# Patient Record
Sex: MALE | Race: WHITE | Employment: OTHER | RURAL
[De-identification: names, ages, dates, MRNs, and addresses within clinical notes are randomized per-mention and may not be internally consistent; named-entity substitution may affect disease eponyms.]

---

## 2017-01-17 ENCOUNTER — OFFICE VISIT (OUTPATIENT)
Dept: FAMILY MEDICINE CLINIC | Age: 72
End: 2017-01-17

## 2017-01-17 VITALS
SYSTOLIC BLOOD PRESSURE: 163 MMHG | HEART RATE: 87 BPM | WEIGHT: 126.4 LBS | DIASTOLIC BLOOD PRESSURE: 79 MMHG | RESPIRATION RATE: 16 BRPM | OXYGEN SATURATION: 98 % | BODY MASS INDEX: 19.8 KG/M2

## 2017-01-17 DIAGNOSIS — F10.982 ALCOHOL-INDUCED INSOMNIA (HCC): ICD-10-CM

## 2017-01-17 DIAGNOSIS — K57.80 DIVERTICULITIS OF INTESTINE WITH ABSCESS WITHOUT BLEEDING, UNSPECIFIED PART OF INTESTINAL TRACT: ICD-10-CM

## 2017-01-17 DIAGNOSIS — I10 ESSENTIAL HYPERTENSION: Primary | ICD-10-CM

## 2017-01-17 DIAGNOSIS — J42 CHRONIC BRONCHITIS, UNSPECIFIED CHRONIC BRONCHITIS TYPE (HCC): ICD-10-CM

## 2017-01-17 DIAGNOSIS — K63.89 COLONIC MASS: ICD-10-CM

## 2017-01-17 RX ORDER — LOSARTAN POTASSIUM 50 MG/1
50 TABLET ORAL
Qty: 30 TAB | Refills: 2 | Status: SHIPPED | OUTPATIENT
Start: 2017-01-17 | End: 2017-04-17 | Stop reason: SDUPTHER

## 2017-01-17 RX ORDER — OMEPRAZOLE 20 MG/1
CAPSULE, DELAYED RELEASE ORAL
Refills: 0 | COMMUNITY
Start: 2016-12-05 | End: 2017-10-05

## 2017-01-17 NOTE — MR AVS SNAPSHOT
Visit Information Date & Time Provider Department Dept. Phone Encounter #  
 1/17/2017  2:30 PM Marilyn Luz NP [facility]  [phone] [id number] Upcoming Health Maintenance Date Due DTaP/Tdap/Td series (1 - Tdap) 7/19/1965 FOBT Q 1 YEAR AGE 50-75 7/19/1994 GLAUCOMA SCREENING Q2Y 7/19/2009 MEDICARE YEARLY EXAM 5/11/2017 Allergies as of 1/17/2017  Review Complete On: 1/17/2017 By: Marilyn Luz NP Severity Noted Reaction Type Reactions Contrast Agent [Iodine]  09/13/2016    Rash Hives on back Current Immunizations  Reviewed on 10/19/2016 Name Date Influenza High Dose Vaccine PF 12/5/2016, 9/14/2015 Influenza Vaccine 8/6/2014 Pneumococcal Conjugate (PCV-13) 9/14/2015 Pneumococcal Vaccine (Unspecified Type) 11/19/2009 Zoster Vaccine, Live 1/16/2016 Not reviewed this visit You Were Diagnosed With   
  
 Codes Comments Essential hypertension    -  Primary ICD-10-CM: I10 
ICD-9-CM: 401.9 Vitals BP Pulse Resp Weight(growth percentile) SpO2 BMI  
 163/79 (BP 1 Location: Left arm, BP Patient Position: Sitting) 87 16 126 lb 6.4 oz (57.3 kg) 98% 19.8 kg/m2 Smoking Status Current Every Day Smoker Vitals History BMI and BSA Data Body Mass Index Body Surface Area  
 19.8 kg/m 2 1.65 m 2 Preferred Pharmacy Pharmacy Name Phone [facility] [address] [place] [address] Jewel Serrano [address] Your Updated Medication List  
  
   
This list is accurate as of: 1/17/17  3:30 PM.  Always use your most recent med list.  
  
  
  
  
 cyanocobalamin 1,000 mcg tablet Take 1,000 mcg by mouth daily. losartan 50 mg tablet Commonly known as:  COZAAR Take 1 Tab by mouth nightly.  
  
 magnesium oxide 400 mg tablet Commonly known as:  MAG-OX Take 1 Tab by mouth two (2) times a day. mirtazapine 7.5 mg tablet Commonly known as:  [place] Brightly Take 1 Tab by mouth nightly. Indications: sleep  
  
 omeprazole 20 mg capsule Commonly known as:  PRILOSEC  
  
 potassium chloride 20 mEq tablet Commonly known as:  K-DUR, KLOR-CON Take  by mouth two (2) times a day. pravastatin 40 mg tablet Commonly known as:  PRAVACHOL Take 40 mg by mouth nightly. propranolol 40 mg tablet Commonly known as:  INDERAL Take 1 Tab by mouth two (2) times a day. tamsulosin 0.4 mg capsule Commonly known as:  FLOMAX Take 0.4 mg by mouth daily. Prescriptions Sent to Pharmacy Refills  
 losartan (COZAAR) 50 mg tablet 2 Sig: Take 1 Tab by mouth nightly. Class: Normal  
 Pharmacy: [id number] Kerrie Dao  #: [phone] Route: Oral  
  
Patient Instructions If you have any questions regarding Corpsolv, you may call Corpsolv support at [phone]. Introducing [facility]! [employer] introduces Trendrating patient portal. Now you can access parts of your medical record, email your doctor's office, and request medication refills online. 1. In your internet browser, go to [email or web address] 2. Click on the First Time User? Click Here link in the Sign In box. You will see the New Member Sign Up page. 3. Enter your Trendrating Access Code exactly as it appears below. You will not need to use this code after youve completed the sign-up process. If you do not sign up before the expiration date, you must request a new code. · Trendrating Access Code: [email or web address] Expires: 2/12/2017  5:39 AM 
 
4. Enter the last four digits of your Social Security Number (xxxx) and Date of Birth (mm/dd/yyyy) as indicated and click Submit. You will be taken to the next sign-up page. 5. Create a ClearFitt ID. This will be your Trendrating login ID and cannot be changed, so think of one that is secure and easy to remember. 6. Create a Fulham password. You can change your password at any time. 7. Enter your Password Reset Question and Answer. This can be used at a later time if you forget your password. 8. Enter your e-mail address. You will receive e-mail notification when new information is available in [address] 9. Click Sign Up. You can now view and download portions of your medical record. 10. Click the Download Summary menu link to download a portable copy of your medical information. If you have questions, please visit the Frequently Asked Questions section of the Fulham website. Remember, Fulham is NOT to be used for urgent needs. For medical emergencies, dial 911. Now available from your iPhone and Android! Please provide this summary of care documentation to your next provider. Your primary care clinician is listed as Jonah Jones. If you have any questions after today's visit, please call [phone].

## 2017-01-18 NOTE — PROGRESS NOTES
1/17/2017    Chief Complaint   Patient presents with    Hypertension       HPI: Donato Cordova is a 66 y.o. male. Very complicated recent medical history. He had prolonged hospitalization for sigmoid diverticuli with possible mass on CT. Recent bout of diverticulitis who was referred to this ER because his CT scan today showed evidence of an intra abdominal abscess. Patient was admitted last month (9/13-9/18) for sigmoid diverticulitis (? Mass on CT) and completed a 14 day course of cipro/ flagyl. His abdominal pain resolved and he followed up with his PCP today. This led to a CT scan being ordered and when he got home, he got a call from Dr Opal Vega office to come to the ER for admission. Labs today is remarkable for Na 121 and K 3.2. He reports that he went back on his Dyazide after his last discharge    Prolonged admission and discharged to [address] [place] [address]. Just had another colonoscopy with a tubular adenoma. Next colonoscopy 5 years. Has gained weight. His main concern today is insomnia. He is no sleeping. He has been taking a low dose of Remeron (Mirtazapine 7.5mg) Will increase dose . Allergies   Allergen Reactions    Contrast Agent [Iodine] Rash     Hives on back       Current Outpatient Prescriptions   Medication Sig Dispense Refill    losartan (COZAAR) 50 mg tablet Take 1 Tab by mouth nightly. 30 Tab 2    tamsulosin (FLOMAX) 0.4 mg capsule Take 0.4 mg by mouth daily.  potassium chloride (K-DUR, KLOR-CON) 20 mEq tablet Take  by mouth two (2) times a day.  cyanocobalamin 1,000 mcg tablet Take 1,000 mcg by mouth daily.  mirtazapine (REMERON) 7.5 mg tablet Take 1 Tab by mouth nightly. Indications: sleep 30 Tab 5    magnesium oxide (MAG-OX) 400 mg tablet Take 1 Tab by mouth two (2) times a day. 180 Tab 1    propranolol (INDERAL) 40 mg tablet Take 1 Tab by mouth two (2) times a day. 60 Tab 11    pravastatin (PRAVACHOL) 40 mg tablet Take 40 mg by mouth nightly.   0    omeprazole (PRILOSEC) 20 mg capsule   0       Past Medical History   Diagnosis Date    Abuse      alcoholism, quit 2012    [facility] muscles [facility] [address] [facility] 2013    Esophageal stricture     Esophagitis     Hemochromatosis     Hyperlipidemia     Hypertension     Peripheral artery disease ([facility]      Dr. Minesh Fox Pulmonary nodule      right lung       Lab Results   Component Value Date/Time    Glucose 101 12/15/2016 03:36 PM    Glucose (POC) 82 10/07/2016 02:21 AM    LDL, calculated 108 05/10/2016 11:39 AM    Creatinine 0.68 12/15/2016 03:36 PM       ROS:  Constitutional: No fever, chills or weight loss  Respiratory: No cough, SOB   CV: No chest pain or Palpitations  GI: No nausea, vomiting or diarrhea. : No dysuria or hematuria. Neuro: No headaches, seizures, change in mental status. Physical Exam:   VS Visit Vitals    /79 (BP 1 Location: Left arm, BP Patient Position: Sitting)    Pulse 87    Resp 16    Wt 126 lb 6.4 oz (57.3 kg)    SpO2 98%    BMI 19.8 kg/m2      General  Alert, oriented WM. Thin. Frail. NAD. Eyes Conjunctiva and lids normal.  Non-icteric. PERRLA, EOMI.   ENMT Lips, teeth, gums normal, mucous membranes moist.    Oropharynx: no erythema, no exudates, no lesions, normal tongue. NECK Thyroid: normal size, nontender. Trachea midline, neck symmetrical and without masses. Carotids 2+ with no bruits. No enlarged nodes. RESP Clear to auscultation and percussion. No rales, wheezes, rhonchi, or rubs. CV RRR, with no S3 or S4, no murmur, no rub. GI   Normal bowel sounds, no bruit, soft, nontender, without masses. MSKEL Normal gait and station. Normal strength and tone, no atrophy. EXT No deformity. Extremities without edema. SKIN Skin warm, normal turgor. NEURO Cranial nerves normal 2-12. PSYCH Judgment and insight good. Oriented to person, place, and time. Affect is alert and attentive.      1. Essential hypertension  Add low dose ARB. - losartan (COZAAR) 50 mg tablet; Take 1 Tab by mouth nightly. Dispense: 30 Tab; Refill: 2    2. ETOH: stopped drinking    3. Intrabdominal abscess: resolved     4. Insomnia  Increase Mirtazapine to 15mg bedtime. Orders Placed This Encounter    omeprazole (PRILOSEC) 20 mg capsule     Refill:  0    losartan (COZAAR) 50 mg tablet     Sig: Take 1 Tab by mouth nightly. Dispense:  30 Tab     Refill:  2       Follow-up Disposition:  Return in about 2 weeks (around 1/31/2017).         EJ Burk

## 2017-01-31 ENCOUNTER — OFFICE VISIT (OUTPATIENT)
Dept: FAMILY MEDICINE CLINIC | Age: 72
End: 2017-01-31

## 2017-01-31 VITALS
DIASTOLIC BLOOD PRESSURE: 75 MMHG | RESPIRATION RATE: 17 BRPM | WEIGHT: 128.4 LBS | BODY MASS INDEX: 20.11 KG/M2 | OXYGEN SATURATION: 92 % | HEART RATE: 40 BPM | SYSTOLIC BLOOD PRESSURE: 118 MMHG

## 2017-01-31 DIAGNOSIS — G62.1 ALCOHOL-INDUCED POLYNEUROPATHY (HCC): Primary | ICD-10-CM

## 2017-01-31 DIAGNOSIS — N40.0 BENIGN NODULAR PROSTATIC HYPERPLASIA WITHOUT LOWER URINARY TRACT SYMPTOMS: ICD-10-CM

## 2017-01-31 DIAGNOSIS — F10.982 ALCOHOL-INDUCED INSOMNIA (HCC): ICD-10-CM

## 2017-01-31 DIAGNOSIS — K21.9 GASTROESOPHAGEAL REFLUX DISEASE WITHOUT ESOPHAGITIS: ICD-10-CM

## 2017-01-31 DIAGNOSIS — I73.9 PVD (PERIPHERAL VASCULAR DISEASE) (HCC): ICD-10-CM

## 2017-01-31 NOTE — MR AVS SNAPSHOT
Visit Information Date & Time Provider Department Dept. Phone Encounter #  
 1/31/2017 11:30 AM Jose Javier NP [facility]leigh[facility] [id number] Your Appointments 3/6/2017 10:00 AM  
ESTABLISHED PATIENT with NORMA Lombardi 38 [facility] Appt Note: 1 MO F/U  
 [address] [id number] [phone]  
  
   
 [address] [address] Upcoming Health Maintenance Date Due DTaP/Tdap/Td series (1 - Tdap) 7/19/1965 FOBT Q 1 YEAR AGE 50-75 7/19/1994 GLAUCOMA SCREENING Q2Y 7/19/2009 MEDICARE YEARLY EXAM 5/11/2017 Allergies as of 1/31/2017  Review Complete On: 1/31/2017 By: Chace Hylton Severity Noted Reaction Type Reactions Contrast Agent [Iodine]  09/13/2016    Rash Hives on back Current Immunizations  Reviewed on 10/19/2016 Name Date Influenza High Dose Vaccine PF 12/5/2016, 9/14/2015 Influenza Vaccine 8/6/2014 Pneumococcal Conjugate (PCV-13) 9/14/2015 Pneumococcal Vaccine (Unspecified Type) 11/19/2009 Zoster Vaccine, Live 1/16/2016 Not reviewed this visit You Were Diagnosed With   
  
 Codes Comments Alcohol-induced insomnia (HCC)     ICD-10-CM: H33.961 ICD-9-CM: 291.82 Vitals BP Pulse Resp Weight(growth percentile) SpO2 BMI  
 118/75 (BP 1 Location: Left arm, BP Patient Position: Sitting) (!) 40 17 128 lb 6.4 oz (58.2 kg) 92% 20.11 kg/m2 Smoking Status Current Every Day Smoker Vitals History BMI and BSA Data Body Mass Index Body Surface Area  
 20.11 kg/m 2 1.66 m 2 Preferred Pharmacy Pharmacy Name Phone [facility] [address] Jewel Serrano [address] Your Updated Medication List  
  
   
This list is accurate as of: 1/31/17 12:29 PM.  Always use your most recent med list.  
  
  
  
  
 cyanocobalamin 1,000 mcg tablet Take 1,000 mcg by mouth daily. losartan 50 mg tablet Commonly known as:  COZAAR Take 1 Tab by mouth nightly.  
  
 magnesium oxide 400 mg tablet Commonly known as:  MAG-OX Take 1 Tab by mouth two (2) times a day. mirtazapine 7.5 mg tablet Commonly known as:  [place] Take 1 Tab by mouth nightly. Indications: sleep * omeprazole 20 mg capsule Commonly known as:  PRILOSEC * omeprazole 20 mg capsule Commonly known as:  PRILOSEC  
take 1 capsule by mouth once daily  
  
 potassium chloride 20 mEq tablet Commonly known as:  K-DUR, KLOR-CON Take  by mouth two (2) times a day. pravastatin 40 mg tablet Commonly known as:  PRAVACHOL  
take 1 tablet by mouth at bedtime  
  
 propranolol 40 mg tablet Commonly known as:  INDERAL Take 1 Tab by mouth two (2) times a day. tamsulosin 0.4 mg capsule Commonly known as:  FLOMAX Take 0.4 mg by mouth daily. * Notice: This list has 2 medication(s) that are the same as other medications prescribed for you. Read the directions carefully, and ask your doctor or other care provider to review them with you. Introducing [address]! [facility] introduces Fingerprint patient portal. Now you can access parts of your medical record, email your doctor's office, and request medication refills online. 1. In your internet browser, go to [email or web address] I 2. Click on the First Time User? Click Here link in the Sign In box. You will see the New Member Sign Up page. 3. Enter your Fingerprint Access Code exactly as it appears below. You will not need to use this code after youve completed the sign-up process. If you do not sign up before the expiration date, you must request a new code. · Fingerprint Access Code: [email or web address] Expires: 2/12/2017  5:39 AM 
 
4.  Enter the last four digits of your Social Security Number (xxxx) and Date of Birth (mm/dd/yyyy) as indicated and click Submit. You will be taken to the next sign-up page. 5. Create a eyeOS ID. This will be your eyeOS login ID and cannot be changed, so think of one that is secure and easy to remember. 6. Create a eyeOS password. You can change your password at any time. 7. Enter your Password Reset Question and Answer. This can be used at a later time if you forget your password. 8. Enter your e-mail address. You will receive e-mail notification when new information is available in [address] 9. Click Sign Up. You can now view and download portions of your medical record. 10. Click the Download Summary menu link to download a portable copy of your medical information. If you have questions, please visit the Frequently Asked Questions section of the eyeOS website. Remember, eyeOS is NOT to be used for urgent needs. For medical emergencies, dial 911. Now available from your iPhone and Android! Please provide this summary of care documentation to your next provider. Your primary care clinician is listed as Krista Tay. If you have any questions after today's visit, please call [phone].

## 2017-02-01 RX ORDER — AMITRIPTYLINE HYDROCHLORIDE 10 MG/1
10 TABLET, FILM COATED ORAL
Qty: 60 TAB | Refills: 2 | Status: SHIPPED | OUTPATIENT
Start: 2017-02-01 | End: 2017-03-06 | Stop reason: SINTOL

## 2017-02-01 NOTE — PROGRESS NOTES
2/1/2017    Chief Complaint   Patient presents with    Hypertension    Insomnia       HPI: Ysabel Erickson is a 66 y.o. male. Very complicated recent medical history. He had prolonged hospitalization for sigmoid diverticuli with possible mass on CT. Recent bout of diverticulitis who was referred to this ER because his CT scan today showed evidence of an intra abdominal abscess. Patient was admitted last month (9/13-9/18) for sigmoid diverticulitis (? Mass on CT) and completed a 14 day course of cipro/ flagyl. He had re-admission in Dec for additional treatment with discharge to [address] [place] [address]. 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 He is still learning to walk post amputation. He has a prosthesis for his shoe, but this makes him more off balance. Issue of concern today: He is not sleeping. His Mirtazapine dose increased to 15mg at night. Does not sleep well. Will add Amitriptyline 10 mg with increase to 20mg as tolerated. Allergies   Allergen Reactions    Contrast Agent [Iodine] Rash     Hives on back       Current Outpatient Prescriptions   Medication Sig Dispense Refill    amitriptyline (ELAVIL) 10 mg tablet Take 1 Tab by mouth nightly. For 10 days. Then increase to 2 tabs at bedtime. Indications: NEUROPATHIC PAIN 60 Tab 2    pravastatin (PRAVACHOL) 40 mg tablet take 1 tablet by mouth at bedtime 90 Tab 3    omeprazole (PRILOSEC) 20 mg capsule take 1 capsule by mouth once daily 30 Cap 5    omeprazole (PRILOSEC) 20 mg capsule   0    losartan (COZAAR) 50 mg tablet Take 1 Tab by mouth nightly. 30 Tab 2    tamsulosin (FLOMAX) 0.4 mg capsule Take 0.4 mg by mouth daily.  potassium chloride (K-DUR, KLOR-CON) 20 mEq tablet Take  by mouth two (2) times a day.  cyanocobalamin 1,000 mcg tablet Take 1,000 mcg by mouth daily.  mirtazapine (REMERON) 7.5 mg tablet Take 1 Tab by mouth nightly.  Indications: sleep 30 Tab 5    magnesium oxide (MAG-OX) 400 mg tablet Take 1 Tab by mouth two (2) times a day. 180 Tab 1    propranolol (INDERAL) 40 mg tablet Take 1 Tab by mouth two (2) times a day. 61 Tab 11       Past Medical History   Diagnosis Date    Abuse      alcoholism, quit 2012    Claudication of calf muscles ([facility] [address] 2013    Esophageal stricture     Esophagitis     Hemochromatosis     Hyperlipidemia     Hypertension     Peripheral artery disease ([facility] [address]      Dr. Adolph Bamberger Pulmonary nodule      right lung       Lab Results   Component Value Date/Time    Glucose 101 12/15/2016 03:36 PM    Glucose (POC) 82 10/07/2016 02:21 AM    LDL, calculated 108 05/10/2016 11:39 AM    Creatinine 0.68 12/15/2016 03:36 PM       ROS:  Constitutional: No fever, chills or weight loss  Respiratory: No cough, SOB   CV: No chest pain or Palpitations  GI: No nausea, vomiting or diarrhea. : No dysuria or hematuria. Neuro: No headaches, seizures, change in mental status. Physical Exam:   VS Visit Vitals    /75 (BP 1 Location: Left arm, BP Patient Position: Sitting)    Pulse (!) 40    Resp 17    Wt 128 lb 6.4 oz (58.2 kg)    SpO2 92%    BMI 20.11 kg/m2      General  Alert, oriented X3. NAD. Thin frail WM. Ambulates unassisted with steady gait. Eyes Conjunctiva and lids normal.    PERRLA, EOMI.   ENMT Mucous membranes moist.  Wears full dentures. Oropharynx: no erythema, no exudates, no lesions, normal tongue. NECK Thyroid: normal size, nontender. Trachea midline, neck symmetrical and without masses. Carotids 2+ with no bruits. No enlarged nodes. RESP Clear to auscultation and percussion. CV RRR, with no S3 or S4, no murmur, no rub. GI   Normal bowel sounds, no bruit, soft, nontender, without masses. MSKEL Normal gait and station. Normal strength and tone, no atrophy. EXT 4th toe amputation left foot. Extremities without edema. SKIN Skin warm, normal turgor. NEURO Cranial nerves normal 2-12.      PSYCH Judgment and insight good. Oriented to person, place, and time. Affect is alert and attentive. 1. Alcohol-induced insomnia (HCC)  Add TCA, may improve sleep and neuropathy. - amitriptyline (ELAVIL) 10 mg tablet; Take 1 Tab by mouth nightly. For 10 days. Then increase to 2 tabs at bedtime. Indications: NEUROPATHIC PAIN  Dispense: 60 Tab; Refill: 2    2. Alcohol-induced polyneuropathy ([facility]  Has stopped drinking. Amitriptyline. - amitriptyline (ELAVIL) 10 mg tablet; Take 1 Tab by mouth nightly. For 10 days. Then increase to 2 tabs at bedtime. Indications: NEUROPATHIC PAIN  Dispense: 60 Tab; Refill: 2    3. PVD [facility]peripheral vascular [facility] ([facility]  As above,    Follow-up Disposition:  Return in about 1 month (around 2/28/2017).         EJ Ackerman

## 2017-02-02 RX ORDER — OMEPRAZOLE 20 MG/1
CAPSULE, DELAYED RELEASE ORAL
Qty: 30 CAP | Refills: 5 | Status: SHIPPED | OUTPATIENT
Start: 2017-02-02 | End: 2017-05-08 | Stop reason: SDUPTHER

## 2017-02-02 RX ORDER — MIRTAZAPINE 15 MG/1
7.5 TABLET, FILM COATED ORAL
Qty: 30 TAB | Refills: 5 | Status: SHIPPED | OUTPATIENT
Start: 2017-02-02 | End: 2017-03-06

## 2017-02-02 RX ORDER — TAMSULOSIN HYDROCHLORIDE 0.4 MG/1
0.4 CAPSULE ORAL DAILY
Qty: 30 CAP | Refills: 11 | Status: SHIPPED | OUTPATIENT
Start: 2017-02-02 | End: 2018-02-12 | Stop reason: SDUPTHER

## 2017-03-06 ENCOUNTER — OFFICE VISIT (OUTPATIENT)
Dept: FAMILY MEDICINE CLINIC | Age: 72
End: 2017-03-06

## 2017-03-06 VITALS
DIASTOLIC BLOOD PRESSURE: 60 MMHG | OXYGEN SATURATION: 99 % | SYSTOLIC BLOOD PRESSURE: 124 MMHG | HEART RATE: 71 BPM | BODY MASS INDEX: 19.64 KG/M2 | RESPIRATION RATE: 17 BRPM | WEIGHT: 125.4 LBS

## 2017-03-06 DIAGNOSIS — I10 ESSENTIAL HYPERTENSION: ICD-10-CM

## 2017-03-06 DIAGNOSIS — J42 CHRONIC BRONCHITIS, UNSPECIFIED CHRONIC BRONCHITIS TYPE (HCC): ICD-10-CM

## 2017-03-06 DIAGNOSIS — G89.28 CHRONIC POST-OPERATIVE PAIN: ICD-10-CM

## 2017-03-06 DIAGNOSIS — E83.42 HYPOMAGNESEMIA: ICD-10-CM

## 2017-03-06 DIAGNOSIS — N40.0 BENIGN NODULAR PROSTATIC HYPERPLASIA WITHOUT LOWER URINARY TRACT SYMPTOMS: ICD-10-CM

## 2017-03-06 DIAGNOSIS — F10.982 ALCOHOL-INDUCED INSOMNIA (HCC): ICD-10-CM

## 2017-03-06 DIAGNOSIS — R31.9 HEMATURIA: Primary | ICD-10-CM

## 2017-03-06 DIAGNOSIS — E53.8 B12 DEFICIENCY: ICD-10-CM

## 2017-03-06 DIAGNOSIS — K21.9 GASTROESOPHAGEAL REFLUX DISEASE WITHOUT ESOPHAGITIS: ICD-10-CM

## 2017-03-06 DIAGNOSIS — K57.80 DIVERTICULITIS OF INTESTINE WITH ABSCESS WITHOUT BLEEDING, UNSPECIFIED PART OF INTESTINAL TRACT: ICD-10-CM

## 2017-03-06 DIAGNOSIS — I73.9 PVD (PERIPHERAL VASCULAR DISEASE) (HCC): ICD-10-CM

## 2017-03-06 RX ORDER — MIRTAZAPINE 7.5 MG/1
TABLET, FILM COATED ORAL
Refills: 0 | COMMUNITY
Start: 2017-01-21 | End: 2017-10-05

## 2017-03-06 RX ORDER — TRAMADOL HYDROCHLORIDE 50 MG/1
50 TABLET ORAL
Qty: 90 TAB | Refills: 1 | Status: SHIPPED | OUTPATIENT
Start: 2017-03-06 | End: 2017-10-05

## 2017-03-06 RX ORDER — MIRTAZAPINE 15 MG/1
15 TABLET, FILM COATED ORAL
Qty: 30 TAB | Refills: 5 | Status: SHIPPED | OUTPATIENT
Start: 2017-03-06 | End: 2017-10-05

## 2017-03-06 NOTE — PATIENT INSTRUCTIONS
If you have any questions regarding TripChamp, you may call TripChamp support at [phone]. If you have any questions regarding TripChamp, you may call TripChamp support at [phone].

## 2017-03-06 NOTE — MR AVS SNAPSHOT
Visit Information Date & Time Provider Department Dept. Phone Encounter #  
 3/6/2017 10:00 AM [id number] Upcoming Health Maintenance Date Due DTaP/Tdap/Td series (1 - Tdap) 7/19/1965 FOBT Q 1 YEAR AGE 50-75 7/19/1994 GLAUCOMA SCREENING Q2Y 7/19/2009 MEDICARE YEARLY EXAM 5/11/2017 Allergies as of 3/6/2017  Review Complete On: 3/6/2017 By: Zac Darden Severity Noted Reaction Type Reactions Contrast Agent [Iodine]  09/13/2016    Rash Hives on back Current Immunizations  Reviewed on 10/19/2016 Name Date Influenza High Dose Vaccine PF 12/5/2016, 9/14/2015 Influenza Vaccine 8/6/2014 Pneumococcal Conjugate (PCV-13) 9/14/2015 Pneumococcal Vaccine (Unspecified Type) 11/19/2009 Zoster Vaccine, Live 1/16/2016 Not reviewed this visit You Were Diagnosed With   
  
 Codes Comments PVD (peripheral vascular disease) ([facility]    -  Primary ICD-10-CM: I73.9 ICD-9-CM: 443.9 Benign nodular prostatic hyperplasia without lower urinary tract symptoms     ICD-10-CM: N40.0 ICD-9-CM: 600.10 Gastroesophageal reflux disease without esophagitis     ICD-10-CM: K21.9 ICD-9-CM: 530.81 Essential hypertension     ICD-10-CM: I10 
ICD-9-CM: 401.9 Diverticulitis of intestine with abscess without bleeding, unspecified part of intestinal tract     ICD-10-CM: K57.80 ICD-9-CM: 562.11, 569.5 Chronic bronchitis, unspecified chronic bronchitis type ([facility]     ICD-10-CM: U86 ICD-9-CM: 491.9 B12 deficiency     ICD-10-CM: E53.8 ICD-9-CM: 266.2 Hypomagnesemia     ICD-10-CM: H63.13 
ICD-9-CM: 275.2 Alcohol-induced insomnia (HCC)     ICD-10-CM: W49.111 ICD-9-CM: 291.82 Chronic post-operative pain     ICD-10-CM: G89.28 ICD-9-CM: 338.28 Vitals BP Pulse Resp Weight(growth percentile) SpO2 BMI 124/60 (BP 1 Location: Right arm, BP Patient Position: Sitting) 71 17 125 lb 6.4 oz (56.9 kg) 99% 19.64 kg/m2 Smoking Status Current Every Day Smoker Vitals History BMI and BSA Data Body Mass Index Body Surface Area 19.64 kg/m 2 1.64 m 2 Preferred Pharmacy Pharmacy Name Phone [facility] [address] [place] [address], [address] [place] [address] Jewel Serrano 101 Your Updated Medication List  
  
   
This list is accurate as of: 3/6/17 11:09 AM.  Always use your most recent med list.  
  
  
  
  
 cyanocobalamin 1,000 mcg tablet Take 1,000 mcg by mouth daily. losartan 50 mg tablet Commonly known as:  COZAAR Take 1 Tab by mouth nightly.  
  
 magnesium oxide 400 mg tablet Commonly known as:  MAG-OX Take 1 Tab by mouth two (2) times a day. * mirtazapine 7.5 mg tablet Commonly known as:  REMERON  
  
 * mirtazapine 15 mg tablet Commonly known as:  Wilnette Campos Take 1 Tab by mouth nightly. Indications: sleep * omeprazole 20 mg capsule Commonly known as:  PRILOSEC * omeprazole 20 mg capsule Commonly known as:  PRILOSEC  
take 1 capsule by mouth once daily  
  
 potassium chloride 20 mEq tablet Commonly known as:  K-DUR, KLOR-CON Take  by mouth two (2) times a day. pravastatin 40 mg tablet Commonly known as:  PRAVACHOL  
take 1 tablet by mouth at bedtime  
  
 propranolol 40 mg tablet Commonly known as:  INDERAL Take 1 Tab by mouth two (2) times a day. tamsulosin 0.4 mg capsule Commonly known as:  FLOMAX Take 1 Cap by mouth daily. traMADol 50 mg tablet Commonly known as:  ULTRAM  
Take 1 Tab by mouth every eight (8) hours as needed for Pain. Max Daily Amount: 150 mg. Indications: Pain * Notice: This list has 4 medication(s) that are the same as other medications prescribed for you. Read the directions carefully, and ask your doctor or other care provider to review them with you. Prescriptions Printed Refills  
 traMADol (ULTRAM) 50 mg tablet 1 Sig: Take 1 Tab by mouth every eight (8) hours as needed for Pain. Max Daily Amount: 150 mg. Indications: Pain Class: Print Route: Oral  
  
Prescriptions Sent to Pharmacy Refills  
 mirtazapine (REMERON) 15 mg tablet 5 Sig: Take 1 Tab by mouth nightly. Indications: sleep Class: Normal  
 Pharmacy: [id number] Kerrie Dao Ph #: [phone] Route: Oral  
  
We Performed the Following CBC WITH AUTOMATED DIFF [63459 CPT(R)] METABOLIC PANEL, COMPREHENSIVE [76399 CPT(R)] DC COLLECTION VENOUS BLOOD,VENIPUNCTURE A323403 CPT(R)] VITAMIN B12 [id number] CPT(R)] Patient Instructions If you have any questions regarding [employer], you may call [employer] support at [phone]. If you have any questions regarding [employer], you may call [employer] support at [phone]. Introducing [facility]! Laya Garcia introduces [employer] patient portal. Now you can access parts of your medical record, email your doctor's office, and request medication refills online. 1. In your internet browser, go to [email or web address] 2. Click on the First Time User? Click Here link in the Sign In box. You will see the New Member Sign Up page. 3. Enter your [employer] Access Code exactly as it appears below. You will not need to use this code after youve completed the sign-up process. If you do not sign up before the expiration date, you must request a new code. · [employer] Access Code: [email or web address] Expires: 6/4/2017  9:56 AM 
 
4. Enter the last four digits of your Social Security Number (xxxx) and Date of Birth (mm/dd/yyyy) as indicated and click Submit. You will be taken to the next sign-up page. 5. Create a [employer] ID. This will be your [employer] login ID and cannot be changed, so think of one that is secure and easy to remember. 6. Create a [employer] password. You can change your password at any time. 7. Enter your Password Reset Question and Answer. This can be used at a later time if you forget your password. 8. Enter your e-mail address. You will receive e-mail notification when new information is available in [address] 9. Click Sign Up. You can now view and download portions of your medical record. 10. Click the Download Summary menu link to download a portable copy of your medical information. If you have questions, please visit the Frequently Asked Questions section of the [employer] website. Remember, [employer] is NOT to be used for urgent needs. For medical emergencies, dial 911. Now available from your iPhone and Android! Please provide this summary of care documentation to your next provider. Your primary care clinician is listed as Nathen Gomez. If you have any questions after today's visit, please call [phone].

## 2017-03-07 LAB
ALBUMIN SERPL-MCNC: 4 G/DL (ref 3.5–4.8)
ALBUMIN/GLOB SERPL: 1 {RATIO} (ref 1.1–2.5)
ALP SERPL-CCNC: 85 IU/L (ref 39–117)
ALT SERPL-CCNC: 11 IU/L (ref 0–44)
AST SERPL-CCNC: 31 IU/L (ref 0–40)
BASOPHILS # BLD AUTO: 0 X10E3/UL (ref 0–0.2)
BASOPHILS NFR BLD AUTO: 1 %
BILIRUB SERPL-MCNC: 0.5 MG/DL (ref 0–1.2)
BUN SERPL-MCNC: 13 MG/DL (ref 8–27)
BUN/CREAT SERPL: 14 (ref 10–22)
CALCIUM SERPL-MCNC: 9.6 MG/DL (ref 8.6–10.2)
CHLORIDE SERPL-SCNC: 97 MMOL/L (ref 96–106)
CO2 SERPL-SCNC: 15 MMOL/L (ref 18–29)
CREAT SERPL-MCNC: 0.92 MG/DL (ref 0.76–1.27)
EOSINOPHIL # BLD AUTO: 0.2 X10E3/UL (ref 0–0.4)
EOSINOPHIL NFR BLD AUTO: 3 %
ERYTHROCYTE [DISTWIDTH] IN BLOOD BY AUTOMATED COUNT: 13.3 % (ref 12.3–15.4)
GLOBULIN SER CALC-MCNC: 4.2 G/DL (ref 1.5–4.5)
GLUCOSE SERPL-MCNC: 107 MG/DL (ref 65–99)
HCT VFR BLD AUTO: 37 % (ref 37.5–51)
HGB BLD-MCNC: 12.6 G/DL (ref 12.6–17.7)
IMM GRANULOCYTES # BLD: 0 X10E3/UL (ref 0–0.1)
IMM GRANULOCYTES NFR BLD: 0 %
LYMPHOCYTES # BLD AUTO: 2.4 X10E3/UL (ref 0.7–3.1)
LYMPHOCYTES NFR BLD AUTO: 38 %
MCH RBC QN AUTO: 32.5 PG (ref 26.6–33)
MCHC RBC AUTO-ENTMCNC: 34.1 G/DL (ref 31.5–35.7)
MCV RBC AUTO: 95 FL (ref 79–97)
MONOCYTES # BLD AUTO: 0.9 X10E3/UL (ref 0.1–0.9)
MONOCYTES NFR BLD AUTO: 15 %
NEUTROPHILS # BLD AUTO: 2.7 X10E3/UL (ref 1.4–7)
NEUTROPHILS NFR BLD AUTO: 43 %
PLATELET # BLD AUTO: 350 X10E3/UL (ref 150–379)
POTASSIUM SERPL-SCNC: 5.3 MMOL/L (ref 3.5–5.2)
PROT SERPL-MCNC: 8.2 G/DL (ref 6–8.5)
RBC # BLD AUTO: 3.88 X10E6/UL (ref 4.14–5.8)
SODIUM SERPL-SCNC: 138 MMOL/L (ref 134–144)
VIT B12 SERPL-MCNC: 402 PG/ML (ref 211–946)
WBC # BLD AUTO: 6.3 X10E3/UL (ref 3.4–10.8)

## 2017-03-07 NOTE — PROGRESS NOTES
3/10/2017    Chief Complaint   Patient presents with    Hypertension     check up appointment        HPI: Sarina Felton is a 66 y.o. male. Complicated history: previous heavy ETOH, stopped drinking. 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 Stopped smoking. Had a intraabdominal abscess last year with prolonged hospitalization. He is concerned he has a UTI. He has seen sediment in the urine. Some terminal dysuria. Allergies   Allergen Reactions    Contrast Agent [Iodine] Rash     Hives on back       Current Outpatient Prescriptions   Medication Sig Dispense Refill    traMADol (ULTRAM) 50 mg tablet Take 1 Tab by mouth every eight (8) hours as needed for Pain. Max Daily Amount: 150 mg. Indications: Pain 90 Tab 1    mirtazapine (REMERON) 15 mg tablet Take 1 Tab by mouth nightly. Indications: sleep 30 Tab 5    pravastatin (PRAVACHOL) 40 mg tablet take 1 tablet by mouth at bedtime 90 Tab 3    omeprazole (PRILOSEC) 20 mg capsule   0    losartan (COZAAR) 50 mg tablet Take 1 Tab by mouth nightly. 30 Tab 2    potassium chloride (K-DUR, KLOR-CON) 20 mEq tablet Take  by mouth two (2) times a day.  cyanocobalamin 1,000 mcg tablet Take 1,000 mcg by mouth daily.  magnesium oxide (MAG-OX) 400 mg tablet Take 1 Tab by mouth two (2) times a day. 180 Tab 1    mirtazapine (REMERON) 7.5 mg tablet   0    omeprazole (PRILOSEC) 20 mg capsule take 1 capsule by mouth once daily 30 Cap 5    tamsulosin (FLOMAX) 0.4 mg capsule Take 1 Cap by mouth daily. 30 Cap 11    propranolol (INDERAL) 40 mg tablet Take 1 Tab by mouth two (2) times a day.  61 Tab 11       Past Medical History:   Diagnosis Date    Abuse     alcoholism, quit 2012    Claudication of calf muscles [facility]) 2013    Esophageal stricture     Esophagitis     Hemochromatosis     Hyperlipidemia     Hypertension     Peripheral artery disease (HCC)     Dr. Deisi Mcfarland Pulmonary nodule     right lung       Lab Results   Component Value Date/Time    Glucose 107 03/06/2017 10:46 AM    Glucose (POC) 82 10/07/2016 02:21 AM    LDL, calculated 108 05/10/2016 11:39 AM    Creatinine 0.92 03/06/2017 10:46 AM       ROS:  Constitutional: No fever, chills or weight loss  Respiratory: No cough, SOB   CV: No chest pain or Palpitations  GI: No nausea, vomiting or diarrhea. : No dysuria or hematuria. Neuro: No headaches, seizures, change in mental status. Physical Exam:   VS Visit Vitals    /60 (BP 1 Location: Right arm, BP Patient Position: Sitting)    Pulse 71    Resp 17    Wt 125 lb 6.4 oz (56.9 kg)    SpO2 99%    BMI 19.64 kg/m2      General  Alert, oriented NAD. Thin frail WM. Ambulates unassisted steady gait. Eyes Conjunctiva and lids normal.  Non- icteric. PERRLA, EOMI.   ENMT Mucous membranes moist.    Oropharynx: no erythema, no exudates, no lesions, normal tongue. NECK Thyroid: normal size, nontender. Trachea midline, neck symmetrical and without masses. Carotids 2+ with no bruits. No enlarged nodes. RESP Clear to auscultation and percussion. No rales, wheezes, rhonchi, or rubs. CV RRR, with no S3 or S4, no murmur, no rub. GI   Normal bowel sounds, no bruit, soft, nontender, without masses. MSKEL Normal gait and station. Normal strength and tone, no atrophy   EXT Left foot: partial amputation of the forefoot at the level of the metatarsals. Sensation intact to monofilament. Extremities without edema. DP and PT 2+ bilaterally. SKIN Skin warm, normal turgor. NEURO Cranial nerves normal 2-12. PSYCH Judgment and insight good. Oriented to person, place, and time. Affect is alert and attentive. 1. PVD (peripheral vascular disease) (HCC)  Quit smoking. Partial amputation of :Left foot. - METABOLIC PANEL, COMPREHENSIVE  - CA COLLECTION VENOUS BLOOD,VENIPUNCTURE    2. Benign nodular prostatic hyperplasia without lower urinary tract symptoms  Will check UA.    He will return with specimen. - METABOLIC PANEL, COMPREHENSIVE  - NE COLLECTION VENOUS BLOOD,VENIPUNCTURE    3. Gastroesophageal reflux disease without esophagitis  Check lab   - METABOLIC PANEL, COMPREHENSIVE  - VITAMIN B12  - NE COLLECTION VENOUS BLOOD,VENIPUNCTURE    4. Essential hypertension  Check lab   - METABOLIC PANEL, COMPREHENSIVE  - NE COLLECTION VENOUS BLOOD,VENIPUNCTURE    5. Diverticulitis of intestine with abscess without bleeding, unspecified part of intestinal tract  Check lab. - METABOLIC PANEL, COMPREHENSIVE  - NE COLLECTION VENOUS BLOOD,VENIPUNCTURE    6. Chronic bronchitis, unspecified chronic bronchitis type (HCC)  Stable  - METABOLIC PANEL, COMPREHENSIVE  - NE COLLECTION VENOUS BLOOD,VENIPUNCTURE    7. B12 deficiency  Check lab  - METABOLIC PANEL, COMPREHENSIVE  - VITAMIN B12  - NE COLLECTION VENOUS BLOOD,VENIPUNCTURE    8. Hypomagnesemia  Check lab   - METABOLIC PANEL, COMPREHENSIVE  - NE COLLECTION VENOUS BLOOD,VENIPUNCTURE    9. Alcohol-induced insomnia (HCC)  Will switch to Mirtazapine. DC'd  TCA due to increased risk of urinary retention   - mirtazapine (REMERON) 15 mg tablet; Take 1 Tab by mouth nightly. Indications: sleep  Dispense: 30 Tab; Refill: 5    10. Chronic post-operative pain  Refill  - traMADol (ULTRAM) 50 mg tablet; Take 1 Tab by mouth every eight (8) hours as needed for Pain. Max Daily Amount: 150 mg. Indications: Pain  Dispense: 90 Tab; Refill: 1    11. Hematuria  Urine very dark color with foul odor. 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 Send for culture. He states that this is the first time the urine \"was like this\"  - AMB POC URINALYSIS DIP STICK AUTO W/O MICRO  - CULTURE, URINE    Spoke with Gastroenterologist on call. He will arrange for f/u. Advised pt to f/u asap -ED for fever, worsening symptoms.        Orders Placed This Encounter    CULTURE, URINE    CBC WITH AUTOMATED DIFF    METABOLIC PANEL, COMPREHENSIVE    VITAMIN B12    AMB POC URINALYSIS DIP STICK AUTO W/O MICRO    NV COLLECTION VENOUS BLOOD,VENIPUNCTURE    mirtazapine (REMERON) 7.5 mg tablet     Refill:  0    traMADol (ULTRAM) 50 mg tablet     Sig: Take 1 Tab by mouth every eight (8) hours as needed for Pain. Max Daily Amount: 150 mg. Indications: Pain     Dispense:  90 Tab     Refill:  1    mirtazapine (REMERON) 15 mg tablet     Sig: Take 1 Tab by mouth nightly. Indications: sleep     Dispense:  30 Tab     Refill:  5       Follow-up Disposition:  Return if symptoms worsen or fail to improve.         EJ Silverman

## 2017-03-08 LAB — BACTERIA UR CULT: NORMAL

## 2017-04-14 ENCOUNTER — HOSPITAL ENCOUNTER (OUTPATIENT)
Dept: CT IMAGING | Age: 72
Discharge: HOME | End: 2017-04-14
Attending: COLON & RECTAL SURGERY
Payer: MEDICARE

## 2017-04-14 DIAGNOSIS — K57.20 PERFORATED DIVERTICULUM OF LARGE INTESTINE: ICD-10-CM

## 2017-04-14 DIAGNOSIS — N32.1 COLO-VESICAL FISTULA: ICD-10-CM

## 2017-04-14 PROCEDURE — 74176 CT ABD & PELVIS W/O CONTRAST: CPT

## 2017-04-17 DIAGNOSIS — I10 ESSENTIAL HYPERTENSION: ICD-10-CM

## 2017-04-27 RX ORDER — LOSARTAN POTASSIUM 50 MG/1
50 TABLET ORAL
Qty: 30 TAB | Refills: 2 | Status: SHIPPED | OUTPATIENT
Start: 2017-04-27 | End: 2017-10-05

## 2017-05-08 RX ORDER — OMEPRAZOLE 20 MG/1
CAPSULE, DELAYED RELEASE ORAL
Qty: 30 CAP | Refills: 5 | Status: SHIPPED | OUTPATIENT
Start: 2017-05-08 | End: 2017-12-06 | Stop reason: SDUPTHER

## 2017-06-05 ENCOUNTER — HOSPITAL ENCOUNTER (OUTPATIENT)
Dept: CT IMAGING | Age: 72
Discharge: HOME | End: 2017-06-05
Attending: COLON & RECTAL SURGERY
Payer: MEDICARE

## 2017-06-05 DIAGNOSIS — K57.90 DIVERTICULAR DISEASE: ICD-10-CM

## 2017-06-05 PROCEDURE — 74176 CT ABD & PELVIS W/O CONTRAST: CPT

## 2017-06-05 RX ORDER — SODIUM CHLORIDE 9 MG/ML
50 INJECTION, SOLUTION INTRAVENOUS
Status: DISCONTINUED | OUTPATIENT
Start: 2017-06-05 | End: 2017-06-05

## 2017-06-05 RX ORDER — SODIUM CHLORIDE 0.9 % (FLUSH) 0.9 %
10 SYRINGE (ML) INJECTION
Status: DISCONTINUED | OUTPATIENT
Start: 2017-06-05 | End: 2017-06-05

## 2017-06-05 RX ORDER — BARIUM SULFATE 20 MG/ML
900 SUSPENSION ORAL
Status: DISCONTINUED | OUTPATIENT
Start: 2017-06-05 | End: 2017-06-05

## 2017-09-25 DIAGNOSIS — E83.42 HYPOMAGNESEMIA: ICD-10-CM

## 2017-09-25 RX ORDER — POTASSIUM CHLORIDE 20 MEQ/1
20 TABLET, EXTENDED RELEASE ORAL DAILY
Qty: 5 TAB | Refills: 0 | Status: SHIPPED | OUTPATIENT
Start: 2017-09-25 | End: 2017-10-05 | Stop reason: SDUPTHER

## 2017-09-25 RX ORDER — LANOLIN ALCOHOL/MO/W.PET/CERES
400 CREAM (GRAM) TOPICAL 2 TIMES DAILY
Qty: 180 TAB | Refills: 1 | Status: SHIPPED | OUTPATIENT
Start: 2017-09-25 | End: 2017-10-05 | Stop reason: SDUPTHER

## 2017-10-05 ENCOUNTER — OFFICE VISIT (OUTPATIENT)
Dept: FAMILY MEDICINE CLINIC | Age: 72
End: 2017-10-05

## 2017-10-05 VITALS
BODY MASS INDEX: 20.34 KG/M2 | WEIGHT: 126.6 LBS | RESPIRATION RATE: 16 BRPM | OXYGEN SATURATION: 99 % | DIASTOLIC BLOOD PRESSURE: 72 MMHG | HEIGHT: 66 IN | HEART RATE: 62 BPM | SYSTOLIC BLOOD PRESSURE: 138 MMHG

## 2017-10-05 DIAGNOSIS — R35.1 NOCTURIA: ICD-10-CM

## 2017-10-05 DIAGNOSIS — E83.42 HYPOMAGNESEMIA: ICD-10-CM

## 2017-10-05 DIAGNOSIS — E87.6 HYPOKALEMIA: ICD-10-CM

## 2017-10-05 DIAGNOSIS — I10 ESSENTIAL HYPERTENSION: ICD-10-CM

## 2017-10-05 DIAGNOSIS — Z00.00 MEDICARE ANNUAL WELLNESS VISIT, SUBSEQUENT: Primary | ICD-10-CM

## 2017-10-05 DIAGNOSIS — Z71.89 ADVANCED DIRECTIVES, COUNSELING/DISCUSSION: ICD-10-CM

## 2017-10-05 RX ORDER — POTASSIUM CHLORIDE 20 MEQ/1
20 TABLET, EXTENDED RELEASE ORAL DAILY
Qty: 30 TAB | Refills: 5 | Status: SHIPPED | OUTPATIENT
Start: 2017-10-05 | End: 2018-04-28 | Stop reason: SDUPTHER

## 2017-10-05 RX ORDER — LANOLIN ALCOHOL/MO/W.PET/CERES
400 CREAM (GRAM) TOPICAL 2 TIMES DAILY
Qty: 60 TAB | Refills: 5 | Status: SHIPPED | OUTPATIENT
Start: 2017-10-05 | End: 2018-12-07 | Stop reason: SDUPTHER

## 2017-10-05 RX ORDER — TRAMADOL HYDROCHLORIDE 50 MG/1
50 TABLET ORAL
Refills: 0 | COMMUNITY
Start: 2017-07-06 | End: 2018-01-16 | Stop reason: ALTCHOICE

## 2017-10-05 NOTE — MR AVS SNAPSHOT
Visit Information Date & Time Provider Department Dept. Phone Encounter #  
 10/5/2017  9:00 AM Varinder Wills NP [facility] [id number] Follow-up Instructions Return in about 3 months (around 1/5/2018). Follow-up and Disposition History Upcoming Health Maintenance Date Due FOBT Q 1 YEAR AGE 50-75 7/19/1994 GLAUCOMA SCREENING Q2Y 7/19/2009 MEDICARE YEARLY EXAM 5/11/2017 DTaP/Tdap/Td series (2 - Td) 10/5/2027 Allergies as of 10/5/2017  Review Complete On: 10/5/2017 By: Varinder Wills NP Severity Noted Reaction Type Reactions Contrast Agent [Iodine]  09/13/2016    Rash Hives on back Current Immunizations  Reviewed on 10/19/2016 Name Date Influenza High Dose Vaccine PF 9/17/2017, 12/5/2016, 9/14/2015 Influenza Vaccine 8/6/2014 Pneumococcal Conjugate (PCV-13) 9/14/2015 Pneumococcal Vaccine (Unspecified Type) 11/19/2009 Zoster Vaccine, Live 1/16/2016 Not reviewed this visit You Were Diagnosed With   
  
 Codes Comments Medicare annual wellness visit, subsequent    -  Primary ICD-10-CM: Z00.00 ICD-9-CM: V70.0 Advanced directives, counseling/discussion     ICD-10-CM: Z71.89 ICD-9-CM: V65.49 Hypomagnesemia     ICD-10-CM: I25.10 
ICD-9-CM: 275.2 Hypokalemia     ICD-10-CM: E87.6 ICD-9-CM: 276.8 Essential hypertension     ICD-10-CM: I10 
ICD-9-CM: 401.9 Nocturia     ICD-10-CM: R35.1 ICD-9-CM: 788.43 Vitals BP Pulse Resp Height(growth percentile) Weight(growth percentile) SpO2  
 138/72 (BP 1 Location: Left arm, BP Patient Position: Sitting) 62 16 5' 6\" (1.676 m) 126 lb 9.6 oz (57.4 kg) 99% BMI Smoking Status 20.43 kg/m2 Current Every Day Smoker Vitals History BMI and BSA Data Body Mass Index Body Surface Area  
 20.43 kg/m 2 1.63 m 2 Preferred Pharmacy Pharmacy Name Phone [facility] [address] [place] [address] Jewel Serrano [address] Your Updated Medication List  
  
   
This list is accurate as of: 10/5/17  9:45 AM.  Always use your most recent med list.  
  
  
  
  
 magnesium oxide 400 mg tablet Commonly known as:  MAG-OX Take 1 Tab by mouth two (2) times a day. omeprazole 20 mg capsule Commonly known as:  PRILOSEC  
take 1 capsule by mouth once daily  
  
 potassium chloride 20 mEq tablet Commonly known as:  K-DUR, KLOR-CON Take 1 Tab by mouth daily. pravastatin 40 mg tablet Commonly known as:  PRAVACHOL  
take 1 tablet by mouth at bedtime  
  
 propranolol 40 mg tablet Commonly known as:  INDERAL Take 1 Tab by mouth two (2) times a day. tamsulosin 0.4 mg capsule Commonly known as:  FLOMAX Take 1 Cap by mouth daily. traMADol 50 mg tablet Commonly known as:  ULTRAM  
  
  
  
  
Prescriptions Sent to Pharmacy Refills  
 potassium chloride (K-DUR, KLOR-CON) 20 mEq tablet 5 Sig: Take 1 Tab by mouth daily. Class: Normal  
 Pharmacy: [id number], [facility] Ph #: [phone] Route: Oral  
 magnesium oxide (MAG-OX) 400 mg tablet 5 Sig: Take 1 Tab by mouth two (2) times a day. Class: Normal  
 [id number] [address], [facility] Ph #: [phone] Route: Oral  
  
We Performed the Following CBC WITH AUTOMATED DIFF [98317 CPT(R)] LIPID PANEL [46419 CPT(R)] MAGNESIUM [id number] CPT(R)] METABOLIC PANEL, COMPREHENSIVE [95077 CPT(R)] PSA W/ REFLX FREE PSA [41708 CPT(R)] Follow-up Instructions Return in about 3 months (around 1/5/2018). Patient Instructions The best way to stay healthy is to live a healthy lifestyle. A healthy lifestyle includes regular exercise, eating a well-balanced diet, keeping a healthy weight and not smoking. Regular physical exams and screening tests are another important way to take care of yourself. Preventive exams provided by health care providers can find health problems early when treatment works best and can keep you from getting certain diseases or illnesses. Preventive services include exams, lab tests, screenings, shots, monitoring and information to help you take care of your own health. All people over 65 should have a pneumonia shot. Pneumonia shots are usually only needed once in a lifetime unless your doctor decides differently. In addition to your physical exam, some screening tests are recommended: 
 
All people over 65 should have a yearly flu shot. People over 65 are at medium to high risk for Hepatitis B. Three shots are needed for complete protection. Bone mass measurement (dexa scan) is recommended every two years. Diabetes Mellitus screening is recommended every year. Glaucoma is an eye disease caused by high pressure in the eye. An eye exam is recommended every year. Cardiovascular screening tests that check your cholesterol and other blood fat (lipid) levels are recommended every five years. Colorectal Cancer screening tests help to find pre-cancerous polyps (growths in the colon) so they can be removed before they turn into cancer. Tests ordered for screening depend on your personal and family history risk factors. Prostate Cancer Screening (annually up to age 76) Screening for breast cancer is recommended yearly with a Mammogram. 
 
Screening for cervical and vaginal cancer is recommended with a pelvic and Pap test every two years. However if you have had an abnormal pap in the past  three years or at high risk for cervical or vaginal cancer Medicare will cover a pap test and a pelvic exam every year. Here is a list of your current Health Maintenance items with a due date: 
Health Maintenance Due Topic Date Due  
  Stool testing for trace blood  07/19/1994  Glaucoma Screening   07/19/2009 Claire Spann Annual Well Visit  05/11/2017 Advance Directives: Care Instructions Your Care Instructions An advance directive is a legal way to state your wishes at the end of your life. It tells your family and your doctor what to do if you can no longer say what you want. There are two main types of advance directives. You can change them any time that your wishes change. · A living will tells your family and your doctor your wishes about life support and other treatment. · A durable power of  for health care lets you name a person to make treatment decisions for you when you can't speak for yourself. This person is called a health care agent. If you do not have an advance directive, decisions about your medical care may be made by a doctor or a  who doesn't know you. It may help to think of an advance directive as a gift to the people who care for you. If you have one, they won't have to make tough decisions by themselves. Follow-up care is a key part of your treatment and safety. Be sure to make and go to all appointments, and call your doctor if you are having problems. It's also a good idea to know your test results and keep a list of the medicines you take. How can you care for yourself at home? · Discuss your wishes with your loved ones and your doctor. This way, there are no surprises. · Many states have a unique form. Or you might use a universal form that has been approved by many states. This kind of form can sometimes be completed and stored online. Your electronic copy will then be available wherever you have a connection to the Internet. In most cases, doctors will respect your wishes even if you have a form from a different state. · You don't need a  to do an advance directive. But you may want to get legal advice. · Think about these questions when you prepare an advance directive: ¨ Who do you want to make decisions about your medical care if you are not able to? Many people choose a family member or close friend. ¨ Do you know enough about life support methods that might be used? If not, talk to your doctor so you understand. ¨ What are you most afraid of that might happen? You might be afraid of having pain, losing your independence, or being kept alive by machines. ¨ Where would you prefer to die? Choices include your home, a hospital, or a nursing home. ¨ Would you like to have information about hospice care to support you and your family? ¨ Do you want to donate organs when you die? ¨ Do you want certain Bahai practices performed before you die? If so, put your wishes in the advance directive. · Read your advance directive every year, and make changes as needed. When should you call for help? Be sure to contact your doctor if you have any questions. Where can you learn more? Go to http://gurdeep-quincy.info/. Enter R264 in the search box to learn more about \"Advance Directives: Care Instructions. \" Current as of: November 17, 2016 Content Version: 11.3 © 7361-6374 [employer]. Care instructions adapted under license by [employer] (which disclaims liability or warranty for this information). If you have questions about a medical condition or this instruction, always ask your healthcare professional. [address] any warranty or liability for your use of this information. Hypokalemia: Care Instructions Your Care Instructions Hypokalemia (say \"gc-rv-dgz-TRISTEN-brent-uh\") is a low level of potassium. The heart, muscles, kidneys, and nervous system all need potassium to work well. This problem has many different causes. Kidney problems, diet, and medicines like diuretics and laxatives can cause it. So can vomiting or diarrhea. In some cases, cancer is the cause.  Your doctor may do tests to find the cause of your low potassium levels. You may need medicines to bring your potassium levels back to normal. You may also need regular blood tests to check your potassium. If you have very low potassium, you may need intravenous (IV) medicines. You also may need tests to check the electrical activity of your heart. Heart problems caused by low potassium levels can be very serious. Follow-up care is a key part of your treatment and safety. Be sure to make and go to all appointments, and call your doctor if you are having problems. It's also a good idea to know your test results and keep a list of the medicines you take. How can you care for yourself at home? · If your doctor recommends it, eat foods that have a lot of potassium. These include fresh fruits, juices, and vegetables. They also include nuts, beans, and milk. · Be safe with medicines. If your doctor prescribes medicines or potassium supplements, take them exactly as directed. Call your doctor if you have any problems with your medicines. · Get your potassium levels tested as often as your doctor tells you. When should you call for help? Call 911 anytime you think you may need emergency care. For example, call if: 
· You feel like your heart is missing beats. Heart problems caused by low potassium can cause death. · You passed out (lost consciousness). · You have a seizure. Call your doctor now or seek immediate medical care if: 
· You feel weak or unusually tired. · You have severe arm or leg cramps. · You have tingling or numbness. · You feel sick to your stomach, or you vomit. · You have belly cramps. · You feel bloated or constipated. · You have to urinate a lot. · You feel very thirsty most of the time. · You are dizzy or lightheaded, or you feel like you may faint. · You feel depressed, or you lose touch with reality. Watch closely for changes in your health, and be sure to contact your doctor if: · You do not get better as expected. Where can you learn more? Go to http://gurdeep-quincy.info/. Enter G358 in the search box to learn more about \"Hypokalemia: Care Instructions. \" Current as of: July 28, 2016 Content Version: 11.3 © 0081-2486 [employer]. Care instructions adapted under license by [employer] (which disclaims liability or warranty for this information). If you have questions about a medical condition or this instruction, always ask your healthcare professional. [address] any warranty or liability for your use of this information. Patient Instructions History Introducing [facility]! [facility] introduces [employer] patient portal. Now you can access parts of your medical record, email your doctor's office, and request medication refills online. 1. In your internet browser, go to [email or web address] 2. Click on the First Time User? Click Here link in the Sign In box. You will see the New Member Sign Up page. 3. Enter your [employer] Access Code exactly as it appears below. You will not need to use this code after youve completed the sign-up process. If you do not sign up before the expiration date, you must request a new code. · [employer] Access Code: [email or web address] Expires: 12/21/2017  4:53 AM 
 
4. Enter the last four digits of your Social Security Number (xxxx) and Date of Birth (mm/dd/yyyy) as indicated and click Submit. You will be taken to the next sign-up page. 5. Create a Acheive CCAt ID. This will be your [employer] login ID and cannot be changed, so think of one that is secure and easy to remember. 6. Create a [employer] CCAt password. You can change your password at any time. 7. Enter your Password Reset Question and Answer. This can be used at a later time if you forget your password. 8. Enter your e-mail address.  You will receive e-mail notification when new information is available in [employer]. 9. Click Sign Up. You can now view and download portions of your medical record. 10. Click the Download Summary menu link to download a portable copy of your medical information. If you have questions, please visit the Frequently Asked Questions section of the [employer] website. Remember, [employer] is NOT to be used for urgent needs. For medical emergencies, dial 911. Now available from your iPhone and Android! Please provide this summary of care documentation to your next provider. Your primary care clinician is listed as Josseline Conrad. If you have any questions after today's visit, please call [phone].

## 2017-10-05 NOTE — PROGRESS NOTES
Laurie Glass is a 67 y.o. male and presents for annual Medicare Wellness Visit. Problem List: Reviewed with patient and discussed risk factors. Patient Active Problem List   Diagnosis Code    Hemochromatosis E83.119    Alcoholism ([facility] [address] F10.20    Pulmonary nodules R91.8    [facility] disease) [facility] J44.9    GERD (gastroesophageal reflux disease) K21.9    HTN (hypertension) I10    Esophageal stricture K22.2    Claudication of calf muscles (HCC) I73.9    B12 deficiency E53.8    Colonic mass K63.9    Diarrhea R19.7    Leukocytosis D72.829    Hypokalemia E87.6    Hypomagnesemia E83.42    Intra-abdominal abscess (HCC) K65.1    GI bleed K92.2       Current medical providers:  Patient Care Team:  Carol Schuster NP as PCP - General (Nurse Practitioner)  Darius Rodas MD (Surgery)    PSH: Reviewed with patient  Past Surgical History:   Procedure Laterality Date    COLONOSCOPY N/A 9/14/2016    COLONOSCOPY performed by Nery Sánchez MD at hospitals ENDOSCOPY    COLONOSCOPY N/A 12/19/2016    COLONOSCOPY performed by Nery Sánchez MD at hospitals ENDOSCOPY    HX AMPUTATION Left 07/2016    left 4th toe from gangrene    HX ENDOSCOPY  10/2016    HX OTHER SURGICAL      left partial foot amputation        SH: Reviewed with patient  Social History   Substance Use Topics    Smoking status: Current Every Day Smoker     Packs/day: 0.50     Years: 50.00     Types: Cigarettes    Smokeless tobacco: Never Used    Alcohol use 12.6 oz/week     21 Glasses of wine per week      Comment: \"about 2-3 glasses of wine per day, a HUGE decrease from before\"       FH: Reviewed with patient  Family History   Problem Relation Age of Onset    No Known Problems Mother      coma       Medications/Allergies: Reviewed with patient  Current Outpatient Prescriptions on File Prior to Visit   Medication Sig Dispense Refill    magnesium oxide (MAG-OX) 400 mg tablet Take 1 Tab by mouth two (2) times a day.  [address] [place] [address] Tab 1    potassium chloride (K-DUR, KLOR-CON) 20 mEq tablet Take 1 Tab by mouth daily. 5 Tab 0    omeprazole (PRILOSEC) 20 mg capsule take 1 capsule by mouth once daily 30 Cap 5    tamsulosin (FLOMAX) 0.4 mg capsule Take 1 Cap by mouth daily. 30 Cap 11    pravastatin (PRAVACHOL) 40 mg tablet take 1 tablet by mouth at bedtime 90 Tab 3    propranolol (INDERAL) 40 mg tablet Take 1 Tab by mouth two (2) times a day. 60 Tab 11     No current facility-administered medications on file prior to visit. Allergies   Allergen Reactions    Contrast Agent [Iodine] Rash     Hives on back       Objective:  Visit Vitals    Pulse 62    Resp 16    Ht 5' 6\" (1.676 m)    Wt 126 lb 9.6 oz (57.4 kg)    SpO2 99%    BMI 20.43 kg/m2    Body mass index is 20.43 kg/(m^2). Assessment of cognitive impairment: Alert and oriented x 4    Depression Screen:   PHQ over the last two weeks 12/15/2016   PHQ Not Done -   Little interest or pleasure in doing things Not at all   Feeling down, depressed or hopeless Not at all   Total Score PHQ 2 0       Fall Risk Assessment:    Fall Risk Assessment, last 12 mths 12/15/2016   Able to walk? Yes   Fall in past 12 months? No       Functional Ability:   Does the patient exhibit a steady gait? yes   How long did it take the patient to get up and walk from a sitting position? 6 seconds   Is the patient self reliant?  (ie can do own laundry, meals, household chores)  yes     Does the patient handle his/her own medications? yes     Does the patient handle his/her own money? yes     Is the patients home safe (ie good lighting, handrails on stairs and bath, etc.)? yes     Did you notice or did patient express any hearing difficulties? no     Did you notice or did patient express any vision difficulties?   no     Were distance and reading eye charts used?   no       Advance Care Planning:   Patient was offered the opportunity to discuss advance care planning:  yes     Does patient have an Advance Directive:  no   If no, did you provide information on Caring Connections? yes       Plan:      Orders Placed This Encounter    traMADol (ULTRAM) 50 mg tablet       Health Maintenance   Topic Date Due    FOBT Q 1 YEAR AGE 50-75  07/19/1994    GLAUCOMA SCREENING Q2Y  07/19/2009    MEDICARE YEARLY EXAM  05/11/2017    DTaP/Tdap/Td series (2 - Td) 10/05/2027    ZOSTER VACCINE AGE 60>  Completed    Pneumococcal 65+ High/Highest Risk  Completed    INFLUENZA AGE 9 TO ADULT  Completed       *Patient verbalized understanding and agreement with the plan. A copy of the After Visit Summary with personalized health plan was given to the patient today.    ___________________________________________________________________________________________________________________________________      Chief Complaint   Patient presents with    Annual Wellness Visit    Medication Refill     Potassium          HPI:       is a 67 y.o. male. He is a retired contractor. Complicated history: previous heavy ETOH, stopped drinking. PVD with anterior partial Right foot amputation due to lack of circulation and had a stent placed. Stopped smoking. Had a intraabdominal abscess last year with prolonged hospitalization. HTN: Currently on Propranolol. HLD: On Pravastain. Denies myalgias. Electrolyte imbalances: takes daily magnesium and potassium. GERD: On daily prilosec. No s/s on medications. Prostate issues: On 0.4 mg of Flomax daily. Dr. Sami Montenegro is his colon doctor in [place]. Dr. Cate Jean is his vascular doctor. New Issues:  Recent visit to the ER and was found to have low potassium and magnesium. Allergies   Allergen Reactions    Contrast Agent [Iodine] Rash     Hives on back       Current Outpatient Prescriptions   Medication Sig    magnesium oxide (MAG-OX) 400 mg tablet Take 1 Tab by mouth two (2) times a day.     potassium chloride (K-DUR, KLOR-CON) 20 mEq tablet Take 1 Tab by mouth daily.  omeprazole (PRILOSEC) 20 mg capsule take 1 capsule by mouth once daily    tamsulosin (FLOMAX) 0.4 mg capsule Take 1 Cap by mouth daily.  pravastatin (PRAVACHOL) 40 mg tablet take 1 tablet by mouth at bedtime    propranolol (INDERAL) 40 mg tablet Take 1 Tab by mouth two (2) times a day.  traMADol (ULTRAM) 50 mg tablet      No current facility-administered medications for this visit.         Past Medical History:   Diagnosis Date    Abuse     alcoholism, quit 2012    Claudication of calf muscles ([address] 2013    Esophageal stricture     Esophagitis     GI bleed 10/2016    Hemochromatosis     Hyperlipidemia     Hypertension     Peripheral artery disease (HCC)     Dr. Anahi Gamino Pulmonary nodule     right lung       Past Surgical History:   Procedure Laterality Date    COLONOSCOPY N/A 9/14/2016    COLONOSCOPY performed by Angel Mulligan MD at [facility]"" ENDOSCOPY    COLONOSCOPY N/A 12/19/2016    COLONOSCOPY performed by Angel Mulligan MD at \[facility]\"" ENDOSCOPY    HX AMPUTATION Left 07/2016    left 4th toe from gangrene    HX ENDOSCOPY  10/2016    HX OTHER SURGICAL      left partial foot amputation       Social History     Social History    Marital status: SINGLE     Spouse name: N/A    Number of children: N/A    Years of education: N/A     Occupational History    retired [employer]      Social History Main Topics    Smoking status: Current Every Day Smoker     Packs/day: 0.50     Years: 50.00     Types: Cigarettes    Smokeless tobacco: Never Used    Alcohol use 12.6 oz/week     21 Glasses of wine per week      Comment: \"about 2-3 glasses of wine per day, a HUGE decrease from before\"    Drug use: No    Sexual activity: Not Asked     Other Topics Concern     Service No    Blood Transfusions No    Caffeine Concern Yes     He does not drink caffeine    Occupational Exposure No    Hobby Hazards No    Sleep Concern No    Stress Concern No    Weight Concern No    Special Diet No    Back Care No    Exercise Yes    Bike Helmet No    Seat Belt Yes    Self-Exams Yes     Social History Narrative       Family History   Problem Relation Age of Onset    No Known Problems Mother      coma       Above history reviewed. ROS:  Denies fever, chills, cough, chest pain, SOB,  nausea, vomiting, or diarrhea. Denies wt loss, wt gain, hemoptysis, hematochezia or melena. Physical Examination:    /72 (BP 1 Location: Left arm, BP Patient Position: Sitting)  Pulse 62  Resp 16  Ht 5' 6\" (1.676 m)  Wt 126 lb 9.6 oz (57.4 kg)  SpO2 99%  BMI 20.43 kg/m2    General: Alert and Ox3, Fluent speech, walks with a limp  HEENT:  PERRLA, EOM intact, TMs, turbinates, pharynx normal.  No thyromegaly. No cervical adenopathy. Neck:  Supple, no adenopathy, JVD, mass or bruit  Chest:  Clear to Ausculation, without wheezes, rales, rubs or ronchi  Cardiac: RRR  Extremities:  No cyanosis, clubbing or edema  Neurologic:  Ambulatory without assist, CN 2-12 grossly intact. Moves all extremities. Skin: no rash  Lymphadenopathy: no cervical or supraclavicular nodes    ASSESSMENT AND PLAN:     1. Medicare annual wellness visit, subsequent  Updating HM  Due for glaucoma screening    2. Advanced directives, counseling/discussion  See note    3. Hypomagnesemia  Checking levels  - magnesium oxide (MAG-OX) 400 mg tablet; Take 1 Tab by mouth two (2) times a day. Dispense: 60 Tab; Refill: 5  - MAGNESIUM    4. Hypokalemia  Checking levels  - potassium chloride (K-DUR, KLOR-CON) 20 mEq tablet; Take 1 Tab by mouth daily. Dispense: 30 Tab; Refill: 5    5. Essential hypertension  Good control  Continue current regimen   - METABOLIC PANEL, COMPREHENSIVE  - CBC WITH AUTOMATED DIFF  - LIPID PANEL    6.  Nocturia  Screening  - PSA W/ REFLX FREE PSA     RTC in 3 months    Shannon Morris NP

## 2017-10-05 NOTE — ACP (ADVANCE CARE PLANNING)
Patient given an Advanced Directive form and states when he fills it in he will bring it back to be scanned into his chart.

## 2017-10-05 NOTE — PATIENT INSTRUCTIONS
The best way to stay healthy is to live a healthy lifestyle. A healthy lifestyle includes regular exercise, eating a well-balanced diet, keeping a healthy weight and not smoking. Regular physical exams and screening tests are another important way to take care of yourself. Preventive exams provided by health care providers can find health problems early when treatment works best and can keep you from getting certain diseases or illnesses. Preventive services include exams, lab tests, screenings, shots, monitoring and information to help you take care of your own health. All people over 65 should have a pneumonia shot. Pneumonia shots are usually only needed once in a lifetime unless your doctor decides differently. In addition to your physical exam, some screening tests are recommended:    All people over 65 should have a yearly flu shot. People over 65 are at medium to high risk for Hepatitis B. Three shots are needed for complete protection. Bone mass measurement (dexa scan) is recommended every two years. Diabetes Mellitus screening is recommended every year. Glaucoma is an eye disease caused by high pressure in the eye. An eye exam is recommended every year. Cardiovascular screening tests that check your cholesterol and other blood fat (lipid) levels are recommended every five years. Colorectal Cancer screening tests help to find pre-cancerous polyps (growths in the colon) so they can be removed before they turn into cancer. Tests ordered for screening depend on your personal and family history risk factors. Prostate Cancer Screening (annually up to age 76)    Screening for breast cancer is recommended yearly with a Mammogram.    Screening for cervical and vaginal cancer is recommended with a pelvic and Pap test every two years.  However if you have had an abnormal pap in the past  three years or at high risk for cervical or vaginal cancer Medicare will cover a pap test and a pelvic exam every year. Here is a list of your current Health Maintenance items with a due date:  Health Maintenance Due   Topic Date Due    Stool testing for trace blood  07/19/1994    Glaucoma Screening   07/19/2009    Annual Well Visit  05/11/2017          Advance Directives: Care Instructions  Your Care Instructions  An advance directive is a legal way to state your wishes at the end of your life. It tells your family and your doctor what to do if you can no longer say what you want. There are two main types of advance directives. You can change them any time that your wishes change. · A living will tells your family and your doctor your wishes about life support and other treatment. · A durable power of  for health care lets you name a person to make treatment decisions for you when you can't speak for yourself. This person is called a health care agent. If you do not have an advance directive, decisions about your medical care may be made by a doctor or a  who doesn't know you. It may help to think of an advance directive as a gift to the people who care for you. If you have one, they won't have to make tough decisions by themselves. Follow-up care is a key part of your treatment and safety. Be sure to make and go to all appointments, and call your doctor if you are having problems. It's also a good idea to know your test results and keep a list of the medicines you take. How can you care for yourself at home? · Discuss your wishes with your loved ones and your doctor. This way, there are no surprises. · Many states have a unique form. Or you might use a universal form that has been approved by many states. This kind of form can sometimes be completed and stored online. Your electronic copy will then be available wherever you have a connection to the Internet. In most cases, doctors will respect your wishes even if you have a form from a different state.   · You don't need a  to do an advance directive. But you may want to get legal advice. · Think about these questions when you prepare an advance directive:  ¨ Who do you want to make decisions about your medical care if you are not able to? Many people choose a family member or close friend. ¨ Do you know enough about life support methods that might be used? If not, talk to your doctor so you understand. ¨ What are you most afraid of that might happen? You might be afraid of having pain, losing your independence, or being kept alive by machines. ¨ Where would you prefer to die? Choices include your home, a hospital, or a nursing home. ¨ Would you like to have information about hospice care to support you and your family? ¨ Do you want to donate organs when you die? ¨ Do you want certain [employer]ats[employer] practices performed before you die? If so, put your wishes in the advance directive. · Read your advance directive every year, and make changes as needed. When should you call for help? Be sure to contact your doctor if you have any questions. Where can you learn more? Go to http://gurdeep-quincy.info/. Enter R264 in the search box to learn more about \"Advance Directives: Care Instructions. \"  Current as of: November 17, 2016  Content Version: 11.3  © 6097-1591 [employer]. Care instructions adapted under license by [employer] (which disclaims liability or warranty for this information). If you have questions about a medical condition or this instruction, always ask your healthcare professional. [place] [address] any warranty or liability for your use of this information. Hypokalemia: Care Instructions  Your Care Instructions  Hypokalemia (say \"jz-ev-mcl-TRISTEN-brent-uh\") is a low level of potassium. The heart, muscles, kidneys, and nervous system all need potassium to work well. This problem has many different causes.  Kidney problems, diet, and medicines like diuretics and laxatives can cause it. So can vomiting or diarrhea. In some cases, cancer is the cause. Your doctor may do tests to find the cause of your low potassium levels. You may need medicines to bring your potassium levels back to normal. You may also need regular blood tests to check your potassium. If you have very low potassium, you may need intravenous (IV) medicines. You also may need tests to check the electrical activity of your heart. Heart problems caused by low potassium levels can be very serious. Follow-up care is a key part of your treatment and safety. Be sure to make and go to all appointments, and call your doctor if you are having problems. It's also a good idea to know your test results and keep a list of the medicines you take. How can you care for yourself at home? · If your doctor recommends it, eat foods that have a lot of potassium. These include fresh fruits, juices, and vegetables. They also include nuts, beans, and milk. · Be safe with medicines. If your doctor prescribes medicines or potassium supplements, take them exactly as directed. Call your doctor if you have any problems with your medicines. · Get your potassium levels tested as often as your doctor tells you. When should you call for help? Call 911 anytime you think you may need emergency care. For example, call if:  · You feel like your heart is missing beats. Heart problems caused by low potassium can cause death. · You passed out (lost consciousness). · You have a seizure. Call your doctor now or seek immediate medical care if:  · You feel weak or unusually tired. · You have severe arm or leg cramps. · You have tingling or numbness. · You feel sick to your stomach, or you vomit. · You have belly cramps. · You feel bloated or constipated. · You have to urinate a lot. · You feel very thirsty most of the time. · You are dizzy or lightheaded, or you feel like you may faint.   · You feel depressed, or you lose touch with reality. Watch closely for changes in your health, and be sure to contact your doctor if:  · You do not get better as expected. Where can you learn more? Go to http://gurdeep-quincy.info/. Enter G358 in the search box to learn more about \"Hypokalemia: Care Instructions. \"  Current as of: July 28, 2016  Content Version: 11.3  © 6160-2415 [employer]. Care instructions adapted under license by [employer] (which disclaims liability or warranty for this information). If you have questions about a medical condition or this instruction, always ask your healthcare professional. [address] any warranty or liability for your use of this information.

## 2017-10-06 ENCOUNTER — TELEPHONE (OUTPATIENT)
Dept: FAMILY MEDICINE CLINIC | Age: 72
End: 2017-10-06

## 2017-10-06 LAB
ALBUMIN SERPL-MCNC: 4.1 G/DL (ref 3.5–4.8)
ALBUMIN/GLOB SERPL: 1.1 {RATIO} (ref 1.2–2.2)
ALP SERPL-CCNC: 79 IU/L (ref 39–117)
ALT SERPL-CCNC: 18 IU/L (ref 0–44)
AST SERPL-CCNC: 35 IU/L (ref 0–40)
BASOPHILS # BLD AUTO: 0.1 X10E3/UL (ref 0–0.2)
BASOPHILS NFR BLD AUTO: 1 %
BILIRUB SERPL-MCNC: 0.7 MG/DL (ref 0–1.2)
BUN SERPL-MCNC: 7 MG/DL (ref 8–27)
BUN/CREAT SERPL: 9 (ref 10–24)
CALCIUM SERPL-MCNC: 9.3 MG/DL (ref 8.6–10.2)
CHLORIDE SERPL-SCNC: 98 MMOL/L (ref 96–106)
CHOLEST SERPL-MCNC: 160 MG/DL (ref 100–199)
CO2 SERPL-SCNC: 26 MMOL/L (ref 18–29)
CREAT SERPL-MCNC: 0.74 MG/DL (ref 0.76–1.27)
EOSINOPHIL # BLD AUTO: 0.3 X10E3/UL (ref 0–0.4)
EOSINOPHIL NFR BLD AUTO: 5 %
ERYTHROCYTE [DISTWIDTH] IN BLOOD BY AUTOMATED COUNT: 13.4 % (ref 12.3–15.4)
GLOBULIN SER CALC-MCNC: 3.6 G/DL (ref 1.5–4.5)
GLUCOSE SERPL-MCNC: 74 MG/DL (ref 65–99)
HCT VFR BLD AUTO: 43.1 % (ref 37.5–51)
HDLC SERPL-MCNC: 53 MG/DL
HGB BLD-MCNC: 14.5 G/DL (ref 12.6–17.7)
IMM GRANULOCYTES # BLD: 0 X10E3/UL (ref 0–0.1)
IMM GRANULOCYTES NFR BLD: 0 %
LDLC SERPL CALC-MCNC: 87 MG/DL (ref 0–99)
LYMPHOCYTES # BLD AUTO: 2.2 X10E3/UL (ref 0.7–3.1)
LYMPHOCYTES NFR BLD AUTO: 32 %
MAGNESIUM SERPL-MCNC: 1.5 MG/DL (ref 1.6–2.3)
MCH RBC QN AUTO: 34.3 PG (ref 26.6–33)
MCHC RBC AUTO-ENTMCNC: 33.6 G/DL (ref 31.5–35.7)
MCV RBC AUTO: 102 FL (ref 79–97)
MONOCYTES # BLD AUTO: 0.8 X10E3/UL (ref 0.1–0.9)
MONOCYTES NFR BLD AUTO: 12 %
NEUTROPHILS # BLD AUTO: 3.5 X10E3/UL (ref 1.4–7)
NEUTROPHILS NFR BLD AUTO: 50 %
PLATELET # BLD AUTO: 275 X10E3/UL (ref 150–379)
POTASSIUM SERPL-SCNC: 4.3 MMOL/L (ref 3.5–5.2)
PROT SERPL-MCNC: 7.7 G/DL (ref 6–8.5)
PSA SERPL-MCNC: 1.4 NG/ML (ref 0–4)
RBC # BLD AUTO: 4.23 X10E6/UL (ref 4.14–5.8)
REFLEX CRITERIA: NORMAL
SODIUM SERPL-SCNC: 140 MMOL/L (ref 134–144)
TRIGL SERPL-MCNC: 98 MG/DL (ref 0–149)
VLDLC SERPL CALC-MCNC: 20 MG/DL (ref 5–40)
WBC # BLD AUTO: 6.9 X10E3/UL (ref 3.4–10.8)

## 2017-10-06 NOTE — TELEPHONE ENCOUNTER
Patient aware of lab results. Stated the reason his magnesium level is so low is because he ran out of medication after leaving the emergency room (that they only gave him 5 tabs). Picked up refills you sent in and said he will go back to taking them everyday now that he has a daily supply.

## 2017-10-06 NOTE — PROGRESS NOTES
Prostate level is within range. Your Magnesium level is low again. How good are you about remembering you Magnesium pill? Kidneys are doing OK. Potassium is much better. Liver is doing OK. Blood count is OK.   Cholesterol looks great!!

## 2017-10-06 NOTE — TELEPHONE ENCOUNTER
----- Message from Suzette Conroy NP sent at 10/6/2017 12:52 PM EDT -----  Prostate level is within range. Your Magnesium level is low again. How good are you about remembering you Magnesium pill? Kidneys are doing OK. Potassium is much better. Liver is doing OK. Blood count is OK.   Cholesterol looks great!!

## 2017-12-07 RX ORDER — OMEPRAZOLE 20 MG/1
CAPSULE, DELAYED RELEASE ORAL
Qty: 30 CAP | Refills: 5 | Status: SHIPPED | OUTPATIENT
Start: 2017-12-07 | End: 2018-10-24 | Stop reason: SDUPTHER

## 2017-12-18 DIAGNOSIS — I10 ESSENTIAL HYPERTENSION: ICD-10-CM

## 2017-12-18 RX ORDER — PROPRANOLOL HYDROCHLORIDE 40 MG/1
TABLET ORAL
Qty: 60 TAB | Refills: 11 | OUTPATIENT
Start: 2017-12-18

## 2017-12-18 RX ORDER — PROPRANOLOL HYDROCHLORIDE 40 MG/1
40 TABLET ORAL 2 TIMES DAILY
Qty: 60 TAB | Refills: 11 | Status: SHIPPED | OUTPATIENT
Start: 2017-12-18 | End: 2018-08-30

## 2017-12-29 ENCOUNTER — HOME HEALTH ADMISSION (OUTPATIENT)
Dept: HOME HEALTH SERVICES | Facility: HOME HEALTH | Age: 72
End: 2017-12-29
Payer: MEDICARE

## 2017-12-30 ENCOUNTER — HOME CARE VISIT (OUTPATIENT)
Dept: HOME HEALTH SERVICES | Facility: HOME HEALTH | Age: 72
End: 2017-12-30

## 2017-12-31 ENCOUNTER — HOME CARE VISIT (OUTPATIENT)
Dept: SCHEDULING | Facility: HOME HEALTH | Age: 72
End: 2017-12-31
Payer: MEDICARE

## 2017-12-31 PROCEDURE — 3331090001 HH PPS REVENUE CREDIT

## 2017-12-31 PROCEDURE — 3331090002 HH PPS REVENUE DEBIT

## 2017-12-31 PROCEDURE — 400013 HH SOC

## 2017-12-31 PROCEDURE — G0299 HHS/HOSPICE OF RN EA 15 MIN: HCPCS

## 2018-01-01 PROCEDURE — 3331090002 HH PPS REVENUE DEBIT

## 2018-01-01 PROCEDURE — 3331090001 HH PPS REVENUE CREDIT

## 2018-01-02 ENCOUNTER — HOME CARE VISIT (OUTPATIENT)
Dept: SCHEDULING | Facility: HOME HEALTH | Age: 73
End: 2018-01-02
Payer: MEDICARE

## 2018-01-02 VITALS
HEART RATE: 60 BPM | OXYGEN SATURATION: 98 % | SYSTOLIC BLOOD PRESSURE: 128 MMHG | RESPIRATION RATE: 18 BRPM | TEMPERATURE: 99 F | DIASTOLIC BLOOD PRESSURE: 84 MMHG

## 2018-01-02 VITALS
BODY MASS INDEX: 20.89 KG/M2 | WEIGHT: 130 LBS | HEART RATE: 59 BPM | SYSTOLIC BLOOD PRESSURE: 110 MMHG | RESPIRATION RATE: 20 BRPM | HEIGHT: 66 IN | OXYGEN SATURATION: 98 % | TEMPERATURE: 99.3 F | DIASTOLIC BLOOD PRESSURE: 80 MMHG

## 2018-01-02 PROCEDURE — 3331090002 HH PPS REVENUE DEBIT

## 2018-01-02 PROCEDURE — G0299 HHS/HOSPICE OF RN EA 15 MIN: HCPCS

## 2018-01-02 PROCEDURE — 3331090001 HH PPS REVENUE CREDIT

## 2018-01-03 ENCOUNTER — HOME CARE VISIT (OUTPATIENT)
Dept: SCHEDULING | Facility: HOME HEALTH | Age: 73
End: 2018-01-03
Payer: MEDICARE

## 2018-01-03 PROCEDURE — G0151 HHCP-SERV OF PT,EA 15 MIN: HCPCS

## 2018-01-03 PROCEDURE — 3331090001 HH PPS REVENUE CREDIT

## 2018-01-03 PROCEDURE — 3331090002 HH PPS REVENUE DEBIT

## 2018-01-04 PROCEDURE — 3331090001 HH PPS REVENUE CREDIT

## 2018-01-04 PROCEDURE — 3331090002 HH PPS REVENUE DEBIT

## 2018-01-05 ENCOUNTER — HOME CARE VISIT (OUTPATIENT)
Dept: HOME HEALTH SERVICES | Facility: HOME HEALTH | Age: 73
End: 2018-01-05
Payer: MEDICARE

## 2018-01-05 ENCOUNTER — HOME CARE VISIT (OUTPATIENT)
Dept: SCHEDULING | Facility: HOME HEALTH | Age: 73
End: 2018-01-05
Payer: MEDICARE

## 2018-01-05 PROCEDURE — 3331090002 HH PPS REVENUE DEBIT

## 2018-01-05 PROCEDURE — 3331090001 HH PPS REVENUE CREDIT

## 2018-01-06 PROCEDURE — 3331090002 HH PPS REVENUE DEBIT

## 2018-01-06 PROCEDURE — 3331090001 HH PPS REVENUE CREDIT

## 2018-01-07 PROCEDURE — 3331090002 HH PPS REVENUE DEBIT

## 2018-01-07 PROCEDURE — 3331090001 HH PPS REVENUE CREDIT

## 2018-01-08 ENCOUNTER — HOME CARE VISIT (OUTPATIENT)
Dept: SCHEDULING | Facility: HOME HEALTH | Age: 73
End: 2018-01-08
Payer: MEDICARE

## 2018-01-08 VITALS
DIASTOLIC BLOOD PRESSURE: 70 MMHG | SYSTOLIC BLOOD PRESSURE: 130 MMHG | HEART RATE: 60 BPM | RESPIRATION RATE: 18 BRPM | OXYGEN SATURATION: 95 %

## 2018-01-08 VITALS
SYSTOLIC BLOOD PRESSURE: 150 MMHG | TEMPERATURE: 98.8 F | OXYGEN SATURATION: 99 % | RESPIRATION RATE: 18 BRPM | DIASTOLIC BLOOD PRESSURE: 70 MMHG | HEART RATE: 61 BPM

## 2018-01-08 PROCEDURE — G0152 HHCP-SERV OF OT,EA 15 MIN: HCPCS

## 2018-01-08 PROCEDURE — G0299 HHS/HOSPICE OF RN EA 15 MIN: HCPCS

## 2018-01-08 PROCEDURE — 3331090001 HH PPS REVENUE CREDIT

## 2018-01-08 PROCEDURE — 3331090002 HH PPS REVENUE DEBIT

## 2018-01-09 ENCOUNTER — HOME CARE VISIT (OUTPATIENT)
Dept: HOME HEALTH SERVICES | Facility: HOME HEALTH | Age: 73
End: 2018-01-09
Payer: MEDICARE

## 2018-01-09 VITALS
TEMPERATURE: 99 F | HEART RATE: 74 BPM | OXYGEN SATURATION: 98 % | DIASTOLIC BLOOD PRESSURE: 70 MMHG | RESPIRATION RATE: 18 BRPM | SYSTOLIC BLOOD PRESSURE: 144 MMHG

## 2018-01-09 PROCEDURE — 3331090002 HH PPS REVENUE DEBIT

## 2018-01-09 PROCEDURE — 3331090001 HH PPS REVENUE CREDIT

## 2018-01-10 PROCEDURE — 3331090001 HH PPS REVENUE CREDIT

## 2018-01-10 PROCEDURE — 3331090002 HH PPS REVENUE DEBIT

## 2018-01-11 ENCOUNTER — HOME CARE VISIT (OUTPATIENT)
Dept: SCHEDULING | Facility: HOME HEALTH | Age: 73
End: 2018-01-11
Payer: MEDICARE

## 2018-01-11 VITALS
TEMPERATURE: 97.8 F | HEART RATE: 68 BPM | RESPIRATION RATE: 18 BRPM | OXYGEN SATURATION: 99 % | DIASTOLIC BLOOD PRESSURE: 60 MMHG | SYSTOLIC BLOOD PRESSURE: 120 MMHG

## 2018-01-11 PROCEDURE — 3331090002 HH PPS REVENUE DEBIT

## 2018-01-11 PROCEDURE — 3331090001 HH PPS REVENUE CREDIT

## 2018-01-11 PROCEDURE — G0299 HHS/HOSPICE OF RN EA 15 MIN: HCPCS

## 2018-01-12 PROCEDURE — 3331090002 HH PPS REVENUE DEBIT

## 2018-01-12 PROCEDURE — 3331090001 HH PPS REVENUE CREDIT

## 2018-01-13 PROCEDURE — 3331090002 HH PPS REVENUE DEBIT

## 2018-01-13 PROCEDURE — 3331090001 HH PPS REVENUE CREDIT

## 2018-01-14 PROCEDURE — 3331090002 HH PPS REVENUE DEBIT

## 2018-01-14 PROCEDURE — 3331090001 HH PPS REVENUE CREDIT

## 2018-01-15 PROCEDURE — 3331090001 HH PPS REVENUE CREDIT

## 2018-01-15 PROCEDURE — 3331090002 HH PPS REVENUE DEBIT

## 2018-01-16 ENCOUNTER — OFFICE VISIT (OUTPATIENT)
Dept: FAMILY MEDICINE CLINIC | Age: 73
End: 2018-01-16

## 2018-01-16 ENCOUNTER — HOME CARE VISIT (OUTPATIENT)
Dept: SCHEDULING | Facility: HOME HEALTH | Age: 73
End: 2018-01-16
Payer: MEDICARE

## 2018-01-16 VITALS
OXYGEN SATURATION: 100 % | HEIGHT: 66 IN | BODY MASS INDEX: 20.38 KG/M2 | DIASTOLIC BLOOD PRESSURE: 90 MMHG | TEMPERATURE: 98.3 F | WEIGHT: 126.8 LBS | RESPIRATION RATE: 12 BRPM | SYSTOLIC BLOOD PRESSURE: 136 MMHG | HEART RATE: 63 BPM

## 2018-01-16 DIAGNOSIS — Z12.11 SCREENING FOR COLON CANCER: ICD-10-CM

## 2018-01-16 DIAGNOSIS — I10 ESSENTIAL HYPERTENSION: ICD-10-CM

## 2018-01-16 DIAGNOSIS — G45.9 TRANSIENT CEREBRAL ISCHEMIA, UNSPECIFIED TYPE: Primary | ICD-10-CM

## 2018-01-16 PROCEDURE — 3331090001 HH PPS REVENUE CREDIT

## 2018-01-16 PROCEDURE — 3331090002 HH PPS REVENUE DEBIT

## 2018-01-16 RX ORDER — LISINOPRIL 5 MG/1
5 TABLET ORAL DAILY
Qty: 90 TAB | Refills: 1 | Status: SHIPPED | OUTPATIENT
Start: 2018-01-16 | End: 2018-02-09 | Stop reason: DRUGHIGH

## 2018-01-16 NOTE — MR AVS SNAPSHOT
[address] [address] [phone] Patient: Suzanne Garcia MRN: [id number] Visit Information Date & Time Provider Department Dept. Phone Encounter #  
 1/16/2018 11:10 [place] [address] [place] [id number] Upcoming Health Maintenance Date Due FOBT Q 1 YEAR AGE 50-75 7/19/1994 GLAUCOMA SCREENING Q2Y 7/19/2009 MEDICARE YEARLY EXAM 10/6/2018 DTaP/Tdap/Td series (2 - Td) 10/5/2027 Allergies as of 1/16/2018  Review Complete On: 1/16/2018 By: Carlos Doyle NP Severity Noted Reaction Type Reactions Contrast Agent [Iodine]  09/13/2016    Rash Hives on back Current Immunizations  Reviewed on 10/19/2016 Name Date Influenza High Dose Vaccine PF 9/17/2017, 12/5/2016, 9/14/2015 Influenza Vaccine 8/6/2014 Pneumococcal Conjugate (PCV-13) 9/14/2015 Pneumococcal Vaccine (Unspecified Type) 11/19/2009 Zoster Vaccine, Live 1/16/2016 Not reviewed this visit You Were Diagnosed With   
  
 Codes Comments Transient cerebral ischemia, unspecified type    -  Primary ICD-10-CM: G45.9 ICD-9-CM: 435.9 Essential hypertension     ICD-10-CM: I10 
ICD-9-CM: 401.9 Vitals BP Pulse Temp Resp Height(growth percentile) Weight(growth percentile) 136/90 (BP 1 Location: Right arm, BP Patient Position: Sitting) 63 98.3 °F (36.8 °C) (Oral) 12 5' 6\" (1.676 m) 126 lb 12.8 oz (57.5 kg) SpO2 BMI Smoking Status 100% 20.47 kg/m2 Current Every Day Smoker Vitals History BMI and BSA Data Body Mass Index Body Surface Area  
 20.47 kg/m 2 1.64 m 2 Preferred Pharmacy Pharmacy Name Phone [facility] [facility] [place] [facility], [address] [place] [address] [address] Your Updated Medication List  
  
   
This list is accurate as of: 1/16/18 11:54 AM.  Always use your most recent med list.  
  
  
  
  
 acetaminophen 500 mg tablet Commonly known as:  TYLENOL Take 500 mg by mouth every six (6) hours as needed for Pain. MAY TAKE 1 TO 2 TABS DO NOT EXCEED 4000MG/24HRS  
  
 aspirin delayed-release 81 mg tablet Take 81 mg by mouth daily. lisinopril 5 mg tablet Commonly known as:  Cleotis Bobo Take 1 Tab by mouth daily. Indications: hypertension  
  
 magnesium oxide 400 mg tablet Commonly known as:  MAG-OX Take 1 Tab by mouth two (2) times a day. omeprazole 20 mg capsule Commonly known as:  PRILOSEC  
take 1 capsule by mouth once daily  
  
 potassium chloride 20 mEq tablet Commonly known as:  K-DUR, KLOR-CON Take 1 Tab by mouth daily. pravastatin 40 mg tablet Commonly known as:  PRAVACHOL  
take 1 tablet by mouth at bedtime  
  
 propranolol 40 mg tablet Commonly known as:  INDERAL Take 1 Tab by mouth two (2) times a day. tamsulosin 0.4 mg capsule Commonly known as:  FLOMAX Take 1 Cap by mouth daily. Prescriptions Sent to Pharmacy Refills  
 lisinopril (PRINIVIL, ZESTRIL) 5 mg tablet 1 Sig: Take 1 Tab by mouth daily. Indications: hypertension Class: Normal  
 Pharmacy: [id number] [facility] [place] Ph #: [phone] Route: Oral  
  
We Performed the Following MAGNESIUM [id number] CPT(R)] METABOLIC PANEL, COMPREHENSIVE [99652 CPT(R)] To-Do List   
 01/16/2018 12:00 PM  
  Appointment with Alina Tan RN at [place] [address]  
  
 01/18/2018 2:00 PM  
  Appointment with Alina Tan RN at [place] [address] Patient Instructions If you have any questions regarding [employer], you may call [employer] support at [phone]. Introducing [facility]! Victorino Omer introduces [employer] patient portal. Now you can access parts of your medical record, email your doctor's office, and request medication refills online. 1. In your internet browser, go to [email or web address] 2. Click on the First Time User? Click Here link in the Sign In box. You will see the New Member Sign Up page. 3. Enter your B[employer] Motion Access Code exactly as it appears below. You will not need to use this code after youve completed the sign-up process. If you do not sign up before the expiration date, you must request a new code. · B[employer] Motion Access Code: [email or web address] Expires: 3/29/2018  1:47 PM 
 
4. Enter the last four digits of your Social Security Number (xxxx) and Date of Birth (mm/dd/yyyy) as indicated and click Submit. You will be taken to the next sign-up page. 5. Create a Mailboxt ID. This will be your Bubble Motion login ID and cannot be changed, so think of one that is secure and easy to remember. 6. Create a Bubble Motion password. You can change your password at any time. 7. Enter your Password Reset Question and Answer. This can be used at a later time if you forget your password. 8. Enter your e-mail address. You will receive e-mail notification when new information is available in [address] 9. Click Sign Up. You can now view and download portions of your medical record. 10. Click the Download Summary menu link to download a portable copy of your medical information. If you have questions, please visit the Frequently Asked Questions section of the B[employer] Motion website. Remember, B[employer] Motion is NOT to be used for urgent needs. For medical emergencies, dial 911. Now available from your iPhone and Android! Please provide this summary of care documentation to your next provider. Your primary care clinician is listed as Anthony Parsons. If you have any questions after today's visit, please call [phone].

## 2018-01-16 NOTE — PROGRESS NOTES
Chief Complaint   Patient presents with    COPD    Ischemic Stroke     follow up         HPI:       is a 67 y.o. male. He is a retired contractor. Complicated history: previous heavy ETOH, stopped drinking. PVD with anterior partial Right foot amputation due to lack of circulation and had a stent placed. Stopped smoking. Had a intraabdominal abscess in 2016 with prolonged hospitalization. HTN: Currently on Propranolol. HLD: On Pravastain. Denies myalgias. Electrolyte imbalances: takes daily magnesium and potassium. GERD: On daily prilosec. No s/s on medications. Prostate issues: On 0.4 mg of Flomax daily. Dr. Watt Ormond is his colon doctor in [place]. Dr. Derik Morgan is his vascular doctor. New Issues:  Was admitted 12/27-12/29 for TIA vs CVA that presented with left sided weakness. Plan to follow-up with [facility]roke [facility] 1/24. Symptoms have totally resolved. He has been seen by home health and is getting PT and OT who released him. Nursing plans to release him Thursday. Did not change any of his medications. He was then [facility] at [facility] on 1/2 for blurry vision. Was told he needed new glasses. Plans to go see Dr. Otilia Luis. He is still smoking about 10 cigarettes per day. Allergies   Allergen Reactions    Contrast Agent [Iodine] Rash     Hives on back       Current Outpatient Prescriptions   Medication Sig    acetaminophen (TYLENOL) 500 mg tablet Take 500 mg by mouth every six (6) hours as needed for Pain. MAY TAKE 1 TO 2 TABS  DO NOT EXCEED 4000MG/24HRS    aspirin delayed-release 81 mg tablet Take 81 mg by mouth daily.  propranolol (INDERAL) 40 mg tablet Take 1 Tab by mouth two (2) times a day.  omeprazole (PRILOSEC) 20 mg capsule take 1 capsule by mouth once daily    potassium chloride (K-DUR, KLOR-CON) 20 mEq tablet Take 1 Tab by mouth daily.  magnesium oxide (MAG-OX) 400 mg tablet Take 1 Tab by mouth two (2) times a day.  tamsulosin (FLOMAX) 0.4 mg capsule Take 1 Cap by mouth daily.  pravastatin (PRAVACHOL) 40 mg tablet take 1 tablet by mouth at bedtime     No current facility-administered medications for this visit.         Past Medical History:   Diagnosis Date    Abuse     alcoholism, quit 2012    Claudication of calf muscles ([address] 2013    Esophageal stricture     Esophagitis     GI bleed 10/2016    Hemochromatosis     Hyperlipidemia     Hypertension     Peripheral artery disease (HCC)     Dr. Sergio Rodriguez Pulmonary nodule     right lung       Past Surgical History:   Procedure Laterality Date    COLONOSCOPY N/A 9/14/2016    COL[facility] END[facility]COPY    COLONOSCOPY N/A 12/19/2016    COLONOSCOPY performed [facility] at [facility] ENDOSCOPY    HX AMPUTATION Left 07/2016    left 4th toe from gangrene    HX ENDOSCOPY  10/2016    HX OTHER SURGICAL      left partial foot amputation       Social History     Social History    Marital status: SINGLE     Spouse name: N/A    Number of children: N/A    Years of education: N/A     Occupational History    retired [employer]      Social History Main Topics    Smoking status: Current Every Day Smoker     Packs/day: 0.50     Years: 50.00     Types: Cigarettes    Smokeless tobacco: Never Used    Alcohol use 12.6 oz/week     21 Glasses of wine per week      Comment: \"about 2-3 glasses of wine per day, a HUGE decrease from before\"    Drug use: No    Sexual activity: Not Asked     Other Topics Concern     Service No    Blood Transfusions No    Caffeine Concern Yes     He does not drink caffeine    Occupational Exposure No    Hobby Hazards No    Sleep Concern No    Stress Concern No    Weight Concern No    Special Diet No    Back Care No    Exercise Yes    Bike Helmet No    Seat Belt Yes    Self-Exams Yes     Social History Narrative       Family History   Problem Relation Age of Onset    No Known Problems Mother coma       Above history reviewed. ROS:  Denies fever, chills, cough, chest pain, SOB,  nausea, vomiting, or diarrhea. Denies wt loss, wt gain, hemoptysis, hematochezia or melena. Physical Examination:    /90 (BP 1 Location: Right arm, BP Patient Position: Sitting)  Pulse 63  Temp 98.3 °F (36.8 °C) (Oral)   Resp 12  Ht 5' 6\" (1.676 m)  Wt 126 lb 12.8 oz (57.5 kg)  SpO2 100%  BMI 20.47 kg/m2    General: Alert and Ox3, Fluent speech  Neck:  Supple, no adenopathy, JVD, mass or bruit  Chest:  Clear to Ausculation, without wheezes, rales, rubs or ronchi  Cardiac: RRR  Extremities:  No cyanosis, clubbing or edema  Neurologic:  Ambulatory without assist, CN 2-12 grossly intact. Moves all extremities. Skin: no rash  Lymphadenopathy: no cervical or supraclavicular nodes    ASSESSMENT AND PLAN:     1. Transient cerebral ischemia, unspecified type  Tightening up on BP control  Last lipid check was good  Educated patient that he has to quit smoking. 2. Essential hypertension  Adding lisinopril. Educated on common side effects. - MAGNESIUM  - METABOLIC PANEL, COMPREHENSIVE  - lisinopril (PRINIVIL, ZESTRIL) 5 mg tablet; Take 1 Tab by mouth daily. Indications: hypertension  Dispense: 90 Tab;  Refill: 1     RTC in 1 month    Zen Matias NP

## 2018-01-16 NOTE — PATIENT INSTRUCTIONS
If you have any questions regarding [employer], you may call [employer] support at [phone]. Learning About Transient Ischemic Attack (TIA)  What is a TIA? A transient ischemic attack (TIA) means that the blood flow to a part of the brain is blocked for a short time. A TIA feels like a stroke but usually lasts only 10 to 20 minutes. Unlike a stroke, a TIA does not cause lasting brain damage. A TIA is usually caused by a blood clot that blocks blood flow in the brain. A blood clot can form in another part of the body (often the heart) and travel through the bloodstream to the brain. When blood flow to part of the brain is blocked, the brain cells in that area are affected within seconds. This causes symptoms in parts of the body controlled by those brain cells. When the blood clot dissolves, blood flow returns, and the symptoms go away. Blood clots can be the result of hardening of the arteries (atherosclerosis) or a heart attack. Sometimes a TIA is caused by a sharp drop in blood pressure that reduces blood flow to the brain. This is called a \"low-flow\" TIA. It is not as common as a TIA caused by a blood clot. What happens after a TIA? TIA is a serious warning sign of a possible stroke in the future. If you have other medical conditions such as coronary artery disease or atherosclerosis, you may also have an increased risk for a heart attack. Talk to your doctor about your risk. Understanding your risk will help you and your doctor plan your treatment options. You can do a lot to lower your chance of having another TIA or a stroke. Medicines can help, and you may also need to make lifestyle changes. What are the symptoms? Symptoms of a TIA are the same as symptoms of a stroke. But symptoms of a TIA don't last very long. Most of the time, they go away in 10 to 20 minutes.  They may include:  · Sudden numbness, tingling, weakness, or loss of movement in your face, arm, or leg, especially on only one side of your body. · Sudden vision changes. · Sudden trouble speaking. · Sudden confusion or trouble understanding simple statements. · Sudden problems with walking or balance. If you have any of these symptoms, call 911 or other emergency services right away. Ask your family, friends, and coworkers to learn the signs of a TIA. They may notice these signs before you do. Make sure they know to call 911 if these signs appear. How is a TIA treated? If you've had a TIA, you may need more testing and treatment after you get checked by your doctor. If you have a high risk of stroke, you may have to stay in the hospital for treatment. Your treatment for a TIA may include taking medicines to prevent blood clots or a stroke, or having surgery to reopen narrow arteries. How can you prevent another TIA? · Work with your doctor to treat any health problems you have. High blood pressure, high cholesterol, atrial fibrillation, and diabetes all raise your chances of having a stroke. · Be safe with medicines. Take your medicine exactly as prescribed. Call your doctor if you think you are having a problem with your medicine. · Have a healthy lifestyle. ¨ Do not smoke or allow others to smoke around you. If you need help quitting, talk to your doctor about stop-smoking programs and medicines. These can increase your chances of quitting for good. Smoking makes a stroke more likely. ¨ Limit alcohol to 2 drinks a day for men and 1 drink a day for women. ¨ Lose weight if you need to. A healthy weight will help you keep your heart and body healthy. ¨ Be active. Ask your doctor what type and level of activity are safe for you. ¨ Eat heart-healthy foods, like fruits, vegetables, and high-fiber foods. Follow-up care is a key part of your treatment and safety. Be sure to make and go to all appointments, and call your doctor if you are having problems.  It's also a good idea to know your test results and keep a list of the medicines you take. Where can you learn more? Go to http://gurdeep-quincy.info/. Enter S981 in the search box to learn more about \"Learning About Transient Ischemic Attack (TIA). \"  Current as of: March 20, 2017  Content Version: 11.4  © 1086-9827 [employer]wise, [employer]. Care instructions adapted under license by [employer] (which disclaims liability or warranty for this information). If you have questions about a medical condition or this instruction, always ask your healthcare professional. [address] any warranty or liability for your use of this information.

## 2018-01-17 LAB
ALBUMIN SERPL-MCNC: 4 G/DL (ref 3.5–4.8)
ALBUMIN/GLOB SERPL: 1 {RATIO} (ref 1.2–2.2)
ALP SERPL-CCNC: 93 IU/L (ref 39–117)
ALT SERPL-CCNC: 17 IU/L (ref 0–44)
AST SERPL-CCNC: 31 IU/L (ref 0–40)
BILIRUB SERPL-MCNC: 0.3 MG/DL (ref 0–1.2)
BUN SERPL-MCNC: 10 MG/DL (ref 8–27)
BUN/CREAT SERPL: 13 (ref 10–24)
CALCIUM SERPL-MCNC: 9.7 MG/DL (ref 8.6–10.2)
CHLORIDE SERPL-SCNC: 99 MMOL/L (ref 96–106)
CO2 SERPL-SCNC: 18 MMOL/L (ref 18–29)
CREAT SERPL-MCNC: 0.76 MG/DL (ref 0.76–1.27)
GLOBULIN SER CALC-MCNC: 3.9 G/DL (ref 1.5–4.5)
GLUCOSE SERPL-MCNC: 88 MG/DL (ref 65–99)
MAGNESIUM SERPL-MCNC: 1.6 MG/DL (ref 1.6–2.3)
POTASSIUM SERPL-SCNC: 4.7 MMOL/L (ref 3.5–5.2)
PROT SERPL-MCNC: 7.9 G/DL (ref 6–8.5)
SODIUM SERPL-SCNC: 139 MMOL/L (ref 134–144)

## 2018-01-17 PROCEDURE — 3331090002 HH PPS REVENUE DEBIT

## 2018-01-17 PROCEDURE — 3331090001 HH PPS REVENUE CREDIT

## 2018-01-18 ENCOUNTER — HOME CARE VISIT (OUTPATIENT)
Dept: SCHEDULING | Facility: HOME HEALTH | Age: 73
End: 2018-01-18
Payer: MEDICARE

## 2018-01-18 ENCOUNTER — TELEPHONE (OUTPATIENT)
Dept: FAMILY MEDICINE CLINIC | Age: 73
End: 2018-01-18

## 2018-01-18 PROCEDURE — G0299 HHS/HOSPICE OF RN EA 15 MIN: HCPCS

## 2018-01-18 PROCEDURE — 3331090001 HH PPS REVENUE CREDIT

## 2018-01-18 PROCEDURE — 3331090003 HH PPS REVENUE ADJ

## 2018-01-18 PROCEDURE — 3331090002 HH PPS REVENUE DEBIT

## 2018-01-18 NOTE — TELEPHONE ENCOUNTER
----- Message from Shikha Merrill NP sent at 1/18/2018  7:29 AM EST -----  Magnesium is better. Liver, kidneys and electrolytes look good.

## 2018-01-19 VITALS
DIASTOLIC BLOOD PRESSURE: 82 MMHG | TEMPERATURE: 98.6 F | SYSTOLIC BLOOD PRESSURE: 130 MMHG | HEART RATE: 62 BPM | RESPIRATION RATE: 18 BRPM | OXYGEN SATURATION: 99 %

## 2018-01-19 PROCEDURE — 3331090001 HH PPS REVENUE CREDIT

## 2018-01-19 PROCEDURE — 3331090002 HH PPS REVENUE DEBIT

## 2018-01-20 PROCEDURE — 3331090001 HH PPS REVENUE CREDIT

## 2018-01-20 PROCEDURE — 3331090002 HH PPS REVENUE DEBIT

## 2018-01-21 PROCEDURE — 3331090002 HH PPS REVENUE DEBIT

## 2018-01-21 PROCEDURE — 3331090001 HH PPS REVENUE CREDIT

## 2018-01-22 PROCEDURE — 3331090002 HH PPS REVENUE DEBIT

## 2018-01-22 PROCEDURE — 3331090001 HH PPS REVENUE CREDIT

## 2018-01-23 PROCEDURE — 3331090002 HH PPS REVENUE DEBIT

## 2018-01-23 PROCEDURE — 3331090001 HH PPS REVENUE CREDIT

## 2018-01-24 PROCEDURE — 3331090002 HH PPS REVENUE DEBIT

## 2018-01-24 PROCEDURE — 3331090001 HH PPS REVENUE CREDIT

## 2018-01-25 PROCEDURE — 3331090001 HH PPS REVENUE CREDIT

## 2018-01-25 PROCEDURE — 3331090002 HH PPS REVENUE DEBIT

## 2018-01-26 LAB — HEMOCCULT STL QL IA: POSITIVE

## 2018-01-26 PROCEDURE — 3331090002 HH PPS REVENUE DEBIT

## 2018-01-26 PROCEDURE — 3331090001 HH PPS REVENUE CREDIT

## 2018-01-26 NOTE — PROGRESS NOTES
Stool positive for occult blood. Where would he like to be sent for a colonoscopy? We can do [place] to gastrointestinal specialists (my first choice) or a general surgeon in [place]

## 2018-01-27 PROCEDURE — 3331090002 HH PPS REVENUE DEBIT

## 2018-01-27 PROCEDURE — 3331090001 HH PPS REVENUE CREDIT

## 2018-01-28 PROCEDURE — 3331090002 HH PPS REVENUE DEBIT

## 2018-01-28 PROCEDURE — 3331090001 HH PPS REVENUE CREDIT

## 2018-01-29 PROCEDURE — 3331090001 HH PPS REVENUE CREDIT

## 2018-01-29 PROCEDURE — 3331090002 HH PPS REVENUE DEBIT

## 2018-01-30 ENCOUNTER — TELEPHONE (OUTPATIENT)
Dept: FAMILY MEDICINE CLINIC | Age: 73
End: 2018-01-30

## 2018-01-30 PROCEDURE — 3331090001 HH PPS REVENUE CREDIT

## 2018-01-30 PROCEDURE — 3331090002 HH PPS REVENUE DEBIT

## 2018-01-30 NOTE — TELEPHONE ENCOUNTER
Have tried to contact patient multiple times and have been unable to reach by phone. Will continue to try to contact.

## 2018-01-30 NOTE — TELEPHONE ENCOUNTER
----- Message from Toma Greco NP sent at 1/26/2018  9:03 AM EST -----  Stool positive for occult blood. Where would he like to be sent for a colonoscopy? We can do [place] to gastrointestinal specialists (my first choice) or a general surgeon in [facility].

## 2018-02-09 ENCOUNTER — OFFICE VISIT (OUTPATIENT)
Dept: FAMILY MEDICINE CLINIC | Age: 73
End: 2018-02-09

## 2018-02-09 VITALS
DIASTOLIC BLOOD PRESSURE: 74 MMHG | RESPIRATION RATE: 16 BRPM | BODY MASS INDEX: 21.21 KG/M2 | SYSTOLIC BLOOD PRESSURE: 174 MMHG | HEART RATE: 60 BPM | WEIGHT: 132 LBS | HEIGHT: 66 IN | OXYGEN SATURATION: 100 %

## 2018-02-09 DIAGNOSIS — I10 SEVERE ESSENTIAL HYPERTENSION: Primary | ICD-10-CM

## 2018-02-09 RX ORDER — HYDROCHLOROTHIAZIDE 25 MG/1
25 TABLET ORAL DAILY
Qty: 90 TAB | Refills: 4 | Status: SHIPPED | OUTPATIENT
Start: 2018-02-09 | End: 2019-05-08 | Stop reason: SDUPTHER

## 2018-02-09 RX ORDER — LISINOPRIL 10 MG/1
10 TABLET ORAL DAILY
Refills: 0 | COMMUNITY
Start: 2018-01-24 | End: 2018-03-12

## 2018-02-09 NOTE — MR AVS SNAPSHOT
Nina Jacobson 
 
 
 [address] [facility] [address] [phone] Patient: Kev Gusman MRN: [id number] Visit Information Date & Time Provider Department Dept. Phone Encounter #  
 2/9/2018  9:00 AM Cinthya Dewitt, Mande[facility] Pool [id number] Follow-up Instructions Return in about 2 weeks (around 2/23/2018). Your Appointments 2/16/2018 11:10 AM  
ESTABLISHED PATIENT with NORMA Whittaker ([facility]) Appt Note: 1 mo f/u  
 [address] [facility] [address] [address] [phone]  
  
   
 [address] [facility],[address] [address] Upcoming Health Maintenance Date Due  
 GLAUCOMA SCREENING Q2Y 7/19/2009 MEDICARE YEARLY EXAM 10/6/2018 FOBT Q 1 YEAR AGE 50-75 1/23/2019 DTaP/Tdap/Td series (2 - Td) 10/5/2027 Allergies as of 2/9/2018  Review Complete On: 2/9/2018 By: Cinthya Dewitt MD  
  
 Severity Noted Reaction Type Reactions Contrast Agent [Iodine]  09/13/2016    Rash Hives on back Current Immunizations  Reviewed on 10/19/2016 Name Date Influenza High Dose Vaccine PF 9/17/2017, 12/5/2016, 9/14/2015 Influenza Vaccine 8/6/2014 Pneumococcal Conjugate (PCV-13) 9/14/2015 Pneumococcal Vaccine (Unspecified Type) 11/19/2009 Zoster Vaccine, Live 1/16/2016 Not reviewed this visit You Were Diagnosed With   
  
 Codes Comments Severe essential hypertension    -  Primary ICD-10-CM: I10 
ICD-9-CM: 401.9 Vitals BP Pulse Resp Height(growth percentile) Weight(growth percentile) SpO2  
 174/74 (BP 1 Location: Left arm, BP Patient Position: Sitting) 60 16 5' 6\" (1.676 m) 132 lb (59.9 kg) 100% BMI Smoking Status 21.31 kg/m2 Former Smoker BMI and BSA Data Body Mass Index Body Surface Area  
 21.31 kg/m 2 1.67 m 2 Preferred Pharmacy Pharmacy Name Phone [facility] [address] [place] [address] [address] Your Updated Medication List  
  
   
This list is accurate as of: 2/9/18  9:44 AM.  Always use your most recent med list.  
  
  
  
  
 acetaminophen 500 mg tablet Commonly known as:  TYLENOL Take 500 mg by mouth every six (6) hours as needed for Pain. MAY TAKE 1 TO 2 TABS DO NOT EXCEED 4000MG/24HRS  
  
 aspirin delayed-release 81 mg tablet Take 81 mg by mouth daily. hydroCHLOROthiazide 25 mg tablet Commonly known as:  HYDRODIURIL Take 1 Tab by mouth daily. lisinopril 10 mg tablet Commonly known as:  Frederic Art Take 10 mg by mouth daily. magnesium oxide 400 mg tablet Commonly known as:  MAG-OX Take 1 Tab by mouth two (2) times a day. omeprazole 20 mg capsule Commonly known as:  PRILOSEC  
take 1 capsule by mouth once daily  
  
 potassium chloride 20 mEq tablet Commonly known as:  K-DUR, KLOR-CON Take 1 Tab by mouth daily. pravastatin 40 mg tablet Commonly known as:  PRAVACHOL  
take 1 tablet by mouth at bedtime  
  
 propranolol 40 mg tablet Commonly known as:  INDERAL Take 1 Tab by mouth two (2) times a day. tamsulosin 0.4 mg capsule Commonly known as:  FLOMAX Take 1 Cap by mouth daily. Prescriptions Sent to Pharmacy Refills  
 hydroCHLOROthiazide (HYDRODIURIL) 25 mg tablet 4 Sig: Take 1 Tab by mouth daily. Class: Normal  
 Pharmacy: [id number] [facility] #: [phone] Route: Oral  
  
Follow-up Instructions Return in about 2 weeks (around 2/23/2018). Introducing [facility] & HEALTH SERVICES! Lindsay Pathak introduces [employer] patient portal. Now you can access parts of your medical record, email your doctor's office, and request medication refills online. 1. In your internet browser, go to [email or web address] 2. Click on the First Time User? Click Here link in the Sign In box. You will see the New Member Sign Up page. 3. Enter your [employer] Access Code exactly as it appears below. You will not need to use this code after youve completed the sign-up process. If you do not sign up before the expiration date, you must request a new code. · [employer] Access Code: [email or web address] Expires: 3/29/2018  1:47 PM 
 
4. Enter the last four digits of your Social Security Number (xxxx) and Date of Birth (mm/dd/yyyy) as indicated and click Submit. You will be taken to the next sign-up page. 5. Create a [employer] ID. This will be your [employer] login ID and cannot be changed, so think of one that is secure and easy to remember. 6. Create a [employer] password. You can change your password at any time. 7. Enter your Password Reset Question and Answer. This can be used at a later time if you forget your password. 8. Enter your e-mail address. You will receive e-mail notification when new information is available in [address] 9. Click Sign Up. You can now view and download portions of your medical record. 10. Click the Download Summary menu link to download a portable copy of your medical information. If you have questions, please visit the Frequently Asked Questions section of the [employer] website. Remember, [employer] is NOT to be used for urgent needs. For medical emergencies, dial 911. Now available from your iPhone and Android! Please provide this summary of care documentation to your next provider. Your primary care clinician is listed as Cecilia Galdamez. If you have any questions after today's visit, please call [phone].

## 2018-02-09 NOTE — PROGRESS NOTES
Chief Complaint   Patient presents with    Hypertension     ER follow up         HPI:       is a 67 y.o. male. History of HTN and CVA in Dec 2017. Seen in the ER yesterday for HTN. Given CCB and urged to come to [facility] today. Not dizzy or lightheaded. Denies chest pain. He is compliant with meds. Lives on a fixed income. Allergies   Allergen Reactions    Contrast Agent [Iodine] Rash     Hives on back       Current Outpatient Prescriptions   Medication Sig    lisinopril (PRINIVIL, ZESTRIL) 10 mg tablet Take 10 mg by mouth daily.  hydroCHLOROthiazide (HYDRODIURIL) 25 mg tablet Take 1 Tab by mouth daily.  acetaminophen (TYLENOL) 500 mg tablet Take 500 mg by mouth every six (6) hours as needed for Pain. MAY TAKE 1 TO 2 TABS  DO NOT EXCEED 4000MG/24HRS    aspirin delayed-release 81 mg tablet Take 81 mg by mouth daily.  propranolol (INDERAL) 40 mg tablet Take 1 Tab by mouth two (2) times a day.  omeprazole (PRILOSEC) 20 mg capsule take 1 capsule by mouth once daily    potassium chloride (K-DUR, KLOR-CON) 20 mEq tablet Take 1 Tab by mouth daily.  magnesium oxide (MAG-OX) 400 mg tablet Take 1 Tab by mouth two (2) times a day.  tamsulosin (FLOMAX) 0.4 mg capsule Take 1 Cap by mouth daily.  pravastatin (PRAVACHOL) 40 mg tablet take 1 tablet by mouth at bedtime     No current facility-administered medications for this visit. Past Medical History:   Diagnosis Date    Abuse     alcoholism, quit 2012    Claudication of calf muscles [facility]) 2013    Esophageal stricture     Esophagitis     GI bleed 10/2016    Hemochromatosis     Hyperlipidemia     Hypertension     Peripheral artery disease (HCC)     Dr. Tanna Lowery Pulmonary nodule     right lung         ROS:  Denies fever, chills, cough, chest pain, SOB,  nausea, vomiting, or diarrhea. Denies wt loss, wt gain, hemoptysis, hematochezia or melena.     Physical Examination:    /74 (BP 1 Location: Left arm, BP Patient Position: Sitting)  Pulse 60  Resp 16  Ht 5' 6\" (1.676 m)  Wt 132 lb (59.9 kg)  SpO2 100%  BMI 21.31 kg/m2    General: Alert and Ox3, Fluent speech  HEENT:  NC/AT, EOMI, OP: clear  Neck:  Supple, no adenopathy, JVD, mass or bruit  Chest:  Clear to Ausculation, without wheezes, rales, rubs or ronchi  Cardiac: RRR  Abdomen:  +BS, soft, nontender without palpable HSM  Extremities:  No cyanosis, clubbing or edema  Neurologic:  Ambulatory without assist, CN 2-12 grossly intact. Moves all extremities. Skin: no rash  Lymphadenopathy: no cervical or supraclavicular nodes      ASSESSMENT AND PLAN:     1. Severe HTN:  Add HCTZ and RTC in 2 weeks. Consider Labetalol if he is not better. Orders Placed This Encounter    lisinopril (PRINIVIL, ZESTRIL) 10 mg tablet     Sig: Take 10 mg by mouth daily. Refill:  0    hydroCHLOROthiazide (HYDRODIURIL) 25 mg tablet     Sig: Take 1 Tab by mouth daily.      Dispense:  90 Tab     Refill:  4       Anna Crooks MD, [address]

## 2018-02-12 DIAGNOSIS — N40.0 BENIGN NODULAR PROSTATIC HYPERPLASIA WITHOUT LOWER URINARY TRACT SYMPTOMS: ICD-10-CM

## 2018-02-12 DIAGNOSIS — K21.9 GASTROESOPHAGEAL REFLUX DISEASE WITHOUT ESOPHAGITIS: ICD-10-CM

## 2018-02-12 RX ORDER — TAMSULOSIN HYDROCHLORIDE 0.4 MG/1
0.4 CAPSULE ORAL DAILY
Qty: 30 CAP | Refills: 11 | Status: SHIPPED | OUTPATIENT
Start: 2018-02-12 | End: 2019-12-16

## 2018-02-12 NOTE — TELEPHONE ENCOUNTER
----- Message from Dante Dunlap sent at 2/12/2018  2:26 PM EST -----  Regarding: Dr. Frye Ast Refill  The pt called to request a refill on his \"Tamsulosin\" medication. The Rx can be sent to his normal [employer] on file. Best contact number is [phone].

## 2018-02-19 RX ORDER — PRAVASTATIN SODIUM 40 MG/1
TABLET ORAL
Qty: 90 TAB | Refills: 3 | Status: SHIPPED | OUTPATIENT
Start: 2018-02-19 | End: 2019-04-25 | Stop reason: SDUPTHER

## 2018-02-23 ENCOUNTER — OFFICE VISIT (OUTPATIENT)
Dept: FAMILY MEDICINE CLINIC | Age: 73
End: 2018-02-23

## 2018-02-23 VITALS
RESPIRATION RATE: 12 BRPM | SYSTOLIC BLOOD PRESSURE: 110 MMHG | TEMPERATURE: 98.3 F | DIASTOLIC BLOOD PRESSURE: 60 MMHG | WEIGHT: 133.8 LBS | HEART RATE: 60 BPM | BODY MASS INDEX: 21.5 KG/M2 | OXYGEN SATURATION: 100 % | HEIGHT: 66 IN

## 2018-02-23 DIAGNOSIS — I10 ESSENTIAL HYPERTENSION: Primary | ICD-10-CM

## 2018-02-23 NOTE — PROGRESS NOTES
Chief Complaint   Patient presents with    Hypertension         HPI:       is a 67 y.o. male. History of HTN and CVA in Dec 2017. Seen in the ER yesterday for HTN. Given CCB and urged to come to [facility] today. Not dizzy or lightheaded. Denies chest pain. He is compliant with meds. Lives on a fixed income. Allergies   Allergen Reactions    Contrast Agent [Iodine] Rash     Hives on back       Current Outpatient Prescriptions   Medication Sig    pravastatin (PRAVACHOL) 40 mg tablet take 1 tablet by mouth at bedtime    tamsulosin (FLOMAX) 0.4 mg capsule Take 1 Cap by mouth daily.  lisinopril (PRINIVIL, ZESTRIL) 10 mg tablet Take 10 mg by mouth daily.  hydroCHLOROthiazide (HYDRODIURIL) 25 mg tablet Take 1 Tab by mouth daily.  acetaminophen (TYLENOL) 500 mg tablet Take 500 mg by mouth every six (6) hours as needed for Pain. MAY TAKE 1 TO 2 TABS  DO NOT EXCEED 4000MG/24HRS    aspirin delayed-release 81 mg tablet Take 81 mg by mouth daily.  propranolol (INDERAL) 40 mg tablet Take 1 Tab by mouth two (2) times a day.  omeprazole (PRILOSEC) 20 mg capsule take 1 capsule by mouth once daily    potassium chloride (K-DUR, KLOR-CON) 20 mEq tablet Take 1 Tab by mouth daily.  magnesium oxide (MAG-OX) 400 mg tablet Take 1 Tab by mouth two (2) times a day. No current facility-administered medications for this visit. Past Medical History:   Diagnosis Date    Abuse     alcoholism, quit 2012    Claudication of calf muscles [facility]) 2013    Esophageal stricture     Esophagitis     GI bleed 10/2016    Hemochromatosis     Hyperlipidemia     Hypertension     Peripheral artery disease (HCC)     Dr. Kami Locke Pulmonary nodule     right lung         ROS:  Denies fever, chills, cough, chest pain, SOB,  nausea, vomiting, or diarrhea. Denies wt loss, wt gain, hemoptysis, hematochezia or melena.     Physical Examination:    /60 (BP 1 Location: Left arm, BP Patient Position: Sitting)  Pulse 60  Temp 98.3 °F (36.8 °C) (Oral)   Resp 12  Ht 5' 6\" (1.676 m)  Wt 133 lb 12.8 oz (60.7 kg)  SpO2 100%  BMI 21.6 kg/m2    General: Alert and Ox3, Fluent speech  HEENT:  NC/AT, EOMI, OP: clear  Neck:  Supple, no adenopathy, JVD, mass or bruit  Chest:  Clear to Ausculation, without wheezes, rales, rubs or ronchi  Cardiac: RRR  Abdomen:  +BS, soft, nontender without palpable HSM  Extremities:  No cyanosis, clubbing or edema  Neurologic:  Ambulatory without assist, CN 2-12 grossly intact. Moves all extremities. Skin: no rash  Lymphadenopathy: no cervical or supraclavicular nodes      ASSESSMENT AND PLAN:     1.  HTN is vastly improved. Continue with current meds. RTC in July. Discussion about HCTZ. No orders of the defined types were placed in this encounter.       Holly Nguyen MD, [address]

## 2018-02-23 NOTE — MR AVS SNAPSHOT
Domingo Kunz 
 
 
 [address] [facility] [phone] Patient: Henry Monreal MRN: [id number] Visit Information Date & Time Provider Department Dept. Phone Encounter #  
 2/23/2018  9:15 AM Rama Ray MD Mandeep Pool [id number] Upcoming Health Maintenance Date Due  
 GLAUCOMA SCREENING Q2Y 7/19/2009 MEDICARE YEARLY EXAM 10/6/2018 FOBT Q 1 YEAR AGE 50-75 1/23/2019 DTaP/Tdap/Td series (2 - Td) 10/5/2027 Allergies as of 2/23/2018  Review Complete On: 2/23/2018 By: Rama Ray MD  
  
 Severity Noted Reaction Type Reactions Contrast Agent [Iodine]  09/13/2016    Rash Hives on back Current Immunizations  Reviewed on 10/19/2016 Name Date Influenza High Dose Vaccine PF 9/17/2017, 12/5/2016, 9/14/2015 Influenza Vaccine 8/6/2014 Pneumococcal Conjugate (PCV-13) 9/14/2015 Pneumococcal Vaccine (Unspecified Type) 11/19/2009 Zoster Vaccine, Live 1/16/2016 Not reviewed this visit You Were Diagnosed With   
  
 Codes Comments Essential hypertension    -  Primary ICD-10-CM: I10 
ICD-9-CM: 401.9 Vitals BP Pulse Temp Resp Height(growth percentile) Weight(growth percentile) 110/60 (BP 1 Location: Left arm, BP Patient Position: Sitting) 60 98.3 °F (36.8 °C) (Oral) 12 5' 6\" (1.676 m) 133 lb 12.8 oz (60.7 kg) SpO2 BMI Smoking Status 100% 21.6 kg/m2 Former Smoker Vitals History BMI and BSA Data Body Mass Index Body Surface Area  
 21.6 kg/m 2 1.68 m 2 Preferred Pharmacy Pharmacy Name Phone [facility] [address] [facility] [address] Your Updated Medication List  
  
   
This list is accurate as of 2/23/18  9:49 AM.  Always use your most recent med list.  
  
  
  
  
 acetaminophen 500 mg tablet Commonly known as:  TYLENOL  
 Take 500 mg by mouth every six (6) hours as needed for Pain. MAY TAKE 1 TO 2 TABS DO NOT EXCEED 4000MG/24HRS  
  
 aspirin delayed-release 81 mg tablet Take 81 mg by mouth daily. hydroCHLOROthiazide 25 mg tablet Commonly known as:  HYDRODIURIL Take 1 Tab by mouth daily. lisinopril 10 mg tablet Commonly known as:  Cleotis Bobo Take 10 mg by mouth daily. magnesium oxide 400 mg tablet Commonly known as:  MAG-OX Take 1 Tab by mouth two (2) times a day. omeprazole 20 mg capsule Commonly known as:  PRILOSEC  
take 1 capsule by mouth once daily  
  
 potassium chloride 20 mEq tablet Commonly known as:  K-DUR, KLOR-CON Take 1 Tab by mouth daily. pravastatin 40 mg tablet Commonly known as:  PRAVACHOL  
take 1 tablet by mouth at bedtime  
  
 propranolol 40 mg tablet Commonly known as:  INDERAL Take 1 Tab by mouth two (2) times a day. tamsulosin 0.4 mg capsule Commonly known as:  FLOMAX Take 1 Cap by mouth daily. Introducing [facility] & HEALTH SERVICES! [facility] introduces [employer] patient portal. Now you can access parts of your medical record, email your doctor's office, and request medication refills online. 1. In your internet browser, go to [email or web address] 2. Click on the First Time User? Click Here link in the Sign In box. You will see the New Member Sign Up page. 3. Enter your [employer] Access Code exactly as it appears below. You will not need to use this code after youve completed the sign-up process. If you do not sign up before the expiration date, you must request a new code. · [employer] Access Code: [email or web address] Expires: 3/29/2018  1:47 PM 
 
4. Enter the last four digits of your Social Security Number (xxxx) and Date of Birth (mm/dd/yyyy) as indicated and click Submit. You will be taken to the next sign-up page. 5. Create a [employer] ID.  This will be your [employer] login ID and cannot be changed, so think of one that is secure and easy to remember. 6. Create a [employer] password. You can change your password at any time. 7. Enter your Password Reset Question and Answer. This can be used at a later time if you forget your password. 8. Enter your e-mail address. You will receive e-mail notification when new information is available in [address] 9. Click Sign Up. You can now view and download portions of your medical record. 10. Click the Download Summary menu link to download a portable copy of your medical information. If you have questions, please visit the Frequently Asked Questions section of the [employer] website. Remember, [employer] is NOT to be used for urgent needs. For medical emergencies, dial 911. Now available from your iPhone and Android! Please provide this summary of care documentation to your next provider. Your primary care clinician is listed as Silvia Felder. If you have any questions after today's visit, please call [phone].

## 2018-03-12 ENCOUNTER — OFFICE VISIT (OUTPATIENT)
Dept: FAMILY MEDICINE CLINIC | Age: 73
End: 2018-03-12

## 2018-03-12 VITALS
SYSTOLIC BLOOD PRESSURE: 132 MMHG | RESPIRATION RATE: 18 BRPM | HEART RATE: 66 BPM | HEIGHT: 66 IN | WEIGHT: 133.4 LBS | DIASTOLIC BLOOD PRESSURE: 86 MMHG | BODY MASS INDEX: 21.44 KG/M2 | OXYGEN SATURATION: 99 %

## 2018-03-12 DIAGNOSIS — R21 RASH: ICD-10-CM

## 2018-03-12 DIAGNOSIS — I10 ESSENTIAL HYPERTENSION: Primary | ICD-10-CM

## 2018-03-12 RX ORDER — DEXAMETHASONE SODIUM PHOSPHATE 4 MG/ML
4 INJECTION, SOLUTION INTRA-ARTICULAR; INTRALESIONAL; INTRAMUSCULAR; INTRAVENOUS; SOFT TISSUE ONCE
Qty: 1 ML | Refills: 0
Start: 2018-03-12 | End: 2018-03-12

## 2018-03-12 RX ORDER — TRIAMCINOLONE ACETONIDE 1 MG/G
OINTMENT TOPICAL 2 TIMES DAILY
Qty: 453.6 G | Refills: 2 | Status: SHIPPED | OUTPATIENT
Start: 2018-03-12 | End: 2018-05-07

## 2018-03-12 RX ORDER — LISINOPRIL 10 MG/1
10 TABLET ORAL DAILY
Qty: 90 TAB | Refills: 3 | Status: SHIPPED | OUTPATIENT
Start: 2018-03-12 | End: 2018-05-07

## 2018-03-12 NOTE — MR AVS SNAPSHOT
[facility] 
 
 
 [address] [address] [phone] Patient: Yenni Espinoza MRN: [id number] IJP:[id number] Visit Information Date & Time Provider Department Dept. Phone Encounter #  
 3/12/2018  9:50 AM Joe Langley NP Mandeep Pool [id number] Follow-up Instructions Return if symptoms worsen or fail to improve. Follow-up and Disposition History Your Appointments 7/20/2018 11:30 AM  
ESTABLISHED PATIENT with Hillary Womack MD  
[place] [address] ([facility]) Appt Note: 4 MO F/U  
 [address] [place] [address],[address] [address] [phone]  
  
   
 [address] [place] [address],[address] [address] Upcoming Health Maintenance Date Due  
 GLAUCOMA SCREENING Q2Y 7/19/2009 Bone Densitometry (Dexa) Screening 7/19/2009 MEDICARE YEARLY EXAM 10/6/2018 FOBT Q 1 YEAR AGE 50-75 1/23/2019 DTaP/Tdap/Td series (2 - Td) 10/5/2027 Allergies as of 3/12/2018  Review Complete On: 3/12/2018 By: Joe Langley NP Severity Noted Reaction Type Reactions Contrast Agent [Iodine]  09/13/2016    Rash Hives on back Current Immunizations  Reviewed on 10/19/2016 Name Date Influenza High Dose Vaccine PF 9/17/2017, 12/5/2016, 9/14/2015 Influenza Vaccine 8/6/2014 Pneumococcal Conjugate (PCV-13) 9/14/2015 Pneumococcal Vaccine (Unspecified Type) 11/19/2009 Zoster Vaccine, Live 1/16/2016 Not reviewed this visit You Were Diagnosed With   
  
 Codes Comments Essential hypertension    -  Primary ICD-10-CM: I10 
ICD-9-CM: 401.9 Rash     ICD-10-CM: R21 
ICD-9-CM: 782.1 Vitals BP Pulse Resp Height(growth percentile) Weight(growth percentile) SpO2  
 132/86 (BP 1 Location: Left arm, BP Patient Position: Sitting) 66 18 5' 6\" (1.676 m) 133 lb 6.4 oz (60.5 kg) 99% BMI Smoking Status 21.53 kg/m2 Former Smoker Vitals History BMI and BSA Data Body Mass Index Body Surface Area  
 21.53 kg/m 2 1.68 m 2 Preferred Pharmacy Pharmacy Name Phone [facility] [address] [facility], [address] [place] [address] Jewel Serrano [address] Your Updated Medication List  
  
   
This list is accurate as of 3/12/18 11:13 AM.  Always use your most recent med list.  
  
  
  
  
 acetaminophen 500 mg tablet Commonly known as:  TYLENOL Take 500 mg by mouth every six (6) hours as needed for Pain. MAY TAKE 1 TO 2 TABS DO NOT EXCEED 4000MG/24HRS  
  
 aspirin delayed-release 81 mg tablet Take 81 mg by mouth daily. dexamethasone 4 mg/mL injection Commonly known as:  DECADRON  
1 mL by IntraMUSCular route once for 1 dose.  
  
 hydroCHLOROthiazide 25 mg tablet Commonly known as:  HYDRODIURIL Take 1 Tab by mouth daily. lisinopril 10 mg tablet Commonly known as:  A[place]ujo Sandra Take 1 Tab by mouth daily. Indications: hypertension  
  
 magnesium oxide 400 mg tablet Commonly known as:  MAG-OX Take 1 Tab by mouth two (2) times a day. omeprazole 20 mg capsule Commonly known as:  PRILOSEC  
take 1 capsule by mouth once daily  
  
 potassium chloride 20 mEq tablet Commonly known as:  K-DUR, KLOR-CON Take 1 Tab by mouth daily. pravastatin 40 mg tablet Commonly known as:  PRAVACHOL  
take 1 tablet by mouth at bedtime  
  
 propranolol 40 mg tablet Commonly known as:  INDERAL Take 1 Tab by mouth two (2) times a day. tamsulosin 0.4 mg capsule Commonly known as:  FLOMAX Take 1 Cap by mouth daily. triamcinolone acetonide 0.1 % ointment Commonly known as:  KENALOG Apply  to affected area two (2) times a day. use thin layer Prescriptions Sent to Pharmacy Refills  
 lisinopril (PRINIVIL, ZESTRIL) 10 mg tablet 3 Sig: Take 1 Tab by mouth daily. Indications: hypertension  Class: Normal  
 [facility]: [id number] [address], [address] [facility] [address] [place] [address] Ph #: [phone] Route: Oral  
 triamcinolone acetonide (KENALOG) 0.1 % ointment 2 Sig: Apply  to affected area two (2) times a day. use thin layer Class: Normal  
 Pharmacy: [id number] Kerrie Dao Ph #: [phone] Route: Topical  
  
We Performed the Following DEXAMETHASONE SODIUM PHOSPHATE INJECTION 1 MG [ [facility]] MO THER/PROPH/DIAG INJECTION, SUBCUT/IM [id number] CPT(R)] Follow-up Instructions Return if symptoms worsen or fail to improve. Patient Instructions Rash: Care Instructions Your Care Instructions A rash is any irritation or inflammation of the skin. Rashes have many possible causes, including allergy, infection, illness, heat, and emotional stress. Follow-up care is a key part of your treatment and safety. Be sure to make and go to all appointments, and call your doctor if you are having problems. It's also a good idea to know your test results and keep a list of the medicines you take. How can you care for yourself at home? · Wash the area with water only. Soap can make dryness and itching worse. Pat dry. · Put cold, wet cloths on the rash to reduce itching. · Keep cool, and stay out of the sun. · Leave the rash open to the air as much of the time as possible. · Sometimes petroleum jelly (Vaseline) can help relieve the discomfort caused by a rash. A moisturizing lotion, such as Cetaphil, also may help. Calamine lotion may help for rashes caused by contact with something (such as a plant or soap) that irritated the skin. Use it 3 or 4 times a day. · If your doctor prescribed a cream, use it as directed. If your doctor prescribed medicine, take it exactly as directed. · If your rash itches so badly that it interferes with your normal activities, take an over-the-counter antihistamine, such as diphenhydramine (Benadryl) or loratadine (Claritin). Read and follow all instructions on the label. When should you call for help? Call your doctor now or seek immediate medical care if: 
? · You have signs of infection, such as: 
¨ Increased pain, swelling, warmth, or redness. ¨ Red streaks leading from the area. ¨ Pus draining from the area. ¨ A fever. ? · You have joint pain along with the rash. ? Watch closely for changes in your health, and be sure to contact your doctor if: 
? · Your rash is changing or getting worse. For example, call if you have pain along with the rash, the rash is spreading, or you have new blisters. ? · You do not get better after 1 week. Where can you learn more? Go to http://gurdeep-quincy.info/. Enter U001 in the search box to learn more about \"Rash: Care Instructions. \" Current as of: October 13, 2016 Content Version: 11.4 © 8261-6584 [employer]. Care instructions adapted under license by [employer] (which disclaims liability or warranty for this information). If you have questions about a medical condition or this instruction, always ask your healthcare professional. [address] any warranty or liability for your use of this information. If you have any questions regarding [employer] ([employer] you may call [employer] ([employer] support at [phone]. Patient Instructions History Introducing [facility]! Yehuda Dan introduces InterEx patient portal. Now you can access parts of your medical record, email your doctor's office, and request medication refills online. 1. In your internet browser, go to https://[employer] (T.[employer].H.L[employer] stickK/[employer]ickers [employer]hart 2. Click on the First Time User? Click Here link in the Sign In box. You will see the New Member Sign Up page. 3. Enter your InterEx Access Code exactly as it appears below. You will not need to use this code after youve completed the sign-up process. If you do not sign up before the expiration date, you must request a new code. · [employer] Access Code: [email or web address] Expires: 3/29/2018  2:47 PM 
 
4. Enter the last four digits of your Social Security Number (xxxx) and Date of Birth (mm/dd/yyyy) as indicated and click Submit. You will be taken to the next sign-up page. 5. Create a [employer] ID. This will be your [employer] login ID and cannot be changed, so think of one that is secure and easy to remember. 6. Create a [employer] password. You can change your password at any time. 7. Enter your Password Reset Question and Answer. This can be used at a later time if you forget your password. 8. Enter your e-mail address. You will receive e-mail notification when new information is available in [address] 9. Click Sign Up. You can now view and download portions of your medical record. 10. Click the Download Summary menu link to download a portable copy of your medical information. If you have questions, please visit the Frequently Asked Questions section of the [employer] website. Remember, [employer] is NOT to be used for urgent needs. For medical emergencies, dial 911. Now available from your iPhone and Android! Please provide this summary of care documentation to your next provider. Your primary care clinician is listed as Susana Pi. If you have any questions after today's visit, please call [phone].

## 2018-03-12 NOTE — PATIENT INSTRUCTIONS
Rash: Care Instructions  Your Care Instructions  A rash is any irritation or inflammation of the skin. Rashes have many possible causes, including allergy, infection, illness, heat, and emotional stress. Follow-up care is a key part of your treatment and safety. Be sure to make and go to all appointments, and call your doctor if you are having problems. It's also a good idea to know your test results and keep a list of the medicines you take. How can you care for yourself at home? · Wash the area with water only. Soap can make dryness and itching worse. Pat dry. · Put cold, wet cloths on the rash to reduce itching. · Keep cool, and stay out of the sun. · Leave the rash open to the air as much of the time as possible. · Sometimes petroleum jelly (Vaseline) can help relieve the discomfort caused by a rash. A moisturizing lotion, such as Cetaphil, also may help. Calamine lotion may help for rashes caused by contact with something (such as a plant or soap) that irritated the skin. Use it 3 or 4 times a day. · If your doctor prescribed a cream, use it as directed. If your doctor prescribed medicine, take it exactly as directed. · If your rash itches so badly that it interferes with your normal activities, take an over-the-counter antihistamine, such as diphenhydramine (Benadryl) or loratadine (Claritin). Read and follow all instructions on the label. When should you call for help? Call your doctor now or seek immediate medical care if:  ? · You have signs of infection, such as:  ¨ Increased pain, swelling, warmth, or redness. ¨ Red streaks leading from the area. ¨ Pus draining from the area. ¨ A fever. ? · You have joint pain along with the rash. ? Watch closely for changes in your health, and be sure to contact your doctor if:  ? · Your rash is changing or getting worse. For example, call if you have pain along with the rash, the rash is spreading, or you have new blisters.    ? · You do not get better after 1 week. Where can you learn more? Go to http://gurdeep-quincy.info/. Enter I239 in the search box to learn more about \"Rash: Care Instructions. \"  Current as of: October 13, 2016  Content Version: 11.4  © 0798-1097 [employer] Care instructions adapted under license by [employer] (which disclaims liability or warranty for this information). If you have questions about a medical condition or this instruction, always ask your healthcare professional. [address] any warranty or liability for your use of this information. If you have any questions regarding weartolookhart, you may call [employer] support at [phone].

## 2018-03-12 NOTE — PROGRESS NOTES
Chief Complaint   Patient presents with    Hypertension     follow up     Rash     on chest         HPI:       is a 67 y.o. male. He is a retired contractor. Complicated history: previous heavy ETOH, stopped drinking. PVD with anterior partial Right foot amputation due to lack of circulation and had a stent placed. Stopped smoking. Had a intraabdominal abscess in 2016 with prolonged hospitalization. HTN: Currently on Propranolol, Lisinopril and HCTZ. HLD: On Pravastain. Denies myalgias. Electrolyte imbalances: takes daily magnesium and potassium. GERD: On daily prilosec. No s/s on medications. Prostate issues: On 0.4 mg of Flomax daily. Dr. Kitty Sorto is his colon doctor in [place]. Dr. Kedar Dallas is his vascular doctor. New Issues:  He had a CT recently and had contrast which breaks him out in hives. Has a rash that has gotten worse in the past few weeks. Denies changes to medications, detergents or personal care issues. Allergies   Allergen Reactions    Contrast Agent [Iodine] Rash     Hives on back       Current Outpatient Prescriptions   Medication Sig    pravastatin (PRAVACHOL) 40 mg tablet take 1 tablet by mouth at bedtime    tamsulosin (FLOMAX) 0.4 mg capsule Take 1 Cap by mouth daily.  hydroCHLOROthiazide (HYDRODIURIL) 25 mg tablet Take 1 Tab by mouth daily.  acetaminophen (TYLENOL) 500 mg tablet Take 500 mg by mouth every six (6) hours as needed for Pain. MAY TAKE 1 TO 2 TABS  DO NOT EXCEED 4000MG/24HRS    aspirin delayed-release 81 mg tablet Take 81 mg by mouth daily.  propranolol (INDERAL) 40 mg tablet Take 1 Tab by mouth two (2) times a day.  omeprazole (PRILOSEC) 20 mg capsule take 1 capsule by mouth once daily    potassium chloride (K-DUR, KLOR-CON) 20 mEq tablet Take 1 Tab by mouth daily.  magnesium oxide (MAG-OX) 400 mg tablet Take 1 Tab by mouth two (2) times a day.      No current facility-administered medications for this visit. Past Medical History:   Diagnosis Date    Abuse     alcoholism, quit 2012    Claudication of calf muscles ([address] 2013    Esophageal stricture     Esophagitis     GI bleed 10/2016    Hemochromatosis     Hyperlipidemia     Hypertension     Peripheral artery disease (HCC)     Dr. Charlie Buck Pulmonary nodule     right lung       Past Surgical History:   Procedure Laterality Date    COLONOSCOPY N/A 9/14/2016    COLONOSCOPY performed by Darby Llanos MD at \[facility]\"" ENDOSCOPY    COLONOSCOPY N/A 12/19/2016    COLONOSCOPY performed by Darby Llanos MD at \[facility]\"" ENDOSCOPY    HX AMPUTATION Left 07/2016    left 4th toe from gangrene    HX ENDOSCOPY  10/2016    HX OTHER SURGICAL      left partial foot amputation       Social History     Social History    Marital status: SINGLE     Spouse name: N/A    Number of children: N/A    Years of education: N/A     Occupational History    retired [employer]      Social History Main Topics    Smoking status: Former Smoker     Packs/day: 0.00     Types: Cigarettes     Quit date: 1/1/2018    Smokeless tobacco: Never Used    Alcohol use 12.6 oz/week     21 Glasses of wine per week      Comment: \"about 2-3 glasses of wine per day, a HUGE decrease from before\"    Drug use: No    Sexual activity: Not Asked     Other Topics Concern     Service No    Blood Transfusions No    Caffeine Concern Yes     He does not drink caffeine    Occupational Exposure No    Hobby Hazards No    Sleep Concern No    Stress Concern No    Weight Concern No    Special Diet No    Back Care No    Exercise Yes    Bike Helmet No    Seat Belt Yes    Self-Exams Yes     Social History Narrative       Family History   Problem Relation Age of Onset    No Known Problems Mother      coma       Above history reviewed. ROS:  Denies fever, chills, cough, chest pain, SOB,  nausea, vomiting, or diarrhea.   Denies wt loss, wt gain, hemoptysis, hematochezia or melena. Physical Examination:    /86 (BP 1 Location: Left arm, BP Patient Position: Sitting)  Pulse 66  Resp 18  Ht 5' 6\" (1.676 m)  Wt 133 lb 6.4 oz (60.5 kg)  SpO2 99%  BMI 21.53 kg/m2    General: Alert and Ox3, Fluent speech, walks with a limp  Neck:  Supple, no adenopathy, JVD, mass or bruit  Chest:  Clear to Ausculation, without wheezes, rales, rubs or ronchi  Cardiac: RRR  Extremities:  No cyanosis, clubbing or edema  Neurologic:  Ambulatory without assist, CN 2-12 grossly intact. Moves all extremities. Skin: Patchy, scaly rash bilateral lower abdomen  Lymphadenopathy: no cervical or supraclavicular nodes    ASSESSMENT AND PLAN:     1. Essential hypertension  Good control  Continue current regimen   - lisinopril (PRINIVIL, ZESTRIL) 10 mg tablet; Take 1 Tab by mouth daily. Indications: hypertension  Dispense: 90 Tab; Refill: 3    2. Rash  Likely reaction after receiving IV contrast  Would consider checking alpha gal panel if not improved in next week or so. - triamcinolone acetonide (KENALOG) 0.1 % ointment; Apply  to affected area two (2) times a day. use thin layer  Dispense: 453.6 g; Refill: 2  - dexamethasone (DECADRON) 4 mg/mL injection; 1 mL by IntraMUSCular route once for 1 dose.   Dispense: 1 mL; Refill: 0  - DEXAMETHASONE SODIUM PHOSPHATE INJECTION 1 MG (-JHB 10)  - THER/PROPH/DIAG INJ, SC/IM (02207)     RTC PRN    Matthew Claudio, NP

## 2018-04-28 DIAGNOSIS — E87.6 HYPOKALEMIA: ICD-10-CM

## 2018-04-28 RX ORDER — POTASSIUM CHLORIDE 20 MEQ/1
TABLET, EXTENDED RELEASE ORAL
Qty: 30 TAB | Refills: 5 | Status: SHIPPED | COMMUNITY
Start: 2018-04-28 | End: 2019-01-14 | Stop reason: SDUPTHER

## 2018-05-04 ENCOUNTER — TELEPHONE (OUTPATIENT)
Dept: FAMILY MEDICINE CLINIC | Age: 73
End: 2018-05-04

## 2018-05-04 NOTE — TELEPHONE ENCOUNTER
[phone] contact # clementine Gale wants an apt today as rash has gotten worse, it's now located on the  hands, around waist and going up around neck. Cheri prescribed cream and this is not working at all. I have given it a chance but has not done any good.     Thanks,

## 2018-05-07 ENCOUNTER — OFFICE VISIT (OUTPATIENT)
Dept: FAMILY MEDICINE CLINIC | Age: 73
End: 2018-05-07

## 2018-05-07 VITALS
RESPIRATION RATE: 19 BRPM | SYSTOLIC BLOOD PRESSURE: 115 MMHG | BODY MASS INDEX: 20.86 KG/M2 | DIASTOLIC BLOOD PRESSURE: 70 MMHG | HEART RATE: 62 BPM | OXYGEN SATURATION: 99 % | HEIGHT: 66 IN | WEIGHT: 129.8 LBS

## 2018-05-07 DIAGNOSIS — I10 ESSENTIAL HYPERTENSION: ICD-10-CM

## 2018-05-07 DIAGNOSIS — R21 RASH OF UNKNOWN CAUSE: Primary | ICD-10-CM

## 2018-05-07 RX ORDER — LEVOCETIRIZINE DIHYDROCHLORIDE 5 MG/1
5 TABLET, FILM COATED ORAL DAILY
Qty: 10 TAB | Refills: 0 | Status: SHIPPED | OUTPATIENT
Start: 2018-05-07 | End: 2018-05-17

## 2018-05-07 RX ORDER — FAMOTIDINE 20 MG/1
20 TABLET, FILM COATED ORAL 2 TIMES DAILY
Qty: 20 TAB | Refills: 0 | Status: SHIPPED | OUTPATIENT
Start: 2018-05-07 | End: 2018-05-17

## 2018-05-07 RX ORDER — VALSARTAN 80 MG/1
80 TABLET ORAL DAILY
Qty: 90 TAB | Refills: 3 | Status: SHIPPED | OUTPATIENT
Start: 2018-05-07 | End: 2018-07-20 | Stop reason: ALTCHOICE

## 2018-05-07 NOTE — PATIENT INSTRUCTIONS
Rash: Care Instructions  Your Care Instructions  A rash is any irritation or inflammation of the skin. Rashes have many possible causes, including allergy, infection, illness, heat, and emotional stress. Follow-up care is a key part of your treatment and safety. Be sure to make and go to all appointments, and call your doctor if you are having problems. It's also a good idea to know your test results and keep a list of the medicines you take. How can you care for yourself at home? · Wash the area with water only. Soap can make dryness and itching worse. Pat dry. · Put cold, wet cloths on the rash to reduce itching. · Keep cool, and stay out of the sun. · Leave the rash open to the air as much of the time as possible. · Sometimes petroleum jelly (Vaseline) can help relieve the discomfort caused by a rash. A moisturizing lotion, such as Cetaphil, also may help. Calamine lotion may help for rashes caused by contact with something (such as a plant or soap) that irritated the skin. Use it 3 or 4 times a day. · If your doctor prescribed a cream, use it as directed. If your doctor prescribed medicine, take it exactly as directed. · If your rash itches so badly that it interferes with your normal activities, take an over-the-counter antihistamine, such as diphenhydramine (Benadryl) or loratadine (Claritin). Read and follow all instructions on the label. When should you call for help? Call your doctor now or seek immediate medical care if:  ? · You have signs of infection, such as:  ¨ Increased pain, swelling, warmth, or redness. ¨ Red streaks leading from the area. ¨ Pus draining from the area. ¨ A fever. ? · You have joint pain along with the rash. ? Watch closely for changes in your health, and be sure to contact your doctor if:  ? · Your rash is changing or getting worse. For example, call if you have pain along with the rash, the rash is spreading, or you have new blisters.    ? · You do not get better after 1 week. Where can you learn more? Go to http://gurdeep-quincy.info/. Enter D449 in the search box to learn more about \"Rash: Care Instructions. \"  Current as of: October 13, 2016  Content Version: 11.4  © 2335-8637 [employer]. Care instructions adapted under license by [employer] (which disclaims liability or warranty for this information). If you have questions about a medical condition or this instruction, always ask your healthcare professional. [address] any warranty or liability for your use of this information.

## 2018-05-07 NOTE — MR AVS SNAPSHOT
[address] [facility] 
 
 
 [address] [address] [facility],5[address] [address] [phone] Patient: Suni Davis MRN: [id number] BKT:[id number] Visit Information Date & Time Provider Department Dept. Phone Encounter #  
 5/7/2018  1:40 PM NORMA Cobb 38 [phone] [id number] Follow-up Instructions Return in about 2 weeks (around 5/21/2018). Follow-up and Disposition History Your Appointments 7/20/2018 11:30 AM  
ESTABLISHED PATIENT with MD Raphael Encarnacion 38 ([facility]) Appt Note: 4 MO F/U  
 [address] [place] [address],[address] [facility] [address] [phone]  
  
   
 [address] [place] [address],[address] [address] Upcoming Health Maintenance Date Due Influenza Age 5 to Adult 8/1/2018 MEDICARE YEARLY EXAM 10/6/2018 FOBT Q 1 YEAR AGE 50-75 1/23/2019 GLAUCOMA SCREENING Q2Y 2/2/2020 DTaP/Tdap/Td series (2 - Td) 10/5/2027 Allergies as of 5/7/2018  Review Complete On: 5/7/2018 By: Eloise Toledo NP Severity Noted Reaction Type Reactions Contrast Agent [Iodine]  09/13/2016    Rash Hives on back Current Immunizations  Reviewed on 10/19/2016 Name Date Influenza High Dose Vaccine PF 9/17/2017, 12/5/2016, 9/14/2015 Influenza Vaccine 8/6/2014 Pneumococcal Conjugate (PCV-13) 9/14/2015 Pneumococcal Vaccine (Unspecified Type) 11/19/2009 Zoster Vaccine, Live 1/16/2016 Not reviewed this visit You Were Diagnosed With   
  
 Codes Comments Rash of unknown cause    -  Primary ICD-10-CM: R21 
ICD-9-CM: 782.1 Essential hypertension     ICD-10-CM: I10 
ICD-9-CM: 401.9 Vitals BP Pulse Resp Height(growth percentile) Weight(growth percentile) SpO2  
 115/70 (BP 1 Location: Left arm, BP Patient Position: Sitting) 62 19 5' 6\" (1.676 m) 129 lb 12.8 oz (58.9 kg) 99% BMI Smoking Status 20.95 kg/m2 Former Smoker Vitals History BMI and BSA Data Body Mass Index Body Surface Area  
 20.95 kg/m 2 1.66 m 2 Preferred Pharmacy Pharmacy Name Phone [facility] [address] [facility], [address] [place] [address] Jewel Serrano 101 Your Updated Medication List  
  
   
This list is accurate as of 5/7/18  2:42 PM.  Always use your most recent med list.  
  
  
  
  
 acetaminophen 500 mg tablet Commonly known as:  TYLENOL Take 500 mg by mouth every six (6) hours as needed for Pain. MAY TAKE 1 TO 2 TABS DO NOT EXCEED 4000MG/24HRS  
  
 aspirin delayed-release 81 mg tablet Take 81 mg by mouth daily. famotidine 20 mg tablet Commonly known as:  PEPCID Take 1 Tab by mouth two (2) times a day for 10 days. Indications: Rash  
  
 hydroCHLOROthiazide 25 mg tablet Commonly known as:  HYDRODIURIL Take 1 Tab by mouth daily. levocetirizine 5 mg tablet Commonly known as:  Peterson Binning Take 1 Tab by mouth daily for 10 days. Indications: Rash  
  
 magnesium oxide 400 mg tablet Commonly known as:  MAG-OX Take 1 Tab by mouth two (2) times a day. omeprazole 20 mg capsule Commonly known as:  PRILOSEC  
take 1 capsule by mouth once daily  
  
 potassium chloride 20 mEq tablet Commonly known as:  K-DUR, KLOR-CON  
take 1 tablet by mouth once daily  
  
 pravastatin 40 mg tablet Commonly known as:  PRAVACHOL  
take 1 tablet by mouth at bedtime  
  
 propranolol 40 mg tablet Commonly known as:  INDERAL Take 1 Tab by mouth two (2) times a day. tamsulosin 0.4 mg capsule Commonly known as:  FLOMAX Take 1 Cap by mouth daily. valsartan 80 mg tablet Commonly known as:  DIOVAN Take 1 Tab by mouth daily. Indications: hypertension, Replaces Lisinopril Prescriptions Sent to Pharmacy Refills  
 valsartan (DIOVAN) 80 mg tablet 3 Sig: Take 1 Tab by mouth daily. Indications: hypertension, Replaces Lisinopril  Class: Normal  
 [facility]acy[facility] Kerrie Cabral  #: [phone] Route: Oral  
 famotidine (PEPCID) 20 mg tablet 0 Sig: Take 1 Tab by mouth two (2) times a day for 10 days. Indications: Rash Class: Normal  
 [facility] [address] Kerrie Dao Ph #: [phone] Route: Oral  
 levocetirizine (XYZAL) 5 mg tablet 0 Sig: Take 1 Tab by mouth daily for 10 days. Indications: Rash Class: Normal  
 [facility] [address] Kerrie Dao Ph #: [phone] Route: Oral  
  
We Performed the Following ALPHA GAL, IGE [[id number] Custom] CBC WITH AUTOMATED DIFF [50482 CPT(R)] LIPID PANEL [88368 CPT(R)] SED RATE (ESR) D289362 CPT(R)] Follow-up Instructions Return in about 2 weeks (around 5/21/2018). Patient Instructions Rash: Care Instructions Your Care Instructions A rash is any irritation or inflammation of the skin. Rashes have many possible causes, including allergy, infection, illness, heat, and emotional stress. Follow-up care is a key part of your treatment and safety. Be sure to make and go to all appointments, and call your doctor if you are having problems. It's also a good idea to know your test results and keep a list of the medicines you take. How can you care for yourself at home? · Wash the area with water only. Soap can make dryness and itching worse. Pat dry. · Put cold, wet cloths on the rash to reduce itching. · Keep cool, and stay out of the sun. · Leave the rash open to the air as much of the time as possible. · Sometimes petroleum jelly (Vaseline) can help relieve the discomfort caused by a rash. A moisturizing lotion, such as Cetaphil, also may help. Calamine lotion may help for rashes caused by contact with something (such as a plant or soap) that irritated the skin. Use it 3 or 4 times a day. · If your doctor prescribed a cream, use it as directed. If your doctor prescribed medicine, take it exactly as directed. · If your rash itches so badly that it interferes with your normal activities, take an over-the-counter antihistamine, such as diphenhydramine (Benadryl) or loratadine (Claritin). Read and follow all instructions on the label. When should you call for help? Call your doctor now or seek immediate medical care if: 
? · You have signs of infection, such as: 
¨ Increased pain, swelling, warmth, or redness. ¨ Red streaks leading from the area. ¨ Pus draining from the area. ¨ A fever. ? · You have joint pain along with the rash. ? Watch closely for changes in your health, and be sure to contact your doctor if: 
? · Your rash is changing or getting worse. For example, call if you have pain along with the rash, the rash is spreading, or you have new blisters. ? · You do not get better after 1 week. Where can you learn more? Go to http://gurdeep-quincy.info/. Enter J780 in the search box to learn more about \"Rash: Care Instructions. \" Current as of: October 13, 2016 Content Version: 11.4 © 1376-2111 [employer]. Care instructions adapted under license by [employer] (which disclaims liability or warranty for this information). If you have questions about a medical condition or this instruction, always ask your healthcare professional. [address] [place] [address] any warranty or liability for your use of this information. Introducing [facility]! Tessie Bah introduces E-nterview patient portal. Now you can access parts of your medical record, email your doctor's office, and request medication refills online. 1. In your internet browser, go to [email or web address] 2. Click on the First Time User? Click Here link in the Sign In box. You will see the New Member Sign Up page. 3. Enter your Makelight Interactive Access Code exactly as it appears below. You will not need to use this code after youve completed the sign-up process. If you do not sign up before the expiration date, you must request a new code. · [employer] Access Code: [email or web address] Expires: 8/5/2018  1:22 PM 
 
4. Enter the last four digits of your Social Security Number (xxxx) and Date of Birth (mm/dd/yyyy) as indicated and click Submit. You will be taken to the next sign-up page. 5. Create a Makelight Interactive ID. This will be your Makelight Interactive login ID and cannot be changed, so think of one that is secure and easy to remember. 6. Create a [employer] Interactive password. You can change your password at any time. 7. Enter your Password Reset Question and Answer. This can be used at a later time if you forget your password. 8. Enter your e-mail address. You will receive e-mail notification when new information is available in [address] 9. Click Sign Up. You can now view and download portions of your medical record. 10. Click the Download Summary menu link to download a portable copy of your medical information. If you have questions, please visit the Frequently Asked Questions section of the [employer] website. Remember, [employer] is NOT to be used for urgent needs. For medical emergencies, dial 911. Now available from your iPhone and Android! Please provide this summary of care documentation to your next provider. Your primary care clinician is listed as Kita Avila. If you have any questions after today's visit, please call [phone].

## 2018-05-07 NOTE — PROGRESS NOTES
Chief Complaint   Patient presents with    Rash         HPI:       is a 67 y.o. male. He is a retired contractor. Complicated history: previous heavy ETOH, stopped drinking. PVD with anterior partial Right foot amputation due to lack of circulation and had a stent placed. Stopped smoking. Had an intraabdominal abscess in 2016 with prolonged hospitalization. HTN: Currently on Propranolol, Lisinopril and HCTZ. HLD: On Pravastain. Denies myalgias. Electrolyte imbalances: takes daily magnesium and potassium. GERD: On daily Prilosec. No s/s on medications. Prostate issues: On 0.4 mg of Flomax daily. Dr. Sonia Cosme is his colon doctor in [place]. Dr. Mariama Dickens is his vascular doctor. Seen 3/12/18 after he had a CT recently and had contrast which breaks him out in hives. Was given Kenalog cream.  Has stopped using because he read there was Acetone in it. Denies tick bites. Allergies   Allergen Reactions    Contrast Agent [Iodine] Rash     Hives on back       Current Outpatient Prescriptions   Medication Sig    potassium chloride (K-DUR, KLOR-CON) 20 mEq tablet take 1 tablet by mouth once daily    lisinopril (PRINIVIL, ZESTRIL) 10 mg tablet Take 1 Tab by mouth daily. Indications: hypertension    pravastatin (PRAVACHOL) 40 mg tablet take 1 tablet by mouth at bedtime    tamsulosin (FLOMAX) 0.4 mg capsule Take 1 Cap by mouth daily.  hydroCHLOROthiazide (HYDRODIURIL) 25 mg tablet Take 1 Tab by mouth daily.  acetaminophen (TYLENOL) 500 mg tablet Take 500 mg by mouth every six (6) hours as needed for Pain. MAY TAKE 1 TO 2 TABS  DO NOT EXCEED 4000MG/24HRS    aspirin delayed-release 81 mg tablet Take 81 mg by mouth daily.  propranolol (INDERAL) 40 mg tablet Take 1 Tab by mouth two (2) times a day.     omeprazole (PRILOSEC) 20 mg capsule take 1 capsule by mouth once daily    magnesium oxide (MAG-OX) 400 mg tablet Take 1 Tab by mouth two (2) times a day.     No current facility-administered medications for this visit. Past Medical History:   Diagnosis Date    Abuse     alcoholism, quit 2012    Claudication of calf muscles ([address] 2013    Esophageal stricture     Esophagitis     GI bleed 10/2016    Hemochromatosis     Hyperlipidemia     Hypertension     Peripheral artery disease (HCC)     Dr. Larissa Doll Pulmonary nodule     right lung       Past Surgical History:   Procedure Laterality Date    COLONOSCOPY N/A 9/14/2016    COL[facility] ENDOSCOPY    COLONOSCOPY N/A 12/19/2016    COLONOSCOPY performed by [facility]indu [facility] MD at [facility] ENDOSCOPY    HX AMPUTATION Left 07/2016    left 4th toe from gangrene    HX ENDOSCOPY  10/2016    HX OTHER SURGICAL      left partial foot amputation       Social History     Social History    Marital status: SINGLE     Spouse name: N/A    Number of children: N/A    Years of education: N/A     Occupational History    retired [employer]      Social History Main Topics    Smoking status: Former Smoker     Packs/day: 0.00     Types: Cigarettes     Quit date: 1/1/2018    Smokeless tobacco: Never Used    Alcohol use 12.6 oz/week     21 Glasses of wine per week      Comment: \"about 2-3 glasses of wine per day, a HUGE decrease from before\"    Drug use: No    Sexual activity: Not Asked     Other Topics Concern     Service No    Blood Transfusions No    Caffeine Concern Yes     He does not drink caffeine    Occupational Exposure No    Hobby Hazards No    Sleep Concern No    Stress Concern No    Weight Concern No    Special Diet No    Back Care No    Exercise Yes    Bike Helmet No    Seat Belt Yes    Self-Exams Yes     Social History Narrative       Family History   Problem Relation Age of Onset    No Known Problems Mother      coma       Above history reviewed. ROS:  Denies fever, chills, cough, chest pain, SOB,  nausea, vomiting, or diarrhea.   Denies wt loss, wt gain, hemoptysis, hematochezia or melena. Physical Examination:    /70 (BP 1 Location: Left arm, BP Patient Position: Sitting)  Pulse 62  Resp 19  Ht 5' 6\" (1.676 m)  Wt 129 lb 12.8 oz (58.9 kg)  SpO2 99%  BMI 20.95 kg/m2    General: Alert and Ox3, Fluent speech  Neck:  Supple, no adenopathy, JVD, mass or bruit  Chest:  Clear to Ausculation, without wheezes, rales, rubs or ronchi  Cardiac: RRR  Extremities:  No cyanosis, clubbing or edema  Neurologic:  Ambulatory without assist, CN 2-12 grossly intact. Moves all extremities. Skin: Raised urticaria in patches over lower abdomen and around back on to shoulder blades. Lymphadenopathy: no cervical or supraclavicular nodes    ASSESSMENT AND PLAN:     1. Rash of unknown cause  Unknown cause  Ruling out Alpha Gal  Concern that Lisinopril could also be a cause  Switching to Valsartan  - ALPHA GAL, IGE  - SED RATE (ESR)  - CBC WITH AUTOMATED DIFF  - famotidine (PEPCID) 20 mg tablet; Take 1 Tab by mouth two (2) times a day for 10 days. Indications: Rash  Dispense: 20 Tab; Refill: 0  - levocetirizine (XYZAL) 5 mg tablet; Take 1 Tab by mouth daily for 10 days. Indications: Rash  Dispense: 10 Tab; Refill: 0    2. Essential hypertension  Switching to Valsartan  - LIPID PANEL  - valsartan (DIOVAN) 80 mg tablet; Take 1 Tab by mouth daily. Indications: hypertension, Replaces Lisinopril  Dispense: 90 Tab;  Refill: 3     RTC in 2 weeks    Moses Johnson NP

## 2018-05-09 ENCOUNTER — DOCUMENTATION ONLY (OUTPATIENT)
Dept: FAMILY MEDICINE CLINIC | Age: 73
End: 2018-05-09

## 2018-05-09 NOTE — PROGRESS NOTES
PT STATED HE IS VOIDING A LOT FROM THE NEW MEDICATION HE WAS STARTED ON YESTERDAY BY CAYLA LOMBARDI NP. ADVISED HIM TO CALL OR GO TO URGENT CARE.

## 2018-05-10 ENCOUNTER — TELEPHONE (OUTPATIENT)
Dept: FAMILY MEDICINE CLINIC | Age: 73
End: 2018-05-10

## 2018-05-10 LAB
ALPHA-GAL IGE QN: <0.1 KU/L
BASOPHILS # BLD AUTO: 0.1 X10E3/UL (ref 0–0.2)
BASOPHILS NFR BLD AUTO: 1 %
CHOLEST SERPL-MCNC: 130 MG/DL (ref 100–199)
EOSINOPHIL # BLD AUTO: 0.4 X10E3/UL (ref 0–0.4)
EOSINOPHIL NFR BLD AUTO: 4 %
ERYTHROCYTE [DISTWIDTH] IN BLOOD BY AUTOMATED COUNT: 13.2 % (ref 12.3–15.4)
ERYTHROCYTE [SEDIMENTATION RATE] IN BLOOD BY WESTERGREN METHOD: 4 MM/HR (ref 0–30)
HCT VFR BLD AUTO: 39.1 % (ref 37.5–51)
HDLC SERPL-MCNC: 51 MG/DL
HGB BLD-MCNC: 13.8 G/DL (ref 13–17.7)
IMM GRANULOCYTES # BLD: 0 X10E3/UL (ref 0–0.1)
IMM GRANULOCYTES NFR BLD: 0 %
LDLC SERPL CALC-MCNC: 63 MG/DL (ref 0–99)
LYMPHOCYTES # BLD AUTO: 2 X10E3/UL (ref 0.7–3.1)
LYMPHOCYTES NFR BLD AUTO: 20 %
MCH RBC QN AUTO: 35.4 PG (ref 26.6–33)
MCHC RBC AUTO-ENTMCNC: 35.3 G/DL (ref 31.5–35.7)
MCV RBC AUTO: 100 FL (ref 79–97)
MONOCYTES # BLD AUTO: 1.1 X10E3/UL (ref 0.1–0.9)
MONOCYTES NFR BLD AUTO: 11 %
NEUTROPHILS # BLD AUTO: 6.4 X10E3/UL (ref 1.4–7)
NEUTROPHILS NFR BLD AUTO: 64 %
PLATELET # BLD AUTO: 333 X10E3/UL (ref 150–379)
RBC # BLD AUTO: 3.9 X10E6/UL (ref 4.14–5.8)
TRIGL SERPL-MCNC: 80 MG/DL (ref 0–149)
VLDLC SERPL CALC-MCNC: 16 MG/DL (ref 5–40)
WBC # BLD AUTO: 10 X10E3/UL (ref 3.4–10.8)

## 2018-05-10 NOTE — PROGRESS NOTES
Negative for alpha gal allergy. Cholesterol is good. Inflammatory factors are normal.  Blood count is OK.

## 2018-05-10 NOTE — TELEPHONE ENCOUNTER
----- Message from Amaury Julien sent at 5/10/2018  9:51 AM EDT -----  Regarding: NORMA Vargas/Telephone  Pt would like to know if he should continue to use ointment for rash    Best contact is [phone]    Message from call center

## 2018-05-10 NOTE — TELEPHONE ENCOUNTER
----- Message from Sandie York NP sent at 5/10/2018 12:14 PM EDT -----  Negative for alpha gal allergy. Cholesterol is good. Inflammatory factors are normal.  Blood count is OK.

## 2018-05-10 NOTE — TELEPHONE ENCOUNTER
Patient asking if he needs to continue the 2 medications, rash is going away but not gone, Encouraged to take medication as prescribed

## 2018-05-14 DIAGNOSIS — I10 ESSENTIAL HYPERTENSION: ICD-10-CM

## 2018-07-20 ENCOUNTER — OFFICE VISIT (OUTPATIENT)
Dept: FAMILY MEDICINE CLINIC | Age: 73
End: 2018-07-20

## 2018-07-20 VITALS
WEIGHT: 125 LBS | RESPIRATION RATE: 16 BRPM | TEMPERATURE: 97.8 F | HEART RATE: 70 BPM | SYSTOLIC BLOOD PRESSURE: 158 MMHG | HEIGHT: 66 IN | DIASTOLIC BLOOD PRESSURE: 68 MMHG | BODY MASS INDEX: 20.09 KG/M2 | OXYGEN SATURATION: 100 %

## 2018-07-20 DIAGNOSIS — N32.1 COLOVESICAL FISTULA: ICD-10-CM

## 2018-07-20 DIAGNOSIS — I10 ESSENTIAL HYPERTENSION: ICD-10-CM

## 2018-07-20 DIAGNOSIS — N39.0 URINARY TRACT INFECTION WITHOUT HEMATURIA, SITE UNSPECIFIED: Primary | ICD-10-CM

## 2018-07-20 LAB
BILIRUB UR QL STRIP: NEGATIVE
GLUCOSE UR-MCNC: NEGATIVE MG/DL
KETONES P FAST UR STRIP-MCNC: NEGATIVE MG/DL
PH UR STRIP: 6.5 [PH] (ref 4.6–8)
PROT UR QL STRIP: NORMAL
SP GR UR STRIP: 1.02 (ref 1–1.03)
UA UROBILINOGEN AMB POC: NORMAL (ref 0.2–1)
URINALYSIS CLARITY POC: CLEAR
URINALYSIS COLOR POC: YELLOW
URINE BLOOD POC: NORMAL
URINE LEUKOCYTES POC: NORMAL
URINE NITRITES POC: NEGATIVE

## 2018-07-20 RX ORDER — CIPROFLOXACIN 500 MG/1
500 TABLET ORAL 2 TIMES DAILY
Qty: 20 TAB | Refills: 0 | Status: SHIPPED | OUTPATIENT
Start: 2018-07-20 | End: 2018-07-30

## 2018-07-20 RX ORDER — LOSARTAN POTASSIUM 50 MG/1
50 TABLET ORAL DAILY
Qty: 90 TAB | Refills: 4 | Status: SHIPPED | OUTPATIENT
Start: 2018-07-20 | End: 2019-10-15 | Stop reason: SDUPTHER

## 2018-07-20 NOTE — PROGRESS NOTES
Chief Complaint   Patient presents with    Bladder Infection         HPI:       is a 75 y.o. male with a history of a colovesicular fistula 2 years ago presents to the office with frequent urination and the observation of purulent drainage from the penis. No fever. Presently taking Valsartan for HTN--this is the subject of a nationwide recall. Allergies   Allergen Reactions    Contrast Agent [Iodine] Rash     Hives on back       Current Outpatient Prescriptions   Medication Sig    valsartan (DIOVAN) 80 mg tablet Take 1 Tab by mouth daily. Indications: hypertension, Replaces Lisinopril    potassium chloride (K-DUR, KLOR-CON) 20 mEq tablet take 1 tablet by mouth once daily    pravastatin (PRAVACHOL) 40 mg tablet take 1 tablet by mouth at bedtime    tamsulosin (FLOMAX) 0.4 mg capsule Take 1 Cap by mouth daily.  hydroCHLOROthiazide (HYDRODIURIL) 25 mg tablet Take 1 Tab by mouth daily.  acetaminophen (TYLENOL) 500 mg tablet Take 500 mg by mouth every six (6) hours as needed for Pain. MAY TAKE 1 TO 2 TABS  DO NOT EXCEED 4000MG/24HRS    aspirin delayed-release 81 mg tablet Take 81 mg by mouth daily.  propranolol (INDERAL) 40 mg tablet Take 1 Tab by mouth two (2) times a day.  omeprazole (PRILOSEC) 20 mg capsule take 1 capsule by mouth once daily    magnesium oxide (MAG-OX) 400 mg tablet Take 1 Tab by mouth two (2) times a day. No current facility-administered medications for this visit. Past Medical History:   Diagnosis Date    Abuse     alcoholism, quit 2012    Claudication of calf muscles [facility]) 2013    Colovesical fistula     Dr Saul Hu Esophageal stricture     Esophagitis     GI bleed 10/2016    Hemochromatosis     Hyperlipidemia     Hypertension     Peripheral artery disease (HCC)     Dr. Cates List Pulmonary nodule     right lung         ROS:  Denies fever, chills, cough, chest pain, SOB,  nausea, vomiting, or diarrhea.   Denies wt loss, wt gain, hemoptysis, hematochezia or melena. Physical Examination:    /68 (BP 1 Location: Left arm, BP Patient Position: Sitting)  Pulse 70  Temp 97.8 °F (36.6 °C) (Oral)   Resp 16  Ht 5' 6\" (1.676 m)  Wt 125 lb (56.7 kg)  SpO2 100%  BMI 20.18 kg/m2    General: Alert and Ox3, Fluent speech  HEENT:  NC/AT, EOMI, OP: clear  Neck:  Supple, no adenopathy, JVD, mass or bruit  Chest:  Clear to Ausculation, without wheezes, rales, rubs or ronchi  Cardiac: RRR  Abdomen:  +BS, soft, nontender without palpable HSM  Extremities:  No cyanosis, clubbing or edema  Neurologic:  Ambulatory without assist, CN 2-12 grossly intact. Moves all extremities. Skin: no rash  Lymphadenopathy: no cervical or supraclavicular nodes    UA is + for LE and blood. ASSESSMENT AND PLAN:     1.  UTI:  History of colovesicular fistula. Culture urine and start Cipro. Consult with Dr Lisa Tadeo for guidance. 2.  HTN:  Stop Valsartan. Start Losartan  3. RTC if sx do not resolve.     Orders Placed This Encounter    CULTURE, URINE    AMB POC URINALYSIS DIP STICK MANUAL W/O MICRO       Maureen Hernandez MD, Greg Schwartz

## 2018-07-23 LAB — BACTERIA UR CULT: ABNORMAL

## 2018-07-31 ENCOUNTER — TELEPHONE (OUTPATIENT)
Dept: FAMILY MEDICINE CLINIC | Age: 73
End: 2018-07-31

## 2018-07-31 NOTE — TELEPHONE ENCOUNTER
Patient states that he was seen 2wks ago for possible UTI. Issues came back and he thinks its more than a UTI. Dr. Marcella Garay had talked about him seeing a specialists. Would like to speak with Dr. Marcella Garay or Ayush Lopez as soon as possible.

## 2018-08-02 NOTE — TELEPHONE ENCOUNTER
Spoke with patient, seeing Colon specialist in [place], [facility] has an appointment with him but encouraged him to call for a sooner appointment.

## 2018-08-07 ENCOUNTER — TELEPHONE (OUTPATIENT)
Dept: FAMILY MEDICINE CLINIC | Age: 73
End: 2018-08-07

## 2018-08-07 NOTE — TELEPHONE ENCOUNTER
Spoke [facility] staff regarding symptoms. Nurse Zurdo Kwok had conferred with Dr. John Vo  he suggested sending patient to the ER, order CT abd/pelvis, CBC, BMP and UA.  Patient could be becoming septic

## 2018-08-07 NOTE — TELEPHONE ENCOUNTER
Spoke with patient, he will go to the ER, trying to find a ride, sister lives in [facility].

## 2018-08-24 ENCOUNTER — HOME HEALTH ADMISSION (OUTPATIENT)
Dept: HOME HEALTH SERVICES | Facility: HOME HEALTH | Age: 73
End: 2018-08-24

## 2018-08-25 ENCOUNTER — HOME CARE VISIT (OUTPATIENT)
Dept: HOME HEALTH SERVICES | Facility: HOME HEALTH | Age: 73
End: 2018-08-25

## 2018-08-30 ENCOUNTER — OFFICE VISIT (OUTPATIENT)
Dept: FAMILY MEDICINE CLINIC | Age: 73
End: 2018-08-30

## 2018-08-30 VITALS
RESPIRATION RATE: 17 BRPM | DIASTOLIC BLOOD PRESSURE: 62 MMHG | SYSTOLIC BLOOD PRESSURE: 130 MMHG | BODY MASS INDEX: 20.41 KG/M2 | OXYGEN SATURATION: 98 % | HEART RATE: 108 BPM | WEIGHT: 127 LBS | HEIGHT: 66 IN

## 2018-08-30 DIAGNOSIS — D64.9 ANEMIA, UNSPECIFIED TYPE: Primary | ICD-10-CM

## 2018-08-30 DIAGNOSIS — L89.92: ICD-10-CM

## 2018-08-30 DIAGNOSIS — E87.6 HYPOKALEMIA: ICD-10-CM

## 2018-08-30 DIAGNOSIS — E53.8 B12 DEFICIENCY: ICD-10-CM

## 2018-08-30 DIAGNOSIS — E83.42 HYPOMAGNESEMIA: ICD-10-CM

## 2018-08-30 DIAGNOSIS — R00.0 TACHYCARDIA: ICD-10-CM

## 2018-08-30 DIAGNOSIS — I10 ESSENTIAL HYPERTENSION: ICD-10-CM

## 2018-08-30 DIAGNOSIS — R35.0 URINARY FREQUENCY: ICD-10-CM

## 2018-08-30 LAB
BILIRUB UR QL STRIP: NEGATIVE
GLUCOSE UR-MCNC: NEGATIVE MG/DL
KETONES P FAST UR STRIP-MCNC: NEGATIVE MG/DL
PH UR STRIP: 8.5 [PH] (ref 4.6–8)
PROT UR QL STRIP: NEGATIVE
SP GR UR STRIP: 1.01 (ref 1–1.03)
UA UROBILINOGEN AMB POC: ABNORMAL (ref 0.2–1)
URINALYSIS CLARITY POC: CLEAR
URINALYSIS COLOR POC: YELLOW
URINE BLOOD POC: NEGATIVE
URINE LEUKOCYTES POC: ABNORMAL
URINE NITRITES POC: NEGATIVE

## 2018-08-30 RX ORDER — LANOLIN ALCOHOL/MO/W.PET/CERES
1000 CREAM (GRAM) TOPICAL DAILY
COMMUNITY
End: 2018-09-27

## 2018-08-30 RX ORDER — PROPRANOLOL HYDROCHLORIDE 20 MG/1
20 TABLET ORAL 2 TIMES DAILY
Qty: 180 TAB | Refills: 3 | Status: SHIPPED | OUTPATIENT
Start: 2018-08-30 | End: 2019-08-27 | Stop reason: SDUPTHER

## 2018-08-30 RX ORDER — LANOLIN ALCOHOL/MO/W.PET/CERES
400 CREAM (GRAM) TOPICAL DAILY
COMMUNITY
End: 2018-09-27

## 2018-08-30 RX ORDER — LANOLIN ALCOHOL/MO/W.PET/CERES
CREAM (GRAM) TOPICAL DAILY
COMMUNITY
End: 2018-09-27

## 2018-08-30 RX ORDER — MELATONIN 5 MG
5 CAPSULE ORAL
COMMUNITY
End: 2018-09-27

## 2018-08-30 NOTE — MR AVS SNAPSHOT
[address] [address] [phone] Patient: Laurie Glass MRN: [id number] LNE:[id number] Visit Information Date & Time Provider Department Dept. Phone Encounter #  
 8/30/2018  9:30 AM Lefty Collins NP [facility] [phone] [id number] Follow-up Instructions Return in about 4 weeks (around 9/27/2018). Follow-up and Disposition History Upcoming Health Maintenance Date Due Influenza Age 5 to Adult 8/1/2018 MEDICARE YEARLY EXAM 10/6/2018 FOBT Q 1 YEAR AGE 50-75 1/23/2019 GLAUCOMA SCREENING Q2Y 2/2/2020 DTaP/Tdap/Td series (2 - Td) 10/5/2027 Allergies as of 8/30/2018  Review Complete On: 8/30/2018 By: Lefty Collins NP Severity Noted Reaction Type Reactions Contrast Agent [Iodine]  09/13/2016    Rash Hives on back Current Immunizations  Reviewed on 10/19/2016 Name Date Influenza High Dose Vaccine PF 9/17/2017, 12/5/2016, 9/14/2015 Influenza Vaccine 8/6/2014 Pneumococcal Conjugate (PCV-13) 9/14/2015 Pneumococcal Vaccine (Unspecified Type) 11/19/2009 Zoster Vaccine, Live 1/16/2016 Not reviewed this visit You Were Diagnosed With   
  
 Codes Comments Anemia, unspecified type    -  Primary ICD-10-CM: D64.9 ICD-9-CM: 285.9 Essential hypertension     ICD-10-CM: I10 
ICD-9-CM: 401.9 Hypokalemia     ICD-10-CM: E87.6 ICD-9-CM: 276.8 Hypomagnesemia     ICD-10-CM: G48.55 
ICD-9-CM: 275.2 B12 deficiency     ICD-10-CM: E53.8 ICD-9-CM: 266.2 Healing decubitus ulcer, stage 2     ICD-10-CM: L89.92 
ICD-9-CM: 707.00, 707.22 Tachycardia     ICD-10-CM: R00.0 ICD-9-CM: 785.0 Urinary frequency     ICD-10-CM: R35.0 ICD-9-CM: 788.41 Vitals  BP Pulse Resp Height(growth percentile) Weight(growth percentile) SpO2  
 130/62 (BP 1 Location: Left arm, BP Patient Position: Sitting) (!) 108 17 5' 6\" (1.676 m) 127 lb (57.6 kg) 98% BMI Smoking Status 20.5 kg/m2 Former Smoker Vitals History BMI and BSA Data Body Mass Index Body Surface Area 20.5 kg/m 2 1.64 m 2 Preferred Pharmacy Pharmacy Name Phone [facility] [address] [facility], [address] [place] [address] Jewel Serrano 101 Your Updated Medication List  
  
   
This list is accurate as of 8/30/18 10:44 AM.  Always use your most recent med list.  
  
  
  
  
 acetaminophen 500 mg tablet Commonly known as:  TYLENOL Take 500 mg by mouth every six (6) hours as needed for Pain. MAY TAKE 1 TO 2 TABS DO NOT EXCEED 4000MG/24HRS  
  
 aspirin delayed-release 81 mg tablet Take 81 mg by mouth daily. cyanocobalamin 1,000 mcg tablet Take 1,000 mcg by mouth daily. folic acid 657 mcg tablet Take 400 mcg by mouth daily. hydroCHLOROthiazide 25 mg tablet Commonly known as:  HYDRODIURIL Take 1 Tab by mouth daily. losartan 50 mg tablet Commonly known as:  COZAAR Take 1 Tab by mouth daily. magnesium oxide 400 mg tablet Commonly known as:  MAG-OX Take 1 Tab by mouth two (2) times a day. melatonin 5 mg Cap capsule Take 5 mg by mouth nightly. omeprazole 20 mg capsule Commonly known as:  PRILOSEC  
take 1 capsule by mouth once daily  
  
 potassium chloride 20 mEq tablet Commonly known as:  K-DUR, KLOR-CON  
take 1 tablet by mouth once daily  
  
 pravastatin 40 mg tablet Commonly known as:  PRAVACHOL  
take 1 tablet by mouth at bedtime  
  
 propranolol 20 mg tablet Commonly known as:  INDERAL Take 1 Tab by mouth two (2) times a day. Indications: New lower dose  
  
 tamsulosin 0.4 mg capsule Commonly known as:  FLOMAX Take 1 Cap by mouth daily. VITAMIN B-1 100 mg tablet Generic drug:  thiamine HCL Take  by mouth daily. Prescriptions Sent to Pharmacy  Refills  
 propranolol (INDERAL) 20 mg tablet 3  
 Sig: Take 1 Tab by mouth two (2) times a day. Indications: New lower dose Class: Normal  
 Pharmacy: [id number] Kerrie Dao Ph #: [phone] Route: Oral  
  
We Performed the Following AMB POC URINALYSIS DIP STICK MANUAL W/ MICRO [37072 CPT(R)] CBC W/O DIFF [61046 CPT(R)] COLLECTION VENOUS BLOOD,VENIPUNCTURE [id number] CPT(R)] CULTURE, URINE [id number] CPT(R)] FERRITIN [23694 CPT(R)] IRON PROFILE [id number] CPT(R)] MAGNESIUM [id number] CPT(R)] METABOLIC PANEL, COMPREHENSIVE [99878 CPT(R)] REFERRAL TO GASTROENTEROLOGY [KDF93 Custom] Comments:  
 Established patient of Dr. Carol Conner. Recent admission for anemia. ER rec's scope. VITAMIN B12 [id number] CPT(R)] Follow-up Instructions Return in about 4 weeks (around 9/27/2018). Referral Information Referral ID Referred By Referred To  
  
 [id number] [address] [facility] [address] [place], 200 [facility] Visits Status Start Date End Date 1 New Request 8/30/18 8/30/19 If your referral has a status of pending review or denied, additional information will be sent to support the outcome of this decision. Patient Instructions Anemia: Care Instructions Your Care Instructions Anemia is a low level of red blood cells, which carry oxygen throughout your body. Many things can cause anemia. Lack of iron is one of the most common causes. Your body needs iron to make hemoglobin, a substance in red blood cells that carries oxygen from the lungs to your body's cells. Without enough iron, the body produces fewer and smaller red blood cells. As a result, your body's cells do not get enough oxygen, and you feel tired and weak. And you may have trouble concentrating. Bleeding is the most common cause of a lack of iron.  You may have heavy menstrual bleeding or bleeding caused by conditions such as ulcers, hemorrhoids, or cancer. Regular use of aspirin or other anti-inflammatory medicines (such as ibuprofen) also can cause bleeding in some people. A lack of iron in your diet also can cause anemia, especially at times when the body needs more iron, such as during pregnancy, infancy, and the teen years. Your doctor may have prescribed iron pills. It may take several months of treatment for your iron levels to return to normal. Your doctor also may suggest that you eat foods that are rich in iron, such as meat and beans. There are many other causes of anemia. It is not always due to a lack of iron. Finding the specific cause of your anemia will help your doctor find the right treatment for you. Follow-up care is a key part of your treatment and safety. Be sure to make and go to all appointments, and call your doctor if you are having problems. It's also a good idea to know your test results and keep a list of the medicines you take. How can you care for yourself at home? · Take your medicines exactly as prescribed. Call your doctor if you think you are having a problem with your medicine. · If your doctor recommends iron pills, take them as directed: ¨ Try to take the pills on an empty stomach about 1 hour before or 2 hours after meals. But you may need to take iron with food to avoid an upset stomach. ¨ Do not take antacids or drink milk or caffeine drinks (such as coffee, tea, or cola) at the same time or within 2 hours of the time that you take your iron. They can make it hard for your body to absorb the iron. ¨ Vitamin C (from food or supplements) helps your body absorb iron. Try taking iron pills with a glass of orange juice or some other food that is high in vitamin C, such as citrus fruits. ¨ Iron pills may cause stomach problems, such as heartburn, nausea, diarrhea, constipation, and cramps. Be sure to drink plenty of fluids, and include fruits, vegetables, and fiber in your diet each day.  Iron pills often make your bowel movements dark or green. ¨ If you forget to take an iron pill, do not take a double dose of iron the next time you take a pill. ¨ Keep iron pills out of the reach of small children. An overdose of iron can be very dangerous. · Follow your doctor's advice about eating iron-rich foods. These include red meat, shellfish, poultry, eggs, beans, raisins, whole-grain bread, and leafy green vegetables. · Steam vegetables to help them keep their iron content. When should you call for help? Call 911 anytime you think you may need emergency care. For example, call if: 
  · You have symptoms of a heart attack. These may include: ¨ Chest pain or pressure, or a strange feeling in the chest. 
¨ Sweating. ¨ Shortness of breath. ¨ Nausea or vomiting. ¨ Pain, pressure, or a strange feeling in the back, neck, jaw, or upper belly or in one or both shoulders or arms. ¨ Lightheadedness or sudden weakness. ¨ A fast or irregular heartbeat. After you call 911, the  may tell you to chew 1 adult-strength or 2 to 4 low-dose aspirin. Wait for an ambulance. Do not try to drive yourself.  
  · You passed out (lost consciousness).  
 Call your doctor now or seek immediate medical care if: 
  · You have new or increased shortness of breath.  
  · You are dizzy or lightheaded, or you feel like you may faint.  
  · Your fatigue and weakness continue or get worse.  
  · You have any abnormal bleeding, such as: 
¨ Nosebleeds. ¨ Vaginal bleeding that is different (heavier, more frequent, at a different time of the month) than what you are used to. ¨ Bloody or black stools, or rectal bleeding. ¨ Bloody or pink urine.  
 Watch closely for changes in your health, and be sure to contact your doctor if: 
  · You do not get better as expected. Where can you learn more? Go to http://gurdeep-quincy.info/. Enter R301 in the search box to learn more about \"Anemia: Care Instructions. \" 
 Current as of: October 9, 2017 Content Version: 11.7 © 9445-7416 [employer]. Care instructions adapted under license by [employer] (which disclaims liability or warranty for this information). If you have questions about a medical condition or this instruction, always ask your healthcare professional. [address] any warranty or liability for your use of this information. If you have any questions regarding [employer], you may call [employer] support at [phone]. Patient Instructions History Introducing [facility]! [employer] introduces [employer] patient portal. Now you can access parts of your medical record, email your doctor's office, and request medication refills online. 1. In your internet browser, go to [email or web address] 2. Click on the First Time User? Click Here link in the Sign In box. You will see the New Member Sign Up page. 3. Enter your [employer] Access Code exactly as it appears below. You will not need to use this code after youve completed the sign-up process. If you do not sign up before the expiration date, you must request a new code. · [employer] Access Code: [email or web address] Expires: 11/5/2018 12:02 PM 
 
4. Enter the last four digits of your Social Security Number (xxxx) and Date of Birth (mm/dd/yyyy) as indicated and click Submit. You will be taken to the next sign-up page. 5. Create a [employer] ID. This will be your [employer] login ID and cannot be changed, so think of one that is secure and easy to remember. 6. Create a [employer] password. You can change your password at any time. 7. Enter your Password Reset Question and Answer. This can be used at a later time if you forget your password. 8. Enter your e-mail address. You will receive e-mail notification when new information is available in [address] 9. Click Sign Up. You can now view and download portions of your medical record. 10. Click the Download Summary menu link to download a portable copy of your medical information. If you have questions, please visit the Frequently Asked Questions section of the [employer] website. Remember, [employer] is NOT to be used for urgent needs. For medical emergencies, dial 911. Now available from your iPhone and Android! Please provide this summary of care documentation to your next provider. Your primary care clinician is listed as Sharron Soler. If you have any questions after today's visit, please call [phone].

## 2018-08-30 NOTE — PATIENT INSTRUCTIONS
Anemia: Care Instructions Your Care Instructions Anemia is a low level of red blood cells, which carry oxygen throughout your body. Many things can cause anemia. Lack of iron is one of the most common causes. Your body needs iron to make hemoglobin, a substance in red blood cells that carries oxygen from the lungs to your body's cells. Without enough iron, the body produces fewer and smaller red blood cells. As a result, your body's cells do not get enough oxygen, and you feel tired and weak. And you may have trouble concentrating. Bleeding is the most common cause of a lack of iron. You may have heavy menstrual bleeding or bleeding caused by conditions such as ulcers, hemorrhoids, or cancer. Regular use of aspirin or other anti-inflammatory medicines (such as ibuprofen) also can cause bleeding in some people. A lack of iron in your diet also can cause anemia, especially at times when the body needs more iron, such as during pregnancy, infancy, and the teen years. Your doctor may have prescribed iron pills. It may take several months of treatment for your iron levels to return to normal. Your doctor also may suggest that you eat foods that are rich in iron, such as meat and beans. There are many other causes of anemia. It is not always due to a lack of iron. Finding the specific cause of your anemia will help your doctor find the right treatment for you. Follow-up care is a key part of your treatment and safety. Be sure to make and go to all appointments, and call your doctor if you are having problems. It's also a good idea to know your test results and keep a list of the medicines you take. How can you care for yourself at home? · Take your medicines exactly as prescribed. Call your doctor if you think you are having a problem with your medicine. · If your doctor recommends iron pills, take them as directed: ¨ Try to take the pills on an empty stomach about 1 hour before or 2 hours after meals. But you may need to take iron with food to avoid an upset stomach. ¨ Do not take antacids or drink milk or caffeine drinks (such as coffee, tea, or cola) at the same time or within 2 hours of the time that you take your iron. They can make it hard for your body to absorb the iron. ¨ Vitamin C (from food or supplements) helps your body absorb iron. Try taking iron pills with a glass of orange juice or some other food that is high in vitamin C, such as citrus fruits. ¨ Iron pills may cause stomach problems, such as heartburn, nausea, diarrhea, constipation, and cramps. Be sure to drink plenty of fluids, and include fruits, vegetables, and fiber in your diet each day. Iron pills often make your bowel movements dark or green. ¨ If you forget to take an iron pill, do not take a double dose of iron the next time you take a pill. ¨ Keep iron pills out of the reach of small children. An overdose of iron can be very dangerous. · Follow your doctor's advice about eating iron-rich foods. These include red meat, shellfish, poultry, eggs, beans, raisins, whole-grain bread, and leafy green vegetables. · Steam vegetables to help them keep their iron content. When should you call for help? Call 911 anytime you think you may need emergency care. For example, call if: 
  · You have symptoms of a heart attack. These may include: ¨ Chest pain or pressure, or a strange feeling in the chest. 
¨ Sweating. ¨ Shortness of breath. ¨ Nausea or vomiting. ¨ Pain, pressure, or a strange feeling in the back, neck, jaw, or upper belly or in one or both shoulders or arms. ¨ Lightheadedness or sudden weakness. ¨ A fast or irregular heartbeat. After you call 911, the  may tell you to chew 1 adult-strength or 2 to 4 low-dose aspirin. Wait for an ambulance. Do not try to drive yourself.  
  · You passed out (lost consciousness).  
 Call your doctor now or seek immediate medical care if:   · You have new or increased shortness of breath.  
  · You are dizzy or lightheaded, or you feel like you may faint.  
  · Your fatigue and weakness continue or get worse.  
  · You have any abnormal bleeding, such as: 
¨ Nosebleeds. ¨ Vaginal bleeding that is different (heavier, more frequent, at a different time of the month) than what you are used to. ¨ Bloody or black stools, or rectal bleeding. ¨ Bloody or pink urine.  
 Watch closely for changes in your health, and be sure to contact your doctor if: 
  · You do not get better as expected. Where can you learn more? Go to http://gurdeep-quincy.info/. Enter R301 in the search box to learn more about \"Anemia: Care Instructions. \" Current as of: October 9, 2017 Content Version: 11.7 © 4059-9487 [employer]. Care instructions adapted under license by [employer] (which disclaims liability or warranty for this information). If you have questions about a medical condition or this instruction, always ask your healthcare professional. [address] any warranty or liability for your use of this information. If you have any questions regarding [employer], you may call [employer] support at [phone].

## 2018-08-30 NOTE — PROGRESS NOTES
Chief Complaint Patient presents with  Dehydration  
  [facility] ED follow up  Abnormal Lab Results  
  low potassium HPI:   
 
 is a 75 y.o. male. He is a retired contractor. 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 Had an intraabdominal abscess in 2016 with prolonged hospitalization. HTN: Currently on Propranolol, Lisinopril and HCTZ. HLD: On Pravastain. Denies myalgias. Electrolyte imbalances: Takes daily magnesium and potassium. GERD: On daily Prilosec. No s/s on medications. Prostate issues: On 0.4 mg of Flomax daily. Wears a brief s/t dribbling during the day. Dr. Victor M Doyle is his colon doctor in [address] [facility]. Dr. Niko Haji is his vascular doctor. New Issues:  Was admitted to [place] [address] for 4 days for dehydration. Discharged 8/22. His family then brought him to the [facility] ED the same day of his discharge because he was still feeling bad. He reports that his magnesium and K were low. Was diagnosed with anemia and was instructed to follow-up with Dr. Victor M Doyle. Increased his omeprazole from 20 to 40 mg daily. Home health was ordered. Patient declined admission. Per home health, he had a left buttock wound that needed to be addressed. He is feeling better. No dark stools. Last scopes done in 2017 with Dr. Gaetano Rivers. HR is elevated today. He was taken off his Propranolol during his hospitalization at [place] [address]. Allergies Allergen Reactions  Contrast Agent [Iodine] Rash Hives on back Current Outpatient Prescriptions Medication Sig  
 melatonin 5 mg cap capsule Take 5 mg by mouth nightly.  folic acid 889 mcg tablet Take 400 mcg by mouth daily.  thiamine HCL (VITAMIN B-1) 100 mg tablet Take  by mouth daily.  cyanocobalamin 1,000 mcg tablet Take 1,000 mcg by mouth daily.  propranolol (INDERAL) 20 mg tablet Take 1 Tab by mouth two (2) times a day. Indications: New lower dose  losartan (COZAAR) 50 mg tablet Take 1 Tab by mouth daily.  potassium chloride (K-DUR, KLOR-CON) 20 mEq tablet take 1 tablet by mouth once daily  pravastatin (PRAVACHOL) 40 mg tablet take 1 tablet by mouth at bedtime  tamsulosin (FLOMAX) 0.4 mg capsule Take 1 Cap by mouth daily.  hydroCHLOROthiazide (HYDRODIURIL) 25 mg tablet Take 1 Tab by mouth daily.  acetaminophen (TYLENOL) 500 mg tablet Take 500 mg by mouth every six (6) hours as needed for Pain. MAY TAKE 1 TO 2 TABS 
DO NOT EXCEED 4000MG/24HRS  aspirin delayed-release 81 mg tablet Take 81 mg by mouth daily.  omeprazole (PRILOSEC) 20 mg capsule take 1 capsule by mouth once daily  magnesium oxide (MAG-OX) 400 mg tablet Take 1 Tab by mouth two (2) times a day. No current facility-administered medications for this visit. Past Medical History:  
Diagnosis Date  Abuse   
 alcoholism, quit 2012  Claudication of calf muscles ([facility] [address] 2013  Colovesical fistula Dr Lang Katz  Esophageal stricture  Esophagitis  GI bleed 10/2016  Hemochromatosis  Hyperlipidemia  Hypertension  Peripheral artery disease ([facility] [address] Dr. Nisha Gar  Pulmonary nodule   
 right lung Past Surgical History:  
Procedure Laterality Date  COLONOSCOPY N/A 9/14/2016 COLONOSCOPY performed by Rayna Roa MD at [facility] ENDOSCOPY  COLONOSCOPY N/A 12/19/2016 COLONOSCOPY performed by Rayna Roa MD at [facility] ENDOSCOPY  
 HX AMPUTATION Left 07/2016  
 left 4th toe from gangrene  HX ENDOSCOPY  10/2016  HX OTHER SURGICAL    
 left partial foot amputation Social History Social History  Marital status: SINGLE Spouse name: N/A  
 Number of children: N/A  
 Years of education: N/A Occupational History  retired [employer] Social History Main Topics  Smoking status: Former Smoker Packs/day: 0.00 Types: Cigarettes Quit date: 1/1/2018  Smokeless tobacco: Never Used  Alcohol use 12.6 oz/week 21 Glasses of wine per week Comment: \"about 2-3 glasses of wine per day, a HUGE decrease from before\"  Drug use: No  
 Sexual activity: Not Asked Other Topics Concern   Service No  
 Blood Transfusions No  
 Caffeine Concern Yes He does not drink caffeine  Occupational Exposure No  
 Hobby Hazards No  
 Sleep Concern No  
 Stress Concern No  
 Weight Concern No  
 Special Diet No  
 Back Care No  
 Exercise Yes  Bike Helmet No  
 Seat Belt Yes  Self-Exams Yes Social History Narrative Family History Problem Relation Age of Onset  No Known Problems Mother   
  coma Above history reviewed. ROS:  Denies fever, chills, cough, chest pain, SOB,  nausea, vomiting, or diarrhea. Denies wt loss, wt gain, hemoptysis, hematochezia or melena. Physical Examination: 
 
/62 (BP 1 Location: Left arm, BP Patient Position: Sitting)  Pulse (!) 108  Resp 17  Ht 5' 6\" (1.676 m)  Wt 127 lb (57.6 kg)  SpO2 98%  BMI 20.5 kg/m2 General: Alert and Ox3, Fluent speech Neck:  Supple, no adenopathy, JVD, mass or bruit Chest:  Clear to Ausculation, without wheezes, rales, rubs or ronchi 
Cardiac: Tachycardia Extremities:  No cyanosis, clubbing or edema Neurologic:  Ambulatory without assist, CN 2-12 grossly intact. Moves all extremities. Skin: no rash Lymphadenopathy: no cervical or supraclavicular nodes Results for orders placed or performed in visit on 08/30/18 AMB POC URINALYSIS DIP STICK MANUAL W/ MICRO Result Value Ref Range Color (UA POC) Yellow Clarity (UA POC) Clear Glucose (UA POC) Negative Negative Bilirubin (UA POC) Negative Negative Ketones (UA POC) Negative Negative Specific gravity (UA POC) 1.015 1.001 - 1.035 Blood (UA POC) Negative Negative pH (UA POC) 8.5 (A) 4.6 - 8.0 Protein (UA POC) Negative Negative Urobilinogen (UA POC) 0.2 mg/dL 0.2 - 1 Nitrites (UA POC) Negative Negative Leukocyte esterase (UA POC) Trace Negative ASSESSMENT AND PLAN:  
 
1. Anemia, unspecified type Checking iron levels Will likely need repeat scope 
- IRON PROFILE 
- FERRITIN 
- CBC W/O DIFF 
- METABOLIC PANEL, COMPREHENSIVE 
- COLLECTION VENOUS BLOOD,VENIPUNCTURE 2. Essential hypertension Restarting at lower dose - propranolol (INDERAL) 20 mg tablet; Take 1 Tab by mouth two (2) times a day. Indications: New lower dose  Dispense: 180 Tab; Refill: 3 3. Hypokalemia Checking levels - METABOLIC PANEL, COMPREHENSIVE 
- MAGNESIUM 4. Hypomagnesemia Checking levels - METABOLIC PANEL, COMPREHENSIVE 
- MAGNESIUM 5. B12 deficiency Checking levels - VITAMIN B12 
 
6. Healing decubitus ulcer, stage 2 Resolved Discussed pressure alleviation techniques 7. Tachycardia Restarting at lower dose - propranolol (INDERAL) 20 mg tablet; Take 1 Tab by mouth two (2) times a day. Indications: New lower dose  Dispense: 180 Tab; Refill: 3 RTC in 1 month Jeff Hewitt NP

## 2018-08-31 ENCOUNTER — TELEPHONE (OUTPATIENT)
Dept: FAMILY MEDICINE CLINIC | Age: 73
End: 2018-08-31

## 2018-08-31 LAB
ALBUMIN SERPL-MCNC: 3.8 G/DL (ref 3.5–4.8)
ALBUMIN/GLOB SERPL: 1.1 {RATIO} (ref 1.2–2.2)
ALP SERPL-CCNC: 47 IU/L (ref 39–117)
ALT SERPL-CCNC: 9 IU/L (ref 0–44)
AST SERPL-CCNC: 13 IU/L (ref 0–40)
BILIRUB SERPL-MCNC: 0.2 MG/DL (ref 0–1.2)
BUN SERPL-MCNC: 13 MG/DL (ref 8–27)
BUN/CREAT SERPL: 20 (ref 10–24)
CALCIUM SERPL-MCNC: 9.7 MG/DL (ref 8.6–10.2)
CHLORIDE SERPL-SCNC: 95 MMOL/L (ref 96–106)
CO2 SERPL-SCNC: 22 MMOL/L (ref 20–29)
CREAT SERPL-MCNC: 0.64 MG/DL (ref 0.76–1.27)
ERYTHROCYTE [DISTWIDTH] IN BLOOD BY AUTOMATED COUNT: 13.1 % (ref 12.3–15.4)
FERRITIN SERPL-MCNC: 146 NG/ML (ref 30–400)
GLOBULIN SER CALC-MCNC: 3.5 G/DL (ref 1.5–4.5)
GLUCOSE SERPL-MCNC: 86 MG/DL (ref 65–99)
HCT VFR BLD AUTO: 34.9 % (ref 37.5–51)
HGB BLD-MCNC: 11.5 G/DL (ref 13–17.7)
IRON SATN MFR SERPL: 26 % (ref 15–55)
IRON SERPL-MCNC: 63 UG/DL (ref 38–169)
MAGNESIUM SERPL-MCNC: 1.5 MG/DL (ref 1.6–2.3)
MCH RBC QN AUTO: 35.2 PG (ref 26.6–33)
MCHC RBC AUTO-ENTMCNC: 33 G/DL (ref 31.5–35.7)
MCV RBC AUTO: 107 FL (ref 79–97)
PLATELET # BLD AUTO: 388 X10E3/UL (ref 150–379)
POTASSIUM SERPL-SCNC: 4.4 MMOL/L (ref 3.5–5.2)
PROT SERPL-MCNC: 7.3 G/DL (ref 6–8.5)
RBC # BLD AUTO: 3.27 X10E6/UL (ref 4.14–5.8)
SODIUM SERPL-SCNC: 135 MMOL/L (ref 134–144)
TIBC SERPL-MCNC: 241 UG/DL (ref 250–450)
UIBC SERPL-MCNC: 178 UG/DL (ref 111–343)
VIT B12 SERPL-MCNC: 563 PG/ML (ref 232–1245)
WBC # BLD AUTO: 7.2 X10E3/UL (ref 3.4–10.8)

## 2018-08-31 NOTE — TELEPHONE ENCOUNTER
----- Message from Jennifer Lindsay NP sent at 8/31/2018  1:36 PM EDT ----- Iron looks pretty good. Blood count has improved to 11.5. Good. Kidneys and liver are good. Potassium is great. Magnesium is low again. Double up on dose x 1 week.   Vitamin B12 level is normal.

## 2018-08-31 NOTE — PROGRESS NOTES
Iron looks pretty good. Blood count has improved to 11.5. Good. Kidneys and liver are good. Potassium is great. Magnesium is low again. Double up on dose x 1 week.   Vitamin B12 level is normal.

## 2018-09-01 LAB — BACTERIA UR CULT: NORMAL

## 2018-10-24 RX ORDER — OMEPRAZOLE 20 MG/1
CAPSULE, DELAYED RELEASE ORAL
Qty: 30 CAP | Refills: 5 | Status: SHIPPED | OUTPATIENT
Start: 2018-10-24 | End: 2019-07-31 | Stop reason: SDUPTHER

## 2019-01-14 DIAGNOSIS — E87.6 HYPOKALEMIA: ICD-10-CM

## 2019-01-14 RX ORDER — POTASSIUM CHLORIDE 20 MEQ/1
TABLET, EXTENDED RELEASE ORAL
Qty: 30 TAB | Refills: 5 | Status: SHIPPED | OUTPATIENT
Start: 2019-01-14 | End: 2019-07-31

## 2019-07-30 PROBLEM — H53.8 BLURRING OF VISUAL IMAGE: Status: ACTIVE | Noted: 2018-01-02

## 2019-07-30 PROBLEM — L89.152 DECUBITUS ULCER OF SACRAL REGION, STAGE 2 (HCC): Status: ACTIVE | Noted: 2018-08-21

## 2019-07-30 PROBLEM — E87.8 DISORDER OF ELECTROLYTES: Status: ACTIVE | Noted: 2018-08-19

## 2019-07-30 PROBLEM — I48.0 PAROXYSMAL ATRIAL FIBRILLATION (HCC): Status: ACTIVE | Noted: 2018-08-19

## 2019-07-30 PROBLEM — I10 ESSENTIAL HYPERTENSION: Status: ACTIVE | Noted: 2018-08-19

## 2019-08-06 RX ORDER — HYDROCHLOROTHIAZIDE 25 MG/1
TABLET ORAL
Qty: 90 TAB | Refills: 0 | Status: SHIPPED | OUTPATIENT
Start: 2019-08-06 | End: 2019-08-07 | Stop reason: SDUPTHER

## 2019-08-27 DIAGNOSIS — I10 ESSENTIAL HYPERTENSION: ICD-10-CM

## 2019-08-27 DIAGNOSIS — R00.0 TACHYCARDIA: ICD-10-CM

## 2019-08-28 RX ORDER — PROPRANOLOL HYDROCHLORIDE 20 MG/1
TABLET ORAL
Qty: 180 TAB | Refills: 3 | Status: SHIPPED | OUTPATIENT
Start: 2019-08-28 | End: 2019-12-16

## 2019-10-15 DIAGNOSIS — I10 ESSENTIAL HYPERTENSION: ICD-10-CM

## 2019-10-15 RX ORDER — LOSARTAN POTASSIUM 50 MG/1
TABLET ORAL
Qty: 90 TAB | Refills: 4 | Status: SHIPPED | OUTPATIENT
Start: 2019-10-15 | End: 2020-01-22

## 2019-11-04 RX ORDER — PRAVASTATIN SODIUM 40 MG/1
TABLET ORAL
Qty: 90 TAB | Refills: 1 | Status: SHIPPED | OUTPATIENT
Start: 2019-11-04 | End: 2019-12-16

## 2019-11-29 ENCOUNTER — HOSPITAL ENCOUNTER (OUTPATIENT)
Dept: CT IMAGING | Age: 74
Discharge: HOME | End: 2019-11-29
Attending: SURGERY
Payer: MEDICARE

## 2019-11-29 DIAGNOSIS — I71.40 AAA (ABDOMINAL AORTIC ANEURYSM): ICD-10-CM

## 2019-11-29 LAB — CREAT BLD-MCNC: 1.1 MG/DL (ref 0.6–1.3)

## 2019-11-29 PROCEDURE — 82565 ASSAY OF CREATININE: CPT

## 2019-11-29 PROCEDURE — 75635 CT ANGIO ABDOMINAL ARTERIES: CPT

## 2019-11-29 PROCEDURE — 74011636320 HC RX REV CODE- 636/320: Performed by: SURGERY

## 2019-11-29 RX ORDER — SODIUM CHLORIDE 0.9 % (FLUSH) 0.9 %
10 SYRINGE (ML) INJECTION
Status: COMPLETED | OUTPATIENT
Start: 2019-11-29 | End: 2019-11-29

## 2019-11-29 RX ADMIN — IOPAMIDOL 100 ML: 755 INJECTION, SOLUTION INTRAVENOUS at 11:44

## 2019-11-29 RX ADMIN — Medication 10 ML: at 11:44

## 2019-12-16 PROBLEM — I73.9 PVD (PERIPHERAL VASCULAR DISEASE) (HCC): Chronic | Status: ACTIVE | Noted: 2019-12-16

## 2019-12-16 PROBLEM — N30.90 CYSTITIS: Status: ACTIVE | Noted: 2019-12-16

## 2019-12-16 PROBLEM — L30.9 ECZEMA: Chronic | Status: ACTIVE | Noted: 2019-12-16

## 2019-12-16 PROBLEM — N30.01 ACUTE CYSTITIS WITH HEMATURIA: Status: ACTIVE | Noted: 2019-12-16

## 2019-12-16 PROBLEM — N30.00 CYSTITIS, ACUTE: Status: ACTIVE | Noted: 2019-12-16

## 2019-12-20 PROBLEM — Z51.89 ENCOUNTER FOR REHABILITATION: Status: ACTIVE | Noted: 2019-12-20

## 2019-12-22 ENCOUNTER — HOSPITAL ENCOUNTER (INPATIENT)
Age: 74
LOS: 31 days | Discharge: NURSING, REHAB OR LONG-TERM CARE | DRG: 981 | End: 2020-01-22
Attending: GENERAL ACUTE CARE HOSPITAL | Admitting: INTERNAL MEDICINE
Payer: MEDICARE

## 2019-12-22 DIAGNOSIS — G62.9 NEUROPATHY: Primary | ICD-10-CM

## 2019-12-22 PROBLEM — F10.939 ALCOHOL WITHDRAWAL (HCC): Status: ACTIVE | Noted: 2019-12-22

## 2019-12-22 LAB
ANION GAP SERPL CALC-SCNC: 7 MMOL/L (ref 5–15)
APPEARANCE UR: ABNORMAL
BACTERIA URNS QL MICRO: ABNORMAL /HPF
BILIRUB UR QL CFM: NEGATIVE
BUN SERPL-MCNC: 10 MG/DL (ref 6–20)
BUN/CREAT SERPL: 13 (ref 12–20)
CALCIUM SERPL-MCNC: 7.9 MG/DL (ref 8.5–10.1)
CHLORIDE SERPL-SCNC: 115 MMOL/L (ref 97–108)
CO2 SERPL-SCNC: 21 MMOL/L (ref 21–32)
COLOR UR: ABNORMAL
CREAT SERPL-MCNC: 0.76 MG/DL (ref 0.7–1.3)
EPITH CASTS URNS QL MICRO: ABNORMAL /LPF
ERYTHROCYTE [DISTWIDTH] IN BLOOD BY AUTOMATED COUNT: 14.3 % (ref 11.5–14.5)
GLUCOSE SERPL-MCNC: 78 MG/DL (ref 65–100)
GLUCOSE UR STRIP.AUTO-MCNC: NEGATIVE MG/DL
HCT VFR BLD AUTO: 31 % (ref 36.6–50.3)
HGB BLD-MCNC: 9.9 G/DL (ref 12.1–17)
HGB UR QL STRIP: ABNORMAL
KETONES UR QL STRIP.AUTO: 15 MG/DL
LEUKOCYTE ESTERASE UR QL STRIP.AUTO: ABNORMAL
MCH RBC QN AUTO: 37.8 PG (ref 26–34)
MCHC RBC AUTO-ENTMCNC: 31.9 G/DL (ref 30–36.5)
MCV RBC AUTO: 118.3 FL (ref 80–99)
NITRITE UR QL STRIP.AUTO: NEGATIVE
NRBC # BLD: 0 K/UL (ref 0–0.01)
NRBC BLD-RTO: 0 PER 100 WBC
PH UR STRIP: 6 [PH] (ref 5–8)
PLATELET # BLD AUTO: 261 K/UL (ref 150–400)
PMV BLD AUTO: 11.2 FL (ref 8.9–12.9)
POTASSIUM SERPL-SCNC: 4.1 MMOL/L (ref 3.5–5.1)
PROT UR STRIP-MCNC: ABNORMAL MG/DL
RBC # BLD AUTO: 2.62 M/UL (ref 4.1–5.7)
RBC #/AREA URNS HPF: ABNORMAL /HPF (ref 0–5)
SODIUM SERPL-SCNC: 143 MMOL/L (ref 136–145)
SP GR UR REFRACTOMETRY: 1.02 (ref 1–1.03)
UR CULT HOLD, URHOLD: NORMAL
UROBILINOGEN UR QL STRIP.AUTO: 0.2 EU/DL (ref 0.2–1)
WBC # BLD AUTO: 6 K/UL (ref 4.1–11.1)
WBC URNS QL MICRO: >100 /HPF (ref 0–4)

## 2019-12-22 PROCEDURE — 74011000250 HC RX REV CODE- 250: Performed by: HOSPITALIST

## 2019-12-22 PROCEDURE — 81001 URINALYSIS AUTO W/SCOPE: CPT

## 2019-12-22 PROCEDURE — 85027 COMPLETE CBC AUTOMATED: CPT

## 2019-12-22 PROCEDURE — 74011000258 HC RX REV CODE- 258: Performed by: HOSPITALIST

## 2019-12-22 PROCEDURE — 74011250636 HC RX REV CODE- 250/636: Performed by: INTERNAL MEDICINE

## 2019-12-22 PROCEDURE — 74011000258 HC RX REV CODE- 258: Performed by: INTERNAL MEDICINE

## 2019-12-22 PROCEDURE — 65660000000 HC RM CCU STEPDOWN

## 2019-12-22 PROCEDURE — 74011250636 HC RX REV CODE- 250/636: Performed by: HOSPITALIST

## 2019-12-22 PROCEDURE — 80048 BASIC METABOLIC PNL TOTAL CA: CPT

## 2019-12-22 PROCEDURE — 36415 COLL VENOUS BLD VENIPUNCTURE: CPT

## 2019-12-22 RX ORDER — LORAZEPAM 2 MG/ML
1 INJECTION INTRAMUSCULAR
Status: DISCONTINUED | OUTPATIENT
Start: 2019-12-22 | End: 2019-12-22

## 2019-12-22 RX ORDER — FOLIC ACID 1 MG/1
1 TABLET ORAL DAILY
Status: DISCONTINUED | OUTPATIENT
Start: 2019-12-22 | End: 2019-12-22

## 2019-12-22 RX ORDER — ONDANSETRON 2 MG/ML
4 INJECTION INTRAMUSCULAR; INTRAVENOUS
Status: DISCONTINUED | OUTPATIENT
Start: 2019-12-22 | End: 2020-01-22 | Stop reason: HOSPADM

## 2019-12-22 RX ORDER — MAGNESIUM SULFATE 1 G/100ML
1 INJECTION INTRAVENOUS DAILY
Status: DISCONTINUED | OUTPATIENT
Start: 2019-12-22 | End: 2019-12-23

## 2019-12-22 RX ORDER — LORAZEPAM 2 MG/ML
1 INJECTION INTRAMUSCULAR
Status: DISCONTINUED | OUTPATIENT
Start: 2019-12-22 | End: 2019-12-23

## 2019-12-22 RX ORDER — ENOXAPARIN SODIUM 100 MG/ML
40 INJECTION SUBCUTANEOUS DAILY
Status: DISCONTINUED | OUTPATIENT
Start: 2019-12-22 | End: 2019-12-30

## 2019-12-22 RX ORDER — SODIUM CHLORIDE 0.9 % (FLUSH) 0.9 %
5-40 SYRINGE (ML) INJECTION AS NEEDED
Status: DISCONTINUED | OUTPATIENT
Start: 2019-12-22 | End: 2020-01-22 | Stop reason: HOSPADM

## 2019-12-22 RX ORDER — NALOXONE HYDROCHLORIDE 0.4 MG/ML
0.4 INJECTION, SOLUTION INTRAMUSCULAR; INTRAVENOUS; SUBCUTANEOUS AS NEEDED
Status: DISCONTINUED | OUTPATIENT
Start: 2019-12-22 | End: 2020-01-22 | Stop reason: HOSPADM

## 2019-12-22 RX ORDER — SODIUM CHLORIDE 0.9 % (FLUSH) 0.9 %
5-40 SYRINGE (ML) INJECTION EVERY 8 HOURS
Status: DISCONTINUED | OUTPATIENT
Start: 2019-12-22 | End: 2020-01-22 | Stop reason: HOSPADM

## 2019-12-22 RX ORDER — ASPIRIN 325 MG/1
100 TABLET, FILM COATED ORAL DAILY
Status: DISCONTINUED | OUTPATIENT
Start: 2019-12-22 | End: 2019-12-22

## 2019-12-22 RX ORDER — ACETAMINOPHEN 325 MG/1
650 TABLET ORAL
Status: DISCONTINUED | OUTPATIENT
Start: 2019-12-22 | End: 2020-01-22 | Stop reason: HOSPADM

## 2019-12-22 RX ORDER — BISACODYL 5 MG
5 TABLET, DELAYED RELEASE (ENTERIC COATED) ORAL DAILY PRN
Status: DISCONTINUED | OUTPATIENT
Start: 2019-12-22 | End: 2020-01-06

## 2019-12-22 RX ADMIN — LORAZEPAM 1 MG: 2 INJECTION INTRAMUSCULAR; INTRAVENOUS at 03:36

## 2019-12-22 RX ADMIN — THIAMINE HYDROCHLORIDE 100 MG: 100 INJECTION, SOLUTION INTRAMUSCULAR; INTRAVENOUS at 16:45

## 2019-12-22 RX ADMIN — LORAZEPAM 1 MG: 2 INJECTION INTRAMUSCULAR; INTRAVENOUS at 06:53

## 2019-12-22 RX ADMIN — LORAZEPAM 1 MG: 2 INJECTION INTRAMUSCULAR; INTRAVENOUS at 04:59

## 2019-12-22 RX ADMIN — Medication 10 ML: at 16:44

## 2019-12-22 RX ADMIN — Medication 10 ML: at 06:53

## 2019-12-22 RX ADMIN — MAGNESIUM SULFATE HEPTAHYDRATE 1 G: 1 INJECTION, SOLUTION INTRAVENOUS at 16:52

## 2019-12-22 RX ADMIN — FOLIC ACID: 5 INJECTION, SOLUTION INTRAMUSCULAR; INTRAVENOUS; SUBCUTANEOUS at 16:44

## 2019-12-22 RX ADMIN — ENOXAPARIN SODIUM 40 MG: 40 INJECTION SUBCUTANEOUS at 16:47

## 2019-12-22 RX ADMIN — CEFTRIAXONE 1 G: 1 INJECTION, POWDER, FOR SOLUTION INTRAMUSCULAR; INTRAVENOUS at 02:57

## 2019-12-22 NOTE — PROGRESS NOTES
0730 - Bedside shift change report given to Reymundo Schroeder (oncoming nurse) by Bernard Cabrera (offgoing nurse). Report included the following information SBAR, Kardex, Procedure Summary, Intake/Output and MAR.    0745 - Pt moderately anxious with tremors. Attempting to get out of bed stating he needs to go get some water. Redirected,but pt still very confused to situation. Incontinence care provided. 4436 - Dr. Valdez Robison at bedside. Asked this nurse to stop giving ativan for now as it seems to be making pt more anxious. Pt currently resting with eyes closed, but still having moderate tremors. Confused. Cannot state location or reason for hospitalization. Bed alarm in place for safety. 1010 - Pt resting with eyes closed. Wakes to voice but still not oriented to location or situation. Holding IV ativan for now. Pt seems to be tolerating without. 1230 - Incontinence care provided. Gown and linen changed. Still unable to obtain clean catch urine sample as pt is incontinent. 0 - Pt much more lucid when asked questions. Thought he was in [facility] initially, but accepts that he is in [facility] Took some sips of water after full linen and gown change and went back to sleep.

## 2019-12-22 NOTE — H&P
Hospitalist Admission Note    NAME: Concetta Guerin   :     MRN:  [id number]     Date/Time:  2019 6:39 AM    Patient PCP: Belinda Seo MD  ______________________________________________________________________  Given the patient's current clinical presentation, I have a high level of concern for decompensation if discharged from the emergency department. Complex decision making was performed, which includes reviewing the patient's available past medical records, laboratory results, and x-ray films. My assessment of this patient's clinical condition and my plan of care is as follows. Assessment / Plan:    Alcohol withdrawal DTs  Admit patient to stepdown  Start patient on CIWA protocol  Start patient on thiamine and folic acid    Acute cystitis  Continue IV antibiotic  Follow-up urine culture    Peripheral vascular disease  Vascular consultation when patient more stable    Hypertension  Continue home antihypertensive medication      Code Status: Full   Surrogate Decision Maker:Brionna Quesada    DVT Prophylaxis: Lovenox   GI Prophylaxis: not indicated          Subjective:   CHIEF COMPLAINT:alcohol withdrawal     HISTORY OF PRESENT ILLNESS:     75years old male with past medical history significant for peripheral vascular disease, hypertension, paroxysmal atrial fibrillation, COPD, GERD was transferred from [facility] for evaluation and treatment of alcohol withdrawal, patient was initially admitted to [facility] hospital  for evaluation and treatment UTI, yesterday patient developed confusion associated with tremor patient was transferred for [facility] for further evaluation and treatment of alcohol withdrawal.    We were asked to admit for work up and evaluation of the above problems.      Past Medical History:   Diagnosis Date    Abuse     alcoholism, quit     Claudication of calf muscles [facility]) 2013    Colovesical fistula Dr Gerardo Benoit Esophageal stricture     Esophagitis     GI bleed 10/2016    Hemochromatosis     Hyperlipidemia     Hypertension     Peripheral artery disease (HCC)     Dr. 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    HX ENDOSCOPY  10/2016    HX OTHER SURGICAL      left partial foot amputation       Social History     Tobacco Use    Smoking status: Former Smoker     Packs/day: 0.00     Types: Cigarettes     Last attempt to quit: 2018     Years since quittin.9    Smokeless tobacco: Never Used   Substance Use Topics    Alcohol use: Yes     Alcohol/week: 21.0 standard drinks     Types: 21 Glasses of wine per week     Frequency: 4 or more times a week     Drinks per session: 3 or 4     Binge frequency: Never     Comment: \"about 2-3 glasses of wine per day, a HUGE decrease from before\"        Family History   Problem Relation Age of Onset    No Known Problems Mother         coma     Allergies   Allergen Reactions    Contrast Agent [Iodine] Rash     Hives on back        Prior to Admission medications    Medication Sig Start Date End Date Taking? Authorizing Provider   pravastatin (PRAVACHOL) 40 mg tablet Take 40 mg by mouth nightly. OtherAd MD   aspirin 81 mg chewable tablet Take 81 mg by mouth daily. Ad Gonzalez MD   losartan (COZAAR) 50 mg tablet take 1 tablet by mouth once daily 10/15/19   Suyapa Murcia MD   hydroCHLOROthiazide (HYDRODIURIL) 25 mg tablet take 1 tablet by mouth once daily for pressure 19   Kirsten Alex MD       REVIEW OF SYSTEMS:     I am not able to complete the review of systems because:    The patient is intubated and sedated    The patient has altered mental status due to his acute medical problems    The patient has baseline aphasia from prior stroke(s)    The patient has baseline dementia and is not reliable historian    The patient is in acute medical distress and unable to provide information           Total of 12 systems reviewed as follows:       POSITIVE= underlined text  Negative = text not underlined  General:  fever, chills, sweats, generalized weakness, weight loss/gain,      loss of appetite   Eyes:    blurred vision, eye pain, loss of vision, double vision  ENT:    rhinorrhea, pharyngitis   Respiratory:   cough, sputum production, SOB, [place], wheezing, pleuritic pain   Cardiology:   chest pain, palpitations, orthopnea, PND, edema, syncope   Gastrointestinal:  abdominal pain , N/V, diarrhea, dysphagia, constipation, bleeding   Genitourinary:  frequency, urgency, dysuria, hematuria, incontinence   Muskuloskeletal :  arthralgia, myalgia, back pain  Hematology:  easy bruising, nose or gum bleeding, lymphadenopathy   Dermatological: rash, ulceration, pruritis, color change / jaundice  Endocrine:   hot flashes or polydipsia   Neurological:  headache, dizziness, confusion, focal weakness, paresthesia,Tremor      Speech difficulties, memory loss, gait difficulty  Psychological: Feelings of anxiety, depression, agitation    Objective:   VITALS:    Visit Vitals  /60   Pulse 96   Temp 98 °F (36.7 °C)   Resp 20   Wt 65 kg (143 lb 3.2 oz)   SpO2 95%   BMI 23.11 kg/m²       PHYSICAL EXAM:    General:    Alert, cooperative, no distress, appears stated age. HEENT: Atraumatic, anicteric sclerae, pink conjunctivae     No oral ulcers, mucosa moist, throat clear, dentition fair  Neck:  Supple, symmetrical,  thyroid: non tender  Lungs:   Clear to auscultation bilaterally. No Wheezing or Rhonchi. No rales. Chest wall:  No tenderness  No Accessory muscle use. Heart:   Regular  rhythm,  No  murmur   No edema  Abdomen:   Soft, non-tender. Not distended. Bowel sounds normal  Extremities: No cyanosis. No clubbing,      Skin turgor normal, Capillary refill normal, Radial dial pulse 2+  Skin:     Not pale. Not Jaundiced  No rashes ,LF heel ulcer , RT foot ulcer   Psych:  Good insight. Not depressed. Not anxious or agitated. Neurologic: EOMs intact. No facial asymmetry. No aphasia or slurred speech. Symmetrical strength, Sensation grossly intact. Alert and oriented X 3 , tremor     _______________________________________________________________________  Care Plan discussed with:    Comments   Patient y    Family      RN y    Care Manager                    Consultant:      _______________________________________________________________________  Expected  Disposition:   Home with Family y   HH/PT/OT/RN    SNF/LTC    ALICIA    ________________________________________________________________________  TOTAL TIME:  61  Minutes    Critical Care Provided     Minutes non procedure based      Comments    y Reviewed previous records   >50% of visit spent in counseling and coordination of care y Discussion with patient and/or family and questions answered       ________________________________________________________________________  Signed: Olman Naylor MD    Procedures: see electronic medical records for all procedures/Xrays and details which were not copied into this note but were reviewed prior to creation of Plan.     LAB DATA REVIEWED:    Recent Results (from the past 24 hour(s))   METABOLIC PANEL, BASIC    Collection Time: 12/21/19 12:00 PM   Result Value Ref Range    Sodium 141 136 - 145 mmol/L    Potassium 3.5 3.5 - 5.1 mmol/L    Chloride 108 97 - 108 mmol/L    CO2 24 21 - 32 mmol/L    Anion gap 9 5 - 15 mmol/L    Glucose 83 65 - 100 mg/dL    BUN 11 6 - 20 MG/DL    Creatinine 0.87 0.70 - 1.30 MG/DL    BUN/Creatinine ratio 13 12 - 20      GFR est AA >60 >60 ml/min/1.73m2    GFR est non-AA >60 >60 ml/min/1.73m2    Calcium 7.9 (L) 8.5 - 10.1 MG/DL   MAGNESIUM Collection Time: 12/21/19 12:00 PM   Result Value Ref Range    Magnesium 1.2 (L) 1.6 - 2.4 mg/dL   EKG, 12 LEAD, SUBSEQUENT    Collection Time: 12/21/19  8:40 PM   Result Value Ref Range    Ventricular Rate 122 BPM    Atrial Rate 122 BPM    P-R Interval 114 ms    QRS Duration 70 ms    Q-T Interval 312 ms    QTC Calculation (Bezet) 444 ms    Calculated P Axis 47 degrees    Calculated R Axis 31 degrees    Calculated T Axis 78 degrees    Diagnosis       Sinus tachycardia with premature ventricular complexes or fusion complexes  Low voltage QRS  Nonspecific ST and T wave abnormality  Abnormal ECG  When compared with ECG of 16-DEC-2019 15:44,  Previous ECG has undetermined rhythm, needs review     METABOLIC PANEL, BASIC    Collection Time: 12/22/19  3:24 AM   Result Value Ref Range    Sodium 143 136 - 145 mmol/L    Potassium 4.1 3.5 - 5.1 mmol/L    Chloride 115 (H) 97 - 108 mmol/L    CO2 21 21 - 32 mmol/L    Anion gap 7 5 - 15 mmol/L    Glucose 78 65 - 100 mg/dL    BUN 10 6 - 20 MG/DL    Creatinine 0.76 0.70 - 1.30 MG/DL    BUN/Creatinine ratio 13 12 - 20      GFR est AA >60 >60 ml/min/1.73m2    GFR est non-AA >60 >60 ml/min/1.73m2    Calcium 7.9 (L) 8.5 - 10.1 MG/DL   CBC W/O DIFF    Collection Time: 12/22/19  3:24 AM   Result Value Ref Range    WBC 6.0 4.1 - 11.1 K/uL    RBC 2.62 (L) 4.10 - 5.70 M/uL    HGB 9.9 (L) 12.1 - 17.0 g/dL    HCT 31.0 (L) 36.6 - 50.3 %    .3 (H) 80.0 - 99.0 FL    MCH 37.8 (H) 26.0 - 34.0 PG    MCHC 31.9 30.0 - 36.5 g/dL    RDW 14.3 11.5 - 14.5 %    PLATELET 756 926 - 355 K/uL    MPV 11.2 8.9 - 12.9 FL    NRBC 0.0 0  WBC    ABSOLUTE NRBC 0.00 0.00 - 0.01 K/uL

## 2019-12-22 NOTE — PROGRESS NOTES
Admitted earlier this am for Alcohol withdrawal. He lives alone has no family  Start on Good bag  Replace MG  On CIWA protocol.

## 2019-12-22 NOTE — PROGRESS NOTES
7234 - TRANSFER - IN REPORT:    Verbal report received from Vipin Collado RN (name) on Laurie Glass  being received from [facility] ED (unit) for routine progression of care      Report consisted of patients Situation, Background, Assessment and   Recommendations(SBAR). Information from the following report(s) SBAR, Kardex, Intake/Output, MAR, Recent Results and Cardiac Rhythm NSR/Sinus Tach was reviewed with the receiving nurse. Opportunity for questions and clarification was provided. Assessment completed upon patients arrival to unit and care assumed. 0200 - Pt arrived on unit. Paged supervisor and Dr. Cyndee Lopez to be notified. VSS. Pt denies any headache/nausea. Pt has moderate tremors. Pt is A/Ox4. Dual skin assessment performed with Nba Riggins RN. Pt has partial thickness skin breakdown on sacrum, partial thickness skin breakdown on R second toe, unblanchable skin on outside of R foot, and Eschar on L heel. Foam applied to all wounds. Pt placed on contact precautions. Call bell within reach. Bed alarm on. Will continue to monitor. 0300 - Dr. Cyndee Lopez at bedside. 200 - New IV placed in L upper arm. Rocephin infusing. Will continue to monitor. 0325 - Pt CIWA score 10 for tremors and restlessness. Pt pulling at lines, reports feeling anxious, tremorous. Will administer ativan per order and reassess. 1 - Spoke with Dr. Cyndee Lopez regarding pt restlessness, pulling lines, confusion, and tremors despite having received 1 mg Ativan at 0336. Received telephone with readback order to increase frequency to q1h as needed 1 mg Ativan. Order modified. 0456 - Pt CIWA score 11 for pulling at lines, attempting to get out of bed, ripping off gown, confusion, and tremors. Will administer Ativan per order. 4804 - Pt had pulled out IV. New IV placed in R upper arm, wrapped. Pt attempting to get out of bed and agitated. Pt repeatedly asking for water and ripping off gown/leads.  Pt redirected and repositioned in bed. CIWA 9. Will administer Ativan prn per order. 0700 - Shift report given to Renetta Taylor RN.

## 2019-12-22 NOTE — DISCHARGE SUMMARY
Physician Discharge Summary Patient: Callie Montana MRN: [id number]  SSN: [id number] YOB: 1945  Age: 75 y.o. Sex: male PCP: Josy Duran MD 
 
Admit date: 12/22/2019 Admitting Provider: Daron Douglas MD 
 
Discharge date: 12/22/2019 Discharging Provider: Wesly Estes MD 
 
* Admission Diagnoses: Alcohol withdrawal ([facility] [F10.239] * Discharge Diagnoses:  Principal Problem: 
  Al[facility] withdrawal ([facility] (12/22/2019) Active Problems: 
  Alcoholism ([facility] (1/8/2014) COPD (chronic obstructive pulmonary disease) ([facility] (1/8/2014) PVD (peripheral vascular disease) ([facility] (12/16/2019) Cystitis (12/16/2019) Encounter for rehabilitation (12/20/2019) * History and Hospital Course:  
Please refer to the H&P note for further details. Briefly, pt is 75year old initially admitted to acute care [facility] for recurrent UTI/Cystitis. He has generalized weakness and chronic alcohol abuse. He was D/C from acute care and admitted to Swing bed to continue Antibiotic, PT/OT, monitoring him regarding alcohol withdrawal symptoms. He had some tremor during day time and started on Ativan orally and close monitoring for withdrawal seizure (DT ) His Mag was low and was supplemented. Last night, his CIWA score up to 19. He was later transferred to Acute  care facility for further treatment. * Procedures: None Consults: None Significant Diagnostic Studies: 
Cta Abd Art W Runoff W Wo Cont Addendum Date: 11/29/2019 Addendum: 3 mm pulmonary nodule in the left lower lobe. If the patient has a history of smoking consider a follow-up chest CT in one year. Result Date: 11/29/2019 CT ABDOMEN, PELVIS, AND BILATERAL LOWER EXTREMITY ANGIOGRAPHY. 11/29/2019 11:45 AM INDICATION: Abdominal aortic aneurysm COMPARISON: None. TECHNIQUE: CT of the abdomen, pelvis, and bilateral lower extremities was performed after the administration of 100 cc IV contrast (Isovue 370). Coronal MIP reconstructions were performed. CT dose reduction was achieved through use of a standardized protocol tailored for this examination and automatic exposure control for dose modulation. FINDINGS: Lung Bases:3 mm nodule left lower lobe series 301, image 6 Abdomen: Hepatic steatosis. Pancreas, spleen, adrenal glands, bilateral kidneys are unremarkable. Pelvis: There is colonic diverticulosis. There is colonic thickening around the rectosigmoid junction with mild pericolonic fat stranding suspicious for diverticulitis. A fluid collection is noted to extend anteriorly from the rectosigmoid junction measuring 24 x 33 mm suspicious for a contained perforation. The appendix is not visualized. Vascular, abdomen: The celiac axis patent. There is moderate stenosis at the origin of the SMA. Inferior mesenteric artery is patent. Mild ectasia of the abdominal aorta is noted measuring 1.8 cm. No evidence of abdominal aortic aneurysm. Vascular, right lower extremity: Multifocal atherosclerotic disease is present in the right common iliac artery. The right external iliac arteries occluded at its origin with reconstitution of the common femoral artery primarily from circumflex iliac artery. The right SFA is patent with mild atherosclerotic disease. Popliteal artery is patent. There is three-vessel runoff to the right foot. Vascular, left lower extremity: The left common iliac artery occluded at its origin. The left external iliac artery remains occluded with reconstitution of the left common femoral artery from the circumflex iliac and inferior epigastric arteries. There is atherosclerotic disease at the common femoral artery bifurcation. Left SFA is patent with a focal mild stenosis. Mild stenosis is present in the popliteal artery. IMPRESSION: 1. Findings are suspicious for acute or subacute diverticulitis with a possible contained perforation.  No free intraperitoneal air is noted. No evidence of bowel obstruction. 2.  No evidence of abdominal aortic aneurysm. 3.  Occluded left common iliac and left external iliac artery with distal reconstitution of the common femoral artery. 4.  Occluded right external iliac artery with distal reconstitution of the right common femoral artery. 5.  The remaining lower extremity vessels are patent with atherosclerotic disease. 6.  Findings were relayed to Dr. Augustina Regan the on-call surgeon on 11/29/2019 at 3:10 PM 
 
Xr Chest [facility] [place] Result Date: 12/16/2019 EXAM:  XR CHEST PORT. INDICATION: eval pna. COMPARISON: 8/7/2018. FINDINGS: A portable AP radiograph of the chest was obtained at 1639 hours. Lines and tubes: The patient is on a cardiac monitor. Lungs: The lungs are clear. Pleura: There is no pneumothorax or pleural effusion. Mediastinum: The cardiac and mediastinal contours and pulmonary vascularity are normal. The aorta is atherosclerotic. Bones and soft tissues: The bones and soft tissues are grossly within normal limits. IMPRESSION: No airspace disease or other acute abnormality. Discharge Exam: 
Visit Vitals /60 Pulse 96 Temp 98 °F (36.7 °C) Resp 20 Wt 65 kg (143 lb 3.2 oz) SpO2 95% BMI 23.11 kg/m² O2 Device: Room air No exam done. Patient already transferred out to acute care facility when I took over service this AM. Spoke with ED physician this AM regarding transfer. Recent Results (from the past 24 hour(s)) METABOLIC PANEL, BASIC Collection Time: 12/21/19 12:00 PM  
Result Value Ref Range Sodium 141 136 - 145 mmol/L Potassium 3.5 3.5 - 5.1 mmol/L Chloride 108 97 - 108 mmol/L  
 CO2 24 21 - 32 mmol/L Anion gap 9 5 - 15 mmol/L Glucose 83 65 - 100 mg/dL BUN 11 6 - 20 MG/DL Creatinine 0.87 0.70 - 1.30 MG/DL  
 BUN/Creatinine ratio 13 12 - 20 GFR est AA >60 >60 ml/min/1.73m2 GFR est non-AA >60 >60 ml/min/1.73m2  Calcium 7.9 (L) 8.5 - 10.1 MG/DL  
 MAGNESIUM Collection Time: 12/21/19 12:00 PM  
Result Value Ref Range Magnesium 1.2 (L) 1.6 - 2.4 mg/dL EKG, 12 LEAD, SUBSEQUENT Collection Time: 12/21/19  8:40 PM  
Result Value Ref Range Ventricular Rate 122 BPM  
 Atrial Rate 122 BPM  
 P-R Interval 114 ms QRS Duration 70 ms Q-T Interval 312 ms QTC Calculation (Bezet) 444 ms Calculated P Axis 47 degrees Calculated R Axis 31 degrees Calculated T Axis 78 degrees Diagnosis Sinus tachycardia with premature ventricular complexes or fusion complexes Low voltage QRS Nonspecific ST and T wave abnormality Abnormal ECG When compared with ECG of 16-DEC-2019 15:44, 
Previous ECG has undetermined rhythm, needs review METABOLIC PANEL, BASIC Collection Time: 12/22/19  3:24 AM  
Result Value Ref Range Sodium 143 136 - 145 mmol/L Potassium 4.1 3.5 - 5.1 mmol/L Chloride 115 (H) 97 - 108 mmol/L  
 CO2 21 21 - 32 mmol/L Anion gap 7 5 - 15 mmol/L Glucose 78 65 - 100 mg/dL BUN 10 6 - 20 MG/DL Creatinine 0.76 0.70 - 1.30 MG/DL  
 BUN/Creatinine ratio 13 12 - 20 GFR est AA >60 >60 ml/min/1.73m2 GFR est non-AA >60 >60 ml/min/1.73m2 Calcium 7.9 (L) 8.5 - 10.1 MG/DL  
CBC W/O DIFF Collection Time: 12/22/19  3:24 AM  
Result Value Ref Range WBC 6.0 4.1 - 11.1 K/uL  
 RBC 2.62 (L) 4.10 - 5.70 M/uL HGB 9.9 (L) 12.1 - 17.0 g/dL HCT 31.0 (L) 36.6 - 50.3 % .3 (H) 80.0 - 99.0 FL  
 MCH 37.8 (H) 26.0 - 34.0 PG  
 MCHC 31.9 30.0 - 36.5 g/dL  
 RDW 14.3 11.5 - 14.5 % PLATELET 813 044 - 556 K/uL MPV 11.2 8.9 - 12.9 FL  
 NRBC 0.0 0  WBC ABSOLUTE NRBC 0.00 0.00 - 0.01 K/uL * Discharge Condition: stable ( as per staff notes ) * Disposition: Acute care hospital 
 
Discharge Medications: 
Current Discharge Medication List  
  
 
 
* Follow-up Care/Patient Instructions: Activity: Activity as tolerated Diet: Cardiac Diet Wound Care: None needed Follow-up Information None Total time spent on discharge: More than 30 minutes. Signed: Huma Gonzalez MD 
12/22/2019 9:15 AM

## 2019-12-23 LAB
ANION GAP SERPL CALC-SCNC: 11 MMOL/L (ref 5–15)
BUN SERPL-MCNC: 13 MG/DL (ref 6–20)
BUN/CREAT SERPL: 17 (ref 12–20)
CALCIUM SERPL-MCNC: 8 MG/DL (ref 8.5–10.1)
CHLORIDE SERPL-SCNC: 120 MMOL/L (ref 97–108)
CO2 SERPL-SCNC: 19 MMOL/L (ref 21–32)
CREAT SERPL-MCNC: 0.78 MG/DL (ref 0.7–1.3)
ERYTHROCYTE [DISTWIDTH] IN BLOOD BY AUTOMATED COUNT: 14.5 % (ref 11.5–14.5)
GLUCOSE SERPL-MCNC: 69 MG/DL (ref 65–100)
HCT VFR BLD AUTO: 32 % (ref 36.6–50.3)
HGB BLD-MCNC: 9.8 G/DL (ref 12.1–17)
MAGNESIUM SERPL-MCNC: 2 MG/DL (ref 1.6–2.4)
MCH RBC QN AUTO: 38 PG (ref 26–34)
MCHC RBC AUTO-ENTMCNC: 30.6 G/DL (ref 30–36.5)
MCV RBC AUTO: 124 FL (ref 80–99)
NRBC # BLD: 0 K/UL (ref 0–0.01)
NRBC BLD-RTO: 0 PER 100 WBC
PLATELET # BLD AUTO: 304 K/UL (ref 150–400)
PMV BLD AUTO: 10.8 FL (ref 8.9–12.9)
POTASSIUM SERPL-SCNC: 4.3 MMOL/L (ref 3.5–5.1)
RBC # BLD AUTO: 2.58 M/UL (ref 4.1–5.7)
SODIUM SERPL-SCNC: 150 MMOL/L (ref 136–145)
WBC # BLD AUTO: 5.7 K/UL (ref 4.1–11.1)

## 2019-12-23 PROCEDURE — 74011000250 HC RX REV CODE- 250: Performed by: HOSPITALIST

## 2019-12-23 PROCEDURE — 77030037877 HC DRSG MEPILEX >48IN BORD MOLN -A

## 2019-12-23 PROCEDURE — 83735 ASSAY OF MAGNESIUM: CPT

## 2019-12-23 PROCEDURE — 80048 BASIC METABOLIC PNL TOTAL CA: CPT

## 2019-12-23 PROCEDURE — 74011000258 HC RX REV CODE- 258: Performed by: INTERNAL MEDICINE

## 2019-12-23 PROCEDURE — 85027 COMPLETE CBC AUTOMATED: CPT

## 2019-12-23 PROCEDURE — 65660000000 HC RM CCU STEPDOWN

## 2019-12-23 PROCEDURE — 77030041247 HC PROTECTOR HEEL HEELMEDIX MDII -B

## 2019-12-23 PROCEDURE — 74011250637 HC RX REV CODE- 250/637: Performed by: INTERNAL MEDICINE

## 2019-12-23 PROCEDURE — 74011250636 HC RX REV CODE- 250/636: Performed by: INTERNAL MEDICINE

## 2019-12-23 PROCEDURE — 74011250636 HC RX REV CODE- 250/636: Performed by: HOSPITALIST

## 2019-12-23 PROCEDURE — 36415 COLL VENOUS BLD VENIPUNCTURE: CPT

## 2019-12-23 RX ORDER — OXYCODONE AND ACETAMINOPHEN 5; 325 MG/1; MG/1
1 TABLET ORAL
Status: DISCONTINUED | OUTPATIENT
Start: 2019-12-23 | End: 2019-12-24

## 2019-12-23 RX ORDER — LOSARTAN POTASSIUM 25 MG/1
50 TABLET ORAL DAILY
Status: DISCONTINUED | OUTPATIENT
Start: 2019-12-23 | End: 2020-01-03

## 2019-12-23 RX ORDER — LORAZEPAM 2 MG/ML
4 INJECTION INTRAMUSCULAR
Status: DISCONTINUED | OUTPATIENT
Start: 2019-12-23 | End: 2019-12-23

## 2019-12-23 RX ORDER — PRAVASTATIN SODIUM 40 MG/1
40 TABLET ORAL
Status: DISCONTINUED | OUTPATIENT
Start: 2019-12-23 | End: 2020-01-19

## 2019-12-23 RX ORDER — HYDROCHLOROTHIAZIDE 25 MG/1
25 TABLET ORAL DAILY
Status: DISCONTINUED | OUTPATIENT
Start: 2019-12-23 | End: 2020-01-03

## 2019-12-23 RX ORDER — DEXTROSE MONOHYDRATE AND SODIUM CHLORIDE 5; .45 G/100ML; G/100ML
100 INJECTION, SOLUTION INTRAVENOUS CONTINUOUS
Status: DISCONTINUED | OUTPATIENT
Start: 2019-12-23 | End: 2019-12-24

## 2019-12-23 RX ORDER — GUAIFENESIN 100 MG/5ML
81 LIQUID (ML) ORAL DAILY
Status: DISCONTINUED | OUTPATIENT
Start: 2019-12-23 | End: 2020-01-22 | Stop reason: HOSPADM

## 2019-12-23 RX ADMIN — MAGNESIUM SULFATE HEPTAHYDRATE 1 G: 1 INJECTION, SOLUTION INTRAVENOUS at 08:18

## 2019-12-23 RX ADMIN — ACETAMINOPHEN 650 MG: 325 TABLET ORAL at 21:45

## 2019-12-23 RX ADMIN — Medication 10 ML: at 13:57

## 2019-12-23 RX ADMIN — OXYCODONE HYDROCHLORIDE AND ACETAMINOPHEN 1 TABLET: 5; 325 TABLET ORAL at 13:56

## 2019-12-23 RX ADMIN — FOLIC ACID: 5 INJECTION, SOLUTION INTRAMUSCULAR; INTRAVENOUS; SUBCUTANEOUS at 21:40

## 2019-12-23 RX ADMIN — HYDROCHLOROTHIAZIDE 25 MG: 25 TABLET ORAL at 12:36

## 2019-12-23 RX ADMIN — Medication 10 ML: at 21:46

## 2019-12-23 RX ADMIN — Medication 10 ML: at 16:53

## 2019-12-23 RX ADMIN — DEXTROSE MONOHYDRATE AND SODIUM CHLORIDE 100 ML/HR: 5; .45 INJECTION, SOLUTION INTRAVENOUS at 12:36

## 2019-12-23 RX ADMIN — OXYCODONE HYDROCHLORIDE AND ACETAMINOPHEN 1 TABLET: 5; 325 TABLET ORAL at 23:18

## 2019-12-23 RX ADMIN — ACETAMINOPHEN 650 MG: 325 TABLET ORAL at 08:16

## 2019-12-23 RX ADMIN — ASPIRIN 81 MG 81 MG: 81 TABLET ORAL at 12:36

## 2019-12-23 RX ADMIN — ENOXAPARIN SODIUM 40 MG: 40 INJECTION SUBCUTANEOUS at 08:17

## 2019-12-23 RX ADMIN — PRAVASTATIN SODIUM 40 MG: 40 TABLET ORAL at 21:45

## 2019-12-23 RX ADMIN — CEFTRIAXONE 1 G: 1 INJECTION, POWDER, FOR SOLUTION INTRAMUSCULAR; INTRAVENOUS at 03:55

## 2019-12-23 RX ADMIN — LOSARTAN POTASSIUM 50 MG: 50 TABLET, FILM COATED ORAL at 12:36

## 2019-12-23 RX ADMIN — Medication 10 ML: at 08:19

## 2019-12-23 NOTE — PROGRESS NOTES
Initial Nutrition Assessment:    INTERVENTIONS/RECOMMENDATIONS:   · Meals/Snacks: General/healthful diet: Continue current diet   · Supplements: Commercial supplement: Add ensure enlive daily    ASSESSMENT:   Patient medically noted for alcohol withdrawal, delirium tremens, and PAD with ulceration to left heel. PMH COPD, GERD, HTN. Patient reports an okay appetite today. Mostly concerned about missing teeth and needing a soft diet. Mechanical soft diet already ordered. Explained menu and room service. He reports no recent weight loss. States he will drink ensure or boost; requesting strawberry ensure at this time. Noted plans for axillo-bifemoral bypass 1/2/20. Encouraged intake of meals. Diet Order: Cardiac, Mechanical soft  % Eaten:    Patient Vitals for the past 72 hrs:   % Diet Eaten   12/23/19 0825 33 %       Pertinent Medications: [x]Reviewed []Other: Goody bag, HCTZ, Pravastatin   Pertinent Labs: [x]Reviewed []Other: BG 69-78-83-68, Na 150  Food Allergies: [x]None []Yes:    Last BM: 12/23  [x]Active     []Hyperactive  []Hypoactive       [] Absent BS  Skin:    [] Intact   [] Incision  [x] Breakdown: Unstageable left heel per flowsheets [] Edema []Other:    Anthropometrics:   Height:   Weight: 65 kg (143 lb 3.2 oz)   IBW (%IBW):   ( ) UBW (%UBW):   (  %)   Last Weight Metrics:  Weight Loss Metrics 12/22/2019 12/21/2019 12/20/2019 12/20/2019 12/20/2019 7/31/2019 7/12/2019   Today's Wt 143 lb 3.2 oz 135 lb 1.6 oz - 139 lb 1.8 oz - 131 lb 3.2 oz 133 lb 6.4 oz   BMI 23.11 kg/m2 - 21.81 kg/m2 - 22.45 kg/m2 21.18 kg/m2 21.53 kg/m2       BMI: Body mass index is 23.11 kg/m². This BMI is indicative of:   []Underweight    [x]Normal    []Overweight    [] Obesity   [] Extreme Obesity (BMI>40)     Estimated Nutrition Needs (Based on):   1726 Kcals/day(BMR (1328) x 1. 3AF) , 78 g(1.2 g/kg bw) Protein  Carbohydrate:  At Least 130 g/day  Fluids: 1700 mL/day (1ml/kcal)    Pt expected to meet estimated nutrient needs: [x]Yes []No    NUTRITION DIAGNOSES:   Problem:  Chewing/masticatory difficulty      Etiology: related to missing teeth     Signs/Symptoms: as evidenced by need for mechanical soft diet       NUTRITION INTERVENTIONS:  Meals/Snacks: General/healthful diet   Supplements: Commercial supplement              GOAL:   PO intake >50% of meals and 75% of ONS next 3-5 days    LEARNING NEEDS (Diet, Food/Nutrient-Drug Interaction):    [x] None Identified   [] Identified and Education Provided/Documented   [] Identified and Pt declined/was not appropriate     Cultural, Gnosticism, OR Ethnic Dietary Needs:    [x] None Identified   [] Identified and Addressed     [x] Interdisciplinary Care Plan Reviewed/Documented    [x] Discharge Planning:  Heart healthy diet      MONITORING /EVALUATION:   Food/Nutrient Intake Outcomes:  Total energy intake  Physical Signs/Symptoms Outcomes: Weight/weight change, Glucose profile, Electrolyte and renal profile    NUTRITION RISK:    [x] Patient At Nutritional Risk              [] Patient Not at Nutritional Risk    PT SEEN FOR:    []  MD Consult: []Calorie Count      []Diabetic Diet Education        []Diet Education     []Electrolyte Management     []General Nutrition Management and Supplements     []Management of Tube Feeding     []TPN Recommendations    [x]  RN Referral:  []MST score >=2     []Enteral/Parenteral Nutrition PTA     []Pregnant: Gestational DM or Multigestation     [x]Pressure Ulcer/Wound Care needs        []  Low BMI  []  PATITO Valdez [id number]  Pager [phone]    Weekend Pager [phone]

## 2019-12-23 NOTE — CONSULTS
Vascular Surgery Consult Note  12/23/2019    Subjective:      Mr. Jenae Browning has a pmhx significant for alcoholism, COPD, DM, PAFib, HTN, HLD, and GERD. Dhiraj Osorio continues to smoke daily. Dhiraj Osorio has a pmhx significant for LLE stenting per his report. Dhiraj Osorio is s/p TMA of the left foot (10/2016). Dhiraj Osorio presented to the clinic in November 2019 w/ compliant of BLE claudication, BLE nocturnal cramping, and a wound to the incision of the left TMA after hitting his foot.  He has ischemia of the left foot and rubor of the right foot.  His ABIs were right 0.40 w/ a flatline TBI and left 0.32 w/ a surgically absent left great toe.  He underwent an arteriogram on 11/5/2019 that revealed severe bilateral aorto iliac disease that was not amendable to endovascular intervention. Dhiraj Osorio returned to the clinic  w/ a CTA that is significant for an occluded left common iliac and left external iliac artery and occluded right external iliac artery. Plan is for axillo-bifemoral bypass on 01/02/2020. Since our last visit he presented to the emergency room on 12/16/2019 w/ complaint of BLE weakness. He was admitted to [facility] and diagnosed w/ cystitis. While admitted he was noted to have left lateral heel ulcer. He was discharged to the TCU at [facility] on 12/20/2019 for rehabilitation. Late that evening he developed active w/d symptoms and returned to the emergency room at [facility]. He was then transferred to [facility] for management of alcohol withdrawal.  His urine cx has grown Klebsiella. Over the weekend he complained of bilateral thigh rest pain.      Past Medical History  PAD  COPD  Right lung pulmonary nodule   Diabetes mellitus  PAfib  Hypertension  Hyperlipidemia  GERD  Colonic mass  Intra-abdominal abscess  Colovesical fistula   Esophageal stricture   GI Bleed   Hemochromatosis  Eczema    Decubitus ulcer of the sacrum  Alcoholism    Past Procedural History   Left leg stent   Left fourth toe amputation for gangrene   Left foot TMA    Family History   Problem Relation Age of Onset    No Known Problems Mother         coma      Social History     Tobacco Use    Smoking status: Former Smoker     Packs/day: 0.00     Types: Cigarettes     Last attempt to quit: 2018     Years since quittin.9    Smokeless tobacco: Never Used   Substance Use Topics    Alcohol use: Yes     Alcohol/week: 21.0 standard drinks     Types: 21 Glasses of wine per week     Frequency: 4 or more times a week     Drinks per session: 3 or 4     Binge frequency: Never     Comment: \"about 2-3 glasses of wine per day, a HUGE decrease from before\"       Patient lives alone. He is no longer able to ambulate due to weakness. He is dependent for all of his ADLs. He no longer drives. He presented to the clinic in a wheelchair. Prior to Admission medications    Medication Sig Start Date End Date Taking? Authorizing Provider   pravastatin (PRAVACHOL) 40 mg tablet Take 40 mg by mouth nightly. Ad Gonzalez MD   aspirin 81 mg chewable tablet Take 81 mg by mouth daily. Ad Gonzalez MD   losartan (COZAAR) 50 mg tablet take 1 tablet by mouth once daily 10/15/19   Kev Peters MD   hydroCHLOROthiazide (HYDRODIURIL) 25 mg tablet take 1 tablet by mouth once daily for pressure 19   Tereza Alex MD     Allergies   Allergen Reactions    Contrast Agent [Iodine] Rash     Hives on back      Review of Systems   Constitutional: Positive for activity change and fatigue. Negative for chills and fever. HENT: Negative for congestion. Eyes: Negative for visual disturbance. Respiratory: Negative for cough, chest tightness and shortness of breath. Cardiovascular: Negative for chest pain and leg swelling. Gastrointestinal: Negative for nausea and vomiting. Endocrine: Negative for polydipsia and polyuria. Genitourinary: Positive for decreased urine volume. Negative for difficulty urinating, dysuria, flank pain and urgency.    Musculoskeletal: Positive for gait problem (Bilateral thigh pain ). Negative for myalgias. Skin: Positive for color change, rash and wound. Allergic/Immunologic: Negative. Neurological: Positive for weakness (BLE ). Hematological: Negative. Psychiatric/Behavioral: Negative. Objective:       Patient Vitals for the past 24 hrs:   BP Temp Pulse Resp SpO2   12/23/19 1112 99/57 97.6 °F (36.4 °C) 86 18 94 %   12/23/19 0752 127/74 97.4 °F (36.3 °C) (!) 118 18 95 %   12/23/19 0316 121/82 97.7 °F (36.5 °C) (!) 111 18 97 %   12/22/19 2240 136/74 97.7 °F (36.5 °C) (!) 110 18 98 %   12/22/19 1922 121/81 97.8 °F (36.6 °C) 79 20 96 %   12/22/19 1459 103/74       12/22/19 1458   85  100 %   12/22/19 1455 (!) 130/115 97.2 °F (36.2 °C) (!) 106 24 96 %     Physical Exam  General-frail chronically ill appearing disheveled CM   Mental Status - Alert and oriented to name and place   Head and Neck - full range of motion, No lymphadenopathy. Thyroid - normal size and consistency. Chest and lung exam reveals  - normal excursion with symmetric chest walls and Clear to auscultation and percussion. Cardiovascular examination reveals  - normal heart sounds, regular rate and rhythm with no murmurs, Regular rate and rhythm and carotid auscultation reveals   Abdomen Inspection-Non Tender, No hepatosplenomegaly, No palpable abdominal masses and Soft. Peripheral Vascular- weak but palpable femoral pulses with no palpable pulses below in either lower extremity. Neurologic-Chronic left sided upper and lower extremity weakness. Routinely ambulates for short distances only using a walker. Presented to the clinic 3 weeks ago in a WC. Now with right sided weakness. Can move foot and ankle. Visible tremor. Skin: New necrotic ulcer to lateral left heel. Stable chronic wound of the TMA incision. Cyanosis of the plantar aspect of the left foot. Ischemic changes to the toes of the 2nd and 3rd digit of the right foot w/ open ulceration. Dry ulcer to the lateral right foot. Pertinent Test Results:   Recent Results (from the past 24 hour(s))   URINALYSIS W/MICROSCOPIC    Collection Time: 12/22/19  5:48 PM   Result Value Ref Range    Color YELLOW/STRAW      Appearance TURBID (A) CLEAR      Specific gravity 1.021 1.003 - 1.030      pH (UA) 6.0 5.0 - 8.0      Protein TRACE (A) NEG mg/dL    Glucose NEGATIVE  NEG mg/dL    Ketone 15 (A) NEG mg/dL    Blood SMALL (A) NEG      Urobilinogen 0.2 0.2 - 1.0 EU/dL    Nitrites NEGATIVE  NEG      Leukocyte Esterase LARGE (A) NEG      WBC >100 (H) 0 - 4 /hpf    RBC 5-10 0 - 5 /hpf    Epithelial cells FEW FEW /lpf    Bacteria 4+ (A) NEG /hpf   URINE CULTURE HOLD SAMPLE    Collection Time: 12/22/19  5:48 PM   Result Value Ref Range    Urine culture hold        URINE ON HOLD IN MICROBIOLOGY DEPT FOR 3 DAYS. IF UNPRESERVED URINE IS SUBMITTED, IT CANNOT BE USED FOR ADDITIONAL TESTING AFTER 24 HRS, RECOLLECTION WILL BE REQUIRED.    BILIRUBIN, CONFIRM    Collection Time: 12/22/19  5:48 PM   Result Value Ref Range    Bilirubin UA, confirm NEGATIVE  NEG     METABOLIC PANEL, BASIC    Collection Time: 12/23/19  3:48 AM   Result Value Ref Range    Sodium 150 (H) 136 - 145 mmol/L    Potassium 4.3 3.5 - 5.1 mmol/L    Chloride 120 (H) 97 - 108 mmol/L    CO2 19 (L) 21 - 32 mmol/L    Anion gap 11 5 - 15 mmol/L    Glucose 69 65 - 100 mg/dL    BUN 13 6 - 20 MG/DL    Creatinine 0.78 0.70 - 1.30 MG/DL    BUN/Creatinine ratio 17 12 - 20      GFR est AA >60 >60 ml/min/1.73m2    GFR est non-AA >60 >60 ml/min/1.73m2    Calcium 8.0 (L) 8.5 - 10.1 MG/DL   CBC W/O DIFF    Collection Time: 12/23/19  3:48 AM   Result Value Ref Range    WBC 5.7 4.1 - 11.1 K/uL    RBC 2.58 (L) 4.10 - 5.70 M/uL    HGB 9.8 (L) 12.1 - 17.0 g/dL    HCT 32.0 (L) 36.6 - 50.3 %    .0 (H) 80.0 - 99.0 FL    MCH 38.0 (H) 26.0 - 34.0 PG    MCHC 30.6 30.0 - 36.5 g/dL    RDW 14.5 11.5 - 14.5 %    PLATELET 581 850 - 165 K/uL    MPV 10.8 8.9 - 12.9 FL    NRBC 0.0 0  WBC    ABSOLUTE NRBC 0.00 0.00 - 0.01 K/uL   MAGNESIUM    Collection Time: 12/23/19  3:48 AM   Result Value Ref Range    Magnesium 2.0 1.6 - 2.4 mg/dL       Assessmen/Plan:     Consult problem  Severe bilateral PAD w/ ulceration of the left heel, left TMA incision, right lateral 5th digit, and corral of right 2nd and 3rd toes. -w/ plan for axillo-bifemoral bypass on 01/02/2020. Heel suspension boots bilateral. Betadine soaked guaze dressing changes to left heel daily. Remainder of wounds can be left open to air unless they are draining then dry dressing changes daily. Agree w/ ASA. Discontinue SCDs for severe BLE PAD. Dr. Fabián Melendez to see later today. No plan for urgent intervention today.       Active problems  Acute klebsiella pneumoniae UTI   -course of Rocephin completed    Alcohol withdrawal  -visible tremor    Thiamine deficiency   -patient currently not receiving scheduled ativan   Diabetes Mellitus w/ hypoglycemia    Hypernatremia   Consider modifying goody bag  COPD  -not in exacerbation   Macrocytic anemia   -stable   Thrombocytopenia  -resolved   PAfib  -rate controlled  -poor candidate for OAG  Hypertension  -stable on ARB  Hyperlipidemia  GERD  Management of comorbid conditions by primary team.    VTE Prophylaxis:  LMWH  Discontinues SCDs: contraindicated in severe BLE PAD    Disposition:  SNF     Signed By: Brooke Sepulveda NP     December 23, 2019

## 2019-12-23 NOTE — PROGRESS NOTES
Hospitalist Progress Note    NAME: Mary Gudino   :  1945   MRN:  [id number]       Assessment / Plan:  Alcohol withdrawal syndrome/Delirium Tremens POA- transferred from [facility]    Cont stepdown monitoring till improved/MS improved to take PO  Cont patient on CIWA protocol IV Ativan  Cont Goody bag daily  Start maintanence IVF - D5 1/2 NS 100ml/hr  Diet once pt able to- MS improved    Acute cystitis/ recent Klebsiella UTI POA    Continue IV antibiotic  Add on urine culture to UA sample this admission    Peripheral vascular disease POA- severe on CTA Abd/pelvis at [facility]  CTA:  -Occluded left common iliac and left external iliac artery with distal  reconstitution of the common femoral artery.  -Occluded right external iliac artery with distal reconstitution of the right  common femoral artery. Will consider IP Vascular consultation when patient more stable    Hypertension  Continue home antihypertensive medication        Code Status: Full   Surrogate Decision Maker:Brionna Quesada     DVT Prophylaxis: Lovenox   GI Prophylaxis: not indicated     Recommended Disposition: SNF/LTC and  PT, OT, RN ??TBD pending hospital course- PT/OT eval when able     Subjective:     Chief Complaint / Reason for Physician Visit: f/u Alcohol withdrawal syndrome, UTI  \"I am\". Discussed with RN events overnight. Review of Systems:  Symptom Y/N Comments  Symptom Y/N Comments   Fever/Chills n   Chest Pain n    Poor Appetite n   Edema n    Cough n   Abdominal Pain n    Sputum n   Joint Pain n    SOB/[place] n   Pruritis/Rash     Nausea/vomit    Tolerating PT/OT y    Diarrhea    Tolerating Diet y    Constipation    Other       Could NOT obtain due to:      Objective:     VITALS:   Last 24hrs VS reviewed since prior progress note.  Most recent are:  Patient Vitals for the past 24 hrs:   Temp Pulse Resp BP SpO2   19 0752 97.4 °F (36.3 °C) (!) 118 18 127/74 95 %   19 0316 97.7 °F (36.5 °C) (!) 111 18 121/82 97 %   19 2240 97.7 °F (36.5 °C) (!) 110 18 136/74 98 %   12/22/19 1922 97.8 °F (36.6 °C) 79 20 121/81 96 %   12/22/19 1459    103/74    12/22/19 1458  85   100 %   12/22/19 1455 97.2 °F (36.2 °C) (!) 106 24 (!) 130/115 96 %   12/22/19 1113  87  189/60    12/22/19 1112 97.3 °F (36.3 °C) 88 20 189/60 95 %       Intake/Output Summary (Last 24 hours) at 12/23/2019 0901  Last data filed at 12/23/2019 2247  Gross per 24 hour   Intake 1683.33 ml   Output 0 ml   Net 1683.33 ml        PHYSICAL EXAM:  General: WD, WN. Alert, cooperative, no acute distress    EENT:  EOMI. Anicteric sclerae. MMM  Resp:  CTA bilaterally, no wheezing or rales. No accessory muscle use  CV:  Regular  Rhythm +,  No edema, tachycardia noted +  GI:  Soft, Non distended, Non tender.  +Bowel sounds  Neurologic:  Alert and oriented X 3, normal speech,   Psych:   Good insight. Not anxious nor agitated  Skin:  No rashes. No jaundice    Reviewed most current lab test results and cultures  YES  Reviewed most current radiology test results   YES  Review and summation of old records today    NO  Reviewed patient's current orders and MAR    YES  PMH/ reviewed - no change compared to H&P  ________________________________________________________________________  Care Plan discussed with:    Comments   Patient x    Family      RN x    Care Manager     Consultant                        Multidiciplinary team rounds were held today with , nursing, pharmacist and clinical coordinator. Patient's plan of care was discussed; medications were reviewed and discharge planning was addressed.      ________________________________________________________________________  Total NON critical care TIME:  36   Minutes    Total CRITICAL CARE TIME Spent:   Minutes non procedure based      Comments   >50% of visit spent in counseling and coordination of care     ________________________________________________________________________  Alice Flores MD Procedures: see electronic medical records for all procedures/Xrays and details which were not copied into this note but were reviewed prior to creation of Plan. LABS:  I reviewed today's most current labs and imaging studies.   Pertinent labs include:  Recent Labs     12/23/19 0348 12/22/19 0324   WBC 5.7 6.0   HGB 9.8* 9.9*   HCT 32.0* 31.0*    261     Recent Labs     12/23/19 0348 12/22/19 0324 12/21/19  1200   * 143 141   K 4.3 4.1 3.5   * 115* 108   CO2 19* 21 24   GLU 69 78 83   BUN 13 10 11   CREA 0.78 0.76 0.87   CA 8.0* 7.9* 7.9*   MG 2.0  --  1.2*       Signed: Mrayjo Freire MD

## 2019-12-23 NOTE — PROGRESS NOTES
1900 - Bedside and Verbal shift change report given to Zan Terry RN (oncoming nurse) by Marizol Vargas RN (offgoing nurse). Report included the following information SBAR, Kardex, Intake/Output, MAR, Recent Results and Cardiac Rhythm NSR/Sinus Tach. Pt sleeping quietly in bed at this time. Pt easily arousable. Pt is A/O to self, place, and time. Pt is confused about situation. UE/LE cool and dusky. Pulses palpable and cap refill <3 seconds. Holding ativan at this time per Dr. Patel Challengefabiana. Pt denies any pain at this time. Call bell within reach. Bed alarm on.    1922 - CIWA score 4, pt has some tremors, but is A/O, denies nausea/headache, pt sleeping quietly. Will continue to monitor. 2151 - Pt sleeping quietly in bed at this time. Call bell within reach. Bed alarm on. Will continue to monitor. 2244 - Pt asking for water. When given water, pt began coughing after sipping. Educated pt on aspiration risk. Will pass on. CIWA 3. Will continue to monitor. 0030 - Pt has one episode of desat to 66%. Quickly recovered to 94-95%. 2L O2 NC placed on pt. Pt O2 sat remains at 98%. Will continue to monitor. 0140 - Pt sleeping quietly in bed at this time. O2 sat remains 95-98% on 2LNC. HR 70-80's. Call bell within reach. 0320 - Labs drawn and sent. VSS. Pt  with activity. NSR at rest. Pt cleaned after incontinence episode. Pt A/O to self, place, situation. Call bell within reach. Bed alarm on. Will continue to monitor. 0530 - Pt given water without straw. Pt able to drink without coughing/choking. Will give fluids without straw. 0630 - Pt repositioned in bed. Lying on L side and mepilex replaced on wounds. Call bell within reach. 2963 - Pt placed on bedpan. Had medium BM and urine occurrence. Pt cleaned and repositioned in bed. Call bell within reach. 0700 - Shift report given to Marizol Vargas RN.

## 2019-12-23 NOTE — PROGRESS NOTES
0730 - Bedside shift change report given to Jocy (oncoming nurse) by Spike Florez (offgoing nurse). Report included the following information SBAR, Kardex, Intake/Output, MAR and Recent Results. 5049 - Pt asking for cell phone. Was not on bedside table, but is listed in the [address]. Found phone in linen. Provided to patient. Advised to not leave phone on bedside table when not in use. 1020 - Called consult to Dr. Isbell Inch office. Anthony Montgomery is not in the office today so one of his associates will perform consult per office staff. 1247 - Prevlon boots applied to feet bilaterally. Pt turned to his left side to rest,    1535 - Pt c/o pain to left foot 5/10. Medicated with     3290 - Envision bed ordered for pt. Versa care bed delivered. Called Ladarius Barrett back to notify of wrong mattress delivered.

## 2019-12-24 LAB
ANION GAP SERPL CALC-SCNC: 7 MMOL/L (ref 5–15)
BUN SERPL-MCNC: 13 MG/DL (ref 6–20)
BUN/CREAT SERPL: 15 (ref 12–20)
CALCIUM SERPL-MCNC: 8.1 MG/DL (ref 8.5–10.1)
CHLORIDE SERPL-SCNC: 111 MMOL/L (ref 97–108)
CO2 SERPL-SCNC: 22 MMOL/L (ref 21–32)
CREAT SERPL-MCNC: 0.88 MG/DL (ref 0.7–1.3)
ERYTHROCYTE [DISTWIDTH] IN BLOOD BY AUTOMATED COUNT: 14.8 % (ref 11.5–14.5)
GLUCOSE SERPL-MCNC: 93 MG/DL (ref 65–100)
HCT VFR BLD AUTO: 30.9 % (ref 36.6–50.3)
HGB BLD-MCNC: 9.9 G/DL (ref 12.1–17)
MAGNESIUM SERPL-MCNC: 2 MG/DL (ref 1.6–2.4)
MCH RBC QN AUTO: 37.5 PG (ref 26–34)
MCHC RBC AUTO-ENTMCNC: 32 G/DL (ref 30–36.5)
MCV RBC AUTO: 117 FL (ref 80–99)
NRBC # BLD: 0 K/UL (ref 0–0.01)
NRBC BLD-RTO: 0 PER 100 WBC
PHOSPHATE SERPL-MCNC: 3.2 MG/DL (ref 2.6–4.7)
PLATELET # BLD AUTO: 347 K/UL (ref 150–400)
PMV BLD AUTO: 10.8 FL (ref 8.9–12.9)
POTASSIUM SERPL-SCNC: 4 MMOL/L (ref 3.5–5.1)
RBC # BLD AUTO: 2.64 M/UL (ref 4.1–5.7)
SODIUM SERPL-SCNC: 140 MMOL/L (ref 136–145)
WBC # BLD AUTO: 7.4 K/UL (ref 4.1–11.1)

## 2019-12-24 PROCEDURE — 74011000258 HC RX REV CODE- 258: Performed by: INTERNAL MEDICINE

## 2019-12-24 PROCEDURE — 74011250637 HC RX REV CODE- 250/637: Performed by: INTERNAL MEDICINE

## 2019-12-24 PROCEDURE — 74011250636 HC RX REV CODE- 250/636: Performed by: HOSPITALIST

## 2019-12-24 PROCEDURE — 84100 ASSAY OF PHOSPHORUS: CPT

## 2019-12-24 PROCEDURE — 97530 THERAPEUTIC ACTIVITIES: CPT

## 2019-12-24 PROCEDURE — 74011000250 HC RX REV CODE- 250: Performed by: HOSPITALIST

## 2019-12-24 PROCEDURE — 74011250636 HC RX REV CODE- 250/636: Performed by: INTERNAL MEDICINE

## 2019-12-24 PROCEDURE — 80048 BASIC METABOLIC PNL TOTAL CA: CPT

## 2019-12-24 PROCEDURE — 74011250637 HC RX REV CODE- 250/637: Performed by: NURSE PRACTITIONER

## 2019-12-24 PROCEDURE — 85027 COMPLETE CBC AUTOMATED: CPT

## 2019-12-24 PROCEDURE — 36415 COLL VENOUS BLD VENIPUNCTURE: CPT

## 2019-12-24 PROCEDURE — 65270000029 HC RM PRIVATE

## 2019-12-24 PROCEDURE — 83735 ASSAY OF MAGNESIUM: CPT

## 2019-12-24 PROCEDURE — 87086 URINE CULTURE/COLONY COUNT: CPT

## 2019-12-24 PROCEDURE — 97161 PT EVAL LOW COMPLEX 20 MIN: CPT

## 2019-12-24 RX ORDER — IBUPROFEN 200 MG
1 TABLET ORAL DAILY
Status: DISCONTINUED | OUTPATIENT
Start: 2019-12-25 | End: 2020-01-22 | Stop reason: HOSPADM

## 2019-12-24 RX ORDER — OXYCODONE HYDROCHLORIDE 5 MG/1
5-10 TABLET ORAL
Status: DISCONTINUED | OUTPATIENT
Start: 2019-12-24 | End: 2020-01-22 | Stop reason: HOSPADM

## 2019-12-24 RX ORDER — OXYCODONE AND ACETAMINOPHEN 5; 325 MG/1; MG/1
1-2 TABLET ORAL
Status: DISCONTINUED | OUTPATIENT
Start: 2019-12-24 | End: 2019-12-24

## 2019-12-24 RX ORDER — GABAPENTIN 100 MG/1
100 CAPSULE ORAL 2 TIMES DAILY
Status: DISCONTINUED | OUTPATIENT
Start: 2019-12-24 | End: 2020-01-22 | Stop reason: HOSPADM

## 2019-12-24 RX ORDER — GABAPENTIN 300 MG/1
300 CAPSULE ORAL
Status: DISCONTINUED | OUTPATIENT
Start: 2019-12-24 | End: 2020-01-22 | Stop reason: HOSPADM

## 2019-12-24 RX ADMIN — CEFTRIAXONE 1 G: 1 INJECTION, POWDER, FOR SOLUTION INTRAMUSCULAR; INTRAVENOUS at 03:35

## 2019-12-24 RX ADMIN — PRAVASTATIN SODIUM 40 MG: 40 TABLET ORAL at 21:19

## 2019-12-24 RX ADMIN — ACETAMINOPHEN 650 MG: 325 TABLET ORAL at 16:48

## 2019-12-24 RX ADMIN — Medication 10 ML: at 13:56

## 2019-12-24 RX ADMIN — OXYCODONE 10 MG: 5 TABLET ORAL at 18:30

## 2019-12-24 RX ADMIN — OXYCODONE 10 MG: 5 TABLET ORAL at 22:29

## 2019-12-24 RX ADMIN — ENOXAPARIN SODIUM 40 MG: 40 INJECTION SUBCUTANEOUS at 08:19

## 2019-12-24 RX ADMIN — GABAPENTIN 300 MG: 300 CAPSULE ORAL at 21:19

## 2019-12-24 RX ADMIN — Medication 10 ML: at 21:18

## 2019-12-24 RX ADMIN — HYDROCHLOROTHIAZIDE 25 MG: 25 TABLET ORAL at 08:19

## 2019-12-24 RX ADMIN — FOLIC ACID: 5 INJECTION, SOLUTION INTRAMUSCULAR; INTRAVENOUS; SUBCUTANEOUS at 21:17

## 2019-12-24 RX ADMIN — ASPIRIN 81 MG 81 MG: 81 TABLET ORAL at 08:18

## 2019-12-24 RX ADMIN — OXYCODONE HYDROCHLORIDE AND ACETAMINOPHEN 1 TABLET: 5; 325 TABLET ORAL at 08:18

## 2019-12-24 RX ADMIN — GABAPENTIN 100 MG: 100 CAPSULE ORAL at 13:55

## 2019-12-24 RX ADMIN — LOSARTAN POTASSIUM 50 MG: 50 TABLET, FILM COATED ORAL at 08:19

## 2019-12-24 RX ADMIN — GABAPENTIN 100 MG: 100 CAPSULE ORAL at 10:09

## 2019-12-24 NOTE — PROGRESS NOTES
TRANSFER - IN REPORT:    Verbal report received from Jarret Strickland (name) on [facility]  being received from PCU (unit) for routine progression of care      Report consisted of patients Situation, Background, Assessment and   Recommendations(SBAR). Information from the following report(s) SBAR, Kardex, Procedure Summary, Intake/Output, MAR and Recent Results was reviewed with the receiving nurse. Opportunity for questions and clarification was provided. Assessment completed upon patients arrival to unit and care assumed.

## 2019-12-24 NOTE — PROGRESS NOTES
CM met with pt to discuss d/c planning. CM verified information provided by patient in case management at [facility] on 12/17. Pt resided alone prior to hospitalization and is considering LTC placement. Process of obtaining medicaid for patient has been started. Pt will likely need SNF stay at discharge. CM will continue to follow patient for discharge planning needs and arrange for services as deemed necessary.     Leslie Au, Care Manager  [phone]

## 2019-12-24 NOTE — PROGRESS NOTES
Problem: Mobility Impaired (Adult and Pediatric)  Goal: *Acute Goals and Plan of Care (Insert Text)  Description  FUNCTIONAL STATUS PRIOR TO ADMISSION: Pt was living at home alone on first level of 2 story home with 2 step entry. Per chart, he was falling and having a difficult time taking care of himself. Pt states he was not falling and that the only time he fell was when he had his stroke. HOME SUPPORT PRIOR TO ADMISSION: The patient lived alone with no local support. Physical Therapy Goals  Initiated 12/24/2019  1. Patient will move from supine to sit and sit to supine , scoot up and down and roll side to side in bed with independence within 7 day(s). 2.  Patient will transfer from bed to chair and chair to bed with modified independence using the least restrictive device within 7 day(s). 3.  Patient will perform sit to stand with modified independence within 7 day(s). 4.  Patient will ambulate with supervision/set-up for 75 feet with the least restrictive device within 7 day(s). Outcome: Progressing Towards Goal   PHYSICAL THERAPY EVALUATION  Patient: Clif Bowling (78 y.o. male)  Date: 12/24/2019  Primary Diagnosis: Alcohol withdrawal ([facility] [F10.239]        Precautions: fall        ASSESSMENT  Based on the objective data described below, the patient presents with moderately limited mobility, gait, balance and activity tolerance. Pt also with c/o pain L heel due to wound. Current Level of Function Impacting Discharge (mobility/balance): pt is min A for bed mobility. He is min to mod A for sit to stand with use of hands on stabilized walker. He is able to take small turning steps with the RW to sit in bedside chair, min A. Did not progress to full amb as pt c/o pain in L heel pending further dressing and care. Wound dry without drainage. Functional Outcome Measure: The patient scored a 35/100 on the barthel outcome measure which is indicative of 65% functional impairment. Other factors to consider for discharge: lives alone, steps to enter, currently needing assist for all mobility     Patient will benefit from skilled therapy intervention to address the above noted impairments. PLAN :  Recommendations and Planned Interventions: bed mobility training, transfer training, gait training, therapeutic exercises, patient and family training/education, and therapeutic activities      Frequency/Duration: Patient will be followed by physical therapy:  4 times a week to address goals. Recommendation for discharge: (in order for the patient to meet his/her long term goals)  Therapy up to 5 days/week in SNF setting    This discharge recommendation:  Has not yet been discussed the attending provider and/or case management    IF patient discharges home will need the following DME: to be determined (TBD)         SUBJECTIVE:   Patient stated I haven't fallen since my stroke.     OBJECTIVE DATA SUMMARY:   HISTORY:    Past Medical History:   Diagnosis Date    Abuse     alcoholism, quit 2012    Claudication of calf muscles ([facility] 2013    Colovesical fistula     Dr Bryanna Pierce    Esophageal stricture     Esophagitis     GI bleed 10/2016    Hemochromatosis     Hyperlipidemia     Hypertension     Peripheral artery disease (HCC)     Dr. Clinton Couch    Pulmonary nodule     right lung     Past Surgical History:   Procedure Laterality Date    COLONOSCOPY N/A 9/14/2016    COLONOSCOPY performed by Chris Stoddard MD at [facility] ENDOSCOPY    COLONOSCOPY N/A 12/19/2016    COLONOSCOPY performed by Chris Stoddard MD at [facility] ENDOSCOPY    HX AMPUTATION Left 07/2016    left 4th toe from gangrene    HX ENDOSCOPY  10/2016    HX OTHER SURGICAL      left partial foot amputation       Personal factors and/or comorbidities impacting plan of care: alcohol hx, difficult time taking care of self prior to admit per help    Home Situation  Home Environment: Private residence  # Steps to Enter: 2  Rails to Enter: Yes  Hand Rails : Right  One/Two Story Residence: Two story, live on 1st floor  Living Alone: Yes  Support Systems: Family member(s)  Patient Expects to be Discharged to[de-identified] Private residence  Current DME Used/Available at Home: [place], straight, Commode, bedside, Grab bars, Walker, rolling, Tub transfer bench  Tub or Shower Type: Tub/Shower combination    EXAMINATION/PRESENTATION/DECISION MAKING:   Critical Behavior:  Neurologic State: Alert  Orientation Level: Oriented to person, Oriented to place, Oriented to situation  Cognition: Follows commands     Hearing: Auditory  Auditory Impairment: None  Skin:  dry and scaly; wound dry on lateral L heel    Range Of Motion:  AROM: Generally decreased, functional           PROM: Generally decreased, functional(L partial foot amp)           Strength:    Strength: Generally decreased, functional                    Tone & Sensation:                  Sensation: (decreased distal LEs)               Coordination:  Coordination: Generally decreased, functional  Vision:      Functional Mobility:  Bed Mobility:     Supine to Sit: Minimum assistance        Transfers:  Sit to Stand: Minimum assistance; Moderate assistance;Assist x1  Stand to Sit: Minimum assistance        Bed to Chair: Minimum assistance;Assist x1;Other (comment)(with RW)              Balance:   Sitting: Intact  Standing: Impaired  Standing - Static: Fair; Other (comment); Constant support(with RW)  Standing - Dynamic : Fair;Constant support(with RW)  Ambulation/Gait Training:              Gait Description (WDL): (Not fully assessed due to tolerance; turns with RW min A)                            Functional Measure:  Barthel Index:    Bathin  Bladder: 0  Bowels: 10  Groomin  Dressin  Feedin  Mobility: 0  Stairs: 0  Toilet Use: 5  Transfer (Bed to Chair and Back): 10  Total: 35/100       The Barthel ADL Index: Guidelines  1.  The index should be used as a record of what a patient does, not as a record of what a patient could do.  2. The main aim is to establish degree of independence from any help, physical or verbal, however minor and for whatever reason. 3. The need for supervision renders the patient not independent. 4. A patient's performance should be established using the best available evidence. Asking the patient, friends/relatives and nurses are the usual sources, but direct observation and common sense are also important. However direct testing is not needed. 5. Usually the patient's performance over the preceding 24-48 hours is important, but occasionally longer periods will be relevant. 6. Middle categories imply that the patient supplies over 50 per cent of the effort. 7. Use of aids to be independent is allowed. Carlos Yeung., Barthel, D.W. (2912). Functional evaluation: the Barthel Index. [facility] (14)2. MIRIAN Kiser, Kina Patel., [facility] [place], [facility] (1999). Measuring the change indisability after inpatient rehabilitation; comparison of the responsiveness of the Barthel Index and Functional Clayton Measure. Journal of Neurology, Neurosurgery, and Psychiatry, 66(4), 268-068. Cristy Vela, N.J.A, BLAKE Burr, & Jocelyne Stewart, M.A. (2004.) Assessment of post-stroke quality of life in cost-effectiveness studies: The usefulness of the Barthel Index and the EuroQoL-5D.  Quality of Life Research, 15, 177-14           Physical Therapy Evaluation Charge Determination   History Examination Presentation Decision-Making   HIGH Complexity :3+ comorbidities / personal factors will impact the outcome/ POC  MEDIUM Complexity : 3 Standardized tests and measures addressing body structure, function, activity limitation and / or participation in recreation  LOW Complexity : Stable, uncomplicated  LOW Complexity : FOTO score of       Based on the above components, the patient evaluation is determined to be of the following complexity level: LOW         Activity Tolerance:   Fair  Please refer to the flowsheet for vital signs taken during this treatment. After treatment patient left in no apparent distress:   Sitting in chair, Call bell within reach, and MANINDER lorenzo, RN present for care     COMMUNICATION/EDUCATION:   The patients plan of care was discussed with: Registered Nurse. Fall prevention education was provided and the patient/caregiver indicated understanding., Patient/family have participated as able in goal setting and plan of care. , and Patient/family agree to work toward stated goals and plan of care.     Thank you for this referral.  Cyndi Mendoza, PT   Time Calculation: 23 mins

## 2019-12-24 NOTE — PROGRESS NOTES
Vascular Surgery Progress Note  Susannah Dejesuss ACNP-BC  12/24/2019       Subjective:      Mr. Edmund Cristobal has a pmhx significant for alcoholism, COPD, DM, PAFib, HTN, HLD, and GERD. Jude Dalal continues to smoke daily. Jude Dalal has a pmhx significant for LLE stenting per his report. Jude Dalal is s/p TMA of the left foot (10/2016). Jude Dalal presented to the clinic in November 2019 w/ compliant of BLE claudication, BLE nocturnal cramping, and a wound to the incision of the left TMA after hitting his foot.  He has ischemia of the left foot and rubor of the right foot.  His ABIs were right 0.40 w/ a flatline TBI and left 0.32 w/ a surgically absent left great toe.  He underwent an arteriogram on 11/5/2019 that revealed severe bilateral aorto iliac disease that was not amendable to endovascular intervention. Jude Dalal returned to the clinic  w/ a CTA that is significant for an occluded left common iliac and left external iliac artery and occluded right external iliac artery. Plan is for axillo-bifemoral bypass on 01/02/2020. Since our last visit he presented to the emergency room on 12/16/2019 w/ complaint of BLE weakness. He was admitted to hospitals and diagnosed w/ cystitis. While admitted he was noted to have left lateral heel ulcer. He was discharged to the TCU at hospitals on 12/20/2019 for rehabilitation. Late that evening he developed active w/d symptoms and returned to the emergency room at hospitals. He was then transferred to [facility] for management of alcohol withdrawal.  His urine cx has grown Klebsiella. This am his wound are stable. He complains of rest pain to the bilateral feet.       Nursing Data:     Patient Vitals for the past 24 hrs:   BP Temp Pulse Resp SpO2   12/24/19 0724 96/81 97.7 °F (36.5 °C) (!) 102 18 93 %   12/24/19 0325 111/63 97.2 °F (36.2 °C) 84 18 100 %   12/23/19 2256 120/58 97.8 °F (36.6 °C) 85 17 92 %   12/23/19 1933 107/61 97.8 °F (36.6 °C) 97 18 96 %   12/23/19 1653   92  93 %   12/23/19 1652 108/59 97.9 °F (36.6 °C) 92 18 93 %   12/23/19 1112 99/57 97.6 °F (36.4 °C) 86 18 94 %     ---------------------------------------------------------------------------------------------------------    Intake/Output Summary (Last 24 hours) at 12/24/2019 0931  Last data filed at 12/24/2019 0325  Gross per 24 hour   Intake 2495 ml   Output    Net 2495 ml       Exam:     Physical Exam  General-frail chronically ill appearing disheveled CM   Mental Status - Alert and oriented to name and place   Head and Neck - full range of motion, No lymphadenopathy. Thyroid - normal size and consistency. Chest and lung exam reveals  - normal excursion and RR  Cardiovascular examination reveals  - RRR  Abdomen Inspection-Non Tender, No hepatosplenomegaly, No palpable abdominal masses and Soft. Peripheral Vascular- weak but palpable femoral pulses with no palpable pulses below in either lower extremity. Neurologic-Chronic left sided upper and lower extremity weakness. Routinely ambulates for short distances only using a walker. Presented to the clinic 3 weeks ago in a WC. Now with right sided weakness. Can move foot and ankle. No notable tremor this am.   Skin: New necrotic ulcer to lateral left heel. Stable chronic wound of the TMA incision. Cyanosis of the plantar aspect of the left foot. Ischemic changes to the toes of the 2nd and 3rd digit of the right foot w/ open ulceration. Dry ulcer to the lateral right foot. Lab Review:     .   Recent Results (from the past 24 hour(s))   CBC W/O DIFF    Collection Time: 12/24/19  5:35 AM   Result Value Ref Range    WBC 7.4 4.1 - 11.1 K/uL    RBC 2.64 (L) 4.10 - 5.70 M/uL    HGB 9.9 (L) 12.1 - 17.0 g/dL    HCT 30.9 (L) 36.6 - 50.3 %    .0 (H) 80.0 - 99.0 FL    MCH 37.5 (H) 26.0 - 34.0 PG    MCHC 32.0 30.0 - 36.5 g/dL    RDW 14.8 (H) 11.5 - 14.5 %    PLATELET 227 676 - 049 K/uL    MPV 10.8 8.9 - 12.9 FL    NRBC 0.0 0  WBC    ABSOLUTE NRBC 0.00 0.00 - 8.38 K/uL   METABOLIC PANEL, BASIC Collection Time: 12/24/19  5:35 AM   Result Value Ref Range    Sodium 140 136 - 145 mmol/L    Potassium 4.0 3.5 - 5.1 mmol/L    Chloride 111 (H) 97 - 108 mmol/L    CO2 22 21 - 32 mmol/L    Anion gap 7 5 - 15 mmol/L    Glucose 93 65 - 100 mg/dL    BUN 13 6 - 20 MG/DL    Creatinine 0.88 0.70 - 1.30 MG/DL    BUN/Creatinine ratio 15 12 - 20      GFR est AA >60 >60 ml/min/1.73m2    GFR est non-AA >60 >60 ml/min/1.73m2    Calcium 8.1 (L) 8.5 - 10.1 MG/DL   MAGNESIUM    Collection Time: 12/24/19  5:35 AM   Result Value Ref Range    Magnesium 2.0 1.6 - 2.4 mg/dL   PHOSPHORUS    Collection Time: 12/24/19  5:35 AM   Result Value Ref Range    Phosphorus 3.2 2.6 - 4.7 MG/DL          Assessment/Plan:      Consult problem  Severe bilateral PAD w/ ulceration of the left heel, left TMA incision, right lateral 5th digit, and web of right 2nd and 3rd toes & rest pain of the bilateral feet. -w/ plan for axillo-bifemoral bypass on 01/02/2020. Stable wounds this am.  Continue Betadine soaked guaze dressing changes to left heel daily and heel suspension boots BL. Continue ASA. No plan for urgent surgical intervention today. Modify pain regimen for better control.  Add gabapentin.        Active problems  Acute klebsiella pneumoniae UTI   -course of Rocephin     Alcohol withdrawal  -tremor resolved this am   -patient currently not receiving scheduled or PRN ativan   Thiamine deficiency   -consider oral supplementation   Diabetes Mellitus w/ hypoglycemia    -on liberalized diet   -add hs snack   Hypernatremia   -resolved   COPD  -not in exacerbation   Macrocytic anemia   -stable   Thrombocytopenia  -resolved   PAfib  -rate controlled  -poor candidate for OAG  Hypertension  -stable on ARB  Hyperlipidemia  GERD  Management of comorbid conditions by primary team.     VTE Prophylaxis:  LMWH  SCDs: contraindicated in severe BLE PAD     Disposition:  SNF

## 2019-12-24 NOTE — PROGRESS NOTES
Hospitalist Progress Note    NAME: Callie Montana   :  1945   MRN:  [id number]       Assessment / Plan:  Alcohol withdrawal syndrome/Delirium Tremens POA- transferred from [facility]    Can transfer off stepdown unit today  Cont patient on CIWA protocol IV Ativan prn-- has not received any ativan >24 hrs  Cont Goody bag daily  DC IVF - D5 1/2 NS today as Na improved & pt eating/drinking PO now  Tolerating PO diet well    Acute cystitis/ recent Klebsiella UTI POA    Continue IV antibiotic  Add on urine culture to UA sample this admission if possible- still holding urine    Peripheral vascular disease POA- severe on CTA Abd/pelvis at [facility]  CTA:  -Occluded left common iliac and left external iliac artery with distal  reconstitution of the common femoral artery.  -Occluded right external iliac artery with distal reconstitution of the right  common femoral artery. IP Vascular consultation noted- following, no plans of immediate surgery per note    Hypertension  Continue home antihypertensive medication        Code Status: Full   Surrogate Decision Maker:Brionna Quesada     DVT Prophylaxis: Lovenox   GI Prophylaxis: not indicated     Recommended Disposition: SNF/LTC and  PT, OT, RN ??TBD pending hospital course- PT/OT eval today       Subjective:     Chief Complaint / Reason for Physician Visit: f/u Alcohol withdrawal syndrome, UTI  \"I am fine\". Discussed with RN events overnight. Review of Systems:  Symptom Y/N Comments  Symptom Y/N Comments   Fever/Chills n   Chest Pain n    Poor Appetite n   Edema n    Cough n   Abdominal Pain n    Sputum n   Joint Pain n    SOB/[place] n   Pruritis/Rash     Nausea/vomit    Tolerating PT/OT y    Diarrhea    Tolerating Diet y    Constipation    Other       Could NOT obtain due to:      Objective:     VITALS:   Last 24hrs VS reviewed since prior progress note.  Most recent are:  Patient Vitals for the past 24 hrs:   Temp Pulse Resp BP SpO2   19 0724 97.7 °F (36.5 °C) (!) 102 18 96/81 93 %   12/24/19 0325 97.2 °F (36.2 °C) 84 18 111/63 100 %   12/23/19 2256 97.8 °F (36.6 °C) 85 17 120/58 92 %   12/23/19 1933 97.8 °F (36.6 °C) 97 18 107/61 96 %   12/23/19 1653  92   93 %   12/23/19 1652 97.9 °F (36.6 °C) 92 18 108/59 93 %   12/23/19 1112 97.6 °F (36.4 °C) 86 18 99/57 94 %       Intake/Output Summary (Last 24 hours) at 12/24/2019 0834  Last data filed at 12/24/2019 0325  Gross per 24 hour   Intake 2995 ml   Output    Net 2995 ml        PHYSICAL EXAM:  General: WD, WN. Alert, cooperative, no acute distress    EENT:  EOMI. Anicteric sclerae. MMM  Resp:  CTA bilaterally, no wheezing or rales. No accessory muscle use  CV:  Regular  Rhythm +,  No edema, tachycardia noted +  GI:  Soft, Non distended, Non tender.  +Bowel sounds  Neurologic:  Alert and oriented X 3, normal speech,   Psych:   Good insight. Not anxious nor agitated  Skin:  No rashes. No jaundice    Reviewed most current lab test results and cultures  YES  Reviewed most current radiology test results   YES  Review and summation of old records today    NO  Reviewed patient's current orders and MAR    YES  PMH/ reviewed - no change compared to H&P  ________________________________________________________________________  Care Plan discussed with:    Comments   Patient x    Family      RN x    Care Manager     Consultant                        Multidiciplinary team rounds were held today with , nursing, pharmacist and clinical coordinator. Patient's plan of care was discussed; medications were reviewed and discharge planning was addressed.      ________________________________________________________________________  Total NON critical care TIME:  26   Minutes    Total CRITICAL CARE TIME Spent:   Minutes non procedure based      Comments   >50% of visit spent in counseling and coordination of care     ________________________________________________________________________  Mirtha Burr MD     Procedures: see electronic medical records for all procedures/Xrays and details which were not copied into this note but were reviewed prior to creation of Plan. LABS:  I reviewed today's most current labs and imaging studies.   Pertinent labs include:  Recent Labs     12/24/19  0535 12/23/19 0348 12/22/19 0324   WBC 7.4 5.7 6.0   HGB 9.9* 9.8* 9.9*   HCT 30.9* 32.0* 31.0*    304 261     Recent Labs     12/24/19  0535 12/23/19 0348 12/22/19 0324 12/21/19  1200    150* 143 141   K 4.0 4.3 4.1 3.5   * 120* 115* 108   CO2 22 19* 21 24   GLU 93 69 78 83   BUN 13 13 10 11   CREA 0.88 0.78 0.76 0.87   CA 8.1* 8.0* 7.9* 7.9*   MG 2.0 2.0  --  1.2*   PHOS 3.2  --   --   --        Signed: Reuben Linton MD

## 2019-12-24 NOTE — PROGRESS NOTES
0700- Bedside and Verbal shift change report given to FILIBERTO Arellano (oncoming nurse) by Romana Coyer, RN (offgoing nurse). Report included the following information SBAR, Kardex, Intake/Output, MAR and Recent Results. Pt resting in bed. No needs at this time. Edouard Khan NP in room. Orders to be received. 0945- PT/OT in room. 1000- Pt up in chair, tolerating well. PT states pt is a 1assist, stand and pivot, with walker back to bed. 1400- Attempt to call report. Number given. 1440- Attempt to call report again. 1520- TRANSFER - OUT REPORT:    Verbal report given to Nanci Austin RN(name) on Alayna Camara  being transferred to Gen Surg (unit) for routine progression of care       Report consisted of patients Situation, Background, Assessment and   Recommendations(SBAR). Information from the following report(s) SBAR, Kardex, Intake/Output, MAR and Med Rec Status was reviewed with the receiving nurse. Lines:   Peripheral IV 12/24/19 Anterior; Left Forearm (Active)   Site Assessment Clean, dry, & intact 12/24/2019 11:12 AM   Phlebitis Assessment 0 12/24/2019 11:12 AM   Infiltration Assessment 0 12/24/2019 11:12 AM   Dressing Status Clean, dry, & intact 12/24/2019 11:12 AM   Dressing Type Transparent;Tape 12/24/2019 11:12 AM   Hub Color/Line Status Green;Capped 12/24/2019 11:12 AM   Alcohol Cap Used Yes 12/24/2019  7:24 AM        Opportunity for questions and clarification was provided.       Patient transported with:   Registered Nurse

## 2019-12-25 LAB
ANION GAP SERPL CALC-SCNC: 6 MMOL/L (ref 5–15)
BUN SERPL-MCNC: 13 MG/DL (ref 6–20)
BUN/CREAT SERPL: 18 (ref 12–20)
CALCIUM SERPL-MCNC: 7.8 MG/DL (ref 8.5–10.1)
CHLORIDE SERPL-SCNC: 112 MMOL/L (ref 97–108)
CO2 SERPL-SCNC: 21 MMOL/L (ref 21–32)
CREAT SERPL-MCNC: 0.72 MG/DL (ref 0.7–1.3)
ERYTHROCYTE [DISTWIDTH] IN BLOOD BY AUTOMATED COUNT: 14.4 % (ref 11.5–14.5)
GLUCOSE SERPL-MCNC: 82 MG/DL (ref 65–100)
HCT VFR BLD AUTO: 26.6 % (ref 36.6–50.3)
HGB BLD-MCNC: 8.6 G/DL (ref 12.1–17)
MCH RBC QN AUTO: 37.9 PG (ref 26–34)
MCHC RBC AUTO-ENTMCNC: 32.3 G/DL (ref 30–36.5)
MCV RBC AUTO: 117.2 FL (ref 80–99)
NRBC # BLD: 0 K/UL (ref 0–0.01)
NRBC BLD-RTO: 0 PER 100 WBC
PLATELET # BLD AUTO: 307 K/UL (ref 150–400)
PMV BLD AUTO: 11.2 FL (ref 8.9–12.9)
POTASSIUM SERPL-SCNC: 4.7 MMOL/L (ref 3.5–5.1)
RBC # BLD AUTO: 2.27 M/UL (ref 4.1–5.7)
SODIUM SERPL-SCNC: 139 MMOL/L (ref 136–145)
WBC # BLD AUTO: 6.2 K/UL (ref 4.1–11.1)

## 2019-12-25 PROCEDURE — 74011250637 HC RX REV CODE- 250/637: Performed by: INTERNAL MEDICINE

## 2019-12-25 PROCEDURE — 85027 COMPLETE CBC AUTOMATED: CPT

## 2019-12-25 PROCEDURE — 74011000250 HC RX REV CODE- 250: Performed by: HOSPITALIST

## 2019-12-25 PROCEDURE — 65270000029 HC RM PRIVATE

## 2019-12-25 PROCEDURE — 74011250636 HC RX REV CODE- 250/636: Performed by: INTERNAL MEDICINE

## 2019-12-25 PROCEDURE — 74011250637 HC RX REV CODE- 250/637: Performed by: FAMILY MEDICINE

## 2019-12-25 PROCEDURE — 36415 COLL VENOUS BLD VENIPUNCTURE: CPT

## 2019-12-25 PROCEDURE — 94760 N-INVAS EAR/PLS OXIMETRY 1: CPT

## 2019-12-25 PROCEDURE — 74011250636 HC RX REV CODE- 250/636: Performed by: HOSPITALIST

## 2019-12-25 PROCEDURE — 80048 BASIC METABOLIC PNL TOTAL CA: CPT

## 2019-12-25 PROCEDURE — 74011250637 HC RX REV CODE- 250/637: Performed by: NURSE PRACTITIONER

## 2019-12-25 RX ORDER — AMOXICILLIN 250 MG
1 CAPSULE ORAL DAILY
Status: DISCONTINUED | OUTPATIENT
Start: 2019-12-25 | End: 2020-01-07

## 2019-12-25 RX ADMIN — LOSARTAN POTASSIUM 50 MG: 50 TABLET, FILM COATED ORAL at 08:50

## 2019-12-25 RX ADMIN — PRAVASTATIN SODIUM 40 MG: 40 TABLET ORAL at 21:13

## 2019-12-25 RX ADMIN — HYDROCHLOROTHIAZIDE 25 MG: 25 TABLET ORAL at 08:50

## 2019-12-25 RX ADMIN — Medication 10 ML: at 14:15

## 2019-12-25 RX ADMIN — GABAPENTIN 100 MG: 100 CAPSULE ORAL at 14:15

## 2019-12-25 RX ADMIN — OXYCODONE 10 MG: 5 TABLET ORAL at 08:36

## 2019-12-25 RX ADMIN — Medication 10 ML: at 06:16

## 2019-12-25 RX ADMIN — ENOXAPARIN SODIUM 40 MG: 40 INJECTION SUBCUTANEOUS at 08:49

## 2019-12-25 RX ADMIN — OXYCODONE 5 MG: 5 TABLET ORAL at 21:12

## 2019-12-25 RX ADMIN — ASPIRIN 81 MG 81 MG: 81 TABLET ORAL at 08:50

## 2019-12-25 RX ADMIN — FOLIC ACID: 5 INJECTION, SOLUTION INTRAMUSCULAR; INTRAVENOUS; SUBCUTANEOUS at 21:12

## 2019-12-25 RX ADMIN — GABAPENTIN 100 MG: 100 CAPSULE ORAL at 08:50

## 2019-12-25 RX ADMIN — Medication 10 ML: at 21:13

## 2019-12-25 RX ADMIN — SENNOSIDES AND DOCUSATE SODIUM 1 TABLET: 8.6; 5 TABLET ORAL at 10:39

## 2019-12-25 RX ADMIN — GABAPENTIN 300 MG: 300 CAPSULE ORAL at 21:13

## 2019-12-25 NOTE — PROGRESS NOTES
Problem: Risk for Spread of Infection  Goal: Prevent transmission of infectious organism to others  Description  Prevent the transmission of infectious organisms to other patients, staff members, and visitors. Outcome: Progressing Towards Goal     Problem: Patient Education:  Go to Education Activity  Goal: Patient/Family Education  Outcome: Progressing Towards Goal     Problem: Falls - Risk of  Goal: *Absence of Falls  Description  Document Emilie Savage Fall Risk and appropriate interventions in the flowsheet. Outcome: Progressing Towards Goal  Note: Fall Risk Interventions:  Mobility Interventions: Communicate number of staff needed for ambulation/transfer    Mentation Interventions: Door open when patient unattended    Medication Interventions: Teach patient to arise slowly    Elimination Interventions: Call light in reach    History of Falls Interventions: Door open when patient unattended         Problem: Patient Education: Go to Patient Education Activity  Goal: Patient/Family Education  Outcome: Progressing Towards Goal     Problem: Pressure Injury - Risk of  Goal: *Prevention of pressure injury  Description  Document Troy Scale and appropriate interventions in the flowsheet.   Outcome: Progressing Towards Goal  Note: Pressure Injury Interventions:  Sensory Interventions: Keep linens dry and wrinkle-free, Float heels, Maintain/enhance activity level, Minimize linen layers, Monitor skin under medical devices    Moisture Interventions: Check for incontinence Q2 hours and as needed, Internal/External urinary devices, Maintain skin hydration (lotion/cream), Minimize layers, Moisture barrier    Activity Interventions: Increase time out of bed, Pressure redistribution bed/mattress(bed type), PT/OT evaluation    Mobility Interventions: Pressure redistribution bed/mattress (bed type), HOB 30 degrees or less    Nutrition Interventions: Document food/fluid/supplement intake    Friction and Shear Interventions: Lift sheet                Problem: Patient Education: Go to Patient Education Activity  Goal: Patient/Family Education  Outcome: Progressing Towards Goal     Problem: Delirium Treatment  Goal: *Level of consciousness restored to baseline  Outcome: Progressing Towards Goal  Goal: *Level of environmental perceptions restored to baseline  Outcome: Progressing Towards Goal  Goal: *Sensory perception restored to baseline  Outcome: Progressing Towards Goal  Goal: *Emotional stability restored to baseline  Outcome: Progressing Towards Goal  Goal: *Functional assessment restored to baseline  Outcome: Progressing Towards Goal  Goal: *Absence of falls  Outcome: Progressing Towards Goal  Goal: *Will remain free of delirium, CAM Score negative  Outcome: Progressing Towards Goal  Goal: *Cognitive status will be restored to baseline  Outcome: Progressing Towards Goal  Goal: Interventions  Outcome: Progressing Towards Goal     Problem: Patient Education: Go to Patient Education Activity  Goal: Patient/Family Education  Outcome: Progressing Towards Goal

## 2019-12-25 NOTE — PROGRESS NOTES
Hospitalist Progress Note    NAME: Leesa Delacruz   :  1945   MRN:  [id number]       Assessment / Plan:  Alcohol withdrawal syndrome/Delirium Tremens POA- transferred from \[facility]\""    Stable out of stepdown unit >24 hrs  Cont patient on CIWA protocol IV Ativan prn-- has not received any ativan >48 hrs  Cont Goody bag daily  off IVF - Na improved & pt eating/drinking PO now  Tolerating PO diet well    Acute cystitis/ recent Klebsiella UTI POA  Recent Urine Cx (office) = Klebsiella pn. Species (sensitive)    Continue IV antibiotic till he is inpatient- PO on discharge  Follow urine culture from this admission for clearance to UA sample this admission if possible- still holding urine    Peripheral vascular disease POA- severe on CTA Abd/pelvis at \[facility]\""  CTA:  -Occluded left common iliac and left external iliac artery with distal  reconstitution of the common femoral artery.  -Occluded right external iliac artery with distal reconstitution of the right  common femoral artery. IP Vascular consultation noted- following, no plans of immediate surgery per note  Added Gabapentin   Added Oxycodone 5-10 mg prn for pain management  Stool softners    Hypertension  Continue home antihypertensive medication        Code Status: Full   Surrogate Decision Maker:Brionna Quesada     DVT Prophylaxis: Lovenox   GI Prophylaxis: not indicated     Recommended Disposition: SNF/LTC and  PT, OT, RN - SNF recommended- CM consulted for DC planning- Short term rehab with conversion to LTC placement when medicaid kicks in (currently pending)       Subjective:     Chief Complaint / Reason for Physician Visit: f/u Alcohol withdrawal syndrome, UTI  \"I am fine\". Discussed with RN events overnight.      Review of Systems:  Symptom Y/N Comments  Symptom Y/N Comments   Fever/Chills n   Chest Pain n    Poor Appetite n   Edema n    Cough n   Abdominal Pain n    Sputum n   Joint Pain n    SOB/MCFARLAND n   Pruritis/Rash     Nausea/vomit    Tolerating PT/OT y    Diarrhea    Tolerating Diet y    Constipation    Other       Could NOT obtain due to:      Objective:     VITALS:   Last 24hrs VS reviewed since prior progress note. Most recent are:  Patient Vitals for the past 24 hrs:   Temp Pulse Resp BP SpO2   12/25/19 0730 98.2 °F (36.8 °C) (!) 115 18 126/76    12/25/19 0347 97.9 °F (36.6 °C) 86 20 104/64 96 %   12/25/19 0101 97.9 °F (36.6 °C) 95 18 104/61 94 %   12/24/19 1941 98 °F (36.7 °C) (!) 102 18 101/67 97 %   12/24/19 1554 98.4 °F (36.9 °C) 85 18 118/79 95 %   12/24/19 1112 97.4 °F (36.3 °C) 92 17 97/65 95 %       Intake/Output Summary (Last 24 hours) at 12/25/2019 0832  Last data filed at 12/25/2019 0630  Gross per 24 hour   Intake 1120 ml   Output 450 ml   Net 670 ml        PHYSICAL EXAM:  General: WD, WN. Alert, cooperative, no acute distress    EENT:  EOMI. Anicteric sclerae. MMM  Resp:  CTA bilaterally, no wheezing or rales. No accessory muscle use  CV:  Regular  Rhythm +,  No edema, tachycardia noted +  GI:  Soft, Non distended, Non tender.  +Bowel sounds  Neurologic:  Alert and oriented X 3, normal speech,   Psych:   Good insight. Not anxious nor agitated  Skin:  No rashes. No jaundice    Reviewed most current lab test results and cultures  YES  Reviewed most current radiology test results   YES  Review and summation of old records today    NO  Reviewed patient's current orders and MAR    YES  PMH/ reviewed - no change compared to H&P  ________________________________________________________________________  Care Plan discussed with:    Comments   Patient x    Family      RN x    Care Manager     Consultant                        Multidiciplinary team rounds were held today with , nursing, pharmacist and clinical coordinator. Patient's plan of care was discussed; medications were reviewed and discharge planning was addressed.      ________________________________________________________________________  Total NON critical care TIME: 26   Minutes    Total CRITICAL CARE TIME Spent:   Minutes non procedure based      Comments   >50% of visit spent in counseling and coordination of care     ________________________________________________________________________  Marychuy Yoo MD     Procedures: see electronic medical records for all procedures/Xrays and details which were not copied into this note but were reviewed prior to creation of Plan. LABS:  I reviewed today's most current labs and imaging studies.   Pertinent labs include:  Recent Labs     12/25/19 0348 12/24/19 0535 12/23/19 0348   WBC 6.2 7.4 5.7   HGB 8.6* 9.9* 9.8*   HCT 26.6* 30.9* 32.0*    347 304     Recent Labs     12/25/19 0348 12/24/19 0535 12/23/19 0348    140 150*   K 4.7 4.0 4.3   * 111* 120*   CO2 21 22 19*   GLU 82 93 69   BUN 13 13 13   CREA 0.72 0.88 0.78   CA 7.8* 8.1* 8.0*   MG  --  2.0 2.0   PHOS  --  3.2  --        Signed: Marychuy Yoo MD

## 2019-12-25 NOTE — PROGRESS NOTES
General Surgery End of Shift Nursing Note    Bedside shift change report given to [address] [place] [address] (oncoming nurse) by Derrek Mayorga (offgoing nurse). Report included the following information SBAR, Kardex, Procedure Summary, Intake/Output, MAR, Accordion, Recent Results and Med Rec Status. Shift worked:   7pm-7am   Summary of shift:   At initial assessment patient requested a nicotine patch and there is not one ordered, telehospitalist paged at 2004 requesting order per patient request. New orders received for nicotine patch. Patient complained at of heart burn, maalox ordered but patient refused at this time stating he doesn't like the taste and he will attempt to change position first.  Foam dressing applied to sacrum.  Patient rested well the rest of the shift, only requiring one dose of oxycodone 10 mg for pain   Issues for physician to address:   None             Margaux De La O Clausing

## 2019-12-25 NOTE — PROGRESS NOTES
General Surgery End of Shift Nursing Note    Bedside shift change report given to Ariel Aburto  (oncoming nurse) by Janusz Montes (offgoing nurse). Report included the following information SBAR, Kardex, Procedure Summary, Intake/Output, MAR and Recent Results. Shift worked:   7a-7p   Summary of shift:  Pt transferred from PCU at ~1530. Pain meds given for chronic leg pain and back pain. Pt tolerating diet. CIWA score 3.    Issues for physician to address:      Number times ambulated in hallway past shift: 0    Number of times OOB to chair past shift: 0    Pain Management:  Current medication: tylenol, Roxicodone   Patient states pain is manageable on current pain medication: YES    GI:    Current diet:  DIET CARDIAC Mechanical Soft; 2 GM NA (House Low NA)  DIET NUTRITIONAL SUPPLEMENTS No; Dinner; Ensure [place]  DIET SNACKS HS Snack; Mechanical soft    Tolerating current diet: YES      Ella Carvajal

## 2019-12-25 NOTE — ROUTINE PROCESS
Vitals w/ MEWS Score (last day) Date/Time MEWS Score Pulse Resp Temp BP Level of Consciousness SpO2  
 12/25/19 0730  3  (!) 115  18  98.2 °F (36.8 °C)  126/76  Alert    
 12/25/19 0347  1  86  20  97.9 °F (36.6 °C)  104/64  Alert  96 % 12/25/19 0101  1  95  18  97.9 °F (36.6 °C)  104/61  Alert  94 % 12/24/19 1941  2  (!) 102  18  98 °F (36.7 °C)  101/67  Alert  97 % 12/24/19 1554  1  85  18  98.4 °F (36.9 °C)  118/79  Alert  95 % 12/24/19 1112  2  92  17  97.4 °F (36.3 °C)  97/65  Alert  95 % 12/24/19 0818            Alert    
 12/24/19 0724  3  (!) 102  18  97.7 °F (36.5 °C)  96/81  Alert  93 % 12/24/19 0325  1  84  18  97.2 °F (36.2 °C)  111/63  Alert  100 % MEWS score of 3 due to HR of 115 for 7am vital signs. Will notify provider. Pt asymptomatic at the moment, but requests tylenol for pan in feet.

## 2019-12-26 LAB
BACTERIA SPEC CULT: NORMAL
CC UR VC: NORMAL
SERVICE CMNT-IMP: NORMAL

## 2019-12-26 PROCEDURE — 97530 THERAPEUTIC ACTIVITIES: CPT

## 2019-12-26 PROCEDURE — 97530 THERAPEUTIC ACTIVITIES: CPT | Performed by: OCCUPATIONAL THERAPIST

## 2019-12-26 PROCEDURE — 74011250637 HC RX REV CODE- 250/637: Performed by: INTERNAL MEDICINE

## 2019-12-26 PROCEDURE — 74011250637 HC RX REV CODE- 250/637: Performed by: FAMILY MEDICINE

## 2019-12-26 PROCEDURE — 65270000029 HC RM PRIVATE

## 2019-12-26 PROCEDURE — 74011250636 HC RX REV CODE- 250/636: Performed by: INTERNAL MEDICINE

## 2019-12-26 PROCEDURE — 97165 OT EVAL LOW COMPLEX 30 MIN: CPT | Performed by: OCCUPATIONAL THERAPIST

## 2019-12-26 PROCEDURE — 74011250637 HC RX REV CODE- 250/637: Performed by: NURSE PRACTITIONER

## 2019-12-26 PROCEDURE — 94760 N-INVAS EAR/PLS OXIMETRY 1: CPT

## 2019-12-26 PROCEDURE — 74011000250 HC RX REV CODE- 250: Performed by: NURSE PRACTITIONER

## 2019-12-26 RX ORDER — SODIUM CHLORIDE AND POTASSIUM CHLORIDE .9; .15 G/100ML; G/100ML
SOLUTION INTRAVENOUS DAILY
Status: DISCONTINUED | OUTPATIENT
Start: 2019-12-26 | End: 2019-12-29

## 2019-12-26 RX ORDER — FOLIC ACID 1 MG/1
1 TABLET ORAL DAILY
Status: DISCONTINUED | OUTPATIENT
Start: 2019-12-27 | End: 2020-01-22 | Stop reason: HOSPADM

## 2019-12-26 RX ORDER — PANTOPRAZOLE SODIUM 40 MG/1
40 TABLET, DELAYED RELEASE ORAL
Status: DISCONTINUED | OUTPATIENT
Start: 2019-12-26 | End: 2020-01-03

## 2019-12-26 RX ORDER — THERA TABS 400 MCG
1 TAB ORAL DAILY
Status: DISCONTINUED | OUTPATIENT
Start: 2019-12-27 | End: 2020-01-22 | Stop reason: HOSPADM

## 2019-12-26 RX ORDER — ASPIRIN 325 MG/1
100 TABLET, FILM COATED ORAL DAILY
Status: DISCONTINUED | OUTPATIENT
Start: 2019-12-27 | End: 2020-01-22 | Stop reason: HOSPADM

## 2019-12-26 RX ADMIN — OXYCODONE 5 MG: 5 TABLET ORAL at 20:28

## 2019-12-26 RX ADMIN — GABAPENTIN 100 MG: 100 CAPSULE ORAL at 09:40

## 2019-12-26 RX ADMIN — SODIUM CHLORIDE AND POTASSIUM CHLORIDE: 9; 1.49 INJECTION, SOLUTION INTRAVENOUS at 22:16

## 2019-12-26 RX ADMIN — PRAVASTATIN SODIUM 40 MG: 40 TABLET ORAL at 21:37

## 2019-12-26 RX ADMIN — HYDROCHLOROTHIAZIDE 25 MG: 25 TABLET ORAL at 09:39

## 2019-12-26 RX ADMIN — Medication 10 ML: at 13:01

## 2019-12-26 RX ADMIN — Medication 10 ML: at 22:17

## 2019-12-26 RX ADMIN — GABAPENTIN 100 MG: 100 CAPSULE ORAL at 14:46

## 2019-12-26 RX ADMIN — SENNOSIDES AND DOCUSATE SODIUM 1 TABLET: 8.6; 5 TABLET ORAL at 09:39

## 2019-12-26 RX ADMIN — GABAPENTIN 300 MG: 300 CAPSULE ORAL at 21:37

## 2019-12-26 RX ADMIN — Medication 10 ML: at 05:30

## 2019-12-26 RX ADMIN — ASPIRIN 81 MG 81 MG: 81 TABLET ORAL at 09:39

## 2019-12-26 RX ADMIN — OXYCODONE 5 MG: 5 TABLET ORAL at 01:26

## 2019-12-26 RX ADMIN — OXYCODONE 5 MG: 5 TABLET ORAL at 11:22

## 2019-12-26 RX ADMIN — LOSARTAN POTASSIUM 50 MG: 50 TABLET, FILM COATED ORAL at 09:39

## 2019-12-26 RX ADMIN — COLLAGENASE SANTYL: 250 OINTMENT TOPICAL at 12:24

## 2019-12-26 RX ADMIN — ENOXAPARIN SODIUM 40 MG: 40 INJECTION SUBCUTANEOUS at 09:40

## 2019-12-26 RX ADMIN — PANTOPRAZOLE SODIUM 40 MG: 40 TABLET, DELAYED RELEASE ORAL at 12:58

## 2019-12-26 NOTE — PROGRESS NOTES
Hospitalist Progress Note    NAME: Dewey Mcmahon   :  1945   MRN:  [id number]       Assessment / Plan:  Alcohol withdrawal syndrome/Delirium Tremens POA- transferred from [facility]    Stable out of stepdown unit >48 hrs  Cont patient on CIWA protocol IV Ativan prn-- has not received any ativan >72 hrs  Cont Goody bag daily  off IVF - Na improved & pt eating/drinking PO now  Tolerating PO diet well    Acute cystitis/ recent Klebsiella UTI POA  Recent Urine Cx (office) = Klebsiella pn. Species (sensitive)    Continue IV antibiotic till he is inpatient- PO on discharge  Follow urine culture from this admission for clearance to UA sample this admission if possible- still holding urine    Peripheral vascular disease POA- severe on CTA Abd/pelvis at [facility]  CTA:  -Occluded left common iliac and left external iliac artery with distal  reconstitution of the common femoral artery.  -Occluded right external iliac artery with distal reconstitution of the right  common femoral artery. IP Vascular consultation noted- following, no plans of immediate surgery per note  Added Gabapentin   Added Oxycodone 5-10 mg prn for pain management  Stool softners    Hypertension  Continue home antihypertensive medication        Code Status: Full   Surrogate Decision Maker:Brionna Quesada     DVT Prophylaxis: Lovenox   GI Prophylaxis: not indicated     Recommended Disposition: SNF/LTC and  PT, OT, RN - SNF recommended- CM consulted for DC planning- Short term rehab with conversion to LTC placement when medicaid kicks in (currently pending)- DC today if arranged       Subjective:     Chief Complaint / Reason for Physician Visit: f/u Alcohol withdrawal syndrome, UTI  \"I am fine\". Discussed with RN events overnight.      Review of Systems:  Symptom Y/N Comments  Symptom Y/N Comments   Fever/Chills n   Chest Pain n    Poor Appetite n   Edema n    Cough n   Abdominal Pain n    Sputum n   Joint Pain n    SOB/MCFARLAND n   Pruritis/Rash Nausea/vomit    Tolerating PT/OT y    Diarrhea    Tolerating Diet y    Constipation    Other       Could NOT obtain due to:      Objective:     VITALS:   Last 24hrs VS reviewed since prior progress note. Most recent are:  Patient Vitals for the past 24 hrs:   Temp Pulse Resp BP SpO2   12/26/19 0712 98 °F (36.7 °C) (!) 107 20 111/70 93 %   12/26/19 0306 97.7 °F (36.5 °C) (!) 110 18 126/87 95 %   12/25/19 2345 98.5 °F (36.9 °C) (!) 108 18 115/75 94 %   12/25/19 1952 98.8 °F (37.1 °C) 96 18 105/65 96 %   12/25/19 1643 98.1 °F (36.7 °C) (!) 111 18 129/81 96 %   12/25/19 1137 98.2 °F (36.8 °C) 97 18 96/68 98 %       Intake/Output Summary (Last 24 hours) at 12/26/2019 0814  Last data filed at 12/26/2019 2238  Gross per 24 hour   Intake 240 ml   Output 850 ml   Net -610 ml        PHYSICAL EXAM:  General: WD, WN. Alert, cooperative, no acute distress    EENT:  EOMI. Anicteric sclerae. MMM  Resp:  CTA bilaterally, no wheezing or rales. No accessory muscle use  CV:  Regular  Rhythm +,  No edema, tachycardia noted +  GI:  Soft, Non distended, Non tender.  +Bowel sounds  Neurologic:  Alert and oriented X 3, normal speech,   Psych:   Good insight. Not anxious nor agitated  Skin:  No rashes. No jaundice    Reviewed most current lab test results and cultures  YES  Reviewed most current radiology test results   YES  Review and summation of old records today    NO  Reviewed patient's current orders and MAR    YES  PMH/SH reviewed - no change compared to H&P  ________________________________________________________________________  Care Plan discussed with:    Comments   Patient x    Family      RN x    Care Manager x    Consultant                        Multidiciplinary team rounds were held today with , nursing, pharmacist and clinical coordinator. Patient's plan of care was discussed; medications were reviewed and discharge planning was addressed. ________________________________________________________________________  Total NON critical care TIME:  16   Minutes    Total CRITICAL CARE TIME Spent:   Minutes non procedure based      Comments   >50% of visit spent in counseling and coordination of care     ________________________________________________________________________  Maida Handley MD     Procedures: see electronic medical records for all procedures/Xrays and details which were not copied into this note but were reviewed prior to creation of Plan. LABS:  I reviewed today's most current labs and imaging studies.   Pertinent labs include:  Recent Labs     12/25/19 0348 12/24/19  0535   WBC 6.2 7.4   HGB 8.6* 9.9*   HCT 26.6* 30.9*    347     Recent Labs     12/25/19 0348 12/24/19  0535    140   K 4.7 4.0   * 111*   CO2 21 22   GLU 82 93   BUN 13 13   CREA 0.72 0.88   CA 7.8* 8.1*   MG  --  2.0   PHOS  --  3.2       Signed: Maida Handley MD

## 2019-12-26 NOTE — PROGRESS NOTES
Problem: Self Care Deficits Care Plan (Adult)  Goal: *Acute Goals and Plan of Care (Insert Text)  Description    FUNCTIONAL STATUS PRIOR TO ADMISSION: The patient was functional at baseline per his report. Reports Has DME at home. Pt report incontinence and wears depends pull ups at home. Pt used a cane for mobility PTA    HOME SUPPORT: pt lived alone; he reports that his neighbors assist him    Occupational Therapy Goals  Initiated 12/26/2019  1. Patient will perform grooming with set-up within 7 day(s). 2.  Patient will perform sit to  preparation for functional transfers OOB with minimal assistance/contact guard assist within 7 day(s). 3.  Patient will perform supine to sit EOB in preparation for bed level adls with minimal assistance/contact guard assist within 7 day(s). 4.  Patient will perform toilet transfers with moderate assistance  within 7 day(s). 5.  Patient will perform all aspects of toileting with minimal assistance/contact guard assist within 7 day(s). 6.  Patient will participate in upper extremity therapeutic exercise/activities with supervision/set-up for 10 minutes within 7 day(s). 7.  Patient will demonstrate safe technique during functional mobility/ADL transfers within 7 days. 8.  Patient will perform upper body dressing with minimal assistance within 7 days. Outcome: Not Met   OCCUPATIONAL THERAPY EVALUATION  Patient: Malou Khalil (32 y.o. male)  Date: 12/26/2019  Primary Diagnosis: Alcohol withdrawal ([facility] [F10.239]        Precautions:   Contact, Fall    ASSESSMENT  Based on the objective data described below, the patient presents with admission for ETOH and vascular needs. Pt was cooperative, oriented, verbalizes insight into his deficits and plan of care and is aware of his situation. Pt required significant assistance to perform bed mobility, functional transfer and adls at this time. His skin is in poor condition--dry, flaky and red.   Pt is functioning below his reported independent baseline and per notes is becoming progressively weaker. .    Current Level of Function Impacting Discharge (ADLs/self-care): moderate/maximal assistance X2 for min A to max A for adls. Functional Outcome Measure: The patient scored Total: 35/100 on the Barthel Index outcome measure which is indicative of 65% impaired ability to care for basic self needs/dependency on others; inferred  dependency on others for instrumental ADLs. Other factors to consider for discharge: pt lives alone-he reports that his neighbors were assisting him (but pt is not specific)     Patient will benefit from skilled therapy intervention to address the above noted impairments. PLAN :  Recommendations and Planned Interventions: self care training, functional mobility training, therapeutic exercise, balance training, therapeutic activities, endurance activities, patient education, home safety training, and family training/education    Frequency/Duration: Patient will be followed by occupational therapy 2 times a week to address goals. Recommendation for discharge: (in order for the patient to meet his/her long term goals)  Therapy up to 5 days/week in SNF setting    This discharge recommendation:  Has not yet been discussed the attending provider and/or case management    IF patient discharges home will need the following DME: to be determined (TBD)       SUBJECTIVE:   Patient stated I want to be able to walk.    (pt's goal)    OBJECTIVE DATA SUMMARY:   HISTORY:   Past Medical History:   Diagnosis Date    Abuse     alcoholism, quit 2012    Claudication of calf muscles ([facility] 2013    Colovesical fistula     Dr Juany Elena    Esophageal stricture     Esophagitis     GI bleed 10/2016    Hemochromatosis     Hyperlipidemia     Hypertension     Peripheral artery disease ([facility]     Dr. 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)    Home Situation  Home Environment: Private residence  # Steps to Enter: 2  Rails to Enter: Yes  Hand Rails : Right  One/Two Story Residence: Two story, live on 1st floor  Living Alone: Yes  Support Systems: Family member(s)  Patient Expects to be Discharged to[de-identified] Private residence  Current DME Used/Available at Home: Cane, straight, Commode, bedside, Grab bars, Walker, rolling, Tub transfer bench  Tub or Shower Type: Tub/Shower combination    Hand dominance: Right    EXAMINATION OF PERFORMANCE DEFICITS:  Cognitive/Behavioral Status:  Neurologic State: Alert  Orientation Level: Oriented X4  Cognition: Follows commands;Poor safety awareness  Perception: Appears intact  Perseveration: No perseveration noted  Safety/Judgement: Insight into deficits    Skin: poor condition, red, flaky     Edema: moderate  (impaired vascularization)    Hearing: Auditory  Auditory Impairment: None    Vision/Perceptual:    Tracking: (not formally tested)                           Corrective Lenses: Glasses    Range of Motion:  BUEs:  AROM: Generally decreased, functional                         Strength:  BUES:    Strength: Generally decreased, functional(function limited--generalized progressive weakness)                Coordination:  Coordination: Generally decreased, functional  Fine Motor Skills-Upper: Left Intact; Right Intact    Gross Motor Skills-Upper: Left Intact; Right Intact(tremors  (pt reports from ETOH))    Tone & Sensation:   Tone is normal      Sensation: (pt reports no numbness or tingling)   Previous notes state impaired sensation BLEs                   Balance:  Sitting: Impaired  Sitting - Static: Fair (occasional); Prop sitting(propped on R  elbow )  Sitting - Dynamic: Fair (occasional)  Standing: Impaired    Functional Mobility and Transfers for ADLs:  Bed Mobility:  Rolling: Moderate assistance;Assist x1  Supine to Sit: Moderate assistance;Assist x2;Bed Modified  Scooting: Moderate assistance;Assist x2    Transfers:  Sit to Stand: Moderate assistance;Assist x2  Stand to Sit: Moderate assistance;Assist x1  Bed to Chair: Moderate assistance; Additional time;Assist x2    ADL Assessment:  Feeding: Setup    Oral Facial Hygiene/Grooming: Minimum assistance    Bathing: Maximum assistance    Upper Body Dressing: Moderate assistance    Lower Body Dressing: Total assistance    Toileting: Total assistance                ADL Intervention and task modifications:     Pt performed all mobiity with assistance X2. Once seated EOB, pt needed minimal assistance to CGA as he tended to lean consistently to his R. He was unable to remove hand from support on bed rail and maintain sitting EOB during adl task. Pt reports that he has gradually become more weak. Cognitive Retraining  Safety/Judgement: Insight into deficits    Therapeutic Exercise:  Encouraged weight shifting to reduce shearing and pressure on skin. Functional Measure:  Barthel Index:    Bathin  Bladder: 0  Bowels: 10  Groomin  Dressin  Feeding: 10  Mobility: 0  Stairs: 0  Toilet Use: 0  Transfer (Bed to Chair and Back): 5  Total: 35/100        The Barthel ADL Index: Guidelines  1. The index should be used as a record of what a patient does, not as a record of what a patient could do. 2. The main aim is to establish degree of independence from any help, physical or verbal, however minor and for whatever reason. 3. The need for supervision renders the patient not independent.   4. A patient's performance should be established using the best available evidence. Asking the patient, friends/relatives and nurses are the usual sources, but direct observation and common sense are also important. However direct testing is not needed. 5. Usually the patient's performance over the preceding 24-48 hours is important, but occasionally longer periods will be relevant. 6. Middle categories imply that the patient supplies over 50 per cent of the effort. 7. Use of aids to be independent is allowed. Dimitry Guzmán., Barthel, D.W. (8614). Functional evaluation: the Barthel Index. [address] [facility] (14)2. Emerald Pacheco paige JESSICA NielsenF, Brisa Hager., Ziggy Gillis., [place], [address] (1999). Measuring the change indisability after inpatient rehabilitation; comparison of the responsiveness of the Barthel Index and Functional [place] Measure. Journal of Neurology, Neurosurgery, and Psychiatry, 66(4), 394-149. Delaney Martinez, N.J.A, BLAKE Burr, & Caitie Marie MJuan AA. (2004.) Assessment of post-stroke quality of life in cost-effectiveness studies: The usefulness of the Barthel Index and the EuroQoL-5D. Quality of Life Research, 15, 132-75         Occupational Therapy Evaluation Charge Determination   History Examination Decision-Making   MEDIUM Complexity : Expanded review of history including physical, cognitive and psychosocial  history  MEDIUM Complexity : 3-5 performance deficits relating to physical, cognitive , or psychosocial skils that result in activity limitations and / or participation restrictions MEDIUM Complexity : Patient may present with comorbidities that affect occupational performnce.  Miniml to moderate modification of tasks or assistance (eg, physical or verbal ) with assesment(s) is necessary to enable patient to complete evaluation       Based on the above components, the patient evaluation is determined to be of the following complexity level: MEDIUM  Pain Rating:  Pt reports pain in his LLE/foot-has heel ulcer. Pt administered pain medication prior tx this date. Activity Tolerance:   Fair--no complaints of fatigue    After treatment patient left in no apparent distress:    Sitting in chair, Heels elevated for pressure relief, Call bell within reach, and Side rails x 3    COMMUNICATION/EDUCATION:   The patients plan of care was discussed with: Physical Therapist and Registered Nurse. Patient/family have participated as able in goal setting and plan of care. This patients plan of care is appropriate for delegation to [facility].     Thank you for this referral.  Jamey Saldana OTR/L  Time Calculation: 38 mins

## 2019-12-26 NOTE — PROGRESS NOTES
Bedside and Verbal shift change report given to Kylie Lentz RN (oncoming nurse) by Jose Miguel Gamboa RN (offgoing nurse). Report included the following information SBAR, Kardex, Intake/Output, MAR, Recent Results and Med Rec Status    No acute events overnight. Pain meds given  Twice this shift. Wound care completed. Pt has been running Tachy otherwise vitals stable. Will continue to monitor.

## 2019-12-26 NOTE — PROGRESS NOTES
Vascular Surgery Progress Note  Malik Sanders ACNP-BC  12/26/2019       Subjective:      Mr. Jean Salguero has a pmhx significant for alcoholism, COPD, DM, PAFib, HTN, HLD, and GERD. Abhay Pickens continues to smoke daily. Abhay Pickens has a pmhx significant for LLE stenting per his report. Abhay Pickens is s/p TMA of the left foot (10/2016). Abhay iPckens presented to the clinic in November 2019 w/ compliant of BLE claudication, BLE nocturnal cramping, and a wound to the incision of the left TMA after hitting his foot.  He has ischemia of the left foot and rubor of the right foot.  His ABIs were right 0.40 w/ a flatline TBI and left 0.32 w/ a surgically absent left great toe.  He underwent an arteriogram on 11/5/2019 that revealed severe bilateral aorto iliac disease that was not amendable to endovascular intervention. Abhay Pickens returned to the clinic  w/ a CTA that is significant for an occluded left common iliac and left external iliac artery and occluded right external iliac artery. Plan is for axillo-bifemoral bypass on 01/02/2020. Since our last visit he presented to the emergency room on 12/16/2019 w/ complaint of BLE weakness. He was admitted to [facility] and diagnosed w/ cystitis. While admitted he was noted to have left lateral heel ulcer. He was discharged to the TCU at [facility] on 12/20/2019 for rehabilitation. Late that evening he developed active w/d symptoms and returned to the emergency room at [facility]. He was then transferred to [facility] for management of alcohol withdrawal.  His urine cx grew Klebsiella that has been treated w/ a course of antibx. A dressing was placed over the holiday on the 1st and 2nd toe and the wound is now moist w/ yellow eschar. The remainder of his wounds are unchanged. He continue to be tachycardic.      Nursing Data:     Patient Vitals for the past 24 hrs:   BP Temp Pulse Resp SpO2   12/26/19 0712 111/70 98 °F (36.7 °C) (!) 107 20 93 %   12/26/19 0306 126/87 97.7 °F (36.5 °C) (!) 110 18 95 %   12/25/19 4765 115/75 98.5 °F (36.9 °C) (!) 108 18 94 %   12/25/19 1952 105/65 98.8 °F (37.1 °C) 96 18 96 %   12/25/19 1643 129/81 98.1 °F (36.7 °C) (!) 111 18 96 %     ---------------------------------------------------------------------------------------------------------    Intake/Output Summary (Last 24 hours) at 12/26/2019 1206  Last data filed at 12/26/2019 0620  Gross per 24 hour   Intake    Output 850 ml   Net -850 ml       Exam:     Physical Exam  General-frail chronically ill appearing disheveled CM   Mental Status - Alert and oriented to name. He is disoriented to which hospital he is in. Head and Neck - full range of motion, No lymphadenopathy. Thyroid - normal size and consistency. Chest and lung exam reveals  - normal excursion and RR  Cardiovascular examination reveals  - tachycardic   Abdomen Inspection-Non Tender, No hepatosplenomegaly, No palpable abdominal masses and Soft. Peripheral Vascular- weak but palpable femoral pulses with no palpable pulses below in either lower extremity. Neurologic-Chronic left sided upper and lower extremity weakness. Routinely ambulates for short distances only using a walker. Presented to the clinic 3 weeks ago in a WC. Now with right sided weakness. Can move foot and ankle. No notable tremor this am.   Skin: New necrotic ulcer to lateral left heel. Stable chronic wound of the TMA incision. Rubor of the plantar aspect of the left foot. Ischemic changes to the toes of the 2nd and 3rd digit of the right foot now w/ wet open ulceration of the 2nd digit. Lab Review:     . No results found for this or any previous visit (from the past 24 hour(s)). Assessment/Plan:      Consult problem  Severe bilateral PAD w/ ulceration of the left heel, left TMA incision, and right 2nd digit.  -w/ plan for axillo-bifemoral bypass on 01/02/2020. Wound care orders modified for 2nd digit of right foot.   Continue Betadine soaked guaze dressing changes to left heel daily and heel suspension boots BL. Continue ASA. Pain better controlled. Continues to be unable to ambulate. Patient has underlying severe PAD but his primary source for inability to ambulate is likely deconditioning. No plan for urgent surgical intervention.       Active problems  Acute klebsiella pneumoniae UTI   -course of Rocephin  -repeat urine cx NG  Alcohol withdrawal  -tremor resolved  -patient currently not receiving scheduled or PRN ativan   Thiamine deficiency   -consider oral supplementation   Cognitive deficit  -likely chronic   Consider nutrition consult   Diabetes Mellitus w/ hypoglycemia    -on liberalized diet w/ hs snack   -suspect malnutrition   -possible liver dysfunction resulting in poor glycogen stores  Hypernatremia   -resolved   Hypomagnesemia  -resolved   COPD  -not in exacerbation   Macrocytic anemia   -relatively stable   Thrombocytopenia  -resolved   PAfib  -intermittent mild tachycardia. May benefit from better rate control.    -poor candidate for [facility]  Hypertension  -stable on ARB  Hyperlipidemia  GERD  Management of comorbid conditions by primary team.     VTE Prophylaxis:  LMWH  SCDs: contraindicated in severe BLE PAD     Disposition:  SNF. Agree w/ ECF following rehabilitation. Patient can be discharged to a SNF from a vascular standpoint; however, medicaid transportation will need to be arranged for patient to return to the hospital on 01/02/2020 @ 10:00 am.  He is unable to ambulate and per his report he does not have anyone to transport him. Case management consult. Vascular surgery will re-evaluate patient on Monday 12/31/2019 if he remains admitted. We appreciate the opportunity to participate in the care of Mr. Jenae Browning. Patient should return for his procedure w/  Dr. Angelia Dance on 01/02/2020 @ 10:00 am. Information and wound care placed on AVS.   Please call for any urgent vascular issues.

## 2019-12-26 NOTE — PROGRESS NOTES
Problem: Risk for Spread of Infection  Goal: Prevent transmission of infectious organism to others  Description  Prevent the transmission of infectious organisms to other patients, staff members, and visitors. Outcome: Progressing Towards Goal     Problem: Patient Education:  Go to Education Activity  Goal: Patient/Family Education  Outcome: Progressing Towards Goal     Problem: Falls - Risk of  Goal: *Absence of Falls  Description  Document Kam Ramsey Fall Risk and appropriate interventions in the flowsheet. Outcome: Progressing Towards Goal  Note: Fall Risk Interventions:  Mobility Interventions: Utilize walker, cane, or other assistive device    Mentation Interventions: Door open when patient unattended    Medication Interventions: Teach patient to arise slowly    Elimination Interventions: Call light in reach    History of Falls Interventions: Bed/chair exit alarm, Door open when patient unattended         Problem: Patient Education: Go to Patient Education Activity  Goal: Patient/Family Education  Outcome: Progressing Towards Goal     Problem: Pressure Injury - Risk of  Goal: *Prevention of pressure injury  Description  Document Troy Scale and appropriate interventions in the flowsheet.   Outcome: Progressing Towards Goal  Note: Pressure Injury Interventions:  Sensory Interventions: Float heels, Keep linens dry and wrinkle-free    Moisture Interventions: Apply protective barrier, creams and emollients, Minimize layers    Activity Interventions: Increase time out of bed    Mobility Interventions: HOB 30 degrees or less    Nutrition Interventions: Document food/fluid/supplement intake    Friction and Shear Interventions: Lift sheet                Problem: Patient Education: Go to Patient Education Activity  Goal: Patient/Family Education  Outcome: Progressing Towards Goal     Problem: Delirium Treatment  Goal: *Level of consciousness restored to baseline  Outcome: Progressing Towards Goal  Goal: *Level of environmental perceptions restored to baseline  Outcome: Progressing Towards Goal  Goal: *Sensory perception restored to baseline  Outcome: Progressing Towards Goal  Goal: *Emotional stability restored to baseline  Outcome: Progressing Towards Goal  Goal: *Functional assessment restored to baseline  Outcome: Progressing Towards Goal  Goal: *Absence of falls  Outcome: Progressing Towards Goal  Goal: *Will remain free of delirium, CAM Score negative  Outcome: Progressing Towards Goal  Goal: *Cognitive status will be restored to baseline  Outcome: Progressing Towards Goal  Goal: Interventions  Outcome: Progressing Towards Goal     Problem: Patient Education: Go to Patient Education Activity  Goal: Patient/Family Education  Outcome: Progressing Towards Goal     Problem: Patient Education: Go to Patient Education Activity  Goal: Patient/Family Education  Outcome: Progressing Towards Goal

## 2019-12-26 NOTE — PROGRESS NOTES
Problem: Mobility Impaired (Adult and Pediatric)  Goal: *Acute Goals and Plan of Care (Insert Text)  Description  FUNCTIONAL STATUS PRIOR TO ADMISSION: Pt was living at home alone on first level of 2 story home with 2 step entry. Per chart, he was falling and having a difficult time taking care of himself. Pt states he was not falling and that the only time he fell was when he had his stroke. HOME SUPPORT PRIOR TO ADMISSION: The patient lived alone with no local support. Physical Therapy Goals  Initiated 12/24/2019  1. Patient will move from supine to sit and sit to supine , scoot up and down and roll side to side in bed with independence within 7 day(s). 2.  Patient will transfer from bed to chair and chair to bed with modified independence using the least restrictive device within 7 day(s). 3.  Patient will perform sit to stand with modified independence within 7 day(s). 4.  Patient will ambulate with supervision/set-up for 75 feet with the least restrictive device within 7 day(s). Outcome: Not Progressing Towards Goal  PHYSICAL THERAPY TREATMENT  Patient: Faustina Osorio (68 y.o. male)  Date: 12/26/2019  Diagnosis: Alcohol withdrawal ([facility] [address] [F10.239]   Alcohol withdrawal ([facility] [address]       Precautions: Contact, Fall  Chart, physical therapy assessment, plan of care and goals were reviewed. ASSESSMENT  Patient continues with skilled PT services and is not progressing towards goals. Pt continues to be limited by L foot pain and a fear of getting an infection, if he bears weight on it. Pt  cooperative and with good insight into his deficits and plan of care. He  is aware of his situation and is very cautious during this session. Pt required significantly more assistance to perform bed mobility and functional transfers at this time. Pt is functioning below his reported independent baseline and per notes is becoming progressively weaker this date.      Current Level of Function Impacting Discharge (mobility/balance): mod a x 2  bed mobility and transfes, inability to ambulate    Other factors to consider for discharge: lives alone         PLAN :  Patient continues to benefit from skilled intervention to address the above impairments. Continue treatment per established plan of care. to address goals. Recommendation for discharge: (in order for the patient to meet his/her long term goals)  Therapy up to 5 days/week in SNF setting    This discharge recommendation:  Has been made in collaboration with the attending provider and/or case management    IF patient discharges home will need the following DME: to be determined (TBD)       SUBJECTIVE:   Patient stated I can't walk.     OBJECTIVE DATA SUMMARY:   Critical Behavior:  Neurologic State: Alert  Orientation Level: Oriented X4  Cognition: Follows commands, Poor safety awareness  Safety/Judgement: Insight into deficits  Functional Mobility Training:  Bed Mobility:  Rolling: Moderate assistance;Assist x1  Supine to Sit: Moderate assistance;Assist x2;Bed Modified     Scooting: Moderate assistance;Assist x2        Transfers:  Sit to Stand: Moderate assistance;Assist x2  Stand to Sit: Moderate assistance;Assist x1        Bed to Chair: Moderate assistance; Additional time;Assist x2                    Balance:  Sitting: Impaired  Sitting - Static: Fair (occasional); Prop sitting(propped on R  elbow )  Sitting - Dynamic: Fair (occasional)  Standing: Impaired    Pain Ratin-8/10 L heel    Activity Tolerance:   Fair  Please refer to the flowsheet for vital signs taken during this treatment.     After treatment patient left in no apparent distress:   Sitting in chair, Heels elevated for pressure relief, and Call bell within reach    COMMUNICATION/COLLABORATION:   The patients plan of care was discussed with: Occupational Therapist and Registered Nurse    Duane Saenz PT   Time Calculation: 35 mins

## 2019-12-26 NOTE — PROGRESS NOTES
Verbal shift change report given to Angelica Clemente (oncoming nurse) by Stephen Taylor (offgoing nurse). Report included the following information SBAR, Kardex, Intake/Output, MAR and Recent Results.

## 2019-12-27 PROCEDURE — 65270000029 HC RM PRIVATE

## 2019-12-27 PROCEDURE — 74011250637 HC RX REV CODE- 250/637: Performed by: NURSE PRACTITIONER

## 2019-12-27 PROCEDURE — 97535 SELF CARE MNGMENT TRAINING: CPT | Performed by: OCCUPATIONAL THERAPIST

## 2019-12-27 PROCEDURE — 74011250637 HC RX REV CODE- 250/637: Performed by: FAMILY MEDICINE

## 2019-12-27 PROCEDURE — 74011250636 HC RX REV CODE- 250/636: Performed by: INTERNAL MEDICINE

## 2019-12-27 PROCEDURE — 94760 N-INVAS EAR/PLS OXIMETRY 1: CPT

## 2019-12-27 PROCEDURE — 97530 THERAPEUTIC ACTIVITIES: CPT

## 2019-12-27 PROCEDURE — 97116 GAIT TRAINING THERAPY: CPT

## 2019-12-27 PROCEDURE — 74011250637 HC RX REV CODE- 250/637: Performed by: INTERNAL MEDICINE

## 2019-12-27 PROCEDURE — 97530 THERAPEUTIC ACTIVITIES: CPT | Performed by: OCCUPATIONAL THERAPIST

## 2019-12-27 RX ADMIN — ASPIRIN 81 MG 81 MG: 81 TABLET ORAL at 09:01

## 2019-12-27 RX ADMIN — GABAPENTIN 100 MG: 100 CAPSULE ORAL at 14:00

## 2019-12-27 RX ADMIN — LOSARTAN POTASSIUM 50 MG: 50 TABLET, FILM COATED ORAL at 08:59

## 2019-12-27 RX ADMIN — SODIUM CHLORIDE AND POTASSIUM CHLORIDE: 9; 1.49 INJECTION, SOLUTION INTRAVENOUS at 21:49

## 2019-12-27 RX ADMIN — COLLAGENASE SANTYL: 250 OINTMENT TOPICAL at 09:20

## 2019-12-27 RX ADMIN — OXYCODONE 10 MG: 5 TABLET ORAL at 20:40

## 2019-12-27 RX ADMIN — OXYCODONE 5 MG: 5 TABLET ORAL at 00:28

## 2019-12-27 RX ADMIN — Medication 10 ML: at 06:44

## 2019-12-27 RX ADMIN — PRAVASTATIN SODIUM 40 MG: 40 TABLET ORAL at 23:30

## 2019-12-27 RX ADMIN — Medication 10 ML: at 23:34

## 2019-12-27 RX ADMIN — ENOXAPARIN SODIUM 40 MG: 40 INJECTION SUBCUTANEOUS at 09:01

## 2019-12-27 RX ADMIN — HYDROCHLOROTHIAZIDE 25 MG: 25 TABLET ORAL at 08:59

## 2019-12-27 RX ADMIN — Medication 10 ML: at 17:51

## 2019-12-27 RX ADMIN — PANTOPRAZOLE SODIUM 40 MG: 40 TABLET, DELAYED RELEASE ORAL at 06:44

## 2019-12-27 RX ADMIN — Medication 100 MG: at 09:00

## 2019-12-27 RX ADMIN — OXYCODONE 10 MG: 5 TABLET ORAL at 16:18

## 2019-12-27 RX ADMIN — FOLIC ACID 1 MG: 1 TABLET ORAL at 09:00

## 2019-12-27 RX ADMIN — GABAPENTIN 100 MG: 100 CAPSULE ORAL at 09:00

## 2019-12-27 RX ADMIN — SENNOSIDES AND DOCUSATE SODIUM 1 TABLET: 8.6; 5 TABLET ORAL at 09:00

## 2019-12-27 RX ADMIN — THERA TABS 1 TABLET: TAB at 09:00

## 2019-12-27 RX ADMIN — SODIUM CHLORIDE AND POTASSIUM CHLORIDE: 9; 1.49 INJECTION, SOLUTION INTRAVENOUS at 14:00

## 2019-12-27 NOTE — PROGRESS NOTES
KEVEN Plan:     *SNF vs LTC   *Medicaid pending    2:09pm  Per Siva, pt has limited Medicaid that just pays for part b stefani payments due to income being rec. medassist rep at [facility] has spoken to dss c/w regarding LTC david and she has stated that pt will need to sell his home if he needs assistance regarding LTC. C/W stated that appx d (LTC) form will be the only thing that will be needed. Medassist rep at facility stated that she has spoken to pt this morning and has informed him of all this info. Pt has stated to medassist rep that he will have to think about it and he will be getting back to medassist rep at that facility regarding how to continue the process or if process will be needed. There will be no need to complete another Medicaid screening for pt due to one has already in process at this time. 1:34pm  Patient just informed CM that he received a call from someone informing him that he is over income to qualify for Medicaid. Per patient request, CM will send a referral to [facility]. Patient stated that he applied for Medicaid prior to admission. CM will continue to follow.     Dora Bassett  Ext [phone]

## 2019-12-27 NOTE — PROGRESS NOTES
Hospitalist Progress Note    NAME: Cecilia Phipps   :  1945   MRN:  [id number]       Assessment / Plan:  Alcohol withdrawal syndrome/Delirium Tremens POA- transferred from [facility]    Stable out of stepdown unit >78 hrs  Cont patient on CIWA protocol IV Ativan prn-- has not received any ativan for days now  Cont Goody bag daily  off IVF - Na improved & pt eating/drinking PO now  Tolerating PO diet well    Acute cystitis/ recent Klebsiella UTI POA  Recent Urine Cx (office) = Klebsiella pn. Species (sensitive)    Continue IV antibiotic till he is inpatient- PO on discharge  Follow urine culture from this admission for clearance to UA sample this admission if possible- still holding urine    Peripheral vascular disease POA- severe on CTA Abd/pelvis at [facility]  CTA:  -Occluded left common iliac and left external iliac artery with distal  reconstitution of the common femoral artery.  -Occluded right external iliac artery with distal reconstitution of the right  common femoral artery. IP Vascular consultation noted- following, no plans of immediate surgery per note  Added Gabapentin   Added Oxycodone 5-10 mg prn for pain management  Stool softners    Hypertension  Continue home antihypertensive medication        Code Status: Full   Surrogate Decision Maker:Brionna Quesada     DVT Prophylaxis: Lovenox   GI Prophylaxis: not indicated     Recommended Disposition: SNF/LTC and  PT, OT, RN - SNF recommended- CM consulted for DC planning- Short term rehab with conversion to LTC placement when medicaid kicks in (currently pending)- DC once arranged- [facility] being considered per pt's choice       Subjective:     Chief Complaint / Reason for Physician Visit: f/u Alcohol withdrawal syndrome, UTI  \"I am fine\". Discussed with RN events overnight.      Review of Systems:  Symptom Y/N Comments  Symptom Y/N Comments   Fever/Chills n   Chest Pain n    Poor Appetite n   Edema n    Cough n   Abdominal Pain n    Sputum n   Joint Pain n    SOB/[place] n   Pruritis/Rash     Nausea/vomit    Tolerating PT/OT y    Diarrhea    Tolerating Diet y    Constipation    Other       Could NOT obtain due to:      Objective:     VITALS:   Last 24hrs VS reviewed since prior progress note. Most recent are:  Patient Vitals for the past 24 hrs:   Temp Pulse Resp BP SpO2   12/27/19 1218 98.8 °F (37.1 °C) (!) 106 18 95/72 96 %   12/27/19 0748 98 °F (36.7 °C) (!) 119 20 112/87 90 %   12/27/19 0144 98.2 °F (36.8 °C) (!) 102 20 112/66 93 %   12/26/19 2333 98 °F (36.7 °C) (!) 105 18 140/76 96 %   12/26/19 1929 98.5 °F (36.9 °C) (!) 103 18 103/63 95 %   12/26/19 1617 97.8 °F (36.6 °C) 100 18 120/76 95 %       Intake/Output Summary (Last 24 hours) at 12/27/2019 1244  Last data filed at 12/27/2019 0749  Gross per 24 hour   Intake 1240 ml   Output 1100 ml   Net 140 ml        PHYSICAL EXAM:  General: WD, WN. Alert, cooperative, no acute distress    EENT:  EOMI. Anicteric sclerae. MMM  Resp:  CTA bilaterally, no wheezing or rales. No accessory muscle use  CV:  Regular  Rhythm +,  No edema, tachycardia noted +  GI:  Soft, Non distended, Non tender.  +Bowel sounds  Neurologic:  Alert and oriented X 3, normal speech,   Psych:   Good insight. Not anxious nor agitated  Skin:  No rashes. No jaundice    Reviewed most current lab test results and cultures  YES  Reviewed most current radiology test results   YES  Review and summation of old records today    NO  Reviewed patient's current orders and MAR    YES  PMH/SH reviewed - no change compared to H&P  ________________________________________________________________________  Care Plan discussed with:    Comments   Patient x    Family      RN x    Care Manager x Chinyere   Consultant                        Multidiciplinary team rounds were held today with , nursing, pharmacist and clinical coordinator. Patient's plan of care was discussed; medications were reviewed and discharge planning was addressed. ________________________________________________________________________  Total NON critical care TIME:  16   Minutes    Total CRITICAL CARE TIME Spent:   Minutes non procedure based      Comments   >50% of visit spent in counseling and coordination of care     ________________________________________________________________________  Osmany Vila MD     Procedures: see electronic medical records for all procedures/Xrays and details which were not copied into this note but were reviewed prior to creation of Plan. LABS:  I reviewed today's most current labs and imaging studies.   Pertinent labs include:  Recent Labs     12/25/19  0348   WBC 6.2   HGB 8.6*   HCT 26.6*        Recent Labs     12/25/19 0348      K 4.7   *   CO2 21   GLU 82   BUN 13   CREA 0.72   CA 7.8*       Signed: Osmany Vila MD

## 2019-12-27 NOTE — PROGRESS NOTES
Problem: Self Care Deficits Care Plan (Adult)  Goal: *Acute Goals and Plan of Care (Insert Text)  Description    FUNCTIONAL STATUS PRIOR TO ADMISSION: The patient was functional at baseline per his report. Reports Has DME at home    HOME SUPPORT: pt lived alone; he reports that his neighbors assist him    Occupational Therapy Goals  Initiated 12/26/2019  1. Patient will perform grooming with set-up within 7 day(s). 2.  Patient will perform sit to  preparation for functional transfers OOB with minimal assistance/contact guard assist within 7 day(s). 3.  Patient will perform supine to sit EOB in preparation for bed level adls with minimal assistance/contact guard assist within 7 day(s). 4.  Patient will perform toilet transfers with moderate assistance  within 7 day(s). 5.  Patient will perform all aspects of toileting with minimal assistance/contact guard assist within 7 day(s). 6.  Patient will participate in upper extremity therapeutic exercise/activities with supervision/set-up for 10 minutes within 7 day(s). 7.  Patient will demonstrate safe technique during functional mobility/ADL transfers within 7 days. 8.  Patient will perform upper body dressing with minimal assistance within 7 days. Outcome: Progressing Towards Goal   OCCUPATIONAL THERAPY TREATMENT  Patient: Faustina Osorio (33 y.o. male)  Date: 12/27/2019  Diagnosis: Alcohol withdrawal ([facility] [address] [F10.239]   Alcohol withdrawal ([facility] [address]       Precautions: Contact, Fall  Chart, occupational therapy assessment, plan of care, and goals were reviewed. ASSESSMENT  Patient continues with skilled OT services and is progressing towards goals. Pt appears brighter today and demonstrates less assistance needed for bed mobiltiy and transfer OOB (assistX2 for safety.)    Pt is very [place] which impacts his awareness of therapeutic intervention/adaptive techniques/ safe technique during functional mobility.   Pt is motivated to increase his independence post his upcoming surgery. Current Level of Function Impacting Discharge (ADLs): overall minimal/moderate assistance - assist X2 for safe transfers . Other factors to consider for discharge: pt lives alone, hx ETOH,         PLAN :  Patient continues to benefit from skilled intervention to address the above impairments. Continue treatment per established plan of care. to address goals. Recommend with staff: encourage OOB for meals and during day--will need to monitor skin integrity when sitting up for long periods of time. Recommend next OT session: self care seated EOB or chair    Recommendation for discharge: (in order for the patient to meet his/her long term goals)  Therapy up to 5 days/week in SNF setting    This discharge recommendation:  Has not yet been discussed the attending provider and/or case management    IF patient discharges home will need the following DME: to be determined (TBD)       SUBJECTIVE:   Patient stated I'll go there after my surgery.   (snf)    OBJECTIVE DATA SUMMARY:   Cognitive/Behavioral Status:   Alert, oriented,        Perception: Appears intact  Perseveration: No perseveration noted  Safety/Judgement: Awareness of environment; Fall prevention; Insight into deficits    Functional Mobility and Transfers for ADLs:  Bed Mobility:  Rolling: Stand-by assistance; Additional time;Bed Modified(hob to 90 deg)  Supine to Sit: Stand-by assistance; Additional time;Bed Modified  Scooting: Stand-by assistance    Transfers:  Sit to Stand: Contact guard assistance;Assist x2     Bed to Chair: Minimum assistance; Additional time;Assist x2  unsafe transfer due to trying to sit too soon into chair. , did not square up to chair and reach for arm rests    Balance:  Sitting: Impaired  Sitting - Static: Fair (occasional)(shifted to the right)- pt states that this is his basleine  Standing: Impaired  Standing - Static: Constant support;Fair(cues for strong BUEs and safe technique)  Standing - Dynamic : Constant support;Fair(places weight into bandaged L foot/ankle)    ADL Intervention:  Feeding  Feeding Assistance: (reports that tremors have decreased and better able to feed)  Drink to Mouth: (reports decreased tremors today)    Grooming  Washing Face: Set-up(seaetd in chair)                   Lower Body Dressing Assistance  Socks: Total assistance (dependent)  Leg Crossed Method Used: No    Toileting  Bladder Hygiene: (pt is incontinent)    Cognitive Retraining  Safety/Judgement: Awareness of environment; Fall prevention; Insight into deficits    Therapeutic Exercises:   Encouraged arm chair push ups to relieve pressure while seated--pt verbalized understanding/limited demonstration    Pain:  No complaints at this time    Activity Tolerance:   Fair    After treatment patient left in no apparent distress:   Sitting in chair and Call bell within reach    COMMUNICATION/COLLABORATION:   The patients plan of care was discussed with: Physical Therapist and Registered Nurse    AMY Mattson/L  Time Calculation: 25 mins

## 2019-12-27 NOTE — PROGRESS NOTES
[facility] on [facility] low air loss bed ordered.   Confirmation number: [id number]

## 2019-12-27 NOTE — PROGRESS NOTES
Verbal shift change report given to Adina Mar (oncoming nurse) by Spero Curling (offgoing nurse). Report included the following information SBAR, Kardex, Intake/Output, MAR and Recent Results. Pt medicated for pain as needed. BUE are swollen and weeping. Genitalia is 3+ pitting.

## 2019-12-27 NOTE — PROGRESS NOTES
General Surgery End of Shift Nursing Note    Bedside shift change report given to Josseline Simmons (oncoming nurse) by Bev Scott RN   (offgoing nurse). Report included the following information SBAR, Kardex, Intake/Output, MAR, Recent Results and Cardiac Rhythm . Mabeline Sink Shift worked:   7p-7a   Summary of shift:    Pt treated for pain with PRN medication. Speciality bed ordered.     Issues for physician to address:   none       Bev Scott RN

## 2019-12-27 NOTE — PROGRESS NOTES
Nutrition Assessment:    RECOMMENDATIONS:   Continue diet and supplement    ASSESSMENT:   Chart reviewed, pt working with case management. DC planning is underway. His appetite is good, he consuming 100% of his meal trays. Current intake is likely adequate to meet est needs. Labs reviewed, WNL. BM noted on 12/24, he is on pericolace. Dietitians Intervention(s)/Plan(s): Continue diet and supplements  SUBJECTIVE/OBJECTIVE:     Diet Order: Cardiac, Mechanical soft, Other (comment)(2gNa + Ensure daily + HS snack )  % Eaten:    Patient Vitals for the past 72 hrs:   % Diet Eaten   12/25/19 0930 100 %   12/24/19 1830 100 %     Pertinent Medications:folvite, protonix, pericolace, thiamin, MVI        Chemistries:  Lab Results   Component Value Date/Time    Sodium 139 12/25/2019 03:48 AM    Potassium 4.7 12/25/2019 03:48 AM    Chloride 112 (H) 12/25/2019 03:48 AM    CO2 21 12/25/2019 03:48 AM    Anion gap 6 12/25/2019 03:48 AM    Glucose 82 12/25/2019 03:48 AM    BUN 13 12/25/2019 03:48 AM    Creatinine 0.72 12/25/2019 03:48 AM    BUN/Creatinine ratio 18 12/25/2019 03:48 AM    GFR est AA >60 12/25/2019 03:48 AM    GFR est non-AA >60 12/25/2019 03:48 AM    Calcium 7.8 (L) 12/25/2019 03:48 AM    Albumin 2.6 (L) 12/16/2019 03:32 PM      Anthropometrics: Height:   Weight: 65 kg (143 lb 3.2 oz)  [] standing scale    []bed scale    []stated   []unknown     IBW (%IBW):   ( ) UBW (%UBW):   (  %)    BMI: Body mass index is 23.11 kg/m². This BMI is indicative of:  []Underweight   [x]Normal   []Overweight   [] Obesity   [] Extreme Obesity (BMI>40)  Estimated Nutrition Needs (Based on): 1726 Kcals/day(BMR (1328) x 1. 3AF) , 78 g(1.2 g/kg bw) Protein  Carbohydrate:  At Least 130 g/day  Fluids: 1800 mL/day    Last BM: 12/24   [x]Active     []Hyperactive  []Hypoactive       [] Absent   BS  Skin:    [] Intact   [] Incision  [x] Breakdown(unstageable-L heel; ulcer- toe; partial thickness-sacrum)   [] DTI   [x] Tears/Excoriation/Abrasion  [x]Edema(trace-BLE; +2-BUE) [] Other: Wt Readings from Last 30 Encounters:   12/22/19 65 kg (143 lb 3.2 oz)   12/21/19 61.3 kg (135 lb 1.6 oz)   12/20/19 63.1 kg (139 lb 1.8 oz)   07/31/19 59.5 kg (131 lb 3.2 oz)   07/12/19 60.5 kg (133 lb 6.4 oz)   06/03/19 62.5 kg (137 lb 12.8 oz)   05/21/19 63 kg (139 lb)   05/10/19 62.1 kg (137 lb)   09/27/18 58.2 kg (128 lb 3.2 oz)   09/23/18 57.6 kg (127 lb)   08/30/18 57.6 kg (127 lb)   08/22/18 56.7 kg (125 lb)   08/07/18 56.7 kg (125 lb)   07/20/18 56.7 kg (125 lb)   06/07/18 58.5 kg (129 lb)   05/07/18 58.9 kg (129 lb 12.8 oz)   03/12/18 60.5 kg (133 lb 6.4 oz)   02/23/18 60.7 kg (133 lb 12.8 oz)   02/09/18 59.9 kg (132 lb)   02/08/18 60.3 kg (133 lb)   01/16/18 57.5 kg (126 lb 12.8 oz)   01/02/18 59 kg (130 lb)   10/05/17 57.4 kg (126 lb 9.6 oz)   09/21/17 57.6 kg (127 lb)   03/06/17 56.9 kg (125 lb 6.4 oz)   01/31/17 58.2 kg (128 lb 6.4 oz)   01/17/17 57.3 kg (126 lb 6.4 oz)   12/19/16 53.2 kg (117 lb 4 oz)   12/15/16 55.3 kg (122 lb)   10/21/16 53.1 kg (117 lb)        Dx of Malnutrition since admission: no    NUTRITION DIAGNOSES:   Problem:  Chewing/masticatory difficulty      Etiology: related to missing teeth     Signs/Symptoms: as evidenced by need for mechanical soft diet     Previous dx re: chewing difficulty continues. NUTRITION INTERVENTIONS:  Meals/Snacks: General/healthful diet   Supplements: Commercial supplement              GOAL:   Pt will consume >70% of meals/supplement in 4-6 days.      NUTRITION MONITORING AND EVALUATION   Previous Goal: PO intake >50% of meals and 75% of ONS next 3-5 days   Previous Goal Met: Yes   Previous Recommendations Implemented: Yes   Cultural, Bahai, or Ethnic Dietary Needs: None   LEARNING NEEDS (Diet, Food/Nutrient-Drug Interaction):    [x] None Identified   [] Identified and Education Provided/Documented   [] Identified and Pt declined/was not appropriate      [x] Interdisciplinary Care Plan Reviewed/Documented    [x] Participated in Discharge Planning: Mechanical Soft diet   [] Interdisciplinary Rounds     NUTRITION RISK:    [] High              [] Moderate           [x]  Low  []  Minimal/Uncompromised      Gatito Oliveros RD, [facility]  Pager [phone]  Weekend Pager [phone]

## 2019-12-27 NOTE — PROGRESS NOTES
Problem: Mobility Impaired (Adult and Pediatric)  Goal: *Acute Goals and Plan of Care (Insert Text)  Description  FUNCTIONAL STATUS PRIOR TO ADMISSION: Pt was living at home alone on first level of 2 story home with 2 step entry. Per chart, he was falling and having a difficult time taking care of himself. Pt states he was not falling and that the only time he fell was when he had his stroke. HOME SUPPORT PRIOR TO ADMISSION: The patient lived alone with no local support. Physical Therapy Goals  Initiated 12/24/2019  1. Patient will move from supine to sit and sit to supine , scoot up and down and roll side to side in bed with independence within 7 day(s). 2.  Patient will transfer from bed to chair and chair to bed with modified independence using the least restrictive device within 7 day(s). 3.  Patient will perform sit to stand with modified independence within 7 day(s). 4.  Patient will ambulate with supervision/set-up for 75 feet with the least restrictive device within 7 day(s). Outcome: Progressing Towards Goal   PHYSICAL THERAPY TREATMENT  Patient: Clayton Dailey (14 y.o. male)  Date: 12/27/2019  Diagnosis: Alcohol withdrawal ([facility] [address] [F10.239]   Alcohol withdrawal ([facility] [address]       Precautions: Contact, Fall  Chart, physical therapy assessment, plan of care and goals were reviewed. ASSESSMENT  Patient continues with skilled PT services and is progressing towards goals. Pt with better performance of functional mobility with less physical assistance required. Pt reporting less pain and voiced wanted to walk to the chair from the bed. Pt able to come to sitting eob with hob elevated to 90 deg and with use of bed rail with sba and additional time and effort. Once seated eob no R lob lean noted this visit. Pt asked to don his R shoe and was able to push to stand at RW with cga A x 2. Pt demonstrates good static balance at RW and was able to take some steps to bedside chair.  Pt unsafe and starts to sit prematurely. Pt likes to do things his way and always has an answer to whatever is recommended at the time. SNF rehab remains appropriate at discharge. Current Level of Function Impacting Discharge (mobility/balance): sba bed mobility, min- mid a x 2 with transfers/gait    Other factors to consider for discharge:  lives alone and was having difficulty taking care of himself, decreased safety awareness         PLAN :  Patient continues to benefit from skilled intervention to address the above impairments. Continue treatment per established plan of care. to address goals. Recommendation for discharge: (in order for the patient to meet his/her long term goals)  Therapy up to 5 days/week in SNF setting    This discharge recommendation:  Has been made in collaboration with the attending provider and/or case management    IF patient discharges home will need the following DME: to be determined (TBD)       SUBJECTIVE:   Patient stated I can walk over to the chair, I think.     OBJECTIVE DATA SUMMARY:   Critical Behavior:  Neurologic State: Alert  Orientation Level: Oriented X4  Cognition: Follows commands  Safety/Judgement: Insight into deficits  Functional Mobility Training:  Bed Mobility:  Rolling: Stand-by assistance; Additional time;Bed Modified(hob to 90 deg)  Supine to Sit: Stand-by assistance; Additional time;Bed Modified     Scooting: Stand-by assistance        Transfers:  Sit to Stand: Contact guard assistance;Assist x2  Stand to Sit: Moderate assistance;Assist x2(unsafe! sits without squaring up to chair)        Bed to Chair: Minimum assistance; Additional time;Assist x2      Balance:  Sitting: Impaired  Sitting - Static: Fair (occasional)(shifted to the right)  Standing: Impaired  Standing - Static: Constant support;Fair(cues for strong BUEs)  Standing - Dynamic : Constant support;Fair(places weight into bandaged L foot/ankle)  Ambulation/Gait Training:  Distance (ft): 3 Feet (ft)  Assistive Device: Gait belt;Walker, rolling  Ambulation - Level of Assistance: Minimal assistance; Additional time        Gait Abnormalities: Decreased step clearance;Shuffling gait; Step to gait              Speed/Amanda: Shuffled; Slow  Step Length: Left shortened;Right shortened                Activity Tolerance:   Fair  Please refer to the flowsheet for vital signs taken during this treatment.     After treatment patient left in no apparent distress:   Sitting in chair and Call bell within reach    COMMUNICATION/COLLABORATION:   The patients plan of care was discussed with: Occupational Therapist and Registered Nurse    Deneen Couch, PT   Time Calculation: 23 mins

## 2019-12-28 PROCEDURE — 74011250637 HC RX REV CODE- 250/637: Performed by: INTERNAL MEDICINE

## 2019-12-28 PROCEDURE — 94760 N-INVAS EAR/PLS OXIMETRY 1: CPT

## 2019-12-28 PROCEDURE — 74011250636 HC RX REV CODE- 250/636: Performed by: INTERNAL MEDICINE

## 2019-12-28 PROCEDURE — 65270000029 HC RM PRIVATE

## 2019-12-28 PROCEDURE — 74011250637 HC RX REV CODE- 250/637: Performed by: FAMILY MEDICINE

## 2019-12-28 PROCEDURE — 74011250637 HC RX REV CODE- 250/637: Performed by: NURSE PRACTITIONER

## 2019-12-28 RX ORDER — GUAIFENESIN 100 MG/5ML
81 LIQUID (ML) ORAL DAILY
Status: CANCELLED | OUTPATIENT
Start: 2019-12-28

## 2019-12-28 RX ADMIN — PRAVASTATIN SODIUM 40 MG: 40 TABLET ORAL at 21:31

## 2019-12-28 RX ADMIN — Medication 10 ML: at 21:39

## 2019-12-28 RX ADMIN — FOLIC ACID 1 MG: 1 TABLET ORAL at 08:18

## 2019-12-28 RX ADMIN — OXYCODONE 10 MG: 5 TABLET ORAL at 20:32

## 2019-12-28 RX ADMIN — LOSARTAN POTASSIUM 50 MG: 50 TABLET, FILM COATED ORAL at 08:17

## 2019-12-28 RX ADMIN — THERA TABS 1 TABLET: TAB at 08:20

## 2019-12-28 RX ADMIN — GABAPENTIN 100 MG: 100 CAPSULE ORAL at 08:21

## 2019-12-28 RX ADMIN — Medication 100 MG: at 08:18

## 2019-12-28 RX ADMIN — GABAPENTIN 300 MG: 300 CAPSULE ORAL at 21:31

## 2019-12-28 RX ADMIN — OXYCODONE 5 MG: 5 TABLET ORAL at 12:23

## 2019-12-28 RX ADMIN — SODIUM CHLORIDE AND POTASSIUM CHLORIDE: 9; 1.49 INJECTION, SOLUTION INTRAVENOUS at 21:31

## 2019-12-28 RX ADMIN — Medication 10 ML: at 06:00

## 2019-12-28 RX ADMIN — ASPIRIN 81 MG 81 MG: 81 TABLET ORAL at 08:19

## 2019-12-28 RX ADMIN — GABAPENTIN 100 MG: 100 CAPSULE ORAL at 13:00

## 2019-12-28 RX ADMIN — COLLAGENASE SANTYL: 250 OINTMENT TOPICAL at 08:22

## 2019-12-28 RX ADMIN — Medication 10 ML: at 13:01

## 2019-12-28 RX ADMIN — HYDROCHLOROTHIAZIDE 25 MG: 25 TABLET ORAL at 08:19

## 2019-12-28 RX ADMIN — PANTOPRAZOLE SODIUM 40 MG: 40 TABLET, DELAYED RELEASE ORAL at 06:44

## 2019-12-28 RX ADMIN — ENOXAPARIN SODIUM 40 MG: 40 INJECTION SUBCUTANEOUS at 08:21

## 2019-12-28 RX ADMIN — OXYCODONE 10 MG: 5 TABLET ORAL at 00:48

## 2019-12-28 NOTE — PROGRESS NOTES
Hospitalist Progress Note    NAME: Aline Edgar   :  1945   MRN:  [id number]       Assessment / Plan:  Alcohol withdrawal syndrome/Delirium Tremens POA- transferred from [facility]    Stable out of stepdown unit >78 hrs  Cont patient on CIWA protocol IV Ativan prn-- has not received any ativan for days now  Cont Goody bag daily  off IVF - Na improved & pt eating/drinking PO now  Tolerating PO diet well    Acute cystitis/ recent Klebsiella UTI POA  Recent Urine Cx (office) = Klebsiella pn. Species (sensitive)    Continue IV antibiotic till he is inpatient- PO on discharge  Follow urine culture from this admission for clearance to UA sample this admission if possible- still holding urine    Peripheral vascular disease POA- severe on CTA Abd/pelvis at [facility]  CTA:  -Occluded left common iliac and left external iliac artery with distal  reconstitution of the common femoral artery.  -Occluded right external iliac artery with distal reconstitution of the right  common femoral artery. IP Vascular consultation noted- following, no plans of immediate surgery per note  Added Gabapentin   Added Oxycodone 5-10 mg prn for pain management  Stool softners    Hypertension  Continue home antihypertensive medication        Code Status: Full   Surrogate Decision Maker:Brionna Quesada     DVT Prophylaxis: Lovenox   GI Prophylaxis: not indicated     Recommended Disposition: SNF/LTC and  PT, OT, RN - SNF recommended- CM consulted for DC planning- Short term rehab with conversion to LTC placement when medicaid kicks in (currently pending)- DC once arranged- [facility] being considered per pt's choice         Subjective:     Chief Complaint / Reason for Physician Visit: f/u Alcohol withdrawal syndrome, UTI  \"I am fine\". Discussed with RN events overnight.      Review of Systems:  Symptom Y/N Comments  Symptom Y/N Comments   Fever/Chills n   Chest Pain n    Poor Appetite n   Edema n    Cough n   Abdominal Pain n    Sputum n   Joint Pain n    SOB/MCFARLAND n   Pruritis/Rash     Nausea/vomit    Tolerating PT/OT y    Diarrhea    Tolerating Diet y    Constipation    Other       Could NOT obtain due to:      Objective:     VITALS:   Last 24hrs VS reviewed since prior progress note. Most recent are:  Patient Vitals for the past 24 hrs:   Temp Pulse Resp BP SpO2   12/28/19 0745 98.7 °F (37.1 °C) (!) 115 17 146/81 96 %   12/28/19 0312 99.9 °F (37.7 °C) (!) 109 17 114/61 94 %   12/27/19 2337 99.1 °F (37.3 °C) 100 18 117/66 92 %   12/27/19 1942 98.7 °F (37.1 °C) (!) 101 18 118/74 95 %   12/27/19 1218 98.8 °F (37.1 °C) (!) 106 18 95/72 96 %       Intake/Output Summary (Last 24 hours) at 12/28/2019 1054  Last data filed at 12/28/2019 1009  Gross per 24 hour   Intake 3128.33 ml   Output 2250 ml   Net 878.33 ml        PHYSICAL EXAM:  General: WD, WN. Alert, cooperative, no acute distress    EENT:  EOMI. Anicteric sclerae. MMM  Resp:  CTA bilaterally, no wheezing or rales. No accessory muscle use  CV:  Regular  Rhythm +,  No edema, tachycardia noted +  GI:  Soft, Non distended, Non tender.  +Bowel sounds  Neurologic:  Alert and oriented X 3, normal speech,   Psych:   Good insight. Not anxious nor agitated  Skin:  No rashes. No jaundice    Reviewed most current lab test results and cultures  YES  Reviewed most current radiology test results   YES  Review and summation of old records today    NO  Reviewed patient's current orders and MAR    YES  PMH/SH reviewed - no change compared to H&P  ________________________________________________________________________  Care Plan discussed with:    Comments   Patient x    Family      RN x    Care Manager x Cindy West yesterday   Consultant                        Multidiciplinary team rounds were held today with , nursing, pharmacist and clinical coordinator. Patient's plan of care was discussed; medications were reviewed and discharge planning was addressed. ________________________________________________________________________  Total NON critical care TIME:  16   Minutes    Total CRITICAL CARE TIME Spent:   Minutes non procedure based      Comments   >50% of visit spent in counseling and coordination of care     ________________________________________________________________________  Navarro Amin MD     Procedures: see electronic medical records for all procedures/Xrays and details which were not copied into this note but were reviewed prior to creation of Plan. LABS:  I reviewed today's most current labs and imaging studies. Pertinent labs include:  No results for input(s): WBC, HGB, HCT, PLT, HGBEXT, HCTEXT, PLTEXT, HGBEXT, HCTEXT, PLTEXT in the last 72 hours. No results for input(s): NA, K, CL, CO2, GLU, BUN, CREA, CA, MG, PHOS, ALB, TBIL, TBILI, SGOT, ALT, INR, INREXT, INREXT in the last 72 hours.     Signed: Navarro Amin MD

## 2019-12-28 NOTE — ROUTINE PROCESS
Bedside and Verbal shift change report given to Meg Rowe (oncoming nurse) by Celia Bobby (offgoing nurse). Report included the following information SBAR, Kardex, ED Summary, Procedure Summary, Intake/Output, MAR, Recent Results and Cardiac Rhythm afib. Pt complained of pain once that was managed with Lois. Wound care completed by LPN. Specialty bed delivered. Pt up with assist x1 and walker to recliner. PT rounded on pt. Meals tolerated with no reports of nausea or vomiting.

## 2019-12-28 NOTE — PROGRESS NOTES
General Surgery End of Shift Nursing Note        Shift worked:   7p-7a   Summary of shift:    Uneventful    Issues for physician to address: none     Number times ambulated in hallway past shift: 0    Number of times OOB to chair past shift: 0    Pain Management:  Current medication: Roxicodone  Patient states pain is manageable on current pain medication: YES    GI:    Current diet:  DIET CARDIAC Mechanical Soft; 2 GM NA (House Low NA)  DIET NUTRITIONAL SUPPLEMENTS No; Dinner; Ensure [employer]  DIET SNACKS HS Snack; Mechanical soft    Tolerating current diet: YES  Passing flatus: YES  Last Bowel Movement: 12/24/19   Appearance: unknown  Respiratory:    Incentive Spirometer at bedside: YES  Patient instructed on use: YES    Patient Safety:    Falls Score: 4  Bed Alarm On? No  Sitter?  No    Damian Rodriguez LPN

## 2019-12-29 PROCEDURE — 74011250637 HC RX REV CODE- 250/637: Performed by: NURSE PRACTITIONER

## 2019-12-29 PROCEDURE — 74011250637 HC RX REV CODE- 250/637: Performed by: INTERNAL MEDICINE

## 2019-12-29 PROCEDURE — 74011250636 HC RX REV CODE- 250/636: Performed by: INTERNAL MEDICINE

## 2019-12-29 PROCEDURE — 65270000029 HC RM PRIVATE

## 2019-12-29 PROCEDURE — 74011000250 HC RX REV CODE- 250: Performed by: FAMILY MEDICINE

## 2019-12-29 PROCEDURE — 77010033678 HC OXYGEN DAILY

## 2019-12-29 PROCEDURE — 74011250637 HC RX REV CODE- 250/637: Performed by: FAMILY MEDICINE

## 2019-12-29 PROCEDURE — 94760 N-INVAS EAR/PLS OXIMETRY 1: CPT

## 2019-12-29 RX ORDER — METOPROLOL TARTRATE 5 MG/5ML
5 INJECTION INTRAVENOUS ONCE
Status: COMPLETED | OUTPATIENT
Start: 2019-12-29 | End: 2019-12-29

## 2019-12-29 RX ORDER — METOPROLOL TARTRATE 25 MG/1
12.5 TABLET, FILM COATED ORAL 2 TIMES DAILY
Status: DISCONTINUED | OUTPATIENT
Start: 2019-12-29 | End: 2020-01-03

## 2019-12-29 RX ORDER — FUROSEMIDE 10 MG/ML
40 INJECTION INTRAMUSCULAR; INTRAVENOUS 2 TIMES DAILY
Status: COMPLETED | OUTPATIENT
Start: 2019-12-29 | End: 2019-12-29

## 2019-12-29 RX ADMIN — COLLAGENASE SANTYL: 250 OINTMENT TOPICAL at 09:25

## 2019-12-29 RX ADMIN — SENNOSIDES AND DOCUSATE SODIUM 1 TABLET: 8.6; 5 TABLET ORAL at 09:16

## 2019-12-29 RX ADMIN — ENOXAPARIN SODIUM 40 MG: 40 INJECTION SUBCUTANEOUS at 09:16

## 2019-12-29 RX ADMIN — GABAPENTIN 100 MG: 100 CAPSULE ORAL at 14:21

## 2019-12-29 RX ADMIN — HYDROCHLOROTHIAZIDE 25 MG: 25 TABLET ORAL at 09:17

## 2019-12-29 RX ADMIN — OXYCODONE 10 MG: 5 TABLET ORAL at 11:01

## 2019-12-29 RX ADMIN — METOPROLOL TARTRATE 12.5 MG: 25 TABLET ORAL at 09:17

## 2019-12-29 RX ADMIN — THERA TABS 1 TABLET: TAB at 09:16

## 2019-12-29 RX ADMIN — METOPROLOL TARTRATE 12.5 MG: 25 TABLET ORAL at 18:05

## 2019-12-29 RX ADMIN — PANTOPRAZOLE SODIUM 40 MG: 40 TABLET, DELAYED RELEASE ORAL at 06:31

## 2019-12-29 RX ADMIN — Medication 10 ML: at 14:00

## 2019-12-29 RX ADMIN — OXYCODONE 10 MG: 5 TABLET ORAL at 20:36

## 2019-12-29 RX ADMIN — ASPIRIN 81 MG 81 MG: 81 TABLET ORAL at 09:16

## 2019-12-29 RX ADMIN — FUROSEMIDE 40 MG: 10 INJECTION, SOLUTION INTRAMUSCULAR; INTRAVENOUS at 18:05

## 2019-12-29 RX ADMIN — PRAVASTATIN SODIUM 40 MG: 40 TABLET ORAL at 22:03

## 2019-12-29 RX ADMIN — FOLIC ACID 1 MG: 1 TABLET ORAL at 09:17

## 2019-12-29 RX ADMIN — Medication 100 MG: at 09:16

## 2019-12-29 RX ADMIN — GABAPENTIN 300 MG: 300 CAPSULE ORAL at 22:03

## 2019-12-29 RX ADMIN — METOPROLOL TARTRATE 5 MG: 5 INJECTION INTRAVENOUS at 06:21

## 2019-12-29 RX ADMIN — GABAPENTIN 100 MG: 100 CAPSULE ORAL at 09:17

## 2019-12-29 RX ADMIN — LOSARTAN POTASSIUM 50 MG: 50 TABLET, FILM COATED ORAL at 09:17

## 2019-12-29 RX ADMIN — FUROSEMIDE 40 MG: 10 INJECTION, SOLUTION INTRAMUSCULAR; INTRAVENOUS at 11:02

## 2019-12-29 RX ADMIN — Medication 10 ML: at 06:31

## 2019-12-29 RX ADMIN — Medication 10 ML: at 22:04

## 2019-12-29 NOTE — PROGRESS NOTES
Hospitalist Progress Note    NAME: Yvonne Rudolph   :  1945   MRN:  [id number]       Assessment / Plan:  Sinus Tachycardia - persistent  Fluid overloaded on exam today  Will DC IVF today  Lasix BID x 2 doses  Check BMP in AM  Will add Metoprolol BID scheduled for HTN/tachycardia    Alcohol withdrawal syndrome/Delirium Tremens POA- transferred from [facility]    Stable out of stepdown unit >78 hrs  Cont patient on CIWA protocol IV Ativan prn-- has not received any ativan for days now  S/p IV Goody bag , now on PO Vitamins as tolerating PO  off IVF - Na improved & pt eating/drinking PO now  Tolerating PO diet well    Acute cystitis/ recent Klebsiella UTI POA  Recent Urine Cx (office) = Klebsiella pn. Species (sensitive)    Continue IV antibiotic till he is inpatient- PO on discharge  Follow urine culture from this admission for clearance to UA sample this admission if possible- still holding urine    Peripheral vascular disease POA- severe on CTA Abd/pelvis at [facility]  CTA:  -Occluded left common iliac and left external iliac artery with distal  reconstitution of the common femoral artery.  -Occluded right external iliac artery with distal reconstitution of the right  common femoral artery.     IP Vascular consultation noted- following, no plans of immediate surgery per note  Added Gabapentin   Added Oxycodone 5-10 mg prn for pain management  Stool softners  If pt here till next week- likely to get surgery as planned by Vascular before DC to [facility] SNF/LTC    Hypertension  Continue home antihypertensive medication  Added Metoprolol today BID as above        Code Status: Full   Surrogate Decision Maker:Brionna Queasda     DVT Prophylaxis: Lovenox   GI Prophylaxis: not indicated     Recommended Disposition: SNF/LTC and  PT, OT, RN - SNF recommended- CM consulted for DC planning- Short term rehab with conversion to LTC placement when medicaid kicks in (currently pending)- DC once arranged- [facility] being considered per pt's choice         Subjective:     Chief Complaint / Reason for Physician Visit: f/u Alcohol withdrawal syndrome, UTI  \"I am fine\". Discussed with RN events overnight. Review of Systems:  Symptom Y/N Comments  Symptom Y/N Comments   Fever/Chills n   Chest Pain n    Poor Appetite n   Edema n    Cough n   Abdominal Pain n    Sputum n   Joint Pain n    SOB/MCFARLAND n   Pruritis/Rash     Nausea/vomit    Tolerating PT/OT y    Diarrhea    Tolerating Diet y    Constipation    Other       Could NOT obtain due to:      Objective:     VITALS:   Last 24hrs VS reviewed since prior progress note. Most recent are:  Patient Vitals for the past 24 hrs:   Temp Pulse Resp BP SpO2   12/29/19 0659 100 °F (37.8 °C) (!) 111 20 120/66 94 %   12/29/19 0615 99.1 °F (37.3 °C) (!) 140 20 143/82 93 %   12/29/19 0604     91 %   12/29/19 0559 99.1 °F (37.3 °C) (!) 140 22 156/85 (!) 84 %   12/29/19 0339 99.4 °F (37.4 °C) (!) 102 17 114/63 (!) 89 %   12/28/19 2305 99.3 °F (37.4 °C) (!) 102 17 107/62 93 %   12/28/19 1939 98.9 °F (37.2 °C) (!) 114 18 139/73 94 %   12/28/19 1602 99.3 °F (37.4 °C) 98 18 117/72 98 %   12/28/19 1151 98.4 °F (36.9 °C) 98 18 143/78 98 %       Intake/Output Summary (Last 24 hours) at 12/29/2019 0820  Last data filed at 12/29/2019 0537  Gross per 24 hour   Intake 1720 ml   Output 1300 ml   Net 420 ml        PHYSICAL EXAM:  General: WD, WN. Alert, cooperative, no acute distress    EENT:  EOMI. Anicteric sclerae. MMM  Resp:  CTA bilaterally, no wheezing or rales. No accessory muscle use  CV:  Regular  Rhythm +,  No edema, tachycardia noted +  GI:  Soft, Non distended, Non tender.  +Bowel sounds  Neurologic:  Alert and oriented X 3, normal speech,   Psych:   Good insight. Not anxious nor agitated  Skin:  No rashes.   No jaundice    Reviewed most current lab test results and cultures  YES  Reviewed most current radiology test results   YES  Review and summation of old records today    NO  Reviewed patient's current orders and MAR    YES  PMH/SH reviewed - no change compared to H&P  ________________________________________________________________________  Care Plan discussed with:    Comments   Patient x    Family      RN x    Care Manager x Trey Steele Friday   Consultant                        Multidiciplinary team rounds were held today with , nursing, pharmacist and clinical coordinator. Patient's plan of care was discussed; medications were reviewed and discharge planning was addressed. ________________________________________________________________________  Total NON critical care TIME:  26   Minutes    Total CRITICAL CARE TIME Spent:   Minutes non procedure based      Comments   >50% of visit spent in counseling and coordination of care     ________________________________________________________________________  Marychuy Yoo MD     Procedures: see electronic medical records for all procedures/Xrays and details which were not copied into this note but were reviewed prior to creation of Plan. LABS:  I reviewed today's most current labs and imaging studies. Pertinent labs include:  No results for input(s): WBC, HGB, HCT, PLT, HGBEXT, HCTEXT, PLTEXT, HGBEXT, HCTEXT, PLTEXT in the last 72 hours. No results for input(s): NA, K, CL, CO2, GLU, BUN, CREA, CA, MG, PHOS, ALB, TBIL, TBILI, SGOT, ALT, INR, INREXT, INREXT in the last 72 hours.     Signed: Marychuy Yoo MD

## 2019-12-29 NOTE — PROGRESS NOTES
General Surgery End of Shift Nursing Note    Shift worked:   [id number]   Summary of shift:  Patient remained in bed all shift. New IV placed in R. Forearm due to the L. Arm IV not working. Patient complained of pain once in his feet, he was medicated with PRN roxicodone (SEE MAR). Heart Rate has gone down since this AM. Patient was given 2 IV lasix. Patient did not have a BM on my shift. Patient has been resting all shift in the bed. Issues for physician to address:   None at this time. Number times ambulated in hallway past shift: 0    Number of times OOB to chair past shift: 0    Pain Management:  Current medication: Tylenol, Roxicodone  Patient states pain is manageable on current pain medication: YES    GI:    Current diet:  DIET CARDIAC Mechanical Soft; 2 GM NA (House Low NA)  DIET NUTRITIONAL SUPPLEMENTS No; Dinner; Ensure [employer]  DIET SNACKS HS Snack; Mechanical soft    Tolerating current diet: YES  Passing flatus: YES      Patient Safety:    Falls Score: 4  Bed Alarm On? No  Sitter?  No    Annmarie Escalera LPN

## 2019-12-29 NOTE — PROGRESS NOTES
Emil Song w/ MEWS Score (last day)     Date/Time MEWS Score Pulse Resp Temp BP Level of Consciousness SpO2    12/29/19 0604              91 %    12/29/19 0559  5  (!) 140  22  99.1 °F (37.3 °C)  156/85  Alert  (!) 84 %    12/29/19 0339  2  (!) 102  17  99.4 °F (37.4 °C)  114/63  Alert  (!) 89 %    12/28/19 2305  2  (!) 102  17  99.3 °F (37.4 °C)  107/62  Alert  93 %    12/28/19 1939  3  (!) 114  18  98.9 °F (37.2 °C)  139/73  Alert  94 %    12/28/19 1602  1  98  18  99.3 °F (37.4 °C)  117/72  Alert  98 %    12/28/19 1151  1  98  18  98.4 °F (36.9 °C)  143/78  Alert  98 %    12/28/19 0745  3  (!) 115  17  98.7 °F (37.1 °C)  146/81  Alert  96 %    12/28/19 0312  2  (!) 109  17  99.9 °F (37.7 °C)  114/61  Alert  94 %            Primary nurse has notified MD. Patient is asymptomatic. Awaiting orders.

## 2019-12-29 NOTE — PROGRESS NOTES
General Surgery End of Shift Nursing Note    Patient tolerated all PO medications whole with water no issue's noted. Pain medication given @ 2032 that was effective. Shift worked:   7p-7a   Summary of shift:  ,  Given lopressor 5mg ,IV and put on 4Lpm o2 via NC, c/o of being cold     Issues for physician to address:   Increased HR,  Temp (low grade)     Number times ambulated in hallway past shift: 0    Number of times OOB to chair past shift: 0    Pain Management:  Current medication: Roxicodone  Patient states pain is manageable on current pain medication: YES  Given 10mg @ 2032 for PS 8/10    GI:    Current diet:  DIET CARDIAC Mechanical Soft; 2 GM NA (House Low NA)  DIET NUTRITIONAL SUPPLEMENTS No; Dinner; Ensure Judithann Cockayne  DIET SNACKS HS Snack; Mechanical soft    Tolerating current diet: YES  Passing flatus: YES  Last Bowel Movement: 12/29/19   Appearance: brown, smear, soft    Respiratory:    Incentive Spirometer at bedside: NO  Patient instructed on use: NO    Patient Safety:    Falls Score: 4  Bed Alarm On? No  Sitter?  No    Tor Jameson LPN

## 2019-12-29 NOTE — ROUTINE PROCESS
Bedside and Verbal shift change report given to [facility] (oncoming nurse) by Moses Parmar (offgoing nurse). Report included the following information SBAR, Kardex, Intake/Output, MAR, Recent Results and Cardiac Rhythm NSR - tach     Pt complained of pain once that was managed with Lois. Wound care completed by ARVIND Marcus. .   Meals tolerated with no report of nausea or vomiting.

## 2019-12-29 NOTE — PROGRESS NOTES
Tele hospitalist on call paged regarding patient's sustained HR in the 140s. Placed on nasal cannula d/t saturations in the mid 80s, now maintained >90% on 4L NC. Denies pain. Awaiting call back from physician. Orders received to administer Lopressor 5 mg IV x1. HR is now in 100s-110s.

## 2019-12-30 LAB
ANION GAP SERPL CALC-SCNC: 7 MMOL/L (ref 5–15)
BUN SERPL-MCNC: 11 MG/DL (ref 6–20)
BUN/CREAT SERPL: 16 (ref 12–20)
CALCIUM SERPL-MCNC: 8.5 MG/DL (ref 8.5–10.1)
CHLORIDE SERPL-SCNC: 105 MMOL/L (ref 97–108)
CO2 SERPL-SCNC: 25 MMOL/L (ref 21–32)
CREAT SERPL-MCNC: 0.7 MG/DL (ref 0.7–1.3)
ERYTHROCYTE [DISTWIDTH] IN BLOOD BY AUTOMATED COUNT: 13.8 % (ref 11.5–14.5)
GLUCOSE SERPL-MCNC: 85 MG/DL (ref 65–100)
HCT VFR BLD AUTO: 30 % (ref 36.6–50.3)
HGB BLD-MCNC: 9.6 G/DL (ref 12.1–17)
MCH RBC QN AUTO: 36.2 PG (ref 26–34)
MCHC RBC AUTO-ENTMCNC: 32 G/DL (ref 30–36.5)
MCV RBC AUTO: 113.2 FL (ref 80–99)
NRBC # BLD: 0 K/UL (ref 0–0.01)
NRBC BLD-RTO: 0 PER 100 WBC
PLATELET # BLD AUTO: 415 K/UL (ref 150–400)
PMV BLD AUTO: 10.9 FL (ref 8.9–12.9)
POTASSIUM SERPL-SCNC: 3.8 MMOL/L (ref 3.5–5.1)
RBC # BLD AUTO: 2.65 M/UL (ref 4.1–5.7)
SODIUM SERPL-SCNC: 137 MMOL/L (ref 136–145)
WBC # BLD AUTO: 11.5 K/UL (ref 4.1–11.1)

## 2019-12-30 PROCEDURE — 74011250637 HC RX REV CODE- 250/637: Performed by: INTERNAL MEDICINE

## 2019-12-30 PROCEDURE — 74011250636 HC RX REV CODE- 250/636: Performed by: INTERNAL MEDICINE

## 2019-12-30 PROCEDURE — 80048 BASIC METABOLIC PNL TOTAL CA: CPT

## 2019-12-30 PROCEDURE — 74011250637 HC RX REV CODE- 250/637: Performed by: FAMILY MEDICINE

## 2019-12-30 PROCEDURE — 74011250637 HC RX REV CODE- 250/637: Performed by: NURSE PRACTITIONER

## 2019-12-30 PROCEDURE — 97530 THERAPEUTIC ACTIVITIES: CPT

## 2019-12-30 PROCEDURE — 77030040831 HC BAG URINE DRNG MDII -A

## 2019-12-30 PROCEDURE — 65270000029 HC RM PRIVATE

## 2019-12-30 PROCEDURE — 36415 COLL VENOUS BLD VENIPUNCTURE: CPT

## 2019-12-30 PROCEDURE — 94760 N-INVAS EAR/PLS OXIMETRY 1: CPT

## 2019-12-30 PROCEDURE — 85027 COMPLETE CBC AUTOMATED: CPT

## 2019-12-30 RX ORDER — SODIUM CHLORIDE 9 MG/ML
250 INJECTION, SOLUTION INTRAVENOUS AS NEEDED
Status: DISCONTINUED | OUTPATIENT
Start: 2019-12-30 | End: 2019-12-30

## 2019-12-30 RX ORDER — ENOXAPARIN SODIUM 100 MG/ML
40 INJECTION SUBCUTANEOUS DAILY
Status: COMPLETED | OUTPATIENT
Start: 2019-12-31 | End: 2020-01-01

## 2019-12-30 RX ADMIN — Medication 100 MG: at 08:09

## 2019-12-30 RX ADMIN — OXYCODONE 10 MG: 5 TABLET ORAL at 04:15

## 2019-12-30 RX ADMIN — GABAPENTIN 300 MG: 300 CAPSULE ORAL at 21:07

## 2019-12-30 RX ADMIN — Medication 10 ML: at 13:51

## 2019-12-30 RX ADMIN — FOLIC ACID 1 MG: 1 TABLET ORAL at 08:10

## 2019-12-30 RX ADMIN — GABAPENTIN 100 MG: 100 CAPSULE ORAL at 13:50

## 2019-12-30 RX ADMIN — THERA TABS 1 TABLET: TAB at 08:08

## 2019-12-30 RX ADMIN — OXYCODONE 5 MG: 5 TABLET ORAL at 21:06

## 2019-12-30 RX ADMIN — Medication 10 ML: at 21:07

## 2019-12-30 RX ADMIN — SENNOSIDES AND DOCUSATE SODIUM 1 TABLET: 8.6; 5 TABLET ORAL at 08:08

## 2019-12-30 RX ADMIN — ASPIRIN 81 MG 81 MG: 81 TABLET ORAL at 08:09

## 2019-12-30 RX ADMIN — HYDROCHLOROTHIAZIDE 25 MG: 25 TABLET ORAL at 08:10

## 2019-12-30 RX ADMIN — COLLAGENASE SANTYL: 250 OINTMENT TOPICAL at 08:12

## 2019-12-30 RX ADMIN — LOSARTAN POTASSIUM 50 MG: 50 TABLET, FILM COATED ORAL at 08:10

## 2019-12-30 RX ADMIN — ACETAMINOPHEN 650 MG: 325 TABLET ORAL at 08:09

## 2019-12-30 RX ADMIN — OXYCODONE 10 MG: 5 TABLET ORAL at 09:32

## 2019-12-30 RX ADMIN — METOPROLOL TARTRATE 12.5 MG: 25 TABLET ORAL at 17:43

## 2019-12-30 RX ADMIN — PANTOPRAZOLE SODIUM 40 MG: 40 TABLET, DELAYED RELEASE ORAL at 06:39

## 2019-12-30 RX ADMIN — GABAPENTIN 100 MG: 100 CAPSULE ORAL at 08:11

## 2019-12-30 RX ADMIN — ENOXAPARIN SODIUM 40 MG: 40 INJECTION SUBCUTANEOUS at 08:09

## 2019-12-30 RX ADMIN — METOPROLOL TARTRATE 12.5 MG: 25 TABLET ORAL at 08:11

## 2019-12-30 RX ADMIN — Medication 10 ML: at 06:39

## 2019-12-30 RX ADMIN — PRAVASTATIN SODIUM 40 MG: 40 TABLET ORAL at 21:06

## 2019-12-30 NOTE — ROUTINE PROCESS
No acute events overnight. Wound care completed according to orders. Pain well controlled with prn medications. Appeared to rest comfortably. Care released to [facility].

## 2019-12-30 NOTE — PROGRESS NOTES
Hospitalist Progress Note    NAME: Malou Khalil   :  1945   MRN:  [id number]       Assessment / Plan:  Sinus Tachycardia -resolved/improved  Fluid overloaded on exam   Off IVF now siince   S/p Lasix BID x 2 doses- put out 2L in past 24 hrs, lasix prn now  Cont low dose Metoprolol BID started     Alcohol withdrawal syndrome/Delirium Tremens POA- transferred from \[facility]\""    Stable out of stepdown unit >78 hrs  Cont patient on CIWA protocol IV Ativan prn-- has not received any ativan for days now  S/p IV Goody bag , now on PO Vitamins as tolerating PO  off IVF - Na improved & pt eating/drinking PO now  Tolerating PO diet well    Acute cystitis/ recent Klebsiella UTI POA  Recent Urine Cx (office) = Klebsiella pn. Species (sensitive)    Continue IV antibiotic till he is inpatient- PO on discharge  Urine culture this admission= negative    Peripheral vascular disease POA- severe on CTA Abd/pelvis at \[facility]\""  CTA:  -Occluded left common iliac and left external iliac artery with distal  reconstitution of the common femoral artery.  -Occluded right external iliac artery with distal reconstitution of the right  common femoral artery.     IP Vascular consultation noted- following, now that pt is still inpatient- plan to go for scheduled OR on Thursday- rehab post OR now- [facility]  Added Gabapentin   Added Oxycodone 5-10 mg prn for pain management  Stool softners      Hypertension  Continue home antihypertensive medication  Added Metoprolol  BID as above        Code Status: Full   Surrogate Decision Maker:Brionna Quesada     DVT Prophylaxis: Lovenox   GI Prophylaxis: not indicated     Recommended Disposition: SNF/LTC and  PT, OT, RN - SNF recommended- CM consulted for DC planning- Short term rehab with conversion to LTC placement when medicaid kicks in (currently pending)- [facility] after OR this Thursday now         Subjective:     Chief Complaint / Reason for Physician Visit: f/u Alcohol withdrawal syndrome, UTI  \"I am fine\". Discussed with RN events overnight. Review of Systems:  Symptom Y/N Comments  Symptom Y/N Comments   Fever/Chills n   Chest Pain n    Poor Appetite n   Edema n    Cough n   Abdominal Pain n    Sputum n   Joint Pain n    SOB/[place] n   Pruritis/Rash     Nausea/vomit    Tolerating PT/OT y    Diarrhea    Tolerating Diet y    Constipation    Other       Could NOT obtain due to:      Objective:     VITALS:   Last 24hrs VS reviewed since prior progress note. Most recent are:  Patient Vitals for the past 24 hrs:   Temp Pulse Resp BP SpO2   12/30/19 1111 98.7 °F (37.1 °C) 97 18 105/59 97 %   12/30/19 0753 99.8 °F (37.7 °C) (!) 103 16 114/66 94 %   12/30/19 0350 98.8 °F (37.1 °C) (!) 104 16 129/69 98 %   12/29/19 2334 98.7 °F (37.1 °C) 98 16 111/62 99 %   12/29/19 2011 98.2 °F (36.8 °C) 88 18 115/67 97 %   12/29/19 1626 97.5 °F (36.4 °C) 83 18 112/68 93 %       Intake/Output Summary (Last 24 hours) at 12/30/2019 1123  Last data filed at 12/30/2019 0407  Gross per 24 hour   Intake 375 ml   Output 2050 ml   Net -1675 ml        PHYSICAL EXAM:  General: WD, WN. Alert, cooperative, no acute distress    EENT:  EOMI. Anicteric sclerae. MMM  Resp:  CTA bilaterally, no wheezing or rales. No accessory muscle use  CV:  Regular  Rhythm +,  No edema, tachycardia improved now in 90's +  GI:  Soft, Non distended, Non tender.  +Bowel sounds  Neurologic:  Alert and oriented X 3, normal speech,   Psych:   Good insight. Not anxious nor agitated  Skin:  No rashes.   No jaundice    Reviewed most current lab test results and cultures  YES  Reviewed most current radiology test results   YES  Review and summation of old records today    NO  Reviewed patient's current orders and MAR    YES  PMH/SH reviewed - no change compared to H&P  ________________________________________________________________________  Care Plan discussed with:    Comments   Patient x    Family      RN x    Care Manager x Mita Cornea   Consultant x Vascular Sx NP Lety                      Multidiciplinary team rounds were held today with , nursing, pharmacist and clinical coordinator. Patient's plan of care was discussed; medications were reviewed and discharge planning was addressed. ________________________________________________________________________  Total NON critical care TIME:  16   Minutes    Total CRITICAL CARE TIME Spent:   Minutes non procedure based      Comments   >50% of visit spent in counseling and coordination of care     ________________________________________________________________________  Daryle Crosser, MD     Procedures: see electronic medical records for all procedures/Xrays and details which were not copied into this note but were reviewed prior to creation of Plan. LABS:  I reviewed today's most current labs and imaging studies.   Pertinent labs include:  Recent Labs     12/30/19  0400   WBC 11.5*   HGB 9.6*   HCT 30.0*   *     Recent Labs     12/30/19  0400      K 3.8      CO2 25   GLU 85   BUN 11   CREA 0.70   CA 8.5       Signed: Daryle Crosser, MD

## 2019-12-30 NOTE — PROGRESS NOTES
Problem: Mobility Impaired (Adult and Pediatric)  Goal: *Acute Goals and Plan of Care (Insert Text)  Description  FUNCTIONAL STATUS PRIOR TO ADMISSION: Pt was living at home alone on first level of 2 story home with 2 step entry. Per chart, he was falling and having a difficult time taking care of himself. Pt states he was not falling and that the only time he fell was when he had his stroke. HOME SUPPORT PRIOR TO ADMISSION: The patient lived alone with no local support. Physical Therapy Goals  Initiated 12/24/2019  1. Patient will move from supine to sit and sit to supine , scoot up and down and roll side to side in bed with independence within 7 day(s). 2.  Patient will transfer from bed to chair and chair to bed with modified independence using the least restrictive device within 7 day(s). 3.  Patient will perform sit to stand with modified independence within 7 day(s). 4.  Patient will ambulate with supervision/set-up for 75 feet with the least restrictive device within 7 day(s). Outcome: Not Progressing Towards Goal   PHYSICAL THERAPY TREATMENT  Patient: Sarah Lindsey (63 y.o. male)  Date: 12/30/2019  Diagnosis: Alcohol withdrawal ([facility] [F10.239]   Alcohol withdrawal ([facility] [address]       Precautions: Contact, Fall  Chart, physical therapy assessment, plan of care and goals were reviewed. ASSESSMENT  Patient continues with skilled PT services and is not progressing towards goals. Pt presents with decreased strength and endurance. Pt very unsteady and weak with difficulty keep weight off of heel. Pt performed bed mobility at CGA/min A. Pt with difficulty scooting to due to air mattress. Pt performed x3 sit to stand transfers at mod A/max A x2. Pt with time able to pivot over to chair with mod A/min A. Pt with buckling of RLE requiring max A x2 to safely get to chair. Pt able to stand at RW at max A/mod A x2 for 1 min for hygiene.      Current Level of Function Impacting Discharge (mobility/balance): bed mobility CGA/min A, sit to stand mod A/max,stand pivot transfer with RW at mod A x2    Other factors to consider for discharge: pt needs assistance for all mobility. PLAN :  Patient continues to benefit from skilled intervention to address the above impairments. Continue treatment per established plan of care. to address goals. Recommendation for discharge: (in order for the patient to meet his/her long term goals)  Therapy up to 5 days/week in SNF setting    This discharge recommendation:  Has been made in collaboration with the attending provider and/or case management    IF patient discharges home will need the following DME: to be determined (TBD)       SUBJECTIVE:   Patient stated I think the pain meds are making me feel weak. Darryl Avila    OBJECTIVE DATA SUMMARY:   Critical Behavior:  Neurologic State: Alert  Orientation Level: Oriented X4  Cognition: Appropriate safety awareness  Safety/Judgement: Awareness of environment, Fall prevention, Insight into deficits  Functional Mobility Training:  Bed Mobility:  Rolling: Stand-by assistance;Contact guard assistance  Supine to Sit: Contact guard assistance;Minimum assistance     Scooting: Minimum assistance; Moderate assistance        Transfers:  Sit to Stand: Moderate assistance;Assist x2;Maximum assistance  Stand to Sit: Moderate assistance;Assist x2;Maximum assistance        Bed to Chair: Moderate assistance;Minimum assistance;Assist x2                    Balance:  Sitting: Intact  Sitting - Static: Fair (occasional)  Sitting - Dynamic: Fair (occasional)  Standing: Impaired  Standing - Static: Poor;Constant support  Standing - Dynamic : Poor;Fair;Constant support        Pain Rating:  Pt with no complaints of pain     Activity Tolerance:   Fair and requires rest breaks  Please refer to the flowsheet for vital signs taken during this treatment.     After treatment patient left in no apparent distress:   Sitting in chair and Call bell within reach    COMMUNICATION/COLLABORATION:   The patients plan of care was discussed with: Registered Nurse    Cherelle Holguin, PTA   Time Calculation: 27 mins

## 2019-12-30 NOTE — ROUTINE PROCESS
Bedside and Verbal shift change report given to [facility] (oncoming nurse) by Dona Cox (offgoing nurse). Report included the following information SBAR, Kardex, Procedure Summary, Intake/Output, MAR, Recent Results and Cardiac Rhythm NSR - tach     Pt received pain meds once for burning in feet. Pt started on IV Lasix. Wound care completed by LPN. Meals tolerated with no report of nausea or vomiting. No BM. Pt ranged from NSR to Tachy on the monitor. Afebrile.

## 2019-12-30 NOTE — PROGRESS NOTES
General Surgery End of Shift Nursing Note    Bedside shift change report given to [facility] (oncoming nurse). Report included the following information SBAR, Kardex and Recent Results. Shift worked:   7a to 7p   Summary of shift:    Uneventful      patient rested in room most of day      PT did an evaluation, see note. Issues for physician to address:   none     Number times ambulated in hallway past shift: 0    Number of times OOB to chair past shift: 2    Pain Management:  Current medication: see MAR  Patient states pain is manageable on current pain medication: YES    GI:    Current diet:  DIET CARDIAC Mechanical Soft; 2 GM NA (House Low NA)  DIET NUTRITIONAL SUPPLEMENTS No; Dinner; Ensure [employer]  DIET SNACKS HS Snack; Mechanical soft    Tolerating current diet: YES  Passing flatus: YES  Last Bowel Movement: today   Appearance:     Respiratory:    Incentive Spirometer at bedside: YES  Patient instructed on use: YES    Patient Safety:    Falls Score: 4  Bed Alarm On? No  Sitter?  No    Candice Phipps RN

## 2019-12-30 NOTE — PROGRESS NOTES
Vascular Surgery Progress Note  [facility] ACNP-BC  12/30/2019       Subjective:      Mr. Jenae Browning has a pmhx significant for alcoholism, COPD, DM, PAFib, HTN, HLD, and GERD. Dhiraj Osorio continues to smoke daily. Dhiraj Osorio has a pmhx significant for LLE stenting per his report. Dhiraj Osorio is s/p TMA of the left foot (10/2016). Dhiraj Osorio presented to the clinic in November 2019 w/ compliant of BLE claudication, BLE nocturnal cramping, and a wound to the incision of the left TMA after hitting his foot.  He has ischemia of the left foot and rubor of the right foot.  His ABIs were right 0.40 w/ a flatline TBI and left 0.32 w/ a surgically absent left great toe.  He underwent an arteriogram on 11/5/2019 that revealed severe bilateral aorto iliac disease that was not amendable to endovascular intervention. Dhiraj Osorio returned to the clinic  w/ a CTA that is significant for an occluded left common iliac and left external iliac artery and occluded right external iliac artery. Plan is for axillo-bifemoral bypass on 01/02/2020. Since our last visit he presented to the emergency room on 12/16/2019 w/ complaint of BLE weakness. He was admitted to [facility] and diagnosed w/ cystitis. While admitted he was noted to have left lateral heel ulcer. He was discharged to the TCU at [facility] on 12/20/2019 for rehabilitation. Late that evening he developed active w/d symptoms and returned to the emergency room at [facility]. He was then transferred to [facility] for management of alcohol withdrawal.  His urine cx grew Klebsiella that has been treated w/ a course of antibx. Wounds are unchanged from previous evaluation.        Nursing Data:     Patient Vitals for the past 24 hrs:   BP Temp Pulse Resp SpO2   12/30/19 1111 105/59 98.7 °F (37.1 °C) 97 18 97 %   12/30/19 0753 114/66 99.8 °F (37.7 °C) (!) 103 16 94 %   12/30/19 0350 129/69 98.8 °F (37.1 °C) (!) 104 16 98 %   12/29/19 2334 111/62 98.7 °F (37.1 °C) 98 16 99 %   12/29/19 2011 115/67 98.2 °F (36.8 °C) 88 18 97 %   12/29/19 1626 112/68 97.5 °F (36.4 °C) 83 18 93 %     ---------------------------------------------------------------------------------------------------------    Intake/Output Summary (Last 24 hours) at 12/30/2019 1236  Last data filed at 12/30/2019 0753  Gross per 24 hour   Intake 735 ml   Output 2050 ml   Net -1315 ml       Exam:     Physical Exam  General-frail chronically ill appearing disheveled CM   Mental Status - Alert and oriented to name. He is disoriented to which hospital he is in. Head and Neck - full range of motion, No lymphadenopathy. Thyroid - normal size and consistency. Chest and lung exam reveals  - normal excursion and RR  Cardiovascular examination reveals  - tachycardic   Abdomen Inspection-Non Tender, No hepatosplenomegaly, No palpable abdominal masses and Soft. Peripheral Vascular- weak but palpable femoral pulses with no palpable pulses below in either lower extremity. Neurologic-Chronic left sided upper and lower extremity weakness. Routinely ambulates for short distances only using a walker. Presented to the clinic 3 weeks ago in a WC. Now with right sided weakness. Can move foot and ankle. No notable tremor this am.   Skin: Necrotic ulcer to lateral left heel. Stable chronic wound of the TMA incision. Rubor of the plantar aspect of the left foot. Ischemic changes to the toes of the 2nd and 3rd digit of the right foot w/ open ulceration of the 2nd digit. Lab Review:     .   Recent Results (from the past 24 hour(s))   METABOLIC PANEL, BASIC    Collection Time: 12/30/19  4:00 AM   Result Value Ref Range    Sodium 137 136 - 145 mmol/L    Potassium 3.8 3.5 - 5.1 mmol/L    Chloride 105 97 - 108 mmol/L    CO2 25 21 - 32 mmol/L    Anion gap 7 5 - 15 mmol/L    Glucose 85 65 - 100 mg/dL    BUN 11 6 - 20 MG/DL    Creatinine 0.70 0.70 - 1.30 MG/DL    BUN/Creatinine ratio 16 12 - 20      GFR est AA >60 >60 ml/min/1.73m2    GFR est non-AA >60 >60 ml/min/1.73m2    Calcium 8.5 8.5 - 10.1 MG/DL   CBC W/O DIFF    Collection Time: 12/30/19  4:00 AM   Result Value Ref Range    WBC 11.5 (H) 4.1 - 11.1 K/uL    RBC 2.65 (L) 4.10 - 5.70 M/uL    HGB 9.6 (L) 12.1 - 17.0 g/dL    HCT 30.0 (L) 36.6 - 50.3 %    .2 (H) 80.0 - 99.0 FL    MCH 36.2 (H) 26.0 - 34.0 PG    MCHC 32.0 30.0 - 36.5 g/dL    RDW 13.8 11.5 - 14.5 %    PLATELET 552 (H) 788 - 400 K/uL    MPV 10.9 8.9 - 12.9 FL    NRBC 0.0 0  WBC    ABSOLUTE NRBC 0.00 0.00 - 0.01 K/uL          Assessment/Plan:      Consult problem  Severe bilateral PAD w/ ulceration of the left heel, left TMA incision, and right 2nd digit.  -w/ plan for axillo-bifemoral bypass on 01/02/2020. Continue current wound care to the BL feet. Continue ASA. Pain controlled. Continues to be unable to ambulate. Patient has underlying severe PAD but his primary source for inability to ambulate is likely deconditioning. Plan for ax-bifemoral bypass on Thursday. NPO after midnight on Wednesday. Hold anticoagulation after am dose on Wednesday. Obtain consent. Active problems  Acute klebsiella pneumoniae UTI   -course of Rocephin  -repeat urine cx NG  Alcohol withdrawal  -tremor resolved  -patient currently not receiving scheduled or PRN ativan   Thiamine deficiency   -consider oral supplementation   Cognitive deficit  -likely chronic   Consider nutrition consult   Diabetes Mellitus w/ hypoglycemia    -on liberalized diet w/ hs snack   -suspect malnutrition   -possible liver dysfunction resulting in poor glycogen stores  Hypernatremia   -resolved   Hypomagnesemia  -resolved   COPD  -not in exacerbation   Macrocytic anemia   -relatively stable   Thrombocytopenia  -resolved   PAfib  -intermittent mild tachycardia.   May benefit from better rate control.    -poor candidate for [facility]  Hypertension  -stable on ARB  Hyperlipidemia  GERD  Management of comorbid conditions by primary team.     VTE Prophylaxis:  LMWH-hold after am dose on Wednesday   SCDs: contraindicated in severe BLE PAD     Disposition:  SNF. Agree w/ ECF following rehabilitation.

## 2019-12-31 LAB
ABO + RH BLD: NORMAL
ANION GAP SERPL CALC-SCNC: 7 MMOL/L (ref 5–15)
BLOOD GROUP ANTIBODIES SERPL: NORMAL
BUN SERPL-MCNC: 11 MG/DL (ref 6–20)
BUN/CREAT SERPL: 16 (ref 12–20)
CALCIUM SERPL-MCNC: 7.5 MG/DL (ref 8.5–10.1)
CHLORIDE SERPL-SCNC: 104 MMOL/L (ref 97–108)
CO2 SERPL-SCNC: 26 MMOL/L (ref 21–32)
CREAT SERPL-MCNC: 0.68 MG/DL (ref 0.7–1.3)
ERYTHROCYTE [DISTWIDTH] IN BLOOD BY AUTOMATED COUNT: 13.4 % (ref 11.5–14.5)
GLUCOSE SERPL-MCNC: 105 MG/DL (ref 65–100)
HCT VFR BLD AUTO: 26.6 % (ref 36.6–50.3)
HGB BLD-MCNC: 8.8 G/DL (ref 12.1–17)
MCH RBC QN AUTO: 37 PG (ref 26–34)
MCHC RBC AUTO-ENTMCNC: 33.1 G/DL (ref 30–36.5)
MCV RBC AUTO: 111.8 FL (ref 80–99)
NRBC # BLD: 0 K/UL (ref 0–0.01)
NRBC BLD-RTO: 0 PER 100 WBC
PLATELET # BLD AUTO: 415 K/UL (ref 150–400)
PMV BLD AUTO: 11.2 FL (ref 8.9–12.9)
POTASSIUM SERPL-SCNC: 3.6 MMOL/L (ref 3.5–5.1)
RBC # BLD AUTO: 2.38 M/UL (ref 4.1–5.7)
SODIUM SERPL-SCNC: 137 MMOL/L (ref 136–145)
SPECIMEN EXP DATE BLD: NORMAL
WBC # BLD AUTO: 9.7 K/UL (ref 4.1–11.1)

## 2019-12-31 PROCEDURE — 74011250637 HC RX REV CODE- 250/637: Performed by: FAMILY MEDICINE

## 2019-12-31 PROCEDURE — 74011250637 HC RX REV CODE- 250/637: Performed by: INTERNAL MEDICINE

## 2019-12-31 PROCEDURE — 85027 COMPLETE CBC AUTOMATED: CPT

## 2019-12-31 PROCEDURE — 94760 N-INVAS EAR/PLS OXIMETRY 1: CPT

## 2019-12-31 PROCEDURE — 97530 THERAPEUTIC ACTIVITIES: CPT

## 2019-12-31 PROCEDURE — 86900 BLOOD TYPING SEROLOGIC ABO: CPT

## 2019-12-31 PROCEDURE — 80048 BASIC METABOLIC PNL TOTAL CA: CPT

## 2019-12-31 PROCEDURE — 74011250637 HC RX REV CODE- 250/637: Performed by: NURSE PRACTITIONER

## 2019-12-31 PROCEDURE — 97535 SELF CARE MNGMENT TRAINING: CPT

## 2019-12-31 PROCEDURE — 74011250636 HC RX REV CODE- 250/636: Performed by: NURSE PRACTITIONER

## 2019-12-31 PROCEDURE — 36415 COLL VENOUS BLD VENIPUNCTURE: CPT

## 2019-12-31 PROCEDURE — 65270000029 HC RM PRIVATE

## 2019-12-31 RX ADMIN — SENNOSIDES AND DOCUSATE SODIUM 1 TABLET: 8.6; 5 TABLET ORAL at 09:35

## 2019-12-31 RX ADMIN — THERA TABS 1 TABLET: TAB at 09:37

## 2019-12-31 RX ADMIN — ENOXAPARIN SODIUM 40 MG: 40 INJECTION SUBCUTANEOUS at 09:36

## 2019-12-31 RX ADMIN — Medication 100 MG: at 09:38

## 2019-12-31 RX ADMIN — OXYCODONE 5 MG: 5 TABLET ORAL at 20:54

## 2019-12-31 RX ADMIN — METOPROLOL TARTRATE 12.5 MG: 25 TABLET ORAL at 09:37

## 2019-12-31 RX ADMIN — GABAPENTIN 100 MG: 100 CAPSULE ORAL at 14:38

## 2019-12-31 RX ADMIN — FOLIC ACID 1 MG: 1 TABLET ORAL at 09:36

## 2019-12-31 RX ADMIN — ASPIRIN 81 MG 81 MG: 81 TABLET ORAL at 09:35

## 2019-12-31 RX ADMIN — METOPROLOL TARTRATE 12.5 MG: 25 TABLET ORAL at 18:35

## 2019-12-31 RX ADMIN — HYDROCHLOROTHIAZIDE 25 MG: 25 TABLET ORAL at 09:36

## 2019-12-31 RX ADMIN — PRAVASTATIN SODIUM 40 MG: 40 TABLET ORAL at 21:58

## 2019-12-31 RX ADMIN — Medication 10 ML: at 14:00

## 2019-12-31 RX ADMIN — OXYCODONE 10 MG: 5 TABLET ORAL at 11:48

## 2019-12-31 RX ADMIN — COLLAGENASE SANTYL: 250 OINTMENT TOPICAL at 14:38

## 2019-12-31 RX ADMIN — GABAPENTIN 300 MG: 300 CAPSULE ORAL at 21:58

## 2019-12-31 RX ADMIN — PANTOPRAZOLE SODIUM 40 MG: 40 TABLET, DELAYED RELEASE ORAL at 08:00

## 2019-12-31 RX ADMIN — GABAPENTIN 100 MG: 100 CAPSULE ORAL at 08:00

## 2019-12-31 RX ADMIN — LOSARTAN POTASSIUM 50 MG: 50 TABLET, FILM COATED ORAL at 09:36

## 2019-12-31 RX ADMIN — Medication 10 ML: at 21:57

## 2019-12-31 NOTE — PROGRESS NOTES
Problem: Mobility Impaired (Adult and Pediatric)  Goal: *Acute Goals and Plan of Care (Insert Text)  Description  FUNCTIONAL STATUS PRIOR TO ADMISSION: Pt was living at home alone on first level of 2 story home with 2 step entry. Per chart, he was falling and having a difficult time taking care of himself. Pt states he was not falling and that the only time he fell was when he had his stroke. HOME SUPPORT PRIOR TO ADMISSION: The patient lived alone with no local support. Physical Therapy Goals    Reviewed 12/31/2019 and downgraded  1. Patient will move from supine to sit and sit to supine, scoot up and down and roll side to side in bed with minimal assistance/contact guard assistance within 7 days. 2.  Patient will transfer from bed to chair and chair to bed with moderate assistance using the least restrictive device within 7 days. 3.  Patient will perform sit to stand with moderate assistance within 7 days. 4.  Patient will ambulate with moderate assistance of 1-2 for 10 feet with a rolling walker within 7 days. Initiated 12/24/2019  1. Patient will move from supine to sit and sit to supine , scoot up and down and roll side to side in bed with independence within 7 day(s). 2.  Patient will transfer from bed to chair and chair to bed with modified independence using the least restrictive device within 7 day(s). 3.  Patient will perform sit to stand with modified independence within 7 day(s). 4.  Patient will ambulate with supervision/set-up for 75 feet with the least restrictive device within 7 day(s).          Outcome: Not Progressing Towards Goal   PHYSICAL THERAPY TREATMENT: WEEKLY REASSESSMENT  Patient: Thee Bishop (23 y.o. male)  Date: 12/31/2019  Primary Diagnosis: Alcohol withdrawal ([facility] [address] [F10.239]        Precautions:   Fall, Contact, Seizure, DNR      ASSESSMENT  Patient continues with skilled PT services and is not progressing towards goals, thus goals have been downgraded to reflect pt's current functional status. Pt continues with poor mobility, requiring heavy assistance of 1 for bed mobility and assistance of 2 for transfers and very limited ambulation. Pt initially declined tx session, saying \"it's a bad day\". He then agreed to comply with sitting eob in preparation for lunch, requiring some encouragement and re-direction. Noted O2 not on and O2 sats 88% at rest.  2L re-applied which patient was agreeable to. Noted Pt incontinent of B and B which he did not report despite sacral wound present. Patient's progression toward goals since last assessment:  none    Current Level of Function Impacting Discharge (mobility/balance): heavy assistance of 1 for bed mobility and assistance of 2 for transfers and very limited ambulation. Pt with inconsistent cooperation with bed to chair transfer, but requires mod to max A x 2 to complete safely    Functional Outcome Measure: The patient scored 20/100 on the Barthel Index outcome measure which is indicative of 80% impaired function/adls. Other factors to consider for discharge: no local support, lived alone with poor ability to adequately take care of himself. PLAN :  Goals have been updated based on progression since last assessment. Patient continues to benefit from skilled intervention to address the above impairments. Recommendations and Planned Interventions: bed mobility training, transfer training, gait training, therapeutic exercises, patient and family training/education, and therapeutic activities      Frequency/Duration: Patient will be followed by physical therapy:  2 times a week to address goals.     Recommendation for discharge: (in order for the patient to meet his/her long term goals)  Therapy up to 5 days/week in SNF setting    This discharge recommendation:  Has been made in collaboration with the attending provider and/or case management    IF patient discharges home will need the following DME: to be determined (TBD)         SUBJECTIVE:   Patient stated i'm having another surgery Thursday    OBJECTIVE DATA SUMMARY:   HISTORY:    Past Medical History:   Diagnosis Date    Abuse     alcoholism, quit 2012    Claudication of calf muscles ([facility] [address] 2013    Colovesical fistula     Dr Pamula Baumgarten    Esophageal stricture     Esophagitis     GI bleed 10/2016    Hemochromatosis     Hyperlipidemia     Hypertension     Peripheral artery disease ([facility] [address]     Dr. Dione Olsen    Pulmonary nodule     right lung     Past Surgical History:   Procedure Laterality Date    COLONOSCOPY N/A 9/14/2016    COLONOSCOPY performed by Nery Sánchez MD at [facility] ENDOSCOPY    COLONOSCOPY N/A 12/19/2016    COLONOSCOPY performed by Nery Sánchez MD at [facility] ENDOSCOPY    HX AMPUTATION Left 07/2016    left 4th toe from gangrene    HX ENDOSCOPY  10/2016    HX OTHER SURGICAL      left partial foot amputation       Personal factors and/or comorbidities impacting plan of care: noncompliance, medical status    Home Situation  Home Environment: Private residence  # Steps to Enter: 2  Rails to Enter: Yes  Hand Rails : Right  One/Two Story Residence: Two story, live on 1st floor  Living Alone: Yes  Support Systems: Family member(s)  Patient Expects to be Discharged to[de-identified] Private residence  Current DME Used/Available at Home: [address] [facility], [address], straight, Commode, bedside, Grab bars, Walker, rolling, Tub transfer bench  Tub or Shower Type: Tub/Shower combination    EXAMINATION/PRESENTATION/DECISION MAKING:   Critical Behavior:  Neurologic State: Alert, Appropriate for age  Orientation Level: Oriented X4  Cognition: Follows commands, Impulsive, Appropriate for age attention/concentration  Safety/Judgement: Fall prevention, Insight into deficits, Decreased insight into deficits(aware he was wet/not aware he was soiled; did not call Nsg )  Hearing:   Auditory  Auditory Impairment: None  Range Of Motion:  AROM: Generally decreased, functional           PROM: Generally decreased, functional Strength:    Strength: Generally decreased, functional                    Tone & Sensation:   Tone: Normal              Sensation: Intact               Coordination:  Coordination: Generally decreased, functional  Functional Mobility:  Bed Mobility:  Rolling: Stand-by assistance;Contact guard assistance; Additional time; Adaptive equipment(bed rail used)  Supine to Sit: Minimum assistance; Moderate assistance; Additional time; Adaptive equipment  Sit to Supine: Minimum assistance; Additional time; Adaptive equipment        Balance:   Sitting: Intact; Impaired  Sitting - Static: Good (unsupported)  Sitting - Dynamic: Fair (occasional)      Functional Measure:  Barthel Index:    Bathin  Bladder: 0  Bowels: 0  Groomin  Dressin  Feeding: 10  Mobility: 0  Stairs: 0  Toilet Use: 0  Transfer (Bed to Chair and Back): 5  Total: 20/100       The Barthel ADL Index: Guidelines  1. The index should be used as a record of what a patient does, not as a record of what a patient could do. 2. The main aim is to establish degree of independence from any help, physical or verbal, however minor and for whatever reason. 3. The need for supervision renders the patient not independent. 4. A patient's performance should be established using the best available evidence. Asking the patient, friends/relatives and nurses are the usual sources, but direct observation and common sense are also important. However direct testing is not needed. 5. Usually the patient's performance over the preceding 24-48 hours is important, but occasionally longer periods will be relevant. 6. Middle categories imply that the patient supplies over 50 per cent of the effort. 7. Use of aids to be independent is allowed. Larissa Arroyo., Barthel, D.W. (3830). Functional evaluation: the Barthel Index. [facility] [address] [facility] (14)2. MIRIAN Sánchez, Bhanu Meade., Daniel Posey., Juloi, [facility] ().  Measuring the change indisability after inpatient rehabilitation; comparison of the responsiveness of the Barthel Index and Functional [place] Measure. Journal of Neurology, Neurosurgery, and Psychiatry, 66(4), 103-130. EVA Wills.MEJIA, BLAKE Burr, & Jaky Briceño M.A. (2004.) Assessment of post-stroke quality of life in cost-effectiveness studies: The usefulness of the Barthel Index and the EuroQoL-5D. Quality of Life Research, 13, 427-43          Pain Rating:  None reported    Activity Tolerance:   Fair and desaturates with exertion and requires oxygen  Please refer to the flowsheet for vital signs taken during this treatment. After treatment patient left in no apparent distress:   Sitting eob, Call bell within reach, and Side rails x 3    COMMUNICATION/EDUCATION:   The patients plan of care was discussed with: Occupational Therapist and Registered Nurse. Fall prevention education was provided and the patient/caregiver indicated understanding., Patient/family have participated as able in goal setting and plan of care. , and Patient/family agree to work toward stated goals and plan of care.     Thank you for this referral.  April Watkins, PT   Time Calculation: 29 mins

## 2019-12-31 NOTE — PROGRESS NOTES
KEVEN Plans:  1. Rehab  2. 2nd IM letter      ROSENDO met with pt in regards to discharge planning and to follow up on conversation regarding Medicaid. Pt stated he could not recall who informed him that he did not qualify for Medicaid. SW was able to confirm this info with [employer] they did contact pt and inform him that he was over income and would need to sell his home. Pt stated he would like to go to rehab and get stronger then return home. SW inquired if pt had family that could assist or if they were aware of his condition. Pt stated he had a visit from the sister but she was the caretaker for her  and could not assist him. Pt stated he would like rehab referrals sent to [place] [address], [facility], and [address] [place] [address]. Referrals via [employer] to [place] [address] and [facility] for their review. Ca Dhaliwal stated they nigel not have a male bed until the 7th. Staff stated she would keep us updated if anything changes. Pt scheduled to have surgical procedure Thursday. CM will continue to follow and assist with additional discharge needs.     Severo Saint, MSW  Ext [phone]

## 2019-12-31 NOTE — PROGRESS NOTES
Hospitalist Progress Note    NAME: Callie Montana   :  1945   MRN:  [id number]       Assessment / Plan:  Sinus Tachycardia - resolved/improved  Fluid overloaded on exam  -- S/p Lasix BID x 2 doses and now euvolemic. Lasix prn only now. Cont low dose Metoprolol BID started     Alcohol withdrawal syndrome/Delirium Tremens POA  Remains stable out of stepdown unit  Can discontinue CIWA monitoring as patient has not received any Ativan for days now and has been in the hospital greater than a week  S/p IV Goody bag, now on PO Vitamins as tolerating PO    Acute cystitis/recent Klebsiella UTI POA  Recent Urine Cx (office) = Klebsiella pn. Species (sensitive)  S/p ceftriaxone x 3 days and currently afebrile    Peripheral vascular disease POA- severe on CTA Abd/pelvis at [facility]  CTA:  -Occluded left common iliac and left external iliac artery with distal  reconstitution of the common femoral artery.  -Occluded right external iliac artery with distal reconstitution of the right  common femoral artery. IP Vascular consultation noted - plan for axillo-femoral bypass on 20  Added Gabapentin   Added Oxycodone 5-10 mg prn for pain management  Stool softners    Hypertension  Continue home antihypertensive medication  Added Metoprolol  BID as above  Good control at this time       Code Status: Full   Surrogate Decision Maker:Brionna Quesada     DVT Prophylaxis: Lovenox   GI Prophylaxis: not indicated     Recommended Disposition: SNF/LTC Short term rehab with conversion to LTC placement when medicaid kicks in (currently pending)- [facility] after OR this Thursday now     Subjective:     Chief Complaint / Reason for Physician Visit: f/u Alcohol withdrawal syndrome, UTI  Patient denies complaints today. His main concern is his upcoming surgery -- asks me whether he's going to survive. No CP, SOB, N/V.      Review of Systems:  Symptom Y/N Comments  Symptom Y/N Comments   Fever/Chills n   Chest Pain n    Poor Appetite n   Edema n    Cough n   Abdominal Pain n    Sputum n   Joint Pain n    SOB/[place] n   Pruritis/Rash     Nausea/vomit    Tolerating PT/OT y    Diarrhea    Tolerating Diet y    Constipation    Other       Could NOT obtain due to:      Objective:     VITALS:   Last 24hrs VS reviewed since prior progress note. Most recent are:  Patient Vitals for the past 24 hrs:   Temp Pulse Resp BP SpO2   12/31/19 1241  95   (!) 88 %   12/31/19 1124 98.2 °F (36.8 °C) 87 17 123/71 99 %   12/31/19 0745 98.6 °F (37 °C) 87 17 114/63 98 %   12/31/19 0350 97.9 °F (36.6 °C) 93 18 140/79 96 %   12/30/19 2322 98 °F (36.7 °C) 98 19 122/67 95 %   12/30/19 2049 98.5 °F (36.9 °C) 97 18 129/75 95 %     No intake or output data in the 24 hours ending 12/31/19 1555     PHYSICAL EXAM:  General: WD, WN. Alert, cooperative, no acute distress. Chronically ill. EENT:  EOMI. Anicteric sclerae. MMM  Resp:  CTA bilaterally, no wheezing or rales. No accessory muscle use  CV:  Normal rate, regular rhythm. No edema. GI:  Soft, Non distended, Non tender.  +Bowel sounds  Neurologic:  Alert, normal speech,   Psych:   Fair insight. Not anxious nor agitated  Skin:  No rashes. No jaundice    Reviewed most current lab test results and cultures  YES  Reviewed most current radiology test results   YES  Review and summation of old records today    NO  Reviewed patient's current orders and MAR    YES  PMH/SH reviewed - no change compared to H&P  ________________________________________________________________________  Care Plan discussed with:    Comments   Patient x    Family      RN     Care Manager     Consultant                        Multidiciplinary team rounds were held today with , nursing, pharmacist and clinical coordinator. Patient's plan of care was discussed; medications were reviewed and discharge planning was addressed.      ________________________________________________________________________  Total NON critical care TIME:  16 Minutes    Total CRITICAL CARE TIME Spent:   Minutes non procedure based      Comments   >50% of visit spent in counseling and coordination of care     ________________________________________________________________________  Jose Rod MD     Procedures: see electronic medical records for all procedures/Xrays and details which were not copied into this note but were reviewed prior to creation of Plan. LABS:  I reviewed today's most current labs and imaging studies.   Pertinent labs include:  Recent Labs     12/31/19  1000 12/30/19  0400   WBC 9.7 11.5*   HGB 8.8* 9.6*   HCT 26.6* 30.0*   * 415*     Recent Labs     12/31/19  1000 12/30/19  0400    137   K 3.6 3.8    105   CO2 26 25   * 85   BUN 11 11   CREA 0.68* 0.70   CA 7.5* 8.5       Signed: Jose Rod MD

## 2019-12-31 NOTE — PROGRESS NOTES
Problem: Self Care Deficits Care Plan (Adult)  Goal: *Acute Goals and Plan of Care (Insert Text)  Description    FUNCTIONAL STATUS PRIOR TO ADMISSION: The patient was functional at baseline per his report. Reports Has DME at home    HOME SUPPORT: pt lived alone; he reports that his neighbors assist him    Occupational Therapy Goals  Initiated 12/26/2019  1. Patient will perform grooming with set-up within 7 day(s). 2.  Patient will perform sit to  preparation for functional transfers OOB with minimal assistance/contact guard assist within 7 day(s). 3.  Patient will perform supine to sit EOB in preparation for bed level adls with minimal assistance/contact guard assist within 7 day(s). 4.  Patient will perform toilet transfers with moderate assistance  within 7 day(s). 5.  Patient will perform all aspects of toileting with minimal assistance/contact guard assist within 7 day(s). 6.  Patient will participate in upper extremity therapeutic exercise/activities with supervision/set-up for 10 minutes within 7 day(s). 7.  Patient will demonstrate safe technique during functional mobility/ADL transfers within 7 days. 8.  Patient will perform upper body dressing with minimal assistance within 7 days. Outcome: Progressing Towards Goal   OCCUPATIONAL THERAPY TREATMENT  Patient: Gloria Buchanan (18 y.o. male)  Date: 12/31/2019  Diagnosis: Alcohol withdrawal ([facility] [address] [F10.239]   Alcohol withdrawal ([facility] [address]       Precautions: Fall, Contact, Seizure, DNR  Chart, occupational therapy assessment, plan of care, and goals were reviewed. ASSESSMENT  Patient continues with skilled OT services and is progressing towards goals. Initially declined tx due to \"it's a bad day,\"  but then able to participate with gentle re-direction and encouragement; willing to sit edge of bed but not to get out of bed; No O2 upon approach with sats at 88% during activity; 2L re-applied which patient was agreeable to.  Incontinent of B and B which patient did not report despite sacral wound present; patient aware of pressure relief strategies for feet more than buttocks/back. ++ edema L elbow region. Mod/min A overall functional mobility this session with encouragement needed for participation. Does respond well to positive feedback. Current Level of Function Impacting Discharge (ADLs): set up/encouragement UE ADLs, Mod A-D LE ADLs, Mod/min A functional mobility in bed with increased time needed; declined out of bed activity    Other factors to consider for discharge: sister currently caring for her spouse so cannot assist this patient         PLAN :  Patient continues to benefit from skilled intervention to address the above impairments. Continue treatment per established plan of care. to address goals. Recommend with staff:     Recommend next OT session: [facility], re-eval due to revascularization surgery 1-2-2020    Recommendation for discharge: (in order for the patient to meet his/her long term goals)  Therapy up to 5 days/week in SNF setting    This discharge recommendation:  Has been made in collaboration with the attending provider and/or case management    IF patient discharges home will need the following DME: bedside commode, transfer bench, and wheelchair       SUBJECTIVE:   Patient stated It's a bad day I am not getting up.     OBJECTIVE DATA SUMMARY:   Cognitive/Behavioral Status:  Neurologic State: Alert; Appropriate for age  Orientation Level: Oriented X4  Cognition: Follows commands; Impulsive; Appropriate for age attention/concentration  Perception: Appears intact  Perseveration: No perseveration noted  Safety/Judgement: Fall prevention; Insight into deficits; Decreased insight into deficits(aware he was wet/not aware he was soiled; did not call Nsg )    Functional Mobility and Transfers for ADLs:  Bed Mobility:  Rolling: Stand-by assistance;Contact guard assistance; Additional time; Adaptive equipment(bed rail used)  Supine to Sit: Minimum assistance; Moderate assistance; Additional time; Adaptive equipment  Sit to Supine: Minimum assistance; Additional time; Adaptive equipment    Transfers:     Functional Transfers  Toilet Transfer : (incontinent and didnt attempt BSC transfer)       Balance:  Sitting: Intact; Impaired  Sitting - Static: Good (unsupported)  Sitting - Dynamic: Fair (occasional)    ADL Intervention:  Feeding  Feeding Assistance: Set-up; Modified independent                        Lower Body Dressing Assistance  Protective Undergarmet: Total assistance (dependent)    Toileting  Toileting Assistance: Total assistance(dependent)(incont. B & B; didn't request assist despite lg sacral wound)    Cognitive Retraining  Attention to Task: Single task  Maintains Attention For (Time): Greater than 10 minutes  Following Commands: Follows one step commands/directions(recommend MoCA to clarify current cognition)  Safety/Judgement: Fall prevention; Insight into deficits; Decreased insight into deficits(aware he was wet/not aware he was soiled; did not call Nsg )        Pain:  5/10 L foot, \"not any worse than it has been and I have had pain meds\"    Activity Tolerance:   Fair, desaturates with exertion and requires oxygen, requires rest breaks, and observed SOB with activity  Please refer to the flowsheet for vital signs taken during this treatment.     After treatment patient left in no apparent distress:   Call bell within reach, Side rails x 3, and seated edge of bed awaiting meal     COMMUNICATION/COLLABORATION:   The patients plan of care was discussed with: Physical Therapist, Registered Nurse, and Physician    Bharati De Souza OTR/L  Time Calculation: 25 mins

## 2019-12-31 NOTE — PROGRESS NOTES
At 1900 shift change, Primary RN Rene Villarreal LPN and this RN tried to draw labs x multiple attempts. Unable to obtain labs. AM labs deferred to PICC team per pt, does not want to be stuck again.

## 2019-12-31 NOTE — PROGRESS NOTES
Bedside shift change report given to RN (oncoming nurse) by Danielle Dennis (offgoing nurse). Report included the following information SBAR, Kardex, Intake/Output, MAR and Recent Results. roxycodone x1. WC done during day shift. See progress note regarding labs. Small BM overnight, incontinent x3.

## 2020-01-01 PROCEDURE — 74011250637 HC RX REV CODE- 250/637: Performed by: FAMILY MEDICINE

## 2020-01-01 PROCEDURE — 74011250637 HC RX REV CODE- 250/637: Performed by: NURSE PRACTITIONER

## 2020-01-01 PROCEDURE — 74011250637 HC RX REV CODE- 250/637: Performed by: INTERNAL MEDICINE

## 2020-01-01 PROCEDURE — 65270000029 HC RM PRIVATE

## 2020-01-01 PROCEDURE — 74011250636 HC RX REV CODE- 250/636: Performed by: NURSE PRACTITIONER

## 2020-01-01 PROCEDURE — 94760 N-INVAS EAR/PLS OXIMETRY 1: CPT

## 2020-01-01 RX ADMIN — Medication 10 ML: at 21:31

## 2020-01-01 RX ADMIN — PRAVASTATIN SODIUM 40 MG: 40 TABLET ORAL at 21:31

## 2020-01-01 RX ADMIN — PANTOPRAZOLE SODIUM 40 MG: 40 TABLET, DELAYED RELEASE ORAL at 06:40

## 2020-01-01 RX ADMIN — Medication 10 ML: at 06:40

## 2020-01-01 RX ADMIN — HYDROCHLOROTHIAZIDE 25 MG: 25 TABLET ORAL at 09:43

## 2020-01-01 RX ADMIN — ENOXAPARIN SODIUM 40 MG: 40 INJECTION SUBCUTANEOUS at 09:43

## 2020-01-01 RX ADMIN — COLLAGENASE SANTYL: 250 OINTMENT TOPICAL at 09:48

## 2020-01-01 RX ADMIN — METOPROLOL TARTRATE 12.5 MG: 25 TABLET ORAL at 09:43

## 2020-01-01 RX ADMIN — GABAPENTIN 300 MG: 300 CAPSULE ORAL at 21:31

## 2020-01-01 RX ADMIN — LOSARTAN POTASSIUM 50 MG: 50 TABLET, FILM COATED ORAL at 09:43

## 2020-01-01 RX ADMIN — ACETAMINOPHEN 650 MG: 325 TABLET ORAL at 00:16

## 2020-01-01 RX ADMIN — OXYCODONE 10 MG: 5 TABLET ORAL at 20:38

## 2020-01-01 RX ADMIN — SENNOSIDES AND DOCUSATE SODIUM 1 TABLET: 8.6; 5 TABLET ORAL at 09:43

## 2020-01-01 RX ADMIN — FOLIC ACID 1 MG: 1 TABLET ORAL at 09:43

## 2020-01-01 RX ADMIN — Medication 10 ML: at 13:42

## 2020-01-01 RX ADMIN — GABAPENTIN 100 MG: 100 CAPSULE ORAL at 07:50

## 2020-01-01 RX ADMIN — OXYCODONE 10 MG: 5 TABLET ORAL at 13:41

## 2020-01-01 RX ADMIN — METOPROLOL TARTRATE 12.5 MG: 25 TABLET ORAL at 17:32

## 2020-01-01 RX ADMIN — THERA TABS 1 TABLET: TAB at 09:43

## 2020-01-01 RX ADMIN — Medication 100 MG: at 09:44

## 2020-01-01 RX ADMIN — GABAPENTIN 100 MG: 100 CAPSULE ORAL at 13:42

## 2020-01-01 RX ADMIN — ASPIRIN 81 MG 81 MG: 81 TABLET ORAL at 09:43

## 2020-01-01 NOTE — PROGRESS NOTES
General Surgery End of Shift Nursing Note    Bedside shift change report given to Alley Mooney (oncoming nurse) by Mariaa Diallo (offgoing nurse). Report included the following information SBAR, Kardex and MAR. Shift worked:   7a-7p   Summary of shift:    uneventful   Issues for physician to address:   none     Number times ambulated in hallway past shift: 0    Number of times OOB to chair past shift: 0    Pain Management:  Current medication: see mar  Patient states pain is manageable on current pain medication: YES    GI:    Current diet:  DIET CARDIAC Mechanical Soft; 2 GM NA (House Low NA)  DIET NUTRITIONAL SUPPLEMENTS No; Dinner; Ensure [employer]  DIET SNACKS HS Snack; Mechanical soft  DIET NPO    Tolerating current diet: YES  Passing flatus: YES  Last Bowel Movement: today   Appearance: brown    Respiratory:    Incentive Spirometer at bedside: YES  Patient instructed on use: YES    Patient Safety:    Falls Score: 4  Bed Alarm On? Yes  Sitter?  Yes    Carlos Mata

## 2020-01-01 NOTE — PROGRESS NOTES
Hospitalist Progress Note    NAME: Clayton Dailey   :  1945   MRN:  [id number]       Assessment / Plan:  Peripheral vascular disease POA- severe on CTA Abd/pelvis at [facility]  CTA:  -Occluded left common iliac and left external iliac artery with distal  reconstitution of the common femoral artery.  -Occluded right external iliac artery with distal reconstitution of the right  common femoral artery. IP Vascular consultation noted - plan for axillo-femoral bypass on 20  Added Gabapentin and Oxycodone 5-10 mg prn for pain management  Stool softners    Sinus Tachycardia - resolved/improved  Fluid overloaded on exam  -- S/p Lasix BID x 2 doses and now euvolemic. Lasix prn only now. Cont low dose Metoprolol BID started     Alcohol withdrawal syndrome/Delirium Tremens POA  Remains stable  Discontinued CIWA monitoring as patient has not received any Ativan for days now and has been in the hospital greater than a week  S/p IV Goody bag, now on PO Vitamins as tolerating PO    Acute cystitis/recent Klebsiella UTI POA  Recent Urine Cx (office) = Klebsiella pn. Species (sensitive)  S/p ceftriaxone x 3 days and currently afebrile    Hypertension  Continue home antihypertensive medication  Added Metoprolol  BID as above  Good control at this time     Code Status: Full   Surrogate Decision Maker:Brionna Quesada     DVT Prophylaxis: Lovenox   GI Prophylaxis: not indicated     Recommended Disposition: SNF/LTC Short term rehab with conversion to LTC placement when medicaid kicks in (currently pending)- [facility] after OR this Thursday now     Subjective:     Chief Complaint / Reason for Physician Visit: f/u Alcohol withdrawal syndrome, UTI  No complaints today. Just worried about having surgery. No pain, dyspnea, CP, N/V.     Review of Systems:  Symptom Y/N Comments  Symptom Y/N Comments   Fever/Chills n   Chest Pain n    Poor Appetite n   Edema n    Cough n   Abdominal Pain n    Sputum n   Joint Pain n SOB/[facility] n   Pruritis/Rash     Nausea/vomit    Tolerating PT/OT y    Diarrhea    Tolerating Diet y    Constipation    Other       Could NOT obtain due to:      Objective:     VITALS:   Last 24hrs VS reviewed since prior progress note. Most recent are:  Patient Vitals for the past 24 hrs:   Temp Pulse Resp BP SpO2   01/01/20 1207 98.7 °F (37.1 °C) 95 17 124/66 100 %   01/01/20 0917 98.7 °F (37.1 °C) 96 17 127/70 94 %   01/01/20 0400 98.6 °F (37 °C) 78 16 110/68 97 %   12/31/19 2256 99 °F (37.2 °C) 98 18 108/66 99 %   12/31/19 1959 98.8 °F (37.1 °C) 95 16 122/64 98 %       Intake/Output Summary (Last 24 hours) at 1/1/2020 1545  Last data filed at 1/1/2020 0019  Gross per 24 hour   Intake 350 ml   Output    Net 350 ml        PHYSICAL EXAM:  General: WD, WN. Alert, cooperative, no acute distress. Chronically ill. EENT:  EOMI. Anicteric sclerae. MMM  Resp:  CTA bilaterally, no wheezing or rales. No accessory muscle use  CV:  Normal rate, regular rhythm. No edema. GI:  Soft, Non distended, Non tender.  +Bowel sounds  Neurologic:  Alert, normal speech, moving extremities  Psych:   Fair insight. Not anxious nor agitated  Skin:  No rashes. No jaundice    Reviewed most current lab test results and cultures  YES  Reviewed most current radiology test results   YES  Review and summation of old records today    NO  Reviewed patient's current orders and MAR    YES  PMH/SH reviewed - no change compared to H&P  ________________________________________________________________________  Care Plan discussed with:    Comments   Patient x    Family      RN     Care Manager     Consultant                        Multidiciplinary team rounds were held today with , nursing, pharmacist and clinical coordinator. Patient's plan of care was discussed; medications were reviewed and discharge planning was addressed.      ________________________________________________________________________  Total NON critical care TIME:  16 Minutes    Total CRITICAL CARE TIME Spent:   Minutes non procedure based      Comments   >50% of visit spent in counseling and coordination of care     ________________________________________________________________________  Gely Brown MD     Procedures: see electronic medical records for all procedures/Xrays and details which were not copied into this note but were reviewed prior to creation of Plan. LABS:  I reviewed today's most current labs and imaging studies.   Pertinent labs include:  Recent Labs     12/31/19  1000 12/30/19  0400   WBC 9.7 11.5*   HGB 8.8* 9.6*   HCT 26.6* 30.0*   * 415*     Recent Labs     12/31/19  1000 12/30/19  0400    137   K 3.6 3.8    105   CO2 26 25   * 85   BUN 11 11   CREA 0.68* 0.70   CA 7.5* 8.5       Signed: Gely Brown MD

## 2020-01-01 NOTE — PROGRESS NOTES
Bedside shift change report given to Jasen Romero (oncoming nurse) by [facility] (offgoing nurse). Report included the following information SBAR, Kardex, Intake/Output, MAR and Recent Results.

## 2020-01-02 ENCOUNTER — ANESTHESIA (OUTPATIENT)
Dept: SURGERY | Age: 75
DRG: 981 | End: 2020-01-02
Payer: MEDICARE

## 2020-01-02 ENCOUNTER — ANESTHESIA EVENT (OUTPATIENT)
Dept: SURGERY | Age: 75
DRG: 981 | End: 2020-01-02
Payer: MEDICARE

## 2020-01-02 ENCOUNTER — APPOINTMENT (OUTPATIENT)
Dept: GENERAL RADIOLOGY | Age: 75
DRG: 981 | End: 2020-01-02
Attending: SURGERY
Payer: MEDICARE

## 2020-01-02 ENCOUNTER — APPOINTMENT (OUTPATIENT)
Dept: GENERAL RADIOLOGY | Age: 75
DRG: 981 | End: 2020-01-02
Attending: INTERNAL MEDICINE
Payer: MEDICARE

## 2020-01-02 LAB
ANION GAP SERPL CALC-SCNC: 6 MMOL/L (ref 5–15)
APPEARANCE UR: ABNORMAL
BACTERIA URNS QL MICRO: ABNORMAL /HPF
BASOPHILS # BLD: 0.1 K/UL (ref 0–0.1)
BASOPHILS NFR BLD: 1 % (ref 0–1)
BILIRUB UR QL: NEGATIVE
BUN SERPL-MCNC: 10 MG/DL (ref 6–20)
BUN/CREAT SERPL: 15 (ref 12–20)
CALCIUM SERPL-MCNC: 7.4 MG/DL (ref 8.5–10.1)
CHLORIDE SERPL-SCNC: 104 MMOL/L (ref 97–108)
CO2 SERPL-SCNC: 27 MMOL/L (ref 21–32)
COLOR UR: ABNORMAL
CREAT SERPL-MCNC: 0.68 MG/DL (ref 0.7–1.3)
DIFFERENTIAL METHOD BLD: ABNORMAL
EOSINOPHIL # BLD: 0.3 K/UL (ref 0–0.4)
EOSINOPHIL NFR BLD: 3 % (ref 0–7)
EPITH CASTS URNS QL MICRO: ABNORMAL /LPF
ERYTHROCYTE [DISTWIDTH] IN BLOOD BY AUTOMATED COUNT: 13.2 % (ref 11.5–14.5)
GLUCOSE SERPL-MCNC: 71 MG/DL (ref 65–100)
GLUCOSE UR STRIP.AUTO-MCNC: NEGATIVE MG/DL
HCT VFR BLD AUTO: 26.7 % (ref 36.6–50.3)
HGB BLD-MCNC: 8.6 G/DL (ref 12.1–17)
HGB UR QL STRIP: ABNORMAL
IMM GRANULOCYTES # BLD AUTO: 0 K/UL (ref 0–0.04)
IMM GRANULOCYTES NFR BLD AUTO: 0 % (ref 0–0.5)
KETONES UR QL STRIP.AUTO: 15 MG/DL
LACTATE SERPL-SCNC: 0.9 MMOL/L (ref 0.4–2)
LEUKOCYTE ESTERASE UR QL STRIP.AUTO: ABNORMAL
LYMPHOCYTES # BLD: 0.9 K/UL (ref 0.8–3.5)
LYMPHOCYTES NFR BLD: 11 % (ref 12–49)
MCH RBC QN AUTO: 36 PG (ref 26–34)
MCHC RBC AUTO-ENTMCNC: 32.2 G/DL (ref 30–36.5)
MCV RBC AUTO: 111.7 FL (ref 80–99)
MONOCYTES # BLD: 0.9 K/UL (ref 0–1)
MONOCYTES NFR BLD: 11 % (ref 5–13)
NEUTS SEG # BLD: 6.2 K/UL (ref 1.8–8)
NEUTS SEG NFR BLD: 74 % (ref 32–75)
NITRITE UR QL STRIP.AUTO: POSITIVE
NRBC # BLD: 0 K/UL (ref 0–0.01)
NRBC BLD-RTO: 0 PER 100 WBC
PH UR STRIP: 6.5 [PH] (ref 5–8)
PLATELET # BLD AUTO: 440 K/UL (ref 150–400)
PLATELET COMMENTS,PCOM: ABNORMAL
PMV BLD AUTO: 11.3 FL (ref 8.9–12.9)
POTASSIUM SERPL-SCNC: 3.6 MMOL/L (ref 3.5–5.1)
PROT UR STRIP-MCNC: ABNORMAL MG/DL
RBC # BLD AUTO: 2.39 M/UL (ref 4.1–5.7)
RBC #/AREA URNS HPF: ABNORMAL /HPF (ref 0–5)
RBC MORPH BLD: ABNORMAL
RBC MORPH BLD: ABNORMAL
SODIUM SERPL-SCNC: 137 MMOL/L (ref 136–145)
SP GR UR REFRACTOMETRY: 1.01 (ref 1–1.03)
UA: UC IF INDICATED,UAUC: ABNORMAL
UROBILINOGEN UR QL STRIP.AUTO: 1 EU/DL (ref 0.2–1)
WBC # BLD AUTO: 8.4 K/UL (ref 4.1–11.1)
WBC URNS QL MICRO: >100 /HPF (ref 0–4)

## 2020-01-02 PROCEDURE — 74011000258 HC RX REV CODE- 258: Performed by: SURGERY

## 2020-01-02 PROCEDURE — 77030010938 HC CLP LIG TELE -A: Performed by: SURGERY

## 2020-01-02 PROCEDURE — 77030040922 HC BLNKT HYPOTHRM STRY -A

## 2020-01-02 PROCEDURE — 87077 CULTURE AEROBIC IDENTIFY: CPT

## 2020-01-02 PROCEDURE — 74011250637 HC RX REV CODE- 250/637: Performed by: INTERNAL MEDICINE

## 2020-01-02 PROCEDURE — 83605 ASSAY OF LACTIC ACID: CPT

## 2020-01-02 PROCEDURE — 77030018836 HC SOL IRR NACL ICUM -A: Performed by: SURGERY

## 2020-01-02 PROCEDURE — 74011250636 HC RX REV CODE- 250/636: Performed by: NURSE ANESTHETIST, CERTIFIED REGISTERED

## 2020-01-02 PROCEDURE — 74011000250 HC RX REV CODE- 250: Performed by: NURSE ANESTHETIST, CERTIFIED REGISTERED

## 2020-01-02 PROCEDURE — 74011250636 HC RX REV CODE- 250/636: Performed by: SURGERY

## 2020-01-02 PROCEDURE — 77030008684 HC TU ET CUF COVD -B: Performed by: ANESTHESIOLOGY

## 2020-01-02 PROCEDURE — 71045 X-RAY EXAM CHEST 1 VIEW: CPT

## 2020-01-02 PROCEDURE — C1768 GRAFT, VASCULAR: HCPCS | Performed by: SURGERY

## 2020-01-02 PROCEDURE — 77030031139 HC SUT VCRL2 J&J -A: Performed by: SURGERY

## 2020-01-02 PROCEDURE — 94760 N-INVAS EAR/PLS OXIMETRY 1: CPT

## 2020-01-02 PROCEDURE — 81001 URINALYSIS AUTO W/SCOPE: CPT

## 2020-01-02 PROCEDURE — 76210000016 HC OR PH I REC 1 TO 1.5 HR: Performed by: SURGERY

## 2020-01-02 PROCEDURE — 74011250636 HC RX REV CODE- 250/636: Performed by: INTERNAL MEDICINE

## 2020-01-02 PROCEDURE — 87186 SC STD MICRODIL/AGAR DIL: CPT

## 2020-01-02 PROCEDURE — 77030010939 HC CLP LIG TELE -B: Performed by: SURGERY

## 2020-01-02 PROCEDURE — 74011000250 HC RX REV CODE- 250: Performed by: SURGERY

## 2020-01-02 PROCEDURE — 65270000029 HC RM PRIVATE

## 2020-01-02 PROCEDURE — 87086 URINE CULTURE/COLONY COUNT: CPT

## 2020-01-02 PROCEDURE — 76060000039 HC ANESTHESIA 4 TO 4.5 HR: Performed by: SURGERY

## 2020-01-02 PROCEDURE — 77030011640 HC PAD GRND REM COVD -A: Performed by: SURGERY

## 2020-01-02 PROCEDURE — 77030008463 HC STPLR SKN PROX J&J -B: Performed by: SURGERY

## 2020-01-02 PROCEDURE — 77030013797 HC KT TRNSDUC PRSSR EDWD -A: Performed by: ANESTHESIOLOGY

## 2020-01-02 PROCEDURE — 77030018673: Performed by: SURGERY

## 2020-01-02 PROCEDURE — 74011250637 HC RX REV CODE- 250/637: Performed by: SURGERY

## 2020-01-02 PROCEDURE — 77030019908 HC STETH ESOPH SIMS -A: Performed by: ANESTHESIOLOGY

## 2020-01-02 PROCEDURE — 74011250636 HC RX REV CODE- 250/636: Performed by: ANESTHESIOLOGY

## 2020-01-02 PROCEDURE — 77030002986 HC SUT PROL J&J -A: Performed by: SURGERY

## 2020-01-02 PROCEDURE — 76010000175 HC OR TIME 4 TO 4.5 HR INTENSV-TIER 1: Performed by: SURGERY

## 2020-01-02 PROCEDURE — 77030020263 HC SOL INJ SOD CL0.9% LFCR 1000ML: Performed by: SURGERY

## 2020-01-02 PROCEDURE — 77030005401 HC CATH RAD ARRO -A: Performed by: ANESTHESIOLOGY

## 2020-01-02 PROCEDURE — 77030005513 HC CATH URETH FOL11 MDII -B: Performed by: SURGERY

## 2020-01-02 PROCEDURE — 77030010516 HC APPL HEMA CLP TELE -B: Performed by: SURGERY

## 2020-01-02 PROCEDURE — 77030002987 HC SUT PROL J&J -B: Performed by: SURGERY

## 2020-01-02 PROCEDURE — 77030026438 HC STYL ET INTUB CARD -A: Performed by: ANESTHESIOLOGY

## 2020-01-02 PROCEDURE — 77030002924 HC SUT GORTX WLGO -B: Performed by: SURGERY

## 2020-01-02 PROCEDURE — 74011250637 HC RX REV CODE- 250/637: Performed by: NURSE PRACTITIONER

## 2020-01-02 PROCEDURE — 77030040179 HC DEV DRSG WND PICO S&N -C: Performed by: SURGERY

## 2020-01-02 PROCEDURE — 80048 BASIC METABOLIC PNL TOTAL CA: CPT

## 2020-01-02 PROCEDURE — 74011250637 HC RX REV CODE- 250/637: Performed by: FAMILY MEDICINE

## 2020-01-02 PROCEDURE — 36415 COLL VENOUS BLD VENIPUNCTURE: CPT

## 2020-01-02 PROCEDURE — 03150JC BYPASS RIGHT AXILLARY ARTERY TO BILATERAL LOWER LEG ARTERY WITH SYNTHETIC SUBSTITUTE, OPEN APPROACH: ICD-10-PCS | Performed by: SURGERY

## 2020-01-02 PROCEDURE — P9045 ALBUMIN (HUMAN), 5%, 250 ML: HCPCS | Performed by: NURSE ANESTHETIST, CERTIFIED REGISTERED

## 2020-01-02 PROCEDURE — 85025 COMPLETE CBC W/AUTO DIFF WBC: CPT

## 2020-01-02 RX ORDER — FUROSEMIDE 10 MG/ML
20 INJECTION INTRAMUSCULAR; INTRAVENOUS DAILY
Status: DISCONTINUED | OUTPATIENT
Start: 2020-01-03 | End: 2020-01-19

## 2020-01-02 RX ORDER — ROCURONIUM BROMIDE 10 MG/ML
INJECTION, SOLUTION INTRAVENOUS AS NEEDED
Status: DISCONTINUED | OUTPATIENT
Start: 2020-01-02 | End: 2020-01-02 | Stop reason: HOSPADM

## 2020-01-02 RX ORDER — PROPOFOL 10 MG/ML
INJECTION, EMULSION INTRAVENOUS AS NEEDED
Status: DISCONTINUED | OUTPATIENT
Start: 2020-01-02 | End: 2020-01-02 | Stop reason: HOSPADM

## 2020-01-02 RX ORDER — SODIUM CHLORIDE 9 MG/ML
50 INJECTION, SOLUTION INTRAVENOUS CONTINUOUS
Status: DISCONTINUED | OUTPATIENT
Start: 2020-01-02 | End: 2020-01-06

## 2020-01-02 RX ORDER — NEOSTIGMINE METHYLSULFATE 1 MG/ML
INJECTION INTRAVENOUS AS NEEDED
Status: DISCONTINUED | OUTPATIENT
Start: 2020-01-02 | End: 2020-01-02 | Stop reason: HOSPADM

## 2020-01-02 RX ORDER — FUROSEMIDE 10 MG/ML
20 INJECTION INTRAMUSCULAR; INTRAVENOUS ONCE
Status: COMPLETED | OUTPATIENT
Start: 2020-01-02 | End: 2020-01-02

## 2020-01-02 RX ORDER — PROTAMINE SULFATE 10 MG/ML
INJECTION, SOLUTION INTRAVENOUS AS NEEDED
Status: DISCONTINUED | OUTPATIENT
Start: 2020-01-02 | End: 2020-01-02 | Stop reason: HOSPADM

## 2020-01-02 RX ORDER — SUCCINYLCHOLINE CHLORIDE 20 MG/ML
INJECTION INTRAMUSCULAR; INTRAVENOUS AS NEEDED
Status: DISCONTINUED | OUTPATIENT
Start: 2020-01-02 | End: 2020-01-02 | Stop reason: HOSPADM

## 2020-01-02 RX ORDER — GLYCOPYRROLATE 0.2 MG/ML
INJECTION INTRAMUSCULAR; INTRAVENOUS AS NEEDED
Status: DISCONTINUED | OUTPATIENT
Start: 2020-01-02 | End: 2020-01-02 | Stop reason: HOSPADM

## 2020-01-02 RX ORDER — HEPARIN SODIUM 1000 [USP'U]/ML
INJECTION, SOLUTION INTRAVENOUS; SUBCUTANEOUS AS NEEDED
Status: DISCONTINUED | OUTPATIENT
Start: 2020-01-02 | End: 2020-01-02 | Stop reason: HOSPADM

## 2020-01-02 RX ORDER — MORPHINE SULFATE 2 MG/ML
2 INJECTION, SOLUTION INTRAMUSCULAR; INTRAVENOUS
Status: DISCONTINUED | OUTPATIENT
Start: 2020-01-02 | End: 2020-01-03

## 2020-01-02 RX ORDER — FENTANYL CITRATE 50 UG/ML
INJECTION, SOLUTION INTRAMUSCULAR; INTRAVENOUS AS NEEDED
Status: DISCONTINUED | OUTPATIENT
Start: 2020-01-02 | End: 2020-01-02 | Stop reason: HOSPADM

## 2020-01-02 RX ORDER — MIDAZOLAM HYDROCHLORIDE 1 MG/ML
INJECTION, SOLUTION INTRAMUSCULAR; INTRAVENOUS AS NEEDED
Status: DISCONTINUED | OUTPATIENT
Start: 2020-01-02 | End: 2020-01-02 | Stop reason: HOSPADM

## 2020-01-02 RX ORDER — ALBUMIN HUMAN 50 G/1000ML
SOLUTION INTRAVENOUS AS NEEDED
Status: DISCONTINUED | OUTPATIENT
Start: 2020-01-02 | End: 2020-01-02 | Stop reason: HOSPADM

## 2020-01-02 RX ORDER — MORPHINE SULFATE 2 MG/ML
4 INJECTION, SOLUTION INTRAMUSCULAR; INTRAVENOUS
Status: DISCONTINUED | OUTPATIENT
Start: 2020-01-02 | End: 2020-01-03

## 2020-01-02 RX ORDER — ONDANSETRON 2 MG/ML
INJECTION INTRAMUSCULAR; INTRAVENOUS AS NEEDED
Status: DISCONTINUED | OUTPATIENT
Start: 2020-01-02 | End: 2020-01-02 | Stop reason: HOSPADM

## 2020-01-02 RX ORDER — PHENYLEPHRINE HCL IN 0.9% NACL 0.4MG/10ML
SYRINGE (ML) INTRAVENOUS AS NEEDED
Status: DISCONTINUED | OUTPATIENT
Start: 2020-01-02 | End: 2020-01-02 | Stop reason: HOSPADM

## 2020-01-02 RX ORDER — SODIUM CHLORIDE, SODIUM LACTATE, POTASSIUM CHLORIDE, CALCIUM CHLORIDE 600; 310; 30; 20 MG/100ML; MG/100ML; MG/100ML; MG/100ML
25 INJECTION, SOLUTION INTRAVENOUS CONTINUOUS
Status: DISCONTINUED | OUTPATIENT
Start: 2020-01-02 | End: 2020-01-02 | Stop reason: HOSPADM

## 2020-01-02 RX ADMIN — ROCURONIUM BROMIDE 20 MG: 10 INJECTION INTRAVENOUS at 13:25

## 2020-01-02 RX ADMIN — PROPOFOL 60 MG: 10 INJECTION, EMULSION INTRAVENOUS at 10:55

## 2020-01-02 RX ADMIN — SUCCINYLCHOLINE CHLORIDE 120 MG: 20 INJECTION, SOLUTION INTRAMUSCULAR; INTRAVENOUS at 10:55

## 2020-01-02 RX ADMIN — Medication 10 ML: at 17:34

## 2020-01-02 RX ADMIN — HEPARIN SODIUM 7500 UNITS: 1000 INJECTION, SOLUTION INTRAVENOUS; SUBCUTANEOUS at 12:38

## 2020-01-02 RX ADMIN — METOPROLOL TARTRATE 12.5 MG: 25 TABLET ORAL at 17:33

## 2020-01-02 RX ADMIN — SODIUM CHLORIDE, POTASSIUM CHLORIDE, SODIUM LACTATE AND CALCIUM CHLORIDE 25 ML/HR: 600; 310; 30; 20 INJECTION, SOLUTION INTRAVENOUS at 10:23

## 2020-01-02 RX ADMIN — HEPARIN SODIUM 1000 UNITS: 1000 INJECTION, SOLUTION INTRAVENOUS; SUBCUTANEOUS at 13:43

## 2020-01-02 RX ADMIN — ROCURONIUM BROMIDE 30 MG: 10 INJECTION INTRAVENOUS at 11:11

## 2020-01-02 RX ADMIN — Medication 10 ML: at 06:03

## 2020-01-02 RX ADMIN — SUGAMMADEX 200 MG: 100 INJECTION, SOLUTION INTRAVENOUS at 14:56

## 2020-01-02 RX ADMIN — WATER 2 G: 1 INJECTION INTRAMUSCULAR; INTRAVENOUS; SUBCUTANEOUS at 11:11

## 2020-01-02 RX ADMIN — FENTANYL CITRATE 50 MCG: 50 INJECTION, SOLUTION INTRAMUSCULAR; INTRAVENOUS at 12:29

## 2020-01-02 RX ADMIN — SODIUM CHLORIDE, POTASSIUM CHLORIDE, SODIUM LACTATE AND CALCIUM CHLORIDE: 600; 310; 30; 20 INJECTION, SOLUTION INTRAVENOUS at 14:08

## 2020-01-02 RX ADMIN — PHENYLEPHRINE HYDROCHLORIDE 50 MCG/MIN: 10 INJECTION INTRAVENOUS at 10:58

## 2020-01-02 RX ADMIN — METOPROLOL TARTRATE 12.5 MG: 25 TABLET ORAL at 08:15

## 2020-01-02 RX ADMIN — ROCURONIUM BROMIDE 10 MG: 10 INJECTION INTRAVENOUS at 10:55

## 2020-01-02 RX ADMIN — GLYCOPYRROLATE 0.4 MG: 0.2 INJECTION, SOLUTION INTRAMUSCULAR; INTRAVENOUS at 14:22

## 2020-01-02 RX ADMIN — FENTANYL CITRATE 50 MCG: 50 INJECTION, SOLUTION INTRAMUSCULAR; INTRAVENOUS at 11:25

## 2020-01-02 RX ADMIN — ROCURONIUM BROMIDE 10 MG: 10 INJECTION INTRAVENOUS at 12:29

## 2020-01-02 RX ADMIN — NEOSTIGMINE METHYLSULFATE 3 MG: 1 INJECTION INTRAVENOUS at 14:22

## 2020-01-02 RX ADMIN — FENTANYL CITRATE 25 MCG: 50 INJECTION, SOLUTION INTRAMUSCULAR; INTRAVENOUS at 10:55

## 2020-01-02 RX ADMIN — FENTANYL CITRATE 25 MCG: 50 INJECTION, SOLUTION INTRAMUSCULAR; INTRAVENOUS at 10:30

## 2020-01-02 RX ADMIN — GABAPENTIN 300 MG: 300 CAPSULE ORAL at 21:41

## 2020-01-02 RX ADMIN — ONDANSETRON HYDROCHLORIDE 4 MG: 2 INJECTION, SOLUTION INTRAMUSCULAR; INTRAVENOUS at 14:11

## 2020-01-02 RX ADMIN — ALBUMIN (HUMAN) 50 ML: 2.5 SOLUTION INTRAVENOUS at 13:45

## 2020-01-02 RX ADMIN — ALBUMIN (HUMAN) 50 ML: 2.5 SOLUTION INTRAVENOUS at 13:40

## 2020-01-02 RX ADMIN — CEFTRIAXONE 1 G: 1 INJECTION, POWDER, FOR SOLUTION INTRAMUSCULAR; INTRAVENOUS at 17:34

## 2020-01-02 RX ADMIN — MIDAZOLAM HYDROCHLORIDE 1 MG: 1 INJECTION INTRAMUSCULAR; INTRAVENOUS at 10:30

## 2020-01-02 RX ADMIN — PROPOFOL 40 MG: 10 INJECTION, EMULSION INTRAVENOUS at 14:35

## 2020-01-02 RX ADMIN — ALBUMIN (HUMAN) 50 ML: 2.5 SOLUTION INTRAVENOUS at 13:30

## 2020-01-02 RX ADMIN — FUROSEMIDE 20 MG: 10 INJECTION, SOLUTION INTRAMUSCULAR; INTRAVENOUS at 09:17

## 2020-01-02 RX ADMIN — ALBUMIN (HUMAN) 50 ML: 2.5 SOLUTION INTRAVENOUS at 13:35

## 2020-01-02 RX ADMIN — GABAPENTIN 100 MG: 100 CAPSULE ORAL at 08:15

## 2020-01-02 RX ADMIN — PROTAMINE SULFATE 25 MG: 10 INJECTION, SOLUTION INTRAVENOUS at 14:11

## 2020-01-02 RX ADMIN — Medication 120 MCG: at 10:58

## 2020-01-02 RX ADMIN — ALBUMIN (HUMAN) 50 ML: 2.5 SOLUTION INTRAVENOUS at 13:25

## 2020-01-02 RX ADMIN — GABAPENTIN 100 MG: 100 CAPSULE ORAL at 17:33

## 2020-01-02 RX ADMIN — PRAVASTATIN SODIUM 40 MG: 40 TABLET ORAL at 21:41

## 2020-01-02 RX ADMIN — COLLAGENASE SANTYL: 250 OINTMENT TOPICAL at 08:22

## 2020-01-02 RX ADMIN — Medication 10 ML: at 21:42

## 2020-01-02 RX ADMIN — SODIUM CHLORIDE 50 ML/HR: 900 INJECTION, SOLUTION INTRAVENOUS at 17:42

## 2020-01-02 NOTE — ANESTHESIA PREPROCEDURE EVALUATION
Relevant Problems   No relevant active problems       Anesthetic History   No history of anesthetic complications            Review of Systems / Medical History  Patient summary reviewed, nursing notes reviewed and pertinent labs reviewed    Pulmonary    COPD               Neuro/Psych              Cardiovascular    Hypertension        Dysrhythmias : atrial fibrillation  PAD and hyperlipidemia         GI/Hepatic/Renal     GERD          Comments: H/o colovesical fistula Endo/Other        Anemia     Other Findings   Comments: Deconditioned  H/o EtOH abuse           Physical Exam    Airway  Mallampati: III  TM Distance: 4 - 6 cm  Neck ROM: normal range of motion   Mouth opening: Normal     Cardiovascular    Rhythm: regular  Rate: normal         Dental    Dentition: Edentulous     Pulmonary      Decreased breath sounds: bibasilar           Abdominal  GI exam deferred       Other Findings            Anesthetic Plan    ASA: 4  Anesthesia type: general    Monitoring Plan: Arterial line and CVP      Induction: Intravenous  Anesthetic plan and risks discussed with: Patient

## 2020-01-02 NOTE — PROGRESS NOTES
Occupational Therapy  Medical record reviewed. Pt is off the floor in the OR at this time for planned axillo-bifemoral bypass. Will defer and continue to follow as appropriate.

## 2020-01-02 NOTE — PROGRESS NOTES
2719- Dr. Marysol Clark notified of CXR results. Order received for 20mg  IV Lasix x1.    0917- 20 mg IV Lasix given and flushed.

## 2020-01-02 NOTE — ROUTINE PROCESS
sbar in note pt to holding area identifies self and procedure for today has been npo.    Consent signed in holding area

## 2020-01-02 NOTE — PROGRESS NOTES
Vascular Surgery Progress Note  Darryl Carolina ACNP-BC  1/2/2020       Subjective:     Mr. Phil Cobos has a pmhx significant for alcoholism, COPD, DM, PAFib, HTN, HLD, and GERD. Jim Butterfield continues to smoke daily. Jim Butterfield has a pmhx significant for LLE stenting per his report. Jim Butterfield is s/p TMA of the left foot (10/2016). Jim Butterfield presented to the clinic in November 2019 w/ compliant of BLE claudication, BLE nocturnal cramping, and a wound to the incision of the left TMA after hitting his foot.  He has ischemia of the left foot and rubor of the right foot.  His ABIs were right 0.40 w/ a flatline TBI and left 0.32 w/ a surgically absent left great toe.  He underwent an arteriogram on 11/5/2019 that revealed severe bilateral aorto iliac disease that was not amendable to endovascular intervention. Jim Butterfield returned to the clinic  w/ a CTA that was significant for an occluded left common iliac and left external iliac artery and occluded right external iliac artery.  Plan is for axillo-bifemoral bypass.  He then presented to the emergency room on 12/16/2019 w/ complaint of BLE weakness.  He was admitted to [facility] and diagnosed w/ cystitis.  While admitted he was noted to have left lateral heel ulcer.  He was discharged to the TCU at [facility] on 12/20/2019 for rehabilitation.  Late that evening he developed active w/d symptoms and returned to the emergency room at [address] was then transferred to [facility] for management of alcohol withdrawal which has resolved.  His urine cx grew Klebsiella and he has completed a course of antibx.      Over the last 24hrs he developed a persistent low grade fever and hypoxia. CXR this am is significant for BL pleural effusion w/ partial LLL collapse. Diuretics have been ordered by primary team he continues on HCTZ. He is tachycardic. His blood pressure is stable.       Wounds are unchanged from previous evaluation.        Nursing Data:     Patient Vitals for the past 24 hrs:   BP Temp Pulse Resp SpO2   01/02/20 0714 121/66 98.6 °F (37 °C) (!) 104 18 95 %   01/02/20 0603  100.3 °F (37.9 °C)      01/02/20 0506  100.1 °F (37.8 °C)      01/02/20 0321 112/61 (!) 100.7 °F (38.2 °C) (!) 112 18 93 %   01/01/20 2326 146/76 100.1 °F (37.8 °C) (!) 114 16 92 %   01/01/20 2250 96/56 99.4 °F (37.4 °C) (!) 101 18 93 %   01/01/20 2033 96/57 99.8 °F (37.7 °C) (!) 102 18 96 %   01/01/20 1629 147/77 99.4 °F (37.4 °C) (!) 104 18 96 %   01/01/20 1207 124/66 98.7 °F (37.1 °C) 95 17 100 %     ---------------------------------------------------------------------------------------------------------    Intake/Output Summary (Last 24 hours) at 1/2/2020 0955  Last data filed at 1/1/2020 2205  Gross per 24 hour   Intake 1060 ml   Output    Net 1060 ml       Exam:     Physical Exam  Constitutional:       Appearance: He is ill-appearing. HENT:      Head: Normocephalic. Nose: Nose normal.      Mouth/Throat:      Mouth: Mucous membranes are moist.   Eyes:      Pupils: Pupils are equal, round, and reactive to light. Neck:      Musculoskeletal: Normal range of motion. Cardiovascular:      Rate and Rhythm: Regular rhythm. Tachycardia present. Pulmonary:      Effort: No tachypnea, accessory muscle usage or respiratory distress. Breath sounds: Rales present. Abdominal:      General: Abdomen is flat. Palpations: Abdomen is soft. Skin:     Comments: Necrotic ulcer to lateral left heel. Stable chronic wound of the TMA incision. Rubor of the plantar aspect of the left foot. Ischemic changes to the toes of the 2nd and 3rd digit of the right foot w/ open ulceration of the 2nd digit. Neurological:      Mental Status: He is alert. Mental status is at baseline. Lab Review:     .   Recent Results (from the past 24 hour(s))   CBC WITH AUTOMATED DIFF    Collection Time: 01/02/20  3:34 AM   Result Value Ref Range    WBC 8.4 4.1 - 11.1 K/uL    RBC 2.39 (L) 4.10 - 5.70 M/uL    HGB 8.6 (L) 12.1 - 17.0 g/dL    HCT 26.7 (L) 36.6 - 50.3 %    MCV 111.7 (H) 80.0 - 99.0 FL    MCH 36.0 (H) 26.0 - 34.0 PG    MCHC 32.2 30.0 - 36.5 g/dL    RDW 13.2 11.5 - 14.5 %    PLATELET 322 (H) 893 - 400 K/uL    MPV 11.3 8.9 - 12.9 FL    NRBC 0.0 0  WBC    ABSOLUTE NRBC 0.00 0.00 - 0.01 K/uL    NEUTROPHILS 74 32 - 75 %    LYMPHOCYTES 11 (L) 12 - 49 %    MONOCYTES 11 5 - 13 %    EOSINOPHILS 3 0 - 7 %    BASOPHILS 1 0 - 1 %    IMMATURE GRANULOCYTES 0 0.0 - 0.5 %    ABS. NEUTROPHILS 6.2 1.8 - 8.0 K/UL    ABS. LYMPHOCYTES 0.9 0.8 - 3.5 K/UL    ABS. MONOCYTES 0.9 0.0 - 1.0 K/UL    ABS. EOSINOPHILS 0.3 0.0 - 0.4 K/UL    ABS. BASOPHILS 0.1 0.0 - 0.1 K/UL    ABS. IMM. GRANS. 0.0 0.00 - 0.04 K/UL    DF AUTOMATED      PLATELET COMMENTS Large Platelets      RBC COMMENTS ANISOCYTOSIS  1+        RBC COMMENTS MACROCYTOSIS  1+       METABOLIC PANEL, BASIC    Collection Time: 01/02/20  3:34 AM   Result Value Ref Range    Sodium 137 136 - 145 mmol/L    Potassium 3.6 3.5 - 5.1 mmol/L    Chloride 104 97 - 108 mmol/L    CO2 27 21 - 32 mmol/L    Anion gap 6 5 - 15 mmol/L    Glucose 71 65 - 100 mg/dL    BUN 10 6 - 20 MG/DL    Creatinine 0.68 (L) 0.70 - 1.30 MG/DL    BUN/Creatinine ratio 15 12 - 20      GFR est AA >60 >60 ml/min/1.73m2    GFR est non-AA >60 >60 ml/min/1.73m2    Calcium 7.4 (L) 8.5 - 10.1 MG/DL          Assessment/Plan:      Consult problem  Severe bilateral PAD w/ ulceration of the left heel, left TMA incision, and right 2nd digit.  -w/ plan for axillo-bifemoral bypass on 01/02/2020.      We will proceed to axillary bifemoral bypass today. Continue NPO status      Active problems  SIRs  -w/ fever and tachycardic   Acute hypoxic respiratory failure   Bilateral pleural effusion w/ partial LLL collapse  COPD  -not in exacerbation   Diuresis per primary team     Urinary retention  -patient voided twice prior to procedure and had retained urine when isabel was placed for procedure.    Klebsiella pneumoniae UTI   -course of Rocephin completed   -repeat urine cx NG  Repeat UA w/ reflex cx due to fever  Blood cxs ordered per primary team    PAfib w/ RVR   -intermittent mild tachycardia. May benefit from better rate control.    -poor candidate for [facility]  Hypertension  -stable on ARB  Hyperlipidemia    Alcohol withdrawal  -resolved   Thiamine deficiency   -consider oral supplementation   Cognitive deficit  -likely chronic   Diabetes Mellitus w/ hypoglycemia    -on liberalized diet w/ hs snack   -suspect malnutrition   -possible liver dysfunction resulting in poor glycogen stores  Add on albumin level   Hypernatremia   -resolved   Hypomagnesemia  -resolved   Macrocytic anemia   -relatively stable   Thrombocytopenia  -resolved   GERD  Management of comorbid conditions by primary team.     VTE Prophylaxis:  LMWH-held for procedure   SCDs: contraindicated in severe BLE PAD     Disposition:  SNF. Agree w/ ECF following rehabilitation.

## 2020-01-02 NOTE — ANESTHESIA PROCEDURE NOTES
Arterial Line Placement    Start time: 1/2/2020 10:31 AM  End time: 1/2/2020 10:41 AM  Performed by: Rosie Padron CRNA  Authorized by:  Joselyn Moffett MD     Pre-Procedure  Indications:  Arterial pressure monitoring  Preanesthetic Checklist: patient identified, risks and benefits discussed, site marked, patient being monitored, timeout performed and patient being monitored      Procedure:   Prep:  Chlorhexidine  Seldinger Technique?: Yes    Orientation:  Left  Location:  Radial artery  Catheter size:  20 G  Number of attempts:  2  Cont Cardiac Output Sensor: No      Assessment:   Post-procedure:  Line secured and sterile dressing applied  Patient Tolerance:  Patient tolerated the procedure well with no immediate complications

## 2020-01-02 NOTE — PROGRESS NOTES
No acute events. Low grade fever slightly improved, continued tachycardia. NPO since midnight for anticipated surgery. CHG bath completed.

## 2020-01-02 NOTE — PROGRESS NOTES
Nutrition Assessment:    RECOMMENDATIONS:   Resume Cardiac diet and supplements as tolerated     ASSESSMENT:   Chart reviewed, pt off the floor in OR at time of attempted visit. He is s/p axillobifemoral bypass. Appetite appears to have been good preop. Labs reviewed, WNL. BM noted today. Intake is likely adequate to meet est needs. Dietitians Intervention(s)/Plan(s): Resume diet and supplements  SUBJECTIVE/OBJECTIVE:   Pt off the floor for OR   Diet Order: Cardiac, Other (comment)(Ensure daily)  % Eaten:    Patient Vitals for the past 72 hrs:   % Diet Eaten   01/01/20 1930 100 %   01/01/20 1305 100 %       Pertinent Medications:rocephin, folvite, protonix, pericolace, MVI, thiamin; [place]@[employer]). Chemistries:  Lab Results   Component Value Date/Time    Sodium 137 01/02/2020 03:34 AM    Potassium 3.6 01/02/2020 03:34 AM    Chloride 104 01/02/2020 03:34 AM    CO2 27 01/02/2020 03:34 AM    Anion gap 6 01/02/2020 03:34 AM    Glucose 71 01/02/2020 03:34 AM    BUN 10 01/02/2020 03:34 AM    Creatinine 0.68 (L) 01/02/2020 03:34 AM    BUN/Creatinine ratio 15 01/02/2020 03:34 AM    GFR est AA >60 01/02/2020 03:34 AM    GFR est non-AA >60 01/02/2020 03:34 AM    Calcium 7.4 (L) 01/02/2020 03:34 AM    Albumin 2.6 (L) 12/16/2019 03:32 PM      Anthropometrics: Height:   Weight: 65 kg (143 lb 3.2 oz)  [] standing scale    []bed scale    []stated   []unknown     IBW (%IBW):   ( ) UBW (%UBW):   (  %)    BMI: Body mass index is 23.11 kg/m². This BMI is indicative of:  []Underweight   [x]Normal   []Overweight   [] Obesity   [] Extreme Obesity (BMI>40)  Estimated Nutrition Needs (Based on): 1726 Kcals/day(BMR (1328) x 1. 3AF) , 78 g(1.2 g/kg bw) Protein  Carbohydrate: At Least 130 g/day  Fluids: 1800 mL/day    Last BM: 1/2   [x]Active     []Hyperactive  []Hypoactive       [] Absent   BS  Skin:    [] Intact   [x] Incision  [] Breakdown   [] DTI   [] Tears/Excoriation/Abrasion  []Edema [] Other:    Wt Readings from Last 30 Encounters:   12/22/19 65 kg (143 lb 3.2 oz)   12/21/19 61.3 kg (135 lb 1.6 oz)   12/20/19 63.1 kg (139 lb 1.8 oz)   07/31/19 59.5 kg (131 lb 3.2 oz)   07/12/19 60.5 kg (133 lb 6.4 oz)   06/03/19 62.5 kg (137 lb 12.8 oz)   05/21/19 63 kg (139 lb)   05/10/19 62.1 kg (137 lb)   09/27/18 58.2 kg (128 lb 3.2 oz)   09/23/18 57.6 kg (127 lb)   08/30/18 57.6 kg (127 lb)   08/22/18 56.7 kg (125 lb)   08/07/18 56.7 kg (125 lb)   07/20/18 56.7 kg (125 lb)   06/07/18 58.5 kg (129 lb)   05/07/18 58.9 kg (129 lb 12.8 oz)   03/12/18 60.5 kg (133 lb 6.4 oz)   02/23/18 60.7 kg (133 lb 12.8 oz)   02/09/18 59.9 kg (132 lb)   02/08/18 60.3 kg (133 lb)   01/16/18 57.5 kg (126 lb 12.8 oz)   01/02/18 59 kg (130 lb)   10/05/17 57.4 kg (126 lb 9.6 oz)   09/21/17 57.6 kg (127 lb)   03/06/17 56.9 kg (125 lb 6.4 oz)   01/31/17 58.2 kg (128 lb 6.4 oz)   01/17/17 57.3 kg (126 lb 6.4 oz)   12/19/16 53.2 kg (117 lb 4 oz)   12/15/16 55.3 kg (122 lb)   10/21/16 53.1 kg (117 lb)        Dx of Malnutrition since admission: no    NUTRITION DIAGNOSES:   Problem:  Chewing/masticatory difficulty      Etiology: related to missing teeth     Signs/Symptoms: as evidenced by need for mechanical soft diet     Previous dx continues. NUTRITION INTERVENTIONS:  Meals/Snacks: General/healthful diet   Supplements: Commercial supplement              GOAL:   Pt will consume >70% of meals/supplement in 4-6 days.      NUTRITION MONITORING AND EVALUATION   Previous Goal: Pt will consume >70% of meals/supplements in 4-6 days   Previous Goal Met: Yes   Previous Recommendations Implemented: Yes   Cultural, Catholic, or Ethnic Dietary Needs: None   LEARNING NEEDS (Diet, Food/Nutrient-Drug Interaction):    [x] None Identified   [] Identified and Education Provided/Documented   [] Identified and Pt declined/was not appropriate      [x] Interdisciplinary Care Plan Reviewed/Documented    [x] Participated in Discharge Planning: Cardiac/Mechanical Soft diet    [] Interdisciplinary Rounds     NUTRITION RISK:    [] High              [] Moderate           [x]  Low  []  Minimal/Uncompromised      Jake Maxwell RD, [address]  Pager [phone]  Weekend Pager [phone]

## 2020-01-02 NOTE — PROGRESS NOTES
Hospitalist Progress Note    NAME: Marce He   :  1945   MRN:  [id number]       Assessment / Plan:  Peripheral vascular disease POA- severe on CTA Abd/pelvis at [facility]  CTA:  -Occluded left common iliac and left external iliac artery with distal  reconstitution of the common femoral artery.  -Occluded right external iliac artery with distal reconstitution of the right  common femoral artery. Axillo-femoral bypass performed today by Dr. Anthony Montgomery  Added Gabapentin and Oxycodone 5-10 mg prn for pain management  Stool softners    Fever due to UTI (recurrent)  -Pan-culture ordered this morning -- urine appears to be source of fever  -Ceftriaxone ordered by vascular surgery team -- I am in agreement and appreciate the assistance  -Will follow up culture  -CXR with pleural effusion and pulmonary edema. Given lack of hemodynamic instability and fever is resolved, I think aggressive fluid replacement would run risk of harming patient by causing respiratory compromise. Therefore, no IVF ordered. Alcohol withdrawal syndrome/Delirium Tremens POA  Remains stable  Discontinued CIWA monitoring as patient has not received any Ativan for days now and has been in the hospital greater than a week  S/p IV Goody bag, now on PO Vitamins as tolerating PO    Acute cystitis/recent Klebsiella UTI POA  Recent Urine Cx (office) = Klebsiella pn. Species (sensitive)  S/p ceftriaxone x 3 days earlier in hospital stay    Hypertension  Continue home antihypertensive medication  Added Metoprolol  BID as above  Good control at this time     Code Status: Full   Surrogate Decision Maker:Brionna Quesada     DVT Prophylaxis: Lovenox   GI Prophylaxis: not indicated     Recommended Disposition: SNF/LTC Short term rehab with conversion to LTC placement when medicaid kicks in (currently pending). Now await urine culture result and confirmation of no bacteremia.      Subjective:     Chief Complaint / Reason for Physician Visit: f/u Alcohol withdrawal syndrome, UTI  No new complaints today. Noted to have low grade fever overnight, resolved after Tylenol. Patient denies complaints, says he feels fine. No N/V/abdominal pain, dysuria, productive cough, rash. Review of Systems:  Symptom Y/N Comments  Symptom Y/N Comments   Fever/Chills n   Chest Pain n    Poor Appetite n   Edema n    Cough n   Abdominal Pain n    Sputum n   Joint Pain n    SOB/[place] n   Pruritis/Rash     Nausea/vomit    Tolerating PT/OT y    Diarrhea    Tolerating Diet y    Constipation    Other       Could NOT obtain due to:      Objective:     VITALS:   Last 24hrs VS reviewed since prior progress note. Most recent are:  Patient Vitals for the past 24 hrs:   Temp Pulse Resp BP SpO2   01/02/20 1602 97.4 °F (36.3 °C) 82 21 105/40 98 %   01/02/20 1535  83 21 112/59 100 %   01/02/20 1530  84 19 129/48 100 %   01/02/20 1525  84 22 126/62 100 %   01/02/20 1520  87 23 130/78 98 %   01/02/20 1515  87 21 138/77    01/02/20 1510  86 22 123/67 95 %   01/02/20 1505  88 25 121/71 96 %   01/02/20 1503 97.6 °F (36.4 °C) 89 17 125/70 93 %   01/02/20 1500    125/70 92 %   01/02/20 1039  87  97/51    01/02/20 1032  85  115/52 92 %   01/02/20 1015  89  127/59    01/02/20 1014  96  119/75    01/02/20 0930 98.5 °F (36.9 °C) 96 (!) 156 121/55 96 %   01/02/20 0714 98.6 °F (37 °C) (!) 104 18 121/66 95 %   01/02/20 0603 100.3 °F (37.9 °C)       01/02/20 0506 100.1 °F (37.8 °C)       01/02/20 0321 (!) 100.7 °F (38.2 °C) (!) 112 18 112/61 93 %   01/01/20 2326 100.1 °F (37.8 °C) (!) 114 16 146/76 92 %   01/01/20 2250 99.4 °F (37.4 °C) (!) 101 18 96/56 93 %   01/01/20 2033 99.8 °F (37.7 °C) (!) 102 18 96/57 96 %   01/01/20 1629 99.4 °F (37.4 °C) (!) 104 18 147/77 96 %       Intake/Output Summary (Last 24 hours) at 1/2/2020 1618  Last data filed at 1/2/2020 1433  Gross per 24 hour   Intake 2020 ml   Output 825 ml   Net 1195 ml        PHYSICAL EXAM:  General: WD, WN.  Alert, cooperative, no acute distress. Chronically ill. Does not appear toxic. EENT:  EOMI. Anicteric sclerae. MMM  Resp:  Diminished breath sounds at bases. No accessory muscle use  CV:  Normal rate, regular rhythm. No edema. GI:  Soft, Non distended, Non tender.  +Bowel sounds  Neurologic:  Alert, normal speech, moving extremities  Psych:   Fair insight. Not anxious nor agitated  Skin:  No rashes. No jaundice    Reviewed most current lab test results and cultures  YES  Reviewed most current radiology test results   YES  Review and summation of old records today    NO  Reviewed patient's current orders and MAR    YES  PMH/SH reviewed - no change compared to H&P  ________________________________________________________________________  Care Plan discussed with:    Comments   Patient x    Family      RN x    Care Manager     Consultant                        Multidiciplinary team rounds were held today with , nursing, pharmacist and clinical coordinator. Patient's plan of care was discussed; medications were reviewed and discharge planning was addressed. ________________________________________________________________________  Total NON critical care TIME:  16   Minutes    Total CRITICAL CARE TIME Spent:   Minutes non procedure based      Comments   >50% of visit spent in counseling and coordination of care     ________________________________________________________________________  Gely Brown MD     Procedures: see electronic medical records for all procedures/Xrays and details which were not copied into this note but were reviewed prior to creation of Plan. LABS:  I reviewed today's most current labs and imaging studies.   Pertinent labs include:  Recent Labs     01/02/20  0334 12/31/19  1000   WBC 8.4 9.7   HGB 8.6* 8.8*   HCT 26.7* 26.6*   * 415*     Recent Labs     01/02/20  0334 12/31/19  1000    137   K 3.6 3.6    104   CO2 27 26   GLU 71 105*   BUN 10 11   CREA 0.68* 0.68*   CA 7.4* 7.5*       Signed: Trevor Bourne MD

## 2020-01-02 NOTE — PROGRESS NOTES
MEWS 3 related to tachycardia. Not a new finding. Primary RN to continue to monitor.      Patient Vitals for the past 4 hrs:   Temp Pulse Resp BP SpO2   01/01/20 2326 100.1 °F (37.8 °C) (!) 114 16 146/76 92 %   01/01/20 2250 99.4 °F (37.4 °C) (!) 101 18 96/56 93 %         Caleb Heard, charge RN

## 2020-01-02 NOTE — PERIOP NOTES
1502-    Handoff Report from Operating Room to PACU    Report received from Merlin Olson RN and FILIBERTO Butts regarding Leila Hanson. Surgeon(s):  Odette Perkins MD  And Procedure(s) (LRB):  AXILLO- BIFEMORAL BYPASS (N/A)  confirmed   with allergies, drains and dressings discussed. Anesthesia type, drugs, patient history, complications, estimated blood loss, vital signs, intake and output, and last pain medication, lines, reversal medications and temperature were reviewed. 1606-   TRANSFER - OUT REPORT:    Verbal report given to Annemarie Michael (name) on Leila Hanson  being transferred to 2132 (unit) for routine progression of care       Report consisted of patients Situation, Background, Assessment and   Recommendations(SBAR). Information from the following report(s) SBAR, OR Summary, MAR and Cardiac Rhythm NSR was reviewed with the receiving nurse. Opportunity for questions and clarification was provided.       Patient transported with:   O2 @ 4 liters  Registered Nurse  [facility]

## 2020-01-02 NOTE — PROGRESS NOTES
General Surgery End of Shift Nursing Note    Bedside shift change report given to [facility] (oncoming nurse) by Radha Allison (offgoing nurse). Report included the following information SBAR, Kardex and MAR. Shift worked:   4p-7p   Summary of shift:    Bm today, 2 urine in brief. CHG bath completed, I more needed before Sx tommow   Issues for physician to address:   none     Number times ambulated in hallway past shift: 0    Number of times OOB to chair past shift: 0    Pain Management:  Current medication: see mar  Patient states pain is manageable on current pain medication: YES    GI:    Current diet:  DIET CARDIAC Mechanical Soft; 2 GM NA (House Low NA)  DIET NUTRITIONAL SUPPLEMENTS No; Dinner; Ensure [employer]  DIET SNACKS HS Snack; Mechanical soft  DIET NPO    Tolerating current diet: YES  Passing flatus: YES  Last Bowel Movement: today   Appearance: brown soft    Respiratory:    Incentive Spirometer at bedside: YES  Patient instructed on use: YES    Patient Safety:    Falls Score: 4  Bed Alarm On? No  Sitter?  No    Loni Rodriguez

## 2020-01-02 NOTE — BRIEF OP NOTE
BRIEF OPERATIVE NOTE    Date of Procedure: 1/2/2020   Preoperative Diagnosis: PERIPHERAL ARTERY DISEASE WITH ULCERATION  Postoperative Diagnosis: PERIPHERAL ARTERY DISEASE WITH ULCERATION    Procedure(s):  AXILLO- BIFEMORAL BYPASS  Surgeon(s) and Role:     * Jhoana Garcia MD - Primary         Surgical Assistant: None    Surgical Staff:  Circ-1: Burgess Kuhn  Circ-Relief: Thaddeus Buckley RN  Scrub Tech-1: Frederick Helm  Scrub Tech-Relief: Shikha BA  Surg Asst-1: Irvine Laser  Surg Asst-Relief: Hamzah Forbes  Event Time In Time Out   Incision Start 1126    Incision Close       Anesthesia: General   Estimated Blood Loss: 100 cc  Specimens:   ID Type Source Tests Collected by Time Destination   1 : urine from isabel catheter insertion Urine Isabel Specimen URINE C&S Jhoana Garcia MD 1/2/2020 1056 Microbiology      Findings: Rt Axillary to bilateral CFA bypass. Good flow. Complications: None  Implants:   Implant Name Type Inv.  Item Serial No.  Lot No. LRB No. Used Action   GORE PROPATEN VASCULAR GRAFT REMOVABLE RING AXILLOBIFERMORAL   [id number]  [id number] N/A 1 Implanted

## 2020-01-02 NOTE — PERIOP NOTES
TRANSFER - IN REPORT:    Verbal report received from [facility] [facility] on Mary Gudino  being received from 2132 for ordered procedure. Estimated time for  given as 1030. Report consisted of patients Situation, Background, Assessment and   Recommendations(SBAR). Information from the following report(s) SBAR, Kardex, Intake/Output, MAR and Recent Results was reviewed with the receiving nurse. Opportunity for questions and clarification was provided. Assessment completed upon patients arrival to unit and care assumed.

## 2020-01-02 NOTE — ANESTHESIA POSTPROCEDURE EVALUATION
Procedure(s):  AXILLO- BIFEMORAL BYPASS. general    Anesthesia Post Evaluation        Patient location during evaluation: PACU  Note status: Adequate. Level of consciousness: responsive to verbal stimuli and sleepy but conscious  Pain management: satisfactory to patient  Airway patency: patent  Anesthetic complications: no  Cardiovascular status: acceptable  Respiratory status: acceptable  Hydration status: acceptable  Comments: +Post-Anesthesia Evaluation and Assessment    Patient: Collette Ellison MRN: [id number]  SSN: [id number]   YOB: 1945  Age: 75 y.o. Sex: male      Cardiovascular Function/Vital Signs    /48 (BP 1 Location: Left arm, BP Patient Position: At rest)   Pulse 84   Temp 36.4 °C (97.6 °F)   Resp 19   Wt 65 kg (143 lb 3.2 oz)   SpO2 100%   BMI 23.11 kg/m²     Patient is status post Procedure(s):  AXILLO- BIFEMORAL BYPASS. Nausea/Vomiting: Controlled. Postoperative hydration reviewed and adequate. Pain:  Pain Scale 1: Numeric (0 - 10) (01/02/20 1530)  Pain Intensity 1: 0 (01/02/20 1530)   Managed. Neurological Status:   Neuro (WDL): Exceptions to WDL (01/02/20 1503)   At baseline. Mental Status and Level of Consciousness: Arousable. Pulmonary Status:   O2 Device: Oxygen mask (01/02/20 1530)   Adequate oxygenation and airway patent. Complications related to anesthesia: None    Post-anesthesia assessment completed. No concerns. Signed By: Wesly Sterling MD    1/2/2020  Post anesthesia nausea and vomiting:  controlled      Vitals Value Taken Time   /59 1/2/2020  3:35 PM   Temp 36.4 °C (97.6 °F) 1/2/2020  3:03 PM   Pulse 84 1/2/2020  3:39 PM   Resp 19 1/2/2020  3:39 PM   SpO2 100 % 1/2/2020  3:39 PM   Vitals shown include unvalidated device data.

## 2020-01-02 NOTE — ANESTHESIA PROCEDURE NOTES
Central Line Placement    Start time: 1/2/2020 10:20 AM  End time: 1/2/2020 10:33 AM  Performed by: Elisabeth Lei MD  Authorized by: Elisabeth Lei MD     Indications: vascular access and need for vasopressors  Preanesthetic Checklist: patient identified, risks and benefits discussed, site marked, patient being monitored and timeout performed      Pre-procedure: All elements of maximal sterile barrier technique followed?  Yes    2% Chlorhexidine for cutaneous antisepsis, Hand hygiene performed prior to catheter insertion and Ultrasound guidance    Sterile Ultrasound Technique followed?: Yes            Procedure:   Prep:  Chlorhexidine    Orientation:  Right  Patient position:  Trendelenburg  Catheter type:  Quad lumen  Catheter size:  8.5 Fr  Catheter length:  16 cm  Number of attempts:  1  Successful placement: Yes      Assessment:   Post-procedure:  Catheter secured and sterile dressing with CHG applied  Assessment:  Blood return through all ports  Insertion:  Uncomplicated  Patient tolerance:  Patient tolerated the procedure well with no immediate complications

## 2020-01-03 LAB
ALBUMIN SERPL-MCNC: 1.5 G/DL (ref 3.5–5)
ALBUMIN/GLOB SERPL: 0.5 {RATIO} (ref 1.1–2.2)
ALP SERPL-CCNC: 56 U/L (ref 45–117)
ALT SERPL-CCNC: 10 U/L (ref 12–78)
ANION GAP SERPL CALC-SCNC: 10 MMOL/L (ref 5–15)
AST SERPL-CCNC: 15 U/L (ref 15–37)
BILIRUB SERPL-MCNC: 0.3 MG/DL (ref 0.2–1)
BUN SERPL-MCNC: 10 MG/DL (ref 6–20)
BUN/CREAT SERPL: 20 (ref 12–20)
CALCIUM SERPL-MCNC: 7.7 MG/DL (ref 8.5–10.1)
CHLORIDE SERPL-SCNC: 104 MMOL/L (ref 97–108)
CO2 SERPL-SCNC: 24 MMOL/L (ref 21–32)
CREAT SERPL-MCNC: 0.49 MG/DL (ref 0.7–1.3)
ERYTHROCYTE [DISTWIDTH] IN BLOOD BY AUTOMATED COUNT: 13.2 % (ref 11.5–14.5)
ERYTHROCYTE [DISTWIDTH] IN BLOOD BY AUTOMATED COUNT: 13.2 % (ref 11.5–14.5)
GLOBULIN SER CALC-MCNC: 3.1 G/DL (ref 2–4)
GLUCOSE SERPL-MCNC: 68 MG/DL (ref 65–100)
HCT VFR BLD AUTO: 23.2 % (ref 36.6–50.3)
HCT VFR BLD AUTO: 24.2 % (ref 36.6–50.3)
HEMOCCULT STL QL: POSITIVE
HGB BLD-MCNC: 7.6 G/DL (ref 12.1–17)
HGB BLD-MCNC: 7.7 G/DL (ref 12.1–17)
MCH RBC QN AUTO: 36.4 PG (ref 26–34)
MCH RBC QN AUTO: 36.5 PG (ref 26–34)
MCHC RBC AUTO-ENTMCNC: 31.8 G/DL (ref 30–36.5)
MCHC RBC AUTO-ENTMCNC: 32.8 G/DL (ref 30–36.5)
MCV RBC AUTO: 111 FL (ref 80–99)
MCV RBC AUTO: 114.7 FL (ref 80–99)
NRBC # BLD: 0 K/UL (ref 0–0.01)
NRBC # BLD: 0 K/UL (ref 0–0.01)
NRBC BLD-RTO: 0 PER 100 WBC
NRBC BLD-RTO: 0 PER 100 WBC
PLATELET # BLD AUTO: 342 K/UL (ref 150–400)
PLATELET # BLD AUTO: 372 K/UL (ref 150–400)
PMV BLD AUTO: 10.7 FL (ref 8.9–12.9)
PMV BLD AUTO: 11 FL (ref 8.9–12.9)
POTASSIUM SERPL-SCNC: 3.3 MMOL/L (ref 3.5–5.1)
PROT SERPL-MCNC: 4.6 G/DL (ref 6.4–8.2)
RBC # BLD AUTO: 2.09 M/UL (ref 4.1–5.7)
RBC # BLD AUTO: 2.11 M/UL (ref 4.1–5.7)
SODIUM SERPL-SCNC: 138 MMOL/L (ref 136–145)
WBC # BLD AUTO: 7.8 K/UL (ref 4.1–11.1)
WBC # BLD AUTO: 9.5 K/UL (ref 4.1–11.1)

## 2020-01-03 PROCEDURE — 74011000250 HC RX REV CODE- 250: Performed by: INTERNAL MEDICINE

## 2020-01-03 PROCEDURE — 80053 COMPREHEN METABOLIC PANEL: CPT

## 2020-01-03 PROCEDURE — 74011250636 HC RX REV CODE- 250/636: Performed by: SURGERY

## 2020-01-03 PROCEDURE — 94760 N-INVAS EAR/PLS OXIMETRY 1: CPT

## 2020-01-03 PROCEDURE — 65660000000 HC RM CCU STEPDOWN

## 2020-01-03 PROCEDURE — 77010033678 HC OXYGEN DAILY

## 2020-01-03 PROCEDURE — 85027 COMPLETE CBC AUTOMATED: CPT

## 2020-01-03 PROCEDURE — 82272 OCCULT BLD FECES 1-3 TESTS: CPT

## 2020-01-03 PROCEDURE — 74011250637 HC RX REV CODE- 250/637: Performed by: SURGERY

## 2020-01-03 PROCEDURE — P9047 ALBUMIN (HUMAN), 25%, 50ML: HCPCS | Performed by: INTERNAL MEDICINE

## 2020-01-03 PROCEDURE — 74011000258 HC RX REV CODE- 258: Performed by: INTERNAL MEDICINE

## 2020-01-03 PROCEDURE — 74011250636 HC RX REV CODE- 250/636: Performed by: INTERNAL MEDICINE

## 2020-01-03 PROCEDURE — 74011250636 HC RX REV CODE- 250/636: Performed by: FAMILY MEDICINE

## 2020-01-03 PROCEDURE — 74011250637 HC RX REV CODE- 250/637: Performed by: INTERNAL MEDICINE

## 2020-01-03 PROCEDURE — C9113 INJ PANTOPRAZOLE SODIUM, VIA: HCPCS | Performed by: FAMILY MEDICINE

## 2020-01-03 PROCEDURE — 74011000250 HC RX REV CODE- 250: Performed by: FAMILY MEDICINE

## 2020-01-03 PROCEDURE — 36415 COLL VENOUS BLD VENIPUNCTURE: CPT

## 2020-01-03 RX ORDER — HEPARIN SODIUM 5000 [USP'U]/ML
5000 INJECTION, SOLUTION INTRAVENOUS; SUBCUTANEOUS EVERY 8 HOURS
Status: DISCONTINUED | OUTPATIENT
Start: 2020-01-04 | End: 2020-01-22 | Stop reason: HOSPADM

## 2020-01-03 RX ORDER — OMEPRAZOLE 20 MG/1
20 CAPSULE, DELAYED RELEASE ORAL
COMMUNITY
End: 2020-02-02 | Stop reason: CLARIF

## 2020-01-03 RX ORDER — SODIUM CHLORIDE 9 MG/ML
500 INJECTION, SOLUTION INTRAVENOUS CONTINUOUS
Status: DISCONTINUED | OUTPATIENT
Start: 2020-01-03 | End: 2020-01-03

## 2020-01-03 RX ORDER — SODIUM CHLORIDE 9 MG/ML
500 INJECTION, SOLUTION INTRAVENOUS ONCE
Status: COMPLETED | OUTPATIENT
Start: 2020-01-03 | End: 2020-01-03

## 2020-01-03 RX ORDER — BETAMETHASONE DIPROPIONATE 0.5 MG/G
OINTMENT TOPICAL
Refills: 0 | COMMUNITY
Start: 2019-11-01 | End: 2020-01-22

## 2020-01-03 RX ORDER — ALBUMIN HUMAN 250 G/1000ML
12.5 SOLUTION INTRAVENOUS ONCE
Status: COMPLETED | OUTPATIENT
Start: 2020-01-03 | End: 2020-01-03

## 2020-01-03 RX ORDER — POTASSIUM CHLORIDE 20 MEQ/1
40 TABLET, EXTENDED RELEASE ORAL
Status: COMPLETED | OUTPATIENT
Start: 2020-01-03 | End: 2020-01-03

## 2020-01-03 RX ORDER — ALCLOMETASONE DIPROPIONATE 0.5 MG/G
CREAM TOPICAL
Refills: 0 | COMMUNITY
Start: 2019-10-11 | End: 2020-01-22

## 2020-01-03 RX ORDER — PROPRANOLOL HYDROCHLORIDE 20 MG/1
20 TABLET ORAL 2 TIMES DAILY
COMMUNITY
End: 2020-01-22

## 2020-01-03 RX ADMIN — SODIUM CHLORIDE 50 ML/HR: 900 INJECTION, SOLUTION INTRAVENOUS at 10:00

## 2020-01-03 RX ADMIN — POTASSIUM CHLORIDE 40 MEQ: 20 TABLET, EXTENDED RELEASE ORAL at 09:56

## 2020-01-03 RX ADMIN — SODIUM CHLORIDE 40 MG: 9 INJECTION INTRAMUSCULAR; INTRAVENOUS; SUBCUTANEOUS at 23:18

## 2020-01-03 RX ADMIN — SENNOSIDES AND DOCUSATE SODIUM 1 TABLET: 8.6; 5 TABLET ORAL at 09:57

## 2020-01-03 RX ADMIN — SODIUM CHLORIDE 50 ML/HR: 900 INJECTION, SOLUTION INTRAVENOUS at 05:31

## 2020-01-03 RX ADMIN — ALBUMIN (HUMAN) 12.5 G: 0.25 INJECTION, SOLUTION INTRAVENOUS at 11:46

## 2020-01-03 RX ADMIN — THERA TABS 1 TABLET: TAB at 09:57

## 2020-01-03 RX ADMIN — ASPIRIN 81 MG 81 MG: 81 TABLET ORAL at 09:57

## 2020-01-03 RX ADMIN — PIPERACILLIN AND TAZOBACTAM 3.38 G: 3; .375 INJECTION, POWDER, LYOPHILIZED, FOR SOLUTION INTRAVENOUS at 21:50

## 2020-01-03 RX ADMIN — NOREPINEPHRINE BITARTRATE 0.5 MCG/MIN: 1 INJECTION INTRAVENOUS at 10:00

## 2020-01-03 RX ADMIN — COLLAGENASE SANTYL: 250 OINTMENT TOPICAL at 10:06

## 2020-01-03 RX ADMIN — PIPERACILLIN AND TAZOBACTAM 3.38 G: 3; .375 INJECTION, POWDER, LYOPHILIZED, FOR SOLUTION INTRAVENOUS at 15:37

## 2020-01-03 RX ADMIN — SODIUM CHLORIDE 500 ML: 900 INJECTION, SOLUTION INTRAVENOUS at 07:00

## 2020-01-03 RX ADMIN — Medication 10 ML: at 15:37

## 2020-01-03 RX ADMIN — FOLIC ACID 1 MG: 1 TABLET ORAL at 09:57

## 2020-01-03 RX ADMIN — NOREPINEPHRINE BITARTRATE 2 MCG/MIN: 1 INJECTION INTRAVENOUS at 23:25

## 2020-01-03 RX ADMIN — Medication 30 ML: at 21:50

## 2020-01-03 RX ADMIN — Medication 100 MG: at 09:57

## 2020-01-03 RX ADMIN — SODIUM CHLORIDE 500 ML: 900 INJECTION, SOLUTION INTRAVENOUS at 06:11

## 2020-01-03 RX ADMIN — GABAPENTIN 100 MG: 100 CAPSULE ORAL at 15:35

## 2020-01-03 RX ADMIN — GABAPENTIN 300 MG: 300 CAPSULE ORAL at 21:49

## 2020-01-03 RX ADMIN — PRAVASTATIN SODIUM 40 MG: 40 TABLET ORAL at 21:49

## 2020-01-03 NOTE — PROGRESS NOTES
Physical Therapy Note    Chart reviewed. Noted patient with axillo-bifemoral bypass on 1/2/19. Patient currently in the process of transferring to CCU for higher level of care. Will hold PT re-evaluation and follow back once medically stable.     Thank you,  Dmitriy Silva, PT, DPT

## 2020-01-03 NOTE — PROGRESS NOTES
Tobias Church RN  7am-7pm      Patient Left unit early AM for procedure    Called Preop to inquire about which meds to give before surgery    Returned to unit  All incision CDI    No complaints of pain    4L O2    Vitals started    Report given to Daniella Gonzalez

## 2020-01-03 NOTE — PROGRESS NOTES
TRANSFER - OUT REPORT:    Verbal report given to Demetrice(name) on Sarah Lindsey  being transferred to CCU(unit) for change in patient condition(Post op hypotension)       Personal belongings sent with pt to include cellphone, cane, luggage. Report consisted of patients Situation, Background, Assessment and   Recommendations(SBAR). Information from the following report(s) SBAR, Kardex, ED Summary, OR Summary, Procedure Summary, Intake/Output, MAR, Recent Results and Cardiac Rhythm NSR, PVC- Afib history was reviewed with the receiving nurse. Lines:   Quad Lumen 01/02/20 Right (Active)   Central Line Being Utilized Yes 1/3/2020  4:11 AM   Criteria for Appropriate Use Limited/no vessel suitable for conventional peripheral access 1/3/2020  4:11 AM   Site Assessment Clean, dry, & intact 1/3/2020  4:11 AM   Infiltration Assessment 0 1/2/2020  3:30 PM   Affected Extremity/Extremities Color distal to insertion site pink (or appropriate for race) 1/3/2020  4:11 AM   Date of Last Dressing Change 01/02/20 1/3/2020  4:11 AM   Dressing Status Clean, dry, & intact 1/3/2020  4:11 AM   Dressing Type Disk with Chlorhexadine gluconate (CHG); Transparent 1/3/2020  4:11 AM   Action Taken Open ports on tubing capped 1/3/2020  4:11 AM   Proximal Hub Color/Line Status White;Flushed 1/3/2020  4:11 AM   Positive Blood Return (Medial Site) Yes 1/3/2020  4:11 AM   Medial 1 Hub Color/Line Status Gray;Flushed 1/3/2020  4:11 AM   Positive Blood Return (Lateral Site) Yes 1/3/2020  4:11 AM   Medial 2 Hub Color/Line Status Blue;Flushed 1/3/2020  4:11 AM   Positive Blood Return (Site #3) Yes 1/3/2020  4:11 AM   Distal Hub Color/Line Status Brown;Flushed 1/3/2020  4:11 AM   Positive Blood Return (Site #4) Yes 1/3/2020  4:11 AM   Alcohol Cap Used Yes 1/3/2020  4:11 AM       Peripheral IV 12/29/19 Anterior;Right Forearm (Active)   Site Assessment Clean, dry, & intact 1/3/2020  8:34 AM   Phlebitis Assessment 0 1/3/2020  4:11 AM Infiltration Assessment 0 1/3/2020  4:11 AM   Dressing Status Clean, dry, & intact 1/3/2020  4:11 AM   Dressing Type Tape;Transparent 1/3/2020  4:11 AM   Hub Color/Line Status Blue;Capped 1/3/2020  4:11 AM   Action Taken Open ports on tubing capped;Dressing reinforced 1/2/2020  3:05 PM   Alcohol Cap Used Yes 1/2/2020  3:05 PM        Opportunity for questions and clarification was provided.       Patient transported with:   Monitor  O2 @ 4 liters  Registered Nurse  [facility]

## 2020-01-03 NOTE — PROGRESS NOTES
Occupational Therapy  Pt has transferred to ICU for increased level of care. He is POD 1 today from axillo bi- femoral bypass surgery. Will defer at this time and follow up as appropriate for OT Re-Evaluation.

## 2020-01-03 NOTE — PROGRESS NOTES
Pharmacy Medication Reconciliation     The patient was interviewed regarding current PTA medication list, use and drug allergies. The patient was questioned regarding use of any other inhalers, topical products, over the counter medications, herbal medications, vitamin products or ophthalmic/nasal/otic medication use. Allergy Update: Contrast agent [iodine]    Recommendations/Findings: The following amendments were made to the patient's active medication list on file at [facility]:   1) Additions: omeprazole PRN, propranolol    2) Deletions: none    3) Changes: none      Pertinent Findings: none    -Clarified PTA med list with patient, RX query. PTA medication list was corrected to the following:     Prior to Admission Medications   Prescriptions Last Dose Informant Taking? alclometasone (ACLOVATE) 0.05 % topical cream  Self Yes   aspirin 81 mg chewable tablet 12/22/2019 at Unknown time Self Yes   Sig: Take 81 mg by mouth daily. betamethasone dipropionate (DIPROLENE) 0.05 % ointment  Self Yes   hydroCHLOROthiazide (HYDRODIURIL) 25 mg tablet 12/22/2019 at Unknown time Self Yes   Sig: take 1 tablet by mouth once daily for pressure   losartan (COZAAR) 50 mg tablet 12/22/2019 at Unknown time Self Yes   Sig: take 1 tablet by mouth once daily   omeprazole (PRILOSEC) 20 mg capsule  Self Yes   Sig: Take 20 mg by mouth daily as needed. pravastatin (PRAVACHOL) 40 mg tablet 12/22/2019 at Unknown time Self Yes   Sig: Take 40 mg by mouth nightly. propranolol (INDERAL) 20 mg tablet  Self Yes   Sig: Take 20 mg by mouth two (2) times a day.       Facility-Administered Medications: None          Thank you,  Wili Saucedo [place] [facility]

## 2020-01-03 NOTE — PROGRESS NOTES
RAPID RESPONSE TEAM    Called by primary RN [employer] to room 2132 over concern of hypotension. Patient had recent axillo-bifemoral bypass surgery. Patient Vitals for the past 12 hrs:   Temp Pulse Resp BP SpO2   01/03/20 0557    (!) 81/64    01/03/20 0549    (!) 79/54    01/03/20 0533    (!) 89/60    01/03/20 0411 98.1 °F (36.7 °C) 68 14 95/50 99 %   01/02/20 2320 97.6 °F (36.4 °C) 71 16 (!) 87/51 97 %   01/02/20 1933 97.4 °F (36.3 °C) 80 17 110/64 100 %     WBC   Date Value Ref Range Status   01/03/2020 7.8 4.1 - 11.1 K/uL Final     HGB   Date Value Ref Range Status   01/03/2020 7.7 (L) 12.1 - 17.0 g/dL Final     HCT   Date Value Ref Range Status   01/03/2020 24.2 (L) 36.6 - 50.3 % Final     PLATELET   Date Value Ref Range Status   01/03/2020 372 150 - 400 K/uL Final     Lactic acid   Date Value Ref Range Status   01/02/2020 0.9 0.4 - 2.0 MMOL/L Final     BUN   Date Value Ref Range Status   01/03/2020 10 6 - 20 MG/DL Final     Creatinine   Date Value Ref Range Status   01/03/2020 0.49 (L) 0.70 - 1.30 MG/DL Final     RN has previously notified hospitalist on call, patient received a 500 ml normal saline bolus, no response in BP. RN notified hospitalist again, orders received to monitor patient at this time. Patient alert, oriented, with no complaints. Surgical sites appropriate. Due to recent vascular surgery, recommended RN notify surgical team of hypotension. Please call with any needs or concerns.      Massimo Burns  Rapid Response [facility]

## 2020-01-03 NOTE — INTERDISCIPLINARY ROUNDS
Interdisciplinary team rounds were held 1/3/2020 with the following team members:Care Management, Nursing, Nutrition, Pharmacy, Physical Therapy, Physician and Respiratory Therapy. Plan of care discussed. See clinical pathway and/or care plan for interventions and desired outcomes.

## 2020-01-03 NOTE — PROGRESS NOTES
TRANSFER - IN REPORT:    Verbal report received from [facility], [address]   on Roxanne Can  being received from General Surgery for urgent transfer      Report consisted of patients Situation, Background, Assessment and   Recommendations(SBAR). Information from the following report(s) SBAR, Kardex, ED Summary, Procedure Summary, Intake/Output, MAR, Recent Results and Cardiac Rhythm NSR was reviewed with the receiving nurse. Opportunity for questions and clarification was provided. Assessment completed upon patients arrival to unit and care assumed.

## 2020-01-03 NOTE — PROGRESS NOTES
[facility]  Pulmonary, Critical Care, and Sleep Medicine    Name: Radha Gallegos MRN: [id number]   : 1945 Hospital: [facility]   Date: 1/3/2020        Critical Care Initial Patient Consult    IMPRESSION:   · Shock, etiology not entirely clear. Post op. No overt bleeding. ON levophed drip at this point. Was given saline bolus x2 last pm.   · S/p Axillo-femoral bypass. · Acute pulmonary edema and bilateral pleural effusions-team was reluctant to give much more volume  · Encephalopathy noted earlier. Now seems better. · Recurrent UTI  · Anemia: hgb o 7.7  · Hypokalemia  · Severe hypoalbuminemia. · Normal lactate  · Hx of Alcohol use, had recently been treated for Etoh withdrawal.   · DM  · Paroxysmal A fib. · Recent Klebsiella UTI. Completed Course of treatment. · Baseline Hypertension  · GERD  · Critically ill due to shock. Pulmonary edema, on pressors. 35 min CC, EOP  · Discussed with nurse this am.       RECOMMENDATIONS:   · Can Check CVP  · If hypotension persist may be helpful to check an echo. · Pressors as needed for SBP over 90  · Will follow serial Cr, Hgb. · Will give dose of albumin  · Will follow serial CXR to evaluate for progression of effusions. · Holding BP meds at present. · Glycemic Control and monitoring. · Vascular Surgery is following  · Monitor for bleeding. · On CTX per medical team.   · Hold lasix for now. · On nicotine patch. · ON PPI. Subjective/History: This patient has been seen and evaluated at the request of Dr. Wiliam Matta for above. Patient is a 75 y.o. male who underwent ax-fem bypass last pm. Was noted to have hypotension and encephalopathy earlier. He is seen in ICU. Has some numbness of his mouth. Has mild pain of his lower abdomen. No chest pain, no back pain. Has a right IJ CVC in place. Reviewed EMR.        Past Medical History:   Diagnosis Date    Abuse     alcoholism, quit 2012    Claudication of calf muscles [facility]) 2013    Colovesical fistula     Dr Veda Fabian Esophageal stricture     Esophagitis     GI bleed 10/2016    Hemochromatosis     Hyperlipidemia     Hypertension     Peripheral artery disease (HCC)     Dr. Pramod Florez Pulmonary nodule     right lung      Past Surgical History:   Procedure Laterality Date    COLONOSCOPY N/A 9/14/2016    COLONOSCOPY performed by [facility]ola Trent [facility] at [facility] ENDOSCOPY    COLONOSCOPY N/A 12/19/2016    COLONOSCOPY performed by Yola Trent MD at [facility] ENDOSCOPY    HX AMPUTATION Left 07/2016    left 4th toe from gangrene    HX ENDOSCOPY  10/2016    HX OTHER SURGICAL      left partial foot amputation      Prior to Admission medications    Medication Sig Start Date End Date Taking? Authorizing Provider   pravastatin (PRAVACHOL) 40 mg tablet Take 40 mg by mouth nightly. Yes Carlos, MD Ad   aspirin 81 mg chewable tablet Take 81 mg by mouth daily.    Yes Carlos, MD Ad   losartan (COZAAR) 50 mg tablet take 1 tablet by mouth once daily 10/15/19  Yes Delfina Saucedo MD   hydroCHLOROthiazide (HYDRODIURIL) 25 mg tablet take 1 tablet by mouth once daily for pressure 8/7/19  Yes Poncho Alex MD     Current Facility-Administered Medications   Medication Dose Route Frequency    NOREPINephrine (LEVOPHED) 8,000 mcg in dextrose 5% 250 mL infusion  0.5-16 mcg/min IntraVENous TITRATE    0.9% sodium chloride infusion  50 mL/hr IntraVENous CONTINUOUS    cefTRIAXone (ROCEPHIN) 1 g in 0.9% sodium chloride (MBP/ADV) 50 mL  1 g IntraVENous Q24H    [Held by provider] furosemide (LASIX) injection 20 mg  20 mg IntraVENous DAILY    collagenase (SANTYL) 250 unit/gram ointment   Topical DAILY    pantoprazole (PROTONIX) tablet 40 mg  40 mg Oral ACB    thiamine mononitrate (B-1) tablet 100 mg  100 mg Oral DAILY    folic acid (FOLVITE) tablet 1 mg  1 mg Oral DAILY    therapeutic multivitamin (THERAGRAN) tablet 1 Tab  1 Tab Oral DAILY    senna-docusate (PERICOLACE) 8.6-50 mg per tablet 1 Tab  1 Tab Oral DAILY    gabapentin (NEURONTIN) capsule 100 mg  100 mg Oral BID    gabapentin (NEURONTIN) capsule 300 mg  300 mg Oral QHS    nicotine (NICODERM CQ) 21 mg/24 hr patch 1 Patch  1 Patch TransDERmal DAILY    pravastatin (PRAVACHOL) tablet 40 mg  40 mg Oral QHS    aspirin chewable tablet 81 mg  81 mg Oral DAILY    sodium chloride (NS) flush 5-40 mL  5-40 mL IntraVENous Q8H     Allergies   Allergen Reactions    Contrast Agent [Iodine] Rash     Hives on back      Social History     Tobacco Use    Smoking status: Former Smoker     Packs/day: 0.00     Types: Cigarettes     Last attempt to quit: 2018     Years since quittin.0    Smokeless tobacco: Never Used   Substance Use Topics    Alcohol use: Yes     Alcohol/week: 21.0 standard drinks     Types: 21 Glasses of wine per week     Frequency: 4 or more times a week     Drinks per session: 3 or 4     Binge frequency: Never     Comment: \"about 2-3 glasses of wine per day, a HUGE decrease from before\"      Family History   Problem Relation Age of Onset    No Known Problems Mother         coma        Review of Systems:  Constitutional: positive for fatigue and malaise  Eyes: negative  Ears, nose, mouth, throat, and face: negative  Respiratory: negative  Cardiovascular: negative  Gastrointestinal: negative  Genitourinary:negative  Integument/breast: negative  Hematologic/lymphatic: negative  Musculoskeletal:negative  Neurological: negative  Behavioral/Psych: negative  Endocrine: negative  Allergic/Immunologic: negative    Objective:   Vital Signs:    Visit Vitals  /66   Pulse 94   Temp 98 °F (36.7 °C)   Resp (!) 0   Wt 65 kg (143 lb 3.2 oz)   SpO2 96%   BMI 23.11 kg/m²       O2 Device: Nasal cannula   O2 Flow Rate (L/min): 4 l/min   Temp (24hrs), Av.7 °F (36.5 °C), Min:97.4 °F (36.3 °C), Max:98.1 °F (36.7 °C)       Intake/Output:   Last shift:      No intake/output data recorded.   Last 3 shifts: 1901 -  0700  In: 2020 [P.O.:820; I.V.:1200]  Out: 1325 [Urine:1125]    Intake/Output Summary (Last 24 hours) at 1/3/2020 1040  Last data filed at 1/3/2020 5697  Gross per 24 hour   Intake 1200 ml   Output 1100 ml   Net 100 ml     Hemodynamics:   PAP:   CO:     Wedge:   CI:     CVP:    SVR:       PVR:       Ventilator Settings:  Mode Rate Tidal Volume Pressure FiO2 PEEP                    Peak airway pressure:      Minute ventilation:        Physical Exam:    General:  Alert, cooperative, no distress, appears stated age. Head:  Normocephalic, without obvious abnormality, atraumatic. Eyes:  Conjunctivae/corneas clear. PERRL, EOMs intact. Nose: Nares normal. Septum midline. Mucosa normal. No drainage or sinus tenderness. Throat: Lips, mucosa, and tongue normal. Teeth and gums normal.   Neck: Supple, symmetrical, trachea midline, no adenopathy, thyroid: no enlargment/tenderness/nodules, no carotid bruit and no JVD. Back:   Symmetric, no curvature. ROM normal.   Lungs:   Clear to auscultation bilaterally. Chest wall:  No tenderness or deformity. Heart:  Regular rate and rhythm, S1, S2 normal, no murmur, click, rub or gallop. Abdomen:   Soft, non-tender. Bowel sounds normal. No masses,  No organomegaly. Extremities: Extremities normal, atraumatic, no cyanosis or edema. Pulses: 2+ and symmetric all extremities.    Skin: Skin color, texture, turgor normal. No rashes or lesions   Lymph nodes: Cervical, supraclavicular, and axillary nodes normal.   Neurologic: Grossly nonfocal       Data:     Recent Results (from the past 24 hour(s))   LACTIC ACID    Collection Time: 01/02/20  6:49 PM   Result Value Ref Range    Lactic acid 0.9 0.4 - 2.0 MMOL/L   METABOLIC PANEL, COMPREHENSIVE    Collection Time: 01/03/20  4:10 AM   Result Value Ref Range    Sodium 138 136 - 145 mmol/L    Potassium 3.3 (L) 3.5 - 5.1 mmol/L    Chloride 104 97 - 108 mmol/L    CO2 24 21 - 32 mmol/L    Anion gap 10 5 - 15 mmol/L    Glucose 68 65 - 100 mg/dL    BUN 10 6 - 20 MG/DL    Creatinine 0.49 (L) 0.70 - 1.30 MG/DL    BUN/Creatinine ratio 20 12 - 20      GFR est AA >60 >60 ml/min/1.73m2    GFR est non-AA >60 >60 ml/min/1.73m2    Calcium 7.7 (L) 8.5 - 10.1 MG/DL    Bilirubin, total 0.3 0.2 - 1.0 MG/DL    ALT (SGPT) 10 (L) 12 - 78 U/L    AST (SGOT) 15 15 - 37 U/L    Alk. phosphatase 56 45 - 117 U/L    Protein, total 4.6 (L) 6.4 - 8.2 g/dL    Albumin 1.5 (L) 3.5 - 5.0 g/dL    Globulin 3.1 2.0 - 4.0 g/dL    A-G Ratio 0.5 (L) 1.1 - 2.2     CBC W/O DIFF    Collection Time: 01/03/20  4:10 AM   Result Value Ref Range    WBC 7.8 4.1 - 11.1 K/uL    RBC 2.11 (L) 4.10 - 5.70 M/uL    HGB 7.7 (L) 12.1 - 17.0 g/dL    HCT 24.2 (L) 36.6 - 50.3 %    .7 (H) 80.0 - 99.0 FL    MCH 36.5 (H) 26.0 - 34.0 PG    MCHC 31.8 30.0 - 36.5 g/dL    RDW 13.2 11.5 - 14.5 %    PLATELET 737 756 - 852 K/uL    MPV 11.0 8.9 - 12.9 FL    NRBC 0.0 0  WBC    ABSOLUTE NRBC 0.00 0.00 - 0.01 K/uL             Telemetry:normal sinus rhythm    Imaging:  I have personally reviewed the patients radiographs and have reviewed the reports:  1-2-20: CXR; INDICATION: Central line placement     FINDINGS:  Right IJ CVL has been placed with tip in the SVC without pneumothorax. There  remains bilateral pulmonary haziness favoring pleural effusions and likely  pulmonary edema. Heart size is obscured.     IMPRESSION  IMPRESSION: Satisfactory CVL placement.        Mitch Aguilera MD

## 2020-01-03 NOTE — PROGRESS NOTES
Hospitalist Progress Note    NAME: Radha Gallegos   :  1945   MRN:  [id number]       Assessment / Plan:  Hypotension, shock? Transferred to CCU for pressors -- no longer appears to have a need for this, fortunately  Dx with UTI yesterday -- on ceftriaxone  No overt signs of significant blood loss  CCU monitoring appreciated   Holding antihypertensives    Peripheral vascular disease POA- severe on CTA Abd/pelvis at [facility]  CTA:  -Occluded left common iliac and left external iliac artery with distal  reconstitution of the common femoral artery.  -Occluded right external iliac artery with distal reconstitution of the right  common femoral artery. Axillo-femoral bypass performed  by Dr. Robert Richard  Added Gabapentin and Oxycodone 5-10 mg prn for pain management  Stool softners    Fever due to UTI (recurrent)  Ceftriaxone ordered by vascular surgery team -- I am in agreement and appreciate the assistance  Blood cultures unfortunately never drawn  Will follow up culture  CXR with pleural effusion and pulmonary edema but no pneumonia identified    Alcohol withdrawal syndrome/Delirium Tremens POA  Remains stable  Discontinued CIWA monitoring as patient has not received any Ativan for days now and has been in the hospital greater than a week  S/p IV Goody bag, now on PO Vitamins as tolerating PO    Acute cystitis/recent Klebsiella UTI POA  Recent Urine Cx (office) = Klebsiella pn. Species (sensitive)  S/p ceftriaxone x 3 days earlier in hospital stay    Hypertension  Holding home antihypertensive medication for current hypotension     Code Status: Full   Surrogate Decision Maker: Brionna Quesada     DVT Prophylaxis: Lovenox   GI Prophylaxis: not indicated     Recommended Disposition: SNF/LTC Short term rehab with conversion to LTC placement when medicaid kicks in (currently pending). Now await urine culture result and confirmation of no bacteremia as well as stabilization of BP.      Subjective:     Chief Complaint / Reason for Physician Visit: f/u Alcohol withdrawal syndrome, UTI  Hypotensive overnight and was lethargic this morning. Transferred to CCU for vasopressors, subsequently weaned off. Seen again in the afternoon, complains of some surgical pain but otherwise feels fine. Essentially remembers nothing from before surgery yesterday until he was transferred to CCU today. Review of Systems:  Symptom Y/N Comments  Symptom Y/N Comments   Fever/Chills n   Chest Pain n    Poor Appetite n   Edema n    Cough n   Abdominal Pain n    Sputum n   Joint Pain n    SOB/MCFARLAND n   Pruritis/Rash     Nausea/vomit    Tolerating PT/OT y    Diarrhea    Tolerating Diet y    Constipation    Other       Could NOT obtain due to:      Objective:     VITALS:   Last 24hrs VS reviewed since prior progress note.  Most recent are:  Patient Vitals for the past 24 hrs:   Temp Pulse Resp BP SpO2   01/03/20 1330  (!) 107 25 106/51 98 %   01/03/20 1315  89 25 96/47 98 %   01/03/20 1300  94 19 104/53 (!) 89 %   01/03/20 1245  90 21 97/40 97 %   01/03/20 1230  90 28 92/44 98 %   01/03/20 1215  90 26  97 %   01/03/20 1200  90 17 97/41 97 %   01/03/20 1145  90 17 99/46 97 %   01/03/20 1130  91 17 (!) 88/42 97 %   01/03/20 1115  91 17 99/45 97 %   01/03/20 1100  94 (!) 43 97/44 98 %   01/03/20 1045  (!) 101 22 (!) 107/38 96 %   01/03/20 1030  95 21 95/49 97 %   01/03/20 1015  94 (!) 0 117/66 96 %   01/03/20 0945 98 °F (36.7 °C) 97 (!) 5 92/58 96 %   01/03/20 0843  83  98/41    01/03/20 0808 98 °F (36.7 °C) 86 18 (!) 89/40 97 %   01/03/20 0703    103/67    01/03/20 0646    118/57    01/03/20 0644    (!) 88/51    01/03/20 0641    (!) 88/56    01/03/20 0637  75  (!) 70/43    01/03/20 0557    (!) 81/64    01/03/20 0549    (!) 79/54    01/03/20 0533    (!) 89/60    01/03/20 0411 98.1 °F (36.7 °C) 68 14 95/50 99 %   01/02/20 2320 97.6 °F (36.4 °C) 71 16 (!) 87/51 97 %   01/02/20 1933 97.4 °F (36.3 °C) 80 17 110/64 100 %   01/02/20 1733 97.8 °F (36.6 °C) 95 18 115/51 91 %       Intake/Output Summary (Last 24 hours) at 1/3/2020 1639  Last data filed at 1/3/2020 0945  Gross per 24 hour   Intake    Output 475 ml   Net -475 ml        PHYSICAL EXAM:  General: WD, lethargic this AM, no acute distress. Chronically ill. Does not appear toxic. EENT:  EOMI. Anicteric sclerae. MMM  Resp:  Diminished breath sounds at bases. No accessory muscle use  CV:  Normal rate, regular rhythm. No edema. GI:  Soft, Non distended, Non tender.  +Bowel sounds  Neurologic:  Briefly aroused to voice this AM, then fell back asleep  Psych:   Fair insight. Not anxious nor agitated  Skin:  No rashes. No jaundice    Reviewed most current lab test results and cultures  YES  Reviewed most current radiology test results   YES  Review and summation of old records today    NO  Reviewed patient's current orders and MAR    YES  PMH/ reviewed - no change compared to H&P  ________________________________________________________________________  Care Plan discussed with:    Comments   Patient x    Family      RN     Care Manager     Consultant  x Juan Santiago, Vascular NP                     Multidiciplinary team rounds were held today with , nursing, pharmacist and clinical coordinator. Patient's plan of care was discussed; medications were reviewed and discharge planning was addressed. ________________________________________________________________________  Total NON critical care TIME:  16   Minutes    Total CRITICAL CARE TIME Spent:   Minutes non procedure based      Comments   >50% of visit spent in counseling and coordination of care     ________________________________________________________________________  Tyron Damian MD     Procedures: see electronic medical records for all procedures/Xrays and details which were not copied into this note but were reviewed prior to creation of Plan.       LABS:  I reviewed today's most current labs and imaging studies.   Pertinent labs include:  Recent Labs     01/03/20 0410 01/02/20  0334   WBC 7.8 8.4   HGB 7.7* 8.6*   HCT 24.2* 26.7*    440*     Recent Labs     01/03/20 0410 01/02/20  0334    137   K 3.3* 3.6    104   CO2 24 27   GLU 68 71   BUN 10 10   CREA 0.49* 0.68*   CA 7.7* 7.4*   ALB 1.5*  --    TBILI 0.3  --    SGOT 15  --    ALT 10*  --        Signed: Doug Riojas MD

## 2020-01-03 NOTE — PROGRESS NOTES
Bedside shift change report given to Yonny Wright (oncoming nurse) by Missy Chan (offgoing nurse). Report included the following information SBAR, Kardex, Intake/Output, MAR and Recent Results. See progress note. Pt currently finishing second 500 mL bolus. Last /57. Patient asymptomatic, slept entire shift. All sites CDI, no evidence of bleeding. No pain overnight.

## 2020-01-03 NOTE — PROGRESS NOTES
Pt had an axillo-bifemoral bypass yesterday. His 4 am BP was 95/50, he received a 500 mL bolus and now his BP is 89/60. There is no evidence of bleeding at the sites.     Pt on IV lasix for vol overload  Monitor

## 2020-01-03 NOTE — CONSULTS
Urology Consult    Patient: Clif Bowling MRN: [id number]  SSN: [id number]    YOB: 1945  Age: 75 y.o. Sex: male          Date of Encounter:  January 3, 2020  Pre-existing [facility] Patient:          History of Present Illness:  Patient is a 75 y.o. male. He presents after axillo-bifemoral bypass. Fever overnight. Pan culture with concern for UTI. Has had prior positive cultures. He is a poor historian. Denies any acute urinary symptoms above baseline urge incontinence for which he wears pads. Has not seen a urologist or taken meds for urination previously. Denies frequency, gross hematuria, dysuria. He has an indwelling catheter he states was placed in the OR. Seen as consult in 2016 with catheter balloon in urethra which was repositioned with awais, followers. Did not follow up outpt. Hx alcohol abuse, DTs        Past Medical History: Allergies   Allergen Reactions    Contrast Agent [Iodine] Rash     Hives on back      Prior to Admission medications    Medication Sig Start Date End Date Taking? Authorizing Provider   pravastatin (PRAVACHOL) 40 mg tablet Take 40 mg by mouth nightly. Yes Carlos, MD Ad   aspirin 81 mg chewable tablet Take 81 mg by mouth daily. Yes Ad Gonzalez MD   losartan (COZAAR) 50 mg tablet take 1 tablet by mouth once daily 10/15/19  Yes Sathish Forrest MD   hydroCHLOROthiazide (HYDRODIURIL) 25 mg tablet take 1 tablet by mouth once daily for pressure 8/7/19  Yes Arsh Jo MD     PMHx:  has a past medical history of Abuse, Claudication of calf muscles ([address] (2013), Colovesical fistula, Esophageal stricture, Esophagitis, GI bleed (10/2016), Hemochromatosis, Hyperlipidemia, Hypertension, Peripheral artery disease ([address] and Pulmonary nodule.   PSurgHx:  has a past surgical history that includes hx amputation (Left, 07/2016); colonoscopy (N/A, 9/14/2016); hx other surgical; colonoscopy (N/A, 12/19/2016); and hx endoscopy (10/2016). PSocHx:  reports that he quit smoking about 2 years ago. His smoking use included cigarettes. He smoked 0.00 packs per day. He has never used smokeless tobacco. He reports current alcohol use of about 21.0 standard drinks of alcohol per week. He reports that he does not use drugs. ROS:  negative other than above.     Physical Exam:            General:    somnolent but arousable                     Skin:  penoscrotal edema                HEENT:  NCAT, O/P Clear        Throat/Neck:  supple                 Chest[de-identified]  nonlabored breathing on supplemental o2      Heart[de-identified]  Regular rate to palp             Abdomen/Flank[de-identified]  No CVAT, non-tender soft abdomen, no masses, surgical dressings cdi             Bladder[de-identified]  Bladder not palpable, isabel in place         Lab Results   Component Value Date/Time    WBC 7.8 01/03/2020 04:10 AM    HCT 24.2 (L) 01/03/2020 04:10 AM    PLATELET 346 91/47/0732 04:10 AM    Sodium 138 01/03/2020 04:10 AM    Potassium 3.3 (L) 01/03/2020 04:10 AM    Chloride 104 01/03/2020 04:10 AM    CO2 24 01/03/2020 04:10 AM    BUN 10 01/03/2020 04:10 AM    Creatinine 0.49 (L) 01/03/2020 04:10 AM    Glucose 68 01/03/2020 04:10 AM    Calcium 7.7 (L) 01/03/2020 04:10 AM    Magnesium 2.0 12/24/2019 05:35 AM    Phosphorus 3.2 12/24/2019 05:35 AM    INR 1.1 08/07/2018 01:07 PM       UA:   Lab Results   Component Value Date/Time    Color YELLOW/STRAW 01/02/2020 08:42 AM    Appearance TURBID (A) 01/02/2020 08:42 AM    Specific gravity 1.013 01/02/2020 08:42 AM    Specific gravity 1.010 12/16/2019 03:20 PM    pH (UA) 6.5 01/02/2020 08:42 AM    Protein TRACE (A) 01/02/2020 08:42 AM    Glucose NEGATIVE  01/02/2020 08:42 AM    Ketone 15 (A) 01/02/2020 08:42 AM    Bilirubin NEGATIVE  01/02/2020 08:42 AM    Urobilinogen 1.0 01/02/2020 08:42 AM    Nitrites POSITIVE (A) 01/02/2020 08:42 AM    Leukocyte Esterase LARGE (A) 01/02/2020 08:42 AM    Epithelial cells FEW 01/02/2020 08:42 AM    Bacteria 4+ (A) 01/02/2020 08:42 AM    WBC >100 (H) 01/02/2020 08:42 AM    RBC 0-5 01/02/2020 08:42 AM       Cultures:   Xrays:     Assessment/Plan:     1. Urge incontinence  2.  UTIs (unclear if symptomatic based on hx)  -treat with course of cx specific abx  -fu as outpt to optimize urination.  -isabel management per primary team    Signed By: Juliana Osorio MD  - January 3, 2020

## 2020-01-03 NOTE — PROGRESS NOTES
Dr. Jair Russo rounded on pt and talked with vascular concerning pt's low BP. Pt to be transferred to CCU. Charge nurse aware and awaiting bed placement. Pt in bed sleeping.

## 2020-01-03 NOTE — PROGRESS NOTES
Vascular Surgery Progress Note  Kimberley Dejesus ACNP-BC  1/3/2020       Subjective:     Mr. Joanna Kovacs has a pmhx significant for alcoholism, COPD, DM, PAFib, HTN, HLD, and GERD. Mathew Boast continues to smoke daily. Mathew Boast has a pmhx significant for LLE stenting per his report. Mathew Boast is s/p TMA of the left foot (10/2016). Mathew Boast presented to the clinic in November 2019 w/ compliant of BLE claudication, BLE nocturnal cramping, and a wound to the incision of the left TMA after hitting his foot.  He has ischemia of the left foot and rubor of the right foot.  His ABIs were right 0.40 w/ a flatline TBI and left 0.32 w/ a surgically absent left great toe.  He underwent an arteriogram on 11/5/2019 that revealed severe bilateral aorto iliac disease that was not amendable to endovascular intervention. Mathew Boast returned to the clinic  w/ a CTA that was significant for an occluded left common iliac and left external iliac artery and occluded right external iliac artery. . A ax-bifemoral bypass was planned.  Mathew Boast then presented to the emergency room on 12/16/2019 w/ complaint of BLE weakness. Mathew Boast was admitted to [facility] and diagnosed w/ cystitis.  While admitted he was noted to have left lateral heel ulcer.  He was discharged to the [address] [place] [address] at [facility] on 12/20/2019 for rehabilitation.  Late that evening he developed active w/d symptoms and returned to the emergency room at [address] was then transferred to [facility] for management of alcohol withdrawal which has resolved.  His urine cx grew Klebsiella and he completed a course of antibx.  Despite this he continued to have a persistent UTI. He was noted to have urinary retention and Urology was consulted. His hospitalization has been complicated by bilateral pleural effusions w/ LLL partial collapse resulting in hypoxic respiratory failure. On 01/02/2020 he underwent axillo-bifemoral bypass. Overnight he has been hypotensive. He continues to make a moderate amount of urine via a isabel catheter.   His creatinine remains normal.  He is afebrile w/ a nl WBC count this am.  He is lethargic and slow to arouse. His feet are warm and well perfused w/ BL femoral pulses intact. His groin incisions and lateral chest wall incisions are dry and intact. Nursing Data:     Patient Vitals for the past 24 hrs:   BP Temp Pulse Resp SpO2   01/03/20 0843 98/41  83     01/03/20 0808 (!) 89/40 98 °F (36.7 °C) 86 18 97 %   01/03/20 0703 103/67       01/03/20 0646 118/57       01/03/20 0644 (!) 88/51       01/03/20 0641 (!) 88/56       01/03/20 0637 (!) 70/43  75     01/03/20 0557 (!) 81/64       01/03/20 0549 (!) 79/54       01/03/20 0533 (!) 89/60       01/03/20 0411 95/50 98.1 °F (36.7 °C) 68 14 99 %   01/02/20 2320 (!) 87/51 97.6 °F (36.4 °C) 71 16 97 %   01/02/20 1933 110/64 97.4 °F (36.3 °C) 80 17 100 %   01/02/20 1733 115/51 97.8 °F (36.6 °C) 95 18 91 %   01/02/20 1620 110/69 97.6 °F (36.4 °C) 84 20 95 %   01/02/20 1602 105/40 97.4 °F (36.3 °C) 82 21 98 %   01/02/20 1535 112/59  83 21 100 %   01/02/20 1530 129/48  84 19 100 %   01/02/20 1525 126/62  84 22 100 %   01/02/20 1520 130/78  87 23 98 %   01/02/20 1515 138/77  87 21    01/02/20 1510 123/67  86 22 95 %   01/02/20 1505 121/71  88 25 96 %   01/02/20 1503 125/70 97.6 °F (36.4 °C) 89 17 93 %   01/02/20 1500 125/70    92 %   01/02/20 1039 97/51  87     01/02/20 1032 115/52  85  92 %   01/02/20 1015 127/59  89     01/02/20 1014 119/75  96     01/02/20 0930 121/55 98.5 °F (36.9 °C) 96 (!) 156 96 %     ---------------------------------------------------------------------------------------------------------    Intake/Output Summary (Last 24 hours) at 1/3/2020 0855  Last data filed at 1/3/2020 9965  Gross per 24 hour   Intake 1200 ml   Output 1325 ml   Net -125 ml       Exam:     Physical Exam  Constitutional:       Appearance: He is ill-appearing. HENT:      Head: Normocephalic.       Nose: Nose normal.      Mouth/Throat: Mouth: Mucous membranes are moist.   Eyes:      Pupils: Pupils are equal, round, and reactive to light. Neck:      Musculoskeletal: Normal range of motion. Comments: Right IJ central line   Cardiovascular:      Rate and Rhythm: Normal rate and regular rhythm. Pulses:           Femoral pulses are 2+ on the right side and 2+ on the left side. Comments: His feet are warm and perfused. Pulmonary:      Effort: Pulmonary effort is normal. No tachypnea, accessory muscle usage or respiratory distress. Breath sounds: No rales. Abdominal:      General: Abdomen is flat. Palpations: Abdomen is soft. Musculoskeletal: Normal range of motion. Skin:     Comments: Necrotic ulcer to lateral left heel. Stable chronic wound of the TMA incision. Rubor of the plantar aspect of the left foot. Ischemic changes to the toes of the 2nd and 3rd digit of the right foot w/ open ulceration of the 2nd digit. His surgical incision to the BL groins and chest wall are dry and intact. Neurological:      Mental Status: Mental status is at baseline. He is lethargic. Psychiatric:         Behavior: Behavior normal.       Lab Review:     . Recent Results (from the past 24 hour(s))   LACTIC ACID    Collection Time: 01/02/20  6:49 PM   Result Value Ref Range    Lactic acid 0.9 0.4 - 2.0 MMOL/L   METABOLIC PANEL, COMPREHENSIVE    Collection Time: 01/03/20  4:10 AM   Result Value Ref Range    Sodium 138 136 - 145 mmol/L    Potassium 3.3 (L) 3.5 - 5.1 mmol/L    Chloride 104 97 - 108 mmol/L    CO2 24 21 - 32 mmol/L    Anion gap 10 5 - 15 mmol/L    Glucose 68 65 - 100 mg/dL    BUN 10 6 - 20 MG/DL    Creatinine 0.49 (L) 0.70 - 1.30 MG/DL    BUN/Creatinine ratio 20 12 - 20      GFR est AA >60 >60 ml/min/1.73m2    GFR est non-AA >60 >60 ml/min/1.73m2    Calcium 7.7 (L) 8.5 - 10.1 MG/DL    Bilirubin, total 0.3 0.2 - 1.0 MG/DL    ALT (SGPT) 10 (L) 12 - 78 U/L    AST (SGOT) 15 15 - 37 U/L    Alk.  phosphatase 56 45 - 117 U/L Protein, total 4.6 (L) 6.4 - 8.2 g/dL    Albumin 1.5 (L) 3.5 - 5.0 g/dL    Globulin 3.1 2.0 - 4.0 g/dL    A-G Ratio 0.5 (L) 1.1 - 2.2     CBC W/O DIFF    Collection Time: 01/03/20  4:10 AM   Result Value Ref Range    WBC 7.8 4.1 - 11.1 K/uL    RBC 2.11 (L) 4.10 - 5.70 M/uL    HGB 7.7 (L) 12.1 - 17.0 g/dL    HCT 24.2 (L) 36.6 - 50.3 %    .7 (H) 80.0 - 99.0 FL    MCH 36.5 (H) 26.0 - 34.0 PG    MCHC 31.8 30.0 - 36.5 g/dL    RDW 13.2 11.5 - 14.5 %    PLATELET 635 248 - 255 K/uL    MPV 11.0 8.9 - 12.9 FL    NRBC 0.0 0  WBC    ABSOLUTE NRBC 0.00 0.00 - 0.01 K/uL          Assessment/Plan:      Consult problem  Severe bilateral PAD w/ ulceration of the left heel, left TMA incision, and right 2nd digit. -s/p axillo-bifemoral bypass on 01/02/2020.      Feet are warm and perfused. H&H stable. OOB to chair once hypotension improves. Continue current wound care for now.       Active problems  Post procedural hypotension w/ hx of HTN  Hypoalbuminemia   Metabolic encephalopathy w/ underlying chronic cognitive deficit   -antihypertensives and diuretics held  -IVF bolus ordered x 2 w/ minimal improvement  Discontinue IVP PRN morphine  Plan to transfer to higher level of care for vasopressor support    SIRs  -resolved   Acute hypoxic respiratory failure   Bilateral pleural effusion w/ partial LLL collapse  COPD  -not in exacerbation   Plan per primary team     Urinary retention  -patient voided twice prior to procedure and had retained urine when isabel was placed for procedure.    Klebsiella pneumoniae UTI   -course of Rocephin completed   -2nd urine cx NG  -3rd urine cx pending   -blood cxs ordered per primary team  Urology consulted     PAfib w/ RVR   -tachycardic resolved this am   -poor candidate for [facility]  Hypertension  -stable on ARB  Hyperlipidemia    Alcohol withdrawal  -resolved   Thiamine deficiency   -on oral supplementation   Diabetes Mellitus w/ hypoglycemia and hypoalbuminemia    -on liberalized diet w/ hs snack   -suspect malnutrition   -possible liver dysfunction resulting in poor glycogen stores  Hypernatremia   -resolved   Hypokalemia   -supplemented by primary team   Hypomagnesemia  -resolved   Macrocytic anemia   - expected post procedural drop  Continue to follow   Thrombocytopenia  -resolved   GERD  Management of comorbid conditions by primary team.     VTE Prophylaxis:  LMWH-resume in am   SCDs: contraindicated in severe BLE PAD     Disposition:  SNF. Agree w/ ECF following rehabilitation. PT/OT once blood pressure improves.

## 2020-01-03 NOTE — PROGRESS NOTES
Brief note    Patient noted to develop hypotension overnight; is POD #1 s/p axillo-femoral bypass. Also newly diagnosed with recurrent UTI yesterday AM, started ceftriaxone. Received NS bolus 500 mL x2 overnight with no improvement in blood pressure. Already has a component of pulmonary edema and effusions on CXR. Discussed with vascular surgery team -- plan to transfer for vasopressor support given limitations in the amount of fluid resuscitation we can perform, as well as presence of new bypass. . R IJ TLC already in place. No PCU beds available; therefore, patient to transfer to CCU.

## 2020-01-04 ENCOUNTER — APPOINTMENT (OUTPATIENT)
Dept: CT IMAGING | Age: 75
DRG: 981 | End: 2020-01-04
Attending: INTERNAL MEDICINE
Payer: MEDICARE

## 2020-01-04 ENCOUNTER — APPOINTMENT (OUTPATIENT)
Dept: NON INVASIVE DIAGNOSTICS | Age: 75
DRG: 981 | End: 2020-01-04
Attending: INTERNAL MEDICINE
Payer: MEDICARE

## 2020-01-04 ENCOUNTER — APPOINTMENT (OUTPATIENT)
Dept: GENERAL RADIOLOGY | Age: 75
DRG: 981 | End: 2020-01-04
Attending: INTERNAL MEDICINE
Payer: MEDICARE

## 2020-01-04 LAB
ALBUMIN SERPL-MCNC: 1.4 G/DL (ref 3.5–5)
ALBUMIN/GLOB SERPL: 0.5 {RATIO} (ref 1.1–2.2)
ALP SERPL-CCNC: 66 U/L (ref 45–117)
ALT SERPL-CCNC: 10 U/L (ref 12–78)
ANION GAP SERPL CALC-SCNC: 6 MMOL/L (ref 5–15)
ANION GAP SERPL CALC-SCNC: 7 MMOL/L (ref 5–15)
AST SERPL-CCNC: 15 U/L (ref 15–37)
AV VELOCITY RATIO: 0.68
AV VTI RATIO: 0.8
BASOPHILS # BLD: 0 K/UL (ref 0–0.1)
BASOPHILS NFR BLD: 0 % (ref 0–1)
BILIRUB SERPL-MCNC: 0.3 MG/DL (ref 0.2–1)
BUN SERPL-MCNC: 11 MG/DL (ref 6–20)
BUN SERPL-MCNC: 12 MG/DL (ref 6–20)
BUN/CREAT SERPL: 17 (ref 12–20)
BUN/CREAT SERPL: 19 (ref 12–20)
CALCIUM SERPL-MCNC: 7 MG/DL (ref 8.5–10.1)
CALCIUM SERPL-MCNC: 7 MG/DL (ref 8.5–10.1)
CHLORIDE SERPL-SCNC: 104 MMOL/L (ref 97–108)
CHLORIDE SERPL-SCNC: 106 MMOL/L (ref 97–108)
CO2 SERPL-SCNC: 25 MMOL/L (ref 21–32)
CO2 SERPL-SCNC: 26 MMOL/L (ref 21–32)
CREAT SERPL-MCNC: 0.59 MG/DL (ref 0.7–1.3)
CREAT SERPL-MCNC: 0.72 MG/DL (ref 0.7–1.3)
DIFFERENTIAL METHOD BLD: ABNORMAL
ECHO AO ROOT DIAM: 2.96 CM
ECHO AV AREA PEAK VELOCITY: 2.2 CM2
ECHO AV AREA VTI: 2.6 CM2
ECHO AV MEAN GRADIENT: 3.7 MMHG
ECHO AV MEAN VELOCITY: 0.86 M/S
ECHO AV PEAK GRADIENT: 9.6 MMHG
ECHO AV PEAK VELOCITY: 155.19 CM/S
ECHO AV VTI: 26.26 CM
ECHO EST RA PRESSURE: 10 MMHG
ECHO LA MAJOR AXIS: 2.79 CM
ECHO LA TO AORTIC ROOT RATIO: 0.94
ECHO LV E' LATERAL VELOCITY: 9.18 CM/S
ECHO LV E' SEPTAL VELOCITY: 7.3 CM/S
ECHO LV INTERNAL DIMENSION DIASTOLIC: 4.37 CM (ref 4.2–5.9)
ECHO LV INTERNAL DIMENSION SYSTOLIC: 3.26 CM
ECHO LV IVSD: 1.36 CM (ref 0.6–1)
ECHO LV MASS 2D: 218 G (ref 88–224)
ECHO LV MASS INDEX 2D: 125.7 G/M2 (ref 49–115)
ECHO LV POSTERIOR WALL DIASTOLIC: 1.01 CM (ref 0.6–1)
ECHO LVOT CARDIAC OUTPUT: 5.1 L/MIN
ECHO LVOT DIAM: 2.03 CM
ECHO LVOT PEAK GRADIENT: 4.5 MMHG
ECHO LVOT PEAK VELOCITY: 106.04 CM/S
ECHO LVOT SV: 68.5 ML
ECHO LVOT VTI: 21.1 CM
ECHO MV A VELOCITY: 79.7 CM/S
ECHO MV AREA PHT: 4.3 CM2
ECHO MV AREA VTI: 3.1 CM2
ECHO MV E DECELERATION TIME (DT): 164.8 MS
ECHO MV E VELOCITY: 97.49 CM/S
ECHO MV E/A RATIO: 1.22
ECHO MV E/E' LATERAL: 10.62
ECHO MV E/E' RATIO (AVERAGED): 11.99
ECHO MV E/E' SEPTAL: 13.35
ECHO MV MAX VELOCITY: 104.87 CM/S
ECHO MV MEAN GRADIENT: 1.4 MMHG
ECHO MV MEAN INFLOW VELOCITY: 0.53 M/S
ECHO MV PEAK GRADIENT: 4.4 MMHG
ECHO MV PRESSURE HALF TIME (PHT): 51.1 MS
ECHO MV VTI: 22.46 CM
ECHO PULMONARY ARTERY SYSTOLIC PRESSURE (PASP): 35.4 MMHG
ECHO PV MAX VELOCITY: 82.74 CM/S
ECHO PV MEAN GRADIENT: 1.6 MMHG
ECHO PV PEAK GRADIENT: 2.7 MMHG
ECHO PV VTI: 20.35 CM
ECHO RIGHT VENTRICULAR SYSTOLIC PRESSURE (RVSP): 35.4 MMHG
ECHO TV A WAVE: 56.75 CM/S
ECHO TV E WAVE: 69.09 CM/S
ECHO TV EROA: 0.8
ECHO TV REGURGITANT MAX VELOCITY: 251.9 CM/S
ECHO TV REGURGITANT PEAK GRADIENT: 25.4 MMHG
EOSINOPHIL # BLD: 0.2 K/UL (ref 0–0.4)
EOSINOPHIL NFR BLD: 2 % (ref 0–7)
ERYTHROCYTE [DISTWIDTH] IN BLOOD BY AUTOMATED COUNT: 13.2 % (ref 11.5–14.5)
GLOBULIN SER CALC-MCNC: 2.8 G/DL (ref 2–4)
GLUCOSE SERPL-MCNC: 110 MG/DL (ref 65–100)
GLUCOSE SERPL-MCNC: 148 MG/DL (ref 65–100)
HCT VFR BLD AUTO: 21.4 % (ref 36.6–50.3)
HCT VFR BLD AUTO: 21.4 % (ref 36.6–50.3)
HCT VFR BLD AUTO: 32.6 % (ref 36.6–50.3)
HGB BLD-MCNC: 11.3 G/DL (ref 12.1–17)
HGB BLD-MCNC: 6.8 G/DL (ref 12.1–17)
HGB BLD-MCNC: 7 G/DL (ref 12.1–17)
IMM GRANULOCYTES # BLD AUTO: 0.1 K/UL (ref 0–0.04)
IMM GRANULOCYTES NFR BLD AUTO: 1 % (ref 0–0.5)
LVFS 2D: 25.46 %
LVOT MG: 2.26 MMHG
LVOT MV: 0.72 CM/S
LYMPHOCYTES # BLD: 1.3 K/UL (ref 0.8–3.5)
LYMPHOCYTES NFR BLD: 13 % (ref 12–49)
MAGNESIUM SERPL-MCNC: 1 MG/DL (ref 1.6–2.4)
MAGNESIUM SERPL-MCNC: 1.4 MG/DL (ref 1.6–2.4)
MCH RBC QN AUTO: 36.5 PG (ref 26–34)
MCHC RBC AUTO-ENTMCNC: 32.7 G/DL (ref 30–36.5)
MCV RBC AUTO: 111.5 FL (ref 80–99)
MONOCYTES # BLD: 1 K/UL (ref 0–1)
MONOCYTES NFR BLD: 10 % (ref 5–13)
MV DEC SLOPE: 5.91
NEUTS SEG # BLD: 7.7 K/UL (ref 1.8–8)
NEUTS SEG NFR BLD: 74 % (ref 32–75)
NRBC # BLD: 0 K/UL (ref 0–0.01)
NRBC BLD-RTO: 0 PER 100 WBC
PHOSPHATE SERPL-MCNC: 2.1 MG/DL (ref 2.6–4.7)
PLATELET # BLD AUTO: 331 K/UL (ref 150–400)
PMV BLD AUTO: 10.6 FL (ref 8.9–12.9)
POTASSIUM SERPL-SCNC: 3.4 MMOL/L (ref 3.5–5.1)
POTASSIUM SERPL-SCNC: 4 MMOL/L (ref 3.5–5.1)
PROT SERPL-MCNC: 4.2 G/DL (ref 6.4–8.2)
RBC # BLD AUTO: 1.92 M/UL (ref 4.1–5.7)
RBC MORPH BLD: ABNORMAL
SODIUM SERPL-SCNC: 136 MMOL/L (ref 136–145)
SODIUM SERPL-SCNC: 138 MMOL/L (ref 136–145)
WBC # BLD AUTO: 10.3 K/UL (ref 4.1–11.1)

## 2020-01-04 PROCEDURE — 93306 TTE W/DOPPLER COMPLETE: CPT

## 2020-01-04 PROCEDURE — 74011636320 HC RX REV CODE- 636/320: Performed by: INTERNAL MEDICINE

## 2020-01-04 PROCEDURE — 71045 X-RAY EXAM CHEST 1 VIEW: CPT

## 2020-01-04 PROCEDURE — 74178 CT ABD&PLV WO CNTR FLWD CNTR: CPT

## 2020-01-04 PROCEDURE — 02HV33Z INSERTION OF INFUSION DEVICE INTO SUPERIOR VENA CAVA, PERCUTANEOUS APPROACH: ICD-10-PCS | Performed by: ANESTHESIOLOGY

## 2020-01-04 PROCEDURE — C9113 INJ PANTOPRAZOLE SODIUM, VIA: HCPCS | Performed by: FAMILY MEDICINE

## 2020-01-04 PROCEDURE — 86923 COMPATIBILITY TEST ELECTRIC: CPT

## 2020-01-04 PROCEDURE — 80053 COMPREHEN METABOLIC PANEL: CPT

## 2020-01-04 PROCEDURE — 65660000000 HC RM CCU STEPDOWN

## 2020-01-04 PROCEDURE — 74011250637 HC RX REV CODE- 250/637: Performed by: SURGERY

## 2020-01-04 PROCEDURE — 74011250636 HC RX REV CODE- 250/636: Performed by: INTERNAL MEDICINE

## 2020-01-04 PROCEDURE — P9016 RBC LEUKOCYTES REDUCED: HCPCS

## 2020-01-04 PROCEDURE — 77010033678 HC OXYGEN DAILY

## 2020-01-04 PROCEDURE — 74011250636 HC RX REV CODE- 250/636: Performed by: SURGERY

## 2020-01-04 PROCEDURE — 74011000250 HC RX REV CODE- 250: Performed by: FAMILY MEDICINE

## 2020-01-04 PROCEDURE — 84100 ASSAY OF PHOSPHORUS: CPT

## 2020-01-04 PROCEDURE — 83735 ASSAY OF MAGNESIUM: CPT

## 2020-01-04 PROCEDURE — 85025 COMPLETE CBC W/AUTO DIFF WBC: CPT

## 2020-01-04 PROCEDURE — 86900 BLOOD TYPING SEROLOGIC ABO: CPT

## 2020-01-04 PROCEDURE — 74011000258 HC RX REV CODE- 258: Performed by: INTERNAL MEDICINE

## 2020-01-04 PROCEDURE — 74011250636 HC RX REV CODE- 250/636: Performed by: FAMILY MEDICINE

## 2020-01-04 PROCEDURE — 85018 HEMOGLOBIN: CPT

## 2020-01-04 PROCEDURE — 74011250637 HC RX REV CODE- 250/637: Performed by: INTERNAL MEDICINE

## 2020-01-04 PROCEDURE — 36415 COLL VENOUS BLD VENIPUNCTURE: CPT

## 2020-01-04 PROCEDURE — 36430 TRANSFUSION BLD/BLD COMPNT: CPT

## 2020-01-04 PROCEDURE — 30233N1 TRANSFUSION OF NONAUTOLOGOUS RED BLOOD CELLS INTO PERIPHERAL VEIN, PERCUTANEOUS APPROACH: ICD-10-PCS | Performed by: SURGERY

## 2020-01-04 RX ORDER — MAGNESIUM SULFATE HEPTAHYDRATE 40 MG/ML
2 INJECTION, SOLUTION INTRAVENOUS ONCE
Status: COMPLETED | OUTPATIENT
Start: 2020-01-04 | End: 2020-01-04

## 2020-01-04 RX ORDER — DIPHENHYDRAMINE HYDROCHLORIDE 50 MG/ML
50 INJECTION, SOLUTION INTRAMUSCULAR; INTRAVENOUS
Status: COMPLETED | OUTPATIENT
Start: 2020-01-04 | End: 2020-01-04

## 2020-01-04 RX ORDER — FUROSEMIDE 10 MG/ML
40 INJECTION INTRAMUSCULAR; INTRAVENOUS ONCE
Status: COMPLETED | OUTPATIENT
Start: 2020-01-04 | End: 2020-01-04

## 2020-01-04 RX ORDER — SODIUM CHLORIDE 9 MG/ML
250 INJECTION, SOLUTION INTRAVENOUS AS NEEDED
Status: DISCONTINUED | OUTPATIENT
Start: 2020-01-04 | End: 2020-01-17

## 2020-01-04 RX ORDER — POTASSIUM CHLORIDE 20 MEQ/1
40 TABLET, EXTENDED RELEASE ORAL
Status: COMPLETED | OUTPATIENT
Start: 2020-01-04 | End: 2020-01-04

## 2020-01-04 RX ORDER — SODIUM CHLORIDE 0.9 % (FLUSH) 0.9 %
10 SYRINGE (ML) INJECTION
Status: COMPLETED | OUTPATIENT
Start: 2020-01-04 | End: 2020-01-04

## 2020-01-04 RX ADMIN — SODIUM CHLORIDE 40 MG: 9 INJECTION INTRAMUSCULAR; INTRAVENOUS; SUBCUTANEOUS at 20:07

## 2020-01-04 RX ADMIN — Medication 10 ML: at 21:01

## 2020-01-04 RX ADMIN — PRAVASTATIN SODIUM 40 MG: 40 TABLET ORAL at 21:02

## 2020-01-04 RX ADMIN — Medication 100 MG: at 08:12

## 2020-01-04 RX ADMIN — ACETAMINOPHEN 650 MG: 325 TABLET ORAL at 09:43

## 2020-01-04 RX ADMIN — OXYCODONE 5 MG: 5 TABLET ORAL at 02:49

## 2020-01-04 RX ADMIN — GABAPENTIN 300 MG: 300 CAPSULE ORAL at 21:02

## 2020-01-04 RX ADMIN — COLLAGENASE SANTYL: 250 OINTMENT TOPICAL at 09:35

## 2020-01-04 RX ADMIN — POTASSIUM CHLORIDE 40 MEQ: 1500 TABLET, EXTENDED RELEASE ORAL at 09:38

## 2020-01-04 RX ADMIN — PIPERACILLIN AND TAZOBACTAM 3.38 G: 3; .375 INJECTION, POWDER, LYOPHILIZED, FOR SOLUTION INTRAVENOUS at 21:02

## 2020-01-04 RX ADMIN — Medication 1 AMPULE: at 14:11

## 2020-01-04 RX ADMIN — FOLIC ACID 1 MG: 1 TABLET ORAL at 08:12

## 2020-01-04 RX ADMIN — GABAPENTIN 100 MG: 100 CAPSULE ORAL at 08:12

## 2020-01-04 RX ADMIN — Medication 10 ML: at 17:04

## 2020-01-04 RX ADMIN — PIPERACILLIN AND TAZOBACTAM 3.38 G: 3; .375 INJECTION, POWDER, LYOPHILIZED, FOR SOLUTION INTRAVENOUS at 13:18

## 2020-01-04 RX ADMIN — OXYCODONE 5 MG: 5 TABLET ORAL at 09:43

## 2020-01-04 RX ADMIN — PIPERACILLIN AND TAZOBACTAM 3.38 G: 3; .375 INJECTION, POWDER, LYOPHILIZED, FOR SOLUTION INTRAVENOUS at 05:58

## 2020-01-04 RX ADMIN — SODIUM CHLORIDE 40 MG: 9 INJECTION INTRAMUSCULAR; INTRAVENOUS; SUBCUTANEOUS at 08:13

## 2020-01-04 RX ADMIN — GABAPENTIN 100 MG: 100 CAPSULE ORAL at 16:36

## 2020-01-04 RX ADMIN — MAGNESIUM SULFATE HEPTAHYDRATE 2 G: 40 INJECTION, SOLUTION INTRAVENOUS at 08:33

## 2020-01-04 RX ADMIN — FUROSEMIDE 40 MG: 10 INJECTION, SOLUTION INTRAMUSCULAR; INTRAVENOUS at 14:11

## 2020-01-04 RX ADMIN — MAGNESIUM SULFATE HEPTAHYDRATE 2 G: 40 INJECTION, SOLUTION INTRAVENOUS at 20:05

## 2020-01-04 RX ADMIN — SODIUM CHLORIDE 50 ML/HR: 900 INJECTION, SOLUTION INTRAVENOUS at 05:58

## 2020-01-04 RX ADMIN — Medication 10 ML: at 13:53

## 2020-01-04 RX ADMIN — DIPHENHYDRAMINE HYDROCHLORIDE 50 MG: 50 INJECTION, SOLUTION INTRAMUSCULAR; INTRAVENOUS at 11:32

## 2020-01-04 RX ADMIN — THERA TABS 1 TABLET: TAB at 08:12

## 2020-01-04 RX ADMIN — METHYLPREDNISOLONE SODIUM SUCCINATE 40 MG: 40 INJECTION, POWDER, FOR SOLUTION INTRAMUSCULAR; INTRAVENOUS at 11:32

## 2020-01-04 RX ADMIN — IOPAMIDOL 100 ML: 755 INJECTION, SOLUTION INTRAVENOUS at 13:53

## 2020-01-04 RX ADMIN — Medication 1 AMPULE: at 20:07

## 2020-01-04 RX ADMIN — Medication 30 ML: at 05:58

## 2020-01-04 RX ADMIN — METHYLPREDNISOLONE SODIUM SUCCINATE 40 MG: 40 INJECTION, POWDER, FOR SOLUTION INTRAMUSCULAR; INTRAVENOUS at 18:21

## 2020-01-04 NOTE — PROGRESS NOTES
OT orders reviewed and chart was reviewed and per nursing pt has very bloody diarrhea and will defer at this time.

## 2020-01-04 NOTE — PROGRESS NOTES
Vascular  Some hypotension over night  hgb down to 6.8 with rectal bleeding  Feet warm and wounds dressed  Needs two units of prbcs and GI consult for endoscopy  following

## 2020-01-04 NOTE — PROGRESS NOTES
0700: bedside and verbal report received from Michelle Dangelo: incontinence care completed, medium maroon stool  0800: assessment completed, pt a & o x 4  0900: pt bathed, incontinent of red/maroon blood  0955: Dr. Andrei Hill paged  1000: Dr. Judi Reilly in to see pt and updated. Order for 2 unit PRBCs received  1010: Spoke with Dr. Andrei Hill to discuss pt, with orders for echo, lasix between PRBC units and NPO status received. 1015: Received call from Dr. Tate Kendrick for GI consult. Order received for abd CTA  1030: orders received for diphenhydramine and solumedrol prior to contrast with CTA. Pt had CTA in Nov and tolerates contrast fine with premedication. 1100: Dr. Sonal Almeida in to see pt and updated  [phone]: first unit prbc started  1130: CT notified of pt being given diphenhydramine and solumedrol. 1200: pt transported to CT. IV pole tipped en route d/t stuck wheel. CL removed. Pt transported back to CCU to have CL placed by anesthesia. Emergent consent completed d/t pt drowsiness d/t receiving luke and diphenhydramine. Pt does awaken and understands CL removal and need for replacement. 1245: CL placed by Dr. Hussein De Santiago. Stat CXR completed, line placement verified by Dr. Hussein De Santiago and ok to use. 1330: pt taken to CT without incident  1445: 2nd unit PRBCs started  1530: L heel wound dsg changed  1600: re-assessment completed, no changes. 1715: Dr. Tate Kendrick in to see pt and updated. Maxcine Mattes for clear liquids tonight, NPO after MN for EGD in AM.  1900: bedside and verbal report given to [employer], RN.

## 2020-01-04 NOTE — PROGRESS NOTES
[facility]  Pulmonary, Critical Care, and Sleep Medicine    Name: Melba Mei MRN: [id number]   : 1945 Hospital: [address]   Date: 2020        Critical Care Initial Patient Consult    IMPRESSION:   · Shock, etiology not entirely clear. Post op. No overt bleeding. ON levophed drip at this point. Was given saline bolus x2 last pm.   · S/p Axillo-femoral bypass. · Acute pulmonary edema and bilateral pleural effusions-team was reluctant to give much more volume  · Encephalopathy noted earlier. Now seems better. · Recurrent UTI  · Anemia: hgb o 7.7  · Hypokalemia  · Severe hypoalbuminemia. · Normal lactate  · Hx of Alcohol use, had recently been treated for Etoh withdrawal.   · DM  · Paroxysmal A fib. · Recent Klebsiella UTI. Completed Course of treatment. · Baseline Hypertension  · GERD  · Critically ill due to shock. Pulmonary edema, on pressors. 37 min CC, EOP  · Discussed with nurse this am.       RECOMMENDATIONS:   · CVP 4- Getting 2 units of blood with lasix in between per primary team   · Echo ordered  · CTA abdomen ordered   · Pressors as needed for SBP over 90  · Will follow serial Cr, Hgb. · UCx  w enterococcus- no suspectibilites yet. On zosyn  · CXR unchanged  · Holding BP meds at present. · Glycemic Control and monitoring. · Vascular Surgery is following  · Monitor for bleeding. · On CTX per medical team.   · Hold lasix for now. · On nicotine patch. · ON PPI. Subjective/History: This patient has been seen and evaluated at the request of Dr. Prince Kwan for above. Patient is a 75 y.o. male who underwent ax-fem bypass last pm. Was noted to have hypotension and encephalopathy earlier. He is seen in ICU. Has some numbness of his mouth. Has mild pain of his lower abdomen. No chest pain, no back pain. Has a right IJ CVC in place. Reviewed EMR.      OVernight events:  Having multiple blood BMs  Hgb dropped to 6.8  Pt denies dizzyness/SOB    Past Medical History:   Diagnosis Date    Abuse     alcoholism, quit 2012    Claudication of calf muscles [facility]) 2013    Colovesical fistula     Dr Lety Hardin Esophageal stricture     Esophagitis     GI bleed 10/2016    Hemochromatosis     Hyperlipidemia     Hypertension     Peripheral artery disease (HCC)     Dr. Alysia Workman Pulmonary nodule     right lung      Past Surgical History:   Procedure Laterality Date    COLONOSCOPY N/A 9/14/2016    COLONOSCOPY performed by Jelani Cueto [facility] at [facility] ENDOSCOPY    COLONOSCOPY N/A 12/19/2016    COLONOSCOPY performed by Jelani Cueto MD at [facility] ENDOSCOPY    HX AMPUTATION Left 07/2016    left 4th toe from gangrene    HX ENDOSCOPY  10/2016    HX OTHER SURGICAL      left partial foot amputation      Prior to Admission medications    Medication Sig Start Date End Date Taking? Authorizing Provider   alclometasone (ACLOVATE) 0.05 % topical cream  10/11/19  Yes Provider, Historical   betamethasone dipropionate (DIPROLENE) 0.05 % ointment  11/1/19  Yes Provider, Historical   omeprazole (PRILOSEC) 20 mg capsule Take 20 mg by mouth daily as needed. Yes Provider, Historical   propranolol (INDERAL) 20 mg tablet Take 20 mg by mouth two (2) times a day. Yes Provider, Historical   pravastatin (PRAVACHOL) 40 mg tablet Take 40 mg by mouth nightly. Yes Other, MD Ad   aspirin 81 mg chewable tablet Take 81 mg by mouth daily.    Yes Carlos, MD Ad   losartan (COZAAR) 50 mg tablet take 1 tablet by mouth once daily 10/15/19  Yes Kev Peters MD   hydroCHLOROthiazide (HYDRODIURIL) 25 mg tablet take 1 tablet by mouth once daily for pressure 8/7/19  Yes Tereza Alex MD     Current Facility-Administered Medications   Medication Dose Route Frequency    furosemide (LASIX) injection 40 mg  40 mg IntraVENous ONCE    diphenhydrAMINE (BENADRYL) injection 50 mg  50 mg IntraVENous ON CALL    methylPREDNISolone (PF) (SOLU-MEDROL) injection 40 mg  40 mg IntraVENous Q6H    alcohol 62% (NOZIN) nasal  1 Ampule  1 Ampule Topical Q12H    NOREPINephrine (LEVOPHED) 8,000 mcg in dextrose 5% 250 mL infusion  0.5-16 mcg/min IntraVENous TITRATE    piperacillin-tazobactam (ZOSYN) 3.375 g in 0.9% sodium chloride (MBP/ADV) 100 mL  3.375 g IntraVENous Q8H    [Held by provider] heparin (porcine) injection 5,000 Units  5,000 Units SubCUTAneous Q8H    pantoprazole (PROTONIX) 40 mg in 0.9% sodium chloride 10 mL injection  40 mg IntraVENous Q12H    0.9% sodium chloride infusion  50 mL/hr IntraVENous CONTINUOUS    [Held by provider] furosemide (LASIX) injection 20 mg  20 mg IntraVENous DAILY    collagenase (SANTYL) 250 unit/gram ointment   Topical DAILY    thiamine mononitrate (B-1) tablet 100 mg  100 mg Oral DAILY    folic acid (FOLVITE) tablet 1 mg  1 mg Oral DAILY    therapeutic multivitamin (THERAGRAN) tablet 1 Tab  1 Tab Oral DAILY    senna-docusate (PERICOLACE) 8.6-50 mg per tablet 1 Tab  1 Tab Oral DAILY    gabapentin (NEURONTIN) capsule 100 mg  100 mg Oral BID    gabapentin (NEURONTIN) capsule 300 mg  300 mg Oral QHS    nicotine (NICODERM CQ) 21 mg/24 hr patch 1 Patch  1 Patch TransDERmal DAILY    pravastatin (PRAVACHOL) tablet 40 mg  40 mg Oral QHS    aspirin chewable tablet 81 mg  81 mg Oral DAILY    sodium chloride (NS) flush 5-40 mL  5-40 mL IntraVENous Q8H     Allergies   Allergen Reactions    Contrast Agent [Iodine] Rash     Hives on back      Social History     Tobacco Use    Smoking status: Former Smoker     Packs/day: 0.00     Types: Cigarettes     Last attempt to quit: 2018     Years since quittin.0    Smokeless tobacco: Never Used   Substance Use Topics    Alcohol use:  Yes     Alcohol/week: 21.0 standard drinks     Types: 21 Glasses of wine per week     Frequency: 4 or more times a week     Drinks per session: 3 or 4     Binge frequency: Never     Comment: \"about 2-3 glasses of wine per day, a HUGE decrease from before\" Family History   Problem Relation Age of Onset    No Known Problems Mother         coma        Review of Systems:  Constitutional: positive for fatigue and malaise  Eyes: negative  Ears, nose, mouth, throat, and face: negative  Respiratory: negative  Cardiovascular: negative  Gastrointestinal: negative  Genitourinary:negative  Integument/breast: negative  Hematologic/lymphatic: negative  Musculoskeletal:negative  Neurological: negative  Behavioral/Psych: negative  Endocrine: negative  Allergic/Immunologic: negative    Objective:   Vital Signs:    Visit Vitals  /53   Pulse 97   Temp 97.9 °F (36.6 °C)   Resp 16   Wt 65 kg (143 lb 3.2 oz)   SpO2 94%   BMI 23.11 kg/m²       O2 Device: Nasal cannula   O2 Flow Rate (L/min): 2 l/min   Temp (24hrs), Av °F (36.7 °C), Min:97.9 °F (36.6 °C), Max:98.1 °F (36.7 °C)       Intake/Output:   Last shift:       07 -  1900  In: 392.4 [I.V.:392.4]  Out: 75 [Urine:75]  Last 3 shifts:  190 -  0700  In: 1915 [I.V.:1915]  Out: 925 [Urine:925]    Intake/Output Summary (Last 24 hours) at 2020 1100  Last data filed at 2020 1000  Gross per 24 hour   Intake 2307.39 ml   Output 525 ml   Net 1782.39 ml     Hemodynamics:   PAP:   CO:     Wedge:   CI:     CVP:  CVP (mmHg): 13 mmHg (20 0900) SVR:       PVR:       Ventilator Settings:  Mode Rate Tidal Volume Pressure FiO2 PEEP                    Peak airway pressure:      Minute ventilation:        Physical Exam:    General:  Alert, cooperative, no distress, appears stated age. Head:  Normocephalic, without obvious abnormality, atraumatic. Eyes:  Conjunctivae/corneas clear. PERRL, EOMs intact. Nose: Nares normal. Septum midline. Mucosa normal. No drainage or sinus tenderness. Throat: Lips, mucosa, and tongue normal. Teeth and gums normal.   Neck: Supple, symmetrical, trachea midline, no adenopathy, thyroid: no enlargment/tenderness/nodules, no carotid bruit and no JVD.    Back:   Symmetric, no curvature. ROM normal.   Lungs:   Clear to auscultation bilaterally. Chest wall:  No tenderness or deformity. Heart:  Regular rate and rhythm, S1, S2 normal, no murmur, click, rub or gallop. Abdomen:   Soft, non-tender. Bowel sounds normal. No masses,  No organomegaly. Extremities: Extremities normal, atraumatic, no cyanosis or edema. Pulses: 2+ and symmetric all extremities. Skin: Skin color, texture, turgor normal. No rashes or lesions   Lymph nodes: Cervical, supraclavicular, and axillary nodes normal.   Neurologic: Grossly nonfocal       Data:     Recent Results (from the past 24 hour(s))   CBC W/O DIFF    Collection Time: 01/03/20 10:24 PM   Result Value Ref Range    WBC 9.5 4.1 - 11.1 K/uL    RBC 2.09 (L) 4.10 - 5.70 M/uL    HGB 7.6 (L) 12.1 - 17.0 g/dL    HCT 23.2 (L) 36.6 - 50.3 %    .0 (H) 80.0 - 99.0 FL    MCH 36.4 (H) 26.0 - 34.0 PG    MCHC 32.8 30.0 - 36.5 g/dL    RDW 13.2 11.5 - 14.5 %    PLATELET 787 583 - 883 K/uL    MPV 10.7 8.9 - 12.9 FL    NRBC 0.0 0  WBC    ABSOLUTE NRBC 0.00 0.00 - 0.01 K/uL   OCCULT BLOOD, STOOL    Collection Time: 01/03/20 10:24 PM   Result Value Ref Range    Occult blood, stool POSITIVE (A) NEG     CBC WITH AUTOMATED DIFF    Collection Time: 01/04/20  6:08 AM   Result Value Ref Range    WBC 10.3 4.1 - 11.1 K/uL    RBC 1.92 (L) 4.10 - 5.70 M/uL    HGB 7.0 (L) 12.1 - 17.0 g/dL    HCT 21.4 (L) 36.6 - 50.3 %    .5 (H) 80.0 - 99.0 FL    MCH 36.5 (H) 26.0 - 34.0 PG    MCHC 32.7 30.0 - 36.5 g/dL    RDW 13.2 11.5 - 14.5 %    PLATELET 958 457 - 388 K/uL    MPV 10.6 8.9 - 12.9 FL    NRBC 0.0 0  WBC    ABSOLUTE NRBC 0.00 0.00 - 0.01 K/uL    NEUTROPHILS 74 32 - 75 %    LYMPHOCYTES 13 12 - 49 %    MONOCYTES 10 5 - 13 %    EOSINOPHILS 2 0 - 7 %    BASOPHILS 0 0 - 1 %    IMMATURE GRANULOCYTES 1 (H) 0.0 - 0.5 %    ABS. NEUTROPHILS 7.7 1.8 - 8.0 K/UL    ABS. LYMPHOCYTES 1.3 0.8 - 3.5 K/UL    ABS. MONOCYTES 1.0 0.0 - 1.0 K/UL    ABS.  EOSINOPHILS 0.2 0.0 - 0.4 K/UL    ABS. BASOPHILS 0.0 0.0 - 0.1 K/UL    ABS. IMM. GRANS. 0.1 (H) 0.00 - 0.04 K/UL    DF AUTOMATED      RBC COMMENTS MACROCYTOSIS  2+       METABOLIC PANEL, COMPREHENSIVE    Collection Time: 01/04/20  6:08 AM   Result Value Ref Range    Sodium 138 136 - 145 mmol/L    Potassium 3.4 (L) 3.5 - 5.1 mmol/L    Chloride 106 97 - 108 mmol/L    CO2 25 21 - 32 mmol/L    Anion gap 7 5 - 15 mmol/L    Glucose 110 (H) 65 - 100 mg/dL    BUN 11 6 - 20 MG/DL    Creatinine 0.59 (L) 0.70 - 1.30 MG/DL    BUN/Creatinine ratio 19 12 - 20      GFR est AA >60 >60 ml/min/1.73m2    GFR est non-AA >60 >60 ml/min/1.73m2    Calcium 7.0 (L) 8.5 - 10.1 MG/DL    Bilirubin, total 0.3 0.2 - 1.0 MG/DL    ALT (SGPT) 10 (L) 12 - 78 U/L    AST (SGOT) 15 15 - 37 U/L    Alk.  phosphatase 66 45 - 117 U/L    Protein, total 4.2 (L) 6.4 - 8.2 g/dL    Albumin 1.4 (L) 3.5 - 5.0 g/dL    Globulin 2.8 2.0 - 4.0 g/dL    A-G Ratio 0.5 (L) 1.1 - 2.2     MAGNESIUM    Collection Time: 01/04/20  6:08 AM   Result Value Ref Range    Magnesium 1.0 (L) 1.6 - 2.4 mg/dL   PHOSPHORUS    Collection Time: 01/04/20  6:08 AM   Result Value Ref Range    Phosphorus 2.1 (L) 2.6 - 4.7 MG/DL   TYPE & SCREEN    Collection Time: 01/04/20  6:08 AM   Result Value Ref Range    Crossmatch Expiration 01/07/2020     ABO/Rh(D) A POSITIVE     Antibody screen NEG     Unit number [id number]     Blood component type [facility]     Unit division 00     Status of unit ALLOCATED     Crossmatch result Compatible     Unit number [id number]     Blood component type [facility]     Unit division 00     Status of unit ISSUED     Crossmatch result Compatible    HGB & HCT    Collection Time: 01/04/20  9:30 AM   Result Value Ref Range    HGB 6.8 (L) 12.1 - 17.0 g/dL    HCT 21.4 (L) 36.6 - 50.3 %             Telemetry:normal sinus rhythm    Imaging:  I have personally reviewed the patients radiographs and have reviewed the reports:  1-2-20: CXR; INDICATION: Central line placement     FINDINGS:  Right IJ CVL has been placed with tip in the SVC without pneumothorax. There  remains bilateral pulmonary haziness favoring pleural effusions and likely  pulmonary edema. Heart size is obscured.     IMPRESSION  IMPRESSION: Satisfactory CVL placement.        Delano Martell MD

## 2020-01-04 NOTE — PROGRESS NOTES
Problem: Risk for Spread of Infection  Goal: Prevent transmission of infectious organism to others  Description  Prevent the transmission of infectious organisms to other patients, staff members, and visitors. Outcome: Progressing Towards Goal     Problem: Patient Education:  Go to Education Activity  Goal: Patient/Family Education  Outcome: Progressing Towards Goal     Problem: Falls - Risk of  Goal: *Absence of Falls  Description  Document Jair Levy Fall Risk and appropriate interventions in the flowsheet. Outcome: Progressing Towards Goal  Note: Fall Risk Interventions:  Mobility Interventions: Bed/chair exit alarm    Mentation Interventions: Door open when patient unattended    Medication Interventions: Evaluate medications/consider consulting pharmacy    Elimination Interventions: Bed/chair exit alarm, Call light in reach, Patient to call for help with toileting needs    History of Falls Interventions: Bed/chair exit alarm, Door open when patient unattended         Problem: Patient Education: Go to Patient Education Activity  Goal: Patient/Family Education  Outcome: Progressing Towards Goal     Problem: Pressure Injury - Risk of  Goal: *Prevention of pressure injury  Description  Document Troy Scale and appropriate interventions in the flowsheet. Outcome: Progressing Towards Goal  Note: Pressure Injury Interventions:  Sensory Interventions: Assess changes in LOC, Check visual cues for pain, Float heels, Keep linens dry and wrinkle-free, Minimize linen layers, Monitor skin under medical devices, Pad between skin to skin, Pressure redistribution bed/mattress (bed type), Turn and reposition approx.  every two hours (pillows and wedges if needed)    Moisture Interventions: Absorbent underpads, Apply protective barrier, creams and emollients, Assess need for specialty bed, Check for incontinence Q2 hours and as needed, Contain wound drainage, Limit adult briefs, Maintain skin hydration (lotion/cream), Internal/External urinary devices    Activity Interventions: Pressure redistribution bed/mattress(bed type)    Mobility Interventions: Pressure redistribution bed/mattress (bed type), HOB 30 degrees or less, Float heels, Turn and reposition approx.  every two hours(pillow and wedges)    Nutrition Interventions: Document food/fluid/supplement intake, Discuss nutritional consult with provider    Friction and Shear Interventions: Apply protective barrier, creams and emollients                Problem: Patient Education: Go to Patient Education Activity  Goal: Patient/Family Education  Outcome: Progressing Towards Goal     Problem: Alcohol Withdrawal  Goal: *STG: Participates in treatment plan  Outcome: Progressing Towards Goal     Problem: Hypertension  Goal: *Fluid volume balance  Outcome: Progressing Towards Goal

## 2020-01-04 NOTE — PROGRESS NOTES
Hospitalist Progress Note    NAME: Cecilia Phipps   :  1945   MRN:  [id number]       Assessment / Plan:  Shock  Unclear cause of shock  UTI is being treated  Echocardiogram pending  Patient is overall volume overloaded, so this should not be due to hypovolemia  He is bleeding now, but this appears to have developed after shock had already been observed  CCU monitoring appreciated, continue pressors as needed  Holding antihypertensives    Peripheral vascular disease POA- severe on CTA Abd/pelvis at [facility]  CTA:  -Occluded left common iliac and left external iliac artery with distal  reconstitution of the common femoral artery.  -Occluded right external iliac artery with distal reconstitution of the right  common femoral artery. Axillo-femoral bypass performed  by Dr. Aruna Soto  Added Gabapentin and Oxycodone 5-10 mg prn for pain management  Stool softeners    Gastrointestinal hemorrhage  New finding of melena yesterday evening  CTA done today -- no active extravasation seen  GI to see patient  Holding chemical DVT ppx  Continue PPI    Acute blood loss anemia  Agree with transfusions as ordered  Follow up CBC post-transfusions    Fever due to UTI (recurrent)  Currently on Zosyn, growing Citrobacter, possible second gram-negative yesi, and possible enterococcus   Blood cultures unfortunately never drawn; fever has not recurred  CXR with pleural effusion and pulmonary edema but no pneumonia identified    Alcohol withdrawal syndrome/Delirium Tremens POA, resolved  Discontinued CIWA monitoring as patient has not received any Ativan for days now and has been in the hospital greater than a week  S/p IV Goody bag, now on PO Vitamins as tolerating PO    Acute cystitis/recent Klebsiella UTI POA  Recent Urine Cx (office) = Klebsiella pn.  Species (sensitive)  S/p ceftriaxone x 3 days earlier in hospital stay    Hypertension  Holding home antihypertensive medication for current hypotension     Code Status: Full Surrogate Decision Maker: Brionna Quesada     DVT Prophylaxis: Lovenox   GI Prophylaxis: not indicated     Recommended Disposition: SNF/LTC Short term rehab with conversion to LTC placement when medicaid kicks in (currently pending). Subjective:     Chief Complaint / Reason for Physician Visit: f/u Alcohol withdrawal syndrome, UTI, hypotension  Developed melena overnight. CTA of abdomen obtained but en route, patient's central line dislodged and had to be replaced. Patient still receiving vasopressors. He denies complaints while I am at bedside. Note that his LUE is edematous -- he says that both arms were edematous before and that RUE improved on its own. Review of Systems:  Symptom Y/N Comments  Symptom Y/N Comments   Fever/Chills n   Chest Pain n    Poor Appetite n   Edema y    Cough n   Abdominal Pain n    Sputum n   Joint Pain n    SOB/MCFARLAND n   Pruritis/Rash     Nausea/vomit    Tolerating PT/OT     Diarrhea    Tolerating Diet     Constipation    Other       Could NOT obtain due to:      Objective:     VITALS:   Last 24hrs VS reviewed since prior progress note.  Most recent are:  Patient Vitals for the past 24 hrs:   Temp Pulse Resp BP SpO2   01/04/20 1515 96.5 °F (35.8 °C) 89 14 118/52 99 %   01/04/20 1500 96.7 °F (35.9 °C) 89 26 122/53 97 %   01/04/20 1445 96.9 °F (36.1 °C) 81 17 113/41 97 %   01/04/20 1427    121/53    01/04/20 1400 96.8 °F (36 °C) 90 21 133/67 95 %   01/04/20 1300 98.7 °F (37.1 °C) 88 16 97/45 96 %   01/04/20 1226 98.7 °F (37.1 °C) 94 18 (!) 79/36 92 %   01/04/20 1200 99 °F (37.2 °C) 90 17 94/57 95 %   01/04/20 1145 99.1 °F (37.3 °C) 94 20 106/52 95 %   01/04/20 1130 98.7 °F (37.1 °C) 91 18 94/46 95 %   01/04/20 1115 98.7 °F (37.1 °C) 89 16 92/41 94 %   01/04/20 1100  90 20 101/44 94 %   01/04/20 1000  97 16 116/53 94 %   01/04/20 0900  (!) 104 20 105/47 90 %   01/04/20 0800 97.9 °F (36.6 °C) (!) 111 18 115/46 94 %   01/04/20 0700  96 18 97/42 94 %   01/04/20 0645  99 20 106/45 93 %   01/04/20 0630  98 18 104/45 94 %   01/04/20 0615  97 18 115/69 94 %   01/04/20 0600  96 18 100/44 95 %   01/04/20 0545  96 17 (!) 82/39 94 %   01/04/20 0530  88 20 (!) 91/34 93 %   01/04/20 0515  98 19 (!) 102/38 94 %   01/04/20 0500  98 23 97/43 95 %   01/04/20 0445  99 17 100/44 95 %   01/04/20 0430  (!) 102 25 92/44 93 %   01/04/20 0415  (!) 104 (!) 47 (!) 89/39 94 %   01/04/20 0400 98 °F (36.7 °C) (!) 103 (!) 34 97/49 94 %   01/04/20 0345  (!) 101 (!) 39 (!) 99/35 94 %   01/04/20 0330  96 22 100/40 93 %   01/04/20 0315  95 (!) 40 118/46 94 %   01/04/20 0300  88 20 119/52 94 %   01/04/20 0245  93 22 107/47 95 %   01/04/20 0230  91 23 90/44 94 %   01/04/20 0215  91 22 95/44 95 %   01/04/20 0200  90 24 95/44 94 %   01/04/20 0145  93 23 97/42 95 %   01/04/20 0130  91 21 90/41 95 %   01/04/20 0115  91 20 95/41 96 %   01/04/20 0100  91 22 (!) 93/35 96 %   01/04/20 0045  91 30 (!) 95/39 95 %   01/04/20 0030 98.1 °F (36.7 °C) 93 23 110/40 94 %   01/04/20 0015  93 22 93/47 95 %   01/04/20 0000  97 23 100/47 95 %   01/03/20 2345  95 23 103/45 94 %   01/03/20 2330  97 27 (!) 85/45 94 %   01/03/20 2325  97 27 (!) 87/41 94 %   01/03/20 2312  98 22 (!) 80/41 94 %   01/03/20 2300  93 21 (!) 81/42 95 %   01/03/20 2230  95 16 97/45 95 %   01/03/20 2200  97 24 99/46 98 %   01/03/20 2130  98 22 (!) 88/44 98 %   01/03/20 2100  (!) 103 22 94/45 97 %   01/03/20 2030  (!) 106 24 99/45 95 %   01/03/20 2000  (!) 102 20 (!) 102/38 96 %   01/03/20 1930  (!) 102 22 (!) 87/42 100 %   01/03/20 1915  (!) 105 24  100 %   01/03/20 1900  (!) 108 20 95/47 100 %   01/03/20 1845  (!) 106 21  96 %   01/03/20 1830  (!) 102 (!) 44 103/50 98 %   01/03/20 1815  (!) 101 25  98 %   01/03/20 1800  97 20 107/47 96 %   01/03/20 1745  97 20  100 %   01/03/20 1730  92 23  97 %   01/03/20 1715  91 23  94 %   01/03/20 1700  92 18 (!) 81/51 100 %   01/03/20 1645  92 22  99 %   01/03/20 1630  100 (!) 36 103/42 98 %       Intake/Output Summary (Last 24 hours) at 1/4/2020 1625  Last data filed at 1/4/2020 1500  Gross per 24 hour   Intake 1402.39 ml   Output 900 ml   Net 502.39 ml        PHYSICAL EXAM:  General: WD, lethargic this AM, no acute distress. Chronically ill. Does not appear toxic. EENT:  EOMI. Anicteric sclerae. MMM  Resp:  Diminished breath sounds at bases. No accessory muscle use  CV:  Normal rate, regular rhythm. No edema. GI:  Soft, Non distended, Non tender.  +Bowel sounds  Neurologic:  Alert, oriented to self, place, purpose, seems to be getting confused about some of the recent timeline of his hospital stay  Psych:   Fair insight. Not anxious nor agitated  Skin:  No rashes. No jaundice  Lines:   Right IJ TLC In place. Reviewed most current lab test results and cultures  YES  Reviewed most current radiology test results   YES  Review and summation of old records today    NO  Reviewed patient's current orders and MAR    YES  PMH/SH reviewed - no change compared to H&P  ________________________________________________________________________  Care Plan discussed with:    Comments   Patient x    Family      RN x    Care Manager     Consultant                        Multidiciplinary team rounds were held today with , nursing, pharmacist and clinical coordinator. Patient's plan of care was discussed; medications were reviewed and discharge planning was addressed. ________________________________________________________________________  Total NON critical care TIME:  16   Minutes    Total CRITICAL CARE TIME Spent:   Minutes non procedure based      Comments   >50% of visit spent in counseling and coordination of care     ________________________________________________________________________  Brien Godfrey MD     Procedures: see electronic medical records for all procedures/Xrays and details which were not copied into this note but were reviewed prior to creation of Plan. LABS:  I reviewed today's most current labs and imaging studies.   Pertinent labs include:  Recent Labs     01/04/20  0930 01/04/20  0608 01/03/20  2224 01/03/20  0410   WBC  --  10.3 9.5 7.8   HGB 6.8* 7.0* 7.6* 7.7*   HCT 21.4* 21.4* 23.2* 24.2*   PLT  --  331 342 372     Recent Labs     01/04/20  0608 01/03/20 0410 01/02/20  0334    138 137   K 3.4* 3.3* 3.6    104 104   CO2 25 24 27   * 68 71   BUN 11 10 10   CREA 0.59* 0.49* 0.68*   CA 7.0* 7.7* 7.4*   MG 1.0*  --   --    PHOS 2.1*  --   --    ALB 1.4* 1.5*  --    TBILI 0.3 0.3  --    SGOT 15 15  --    ALT 10* 10*  --        Signed: Rommel Lind MD

## 2020-01-04 NOTE — PROGRESS NOTES
[address] MRN [id number]  Consult ID [id number]  Facility Time Zone ET  Date and Time of Request 01/03/2020 10:14:09 PM  Requesting Clinician jennyfer  Patient Name Jorge Calixto  Date of Birth [id number]  Gender Male  Clinical Note  Clinical Note PER RN,\"pt had large amount of maroon colored stool\"  Cbc ordered, check occult blood test. Hx GI bleed. IV protonix 40 mg BID ordered. GI consult for concern of GI bleed.

## 2020-01-04 NOTE — PROGRESS NOTES
0945: Pt arrived to unit. Primary Nurse Nayan Dee and SOHA Guerrero performed a dual skin assessment on this patient Impairment noted- see wound doc flow sheet. Troy score is 15    1000: Levophed started at 0.5 mcg/min, see flowsheets    1008: Levophed gtt rate advanced to 1 mcg/min. 1019: Levophed goal of therapy changed to sbp > 90, levophed gtt stopped at this time. 1200: Reassessment completed, see flowsheets. 1600: Reassessment completed, see flowsheets. 1900: Uneventful shift, Bedside shift change report given to Aura Noguera RN (oncoming nurse) by Krystal Olvrea RN (offgoing nurse). Report included the following information SBAR, Kardex, ED Summary, Procedure Summary, Intake/Output, MAR, Recent Results and Cardiac Rhythm SINUS ARRHYTHMIA.

## 2020-01-04 NOTE — PROGRESS NOTES
Chart reviewed and per nursing pt with very bloody diarrhea and now on pressors. Asking to hold at this time. Will follow back tomorrow if appropriate.

## 2020-01-04 NOTE — PROCEDURES
Central Line Procedure Note    Indication: Need for vasopressors    Start:  1230  End:   1246    Risks, benefits, alternatives explained and patient agrees to proceed. Patient positioned in Trendelenburg. 7-Step Sterility Protocol followed. (cap, mask sterile gown, sterile gloves, large sterile sheet, hand hygiene, 2% chlorhexidine for cutaneous antisepsis)  5 mL 1% Lidocaine placed at insertion site. Right internal jugular cannulated x 1 attempt(s) utilizing the Seldinger technique under ultrasound guidance. Catheter secured & Biopatch applied. Sterile Tegaderm placed. CXR pending.     Care turned over to covering Attending MD.

## 2020-01-04 NOTE — CONSULTS
Please see dictated note    This is a patient with severe vascular disease s/p recent axillobifemoral bypass with remote history of esophagitis, peptic ulcer disease and alcohol and tobacco abuse. Today he had melanic stools and a drop in hgb. It fell from 7.7 to 6.8. He has since been transfused . He is not vomiting. There is no hematemesis and he hasn't had a bowel movement since this morning. A CTA was done which showed the followin. No acute GI bleeding site demonstrated  2. Colonic diverticulosis. 3. Moderate bilateral pleural effusions and adjacent atelectasis. 4. Recent right axillary femoral and cross femoral bypass. Anasarca. 5. No acute findings in the abdomen or pelvis. 6. Possible cholelithiasis. His vital signs are stable. He does not have abdominal pain or tachycardia. His BP is ok and he is not on Pressors    He is on IV Protonix q 12 hours. Sub Q heparin has been stopped. I suspect he may have a mild upper GI bleeding.  I will let him have clear liquids tonight and will plan on doing EGD in the morning()    [facility]

## 2020-01-05 LAB
ABO + RH BLD: NORMAL
BLD PROD TYP BPU: NORMAL
BLD PROD TYP BPU: NORMAL
BLOOD GROUP ANTIBODIES SERPL: NORMAL
BPU ID: NORMAL
BPU ID: NORMAL
CROSSMATCH RESULT,%XM: NORMAL
CROSSMATCH RESULT,%XM: NORMAL
SPECIMEN EXP DATE BLD: NORMAL
STATUS OF UNIT,%ST: NORMAL
STATUS OF UNIT,%ST: NORMAL
UNIT DIVISION, %UDIV: 0
UNIT DIVISION, %UDIV: 0

## 2020-01-05 PROCEDURE — 74011250637 HC RX REV CODE- 250/637: Performed by: SURGERY

## 2020-01-05 PROCEDURE — 0DJ08ZZ INSPECTION OF UPPER INTESTINAL TRACT, VIA NATURAL OR ARTIFICIAL OPENING ENDOSCOPIC: ICD-10-PCS | Performed by: INTERNAL MEDICINE

## 2020-01-05 PROCEDURE — C9113 INJ PANTOPRAZOLE SODIUM, VIA: HCPCS | Performed by: FAMILY MEDICINE

## 2020-01-05 PROCEDURE — 65660000000 HC RM CCU STEPDOWN

## 2020-01-05 PROCEDURE — 74011000250 HC RX REV CODE- 250: Performed by: FAMILY MEDICINE

## 2020-01-05 PROCEDURE — 76040000019: Performed by: INTERNAL MEDICINE

## 2020-01-05 PROCEDURE — 74011000258 HC RX REV CODE- 258: Performed by: INTERNAL MEDICINE

## 2020-01-05 PROCEDURE — 74011250636 HC RX REV CODE- 250/636: Performed by: FAMILY MEDICINE

## 2020-01-05 PROCEDURE — 74011250636 HC RX REV CODE- 250/636: Performed by: INTERNAL MEDICINE

## 2020-01-05 PROCEDURE — 74011250637 HC RX REV CODE- 250/637: Performed by: INTERNAL MEDICINE

## 2020-01-05 PROCEDURE — 74011250636 HC RX REV CODE- 250/636: Performed by: SURGERY

## 2020-01-05 PROCEDURE — 77010033678 HC OXYGEN DAILY

## 2020-01-05 PROCEDURE — 74011250636 HC RX REV CODE- 250/636

## 2020-01-05 RX ORDER — SODIUM CHLORIDE 0.9 % (FLUSH) 0.9 %
5-40 SYRINGE (ML) INJECTION EVERY 8 HOURS
Status: DISCONTINUED | OUTPATIENT
Start: 2020-01-05 | End: 2020-01-06

## 2020-01-05 RX ORDER — FLUMAZENIL 0.1 MG/ML
0.2 INJECTION INTRAVENOUS
Status: ACTIVE | OUTPATIENT
Start: 2020-01-05 | End: 2020-01-05

## 2020-01-05 RX ORDER — FENTANYL CITRATE 50 UG/ML
100 INJECTION, SOLUTION INTRAMUSCULAR; INTRAVENOUS ONCE
Status: COMPLETED | OUTPATIENT
Start: 2020-01-05 | End: 2020-01-05

## 2020-01-05 RX ORDER — EPINEPHRINE 0.1 MG/ML
1 INJECTION INTRACARDIAC; INTRAVENOUS
Status: ACTIVE | OUTPATIENT
Start: 2020-01-05 | End: 2020-01-06

## 2020-01-05 RX ORDER — ATROPINE SULFATE 0.1 MG/ML
0.5 INJECTION INTRAVENOUS
Status: ACTIVE | OUTPATIENT
Start: 2020-01-05 | End: 2020-01-06

## 2020-01-05 RX ORDER — MIDAZOLAM HYDROCHLORIDE 1 MG/ML
INJECTION, SOLUTION INTRAMUSCULAR; INTRAVENOUS
Status: COMPLETED
Start: 2020-01-05 | End: 2020-01-05

## 2020-01-05 RX ORDER — DEXTROMETHORPHAN/PSEUDOEPHED 2.5-7.5/.8
1.2 DROPS ORAL
Status: DISCONTINUED | OUTPATIENT
Start: 2020-01-05 | End: 2020-01-22 | Stop reason: HOSPADM

## 2020-01-05 RX ORDER — SODIUM CHLORIDE 9 MG/ML
75 INJECTION, SOLUTION INTRAVENOUS CONTINUOUS
Status: ACTIVE | OUTPATIENT
Start: 2020-01-05 | End: 2020-01-05

## 2020-01-05 RX ORDER — NALOXONE HYDROCHLORIDE 0.4 MG/ML
0.4 INJECTION, SOLUTION INTRAMUSCULAR; INTRAVENOUS; SUBCUTANEOUS
Status: ACTIVE | OUTPATIENT
Start: 2020-01-05 | End: 2020-01-05

## 2020-01-05 RX ORDER — MIDAZOLAM HYDROCHLORIDE 1 MG/ML
.25-1 INJECTION, SOLUTION INTRAMUSCULAR; INTRAVENOUS
Status: ACTIVE | OUTPATIENT
Start: 2020-01-05 | End: 2020-01-05

## 2020-01-05 RX ORDER — SODIUM CHLORIDE 0.9 % (FLUSH) 0.9 %
5-40 SYRINGE (ML) INJECTION AS NEEDED
Status: DISCONTINUED | OUTPATIENT
Start: 2020-01-05 | End: 2020-01-06

## 2020-01-05 RX ORDER — FENTANYL CITRATE 50 UG/ML
INJECTION, SOLUTION INTRAMUSCULAR; INTRAVENOUS
Status: COMPLETED
Start: 2020-01-05 | End: 2020-01-05

## 2020-01-05 RX ADMIN — Medication 1 AMPULE: at 11:23

## 2020-01-05 RX ADMIN — OXYCODONE 5 MG: 5 TABLET ORAL at 12:42

## 2020-01-05 RX ADMIN — GABAPENTIN 100 MG: 100 CAPSULE ORAL at 16:28

## 2020-01-05 RX ADMIN — Medication 10 ML: at 09:54

## 2020-01-05 RX ADMIN — PIPERACILLIN AND TAZOBACTAM 3.38 G: 3; .375 INJECTION, POWDER, LYOPHILIZED, FOR SOLUTION INTRAVENOUS at 06:04

## 2020-01-05 RX ADMIN — SODIUM CHLORIDE 40 MG: 9 INJECTION INTRAMUSCULAR; INTRAVENOUS; SUBCUTANEOUS at 09:51

## 2020-01-05 RX ADMIN — SODIUM CHLORIDE 40 MG: 9 INJECTION INTRAMUSCULAR; INTRAVENOUS; SUBCUTANEOUS at 21:31

## 2020-01-05 RX ADMIN — FOLIC ACID 1 MG: 1 TABLET ORAL at 09:50

## 2020-01-05 RX ADMIN — Medication 10 ML: at 16:29

## 2020-01-05 RX ADMIN — FENTANYL CITRATE 50 MCG: 50 INJECTION, SOLUTION INTRAMUSCULAR; INTRAVENOUS at 08:40

## 2020-01-05 RX ADMIN — GABAPENTIN 300 MG: 300 CAPSULE ORAL at 21:30

## 2020-01-05 RX ADMIN — PIPERACILLIN AND TAZOBACTAM 3.38 G: 3; .375 INJECTION, POWDER, LYOPHILIZED, FOR SOLUTION INTRAVENOUS at 12:30

## 2020-01-05 RX ADMIN — Medication 100 MG: at 09:50

## 2020-01-05 RX ADMIN — MIDAZOLAM 2 MG: 1 INJECTION INTRAMUSCULAR; INTRAVENOUS at 08:40

## 2020-01-05 RX ADMIN — Medication 10 ML: at 06:04

## 2020-01-05 RX ADMIN — Medication 10 ML: at 21:32

## 2020-01-05 RX ADMIN — GABAPENTIN 100 MG: 100 CAPSULE ORAL at 09:50

## 2020-01-05 RX ADMIN — PRAVASTATIN SODIUM 40 MG: 40 TABLET ORAL at 21:30

## 2020-01-05 RX ADMIN — SENNOSIDES AND DOCUSATE SODIUM 1 TABLET: 8.6; 5 TABLET ORAL at 09:50

## 2020-01-05 RX ADMIN — Medication 1 AMPULE: at 23:55

## 2020-01-05 RX ADMIN — SODIUM CHLORIDE 75 ML/HR: 900 INJECTION, SOLUTION INTRAVENOUS at 08:31

## 2020-01-05 RX ADMIN — METHYLPREDNISOLONE SODIUM SUCCINATE 40 MG: 40 INJECTION, POWDER, FOR SOLUTION INTRAMUSCULAR; INTRAVENOUS at 01:32

## 2020-01-05 RX ADMIN — Medication 30 ML: at 21:36

## 2020-01-05 RX ADMIN — THERA TABS 1 TABLET: TAB at 09:50

## 2020-01-05 RX ADMIN — COLLAGENASE SANTYL: 250 OINTMENT TOPICAL at 11:23

## 2020-01-05 RX ADMIN — PIPERACILLIN AND TAZOBACTAM 3.38 G: 3; .375 INJECTION, POWDER, LYOPHILIZED, FOR SOLUTION INTRAVENOUS at 21:35

## 2020-01-05 RX ADMIN — SODIUM CHLORIDE 50 ML/HR: 900 INJECTION, SOLUTION INTRAVENOUS at 12:27

## 2020-01-05 RX ADMIN — ASPIRIN 81 MG 81 MG: 81 TABLET ORAL at 09:50

## 2020-01-05 NOTE — PROGRESS NOTES
Pharmacy Automatic Renal Dosing Protocol - Antimicrobials    Indication for Antimicrobials: UTI     Current Regimen of Each Antimicrobial:  Piperacillin/tazobactam 3.375g IV q8h - started 1/3 day 3    Previous Antimicrobial Therapy:  Ceftriaxone starting     Significant Cultures:    UCx - Citrobacter Braakii - pending    Radiology / Imaging results: (X-ray, CT scan or MRI): none pertinent    Paralysis, amputations, malnutrition: L foot TMA    Labs:  Recent Labs     20  1834 20  0608 20  2224 20  0410   CREA 0.72 0.59*  --  0.49*   BUN 12 11  --  10   WBC  --  10.3 9.5 7.8     Temp (24hrs), Av.9 °F (36.6 °C), Min:96.5 °F (35.8 °C), Max:99.1 °F (37.3 °C)    Creatinine Clearance (mL/min) or Dialysis:   Estimated Creatinine Clearance: 80 mL/min (based on SCr of 0.72 mg/dL). Estimated Creatinine Clearance (using IBW):80.0 mL/min    Impression/Plan:   SCr stable,   Could consider de-escalation per UCx  Continue Zosyn regimen  Antimicrobial stop date 7 days     Pharmacy will follow daily and adjust medications as appropriate for renal function and/or serum levels.     Thank you,  Fortino Salcedo, [facility]

## 2020-01-05 NOTE — PROGRESS NOTES
[facility], [place]  [address] [facility], [facility] [place]   [phone]     Endoscopic Gastroduodenoscopy Procedure Note     NAME:            Melba Mei   :               [id number]   MRN:               [id number]      Indication:  Melena/hematochezia      : Quintin Person MD     Referring Provider:  Lennox Ramming, MD     Anesthesia/Sedation:  Versed 2 mg IV and Fentanyl 50 mcg IV     Procedure Details      After infomed consent was obtained for the procedure, with all risks and benefits of procedure explained the patient was taken to the endoscopy suite and placed in the left lateral decubitus position. Following sequential administration of sedation as per above, the endoscope was inserted into the mouth and advanced under direct vision to second portion of the duodenum. A careful inspection was made as the gastroscope was withdrawn, including a retroflexed view of the proximal stomach; findings and interventions are described below.       Findings:   Esophagus:normal  Stomach: hiatal hernia  Duodenum/jejunum: normal        Therapies:  none     Specimens: none           Complications:   None; patient tolerated the procedure well.            Impression:    -Normal upper endoscopy, with no endoscopic evidence of neoplasia or mucosal abnormality.     Recommendations:  -Continue acid suppression.  -Follow hemoglobin  -Consider Flex Sig or Colonoscopy if bleeding continues     Quintin Person MD

## 2020-01-05 NOTE — ROUTINE PROCESS
TRANSFER - IN REPORT:    Verbal report received from Yolanda HOYOS on [employer]  being received from 2520(unit) for routine progression of care      Report consisted of patients Situation, Background, Assessment and   Recommendations(SBAR). Information from the following report(s) Procedure Summary was reviewed with the receiving nurse. Opportunity for questions and clarification was provided.

## 2020-01-05 NOTE — PROCEDURES
[address]  [address]  [phone]    Endoscopic Gastroduodenoscopy Procedure Note    NAME:  Altaf Champagne   :      MRN:   [id number]     Indication:  Melena/hematochezia     : Jude Lara MD    Referring Provider:  Jenelle Santos MD    Anesthesia/Sedation:  Versed 2 mg IV and Fentanyl 50 mcg IV     Procedure Details     After infomed consent was obtained for the procedure, with all risks and benefits of procedure explained the patient was taken to the endoscopy suite and placed in the left lateral decubitus position. Following sequential administration of sedation as per above, the endoscope was inserted into the mouth and advanced under direct vision to second portion of the duodenum. A careful inspection was made as the gastroscope was withdrawn, including a retroflexed view of the proximal stomach; findings and interventions are described below. Findings:   Esophagus:normal  Stomach: hiatal hernia  Duodenum/jejunum: normal      Therapies:  none    Specimens: none           Complications:   None; patient tolerated the procedure well. Impression:    -Normal upper endoscopy, with no endoscopic evidence of neoplasia or mucosal abnormality. Recommendations:  -Continue acid suppression.     Jude Lara MD

## 2020-01-05 NOTE — PROGRESS NOTES
PT note:     Chart reviewed and noted patient undergoing EGD this date. Will defer and continue to follow for PT re-evaluation when stable.      Erickson Barragan, PT, DPT

## 2020-01-05 NOTE — PROGRESS NOTES
Hospitalist Progress Note    NAME: Leesa Delacruz   :  1945   MRN:  [id number]       Assessment / Plan:  Shock, resolved  Unclear cause of shock  UTI is being treated  Echocardiogram with normal EF, unlikely cardiogenic  Patient is overall volume overloaded, so this should not be due to hypovolemia  He did bleed, but this appeared to have developed after shock had already been observed  CCU monitoring appreciated, continue pressors as needed -- anticipate he can transfer out tomorrow if stable overnight  Holding antihypertensives    Peripheral vascular disease POA- severe on CTA Abd/pelvis at [facility]  CTA:  -Occluded left common iliac and left external iliac artery with distal  reconstitution of the common femoral artery.  -Occluded right external iliac artery with distal reconstitution of the right  common femoral artery.     Axillo-femoral bypass performed  by Dr. Stevenson Velazquez  Added Gabapentin and Oxycodone 5-10 mg prn for pain management  Stool softeners    Gastrointestinal hemorrhage, resolved  New finding of melena 1/3 evening  CTA done  -- no active extravasation seen  EGD done, no active bleeding or bleeding source seen   Holding chemical DVT ppx  Continue PPI  Bleeding appears to have resolved    Acute blood loss anemia  Agree with transfusions as ordered  Follow up CBC post-transfusions    Pleural effusions and pulmonary edema  Diuresis has been on hold as patient was requiring vasopressors to support BP  If BP stable overnight, start diuresis in AM    Fever due to UTI (recurrent)  Currently on Zosyn, growing Citrobacter, possible second gram-negative yesi, and possible enterococcus   Blood cultures unfortunately never drawn; fever has not recurred  CXR with pleural effusion and pulmonary edema but no pneumonia identified    Alcohol withdrawal syndrome/Delirium Tremens POA, resolved  Discontinued CIWA monitoring as patient has not received any Ativan for days now and has been in the hospital greater than a week  S/p IV Goody bag, now on PO Vitamins as tolerating PO    Acute cystitis/recent Klebsiella UTI POA  Recent Urine Cx (office) = Klebsiella pn. Species (sensitive)  S/p ceftriaxone x 3 days earlier in hospital stay    Hypertension  Holding home antihypertensive medication for current hypotension     Code Status: Full   Surrogate Decision Maker: Brionna Quesada     DVT Prophylaxis: Lovenox   GI Prophylaxis: not indicated     Recommended Disposition: SNF/LTC Short term rehab with conversion to LTC placement when medicaid kicks in (currently pending). Subjective:     Chief Complaint / Reason for Physician Visit: f/u Alcohol withdrawal syndrome, UTI, hypotension  S/p EGD today for evaluation of melena -- normal study. Feels OK today except that he had some pain when [place] was clogged with sediment, better now after irrigation. No dyspnea. No N/V. Review of Systems:  Symptom Y/N Comments  Symptom Y/N Comments   Fever/Chills n   Chest Pain n    Poor Appetite n   Edema     Cough n   Abdominal Pain y Relieved now   Sputum n   Joint Pain n    SOB/MCF[place]LAND n   Pruritis/Rash     Nausea/vomit n   Tolerating PT/OT     Diarrhea    Tolerating Diet y    Constipation    Other       Could NOT obtain due to:      Objective:     VITALS:   Last 24hrs VS reviewed since prior progress note.  Most recent are:  Patient Vitals for the past 24 hrs:   Temp Pulse Resp BP SpO2   01/05/20 1630  90 18 117/58 96 %   01/05/20 1600 97.5 °F (36.4 °C) 92 15 111/57 94 %   01/05/20 1530  97 18 119/63 93 %   01/05/20 1500  84 15 111/53 96 %   01/05/20 1400  81 16 123/56 98 %   01/05/20 1300  82 19 105/53 98 %   01/05/20 1230  85 18 120/62 95 %   01/05/20 1200 97.3 °F (36.3 °C) 93 22 135/44 98 %   01/05/20 1130  93 21 117/59 98 %   01/05/20 1100  92 28 125/44 97 %   01/05/20 1030  90 17 141/62 98 %   01/05/20 0957  85 18 93/70 95 %   01/05/20 0900  82 18 113/57    01/05/20 0853  81 16 112/51 94 %   01/05/20 0850  81 16 115/57 94 %   01/05/20 0845  80 16 160/60 91 %   01/05/20 0842  80 16 128/63 91 %   01/05/20 0840  81 16 128/46 91 %   01/05/20 0830  87 17 128/57 94 %   01/05/20 0822  88 16 132/55 94 %   01/05/20 0815  81 16 118/55 97 %   01/05/20 0800 97.9 °F (36.6 °C) 80 17 120/58 96 %   01/05/20 0600  82 15 131/61 97 %   01/05/20 0500  82 19 117/63 96 %   01/05/20 0400  80 23 118/47 96 %   01/05/20 0300  76 17 115/64 97 %   01/05/20 0200  77 18 113/61 97 %   01/05/20 0100  77 16 118/52 98 %   01/05/20 0000  76 15 107/54 98 %   01/04/20 2315  69 19 (!) 107/38 94 %   01/04/20 2300  74 16 99/59 98 %   01/04/20 2245  75 16 105/47 97 %   01/04/20 2230  77 17 108/50 100 %   01/04/20 2215  77 16 109/52 97 %   01/04/20 2200  79 27 107/52 96 %   01/04/20 2145  79 20 104/52 95 %   01/04/20 2130  81 16 102/48 97 %   01/04/20 2115  79 19 105/45 96 %   01/04/20 2100  83 17 102/49 97 %   01/04/20 2045  71 20 94/43 96 %   01/04/20 2030  75 25 (!) 101/33 95 %   01/04/20 2015  81 19 99/43 98 %   01/04/20 2000 97.6 °F (36.4 °C) 84 19 (!) 82/38 97 %   01/04/20 1930  85 17 (!) 83/47    01/04/20 1900  87 16 111/58 96 %   01/04/20 1800 97.9 °F (36.6 °C) 94 20 143/89 96 %       Intake/Output Summary (Last 24 hours) at 1/5/2020 1714  Last data filed at 1/5/2020 1600  Gross per 24 hour   Intake 2846.18 ml   Output 1200 ml   Net 1646.18 ml        PHYSICAL EXAM:  General: WD, lethargic this AM, no acute distress. Chronically ill. Does not appear toxic. EENT:  EOMI. Anicteric sclerae. MMM  Resp:  Diminished breath sounds at bases. No accessory muscle use  CV:  Normal rate, regular rhythm. No edema. GI:  Soft, Non distended, Non tender.  +Bowel sounds  Neurologic:  Alert, oriented to self, place, purpose  Psych:   Fair insight. Not anxious nor agitated  Skin:  No rashes. No jaundice  Lines:   Right IJ TLC In place.     Reviewed most current lab test results and cultures  YES  Reviewed most current radiology test results YES  Review and summation of old records today    NO  Reviewed patient's current orders and MAR    YES  PMH/SH reviewed - no change compared to H&P  ________________________________________________________________________  Care Plan discussed with:    Comments   Patient x    Family      RN     Care Manager     Consultant                        Multidiciplinary team rounds were held today with , nursing, pharmacist and clinical coordinator. Patient's plan of care was discussed; medications were reviewed and discharge planning was addressed. ________________________________________________________________________  Total NON critical care TIME:  16   Minutes    Total CRITICAL CARE TIME Spent:   Minutes non procedure based      Comments   >50% of visit spent in counseling and coordination of care     ________________________________________________________________________  Kasey Morejon MD     Procedures: see electronic medical records for all procedures/Xrays and details which were not copied into this note but were reviewed prior to creation of Plan. LABS:  I reviewed today's most current labs and imaging studies.   Pertinent labs include:  Recent Labs     01/04/20 1834 01/04/20  0930 01/04/20  0608 01/03/20 2224 01/03/20  0410   WBC  --   --  10.3 9.5 7.8   HGB 11.3* 6.8* 7.0* 7.6* 7.7*   HCT 32.6* 21.4* 21.4* 23.2* 24.2*   PLT  --   --  331 342 372     Recent Labs     01/04/20  1834 01/04/20  0608 01/03/20  0410    138 138   K 4.0 3.4* 3.3*    106 104   CO2 26 25 24   * 110* 68   BUN 12 11 10   CREA 0.72 0.59* 0.49*   CA 7.0* 7.0* 7.7*   MG 1.4* 1.0*  --    PHOS  --  2.1*  --    ALB  --  1.4* 1.5*   TBILI  --  0.3 0.3   SGOT  --  15 15   ALT  --  10* 10*       Signed: Kasey Morejon MD

## 2020-01-05 NOTE — CONSULTS
[facility]    Name:  Mariana Gonzalez  MR#:  [id number]  :  1945  ACCOUNT #:  [de-identified]  DATE OF SERVICE:  2020    REASON FOR CONSULT:  GI bleeding. DATABASE:  The patient is a 28-year-old man with a history of alcohol and tobacco abuse and severe vascular disease. He has been hospitalized for the last few weeks. He recently underwent axillary-bifemoral vascular bypass. This morning, the patient had a couple of black tarry stools. His hemoglobin fell from 7.7 to 6.8. We were asked to help with further management. The patient has a history of previous bleeding peptic ulcers. This was found and treated on endoscopy in 2016. He also has a previous severe esophagitis. He has alcohol abuse, but does not have any known varices. The patient had a CT angio done today, which showed diverticulosis, but no active bleeding site. His vital signs remained stable in the ICU. He is not tachycardic. His blood pressure has been in the 289-018 systolic range with diastolic in the 80-19 range. He does not require pressors. He was slightly tachycardic last night. The patient was given 1 unit of packed cells with his hemoglobin of 6.8. His repeat hemoglobin is pending. He does not have any chest pain, abdominal pain, nausea, vomiting, hematemesis, etc.  He has not had a bowel movement in several hours. PAST MEDICAL HISTORY:  Previous history of alcohol withdrawal which was noted at the beginning of this admission. He was treated with the CIWA protocol. He was also treated with goody bags (thiamine, folate, etc.). He has history of hemachromatosis, peripheral vascular disease, and previous diverticulosis. 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 REVIEW OF SYSTEMS:  Otherwise unremarkable. PHYSICAL EXAMINATION:  GENERAL:  Pleasant gentleman in no obvious distress. VITAL SIGNS:  Stable. LUNGS:  Clear.   HEART:  Regular rate and rhythm. ABDOMEN:  Soft, nontender. Bowel sounds normal.    IMPRESSION:  I suspect the patient is having mild upper gastrointestinal bleed. He is not actively bleeding now. PLAN:  I agree with IV Protonix. I will plan an upper endoscopy tomorrow morning (Sunday).       Leila Almendarez MD      [id number]  D:  01/04/2020 17:50  T:  01/04/2020 22:46  JOB #:  [id number]  CC:  Nirav Ladd MD

## 2020-01-05 NOTE — PROGRESS NOTES
Noted patient will be having Upper endoscopy this morning due to GI bleeding. Will check on for re-eval at later time.     Noe Leo MS, OTR/L, CSRS

## 2020-01-05 NOTE — PROGRESS NOTES
0730 Report received from night nurseSuyapa RN. Patient currently sleeping, vitals stable. 5497 Dr. Fransico Riley at bedside with Endo team, EGD underway. 1230 Levo stopped, will monitor blood pressures,    1245 Patient complained of sudden onset pain in penis/scrotum. Isabel cath in place and draining juan manuel urine. Cleansed isabel, penis and scrotum with wipes, advanced isabel cath into bladder about 1 inch and moderate amount of brown, rust colored  Sediment/sludge drained from bladder into isabel tubing. Spoke with Dr. Eddy Taylor, ok to irrigate isabel with sterile solution. Irrigated with 40 ml and all was returned along with more sediment/sludge. Eddy Taylor relayed that if this continues, ok to change isabel cath out. Patient reported relief from the original pain, requested addition pain medication. Medicated with Maria T as ordered PRN. Assessment unchanged from previous. 1325 Patient sleeping quietly, vital stable. 1600 Assessment unchanged. Patient offering no complaints at this time. Vitals stable. Levo remains off.

## 2020-01-05 NOTE — PROCEDURES
[address]  [address]  [phone]    Endoscopic Gastroduodenoscopy Procedure Note    NAME:  Gloria Buchanan   :      MRN:   [id number]     Indication:  Melena/hematochezia     : Aurora Begum MD    Referring Provider:  Belinda Sanon MD    Anesthesia/Sedation:  Versed 2 mg IV and Fentanyl 50 mcg IV     Procedure Details     After infomed consent was obtained for the procedure, with all risks and benefits of procedure explained the patient was taken to the endoscopy suite and placed in the left lateral decubitus position. Following sequential administration of sedation as per above, the endoscope was inserted into the mouth and advanced under direct vision to second portion of the duodenum. A careful inspection was made as the gastroscope was withdrawn, including a retroflexed view of the proximal stomach; findings and interventions are described below. Findings:   Esophagus:normal  Stomach: hiatal hernia  Duodenum/jejunum: normal      Therapies:  none    Specimens: none           Complications:   None; patient tolerated the procedure well. Impression:    -Normal upper endoscopy, with no endoscopic evidence of neoplasia or mucosal abnormality. Recommendations:  -Continue acid suppression.     Aurora Begum MD

## 2020-01-05 NOTE — PROGRESS NOTES
[facility]  Pulmonary, Critical Care, and Sleep Medicine    Name: Clif Bowling MRN: [id number]   : 1945 Hospital: [facility]   Date: 2020        Critical Care Initial Patient Consult    IMPRESSION:   · Shock, etiology not entirely clear. Post op. No overt bleeding. ON levophed drip at this point. Was given saline bolus x2 last pm.   · S/p Axillo-femoral bypass. · Acute pulmonary edema and bilateral pleural effusions-team was reluctant to give much more volume  · Encephalopathy noted earlier. Now seems better. · Recurrent UTI  · Anemia: hgb o 7.7  · Hypokalemia  · Severe hypoalbuminemia. · Normal lactate  · Hx of Alcohol use, had recently been treated for Etoh withdrawal.   · DM  · Paroxysmal A fib. · Recent Klebsiella UTI. Completed Course of treatment. · Baseline Hypertension  · GERD  · Critically ill due to shock. Pulmonary edema, on pressors. 37 min CC, EOP  · Discussed with nurse this am.       RECOMMENDATIONS:   · EGD this AM with no source of bleeding  · Echo EF 55 with some diastolic dysfunction  · CTA abdomen negative  · Pressors as needed for SBP over 90  · Will follow serial Cr, Hgb. · UCx  w citrobacter sensitive to zosyn which he is on  · Holding BP meds at present. · Glycemic Control and monitoring. · Vascular Surgery is following  · Monitor for bleeding. · Hold lasix for now. · On nicotine patch. · ON PPI. Subjective/History: This patient has been seen and evaluated at the request of Dr. Alayna Guerra for above. Patient is a 75 y.o. male who underwent ax-fem bypass last pm. Was noted to have hypotension and encephalopathy earlier. He is seen in ICU. Has some numbness of his mouth. Has mild pain of his lower abdomen. No chest pain, no back pain. Has a right IJ CVC in place. Reviewed EMR.      OVernight events:  No BMs overnight  No drop in hgb  EGD negative  Pt sleepy from recent EGD    Past Medical History:   Diagnosis Date    Abuse     alcoholism, quit 2012    Claudication of calf muscles [facility]) 2013    Colovesical fistula     Dr Ezekiel Douglas Esophageal stricture     Esophagitis     GI bleed 10/2016    Hemochromatosis     Hyperlipidemia     Hypertension     Peripheral artery disease (HCC)     Dr. 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 Authorizing Provider   alclometasone (ACLOVATE) 0.05 % topical cream  10/11/19  Yes Provider, Historical   betamethasone dipropionate (DIPROLENE) 0.05 % ointment  11/1/19  Yes Provider, Historical   omeprazole (PRILOSEC) 20 mg capsule Take 20 mg by mouth daily as needed. Yes Provider, Historical   propranolol (INDERAL) 20 mg tablet Take 20 mg by mouth two (2) times a day. Yes Provider, Historical   pravastatin (PRAVACHOL) 40 mg tablet Take 40 mg by mouth nightly. Yes Carlos, MD Ad   aspirin 81 mg chewable tablet Take 81 mg by mouth daily.    Yes Ad Gonzalez MD   losartan (COZAAR) 50 mg tablet take 1 tablet by mouth once daily 10/15/19  Yes Bernard Lopez MD   hydroCHLOROthiazide (HYDRODIURIL) 25 mg tablet take 1 tablet by mouth once daily for pressure 8/7/19  Yes Saskia Alex MD     Current Facility-Administered Medications   Medication Dose Route Frequency    0.9% sodium chloride infusion  75 mL/hr IntraVENous CONTINUOUS    sodium chloride (NS) flush 5-40 mL  5-40 mL IntraVENous Q8H    fentaNYL citrate (PF) injection 100 mcg  100 mcg IntraVENous ONCE    alcohol 62% (NOZIN) nasal  1 Ampule  1 Ampule Topical Q12H    NOREPINephrine (LEVOPHED) 8,000 mcg in dextrose 5% 250 mL infusion  0.5-16 mcg/min IntraVENous TITRATE    piperacillin-tazobactam (ZOSYN) 3.375 g in 0.9% sodium chloride (MBP/ADV) 100 mL  3.375 g IntraVENous Q8H    [Held by provider] heparin (porcine) injection 5,000 Units  5,000 Units SubCUTAneous Q8H    pantoprazole (PROTONIX) 40 mg in 0.9% sodium chloride 10 mL injection  40 mg IntraVENous Q12H    0.9% sodium chloride infusion  50 mL/hr IntraVENous CONTINUOUS    [Held by provider] furosemide (LASIX) injection 20 mg  20 mg IntraVENous DAILY    collagenase (SANTYL) 250 unit/gram ointment   Topical DAILY    thiamine mononitrate (B-1) tablet 100 mg  100 mg Oral DAILY    folic acid (FOLVITE) tablet 1 mg  1 mg Oral DAILY    therapeutic multivitamin (THERAGRAN) tablet 1 Tab  1 Tab Oral DAILY    senna-docusate (PERICOLACE) 8.6-50 mg per tablet 1 Tab  1 Tab Oral DAILY    gabapentin (NEURONTIN) capsule 100 mg  100 mg Oral BID    gabapentin (NEURONTIN) capsule 300 mg  300 mg Oral QHS    nicotine (NICODERM CQ) 21 mg/24 hr patch 1 Patch  1 Patch TransDERmal DAILY    pravastatin (PRAVACHOL) tablet 40 mg  40 mg Oral QHS    aspirin chewable tablet 81 mg  81 mg Oral DAILY    sodium chloride (NS) flush 5-40 mL  5-40 mL IntraVENous Q8H     Allergies   Allergen Reactions    Contrast Agent [Iodine] Rash     Hives on back      Social History     Tobacco Use    Smoking status: Former Smoker     Packs/day: 0.00     Types: Cigarettes     Last attempt to quit: 2018     Years since quittin.0    Smokeless tobacco: Never Used   Substance Use Topics    Alcohol use:  Yes     Alcohol/week: 21.0 standard drinks     Types: 21 Glasses of wine per week     Frequency: 4 or more times a week     Drinks per session: 3 or 4     Binge frequency: Never     Comment: \"about 2-3 glasses of wine per day, a HUGE decrease from before\"      Family History   Problem Relation Age of Onset    No Known Problems Mother         coma        Review of Systems:  Constitutional: positive for fatigue and malaise  Eyes: negative  Ears, nose, mouth, throat, and face: negative  Respiratory: negative  Cardiovascular: negative  Gastrointestinal: negative  Genitourinary:negative  Integument/breast: negative  Hematologic/lymphatic: negative  Musculoskeletal:negative  Neurological: negative  Behavioral/Psych: negative  Endocrine: negative  Allergic/Immunologic: negative    Objective:   Vital Signs:    Visit Vitals  /51   Pulse 81   Temp 97.9 °F (36.6 °C)   Resp 16   Ht 5' 6\" (1.676 m)   Wt 64.9 kg (143 lb)   SpO2 94%   BMI 23.08 kg/m²       O2 Device: Nasal cannula   O2 Flow Rate (L/min): 2 l/min   Temp (24hrs), Av.9 °F (36.6 °C), Min:96.5 °F (35.8 °C), Max:99.1 °F (37.3 °C)       Intake/Output:   Last shift:       07 -  1900  In: 111.2 [I.V.:111.2]  Out: -   Last 3 shifts:  190 -  0700  In: 3303.4 [I.V.:2683.4]  Out: 2535 [Urine:2535]    Intake/Output Summary (Last 24 hours) at 2020 0913  Last data filed at 2020 0800  Gross per 24 hour   Intake 2322.18 ml   Output 2025 ml   Net 297.18 ml     Hemodynamics:   PAP:   CO:     Wedge:   CI:     CVP:  CVP (mmHg): 1 mmHg (20 1200) SVR:       PVR:       Ventilator Settings:  Mode Rate Tidal Volume Pressure FiO2 PEEP                    Peak airway pressure:      Minute ventilation:        Physical Exam:    General:  Sleepy, pale   Head:  Normocephalic, without obvious abnormality, atraumatic. Eyes:  Conjunctivae/corneas clear. PERRL, EOMs intact. Nose: Nares normal. Septum midline. Mucosa normal. No drainage or sinus tenderness. Throat: Lips, mucosa, and tongue normal. Teeth and gums normal.   Neck: Supple, symmetrical, trachea midline, no adenopathy, thyroid: no enlargment/tenderness/nodules, no carotid bruit and no JVD. Back:   Symmetric, no curvature. ROM normal.   Lungs:   Clear to auscultation bilaterally.    Chest wall:  No tenderness or deformity. Heart:  Regular rate and rhythm, S1, S2 normal, no murmur, click, rub or gallop. Abdomen:   Soft, non-tender. Bowel sounds normal. No masses,  No organomegaly. Extremities: Extremities normal, atraumatic, no cyanosis or edema. Pulses: 2+ and symmetric all extremities.    Skin: Skin color, texture, turgor normal. No rashes or lesions   Lymph nodes: Cervical, supraclavicular, and axillary nodes normal.   Neurologic: Grossly nonfocal       Data:     Recent Results (from the past 24 hour(s))   HGB & HCT    Collection Time: 01/04/20  9:30 AM   Result Value Ref Range    HGB 6.8 (L) 12.1 - 17.0 g/dL    HCT 21.4 (L) 36.6 - 50.3 %   ECHO ADULT COMPLETE    Collection Time: 01/04/20  2:48 PM   Result Value Ref Range    LV E' Lateral Velocity 9.18 cm/s    LV E' Septal Velocity 7.30 cm/s    Ao Root D 2.96 cm    Aortic Valve Systolic Peak Velocity 711.07 cm/s    AoV VTI 26.26 cm    Aortic Valve Area by Continuity of Peak Velocity 2.2 cm2    Aortic Valve Area by Continuity of VTI 2.6 cm2    AoV PG 9.6 mmHg    LVIDd 4.37 4.2 - 5.9 cm    LVPWd 1.01 (A) 0.6 - 1.0 cm    LVIDs 3.26 cm    IVSd 1.36 (A) 0.6 - 1.0 cm    LVOT d 2.03 cm    LVOT Peak Velocity 106.04 cm/s    LVOT Peak Gradient 4.5 mmHg    LVOT VTI 21.10 cm    MVA (PHT) 4.3 cm2    MV A Mehrdad 79.70 cm/s    MV E Mehrdad 97.49 cm/s    MV E/A 1.22     MV Mean Gradient 1.4 mmHg    Mitral Valve Annulus Velocity Time Integral 22.46 cm    Left Atrium to Aortic Root Ratio 0.94     Pulmonic Valve Systolic Mean Gradient 1.6 mmHg    Pulmonic Valve Systolic Velocity Time Integral 20.35 cm    Tricuspid Valve E Wave Peak Velocity 69.09 cm/s    Tricuspid Valve A Wave Peak Velocity 56.75 cm/s    Aortic Valve Systolic Mean Gradient 3.7 mmHg    LVOT Cardiac Output 5.1 L/min    LV Mass .0 88 - 224 g    LV Mass AL Index 125.7 49 - 115 g/m2    E/E' lateral 10.62     E/E' septal 13.35     TR ERO 0.8     RVSP 35.4 mmHg    MV Peak Gradient 4.4 mmHg    E/E' ratio (averaged) 11.99 Est. RA Pressure 10.0 mmHg    Mitral Valve E Wave Deceleration Time 164.8 ms    Mitral Valve Pressure Half-time 51.1 ms    Left Atrium Major Axis 2.79 cm    Triscuspid Valve Regurgitation Peak Gradient 25.4 mmHg    Pulmonic Valve Max Velocity 82.74 cm/s    Mitral Valve Max Velocity 104.87 cm/s    MVA VTI 3.1 cm2    LVOT SV 68.5 ml    TR Max Velocity 251.90 cm/s    PASP 35.4 mmHg    Left Ventricular Fractional Shortening by 2D 17.039502070634 %    Left Ventricular Outflow Tract Mean Gradient 2.8360895446729 mmHg    Left Ventricular Outflow Tract Mean Velocity 2.25327887886957 cm/s    Mitral Valve Deceleration Duchesne 4.4885118707604     Mitral valve mean inflow velocity 0.65213835507994 m/s    AV Velocity Ratio 0.68     AV VTI Ratio 0.8     Aortic valve mean velocity 5.51390626424465 m/s    PV peak gradient 2.7 mmHg   HGB & HCT    Collection Time: 01/04/20  6:34 PM   Result Value Ref Range    HGB 11.3 (L) 12.1 - 17.0 g/dL    HCT 32.6 (L) 36.6 - 86.6 %   METABOLIC PANEL, BASIC    Collection Time: 01/04/20  6:34 PM   Result Value Ref Range    Sodium 136 136 - 145 mmol/L    Potassium 4.0 3.5 - 5.1 mmol/L    Chloride 104 97 - 108 mmol/L    CO2 26 21 - 32 mmol/L    Anion gap 6 5 - 15 mmol/L    Glucose 148 (H) 65 - 100 mg/dL    BUN 12 6 - 20 MG/DL    Creatinine 0.72 0.70 - 1.30 MG/DL    BUN/Creatinine ratio 17 12 - 20      GFR est AA >60 >60 ml/min/1.73m2    GFR est non-AA >60 >60 ml/min/1.73m2    Calcium 7.0 (L) 8.5 - 10.1 MG/DL   MAGNESIUM    Collection Time: 01/04/20  6:34 PM   Result Value Ref Range    Magnesium 1.4 (L) 1.6 - 2.4 mg/dL             Telemetry:normal sinus rhythm    Imaging:  I have personally reviewed the patients radiographs and have reviewed the reports:  1-2-20: CXR; INDICATION: Central line placement     FINDINGS:  Right IJ CVL has been placed with tip in the SVC without pneumothorax. There  remains bilateral pulmonary haziness favoring pleural effusions and likely  pulmonary edema.  Heart size is obscured.     IMPRESSION  IMPRESSION: Satisfactory CVL placement.        Kenyatta Macario MD

## 2020-01-06 LAB
ALBUMIN SERPL-MCNC: 1.4 G/DL (ref 3.5–5)
ALBUMIN/GLOB SERPL: 0.4 {RATIO} (ref 1.1–2.2)
ALP SERPL-CCNC: 74 U/L (ref 45–117)
ALT SERPL-CCNC: 13 U/L (ref 12–78)
ANION GAP SERPL CALC-SCNC: 5 MMOL/L (ref 5–15)
AST SERPL-CCNC: 21 U/L (ref 15–37)
BILIRUB SERPL-MCNC: 1 MG/DL (ref 0.2–1)
BUN SERPL-MCNC: 11 MG/DL (ref 6–20)
BUN/CREAT SERPL: 20 (ref 12–20)
CALCIUM SERPL-MCNC: 7.1 MG/DL (ref 8.5–10.1)
CHLORIDE SERPL-SCNC: 107 MMOL/L (ref 97–108)
CO2 SERPL-SCNC: 26 MMOL/L (ref 21–32)
CREAT SERPL-MCNC: 0.56 MG/DL (ref 0.7–1.3)
ERYTHROCYTE [DISTWIDTH] IN BLOOD BY AUTOMATED COUNT: 18.1 % (ref 11.5–14.5)
GLOBULIN SER CALC-MCNC: 3.3 G/DL (ref 2–4)
GLUCOSE SERPL-MCNC: 104 MG/DL (ref 65–100)
HCT VFR BLD AUTO: 28.9 % (ref 36.6–50.3)
HGB BLD-MCNC: 9.7 G/DL (ref 12.1–17)
MCH RBC QN AUTO: 33.9 PG (ref 26–34)
MCHC RBC AUTO-ENTMCNC: 33.6 G/DL (ref 30–36.5)
MCV RBC AUTO: 101 FL (ref 80–99)
NRBC # BLD: 0 K/UL (ref 0–0.01)
NRBC BLD-RTO: 0 PER 100 WBC
PLATELET # BLD AUTO: 351 K/UL (ref 150–400)
PMV BLD AUTO: 10.7 FL (ref 8.9–12.9)
POTASSIUM SERPL-SCNC: 3.3 MMOL/L (ref 3.5–5.1)
PROT SERPL-MCNC: 4.7 G/DL (ref 6.4–8.2)
RBC # BLD AUTO: 2.86 M/UL (ref 4.1–5.7)
SODIUM SERPL-SCNC: 138 MMOL/L (ref 136–145)
WBC # BLD AUTO: 10 K/UL (ref 4.1–11.1)

## 2020-01-06 PROCEDURE — 74011000250 HC RX REV CODE- 250: Performed by: SURGERY

## 2020-01-06 PROCEDURE — 80053 COMPREHEN METABOLIC PANEL: CPT

## 2020-01-06 PROCEDURE — 97530 THERAPEUTIC ACTIVITIES: CPT

## 2020-01-06 PROCEDURE — 74011000258 HC RX REV CODE- 258: Performed by: INTERNAL MEDICINE

## 2020-01-06 PROCEDURE — 87506 IADNA-DNA/RNA PROBE TQ 6-11: CPT

## 2020-01-06 PROCEDURE — 74011250637 HC RX REV CODE- 250/637: Performed by: INTERNAL MEDICINE

## 2020-01-06 PROCEDURE — 85027 COMPLETE CBC AUTOMATED: CPT

## 2020-01-06 PROCEDURE — 65660000000 HC RM CCU STEPDOWN

## 2020-01-06 PROCEDURE — C9113 INJ PANTOPRAZOLE SODIUM, VIA: HCPCS | Performed by: FAMILY MEDICINE

## 2020-01-06 PROCEDURE — 89055 LEUKOCYTE ASSESSMENT FECAL: CPT

## 2020-01-06 PROCEDURE — 36415 COLL VENOUS BLD VENIPUNCTURE: CPT

## 2020-01-06 PROCEDURE — 74011250637 HC RX REV CODE- 250/637: Performed by: SURGERY

## 2020-01-06 PROCEDURE — 74011250636 HC RX REV CODE- 250/636: Performed by: FAMILY MEDICINE

## 2020-01-06 PROCEDURE — 74011250636 HC RX REV CODE- 250/636: Performed by: INTERNAL MEDICINE

## 2020-01-06 PROCEDURE — 97168 OT RE-EVAL EST PLAN CARE: CPT

## 2020-01-06 PROCEDURE — 77010033678 HC OXYGEN DAILY

## 2020-01-06 PROCEDURE — 74011250636 HC RX REV CODE- 250/636: Performed by: SURGERY

## 2020-01-06 PROCEDURE — 87338 HPYLORI STOOL AG IA: CPT

## 2020-01-06 PROCEDURE — 74011250637 HC RX REV CODE- 250/637: Performed by: NURSE PRACTITIONER

## 2020-01-06 PROCEDURE — 97164 PT RE-EVAL EST PLAN CARE: CPT

## 2020-01-06 PROCEDURE — 87177 OVA AND PARASITES SMEARS: CPT

## 2020-01-06 PROCEDURE — 74011000250 HC RX REV CODE- 250: Performed by: FAMILY MEDICINE

## 2020-01-06 RX ORDER — POTASSIUM CHLORIDE 750 MG/1
40 TABLET, FILM COATED, EXTENDED RELEASE ORAL
Status: COMPLETED | OUTPATIENT
Start: 2020-01-06 | End: 2020-01-06

## 2020-01-06 RX ORDER — POTASSIUM CHLORIDE 20 MEQ/1
40 TABLET, EXTENDED RELEASE ORAL
Status: COMPLETED | OUTPATIENT
Start: 2020-01-06 | End: 2020-01-06

## 2020-01-06 RX ORDER — TAMSULOSIN HYDROCHLORIDE 0.4 MG/1
0.4 CAPSULE ORAL DAILY
Status: DISCONTINUED | OUTPATIENT
Start: 2020-01-06 | End: 2020-01-22 | Stop reason: HOSPADM

## 2020-01-06 RX ADMIN — ACETAMINOPHEN 650 MG: 325 TABLET ORAL at 04:05

## 2020-01-06 RX ADMIN — Medication 10 ML: at 21:28

## 2020-01-06 RX ADMIN — FOLIC ACID 1 MG: 1 TABLET ORAL at 08:56

## 2020-01-06 RX ADMIN — PRAVASTATIN SODIUM 40 MG: 40 TABLET ORAL at 21:28

## 2020-01-06 RX ADMIN — Medication 100 MG: at 08:56

## 2020-01-06 RX ADMIN — COLLAGENASE SANTYL: 250 OINTMENT TOPICAL at 10:06

## 2020-01-06 RX ADMIN — PIPERACILLIN AND TAZOBACTAM 3.38 G: 3; .375 INJECTION, POWDER, LYOPHILIZED, FOR SOLUTION INTRAVENOUS at 21:27

## 2020-01-06 RX ADMIN — THERA TABS 1 TABLET: TAB at 08:56

## 2020-01-06 RX ADMIN — PIPERACILLIN AND TAZOBACTAM 3.38 G: 3; .375 INJECTION, POWDER, LYOPHILIZED, FOR SOLUTION INTRAVENOUS at 05:29

## 2020-01-06 RX ADMIN — SODIUM CHLORIDE 50 ML/HR: 900 INJECTION, SOLUTION INTRAVENOUS at 01:43

## 2020-01-06 RX ADMIN — PIPERACILLIN AND TAZOBACTAM 3.38 G: 3; .375 INJECTION, POWDER, LYOPHILIZED, FOR SOLUTION INTRAVENOUS at 13:19

## 2020-01-06 RX ADMIN — Medication 10 ML: at 05:29

## 2020-01-06 RX ADMIN — POTASSIUM CHLORIDE 40 MEQ: 750 TABLET, FILM COATED, EXTENDED RELEASE ORAL at 13:20

## 2020-01-06 RX ADMIN — TAMSULOSIN HYDROCHLORIDE 0.4 MG: 0.4 CAPSULE ORAL at 13:20

## 2020-01-06 RX ADMIN — Medication 10 ML: at 13:21

## 2020-01-06 RX ADMIN — Medication 1 AMPULE: at 10:05

## 2020-01-06 RX ADMIN — Medication 1 AMPULE: at 21:28

## 2020-01-06 RX ADMIN — SODIUM CHLORIDE 40 MG: 9 INJECTION INTRAMUSCULAR; INTRAVENOUS; SUBCUTANEOUS at 08:57

## 2020-01-06 RX ADMIN — ASPIRIN 81 MG 81 MG: 81 TABLET ORAL at 08:56

## 2020-01-06 RX ADMIN — GABAPENTIN 100 MG: 100 CAPSULE ORAL at 08:56

## 2020-01-06 RX ADMIN — SODIUM CHLORIDE 40 MG: 9 INJECTION INTRAMUSCULAR; INTRAVENOUS; SUBCUTANEOUS at 21:28

## 2020-01-06 RX ADMIN — GABAPENTIN 100 MG: 100 CAPSULE ORAL at 13:21

## 2020-01-06 RX ADMIN — POTASSIUM CHLORIDE 40 MEQ: 20 TABLET, EXTENDED RELEASE ORAL at 18:13

## 2020-01-06 RX ADMIN — GABAPENTIN 300 MG: 300 CAPSULE ORAL at 21:28

## 2020-01-06 RX ADMIN — ACETAMINOPHEN 650 MG: 325 TABLET ORAL at 21:27

## 2020-01-06 NOTE — PROGRESS NOTES
1100 Bedside shift change report given to [facility], [facility] (oncoming nurse) by Beatriz Miller RN (offgoing nurse). Report included the following information SBAR, Kardex, Intake/Output, MAR, Recent Results and Cardiac Rhythm NSR.     1320 Removed isabel, returned pt to bed and performed incontinence care. 1430 Central line dressing peeling off. Changed central line dressing. 1547 Pt resting quietly in bed at this time. VSS. No complaints of pain. Will continue to monitor. 1600 Pt resting quietly in bed at this time. Incontinence care performed. Pt urinated an unmeasurable amount into the white darrel. Post-void bladder scan showed 0mL. 1233 [address] Dr. Myranda Seay at bedside speaking with pt.     1900 Bedside shift change report given to [facility] Merrill (oncoming nurse) by [facility], RN (offgoing nurse). Report included the following information SBAR, Kardex, Intake/Output, MAR, Recent Results and Cardiac Rhythm NSR.

## 2020-01-06 NOTE — PROGRESS NOTES
Nutrition Assessment:    INTERVENTIONS/RECOMMENDATIONS:   Continue cardiac diet  Add ensure enlive daily    ASSESSMENT:   Chart reviewed; new consult received. Patient sleeping soundly at time of attempted visit. Didn't wake to knock on door. He has been eating 100% of meals consistently throughout admission. Previously receiving ensure daily per patient request but it was discontinued for EGD. No weight loss noted on chart review and patient reported no recent weight loss earlier in admission. Unable to diagnosis malnutrition at this time. Will add ensure daily back to diet order. Monitor need for mechanical soft consistency as patient requested this earlier in admission. Diet Order: Cardiac  % Eaten:    Patient Vitals for the past 72 hrs:   % Diet Eaten   01/06/20 0800 100 %   01/05/20 1243 0 %   01/05/20 1054 100 %     Pertinent Medications: [x] Reviewed []Other: Folic acid, Protonix, Pravastatin, Pericolace, MVI, Thiamine   Pertinent Labs: [x]Reviewed  []Other: K+ 3.3  Food Allergies: [x]None []Yes:     Last BM: 1/6  []Active     []Hyperactive  []Hypoactive       [] Absent  BS  Skin:    [] Intact   [x] Incision  [x] Breakdown: Unstageable left heel   []Edema   []Other:    Anthropometrics: Height: 5' 6\" (167.6 cm) Weight: 64.9 kg (143 lb)    IBW (%IBW):   ( ) UBW (%UBW):   (  %)    BMI: Body mass index is 23.08 kg/m². This BMI is indicative of:  []Underweight   [x]Normal   []Overweight   [] Obesity   [] Extreme Obesity (BMI>40)  Last Weight Metrics:  Weight Loss Metrics 1/4/2020 12/22/2019 12/21/2019 12/20/2019 12/20/2019 12/20/2019 7/31/2019   Today's Wt 143 lb - 135 lb 1.6 oz - 139 lb 1.8 oz - 131 lb 3.2 oz   BMI - 23.08 kg/m2 - 21.81 kg/m2 - 22.45 kg/m2 21.18 kg/m2       Estimated Nutrition Needs (Based on): 1726 Kcals/day(BMR (1328) x 1. 3AF) , 78 g(1.2 g/kg bw) Protein  Carbohydrate:  At Least 130 g/day  Fluids: 1700 mL/day     Pt expected to meet estimated nutrient needs: [x]Yes []No    NUTRITION DIAGNOSES:   Problem:  No nutritional diagnosis at this time      Etiology: related to      Signs/Symptoms: as evidenced by       NUTRITION INTERVENTIONS:  Meals/Snacks: General/healthful diet   Supplements: Commercial supplement              GOAL:   PO intake >75% of meals/supplement next 1-3 days    NUTRITION MONITORING AND EVALUATION   Food/Nutrient Intake Outcomes:  Total energy intake  Physical Signs/Symptoms Outcomes: Weight/weight change, Electrolyte and renal profile, Glucose profile    Previous Goal Met:   [x] Met              [] Progressing Towards Goal              [] Not Progressing Towards Goal   Previous Recommendations:   [x] Implemented          [] Not Implemented          [] Not Applicable    LEARNING NEEDS (Diet, Food/Nutrient-Drug Interaction):    [x] None Identified   [] Identified and Education Provided/Documented   [] Identified and Pt declined/was not appropriate     Cultural, Voodoo, OR Ethnic Dietary Needs:    [x] None Identified   [] Identified and Addressed     [x] Interdisciplinary Care Plan Reviewed/Documented    [x] Discharge Planning: Heart healthy diet   [] Participated in Interdisciplinary Rounds    NUTRITION RISK:    [x] Patient At Nutritional Risk             [] Patient Not At Nutritional Risk      Yoni Sender [phone]  Pager [phone]    Weekend Pager [phone]

## 2020-01-06 NOTE — PROGRESS NOTES
7:30 AM Bedside report received from [facility] Maurer.    8:30 AM Paulette Stoll NP, at bedside. NP states she will discuss wound vacs with Dr. Kiki Lefort and likely they will be pulled today. 10:30 AM Santyl received from pharmacy. Wound care completed to L heel and R toes, per MD order. Patient incontinent of brown, loose stool. Patient bathed and zinc cream applied to buttocks and scrotum d/t skin noted to be excoriated. Noonan care completed. 11:00 AM Report given to Anjelica Valera, [facility].

## 2020-01-06 NOTE — PROGRESS NOTES
Problem: Self Care Deficits Care Plan (Adult)  Goal: *Acute Goals and Plan of Care (Insert Text)  Description    FUNCTIONAL STATUS PRIOR TO ADMISSION: The patient was functional at baseline per his report. Reports Has DME at home    HOME SUPPORT: pt lived alone; he reports that his neighbors assist him    Occupational Therapy Goals  Initiated 12/26/2019; Re-evaluation 1/6/2020, goals updated below    1. Patient will perform grooming with set-up within 7 day(s). Progressing  2. Patient will perform sit to  preparation for functional transfers OOB with minimal assistance/contact guard assist within 7 day(s). Progressing  3. Patient will perform supine to sit EOB in preparation for bed level adls with minimal assistance/contact guard assist within 7 day(s). MET 1/6/2020  4. Patient will perform toilet transfers with moderate assistance  within 7 day(s). Upgrade to CGA x1  5. Patient will perform all aspects of toileting with minimal assistance/contact guard assist within 7 day(s). Progressing  6. Patient will participate in upper extremity therapeutic exercise/activities with supervision/set-up for 10 minutes within 7 day(s). Progressing  7. Patient will demonstrate safe technique during functional mobility/ADL transfers within 7 days. Progressing  8. Patient will perform upper body dressing with minimal assistance within 7 days. Upgrade to supervision/set up  Goals added 1/6/2020:  9. Patient will tolerate static standing for 2 minutes in preparation for OOB ADLs with supervision/set-up within 7 day(s). 10.  Patient will perform anterior bathing from neck to thighs with supervision/set-up within 7 day(s).          Outcome: Progressing Towards Goal     OCCUPATIONAL THERAPY RE-EVALUATION  Patient: Thee Bishop (1 y.o. male)  Date: 1/6/2020  Diagnosis: Alcohol withdrawal ([facility] [F10.239]   Alcohol withdrawal ([facility]  Procedure(s) (LRB):  ESOPHAGOGASTRODUODENOSCOPY (EGD) (N/A) 1 Day Post-Op  Precautions: Fall, Contact, Seizure, DNR  Chart, occupational therapy assessment, plan of care, and goals were reviewed. ASSESSMENT  Based on the objective data described below, impaired balance, decreased activity tolerance, elevated pain, generalized weakness, decreased safety awareness, and decreased ADL performance. Patient is s/p axillo-bifemoral bypass and EGD, cleared to participate with therapy. Patient is received in bed and agreeable to transfer to chair this session. He performs rolling in bed to don brief with max A due to frequent stools. He requires assist x2 for transfer to chair and requires cues for safety. Attempted to have patient stand again to place waffle cushion for skin integrity but pt very fatigued, requesting to rest in chair. At this time, patient is functioning below his baseline and will require continued skilled therapy services. Current Level of Function Impacting Discharge (ADLs): up to max/total A ADLs; Assist x2 for mobility    Other factors to consider for discharge: poor safety awareness; lives alone; medical         PLAN :  Recommendations and Planned Interventions: self care training, functional mobility training, therapeutic exercise, balance training, therapeutic activities, cognitive retraining, endurance activities, patient education, home safety training and family training/education    Frequency/Duration: Patient will be followed by occupational therapy 3 times a week to address goals.     Recommend with staff: 2 person assist for SPT to chair/BSC    Recommend next OT session: BSC transfer; standing tolerance    Recommendation for discharge: (in order for the patient to meet his/her long term goals)  Therapy up to 5 days/week in SNF setting    This discharge recommendation:  Has not yet been discussed the attending provider and/or case management    Equipment recommendations for successful discharge (if) home: TBD       SUBJECTIVE:   Patient stated I have to drink my soup out of the cup because of my tremors.     OBJECTIVE DATA SUMMARY:   Hospital course since last seen and reason for reevaluation: Pt is s/p axillo bifemoral bypass on 1/2/2020    Cognitive/Behavioral Status:  Neurologic State: Alert  Orientation Level: Oriented X4  Cognition: Appropriate decision making; Follows commands  Perception: Appears intact  Perseveration: No perseveration noted  Safety/Judgement: Decreased awareness of need for assistance;Decreased awareness of need for safety    Functional Mobility and Transfers for ADLs:  Bed Mobility:  Rolling: Minimum assistance  Supine to Sit: Moderate assistance; Additional time  Sit to Supine: (Pt seated in chair at end of session)  Scooting: Minimum assistance; Moderate assistance(to scoot to EOB due to sacral tenderness/pain)    Transfers:  Sit to Stand: Minimum assistance;Assist x2  Functional Transfers  Toilet Transfer : Minimum assistance; Moderate assistance(infer based on observations)  Bed to Chair: Moderate assistance(cues for safety and rw mgmt)    Balance:  Sitting: Intact  Standing: Impaired  Standing - Static: Fair;Constant support  Standing - Dynamic : Fair;Constant support    ADL Assessment:  Feeding: Setup(due to tremors)    Oral Facial Hygiene/Grooming: Setup(seated; unable to perform in standing)    Bathing: Moderate assistance;Maximum assistance(infer 2/2 weakness, pain, and decreased endurance)    Upper Body Dressing: Minimum assistance(infer based on observations)    Lower Body Dressing: Maximum assistance; Total assistance(infer based on observations)    Toileting: Maximum assistance; Total assistance    ADL Intervention and task modifications:  Lower Body Dressing Assistance  Socks:  Total assistance (dependent)  Leg Crossed Method Used: No  Position Performed: Supine  Cues: Don;Physical assistance    Toileting  Toileting Assistance: (performed rolling in bed to R/L)  Clothing Management: Maximum assistance(to manage brief)  Cues: Physical assistance for pants down;Physical assistance for pants up;Verbal cues provided  Adaptive Equipment: (bed rails - pt able to hold on and stay in side-lying)    Cognitive Retraining  Safety/Judgement: Decreased awareness of need for assistance;Decreased awareness of need for safety    Functional Measure:  Barthel Index:    Bathin  Bladder: 0  Bowels: 0  Groomin  Dressin  Feeding: 10  Mobility: 0  Stairs: 0  Toilet Use: 5  Transfer (Bed to Chair and Back): 5  Total: 30/100        The Barthel ADL Index: Guidelines  1. The index should be used as a record of what a patient does, not as a record of what a patient could do. 2. The main aim is to establish degree of independence from any help, physical or verbal, however minor and for whatever reason. 3. The need for supervision renders the patient not independent. 4. A patient's performance should be established using the best available evidence. Asking the patient, friends/relatives and nurses are the usual sources, but direct observation and common sense are also important. However direct testing is not needed. 5. Usually the patient's performance over the preceding 24-48 hours is important, but occasionally longer periods will be relevant. 6. Middle categories imply that the patient supplies over 50 per cent of the effort. 7. Use of aids to be independent is allowed. Charito Garcia, Barthel, D.W. (8090). Functional evaluation: the Barthel Index. [facility] (14)2. MIRIAN Hutson, [place] Spence., [facility], [address] [place] [address] (). Measuring the change indisability after inpatient rehabilitation; comparison of the responsiveness of the Barthel Index and Functional [place] Measure. Journal of Neurology, Neurosurgery, and Psychiatry, 66(4), 777-221. Padilla Herring, N.[email or web address].A, BLAKE Burr, & Tereza Marie MMARSHALL. (2004.) Assessment of post-stroke quality of life in cost-effectiveness studies:  The usefulness of the Barthel Index and the EuroQoL-5D. Quality of Life Research, 13, 658-70     Pain:  Pt reporting pain at incision sites and at sacrum. Repositioned in chair to comfort    Activity Tolerance:   Fair and Poor  Please refer to the flowsheet for vital signs taken during this treatment.     After treatment patient left in no apparent distress:   Sitting in chair, Call bell within reach and RN notified    COMMUNICATION/COLLABORATION:   The patients plan of care was discussed with: Physical Therapist and Registered Nurse    Cherry Brian OT  Time Calculation: 29 mins

## 2020-01-06 NOTE — PROGRESS NOTES
RAPID RESPONSE TEAM FOLLOW UP      Rounded on pt d/t recent transfer out of CCU. Discussed with primary nurse. Has no acute concerns. VSS.  MEWS 1    Vitals w/ MEWS Score (last day)     Date/Time MEWS Score Pulse Resp Temp BP Level of Consciousness SpO2    01/06/20 1042  1  92  19  97.4 °F (36.3 °C)  124/54  Alert  97 %    01/06/20 0715  1  81  17  97.4 °F (36.3 °C)  131/61  Alert  96 %    01/06/20 0402  1  86  17  98.2 °F (36.8 °C)  118/64  Alert  96 %    01/06/20 0117          116/53        01/05/20 2356  0  88  14  97.9 °F (36.6 °C)  116/48  Alert  95 %    01/05/20 2104  1  87  19  97.6 °F (36.4 °C)  125/49  Alert  97 %    01/05/20 2042  1  89  18  97.6 °F (36.4 °C)  136/86  Alert  97 %    01/05/20 1630    90  18    117/58    96 %    01/05/20 1600  1  92  15  97.5 °F (36.4 °C)  111/57  Alert  94 %    01/05/20 1530    97  18    119/63    93 %    01/05/20 1500    84  15    111/53    96 %    01/05/20 1400    81  16    123/56    98 %    01/05/20 1300    82  19    105/53    98 %    01/05/20 1230    85  18    120/62    95 %    01/05/20 1200  2  93  22  97.3 °F (36.3 °C)  135/44  Alert  98 %    01/05/20 1130    93  21    117/59    98 %    01/05/20 1100    92  28    125/44    97 %    01/05/20 1030    90  17    141/62    98 %    01/05/20 0957    85  18    93/70    95 %    01/05/20 0900    82  18    113/57        01/05/20 0853    81  16    112/51  Responds to Voice  94 %    01/05/20 0850    81  16    115/57  Responds to Voice  94 %    01/05/20 0845    80  16    160/60    91 %    01/05/20 0842    80  16    128/63    91 %    01/05/20 0840    81  16    128/46    91 %    01/05/20 0830    87  17    128/57    94 %    01/05/20 0822    88  16    132/55  Alert  94 %    01/05/20 0815    81  16    118/55    97 %    01/05/20 0800  1  80  17  97.9 °F (36.6 °C)  120/58  Alert  96 %    01/05/20 0600    82  15    131/61    97 %    01/05/20 0500    82  19    117/63    96 % 01/05/20 0400    80  23    118/47  Responds to Voice  96 %    01/05/20 0300    76  17    115/64    97 %    01/05/20 0200    77  18    113/61    97 %    01/05/20 0100    77  16    118/52    98 %    01/05/20 0000    76  15    107/54  Alert  98 %              No RRT interventions currently indicated.     Obed Romberg, RN  RRT,

## 2020-01-06 NOTE — PROGRESS NOTES
9435 - Report received from CCU nurse, Suyapa, using SBAR. 2040 - TRANSFER - IN REPORT:    Verbal report received from Suyapa RN (name) on Rosemary Bryson  being received from CCU (unit) for routine progression of care      Report consisted of patients Situation, Background, Assessment and   Recommendations(SBAR). Information from the following report(s) SBAR, Kardex, Intake/Output, MAR, Recent Results and Cardiac Rhythm NSR was reviewed with the receiving nurse. Opportunity for questions and clarification was provided. Assessment completed upon patients arrival to unit and care assumed. Dual skin performed with Armen Flores RN, pt has groin and 2 chest incisions from surgery. Groin wounds have suction devices attached. Pt scabbing on LLE. Wound between 2nd toe on R side. L side wound on heel. Pt placed on contact precautions. 2042 - /86. O2 97% on 2L O2. LE pulses checked with doppler. Present. Cap refill <3 seconds all extremities. R IJ and R chest dressing reinforced. Pt refused heel boots. Educated on wound prevention. Pt still refused to wear them. Call bell within reach. Will continue to monitor. 2235 - Pt cleaned after incontinence of bowel. Pt had moderate amount of reddish/brown loose stool. Put zinc cream on bottom, pt states bottom is sore, no injury but red. Pt placed on left side with heels elevated. Will continue to monitor. 2307 - Changed dressing on LLE. New dressing applied with betadine soaked gauze, gauze ce wrap applied overtop. Pt tolerated. Will continue to monitor. Pt wound vac dressings loosened around skin folds. Light on wound vac blinking indicating dressing loose. Attempted to resuction with tegaderm, no success. Dressing reinforced, but wound vac did not begin suctioning again. Will continue to monitor. 0005 - Pt turned on R side with heels floated. Pt watching tv at this time. Zosyn infusing. /48. Call bell within reach.  Will continue to monitor. 0145 - Zosyn completed. NS infusing at 50 mL/hr.     0402 - Pt cleaned after incontinence of bowel. Pt had medium loose BM. Zinc cream applied to buttocks and pt turned on L side. Labs drawn and sent. Call bell within reach. Will continue to monitor. 0700 - Shift report given to Aylin Ruiz RN.

## 2020-01-06 NOTE — PROGRESS NOTES
Vascular Surgery Progress Note  Luci Corey ACNP-BC  1/6/2020       Subjective:     Mr. Bora Malhotra has a pmhx significant for alcoholism, COPD, DM, PAFib, HTN, HLD, and GERD. Jessica Luna continues to smoke daily. Jessica Luna has a pmhx significant for LLE stenting per his report. Jessica Luna is s/p TMA of the left foot (10/2016). Jessica Luna presented to the clinic in November 2019 w/ compliant of BLE claudication, BLE nocturnal cramping, and a wound to the incision of the left TMA after hitting his foot.  He has ischemia of the left foot and rubor of the right foot.  His ABIs were right 0.40 w/ a flatline TBI and left 0.32 w/ a surgically absent left great toe.  He underwent an arteriogram on 11/5/2019 that revealed severe bilateral aorto iliac disease that was not amendable to endovascular intervention. Jessica Luna returned to the clinic  w/ a CTA that was significant for an occluded left common iliac and left external iliac artery and occluded right external iliac artery. . A ax-bifemoral bypass was planned.  Jessica Luna then presented to the emergency room on 12/16/2019 w/ complaint of BLE weakness. Jessica Luna was admitted to \[facility]\"" and diagnosed w/ cystitis.  While admitted he was noted to have left lateral heel ulcer.  He was discharged to the [address] [place] [address] at \[facility]\"" on 12/20/2019 for rehabilitation.  Late that evening he developed active w/d symptoms and returned to the emergency room at [address] was then transferred to [facility] for management of alcohol withdrawal which has resolved.  His urine cx grew Klebsiella and he completed a course of antibx.  Despite this he continued to have a persistent UTI. He was noted to have urinary retention and Urology was consulted. His hospitalization has been complicated by bilateral pleural effusions w/ LLL partial collapse resulting in hypoxic respiratory failure. On 01/02/2020 he underwent axillo-bifemoral bypass. His post procedure course was complicated by hypotension requiring vasopressor support which was weaned off on 01/05/2020.   Over the weekend he was diagnosed w/ a GIB. His EGD was unremarkable. This am he is more alert. His feet remains perfused. His blood pressure is stable. His H&H has decreased from previous evaluation. Nursing Data:     Patient Vitals for the past 24 hrs:   BP Temp Pulse Resp SpO2   01/06/20 1042 124/54 97.4 °F (36.3 °C) 92 19 97 %   01/06/20 0715 131/61 97.4 °F (36.3 °C) 81 17 96 %   01/06/20 0402 118/64 98.2 °F (36.8 °C) 86 17 96 %   01/06/20 0117 116/53       01/05/20 2356 116/48 97.9 °F (36.6 °C) 88 14 95 %   01/05/20 2104 125/49 97.6 °F (36.4 °C) 87 19 97 %   01/05/20 2042 136/86 97.6 °F (36.4 °C) 89 18 97 %   01/05/20 1630 117/58  90 18 96 %   01/05/20 1600 111/57 97.5 °F (36.4 °C) 92 15 94 %   01/05/20 1530 119/63  97 18 93 %   01/05/20 1500 111/53  84 15 96 %   01/05/20 1400 123/56  81 16 98 %   01/05/20 1300 105/53  82 19 98 %   01/05/20 1230 120/62  85 18 95 %   01/05/20 1200 135/44 97.3 °F (36.3 °C) 93 22 98 %     ---------------------------------------------------------------------------------------------------------    Intake/Output Summary (Last 24 hours) at 1/6/2020 1147  Last data filed at 1/6/2020 0800  Gross per 24 hour   Intake 2197.22 ml   Output 915 ml   Net 1282.22 ml       Exam:     Physical Exam  Constitutional:       Comments: Appears generally deconditioned and chronically ill. HENT:      Head: Normocephalic. Nose: Nose normal.      Mouth/Throat:      Mouth: Mucous membranes are moist.   Eyes:      Pupils: Pupils are equal, round, and reactive to light. Neck:      Musculoskeletal: Normal range of motion. Comments: Right IJ central line   Cardiovascular:      Rate and Rhythm: Normal rate and regular rhythm. Pulses:           Femoral pulses are 2+ on the right side and 2+ on the left side. Comments: His feet are warm and perfused. Pulmonary:      Effort: Pulmonary effort is normal. No tachypnea, accessory muscle usage or respiratory distress. Breath sounds: No rales. Abdominal:      General: Abdomen is flat. Palpations: Abdomen is soft. Musculoskeletal: Normal range of motion. Skin:     Comments: Necrotic ulcer to lateral left heel is stable. Stable chronic wound of the TMA incision. Rubor of the plantar aspect of the left foot has resolved. Ischemic changes to the toes of the 2nd and 3rd digit of the right foot as resolved. He continues to have an open ulceration of lateral 2nd digit w/ yellow eschar. His surgical incision to the BL groins are dry w/ drains intact. Neurological:      Mental Status: He is alert. Mental status is at baseline. Psychiatric:         Mood and Affect: Mood normal.         Behavior: Behavior normal.       Lab Review:     . Recent Results (from the past 24 hour(s))   METABOLIC PANEL, COMPREHENSIVE    Collection Time: 01/06/20  4:11 AM   Result Value Ref Range    Sodium 138 136 - 145 mmol/L    Potassium 3.3 (L) 3.5 - 5.1 mmol/L    Chloride 107 97 - 108 mmol/L    CO2 26 21 - 32 mmol/L    Anion gap 5 5 - 15 mmol/L    Glucose 104 (H) 65 - 100 mg/dL    BUN 11 6 - 20 MG/DL    Creatinine 0.56 (L) 0.70 - 1.30 MG/DL    BUN/Creatinine ratio 20 12 - 20      GFR est AA >60 >60 ml/min/1.73m2    GFR est non-AA >60 >60 ml/min/1.73m2    Calcium 7.1 (L) 8.5 - 10.1 MG/DL    Bilirubin, total 1.0 0.2 - 1.0 MG/DL    ALT (SGPT) 13 12 - 78 U/L    AST (SGOT) 21 15 - 37 U/L    Alk.  phosphatase 74 45 - 117 U/L    Protein, total 4.7 (L) 6.4 - 8.2 g/dL    Albumin 1.4 (L) 3.5 - 5.0 g/dL    Globulin 3.3 2.0 - 4.0 g/dL    A-G Ratio 0.4 (L) 1.1 - 2.2     CBC W/O DIFF    Collection Time: 01/06/20  4:11 AM   Result Value Ref Range    WBC 10.0 4.1 - 11.1 K/uL    RBC 2.86 (L) 4.10 - 5.70 M/uL    HGB 9.7 (L) 12.1 - 17.0 g/dL    HCT 28.9 (L) 36.6 - 50.3 %    .0 (H) 80.0 - 99.0 FL    MCH 33.9 26.0 - 34.0 PG    MCHC 33.6 30.0 - 36.5 g/dL    RDW 18.1 (H) 11.5 - 14.5 %    PLATELET 174 598 - 772 K/uL    MPV 10.7 8.9 - 12.9 FL    NRBC 0.0 0  WBC    ABSOLUTE NRBC 0.00 0.00 - 0.01 K/uL          Assessment/Plan:      Consult problem  Severe bilateral PAD w/ ulceration of the left heel, left TMA incision, and right 2nd digit. -s/p axillo-bifemoral bypass on 01/02/2020.      His feet continue to be warm and well perfused. His wounds are stable. Continue wound care. Will consult wound care team for recommendations. Encourage OOB w/ lift team.  PT/OT daily. He continues on ASA and statin. Patient has vac dressing. Those dressings will be removed on Wednesday.       Active problems  Post procedural hypotension w/ hx of HTN  -weaned off vasopressor support. Antihypertensives continue to be held. Hypoalbuminemia   Metabolic encephalopathy   -resolved @ baseline   Chronic cognitive deficit   Acute hypoxic respiratory failure   Bilateral pleural effusion w/ partial LLL collapse  ECHO 01/04/2020  · Normal cavity size and systolic function (ejection fraction normal). Increased wall thickness. Estimated left ventricular ejection fraction is 50 - 55%. Left ventricular diastolic dysfunction. · Trace mitral valve regurgitation is present. · Mild tricuspid valve regurgitation is present. · Small-to-moderate pericardial effusion. There is left pleural effusion  COPD  -not in exacerbation   Wean oxygen as tolerated    Sepsis  -resolved   Urinary retention  -patient voided twice prior to procedure and had retained urine when isabel was placed for procedure. Recurrent UTI   -initial urine cxKLEBISIELLA PNEUMONIAE ( 3 day course of Rocephin completed)  -2nd urine cx NG  -3rd urine cx CITROBACTER BRAAKII & ENTEROCOCCUS (continues on Zosyn day 3)  -patient continues to have isabel that was placed prior to Zosyn administration     Initiate flomax. Voiding trial in the am.     Outpatient f/u w/ Urology. GI Bleed  Diverticulosis   GERD  -EGD unremarkable   Macrocytic Anemia   -labile   Continue to follow.    Thrombocytopenia  -resolved   Plan per gastroenterology     PAfib w/ RVR   -tachycardic resolved this am   -poor candidate for [facility] w/ hx of GIB of unknown etiology and cognitive deficit. Hyperlipidemia  -on statin   Alcohol withdrawal  -resolved   Thiamine deficiency   -on oral supplementation   Diabetes Mellitus w/ hypoglycemia and hypoalbuminemia    -on liberalized diet w/ hs snack   -suspect malnutrition of moderate degree  -possible liver dysfunction resulting in poor glycogen stores  -hypoglycemia improved   Hypernatremia   -resolved   Hypokalemia   Hypomagnesemia  Management of comorbid conditions by primary team.     VTE Prophylaxis:  SCDs: contraindicated in severe BLE PAD  Not a candidate for SQ w/ hx of GIB     Disposition:  SNF. Agree w/ ECF following rehabilitation.

## 2020-01-06 NOTE — PROGRESS NOTES
Problem: Mobility Impaired (Adult and Pediatric)  Goal: *Acute Goals and Plan of Care (Insert Text)  Description  FUNCTIONAL STATUS PRIOR TO ADMISSION: Pt was living at home alone on first level of 2 story home with 2 step entry. Per chart, he was falling and having a difficult time taking care of himself. Pt states he was not falling and that the only time he fell was when he had his stroke. HOME SUPPORT PRIOR TO ADMISSION: The patient lived alone with no local support. Physical Therapy Goals    Revised 1/6/2020  1. Patient will move from supine to sit and sit to supine, scoot up and down and roll side to side in bed with contact guard assistance within 7 days. 2.  Patient will transfer from bed to chair and chair to bed with minimal assistance using the least restrictive device within 7 days. 3.  Patient will perform sit to stand with CGA within 7 days. 4.  Patient will ambulate with moderate assistance 10 feet with a rolling walker within 7 days. Reviewed 12/31/2019 and downgraded  1. Patient will move from supine to sit and sit to supine, scoot up and down and roll side to side in bed with minimal assistance/contact guard assistance within 7 days. 2.  Patient will transfer from bed to chair and chair to bed with moderate assistance using the least restrictive device within 7 days. 3.  Patient will perform sit to stand with moderate assistance within 7 days. 4.  Patient will ambulate with moderate assistance of 1-2 for 10 feet with a rolling walker within 7 days. Initiated 12/24/2019  1. Patient will move from supine to sit and sit to supine , scoot up and down and roll side to side in bed with independence within 7 day(s). 2.  Patient will transfer from bed to chair and chair to bed with modified independence using the least restrictive device within 7 day(s). 3.  Patient will perform sit to stand with modified independence within 7 day(s).   4.  Patient will ambulate with supervision/set-up for 75 feet with the least restrictive device within 7 day(s). Outcome: Progressing Towards Goal   PHYSICAL THERAPY REEVALUATION  Patient: Malou Khalil (49 y.o. male)  Date: 1/6/2020  Primary Diagnosis: Alcohol withdrawal ([facility] [F10.239]  Procedure(s) (LRB):  ESOPHAGOGASTRODUODENOSCOPY (EGD) (N/A) 1 Day Post-Op   Precautions:   Fall, Contact, Seizure, DNR      ASSESSMENT  Based on the objective data described below, the patient presents with axillo-bifemoral bypass on 1/2/19. Pt with generalized weakness, decreased activity tolerance, impaired balance, altered gait and overall poor functional mobility. Pt was received in supine and cleared by nursing to mobilize. Donned a brief before mobilization due to frequent stools. He was able to come to the EOB. Stood with RW and then took a few steps to the chair. Pt very unsafe with walker management and leaving it behind before being close enough to the chair to sit. Pt not very open to safety advise. He was set up for lunch. Current Level of Function Impacting Discharge (mobility/balance): min Ax 2     Patient will benefit from skilled therapy intervention to address the above noted impairments. PLAN :  Recommendations and Planned Interventions: bed mobility training, transfer training, gait training, therapeutic exercises, patient and family training/education, and therapeutic activities      Frequency/Duration: Patient will be followed by physical therapy:  3 times a week to address goals. Recommendation for discharge: (in order for the patient to meet his/her long term goals)  Therapy up to 5 days/week in SNF setting    This discharge recommendation:  Has not yet been discussed the attending provider and/or case management    Equipment recommendations for successful discharge (if) home: TBD         SUBJECTIVE:   Patient stated you always come when lunch comes.     OBJECTIVE DATA SUMMARY:   HISTORY:    Past Medical History: Diagnosis Date    Abuse     alcoholism, quit 2012    Claudication of calf muscles ([facility] 2013    Colovesical fistula     Dr Priya Barnett    Esophageal stricture     Esophagitis     GI bleed 10/2016    Hemochromatosis     Hyperlipidemia     Hypertension     Peripheral artery disease (HCC)     Dr. Caterina Mack    Pulmonary nodule     right lung     Past Surgical History:   Procedure Laterality Date    COLONOSCOPY N/A 9/14/2016    COLONOSCOPY performed by Sloan Veras MD at [facility] ENDOSCOPY    COLONOSCOPY N/A 12/19/2016    COLONOSCOPY performed by Sloan Veras MD at [facility] ENDOSCOPY    HX AMPUTATION Left 07/2016    left 4th toe from gangrene    HX ENDOSCOPY  10/2016    HX OTHER SURGICAL      left partial foot amputation     Hospital course since last seen and reason for reevaluation: axillo-bifemoral bypass on 1/2/19    Personal factors and/or comorbidities impacting plan of care:     Home Situation  Home Environment: Private residence  # Steps to Enter: 2  Rails to Enter: Yes  Hand Rails : Right  One/Two Story Residence: Two story, live on 1st floor  Living Alone: Yes  Support Systems: Family member(s)  Patient Expects to be Discharged to[de-identified] Private residence  Current DME Used/Available at Home: [address] [facility], [place], straight, Commode, bedside, Grab bars, Walker, rolling, Tub transfer bench  Tub or Shower Type: Tub/Shower combination    EXAMINATION/PRESENTATION/DECISION MAKING:   Critical Behavior:  Neurologic State: Alert, Appropriate for age  Orientation Level: Oriented X4  Cognition: Appropriate decision making, Appropriate safety awareness, Follows commands  Safety/Judgement: Fall prevention, Insight into deficits, Decreased insight into deficits(aware he was wet/not aware he was soiled; did not call Nsg )  Hearing:   Auditory  Auditory Impairment: None  Skin:  dressings intact  Edema: genitals       Functional Mobility:  Bed Mobility:  Rolling: Minimum assistance  Supine to Sit: Moderate assistance        Transfers:  Sit to Stand: Minimum assistance;Assist x2  Stand to Sit: Minimum assistance;Assist x2                       Balance:   Sitting: Intact  Standing: Impaired  Standing - Static: Fair;Constant support  Standing - Dynamic : Fair;Constant support  Ambulation/Gait Training:  Distance (ft): 2 Feet (ft)  Assistive Device: Gait belt;Walker, rolling  Ambulation - Level of Assistance: Minimal assistance        Gait Abnormalities: Decreased step clearance;Shuffling gait        Base of Support: Narrowed     Speed/Amanda: Shuffled;Pace decreased (<100 feet/min)  Step Length: Left shortened;Right shortened          Activity Tolerance:   Fair  Please refer to the flowsheet for vital signs taken during this treatment. After treatment patient left in no apparent distress:   Sitting in chair and Call bell within reach    COMMUNICATION/EDUCATION:   The patients plan of care was discussed with: Occupational Therapist and Registered Nurse. Fall prevention education was provided and the patient/caregiver indicated understanding. and Patient understands intent and goals of therapy, but is neutral about his/her participation.     Thank you for this referral.  Gaurav Parikh, PT, DPT   Time Calculation: 27 mins

## 2020-01-06 NOTE — PROGRESS NOTES
F/U for melena, blood loss anemia    S: Mr. Melba Mei was seen by me today during rounds. At this time, he is resting +comfortably. The patient has no new complaints today. NO black or bloody stools. Out of ccu.    O: Blood pressure 124/54, pulse 92, temperature 97.4 °F (36.3 °C), resp. rate 19, height 5' 6\" (1.676 m), weight 64.9 kg (143 lb), SpO2 97 %. Gen: Patient is in no acute distress. There is no3 jaundice. Lungs: Clear to auscultation bilaterally . Heart:+RRR. Abd: Soft, non tender, non-distended, bowel sounds present. Extremities: Warm. Cross sectional imaging:  None new  Lab Results   Component Value Date/Time    WBC 10.0 01/06/2020 04:11 AM    HGB 9.7 (L) 01/06/2020 04:11 AM    HCT 28.9 (L) 01/06/2020 04:11 AM    PLATELET 595 57/67/1563 04:11 AM    .0 (H) 01/06/2020 04:11 AM     Lab Results   Component Value Date/Time    Sodium 138 01/06/2020 04:11 AM    Potassium 3.3 (L) 01/06/2020 04:11 AM    Chloride 107 01/06/2020 04:11 AM    CO2 26 01/06/2020 04:11 AM    Anion gap 5 01/06/2020 04:11 AM    Glucose 104 (H) 01/06/2020 04:11 AM    BUN 11 01/06/2020 04:11 AM    Creatinine 0.56 (L) 01/06/2020 04:11 AM    BUN/Creatinine ratio 20 01/06/2020 04:11 AM    GFR est AA >60 01/06/2020 04:11 AM    GFR est non-AA >60 01/06/2020 04:11 AM    Calcium 7.1 (L) 01/06/2020 04:11 AM    Bilirubin, total 1.0 01/06/2020 04:11 AM    AST (SGOT) 21 01/06/2020 04:11 AM    Alk.  phosphatase 74 01/06/2020 04:11 AM    Protein, total 4.7 (L) 01/06/2020 04:11 AM    Albumin 1.4 (L) 01/06/2020 04:11 AM    Globulin 3.3 01/06/2020 04:11 AM    A-G Ratio 0.4 (L) 01/06/2020 04:11 AM    ALT (SGPT) 13 01/06/2020 04:11 AM     Lab Results   Component Value Date/Time    INR 1.1 08/07/2018 01:07 PM    INR 1.1 10/14/2016 10:37 AM    Prothrombin time 10.3 08/07/2018 01:07 PM    Prothrombin time 11.6 (H) 10/14/2016 10:37 AM         A: Principal Problem:    Alcohol withdrawal ([facility] (12/22/2019)    Active Problems: Alcoholism ([facility] (1/8/2014)      COPD (chronic obstructive pulmonary disease) ([facility] (1/8/2014)      PVD (peripheral vascular disease) ([facility] (12/16/2019)      Cystitis (12/16/2019)      Encounter for rehabilitation (12/20/2019)        Comment:  stable  P:  Not clinically bleeding  He should have a colonoscopy after discharge    Herminio Hamlin MD  8:50 PM  1/6/2020

## 2020-01-07 LAB
ALBUMIN SERPL-MCNC: 1.5 G/DL (ref 3.5–5)
ALBUMIN/GLOB SERPL: 0.5 {RATIO} (ref 1.1–2.2)
ALP SERPL-CCNC: 90 U/L (ref 45–117)
ALT SERPL-CCNC: 25 U/L (ref 12–78)
ANION GAP SERPL CALC-SCNC: 5 MMOL/L (ref 5–15)
AST SERPL-CCNC: 33 U/L (ref 15–37)
BACTERIA SPEC CULT: ABNORMAL
BASOPHILS # BLD: 0 K/UL (ref 0–0.1)
BASOPHILS NFR BLD: 0 % (ref 0–1)
BILIRUB SERPL-MCNC: 1 MG/DL (ref 0.2–1)
BUN SERPL-MCNC: 11 MG/DL (ref 6–20)
BUN/CREAT SERPL: 17 (ref 12–20)
CALCIUM SERPL-MCNC: 7.5 MG/DL (ref 8.5–10.1)
CAMPYLOBACTER SPECIES, DNA: NEGATIVE
CC UR VC: ABNORMAL
CHLORIDE SERPL-SCNC: 110 MMOL/L (ref 97–108)
CO2 SERPL-SCNC: 26 MMOL/L (ref 21–32)
CREAT SERPL-MCNC: 0.63 MG/DL (ref 0.7–1.3)
DIFFERENTIAL METHOD BLD: ABNORMAL
ENTEROTOXIGEN E COLI, DNA: NEGATIVE
EOSINOPHIL # BLD: 0.2 K/UL (ref 0–0.4)
EOSINOPHIL NFR BLD: 3 % (ref 0–7)
ERYTHROCYTE [DISTWIDTH] IN BLOOD BY AUTOMATED COUNT: 17.5 % (ref 11.5–14.5)
GLOBULIN SER CALC-MCNC: 3.3 G/DL (ref 2–4)
GLUCOSE SERPL-MCNC: 76 MG/DL (ref 65–100)
HCT VFR BLD AUTO: 30.8 % (ref 36.6–50.3)
HGB BLD-MCNC: 9.9 G/DL (ref 12.1–17)
IMM GRANULOCYTES # BLD AUTO: 0.2 K/UL (ref 0–0.04)
IMM GRANULOCYTES NFR BLD AUTO: 3 % (ref 0–0.5)
LYMPHOCYTES # BLD: 1.7 K/UL (ref 0.8–3.5)
LYMPHOCYTES NFR BLD: 21 % (ref 12–49)
MAGNESIUM SERPL-MCNC: 1.5 MG/DL (ref 1.6–2.4)
MCH RBC QN AUTO: 33.3 PG (ref 26–34)
MCHC RBC AUTO-ENTMCNC: 32.1 G/DL (ref 30–36.5)
MCV RBC AUTO: 103.7 FL (ref 80–99)
MONOCYTES # BLD: 0.8 K/UL (ref 0–1)
MONOCYTES NFR BLD: 10 % (ref 5–13)
NEUTS SEG # BLD: 5.3 K/UL (ref 1.8–8)
NEUTS SEG NFR BLD: 63 % (ref 32–75)
NRBC # BLD: 0 K/UL (ref 0–0.01)
NRBC BLD-RTO: 0 PER 100 WBC
P SHIGELLOIDES DNA STL QL NAA+PROBE: NEGATIVE
PLATELET # BLD AUTO: 368 K/UL (ref 150–400)
PMV BLD AUTO: 10.7 FL (ref 8.9–12.9)
POTASSIUM SERPL-SCNC: 3.9 MMOL/L (ref 3.5–5.1)
PROT SERPL-MCNC: 4.8 G/DL (ref 6.4–8.2)
RBC # BLD AUTO: 2.97 M/UL (ref 4.1–5.7)
RBC MORPH BLD: ABNORMAL
RBC MORPH BLD: ABNORMAL
SALMONELLA SPECIES, DNA: NEGATIVE
SERVICE CMNT-IMP: ABNORMAL
SHIGA TOXIN PRODUCING, DNA: NEGATIVE
SHIGELLA SP+EIEC IPAH STL QL NAA+PROBE: NEGATIVE
SODIUM SERPL-SCNC: 141 MMOL/L (ref 136–145)
VIBRIO SPECIES, DNA: NEGATIVE
WBC # BLD AUTO: 8.2 K/UL (ref 4.1–11.1)
Y. ENTEROCOLITICA, DNA: NEGATIVE

## 2020-01-07 PROCEDURE — 74011250636 HC RX REV CODE- 250/636: Performed by: FAMILY MEDICINE

## 2020-01-07 PROCEDURE — 77010033678 HC OXYGEN DAILY

## 2020-01-07 PROCEDURE — 74011000250 HC RX REV CODE- 250: Performed by: FAMILY MEDICINE

## 2020-01-07 PROCEDURE — 74011250637 HC RX REV CODE- 250/637: Performed by: SURGERY

## 2020-01-07 PROCEDURE — 74011250637 HC RX REV CODE- 250/637: Performed by: INTERNAL MEDICINE

## 2020-01-07 PROCEDURE — 65660000000 HC RM CCU STEPDOWN

## 2020-01-07 PROCEDURE — 51798 US URINE CAPACITY MEASURE: CPT

## 2020-01-07 PROCEDURE — 74011000250 HC RX REV CODE- 250: Performed by: SURGERY

## 2020-01-07 PROCEDURE — 74011250637 HC RX REV CODE- 250/637: Performed by: NURSE PRACTITIONER

## 2020-01-07 PROCEDURE — 74011250636 HC RX REV CODE- 250/636: Performed by: NURSE PRACTITIONER

## 2020-01-07 PROCEDURE — 74011250636 HC RX REV CODE- 250/636: Performed by: INTERNAL MEDICINE

## 2020-01-07 PROCEDURE — 80053 COMPREHEN METABOLIC PANEL: CPT

## 2020-01-07 PROCEDURE — C9113 INJ PANTOPRAZOLE SODIUM, VIA: HCPCS | Performed by: FAMILY MEDICINE

## 2020-01-07 PROCEDURE — 85025 COMPLETE CBC W/AUTO DIFF WBC: CPT

## 2020-01-07 PROCEDURE — 83735 ASSAY OF MAGNESIUM: CPT

## 2020-01-07 PROCEDURE — 36415 COLL VENOUS BLD VENIPUNCTURE: CPT

## 2020-01-07 PROCEDURE — 74011000258 HC RX REV CODE- 258: Performed by: INTERNAL MEDICINE

## 2020-01-07 PROCEDURE — 77030041076 HC DRSG AG OPTICELL MDII -A

## 2020-01-07 RX ORDER — POTASSIUM CHLORIDE 20 MEQ/1
20 TABLET, EXTENDED RELEASE ORAL DAILY
Status: DISCONTINUED | OUTPATIENT
Start: 2020-01-08 | End: 2020-01-22 | Stop reason: HOSPADM

## 2020-01-07 RX ORDER — MAGNESIUM SULFATE HEPTAHYDRATE 40 MG/ML
2 INJECTION, SOLUTION INTRAVENOUS ONCE
Status: COMPLETED | OUTPATIENT
Start: 2020-01-07 | End: 2020-01-07

## 2020-01-07 RX ADMIN — SODIUM CHLORIDE 40 MG: 9 INJECTION INTRAMUSCULAR; INTRAVENOUS; SUBCUTANEOUS at 21:13

## 2020-01-07 RX ADMIN — PIPERACILLIN AND TAZOBACTAM 3.38 G: 3; .375 INJECTION, POWDER, LYOPHILIZED, FOR SOLUTION INTRAVENOUS at 21:10

## 2020-01-07 RX ADMIN — Medication 1 AMPULE: at 09:00

## 2020-01-07 RX ADMIN — GABAPENTIN 100 MG: 100 CAPSULE ORAL at 09:00

## 2020-01-07 RX ADMIN — PIPERACILLIN AND TAZOBACTAM 3.38 G: 3; .375 INJECTION, POWDER, LYOPHILIZED, FOR SOLUTION INTRAVENOUS at 12:33

## 2020-01-07 RX ADMIN — THERA TABS 1 TABLET: TAB at 09:01

## 2020-01-07 RX ADMIN — Medication 10 ML: at 13:44

## 2020-01-07 RX ADMIN — PIPERACILLIN AND TAZOBACTAM 3.38 G: 3; .375 INJECTION, POWDER, LYOPHILIZED, FOR SOLUTION INTRAVENOUS at 06:44

## 2020-01-07 RX ADMIN — Medication 1 AMPULE: at 23:57

## 2020-01-07 RX ADMIN — COLLAGENASE SANTYL: 250 OINTMENT TOPICAL at 09:00

## 2020-01-07 RX ADMIN — Medication 100 MG: at 09:00

## 2020-01-07 RX ADMIN — ASPIRIN 81 MG 81 MG: 81 TABLET ORAL at 09:00

## 2020-01-07 RX ADMIN — TAMSULOSIN HYDROCHLORIDE 0.4 MG: 0.4 CAPSULE ORAL at 09:01

## 2020-01-07 RX ADMIN — GABAPENTIN 100 MG: 100 CAPSULE ORAL at 13:44

## 2020-01-07 RX ADMIN — ACETAMINOPHEN 650 MG: 325 TABLET ORAL at 21:18

## 2020-01-07 RX ADMIN — Medication 10 ML: at 21:19

## 2020-01-07 RX ADMIN — GABAPENTIN 300 MG: 300 CAPSULE ORAL at 21:18

## 2020-01-07 RX ADMIN — SODIUM CHLORIDE 40 MG: 9 INJECTION INTRAMUSCULAR; INTRAVENOUS; SUBCUTANEOUS at 09:00

## 2020-01-07 RX ADMIN — PRAVASTATIN SODIUM 40 MG: 40 TABLET ORAL at 21:18

## 2020-01-07 RX ADMIN — MAGNESIUM SULFATE HEPTAHYDRATE 2 G: 40 INJECTION, SOLUTION INTRAVENOUS at 09:01

## 2020-01-07 RX ADMIN — FOLIC ACID 1 MG: 1 TABLET ORAL at 09:00

## 2020-01-07 RX ADMIN — ACETAMINOPHEN 650 MG: 325 TABLET ORAL at 03:38

## 2020-01-07 NOTE — PROGRESS NOTES
11:55 AM attempted to call consult to Dr. Bender/Dr. Anushka Santamaria office; vm carmelina option. Left message to return call.

## 2020-01-07 NOTE — PROGRESS NOTES
Vascular Surgery Progress Note  Zuleima Calvert ACNP-BC  1/7/2020       Subjective:     Mr. Joey Son has a pmhx significant for alcoholism, COPD, DM, PAFib, HTN, HLD, and GERD. Spencer Gonzales continues to smoke daily. Spencer Gonzales has a pmhx significant for LLE stenting per his report. Spencer Gonzales is s/p TMA of the left foot (10/2016). Spencer Gonzales presented to the clinic in November 2019 w/ compliant of BLE claudication, BLE nocturnal cramping, and a wound to the incision of the left TMA after hitting his foot.  He has ischemia of the left foot and rubor of the right foot.  His ABIs were right 0.40 w/ a flatline TBI and left 0.32 w/ a surgically absent left great toe.  He underwent an arteriogram on 11/5/2019 that revealed severe bilateral aorto iliac disease that was not amendable to endovascular intervention. Spencer Gonzales returned to the clinic  w/ a CTA that was significant for an occluded left common iliac and left external iliac artery and occluded right external iliac artery. . A ax-bifemoral bypass was planned.  Spencer Gonzales then presented to the emergency room on 12/16/2019 w/ complaint of BLE weakness. Spencer Gonzales was admitted to [facility] and diagnosed w/ cystitis.  While admitted he was noted to have left lateral heel ulcer.  He was discharged to the [address] [place] [address] at [facility] on 12/20/2019 for rehabilitation.  Late that evening he developed active w/d symptoms and returned to the emergency room at [address] was then transferred to [address] [place] [address] for management of alcohol withdrawal which has resolved.  His initial urine cx grew Klebsiella and he completed a course of antibx.  A follow up urine cx showed no growth. He then developed a low grade fever and leukocytosis. Repeat urine cx was + for citrobacter and enterococcus. He was noted to have urinary retention and Urology was consulted. Outpatient follow up was recommended. His hospitalization was complicated by bilateral pleural effusions w/ LLL partial collapse resulting in hypoxic respiratory failure.   On 01/02/2020 he underwent axillo-bifemoral bypass. His post procedure course was complicated by hypotension requiring vasopressor support which was weaned off on 01/05/2020. He developed a GI Bleed that was treated w/ transfusion and PPI. His EGD was unremarkable. He developed pressure ulcers to the left calf and right heel during admission despite the use of heel suspension boots. This am his feet remains warm and perfused. His BP is stable. His H&H is stable. His right toe wound has failed to improve w/ revascularization. His left heel wound is improved. Vac dressing were removed and groin incisions are stable. Nursing Data:     Patient Vitals for the past 24 hrs:   BP Temp Pulse Resp SpO2   01/07/20 0712 125/67 97.4 °F (36.3 °C) 80 18 97 %   01/07/20 0339 131/74 97.5 °F (36.4 °C) 89 17 95 %   01/06/20 2256 130/79 97.7 °F (36.5 °C) 89 16 95 %   01/06/20 2021 130/49 97.8 °F (36.6 °C) 96 16 94 %   01/06/20 1547 117/53 97.9 °F (36.6 °C) 85 18 94 %   01/06/20 1042 124/54 97.4 °F (36.3 °C) 92 19 97 %     ---------------------------------------------------------------------------------------------------------    Intake/Output Summary (Last 24 hours) at 1/7/2020 0959  Last data filed at 1/6/2020 1547  Gross per 24 hour   Intake 440 ml   Output 75 ml   Net 365 ml       Exam:     Physical Exam  Constitutional:       Comments: Appears generally deconditioned and chronically ill. HENT:      Head: Normocephalic. Nose: Nose normal.      Mouth/Throat:      Mouth: Mucous membranes are moist.   Eyes:      Pupils: Pupils are equal, round, and reactive to light. Neck:      Musculoskeletal: Normal range of motion. Cardiovascular:      Rate and Rhythm: Normal rate and regular rhythm. Pulses:           Femoral pulses are 2+ on the right side and 2+ on the left side. Comments: His feet are warm and perfused. Pulmonary:      Effort: Pulmonary effort is normal. No tachypnea, accessory muscle usage or respiratory distress. Breath sounds: No rales. Abdominal:      General: Abdomen is flat. Palpations: Abdomen is soft. Musculoskeletal: Normal range of motion. Skin:  Right heel DTI   Right second toe ulcer w/ yellow eschar has failed to improve despite revascularization  Left heel ulceration is improved  Left leg calf DTI  Left TMA incision ulceration improved. His surgical incision to the BL groins are intact. Neurological:      Mental Status: He is alert. Mental status is at baseline. Psychiatric:         Mood and Affect: Mood normal.         Behavior: Behavior normal.      Lab Review:     . Recent Results (from the past 24 hour(s))   CBC WITH AUTOMATED DIFF    Collection Time: 01/07/20  3:47 AM   Result Value Ref Range    WBC 8.2 4.1 - 11.1 K/uL    RBC 2.97 (L) 4.10 - 5.70 M/uL    HGB 9.9 (L) 12.1 - 17.0 g/dL    HCT 30.8 (L) 36.6 - 50.3 %    .7 (H) 80.0 - 99.0 FL    MCH 33.3 26.0 - 34.0 PG    MCHC 32.1 30.0 - 36.5 g/dL    RDW 17.5 (H) 11.5 - 14.5 %    PLATELET 353 541 - 308 K/uL    MPV 10.7 8.9 - 12.9 FL    NRBC 0.0 0  WBC    ABSOLUTE NRBC 0.00 0.00 - 0.01 K/uL    NEUTROPHILS 63 32 - 75 %    LYMPHOCYTES 21 12 - 49 %    MONOCYTES 10 5 - 13 %    EOSINOPHILS 3 0 - 7 %    BASOPHILS 0 0 - 1 %    IMMATURE GRANULOCYTES 3 (H) 0.0 - 0.5 %    ABS. NEUTROPHILS 5.3 1.8 - 8.0 K/UL    ABS. LYMPHOCYTES 1.7 0.8 - 3.5 K/UL    ABS. MONOCYTES 0.8 0.0 - 1.0 K/UL    ABS. EOSINOPHILS 0.2 0.0 - 0.4 K/UL    ABS. BASOPHILS 0.0 0.0 - 0.1 K/UL    ABS. IMM.  GRANS. 0.2 (H) 0.00 - 0.04 K/UL    DF SMEAR SCANNED      RBC COMMENTS ANISOCYTOSIS  1+        RBC COMMENTS MACROCYTOSIS  1+       METABOLIC PANEL, COMPREHENSIVE    Collection Time: 01/07/20  3:47 AM   Result Value Ref Range    Sodium 141 136 - 145 mmol/L    Potassium 3.9 3.5 - 5.1 mmol/L    Chloride 110 (H) 97 - 108 mmol/L    CO2 26 21 - 32 mmol/L    Anion gap 5 5 - 15 mmol/L    Glucose 76 65 - 100 mg/dL    BUN 11 6 - 20 MG/DL    Creatinine 0.63 (L) 0.70 - 1.30 MG/DL BUN/Creatinine ratio 17 12 - 20      GFR est AA >60 >60 ml/min/1.73m2    GFR est non-AA >60 >60 ml/min/1.73m2    Calcium 7.5 (L) 8.5 - 10.1 MG/DL    Bilirubin, total 1.0 0.2 - 1.0 MG/DL    ALT (SGPT) 25 12 - 78 U/L    AST (SGOT) 33 15 - 37 U/L    Alk. phosphatase 90 45 - 117 U/L    Protein, total 4.8 (L) 6.4 - 8.2 g/dL    Albumin 1.5 (L) 3.5 - 5.0 g/dL    Globulin 3.3 2.0 - 4.0 g/dL    A-G Ratio 0.5 (L) 1.1 - 2.2     MAGNESIUM    Collection Time: 01/07/20  3:47 AM   Result Value Ref Range    Magnesium 1.5 (L) 1.6 - 2.4 mg/dL          Assessment/Plan:      Consult problem  Severe bilateral PAD w/ multiple ulcerations of the BLE  -s/p axillo-bifemoral bypass on 01/02/2020.   -his right toe wound has failed to improve    Consult podiatry for amputation of the right second toe. Wound care consult for bilateral heel and left calf has been ordered. Encourage OOB w/ lift team.  PT/OT daily. He continues on ASA and statin.      Active problems  Post procedural hypotension w/ hx of HTN  -weaned off vasopressor support. Antihypertensives continue to be held. Hypoalbuminemia   Metabolic encephalopathy   -resolved @ baseline   Chronic cognitive deficit   Acute hypoxic respiratory failure   Bilateral pleural effusion w/ partial LLL collapse  ECHO 01/04/2020  · Normal cavity size and systolic function (ejection fraction normal). Increased wall thickness. Estimated left ventricular ejection fraction is 50 - 55%. Left ventricular diastolic dysfunction. · Trace mitral valve regurgitation is present. · Mild tricuspid valve regurgitation is present. · Small-to-moderate pericardial effusion. There is left pleural effusion  COPD  -not in exacerbation   Wean oxygen as tolerated     Sepsis  -resolved   Urinary retention  -patient voided twice prior to procedure and had retained urine when isabel was placed for procedure.    Recurrent UTI   -initial urine cxKLEBISIELLA PNEUMONIAE ( 3 day course of Rocephin completed)  -2nd urine cx NG  -3rd urine cx CITROBACTER BRAAKII & ENTEROCOCCUS (continues on Zosyn day 4)  Voiding trial today   Outpatient f/u w/ Urology. Consider continuation of suppressive antibx until f/u w/ . Defer decision to primary team.       GI Bleed  Diverticulosis   GERD  -EGD unremarkable   Macrocytic Anemia   -H&H improved   Thrombocytopenia  -resolved   Plan per gastroenterology      PAfib w/ RVR   -tachycardic resolved   -poor candidate for [facility] w/ hx of GIB of unknown etiology and cognitive deficit. Hyperlipidemia  -on statin   Alcohol withdrawal  -resolved   Thiamine deficiency   -on oral supplementation   Diabetes Mellitus w/ hypoglycemia and hypoalbuminemia    -on liberalized diet w/ hs snack   -suspect malnutrition of moderate degree  -possible liver dysfunction resulting in poor glycogen stores  -hypoglycemia improved   Hypernatremia   -resolved   Hypokalemia   -resolved   Hypophosphatemia   Hypomagnesemia  Management of comorbid conditions by primary team.     VTE Prophylaxis:  SCDs: contraindicated in severe BLE PAD  Not a candidate for SQ w/ hx of GIB     Disposition:  SNF. [facility] w/ ECF following rehabilitation.         Vascular surgery will continue to follow patient intermittently during admission. We appreciate the opportunity to participate in the care of Mr. Mesha Elena. Patient should f/u in 3 weeks with Dr. Clinton Couch. Please call for any urgent vascular issues.

## 2020-01-07 NOTE — WOUND CARE
Wound Care Nurse: from vascular NP stating \" Right second toe and left heel s/p revascularization\". Patient is a 76 y/o CM who states he just quite smoking and is wearing a Nicotine patch. Admitted for alcohol withdrawal on 12/22 and Dr Kathryn Hendricks took him to OR 1/2 for a Axillo-bifemoral bypass for PAD with ulceration. Past Medical History:   Diagnosis Date    Abuse     alcoholism, quit 2012    Claudication of calf muscles [facility]) 2013    Colovesical fistula     Dr Zuri Hannah Esophageal stricture     Esophagitis     GI bleed 10/2016    Hemochromatosis     Hyperlipidemia     Hypertension     Peripheral artery disease (HCC)     Dr. Shankar Gonzalez Pulmonary nodule     right lung     Past Surgical History:   Procedure Laterality Date    COLONOSCOPY N/A 9/14/2016    COLON[facility] Del[facility] Leon [facility]OSCOPY    COLONOSCOPY N/A 12/19/2016    COLONOSCOPY performed by Delano De Leon MD at [facility] ENDOSCOPY    HX AMPUTATION Left 07/2016    left 4th toe from gangrene    HX ENDOSCOPY  10/2016    HX OTHER SURGICAL      left partial foot amputation     Patient has a left Heel Unstageable PI complicated by PAD and a right 2nd toe ulceration from PAD. Wound Toe (Comment  which one) Anterior;Right (Active)   Dressing Status Removed 1/7/2020  6:05 PM   Dressing Type Petroleum gauze 1/7/2020  6:05 PM   Non-staged Wound Description Full thickness 1/7/2020  6:05 PM   Wound Length (cm) 1 cm 1/7/2020  6:05 PM   Wound Width (cm) 0.8 cm 1/7/2020  6:05 PM   Wound Surface Area (cm^2) 0.8 cm^2 1/7/2020  6:05 PM   Condition of [place] 1/7/2020  6:05 PM   Condition of Edges Rolled/curled 1/7/2020  6:05 PM   Assessment Drainage;Painful; Other (Comment) 1/1/2020  4:44 AM   Tissue Type Percent Pink 10 1/7/2020  6:05 PM   Tissue Type Percent Yellow 90 1/7/2020  6:05 PM   Drainage Amount Scant 1/7/2020  6:05 PM   Drainage Color Yellow 1/7/2020  6:05 PM   Wound Odor None 1/7/2020  6:05 PM   Cindy-wound Assessment Blanchable erythema 1/7/2020  6:05 PM   Cleansing and Cleansing Agents  Dermal wound cleanser 1/7/2020  6:05 PM   Dressing Changed Changed/New 1/7/2020  6:05 PM   Dressing Type Applied Silver products;Dry dressing 1/7/2020  6:05 PM   Procedure Tolerated Well 1/7/2020  6:05 PM   Number of days: 22       Wound Heel Left dime size pressure ulcer with eschar tissue 12/17/19 (Active)   Dressing Status Clean, dry, and intact 1/6/2020  8:21 PM   Dressing Type Open to air 1/7/2020  6:05 PM   Non-staged Wound Description Partial thickness 12/31/2019  5:08 PM   Pressure Injury Unstageable 1/7/2020  6:05 PM   Shape round 1/1/2020  4:44 AM   Wound Length (cm) 1 cm 1/7/2020  6:05 PM   Wound Width (cm) 1 cm 1/7/2020  6:05 PM   Wound Surface Area (cm^2) 1 cm^2 1/7/2020  6:05 PM   Condition of Base Eschar 1/7/2020  6:05 PM   Condition of Edges Calloused 1/7/2020  6:05 PM   Assessment Black 1/7/2020  6:05 PM   Tissue Type Percent Black 100 % 1/7/2020  6:05 PM   Drainage Amount None 1/7/2020  6:05 PM   Drainage Color Serosanguinous 12/28/2019 10:09 AM   Wound Odor None 1/7/2020  6:05 PM   Cindy-wound Assessment Intact 1/7/2020  6:05 PM   Cleansing and Cleansing Agents  Betadine 1/7/2020  6:05 PM   Dressing Changed Changed/New 1/6/2020 10:30 AM   Dressing Type Applied Open to air 1/7/2020  6:05 PM   Procedure Tolerated Well 1/7/2020  6:05 PM   Number of days: 21          Recommend:    Left heel: daily paint with betadine to dry up and keep clean. Right 2nd toe: daily, cleanse wound with dermal wound cleanser, wipe clean with 4x4. Apply Carrysen wound gel to wound and cover with a piece of Aquacell-AG/Opticell AG. Place dry 4x4's between toes and over toe and secure with gauze ce.     Float heels!!!    Hillary De Santiago RN

## 2020-01-07 NOTE — PROGRESS NOTES
[place] PLAN    SNF rehab: 1. [facility]. 2. [facility]. [address]. 4. [facility]. Will need LTC at some point, Pt recognizes it. Second IM letter prior to DC    [facility] transport to get to SNF. Unsure of DC date at this time. Will need to know anticipated DC date as we get closer to work on disposition since no acceptance yet for planning purposes. CM will continue to monitor discharge plan.      Dwayne Gibson, [address] [facility]

## 2020-01-07 NOTE — PROGRESS NOTES
- Bedside report received from [facility], [facility]. Pt resting in bed, VSS, no c/o pain. Changed pt and put Desitin cream on. Will consult WC for wound care orders to right toe and left heel. Will discuss with MD, benefits and risks of FMS for loose stools. 0800- Assessment complete. 1100- Dressing changed. Left heel painted with betadine, xeroform placed and wrapped with gauze per DrJuan A's orders. Right toe rewrapped. Wound care consult orders placed. 1200- Discussed with Bushra need for pt's central line. Documentation states it was d/c'd a few days ago, however, still in place. Will d/c after new PIV placed. 1300- Pt has had 3x stool and 5x urine incontinent episodes this morning which is a marked decreased per night nurse report and per pt.     1630- Dr. Elveria Goodpasture called and stated that they are not taking pts from this hospital anymore. Lisa 22 Dr. Evens Andrews, [address]. Notified of consult. Will be in to see pt this evening.

## 2020-01-07 NOTE — PROGRESS NOTES
Hospitalist Progress Note    NAME: Collette Ellison   :  1945   MRN:  [id number]       Assessment / Plan:  Septic vs hemorrhagic shock 2020  Citrobacter UTI 2020  Resolving and off pressors  UTI and also recent GI bleeding  Echocardiogram with normal EF, unlikely cardiogenic  Patient is overall volume overloaded, so this should not be due to hypovolemia  CCU monitoring appreciated  Holding antihypertensives  Likely can transfer out of stepdown in AM  Complete zosyn x 7 days  No blood cultures were sent    Gastrointestinal hemorrhage, resolved  Acute blood loss anemia HgB dropped 8.8 to 6.8  S/p 2 units pRBCs on 2019  New finding of melena 1/3 evening  CTA done  -- no active extravasation seen  EGD done, no active bleeding or bleeding source seen   Holding chemical DVT ppx  Continue PPI  Bleeding appears to have resolved, HgB now 9.7    Peripheral vascular disease POA  Axillo-femoral bypass performed  by Dr. Krause Shelter  Added Gabapentin and Oxycodone 5-10 mg prn for pain management  Stool softeners  Severe on CTA Abd/pelvis at [facility]  CTA:  -Occluded left common iliac and left external iliac artery with distal  reconstitution of the common femoral artery.  -Occluded right external iliac artery with distal reconstitution of the right  common femoral artery.     Anasarca  Pleural effusions and pulmonary edema  Diuresis has been on hold as patient was requiring vasopressors to support BP  If BP stable overnight  Resume diuretics in AM  Still with significant dependent edema in sacrum and scrotum    Alcohol withdrawal syndrome/Delirium Tremens POA, resolved  Discontinued CIWA monitoring as patient has not received any Ativan for days now and has been in the hospital greater than a week  S/p IV Goody bag, now on PO Vitamins as tolerating PO    Hypertension  Holding home antihypertensive medication for current hypotension     Code Status: Full   Surrogate Decision Maker: Brionna Quesada     DVT Prophylaxis: Lovenox   GI Prophylaxis: not indicated     Recommended Disposition: SNF/LTC Short term rehab with conversion to LTC placement when medicaid kicks in (currently pending). Subjective:     Chief Complaint / Reason for Physician Visit: f/u Alcohol withdrawal syndrome, UTI, hypotension  \"just aching all over\"  I saw today for the first time  No further bleeding, loose but non watery diarrhea  No SOB or CP or HA  Noonan removed today  Asking about his electrolytes    Review of Systems:  Symptom Y/N Comments  Symptom Y/N Comments   Fever/Chills n   Chest Pain n    Poor Appetite    Edema     Cough n   Abdominal Pain n    Sputum n   Joint Pain n    SOB/MCFARLAND n   Pruritis/Rash     Nausea/vomit n   Tolerating PT/OT     Diarrhea    Tolerating Diet y    Constipation    Other       Could NOT obtain due to:      Objective:     VITALS:   Last 24hrs VS reviewed since prior progress note. Most recent are:  Patient Vitals for the past 24 hrs:   Temp Pulse Resp BP SpO2   01/06/20 1547 97.9 °F (36.6 °C) 85 18 117/53 94 %   01/06/20 1042 97.4 °F (36.3 °C) 92 19 124/54 97 %   01/06/20 0715 97.4 °F (36.3 °C) 81 17 131/61 96 %   01/06/20 0402 98.2 °F (36.8 °C) 86 17 118/64 96 %   01/06/20 0117    116/53    01/05/20 2356 97.9 °F (36.6 °C) 88 14 116/48 95 %   01/05/20 2104 97.6 °F (36.4 °C) 87 19 125/49 97 %   01/05/20 2042 97.6 °F (36.4 °C) 89 18 136/86 97 %       Intake/Output Summary (Last 24 hours) at 1/6/2020 1941  Last data filed at 1/6/2020 1547  Gross per 24 hour   Intake 1554.16 ml   Output 845 ml   Net 709.16 ml        PHYSICAL EXAM:  General: WD,alert, no acute distress. Chronically ill. Does not appear toxic. EENT:  Anicteric sclerae. MMM  Resp:  Diminished breath sounds at bases. No accessory muscle use  CV:  Normal rate, regular rhythm. No edema.   GI:  Soft, Non distended, Non tender.  +Bowel sounds  Neurologic:  Alert, oriented to self, place, purpose  Psych:   Fair insight. Not anxious nor agitated  Skin:  No rashes. No jaundice  Lines:   Right IJ TLC In place. Reviewed most current lab test results and cultures  YES  Reviewed most current radiology test results   YES  Review and summation of old records today    NO  Reviewed patient's current orders and MAR    YES  PMH/SH reviewed - no change compared to H&P  ________________________________________________________________________  Care Plan discussed with:    Comments   Patient x    Family      RN x    Care Manager     Consultant                        Multidiciplinary team rounds were held today with , nursing, pharmacist and clinical coordinator. Patient's plan of care was discussed; medications were reviewed and discharge planning was addressed. ________________________________________________________________________      Comments   >50% of visit spent in counseling and coordination of care     ________________________________________________________________________  Ephraim Osborne MD     Procedures: see electronic medical records for all procedures/Xrays and details which were not copied into this note but were reviewed prior to creation of Plan. LABS:  I reviewed today's most current labs and imaging studies.   Pertinent labs include:  Recent Labs     01/06/20  0411 01/04/20  1834 01/04/20  0930 01/04/20  0608 01/03/20  2224   WBC 10.0  --   --  10.3 9.5   HGB 9.7* 11.3* 6.8* 7.0* 7.6*   HCT 28.9* 32.6* 21.4* 21.4* 23.2*     --   --  331 342     Recent Labs     01/06/20  0411 01/04/20  1834 01/04/20  0608    136 138   K 3.3* 4.0 3.4*    104 106   CO2 26 26 25   * 148* 110*   BUN 11 12 11   CREA 0.56* 0.72 0.59*   CA 7.1* 7.0* 7.0*   MG  --  1.4* 1.0*   PHOS  --   --  2.1*   ALB 1.4*  --  1.4*   TBILI 1.0  --  0.3   SGOT 21  --  15   ALT 13  --  10*       Signed: Ephraim Osborne MD

## 2020-01-07 NOTE — PROGRESS NOTES
F/U for melena, blood loss anemia    S: Mr. Brandon Wilkins was seen by me today during rounds. At this time, he is resting +comfortably. The patient has + new complaint today, reports he had significant diarrhea yesterday and now has some skin breakdown on his bottom. NO black or bloody stools but unable to control diarrhea. Appears to have slowed down today. Hgb stable at 9.9 this AM.  Denies any F/C or abdominal pain. Tolerating good PO intake without N/V.    O: Blood pressure 125/67, pulse 80, temperature 97.4 °F (36.3 °C), resp. rate 18, height 5' 6\" (1.676 m), weight 64.9 kg (143 lb), SpO2 97 %. Gen: Patient is in no acute distress. There is no jaundice. Lungs: Clear to auscultation bilaterally . Heart:+RRR. Abd: Soft, non tender, non-distended, normal BS Extremities: Warm, multiple amputated digits   Cross sectional imaging:  None new  Lab Results   Component Value Date/Time    WBC 8.2 01/07/2020 03:47 AM    HGB 9.9 (L) 01/07/2020 03:47 AM    HCT 30.8 (L) 01/07/2020 03:47 AM    PLATELET 095 98/12/4990 03:47 AM    .7 (H) 01/07/2020 03:47 AM     Lab Results   Component Value Date/Time    Sodium 141 01/07/2020 03:47 AM    Potassium 3.9 01/07/2020 03:47 AM    Chloride 110 (H) 01/07/2020 03:47 AM    CO2 26 01/07/2020 03:47 AM    Anion gap 5 01/07/2020 03:47 AM    Glucose 76 01/07/2020 03:47 AM    BUN 11 01/07/2020 03:47 AM    Creatinine 0.63 (L) 01/07/2020 03:47 AM    BUN/Creatinine ratio 17 01/07/2020 03:47 AM    GFR est AA >60 01/07/2020 03:47 AM    GFR est non-AA >60 01/07/2020 03:47 AM    Calcium 7.5 (L) 01/07/2020 03:47 AM    Bilirubin, total 1.0 01/07/2020 03:47 AM    AST (SGOT) 33 01/07/2020 03:47 AM    Alk.  phosphatase 90 01/07/2020 03:47 AM    Protein, total 4.8 (L) 01/07/2020 03:47 AM    Albumin 1.5 (L) 01/07/2020 03:47 AM    Globulin 3.3 01/07/2020 03:47 AM    A-G Ratio 0.5 (L) 01/07/2020 03:47 AM    ALT (SGPT) 25 01/07/2020 03:47 AM     Lab Results   Component Value Date/Time    INR 1.1 08/07/2018 01:07 PM    INR 1.1 10/14/2016 10:37 AM    Prothrombin time 10.3 08/07/2018 01:07 PM    Prothrombin time 11.6 (H) 10/14/2016 10:37 AM         A: Principal Problem:    Alcohol withdrawal ([facility] (12/22/2019)    Active Problems:    Alcoholism ([facility] (1/8/2014)      COPD (chronic obstructive pulmonary disease) ([facility] (1/8/2014)      PVD (peripheral vascular disease) ([facility] (12/16/2019)      Cystitis (12/16/2019)      Encounter for rehabilitation (12/20/2019)        Comment:  Significant diarrhea yesterday  P:  No further blood in stool or dark tarry stools. Stool sent for O&P as well as H. pylori. We will check stool for enteric pathogens. Agree he may be of benefit from fecal management system if diarrhea continues today however appears to have slowed some. Does not meet guidelines to check for C. Diff so will hold off on testing. Reviewed need for colonoscopy after d/c and patient in agreement.      YUSUF Delgado  8:50 PM  1/7/2020

## 2020-01-07 NOTE — PROGRESS NOTES
Hospitalist Progress Note    NAME: Kelly Bishop   :  [birth year]   MRN:  [id number]     I reviewed pertinent labs/imaging and discussed plan of care with Dr. Luci Curiel who is in agreement. Assessment / Plan:  Shock, multifactorial (sepsis/acute blood loss) resolved  UTI  Urine culture 20:  Citrobacter braakii, Enterococcus Faecalis Group D.  - No blood cultures were sent. - Remains hemodynamically stable. - Continue Zosyn 3.375 mg IV q8h. Hypomagnesemia  - Replete. - Monitor. GIB, resolved  Diverticulosis  Acute blood loss anemia  Diarrhea  CT abdomen/pelvis 20:  1. No acute GI bleeding site demonstrated. 2. Colonic diverticulosis. 3. Moderate bilateral pleural effusions and adjacent atelectasis. 4. Recent right axillary femoral and cross femoral bypass. Anasarca. 5. No acute findings in the abdomen or pelvis. 6. Possible cholelithiasis. EGD 20:  Normal upper endoscopy. - Patient received total of 2 units PRBCs.  - H/H stable for 48 hours. - Stool studies pending.    - Diarrhea improved today. - Continue pantoprazole 40 mg IV q12h. - Continue to hold DVT chemoprophylaxis. PVD (POA)  Peripheral neuropathy  - S/P Axillo-Bifemoral bypass on 20.  - Podiatry consulted for amputation of right second toe.  - Continue asa 81 mg po daily. - Continue gabapentin 100 mg po bid and 300 mg po at hs. Anasarca  Pulmonary edema  Pleural effusions  Echo 20:  · Normal cavity size and systolic function (ejection fraction normal). Increased wall thickness. Estimated left ventricular ejection fraction is 50 - 55%. Left ventricular diastolic dysfunction. · Trace mitral valve regurgitation is present. · Mild tricuspid valve regurgitation is present. · Small-to-moderate pericardial effusion. There is left pleural effusion.  - Resume furosemide 20 mg IV daily.    - Add scheduled KCl with daily dose of furosemide.       EtOH abuse  EtOH withdrawal with DTs, resolved  Tobacco abuse  - Patient has been inpatient for several days without S/S of EtOH withdrawal.    - Continue MVI 1 po daily. - Continue folic acid 1 mg po daily. - Continue thiamine 100 mg po daily. - Continue nicotine patch. HTN  Dyslipidemia  - Adequate BP off antihypertensive meds. - Continue pravastatin 40 mg po daily.    - Add home antihypertensives prn. COPD without exacerbation   - No tx indicated at this time. BPH  Urinary retention  - No further urinary retention.  - Continue tamsulosin 0.4 mg po daily. 18.5 - 24.9 Normal weight / Body mass index is 23.08 kg/m². Code status: DNR  Prophylaxis: H2B/PPI and DVT prophylaxis on hold 2/2 ABL anemia  Recommended Disposition: Home w/Family     Subjective:     Chief Complaint / Reason for Physician Visit  No complaints currently. Plan of care reviewed with bedside RN. Review of Systems:  Symptom Y/N Comments  Symptom Y/N Comments   Fever/Chills    Chest Pain     Poor Appetite    Edema     Cough    Abdominal Pain     Sputum    Joint Pain     SOB/MCFARLAND    Pruritis/Rash     Nausea/vomit    Tolerating PT/OT     Diarrhea    Tolerating Diet     Constipation    Other       Could NOT obtain due to:      Objective:     VITALS:   Last 24hrs VS reviewed since prior progress note. Most recent are:  Patient Vitals for the past 24 hrs:   Temp Pulse Resp BP SpO2   01/07/20 0712 97.4 °F (36.3 °C) 80 18 125/67 97 %   01/07/20 0339 97.5 °F (36.4 °C) 89 17 131/74 95 %   01/06/20 2256 97.7 °F (36.5 °C) 89 16 130/79 95 %   01/06/20 2021 97.8 °F (36.6 °C) 96 16 130/49 94 %   01/06/20 1547 97.9 °F (36.6 °C) 85 18 117/53 94 %   01/06/20 1042 97.4 °F (36.3 °C) 92 19 124/54 97 %       Intake/Output Summary (Last 24 hours) at 1/7/2020 0938  Last data filed at 1/6/2020 1547  Gross per 24 hour   Intake 440 ml   Output 75 ml   Net 365 ml        PHYSICAL EXAM:  General:  A/A/O X 3. NAD. HEENT:  Normocephalic. Sclera anicteric. EOMI.   Mucous membranes moist.    Chest:  Resps even/unlabored with symmetrical CWE. Air entry diminished at bases curt. Lungs CTA. No use of accessory muscles. CV:  RRR. Anasarca with scrotal and penile edema. GI:  Abdomen soft/NT/ND. ABT X 4.    Neurologic:  Nonfocal.  CN II-XII grossly intact. Speech normal.     Psych:  Cooperative. No anxiety or agitation. Skin:  RIJ intact without erythema/exudate/edema. No rashes or jaundice. Reviewed most current lab test results and cultures  YES  Reviewed most current radiology test results   YES  Review and summation of old records today    NO  Reviewed patient's current orders and MAR    YES  PMH/SH reviewed - no change compared to H&P  ________________________________________________________________________  Care Plan discussed with:    Comments   Patient [address]     Consultant                        Multidiciplinary team rounds were held today with , nursing, pharmacist and clinical coordinator. Patient's plan of care was discussed; medications were reviewed and discharge planning was addressed. ___________________________________________________________________  Joseph River NP     Procedures: see electronic medical records for all procedures/Xrays and details which were not copied into this note but were reviewed prior to creation of Plan. LABS:  I reviewed today's most current labs and imaging studies.   Pertinent labs include:  Recent Labs     01/07/20 0347 01/06/20 0411 01/04/20  1834   WBC 8.2 10.0  --    HGB 9.9* 9.7* 11.3*   HCT 30.8* 28.9* 32.6*    351  --      Recent Labs     01/07/20 0347 01/06/20  0411 01/04/20  1834    138 136   K 3.9 3.3* 4.0   * 107 104   CO2 26 26 26   GLU 76 104* 148*   BUN 11 11 12   CREA 0.63* 0.56* 0.72   CA 7.5* 7.1* 7.0*   MG 1.5*  --  1.4*   ALB 1.5* 1.4*  --    TBILI 1.0 1.0  --    SGOT 33 21  --    ALT 25 13  --        Signed: Joseph River, NP

## 2020-01-07 NOTE — PROGRESS NOTES
Bedside and Verbal shift change report given to Solitario Still RN (oncoming nurse) by Mookie Perez RN (offgoing nurse). Report included the following information SBAR, Kardex, MAR and Recent Results. 2150: Messaged MD as patient has had 6 bowel movements in the past 2 hours. And is very excoriated and is causing him a lot of pain. 2314: Messaged tele-hospitalist regarding CDIFF sample that was requested as patient does not fall under the appropriate criteria. Order for Cdiff sample discontinued. 0215: Patient voided, post void scan is 0 mL. Both wound vac dressings are not sealed and are getting soiled with urine, it has been noted for several days now that the seal has not be intact on these dressings. Both dressing were removed and honeycomb dressings placed on top of staples to keep the area clean, dry, and intact to help prevent infection. Will pass on to day shift RN so they can discuss with vascular later today. 0730: Bedside and Verbal shift change report given to Lefty Fuentes RN (oncoming nurse) by Solitario Still RN (offgoing nurse). Report included the following information SBAR, Kardex, MAR and Recent Results.

## 2020-01-08 LAB
ANION GAP SERPL CALC-SCNC: 6 MMOL/L (ref 5–15)
BASOPHILS # BLD: 0.1 K/UL (ref 0–0.1)
BASOPHILS NFR BLD: 1 % (ref 0–1)
BUN SERPL-MCNC: 9 MG/DL (ref 6–20)
BUN/CREAT SERPL: 17 (ref 12–20)
CALCIUM SERPL-MCNC: 7.5 MG/DL (ref 8.5–10.1)
CHLORIDE SERPL-SCNC: 108 MMOL/L (ref 97–108)
CO2 SERPL-SCNC: 25 MMOL/L (ref 21–32)
CREAT SERPL-MCNC: 0.53 MG/DL (ref 0.7–1.3)
DIFFERENTIAL METHOD BLD: ABNORMAL
EOSINOPHIL # BLD: 1 K/UL (ref 0–0.4)
EOSINOPHIL NFR BLD: 12 % (ref 0–7)
ERYTHROCYTE [DISTWIDTH] IN BLOOD BY AUTOMATED COUNT: 16.4 % (ref 11.5–14.5)
GLUCOSE SERPL-MCNC: 70 MG/DL (ref 65–100)
HCT VFR BLD AUTO: 29.7 % (ref 36.6–50.3)
HGB BLD-MCNC: 9.7 G/DL (ref 12.1–17)
IMM GRANULOCYTES # BLD AUTO: 0.3 K/UL (ref 0–0.04)
IMM GRANULOCYTES NFR BLD AUTO: 3 % (ref 0–0.5)
LYMPHOCYTES # BLD: 1.3 K/UL (ref 0.8–3.5)
LYMPHOCYTES NFR BLD: 15 % (ref 12–49)
MAGNESIUM SERPL-MCNC: 1.6 MG/DL (ref 1.6–2.4)
MCH RBC QN AUTO: 33.4 PG (ref 26–34)
MCHC RBC AUTO-ENTMCNC: 32.7 G/DL (ref 30–36.5)
MCV RBC AUTO: 102.4 FL (ref 80–99)
MONOCYTES # BLD: 0.6 K/UL (ref 0–1)
MONOCYTES NFR BLD: 7 % (ref 5–13)
NEUTS SEG # BLD: 5.2 K/UL (ref 1.8–8)
NEUTS SEG NFR BLD: 62 % (ref 32–75)
NRBC # BLD: 0 K/UL (ref 0–0.01)
NRBC BLD-RTO: 0 PER 100 WBC
PLATELET # BLD AUTO: 386 K/UL (ref 150–400)
PMV BLD AUTO: 10.6 FL (ref 8.9–12.9)
POTASSIUM SERPL-SCNC: 3.7 MMOL/L (ref 3.5–5.1)
RBC # BLD AUTO: 2.9 M/UL (ref 4.1–5.7)
RBC MORPH BLD: ABNORMAL
RBC MORPH BLD: ABNORMAL
SODIUM SERPL-SCNC: 139 MMOL/L (ref 136–145)
WBC # BLD AUTO: 8.5 K/UL (ref 4.1–11.1)
WBC #/AREA STL HPF: NORMAL /HPF (ref 0–4)

## 2020-01-08 PROCEDURE — 74011250637 HC RX REV CODE- 250/637: Performed by: SURGERY

## 2020-01-08 PROCEDURE — 74011000250 HC RX REV CODE- 250: Performed by: FAMILY MEDICINE

## 2020-01-08 PROCEDURE — 77010033678 HC OXYGEN DAILY

## 2020-01-08 PROCEDURE — 85025 COMPLETE CBC W/AUTO DIFF WBC: CPT

## 2020-01-08 PROCEDURE — 74011250636 HC RX REV CODE- 250/636: Performed by: NURSE PRACTITIONER

## 2020-01-08 PROCEDURE — 65270000029 HC RM PRIVATE

## 2020-01-08 PROCEDURE — 83735 ASSAY OF MAGNESIUM: CPT

## 2020-01-08 PROCEDURE — 80048 BASIC METABOLIC PNL TOTAL CA: CPT

## 2020-01-08 PROCEDURE — 36415 COLL VENOUS BLD VENIPUNCTURE: CPT

## 2020-01-08 PROCEDURE — 74011250636 HC RX REV CODE- 250/636: Performed by: INTERNAL MEDICINE

## 2020-01-08 PROCEDURE — C9113 INJ PANTOPRAZOLE SODIUM, VIA: HCPCS | Performed by: FAMILY MEDICINE

## 2020-01-08 PROCEDURE — 74011250637 HC RX REV CODE- 250/637: Performed by: NURSE PRACTITIONER

## 2020-01-08 PROCEDURE — 74011250636 HC RX REV CODE- 250/636: Performed by: FAMILY MEDICINE

## 2020-01-08 PROCEDURE — 74011000258 HC RX REV CODE- 258: Performed by: INTERNAL MEDICINE

## 2020-01-08 PROCEDURE — 74011250637 HC RX REV CODE- 250/637: Performed by: INTERNAL MEDICINE

## 2020-01-08 RX ORDER — MAGNESIUM SULFATE HEPTAHYDRATE 40 MG/ML
2 INJECTION, SOLUTION INTRAVENOUS ONCE
Status: COMPLETED | OUTPATIENT
Start: 2020-01-08 | End: 2020-01-08

## 2020-01-08 RX ADMIN — ASPIRIN 81 MG 81 MG: 81 TABLET ORAL at 09:01

## 2020-01-08 RX ADMIN — GABAPENTIN 100 MG: 100 CAPSULE ORAL at 14:15

## 2020-01-08 RX ADMIN — FOLIC ACID 1 MG: 1 TABLET ORAL at 09:01

## 2020-01-08 RX ADMIN — FUROSEMIDE 20 MG: 10 INJECTION, SOLUTION INTRAMUSCULAR; INTRAVENOUS at 09:02

## 2020-01-08 RX ADMIN — TAMSULOSIN HYDROCHLORIDE 0.4 MG: 0.4 CAPSULE ORAL at 09:01

## 2020-01-08 RX ADMIN — Medication 1 AMPULE: at 09:00

## 2020-01-08 RX ADMIN — PIPERACILLIN AND TAZOBACTAM 3.38 G: 3; .375 INJECTION, POWDER, LYOPHILIZED, FOR SOLUTION INTRAVENOUS at 04:43

## 2020-01-08 RX ADMIN — ACETAMINOPHEN 650 MG: 325 TABLET ORAL at 22:15

## 2020-01-08 RX ADMIN — PIPERACILLIN AND TAZOBACTAM 3.38 G: 3; .375 INJECTION, POWDER, LYOPHILIZED, FOR SOLUTION INTRAVENOUS at 14:15

## 2020-01-08 RX ADMIN — THERA TABS 1 TABLET: TAB at 09:02

## 2020-01-08 RX ADMIN — Medication 100 MG: at 09:01

## 2020-01-08 RX ADMIN — COLLAGENASE SANTYL: 250 OINTMENT TOPICAL at 09:05

## 2020-01-08 RX ADMIN — MAGNESIUM SULFATE HEPTAHYDRATE 2 G: 40 INJECTION, SOLUTION INTRAVENOUS at 09:01

## 2020-01-08 RX ADMIN — Medication 10 ML: at 14:16

## 2020-01-08 RX ADMIN — POTASSIUM CHLORIDE 20 MEQ: 20 TABLET, EXTENDED RELEASE ORAL at 09:01

## 2020-01-08 RX ADMIN — ACETAMINOPHEN 650 MG: 325 TABLET ORAL at 09:01

## 2020-01-08 RX ADMIN — GABAPENTIN 100 MG: 100 CAPSULE ORAL at 09:01

## 2020-01-08 RX ADMIN — PIPERACILLIN AND TAZOBACTAM 3.38 G: 3; .375 INJECTION, POWDER, LYOPHILIZED, FOR SOLUTION INTRAVENOUS at 21:20

## 2020-01-08 RX ADMIN — PRAVASTATIN SODIUM 40 MG: 40 TABLET ORAL at 21:20

## 2020-01-08 RX ADMIN — SODIUM CHLORIDE 40 MG: 9 INJECTION INTRAMUSCULAR; INTRAVENOUS; SUBCUTANEOUS at 21:20

## 2020-01-08 RX ADMIN — SODIUM CHLORIDE 40 MG: 9 INJECTION INTRAMUSCULAR; INTRAVENOUS; SUBCUTANEOUS at 09:01

## 2020-01-08 RX ADMIN — GABAPENTIN 300 MG: 300 CAPSULE ORAL at 21:20

## 2020-01-08 NOTE — PROGRESS NOTES
Occupational Therapy Note:    Chart reviewed. Attempted to see pt for OT session. Pt with central line pulled, laying flat for 30 mins and then transferring to new room. Will defer at this time and continue to follow.     Dyanna Angelucci, OTR/L

## 2020-01-08 NOTE — PROGRESS NOTES
Patient is on the surgical schedule for second toe amputation right foot. This will be at 12:45 tomorrow afternoon January 9, 2020. He will be n.p.o. starting at midnight.

## 2020-01-08 NOTE — PROGRESS NOTES
Visit was initiated by Zack Linares as a routine follow-up to Mr. Lavonne Mhaaraj. After introducing myself to Mr Lavonne Maharaj, he immediately expressed that he was uncomfortable and that he needed the attention of his nurse. However he was very appreciative of the visit yet he needed his nurses attention. I asked him if there was anything else I could do for him and he mentioned to just pray for him. I assured him that I would do that I would definitely find someone to see to his immediate need. [address] [facility] [address].   [facility] service [phone] ([phone])

## 2020-01-08 NOTE — PROGRESS NOTES
Attempted to see pt for PT tx. Pt with central line pulled and laying flat for 30 mins and then transferred from PCU to new room 2182. Will continue to follow.

## 2020-01-08 NOTE — PROGRESS NOTES
General Surgery End of Shift Nursing Note    Bedside shift change report given to  (oncoming nurse) by Jose Shay (offgoing nurse). Report included the following information SBAR, Kardex and MAR. Shift worked:   4p-7p   Summary of shift:   pt having multiple incontinent episodes of urine, groin wound dressings getting soaked each time. Writer is leaving bilat groin wound with staples open to air. Issues for physician to address:   none     Number times ambulated in hallway past shift: 0    Number of times OOB to chair past shift: 0    Pain Management:  Current medication: see mar  Patient states pain is manageable on current pain medication: YES    GI:    Current diet:  DIET CARDIAC Regular  DIET ONE TIME MESSAGE  DIET SNACKS HS Snack  DIET NUTRITIONAL SUPPLEMENTS No; Lunch; Ensure Enlive  DIET ONE TIME MESSAGE    Tolerating current diet: YES  Passing flatus: YES  Last Bowel Movement: yesterday   Appearance: brown      Respiratory:    Incentive Spirometer at bedside: YES  Patient instructed on use: YES    Patient Safety:    Falls Score: 4  Bed Alarm On? No  Sitter?  No    Rhiannon Ferguson

## 2020-01-08 NOTE — PROGRESS NOTES
Hospitalist Progress Note    NAME: Altaf Champagne   :  [birth year]   MRN:  [id number]     I reviewed pertinent labs/imaging and discussed plan of care with Dr. Edna Todd who is in agreement. Assessment / Plan:  Shock, multifactorial (sepsis/acute blood loss) resolved  UTI  Urine culture 20:  Citrobacter braakii, Enterococcus Faecalis Group D.  - No blood cultures were sent. - Remains hemodynamically stable. - Continue Zosyn 3.375 mg IV q8h.  - Check procalcitonin in a.m. Hypomagnesemia, improved  - Supplement today. - Monitor. GIB, resolved  Diverticulosis  Acute blood loss anemia  Diarrhea, resolved  CT abdomen/pelvis 20:  1. No acute GI bleeding site demonstrated. 2. Colonic diverticulosis. 3. Moderate bilateral pleural effusions and adjacent atelectasis. 4. Recent right axillary femoral and cross femoral bypass. Anasarca. 5. No acute findings in the abdomen or pelvis. 6. Possible cholelithiasis. EGD 20:  Normal upper endoscopy. Enteric panel 20:  Negative. - Patient received total of 2 units PRBCs.  - H/H remains stable. - No diarrhea today. - Continue pantoprazole 40 mg IV q12h. - Continue to hold DVT chemoprophylaxis. PVD (POA)  Peripheral neuropathy  - S/P Axillo-Bifemoral bypass on 20.  - Podiatry planning to amputate right second toe.  - Continue asa 81 mg po daily. - Continue gabapentin 100 mg po bid and 300 mg po at hs. Anasarca  Pulmonary edema  Pleural effusions  Echo 20:  · Normal cavity size and systolic function (ejection fraction normal). Increased wall thickness. Estimated left ventricular ejection fraction is 50 - 55%. Left ventricular diastolic dysfunction. · Trace mitral valve regurgitation is present. · Mild tricuspid valve regurgitation is present. · Small-to-moderate pericardial effusion. There is left pleural effusion.  - Continue furosemide 20 mg IV daily.     - Continue scheduled KCl with daily dose of furosemide. EtOH abuse  EtOH withdrawal with DTs, resolved  Tobacco abuse  - Patient has been inpatient for several days without S/S of EtOH withdrawal.    - Continue MVI 1 po daily. - Continue folic acid 1 mg po daily. - Continue thiamine 100 mg po daily. - Continue nicotine patch. HTN  Dyslipidemia  - Continues to have adequqate BP off antihypertensive meds. - Continue pravastatin 40 mg po daily.    - Add home antihypertensives prn. COPD without exacerbation   - No tx indicated at this time. BPH  Urinary retention  - No further urinary retention.  - Continue tamsulosin 0.4 mg po daily. Misc  - PIV access obtained, will d/c RIJ central line to minimize risk of infection.  - Transfer to medical floor. 18.5 - 24.9 Normal weight / Body mass index is 23.08 kg/m². Code status: DNR  Prophylaxis: H2B/PPI and DVT prophylaxis on hold 2/2 ABL anemia  Recommended Disposition: Home w/Family     Subjective:     Chief Complaint / Reason for Physician Visit  No complaints currently. Plan of care reviewed with bedside RN. Review of Systems:  Symptom Y/N Comments  Symptom Y/N Comments   Fever/Chills N   Chest Pain N    Poor Appetite N   Edema Y    Cough N   Abdominal Pain N    Sputum N   Joint Pain N    SOB/MCFARLAND N   Pruritis/Rash N    Nausea/vomit N   Tolerating PT/OT     Diarrhea N   Tolerating Diet Y    Constipation N   Other       Could NOT obtain due to:      Objective:     VITALS:   Last 24hrs VS reviewed since prior progress note.  Most recent are:  Patient Vitals for the past 24 hrs:   Temp Pulse Resp BP SpO2   01/08/20 0730 98 °F (36.7 °C) 78 18 138/63 95 %   01/08/20 0711  78 18 138/63 95 %   01/08/20 0442 97.7 °F (36.5 °C) 79 18 130/59 97 %   01/07/20 2357 98.2 °F (36.8 °C) 90 20 121/53 97 %   01/07/20 2322 98.2 °F (36.8 °C) 90 21 (!) 146/117 97 %   01/1944 97.3 °F (36.3 °C) 91 21 128/68 98 %   01/07/20 1557 97.5 °F (36.4 °C) 84 18 128/63 98 %   01/07/20 1556    128/63    01/07/20 1111 97.4 °F (36.3 °C) 88 18 132/60 99 %       Intake/Output Summary (Last 24 hours) at 1/8/2020 1013  Last data filed at 1/8/2020 0443  Gross per 24 hour   Intake 440 ml   Output    Net 440 ml        PHYSICAL EXAM:  General:  A/A/O X 3. NAD. HEENT:  Normocephalic. Sclera anicteric. EOMI. Mucous membranes moist.    Chest:  Resps even/unlabored with symmetrical CWE. Air entry diminished at bases curt. Lungs CTA. No use of accessory muscles. CV:  RRR. Anasarca with ongoing scrotal and penile edema. GI:  Abdomen soft/NT/ND. ABT X 4.    Neurologic:  Nonfocal.  CN II-XII grossly intact. Speech normal.     Psych:  Cooperative. No anxiety or agitation. Skin:  RIJ intact without erythema/exudate/edema. Left TMA noted well healed. Right toes/foot dressed. No rashes or jaundice. Reviewed most current lab test results and cultures  YES  Reviewed most current radiology test results   YES  Review and summation of old records today    NO  Reviewed patient's current orders and MAR    YES  PMH/ reviewed - no change compared to H&P  ________________________________________________________________________  Care Plan discussed with:    Comments   Patient [address] [place] [address]     Consultant                        Multidiciplinary team rounds were held today with , nursing, pharmacist and clinical coordinator. Patient's plan of care was discussed; medications were reviewed and discharge planning was addressed. ___________________________________________________________________  Janel Rosa NP     Procedures: see electronic medical records for all procedures/Xrays and details which were not copied into this note but were reviewed prior to creation of Plan. LABS:  I reviewed today's most current labs and imaging studies.   Pertinent labs include:  Recent Labs     01/08/20  0441 01/07/20  0347 01/06/20  0411   WBC 8.5 8.2 10.0   HGB 9.7* 9.9* 9.7*   HCT 29.7* 30.8* 28.9*    368 351     Recent Labs     01/08/20  0441 01/07/20 0347 01/06/20 0411    141 138   K 3.7 3.9 3.3*    110* 107   CO2 25 26 26   GLU 70 76 104*   BUN 9 11 11   CREA 0.53* 0.63* 0.56*   CA 7.5* 7.5* 7.1*   MG 1.6 1.5*  --    ALB  --  1.5* 1.4*   TBILI  --  1.0 1.0   SGOT  --  33 21   ALT  --  25 13       Signed: Tiffanie Maharaj NP

## 2020-01-08 NOTE — PROGRESS NOTES
5655 Deana Yu removed. Pt tolerated well. Currently laying flat for 30mins. Will call report to [phone].

## 2020-01-08 NOTE — PROGRESS NOTES
F/U for melena, blood loss anemia    S: Mr. Aline Edgar was seen by me today during rounds. At this time, he is resting +comfortably. The patient has no new complaints today, no further diarrhea at this time. No black or bloody stools. Denies any abdominal pain, tolerating diet well. Hgb stable at 9.7 this AM.    O: Blood pressure (!) 110/91, pulse 91, temperature 97.2 °F (36.2 °C), resp. rate 22, height 5' 6\" (1.676 m), weight 64.9 kg (143 lb), SpO2 96 %. Gen: Patient is in no acute distress. There is no jaundice. Lungs: Clear to auscultation bilaterally . Heart:+RRR. Abd: Soft, non tender, non-distended, normal BS Extremities: Warm, multiple amputated digits   Cross sectional imaging:  None new  Lab Results   Component Value Date/Time    WBC 8.5 01/08/2020 04:41 AM    HGB 9.7 (L) 01/08/2020 04:41 AM    HCT 29.7 (L) 01/08/2020 04:41 AM    PLATELET 039 51/71/0877 04:41 AM    .4 (H) 01/08/2020 04:41 AM     Lab Results   Component Value Date/Time    Sodium 139 01/08/2020 04:41 AM    Potassium 3.7 01/08/2020 04:41 AM    Chloride 108 01/08/2020 04:41 AM    CO2 25 01/08/2020 04:41 AM    Anion gap 6 01/08/2020 04:41 AM    Glucose 70 01/08/2020 04:41 AM    BUN 9 01/08/2020 04:41 AM    Creatinine 0.53 (L) 01/08/2020 04:41 AM    BUN/Creatinine ratio 17 01/08/2020 04:41 AM    GFR est AA >60 01/08/2020 04:41 AM    GFR est non-AA >60 01/08/2020 04:41 AM    Calcium 7.5 (L) 01/08/2020 04:41 AM    Bilirubin, total 1.0 01/07/2020 03:47 AM    AST (SGOT) 33 01/07/2020 03:47 AM    Alk.  phosphatase 90 01/07/2020 03:47 AM    Protein, total 4.8 (L) 01/07/2020 03:47 AM    Albumin 1.5 (L) 01/07/2020 03:47 AM    Globulin 3.3 01/07/2020 03:47 AM    A-G Ratio 0.5 (L) 01/07/2020 03:47 AM    ALT (SGPT) 25 01/07/2020 03:47 AM     Lab Results   Component Value Date/Time    INR 1.1 08/07/2018 01:07 PM    INR 1.1 10/14/2016 10:37 AM    Prothrombin time 10.3 08/07/2018 01:07 PM    Prothrombin time 11.6 (H) 10/14/2016 10:37 AM         A: Principal Problem:    Alcohol withdrawal ([facility] (12/22/2019)    Active Problems:    Alcoholism ([facility] (1/8/2014)      COPD (chronic obstructive pulmonary disease) ([facility] (1/8/2014)      PVD (peripheral vascular disease) ([facility] (12/16/2019)      Cystitis (12/16/2019)      Encounter for rehabilitation (12/20/2019)        Comment: No further diarrhea, hgb stable  P:  No further s/sx of bleeding. Continue diet as tolerated. No further GI intervention while here. Continue to monitor hgb while admitted. Reviewed need for colonoscopy after d/c and patient in agreement.      YUSUF Lea  1:05 PM  1/8/2020

## 2020-01-08 NOTE — CONSULTS
Patient seen at bedside, communicative, no distress. Has had transmetatarsal amputation left foot. Recent development necrotic tissue and ulcerative lesion second digit right foot. Patient was be stable along the ankle and rear foot right side. Alcohol recovery/withdrawal, tobacco use. Patient is a vascular patient with significant decrease in ABIs in the past history. Asked to consult for potential amputation second digit right foot. I explained the situation to the patient, he understands, he understands the risks and possible consultation, as well as the risks of not having the procedure performed right foot. This will most likely be early morning Thursday, due to scheduling with add-on case is seen today Wednesday, January 8, 2020, I will again check the surgical schedule. He has significant for alcoholism, COPD, DM, PAFib, HTN, HLD, and GERD. He continues to smoke daily. Albumin 1.4, WBC= 10. Past Medical History:  Diagnosis Date   Abuse      alcoholism, quit 2012   Claudication of calf muscles [facility]) 2013   Colovesical fistula      Dr Smart Edmunds Esophageal stricture     Esophagitis     GI bleed 10/2016   Hemochromatosis     Hyperlipidemia     Hypertension     Peripheral artery disease (HCC)      Dr. Lacy Bragg Pulmonary nodule      right lung     Past Surgical History:  Procedure Laterality Date   COLONOSCOPY N/A 9/14/2016    COLONOSCOPY performed by Ivy Bobby MD at \[facility]\"" ENDOSCOPY   COLONOSCOPY N/A 12/19/2016    COLONOSCOPY performed by Ivy Bobby MD at \[facility]\"" ENDOSCOPY   HX AMPUTATION Left 07/2016    left 4th toe from gangrene   HX ENDOSCOPY   10/2016   HX OTHER SURGICAL        left partial foot amputation        Ulcerative lesion medial second digit right foot 1 cm x 1 cm with surrounding erythema. No proximal ascending cellulitis into the metatarsal region. He continues to be seen by vascular after bypass procedure early in the year. , no significant induration along the lymphatic channels ankle right side. Patient is insensate to monofilament, as well as vibratory sensation. Infection with suspected osteomyelitis second digit right foot. Recommending amputation second digit right foot, then following again with wound care nursing. I am checking  the surgical schedule, most likely will be tomorrow a.m. depending on availability.

## 2020-01-09 ENCOUNTER — ANESTHESIA (OUTPATIENT)
Dept: SURGERY | Age: 75
DRG: 981 | End: 2020-01-09
Payer: MEDICARE

## 2020-01-09 ENCOUNTER — ANESTHESIA EVENT (OUTPATIENT)
Dept: SURGERY | Age: 75
DRG: 981 | End: 2020-01-09
Payer: MEDICARE

## 2020-01-09 LAB
ANION GAP SERPL CALC-SCNC: 6 MMOL/L (ref 5–15)
BASOPHILS # BLD: 0 K/UL (ref 0–0.1)
BASOPHILS NFR BLD: 0 % (ref 0–1)
BUN SERPL-MCNC: 8 MG/DL (ref 6–20)
BUN/CREAT SERPL: 12 (ref 12–20)
CALCIUM SERPL-MCNC: 7.6 MG/DL (ref 8.5–10.1)
CHLORIDE SERPL-SCNC: 106 MMOL/L (ref 97–108)
CO2 SERPL-SCNC: 25 MMOL/L (ref 21–32)
CREAT SERPL-MCNC: 0.67 MG/DL (ref 0.7–1.3)
DIFFERENTIAL METHOD BLD: ABNORMAL
EOSINOPHIL # BLD: 1.4 K/UL (ref 0–0.4)
EOSINOPHIL NFR BLD: 8 % (ref 0–7)
ERYTHROCYTE [DISTWIDTH] IN BLOOD BY AUTOMATED COUNT: 16.4 % (ref 11.5–14.5)
GLUCOSE SERPL-MCNC: 80 MG/DL (ref 65–100)
H PYLORI AG STL QL IA: NEGATIVE
HCT VFR BLD AUTO: 39.9 % (ref 36.6–50.3)
HGB BLD-MCNC: 12.8 G/DL (ref 12.1–17)
IMM GRANULOCYTES # BLD AUTO: 0 K/UL (ref 0–0.04)
IMM GRANULOCYTES NFR BLD AUTO: 0 % (ref 0–0.5)
LACTATE SERPL-SCNC: 1.7 MMOL/L (ref 0.4–2)
LYMPHOCYTES # BLD: 1.2 K/UL (ref 0.8–3.5)
LYMPHOCYTES NFR BLD: 7 % (ref 12–49)
MAGNESIUM SERPL-MCNC: 1.7 MG/DL (ref 1.6–2.4)
MCH RBC QN AUTO: 33.4 PG (ref 26–34)
MCHC RBC AUTO-ENTMCNC: 32.1 G/DL (ref 30–36.5)
MCV RBC AUTO: 104.2 FL (ref 80–99)
MONOCYTES # BLD: 0.7 K/UL (ref 0–1)
MONOCYTES NFR BLD: 4 % (ref 5–13)
NEUTS BAND NFR BLD MANUAL: 1 %
NEUTS SEG # BLD: 14 K/UL (ref 1.8–8)
NEUTS SEG NFR BLD: 80 % (ref 32–75)
NRBC # BLD: 0 K/UL (ref 0–0.01)
NRBC BLD-RTO: 0 PER 100 WBC
O+P SPEC MICRO: NORMAL
O+P STL CONC: NORMAL
PLATELET # BLD AUTO: 349 K/UL (ref 150–400)
POTASSIUM SERPL-SCNC: 3.6 MMOL/L (ref 3.5–5.1)
PROCALCITONIN SERPL-MCNC: 11.44 NG/ML
RBC # BLD AUTO: 3.83 M/UL (ref 4.1–5.7)
RBC MORPH BLD: ABNORMAL
SODIUM SERPL-SCNC: 137 MMOL/L (ref 136–145)
SPECIMEN SOURCE: NORMAL
SPECIMEN SOURCE: NORMAL
WBC # BLD AUTO: 17.3 K/UL (ref 4.1–11.1)

## 2020-01-09 PROCEDURE — C9113 INJ PANTOPRAZOLE SODIUM, VIA: HCPCS | Performed by: FAMILY MEDICINE

## 2020-01-09 PROCEDURE — 83735 ASSAY OF MAGNESIUM: CPT

## 2020-01-09 PROCEDURE — 87186 SC STD MICRODIL/AGAR DIL: CPT

## 2020-01-09 PROCEDURE — 85025 COMPLETE CBC W/AUTO DIFF WBC: CPT

## 2020-01-09 PROCEDURE — 74011250636 HC RX REV CODE- 250/636: Performed by: PODIATRIST

## 2020-01-09 PROCEDURE — 74011250636 HC RX REV CODE- 250/636: Performed by: FAMILY MEDICINE

## 2020-01-09 PROCEDURE — 77030040922 HC BLNKT HYPOTHRM STRY -A

## 2020-01-09 PROCEDURE — 77030036687 HC SHOE PSTOP S2SG -A

## 2020-01-09 PROCEDURE — 74011250636 HC RX REV CODE- 250/636: Performed by: NURSE ANESTHETIST, CERTIFIED REGISTERED

## 2020-01-09 PROCEDURE — 74011000250 HC RX REV CODE- 250: Performed by: FAMILY MEDICINE

## 2020-01-09 PROCEDURE — 74011000258 HC RX REV CODE- 258: Performed by: INTERNAL MEDICINE

## 2020-01-09 PROCEDURE — 88311 DECALCIFY TISSUE: CPT

## 2020-01-09 PROCEDURE — 77030018836 HC SOL IRR NACL ICUM -A: Performed by: PODIATRIST

## 2020-01-09 PROCEDURE — 36415 COLL VENOUS BLD VENIPUNCTURE: CPT

## 2020-01-09 PROCEDURE — 74011250637 HC RX REV CODE- 250/637: Performed by: SURGERY

## 2020-01-09 PROCEDURE — 65270000029 HC RM PRIVATE

## 2020-01-09 PROCEDURE — 88305 TISSUE EXAM BY PATHOLOGIST: CPT

## 2020-01-09 PROCEDURE — 87077 CULTURE AEROBIC IDENTIFY: CPT

## 2020-01-09 PROCEDURE — 76010000138 HC OR TIME 0.5 TO 1 HR: Performed by: PODIATRIST

## 2020-01-09 PROCEDURE — 74011000250 HC RX REV CODE- 250: Performed by: PODIATRIST

## 2020-01-09 PROCEDURE — 83605 ASSAY OF LACTIC ACID: CPT

## 2020-01-09 PROCEDURE — 87075 CULTR BACTERIA EXCEPT BLOOD: CPT

## 2020-01-09 PROCEDURE — 0Y6R0Z0 DETACHMENT AT RIGHT 2ND TOE, COMPLETE, OPEN APPROACH: ICD-10-PCS | Performed by: PODIATRIST

## 2020-01-09 PROCEDURE — 74011250637 HC RX REV CODE- 250/637: Performed by: PODIATRIST

## 2020-01-09 PROCEDURE — 87205 SMEAR GRAM STAIN: CPT

## 2020-01-09 PROCEDURE — 76060000032 HC ANESTHESIA 0.5 TO 1 HR: Performed by: PODIATRIST

## 2020-01-09 PROCEDURE — 76210000006 HC OR PH I REC 0.5 TO 1 HR: Performed by: PODIATRIST

## 2020-01-09 PROCEDURE — 94760 N-INVAS EAR/PLS OXIMETRY 1: CPT

## 2020-01-09 PROCEDURE — 80048 BASIC METABOLIC PNL TOTAL CA: CPT

## 2020-01-09 PROCEDURE — 77030011640 HC PAD GRND REM COVD -A: Performed by: PODIATRIST

## 2020-01-09 PROCEDURE — 87176 TISSUE HOMOGENIZATION CULTR: CPT

## 2020-01-09 PROCEDURE — 84145 PROCALCITONIN (PCT): CPT

## 2020-01-09 PROCEDURE — 74011250636 HC RX REV CODE- 250/636: Performed by: INTERNAL MEDICINE

## 2020-01-09 PROCEDURE — 77010033678 HC OXYGEN DAILY

## 2020-01-09 PROCEDURE — 77030002916 HC SUT ETHLN J&J -A: Performed by: PODIATRIST

## 2020-01-09 RX ORDER — HYDROMORPHONE HYDROCHLORIDE 1 MG/ML
0.5 INJECTION, SOLUTION INTRAMUSCULAR; INTRAVENOUS; SUBCUTANEOUS
Status: DISCONTINUED | OUTPATIENT
Start: 2020-01-09 | End: 2020-01-09 | Stop reason: HOSPADM

## 2020-01-09 RX ORDER — FENTANYL CITRATE 50 UG/ML
25 INJECTION, SOLUTION INTRAMUSCULAR; INTRAVENOUS
Status: DISCONTINUED | OUTPATIENT
Start: 2020-01-09 | End: 2020-01-09 | Stop reason: HOSPADM

## 2020-01-09 RX ORDER — SODIUM CHLORIDE, SODIUM LACTATE, POTASSIUM CHLORIDE, CALCIUM CHLORIDE 600; 310; 30; 20 MG/100ML; MG/100ML; MG/100ML; MG/100ML
INJECTION, SOLUTION INTRAVENOUS
Status: DISCONTINUED | OUTPATIENT
Start: 2020-01-09 | End: 2020-01-09 | Stop reason: HOSPADM

## 2020-01-09 RX ORDER — MIDAZOLAM HYDROCHLORIDE 1 MG/ML
1 INJECTION, SOLUTION INTRAMUSCULAR; INTRAVENOUS AS NEEDED
Status: DISCONTINUED | OUTPATIENT
Start: 2020-01-09 | End: 2020-01-09 | Stop reason: HOSPADM

## 2020-01-09 RX ORDER — DIPHENHYDRAMINE HYDROCHLORIDE 50 MG/ML
12.5 INJECTION, SOLUTION INTRAMUSCULAR; INTRAVENOUS AS NEEDED
Status: DISCONTINUED | OUTPATIENT
Start: 2020-01-09 | End: 2020-01-09 | Stop reason: HOSPADM

## 2020-01-09 RX ORDER — MIDAZOLAM HYDROCHLORIDE 1 MG/ML
0.5 INJECTION, SOLUTION INTRAMUSCULAR; INTRAVENOUS
Status: DISCONTINUED | OUTPATIENT
Start: 2020-01-09 | End: 2020-01-09 | Stop reason: HOSPADM

## 2020-01-09 RX ORDER — FENTANYL CITRATE 50 UG/ML
INJECTION, SOLUTION INTRAMUSCULAR; INTRAVENOUS AS NEEDED
Status: DISCONTINUED | OUTPATIENT
Start: 2020-01-09 | End: 2020-01-09 | Stop reason: HOSPADM

## 2020-01-09 RX ORDER — SODIUM CHLORIDE 9 MG/ML
INJECTION, SOLUTION INTRAVENOUS
Status: COMPLETED | OUTPATIENT
Start: 2020-01-09 | End: 2020-01-09

## 2020-01-09 RX ORDER — FENTANYL CITRATE 50 UG/ML
50 INJECTION, SOLUTION INTRAMUSCULAR; INTRAVENOUS AS NEEDED
Status: DISCONTINUED | OUTPATIENT
Start: 2020-01-09 | End: 2020-01-09 | Stop reason: HOSPADM

## 2020-01-09 RX ORDER — LIDOCAINE HYDROCHLORIDE 10 MG/ML
0.1 INJECTION, SOLUTION EPIDURAL; INFILTRATION; INTRACAUDAL; PERINEURAL AS NEEDED
Status: DISCONTINUED | OUTPATIENT
Start: 2020-01-09 | End: 2020-01-09 | Stop reason: HOSPADM

## 2020-01-09 RX ORDER — ONDANSETRON 2 MG/ML
4 INJECTION INTRAMUSCULAR; INTRAVENOUS AS NEEDED
Status: DISCONTINUED | OUTPATIENT
Start: 2020-01-09 | End: 2020-01-09 | Stop reason: HOSPADM

## 2020-01-09 RX ORDER — BUPIVACAINE HYDROCHLORIDE 5 MG/ML
INJECTION, SOLUTION EPIDURAL; INTRACAUDAL AS NEEDED
Status: DISCONTINUED | OUTPATIENT
Start: 2020-01-09 | End: 2020-01-09 | Stop reason: HOSPADM

## 2020-01-09 RX ORDER — PROPOFOL 10 MG/ML
INJECTION, EMULSION INTRAVENOUS
Status: DISCONTINUED | OUTPATIENT
Start: 2020-01-09 | End: 2020-01-09 | Stop reason: HOSPADM

## 2020-01-09 RX ORDER — PHENYLEPHRINE HCL IN 0.9% NACL 0.4MG/10ML
SYRINGE (ML) INTRAVENOUS AS NEEDED
Status: DISCONTINUED | OUTPATIENT
Start: 2020-01-09 | End: 2020-01-09 | Stop reason: HOSPADM

## 2020-01-09 RX ORDER — MIDAZOLAM HYDROCHLORIDE 1 MG/ML
INJECTION, SOLUTION INTRAMUSCULAR; INTRAVENOUS AS NEEDED
Status: DISCONTINUED | OUTPATIENT
Start: 2020-01-09 | End: 2020-01-09 | Stop reason: HOSPADM

## 2020-01-09 RX ORDER — HYDROCODONE BITARTRATE AND ACETAMINOPHEN 5; 325 MG/1; MG/1
1 TABLET ORAL AS NEEDED
Status: DISCONTINUED | OUTPATIENT
Start: 2020-01-09 | End: 2020-01-09 | Stop reason: HOSPADM

## 2020-01-09 RX ADMIN — Medication 200 MCG: at 13:37

## 2020-01-09 RX ADMIN — Medication 10 ML: at 00:20

## 2020-01-09 RX ADMIN — PRAVASTATIN SODIUM 40 MG: 40 TABLET ORAL at 21:51

## 2020-01-09 RX ADMIN — SODIUM CHLORIDE 40 MG: 9 INJECTION INTRAMUSCULAR; INTRAVENOUS; SUBCUTANEOUS at 21:51

## 2020-01-09 RX ADMIN — FENTANYL CITRATE 50 MCG: 50 INJECTION, SOLUTION INTRAMUSCULAR; INTRAVENOUS at 13:01

## 2020-01-09 RX ADMIN — Medication 10 ML: at 22:05

## 2020-01-09 RX ADMIN — ACETAMINOPHEN 650 MG: 325 TABLET ORAL at 22:04

## 2020-01-09 RX ADMIN — PIPERACILLIN AND TAZOBACTAM 3.38 G: 3; .375 INJECTION, POWDER, LYOPHILIZED, FOR SOLUTION INTRAVENOUS at 05:12

## 2020-01-09 RX ADMIN — GABAPENTIN 100 MG: 100 CAPSULE ORAL at 15:39

## 2020-01-09 RX ADMIN — MIDAZOLAM HYDROCHLORIDE 1 MG: 1 INJECTION INTRAMUSCULAR; INTRAVENOUS at 12:57

## 2020-01-09 RX ADMIN — Medication 3 AMPULE: at 12:36

## 2020-01-09 RX ADMIN — Medication 10 ML: at 05:13

## 2020-01-09 RX ADMIN — GABAPENTIN 300 MG: 300 CAPSULE ORAL at 21:51

## 2020-01-09 RX ADMIN — Medication 10 ML: at 15:40

## 2020-01-09 RX ADMIN — PIPERACILLIN AND TAZOBACTAM 3.38 G: 3; .375 INJECTION, POWDER, LYOPHILIZED, FOR SOLUTION INTRAVENOUS at 21:50

## 2020-01-09 RX ADMIN — Medication 120 MCG: at 13:11

## 2020-01-09 RX ADMIN — PROPOFOL 50 MCG/KG/MIN: 10 INJECTION, EMULSION INTRAVENOUS at 13:00

## 2020-01-09 RX ADMIN — PIPERACILLIN AND TAZOBACTAM 3.38 G: 3; .375 INJECTION, POWDER, LYOPHILIZED, FOR SOLUTION INTRAVENOUS at 13:03

## 2020-01-09 RX ADMIN — SODIUM CHLORIDE, POTASSIUM CHLORIDE, SODIUM LACTATE AND CALCIUM CHLORIDE: 600; 310; 30; 20 INJECTION, SOLUTION INTRAVENOUS at 12:52

## 2020-01-09 NOTE — PROGRESS NOTES
Bedside shift change report given to RN (oncoming nurse) by Jhonatan Benton (offgoing nurse). Report included the following information SBAR, Kardex, Intake/Output, MAR and Recent Results. Chg bath completed. Still needs AM bath. 1 small BM overnight. Received zosyn x2. Tylenol x1.

## 2020-01-09 NOTE — PROGRESS NOTES
Hospitalist Progress Note    NAME: Amy Washington   :     MRN:  [id number]     I reviewed pertinent labs/imaging and discussed plan of care with Dr. Papo Chris who is in agreement. Assessment / Plan:  Patient continues to require hospitalization 2/2 ongoing infection, IV abx, and surgical intervention. He is at risk for deterioration in his condition. Shock, multifactorial (sepsis/acute blood loss) resolved  UTI  Urine culture 20:  Citrobacter braakii, Enterococcus Faecalis Group D.  - No blood cultures were sent initially. - Remains hemodynamically stable. - Continue Zosyn 3.375 mg IV q8h. - Procalcitonin 11.44  - Check LA.  - Check blood culture. Hypomagnesemia, resolved  - Continue to monitor. GIB, resolved  Diverticulosis  Acute blood loss anemia  Diarrhea, resolved  CT abdomen/pelvis 20:  1. No acute GI bleeding site demonstrated. 2. Colonic diverticulosis. 3. Moderate bilateral pleural effusions and adjacent atelectasis. 4. Recent right axillary femoral and cross femoral bypass. Anasarca. 5. No acute findings in the abdomen or pelvis. 6. Possible cholelithiasis. EGD 20:  Normal upper endoscopy. Enteric panel 20:  Negative. - Patient received total of 2 units PRBCs.  - H/H remains stable. - Continue pantoprazole 40 mg IV q12h. - Continue to hold DVT chemoprophylaxis. PVD (POA)  Peripheral neuropathy  - S/P Axillo-Bifemoral bypass on 20.  - S/P amputation of right second toe today (20). - Continue asa 81 mg po daily. - Continue gabapentin 100 mg po bid and 300 mg po at hs. Anasarca  Pulmonary edema  Pleural effusions  Echo 20:  · Normal cavity size and systolic function (ejection fraction normal). Increased wall thickness. Estimated left ventricular ejection fraction is 50 - 55%. Left ventricular diastolic dysfunction. · Trace mitral valve regurgitation is present.   · Mild tricuspid valve regurgitation is present. · Small-to-moderate pericardial effusion. There is left pleural effusion.  - Continue furosemide 20 mg IV daily. - Continue scheduled KCl with daily dose of furosemide. EtOH abuse  EtOH withdrawal with DTs, resolved  Tobacco abuse  - Patient has been inpatient for several days without S/S of EtOH withdrawal.    - Continue MVI 1 po daily. - Continue folic acid 1 mg po daily. - Continue thiamine 100 mg po daily. - Continue nicotine patch. HTN  Dyslipidemia  - BP elevated post-op. - Continue to monitor off antihypertensive meds. - Continue pravastatin 40 mg po daily.    - Add home antihypertensives prn. COPD without exacerbation   - No tx indicated at this time. BPH  Urinary retention  - No further urinary retention.  - Continue tamsulosin 0.4 mg po daily. 18.5 - 24.9 Normal weight / Body mass index is 23.08 kg/m². Code status: DNR  Prophylaxis: H2B/PPI and DVT prophylaxis on hold 2/2 ABL anemia  Recommended Disposition: Home w/Family     Subjective:     Chief Complaint / Reason for Physician Visit  Patient stable post-op with adequate pain control. Plan of care reviewed with bedside RN. Review of Systems:  Symptom Y/N Comments  Symptom Y/N Comments   Fever/Chills N   Chest Pain N    Poor Appetite N   Edema Y    Cough N   Abdominal Pain N    Sputum N   Joint Pain N    SOB/MCFARLAND N   Pruritis/Rash N    Nausea/vomit N   Tolerating PT/OT     Diarrhea N   Tolerating Diet Y    Constipation N   Other       Could NOT obtain due to:      Objective:     VITALS:   Last 24hrs VS reviewed since prior progress note.  Most recent are:  Patient Vitals for the past 24 hrs:   Temp Pulse Resp BP SpO2   01/09/20 1548 97.6 °F (36.4 °C) 95  158/87 100 %   01/09/20 1459 98.5 °F (36.9 °C) 88 15 152/76 98 %   01/09/20 1445  89 14 115/61 100 %   01/09/20 1443 98.5 °F (36.9 °C)       01/09/20 1430  87 14 122/65 100 %   01/09/20 1415 98.2 °F (36.8 °C) 88 15 121/63 100 %   01/09/20 1405  89 14 121/63 100 %   01/09/20 1400  88 18 126/72 100 %   01/09/20 1355  87 20 120/65 100 %   01/09/20 1350    112/60    01/09/20 1348 98.2 °F (36.8 °C) 88 15 112/60 96 %   01/09/20 1215 97.5 °F (36.4 °C) 92 18 118/70 96 %   01/09/20 1059 97.8 °F (36.6 °C) 88 17 153/81 98 %   01/09/20 0737 97.7 °F (36.5 °C) 86 16 133/70 100 %   01/09/20 0338 98.4 °F (36.9 °C) 85 16 118/72 100 %   01/09/20 0014 98.2 °F (36.8 °C) 80 17 133/57 98 %   01/08/20 2147     99 %   01/08/20 2019 97.3 °F (36.3 °C) 93 18 154/75        Intake/Output Summary (Last 24 hours) at 1/9/2020 1649  Last data filed at 1/9/2020 1343  Gross per 24 hour   Intake 420 ml   Output 5 ml   Net 415 ml        PHYSICAL EXAM:  General:  A/A/O X 3. NAD. HEENT:  Normocephalic. Sclera anicteric. EOMI. Mucous membranes moist.    Chest:  Resps even/unlabored with symmetrical CWE. Air entry diminished at bases curt. Lungs CTA. No use of accessory muscles. CV:  RRR. Anasarca with ongoing scrotal and penile edema. GI:  Abdomen soft/NT/ND. ABT X 4.    Neurologic:  Nonfocal.  CN II-XII grossly intact. Speech normal.     Psych:  Cooperative. No anxiety or agitation. Skin:  Left TMA noted well healed. Right toes/foot dressed post-operatively. No rashes or jaundice. Reviewed most current lab test results and cultures  YES  Reviewed most current radiology test results   YES  Review and summation of old records today    NO  Reviewed patient's current orders and MAR    YES  PMH/SH reviewed - no change compared to H&P  ________________________________________________________________________  Care Plan discussed with:    Comments   Patient [address]     Consultant                        Multidiciplinary team rounds were held today with , nursing, pharmacist and clinical coordinator. Patient's plan of care was discussed; medications were reviewed and discharge planning was addressed. ___________________________________________________________________  Yuliya Pollock NP     Procedures: see electronic medical records for all procedures/Xrays and details which were not copied into this note but were reviewed prior to creation of Plan. LABS:  I reviewed today's most current labs and imaging studies.   Pertinent labs include:  Recent Labs     01/08/20 0441 01/07/20 0347   WBC 8.5 8.2   HGB 9.7* 9.9*   HCT 29.7* 30.8*    368     Recent Labs     01/09/20 0317 01/08/20 0441 01/07/20 0347    139 141   K 3.6 3.7 3.9    108 110*   CO2 25 25 26   GLU 80 70 76   BUN 8 9 11   CREA 0.67* 0.53* 0.63*   CA 7.6* 7.5* 7.5*   MG 1.7 1.6 1.5*   ALB  --   --  1.5*   TBILI  --   --  1.0   SGOT  --   --  33   ALT  --   --  25       Signed: Yuliya Pollock NP

## 2020-01-09 NOTE — PROGRESS NOTES
Vascular Surgery Progress Note  Arnoldo Alvarado ACNP-BC  1/9/2020       Subjective:     Mr. Logan Smith has a pmhx significant for alcoholism, COPD, DM, PAFib, HTN, HLD, and GERD. Karl Vela continues to smoke daily. Karl Vela has a pmhx significant for LLE stenting per his report. Karl Vela is s/p TMA of the left foot (10/2016). Karl Vela presented to the clinic in November 2019 w/ compliant of BLE claudication, BLE nocturnal cramping, and a wound to the incision of the left TMA after hitting his foot.  He has ischemia of the left foot and rubor of the right foot.  His ABIs were right 0.40 w/ a flatline TBI and left 0.32 w/ a surgically absent left great toe.  He underwent an arteriogram on 11/5/2019 that revealed severe bilateral aorto iliac disease that was not amendable to endovascular intervention. Karl Vela returned to the clinic  w/ a CTA that was significant for an occluded left common iliac and left external iliac artery and occluded right external iliac artery. . A ax-bifemoral bypass was planned.  Karl Vela then presented to the emergency room on 12/16/2019 w/ complaint of BLE weakness. Karl Vela was admitted to [facility] and diagnosed w/ cystitis.  While admitted he was noted to have left lateral heel ulcer.  He was discharged to the [address] [place] [address] at [facility] on 12/20/2019 for rehabilitation.  Late that evening he developed active w/d symptoms and returned to the emergency room at [address] was then transferred to [facility] for management of alcohol withdrawal which has resolved.  His initial urine cx grew Klebsiella and he completed a course of antibx.  A follow up urine cx showed no growth. He then developed a low grade fever and leukocytosis. Repeat urine cx was + for citrobacter and enterococcus. He was noted to have urinary retention and Urology was consulted. Outpatient follow up was recommended. His hospitalization was complicated by bilateral pleural effusions w/ LLL partial collapse resulting in hypoxic respiratory failure.   On 01/02/2020 he underwent axillo-bifemoral bypass. His post procedure course was complicated by hypotension requiring vasopressor support which was weaned off on 01/05/2020. He developed a GI Bleed that was treated w/ transfusion and PPI. His EGD was unremarkable. He developed pressure ulcers to the left calf and right heel during admission despite the use of heel suspension boots. This am he is laying flat in bed. His pain is controlled. His feet are warm. Right second toe amp is planned for later today. Nursing Data:     Patient Vitals for the past 24 hrs:   BP Temp Pulse Resp SpO2   01/09/20 1215 118/70 97.5 °F (36.4 °C) 92 18 96 %   01/09/20 1059 153/81 97.8 °F (36.6 °C) 88 17 98 %   01/09/20 0737 133/70 97.7 °F (36.5 °C) 86 16 100 %   01/09/20 0338 118/72 98.4 °F (36.9 °C) 85 16 100 %   01/09/20 0014 133/57 98.2 °F (36.8 °C) 80 17 98 %   01/08/20 2147     99 %   01/08/20 2019 154/75 97.3 °F (36.3 °C) 93 18    01/08/20 1602 152/63 97.6 °F (36.4 °C) 82 17 96 %   01/08/20 1427 131/59 97.9 °F (36.6 °C) 91 18 97 %     ---------------------------------------------------------------------------------------------------------    Intake/Output Summary (Last 24 hours) at 1/9/2020 1300  Last data filed at 1/8/2020 1806  Gross per 24 hour   Intake 120 ml   Output    Net 120 ml       Exam:     Physical Exam  Constitutional:       Comments: Appears generally deconditioned and chronically ill. HENT:      Head: Normocephalic. Nose: Nose normal.      Mouth/Throat:      Mouth: Mucous membranes are moist.   Eyes:      Pupils: Pupils are equal, round, and reactive to light. Neck:      Musculoskeletal: Normal range of motion. Cardiovascular:      Rate and Rhythm: Normal rate and regular rhythm. Pulses:           Femoral pulses are 2+ on the right side and 2+ on the left side. Comments: His feet are warm and perfused.    Pulmonary:      Effort: Pulmonary effort is normal. No tachypnea, accessory muscle usage or respiratory distress. Breath sounds: No rales. Abdominal:      General: Abdomen is flat. Palpations: Abdomen is soft. Musculoskeletal: Normal range of motion. Skin:  Right heel DTI   Right second toe ulcer w/ yellow eschar has failed to improve despite revascularization  Left heel ulceration is improved  Left leg calf DTI  Left TMA incision ulceration improved. His surgical incision to the BL groins are intact. Neurological:      Mental Status: He is alert. Mental status is at baseline. Psychiatric:         Mood and Affect: Mood normal.         Behavior: Behavior normal.      Lab Review:     . Recent Results (from the past 24 hour(s))   METABOLIC PANEL, BASIC    Collection Time: 01/09/20  3:17 AM   Result Value Ref Range    Sodium 137 136 - 145 mmol/L    Potassium 3.6 3.5 - 5.1 mmol/L    Chloride 106 97 - 108 mmol/L    CO2 25 21 - 32 mmol/L    Anion gap 6 5 - 15 mmol/L    Glucose 80 65 - 100 mg/dL    BUN 8 6 - 20 MG/DL    Creatinine 0.67 (L) 0.70 - 1.30 MG/DL    BUN/Creatinine ratio 12 12 - 20      GFR est AA >60 >60 ml/min/1.73m2    GFR est non-AA >60 >60 ml/min/1.73m2    Calcium 7.6 (L) 8.5 - 10.1 MG/DL   MAGNESIUM    Collection Time: 01/09/20  3:17 AM   Result Value Ref Range    Magnesium 1.7 1.6 - 2.4 mg/dL   PROCALCITONIN    Collection Time: 01/09/20  3:17 AM   Result Value Ref Range    Procalcitonin 11.44 ng/mL          Assessment/Plan:      Consult problem  Severe bilateral PAD w/ multiple ulcerations of the BLE  -s/p axillo-bifemoral bypass on 01/02/2020.   -his right toe wound has failed to improve  Appreciate input from podiatry. Right second toe amputation planned for later today. Post procedure ncourage OOB. PT/OT daily. He continues on ASA and statin.      Active problems  Post procedural hypotension w/ hx of HTN  -weaned off vasopressor support. Antihypertensives continue to be held.     Hypoalbuminemia   Metabolic encephalopathy   -resolved @ baseline   Chronic cognitive deficit   Acute hypoxic respiratory failure   Bilateral pleural effusion w/ partial LLL collapse  ECHO 01/04/2020  · Normal cavity size and systolic function (ejection fraction normal). Increased wall thickness. Estimated left ventricular ejection fraction is 50 - 55%. Left ventricular diastolic dysfunction. · Trace mitral valve regurgitation is present. · Mild tricuspid valve regurgitation is present. · Small-to-moderate pericardial effusion. There is left pleural effusion  COPD  -not in exacerbation   Wean oxygen as tolerated     Sepsis  -resolved   Urinary retention  -patient voided twice prior to procedure and had retained urine when isabel was placed for procedure. Recurrent UTI   -initial urine cxKLEBISIELLA PNEUMONIAE ( 3 day course of Rocephin completed)  -2nd urine cx NG  -3rd urine cx CITROBACTER BRAAKII & ENTEROCOCCUS (continues on Zosyn day 4)  Voiding trial today   Outpatient f/u w/ Urology. Consider continuation of suppressive antibx until f/u w/ . Defer decision to primary team.       GI Bleed  Diverticulosis   GERD  -EGD unremarkable   Macrocytic Anemia   -H&H now stable    Thrombocytopenia  -resolved   Plan per gastroenterology       PAfib w/ RVR   -tachycardia resolved   -poor candidate for [facility] w/ hx of GIB of unknown etiology and cognitive deficit. Hyperlipidemia  -on statin   Alcohol withdrawal  -resolved   Thiamine deficiency   -on oral supplementation   Diabetes Mellitus w/ hypoglycemia and hypoalbuminemia    -on liberalized diet w/ hs snack   -suspect malnutrition of moderate degree  -possible liver dysfunction resulting in poor glycogen stores  -hypoglycemia improved   Hypernatremia   -resolved   Hypokalemia   -resolved   Hypophosphatemia   Hypomagnesemia  -resolved   Management of comorbid conditions by primary team.     VTE Prophylaxis:  SCDs: contraindicated in severe BLE PAD  Not a candidate for SQ w/ hx of GIB     Disposition:  SNF. [facility] w/ ECF following rehabilitation.      Vascular surgery will continue to follow patient intermittently during admission. We appreciate the opportunity to participate in the care of Mr. Lavonne Maharaj. Patient should f/u in 3 weeks with Dr. Robert Sexton. Please call for any urgent vascular issues.

## 2020-01-09 NOTE — ANESTHESIA PREPROCEDURE EVALUATION
Anesthetic History   No history of anesthetic complications            Review of Systems / Medical History  Patient summary reviewed, nursing notes reviewed and pertinent labs reviewed    Pulmonary    COPD      Smoker         Neuro/Psych   Within defined limits           Cardiovascular    Hypertension        Dysrhythmias : atrial fibrillation  PAD    Exercise tolerance: >4 METS     GI/Hepatic/Renal     GERD           Endo/Other  Within defined limits           Other Findings   Comments: etoh abuse hx           Physical Exam    Airway  Mallampati: II  TM Distance: 4 - 6 cm  Neck ROM: normal range of motion   Mouth opening: Normal     Cardiovascular  Regular rate and rhythm,  S1 and S2 normal,  no murmur, click, rub, or gallop             Dental    Dentition: Edentulous     Pulmonary  Breath sounds clear to auscultation               Abdominal  GI exam deferred       Other Findings            Anesthetic Plan    ASA: 3  Anesthesia type: MAC and total IV anesthesia            Anesthetic plan and risks discussed with: Patient      Propofol MAC

## 2020-01-09 NOTE — PROGRESS NOTES
Occupational Therapy  Medical record reviewed. Pt is off the floor for surgery at this time. Will defer and continue to follow as appropriate.

## 2020-01-09 NOTE — ROUTINE PROCESS
sbar in note pt to holding area. Wearing glasses sent to recovery room with the tele box. Pt alert and oriented confirms surgery site. Geraline Tara been npo except for meds  esbl precautions in effect.

## 2020-01-09 NOTE — PROGRESS NOTES
Nutrition Assessment:    RECOMMENDATIONS:   Resume diet and supplements     ASSESSMENT:   Chart reviewed, pt off the floor for surgery at time of attempted visit. Pt for toe amputation today. His appetite appears to have been good preop. Labs reviewed, WNL. BM noted today. Would resume diet (recommend regular) and supplements. Dietitians Intervention(s)/Plan(s): Resume diet and supplements, monitor PO intake  SUBJECTIVE/OBJECTIVE:   Pt off the floor for surgery  Diet Order: NPO  % Eaten:    Patient Vitals for the past 72 hrs:   % Diet Eaten   01/08/20 1806 100 %   01/06/20 1547 75 %       Pertinent Medications:folvite, lasix, protonix, zosyn, KCl, MVI, thiamin    Chemistries:  Lab Results   Component Value Date/Time    Sodium 137 01/09/2020 03:17 AM    Potassium 3.6 01/09/2020 03:17 AM    Chloride 106 01/09/2020 03:17 AM    CO2 25 01/09/2020 03:17 AM    Anion gap 6 01/09/2020 03:17 AM    Glucose 80 01/09/2020 03:17 AM    BUN 8 01/09/2020 03:17 AM    Creatinine 0.67 (L) 01/09/2020 03:17 AM    BUN/Creatinine ratio 12 01/09/2020 03:17 AM    GFR est AA >60 01/09/2020 03:17 AM    GFR est non-AA >60 01/09/2020 03:17 AM    Calcium 7.6 (L) 01/09/2020 03:17 AM    Albumin 1.5 (L) 01/07/2020 03:47 AM      Anthropometrics: Height: 5' 6\" (167.6 cm) Weight: 64.9 kg (143 lb)  [] standing scale    []bed scale    []stated   []unknown     IBW (%IBW):   ( ) UBW (%UBW):   (  %)    BMI: Body mass index is 23.08 kg/m². This BMI is indicative of:  []Underweight   [x]Normal   []Overweight   [] Obesity   [] Extreme Obesity (BMI>40)  Estimated Nutrition Needs (Based on): 1726 Kcals/day(BMR (1328) x 1. 3AF) , 78 g(1.2 g/kg bw) Protein  Carbohydrate:  At Least 130 g/day  Fluids: 1800 mL/day    Last BM: 1/9   [x]Active     []Hyperactive  []Hypoactive       [] Absent   BS  Skin:    [] Intact   [x] Incision  [x] Breakdown(partial thickness-sacrum; unstageable-L heel)   [] DTI   [] Tears/Excoriation/Abrasion  [x]Edema(+1-generalized; +1 nonpitting-BLE) [] Other: Wt Readings from Last 30 Encounters:   01/04/20 64.9 kg (143 lb)   12/21/19 61.3 kg (135 lb 1.6 oz)   12/20/19 63.1 kg (139 lb 1.8 oz)   07/31/19 59.5 kg (131 lb 3.2 oz)   07/12/19 60.5 kg (133 lb 6.4 oz)   06/03/19 62.5 kg (137 lb 12.8 oz)   05/21/19 63 kg (139 lb)   05/10/19 62.1 kg (137 lb)   09/27/18 58.2 kg (128 lb 3.2 oz)   09/23/18 57.6 kg (127 lb)   08/30/18 57.6 kg (127 lb)   08/22/18 56.7 kg (125 lb)   08/07/18 56.7 kg (125 lb)   07/20/18 56.7 kg (125 lb)   06/07/18 58.5 kg (129 lb)   05/07/18 58.9 kg (129 lb 12.8 oz)   03/12/18 60.5 kg (133 lb 6.4 oz)   02/23/18 60.7 kg (133 lb 12.8 oz)   02/09/18 59.9 kg (132 lb)   02/08/18 60.3 kg (133 lb)   01/16/18 57.5 kg (126 lb 12.8 oz)   01/02/18 59 kg (130 lb)   10/05/17 57.4 kg (126 lb 9.6 oz)   09/21/17 57.6 kg (127 lb)   03/06/17 56.9 kg (125 lb 6.4 oz)   01/31/17 58.2 kg (128 lb 6.4 oz)   01/17/17 57.3 kg (126 lb 6.4 oz)   12/19/16 53.2 kg (117 lb 4 oz)   12/15/16 55.3 kg (122 lb)   10/21/16 53.1 kg (117 lb)        Dx of Malnutrition since admission: no    NUTRITION DIAGNOSES:   Problem:  No nutritional diagnosis at this time        NUTRITION INTERVENTIONS:  Meals/Snacks: General/healthful diet   Supplements: Commercial supplement              GOAL:   Pt will resume diet and supplements, consuming >75% of meals in 3-5 days.      NUTRITION MONITORING AND EVALUATION   Previous Goal: PO intake >75% of meals/supplements next 1-3 days   Previous Goal Met: Yes   Previous Recommendations Implemented: Yes   Cultural, Baptism, or Ethnic Dietary Needs: None   LEARNING NEEDS (Diet, Food/Nutrient-Drug Interaction):    [x] None Identified   [] Identified and Education Provided/Documented   [] Identified and Pt declined/was not appropriate      [x] Interdisciplinary Care Plan Reviewed/Documented    [x] Participated in Discharge Planning: Regular diet    [] Interdisciplinary Rounds     NUTRITION RISK:    [] High              [x] Moderate [x]  Low  []  Minimal/Uncompromised      Meg Sosa RD, [address]  Pager [phone]  Weekend Pager [phone]

## 2020-01-09 NOTE — PERIOP NOTES
TRANSFER - IN REPORT:    Verbal report received from [address] Isabel Parsons RN(name) on Altaf Champagne  being received from General Surgery Room 2182(unit) for ordered procedure      Report consisted of patients Situation, Background, Assessment and   Recommendations(SBAR). Information from the following report(s) SBAR, Kardex, Intake/Output, MAR, Recent Results, Med Rec Status, Pre Procedure Checklist and Procedure Verification was reviewed with the receiving nurse. Opportunity for questions and clarification was provided. Assessment completed upon patients arrival to unit and care assumed.

## 2020-01-09 NOTE — PROGRESS NOTES
Patient tolerated the procedure well, and showed very good bleeding tissue margins proximally with no purulence right foot. Bone cultures taken and pathology submitted. Leave dressing on, clean and dry-I will change this tommorow.

## 2020-01-09 NOTE — PERIOP NOTES
Handoff Report from Operating Room to PACU    Report received from [address] [place] [address]  and Rony Alfonso CRNA regarding Mary Gudino. Surgeon(s):  Jodi Burgos DPM  And Procedure(s) (LRB):  AMPUTATION 2ND TOE RIGHT FOOT (Right)  confirmed   with allergies and dressings discussed. Anesthesia type, drugs, patient history, complications, estimated blood loss, vital signs, intake and output, and last pain medication were reviewed.

## 2020-01-09 NOTE — ANESTHESIA POSTPROCEDURE EVALUATION
Procedure(s):  AMPUTATION 2ND TOE RIGHT FOOT.    MAC, total IV anesthesia    Anesthesia Post Evaluation        Patient location during evaluation: PACU  Note status: Adequate. Level of consciousness: responsive to verbal stimuli and sleepy but conscious  Pain management: satisfactory to patient  Airway patency: patent  Anesthetic complications: no  Cardiovascular status: acceptable  Respiratory status: acceptable  Hydration status: acceptable  Comments: +Post-Anesthesia Evaluation and Assessment    Patient: Clayton Dailey MRN: [id number]  SSN: [id number]   YOB: 1945  Age: 75 y.o. Sex: male      Cardiovascular Function/Vital Signs    /63   Pulse 88   Temp 36.8 °C (98.2 °F)   Resp 15   Ht 5' 6\" (1.676 m)   Wt 64.9 kg (143 lb)   SpO2 100%   BMI 23.08 kg/m²     Patient is status post Procedure(s):  AMPUTATION 2ND TOE RIGHT FOOT. Nausea/Vomiting: Controlled. Postoperative hydration reviewed and adequate. Pain:  Pain Scale 1: Numeric (0 - 10) (01/09/20 1415)  Pain Intensity 1: 0 (01/09/20 1415)   Managed. Neurological Status:   Neuro (WDL): Exceptions to WDL (01/09/20 1348)   At baseline. Mental Status and Level of Consciousness: Arousable. Pulmonary Status:   O2 Device: Nasal cannula (01/09/20 1415)   Adequate oxygenation and airway patent. Complications related to anesthesia: None    Post-anesthesia assessment completed. No concerns. Signed By: Marlene Ahuja MD    1/9/2020  Post anesthesia nausea and vomiting:  controlled      Vitals Value Taken Time   /63 1/9/2020  2:15 PM   Temp 36.8 °C (98.2 °F) 1/9/2020  2:15 PM   Pulse 88 1/9/2020  2:26 PM   Resp 13 1/9/2020  2:26 PM   SpO2 100 % 1/9/2020  2:26 PM   Vitals shown include unvalidated device data.

## 2020-01-09 NOTE — PERIOP NOTES
TRANSFER - OUT REPORT:    Verbal report given to Darryle Ingle RN (name) on Faustina Osorio  being transferred to [facility](unit) for routine post - op       Report consisted of patients Situation, Background, Assessment and   Recommendations(SBAR). Information from the following report(s) SBAR, Kardex, OR Summary, Intake/Output and MAR was reviewed with the receiving nurse. Opportunity for questions and clarification was provided.       Patient transported with:   Monitor  O2 @ 2 liters  Registered Nurse  [facility]

## 2020-01-10 ENCOUNTER — APPOINTMENT (OUTPATIENT)
Dept: GENERAL RADIOLOGY | Age: 75
DRG: 981 | End: 2020-01-10
Attending: INTERNAL MEDICINE
Payer: MEDICARE

## 2020-01-10 ENCOUNTER — APPOINTMENT (OUTPATIENT)
Dept: GENERAL RADIOLOGY | Age: 75
DRG: 981 | End: 2020-01-10
Attending: NURSE PRACTITIONER
Payer: MEDICARE

## 2020-01-10 LAB
ANION GAP SERPL CALC-SCNC: 7 MMOL/L (ref 5–15)
BASOPHILS # BLD: 0.2 K/UL (ref 0–0.1)
BASOPHILS NFR BLD: 1 % (ref 0–1)
BUN SERPL-MCNC: 6 MG/DL (ref 6–20)
BUN/CREAT SERPL: 11 (ref 12–20)
CALCIUM SERPL-MCNC: 8 MG/DL (ref 8.5–10.1)
CHLORIDE SERPL-SCNC: 106 MMOL/L (ref 97–108)
CO2 SERPL-SCNC: 24 MMOL/L (ref 21–32)
COMMENT, HOLDF: NORMAL
CREAT SERPL-MCNC: 0.54 MG/DL (ref 0.7–1.3)
DIFFERENTIAL METHOD BLD: ABNORMAL
EOSINOPHIL # BLD: 1.8 K/UL (ref 0–0.4)
EOSINOPHIL NFR BLD: 12 % (ref 0–7)
ERYTHROCYTE [DISTWIDTH] IN BLOOD BY AUTOMATED COUNT: 16.2 % (ref 11.5–14.5)
GLUCOSE SERPL-MCNC: 97 MG/DL (ref 65–100)
HCT VFR BLD AUTO: 34.7 % (ref 36.6–50.3)
HGB BLD-MCNC: 11.7 G/DL (ref 12.1–17)
IMM GRANULOCYTES # BLD AUTO: 0.2 K/UL (ref 0–0.04)
IMM GRANULOCYTES NFR BLD AUTO: 1 % (ref 0–0.5)
LACTATE SERPL-SCNC: 2.3 MMOL/L (ref 0.4–2)
LACTATE SERPL-SCNC: 2.9 MMOL/L (ref 0.4–2)
LYMPHOCYTES # BLD: 1.1 K/UL (ref 0.8–3.5)
LYMPHOCYTES NFR BLD: 7 % (ref 12–49)
MAGNESIUM SERPL-MCNC: 1.4 MG/DL (ref 1.6–2.4)
MCH RBC QN AUTO: 34.1 PG (ref 26–34)
MCHC RBC AUTO-ENTMCNC: 33.7 G/DL (ref 30–36.5)
MCV RBC AUTO: 101.2 FL (ref 80–99)
MONOCYTES # BLD: 0.8 K/UL (ref 0–1)
MONOCYTES NFR BLD: 5 % (ref 5–13)
NEUTS SEG # BLD: 11.2 K/UL (ref 1.8–8)
NEUTS SEG NFR BLD: 74 % (ref 32–75)
NRBC # BLD: 0 K/UL (ref 0–0.01)
NRBC BLD-RTO: 0 PER 100 WBC
PLATELET # BLD AUTO: 467 K/UL (ref 150–400)
PMV BLD AUTO: 10.4 FL (ref 8.9–12.9)
POTASSIUM SERPL-SCNC: 3.5 MMOL/L (ref 3.5–5.1)
RBC # BLD AUTO: 3.43 M/UL (ref 4.1–5.7)
RBC MORPH BLD: ABNORMAL
SAMPLES BEING HELD,HOLD: NORMAL
SODIUM SERPL-SCNC: 137 MMOL/L (ref 136–145)
WBC # BLD AUTO: 15.3 K/UL (ref 4.1–11.1)

## 2020-01-10 PROCEDURE — 83605 ASSAY OF LACTIC ACID: CPT

## 2020-01-10 PROCEDURE — 65660000000 HC RM CCU STEPDOWN

## 2020-01-10 PROCEDURE — 71045 X-RAY EXAM CHEST 1 VIEW: CPT

## 2020-01-10 PROCEDURE — C9113 INJ PANTOPRAZOLE SODIUM, VIA: HCPCS | Performed by: FAMILY MEDICINE

## 2020-01-10 PROCEDURE — 97535 SELF CARE MNGMENT TRAINING: CPT | Performed by: OCCUPATIONAL THERAPIST

## 2020-01-10 PROCEDURE — 74011250637 HC RX REV CODE- 250/637: Performed by: SURGERY

## 2020-01-10 PROCEDURE — 36415 COLL VENOUS BLD VENIPUNCTURE: CPT

## 2020-01-10 PROCEDURE — 74011000258 HC RX REV CODE- 258: Performed by: INTERNAL MEDICINE

## 2020-01-10 PROCEDURE — 74011250636 HC RX REV CODE- 250/636: Performed by: INTERNAL MEDICINE

## 2020-01-10 PROCEDURE — 02HV33Z INSERTION OF INFUSION DEVICE INTO SUPERIOR VENA CAVA, PERCUTANEOUS APPROACH: ICD-10-PCS | Performed by: INTERNAL MEDICINE

## 2020-01-10 PROCEDURE — C1751 CATH, INF, PER/CENT/MIDLINE: HCPCS

## 2020-01-10 PROCEDURE — 74011250636 HC RX REV CODE- 250/636: Performed by: NURSE PRACTITIONER

## 2020-01-10 PROCEDURE — 74011250637 HC RX REV CODE- 250/637: Performed by: INTERNAL MEDICINE

## 2020-01-10 PROCEDURE — 77030018786 HC NDL GD F/USND BARD -B

## 2020-01-10 PROCEDURE — 97530 THERAPEUTIC ACTIVITIES: CPT | Performed by: OCCUPATIONAL THERAPIST

## 2020-01-10 PROCEDURE — 87040 BLOOD CULTURE FOR BACTERIA: CPT

## 2020-01-10 PROCEDURE — 77010033678 HC OXYGEN DAILY

## 2020-01-10 PROCEDURE — 83735 ASSAY OF MAGNESIUM: CPT

## 2020-01-10 PROCEDURE — 76937 US GUIDE VASCULAR ACCESS: CPT

## 2020-01-10 PROCEDURE — 74011250636 HC RX REV CODE- 250/636: Performed by: FAMILY MEDICINE

## 2020-01-10 PROCEDURE — 74011000250 HC RX REV CODE- 250: Performed by: FAMILY MEDICINE

## 2020-01-10 PROCEDURE — 97530 THERAPEUTIC ACTIVITIES: CPT

## 2020-01-10 PROCEDURE — 85025 COMPLETE CBC W/AUTO DIFF WBC: CPT

## 2020-01-10 PROCEDURE — 74011250637 HC RX REV CODE- 250/637: Performed by: NURSE PRACTITIONER

## 2020-01-10 PROCEDURE — 80048 BASIC METABOLIC PNL TOTAL CA: CPT

## 2020-01-10 PROCEDURE — 77030009526 HC GEL CARSYN MDII -A

## 2020-01-10 PROCEDURE — 36573 INSJ PICC RS&I 5 YR+: CPT | Performed by: NURSE PRACTITIONER

## 2020-01-10 RX ORDER — DIPHENHYDRAMINE HYDROCHLORIDE 50 MG/ML
25 INJECTION, SOLUTION INTRAMUSCULAR; INTRAVENOUS
Status: DISCONTINUED | OUTPATIENT
Start: 2020-01-10 | End: 2020-01-22 | Stop reason: HOSPADM

## 2020-01-10 RX ORDER — HEPARIN 100 UNIT/ML
300 SYRINGE INTRAVENOUS AS NEEDED
Status: CANCELLED | OUTPATIENT
Start: 2020-01-10

## 2020-01-10 RX ORDER — MAGNESIUM SULFATE HEPTAHYDRATE 40 MG/ML
2 INJECTION, SOLUTION INTRAVENOUS ONCE
Status: COMPLETED | OUTPATIENT
Start: 2020-01-10 | End: 2020-01-10

## 2020-01-10 RX ORDER — SODIUM CHLORIDE 0.9 % (FLUSH) 0.9 %
5-10 SYRINGE (ML) INJECTION AS NEEDED
Status: DISCONTINUED | OUTPATIENT
Start: 2020-01-10 | End: 2020-01-22 | Stop reason: HOSPADM

## 2020-01-10 RX ORDER — BACITRACIN 500 UNIT/G
1 PACKET (EA) TOPICAL AS NEEDED
Status: DISCONTINUED | OUTPATIENT
Start: 2020-01-10 | End: 2020-01-22 | Stop reason: HOSPADM

## 2020-01-10 RX ORDER — SODIUM CHLORIDE 9 MG/ML
75 INJECTION, SOLUTION INTRAVENOUS CONTINUOUS
Status: DISCONTINUED | OUTPATIENT
Start: 2020-01-10 | End: 2020-01-12

## 2020-01-10 RX ADMIN — POTASSIUM CHLORIDE 20 MEQ: 20 TABLET, EXTENDED RELEASE ORAL at 10:10

## 2020-01-10 RX ADMIN — GABAPENTIN 100 MG: 100 CAPSULE ORAL at 10:10

## 2020-01-10 RX ADMIN — ACETAMINOPHEN 650 MG: 325 TABLET ORAL at 21:36

## 2020-01-10 RX ADMIN — SODIUM CHLORIDE 500 ML: 900 INJECTION, SOLUTION INTRAVENOUS at 23:24

## 2020-01-10 RX ADMIN — PRAVASTATIN SODIUM 40 MG: 40 TABLET ORAL at 21:36

## 2020-01-10 RX ADMIN — TAMSULOSIN HYDROCHLORIDE 0.4 MG: 0.4 CAPSULE ORAL at 10:10

## 2020-01-10 RX ADMIN — SODIUM CHLORIDE 75 ML/HR: 900 INJECTION, SOLUTION INTRAVENOUS at 20:57

## 2020-01-10 RX ADMIN — ASPIRIN 81 MG 81 MG: 81 TABLET ORAL at 10:10

## 2020-01-10 RX ADMIN — Medication 100 MG: at 10:10

## 2020-01-10 RX ADMIN — MAGNESIUM SULFATE HEPTAHYDRATE 2 G: 40 INJECTION, SOLUTION INTRAVENOUS at 17:30

## 2020-01-10 RX ADMIN — SODIUM CHLORIDE 40 MG: 9 INJECTION INTRAMUSCULAR; INTRAVENOUS; SUBCUTANEOUS at 21:36

## 2020-01-10 RX ADMIN — Medication 10 ML: at 05:09

## 2020-01-10 RX ADMIN — GABAPENTIN 300 MG: 300 CAPSULE ORAL at 21:36

## 2020-01-10 RX ADMIN — VANCOMYCIN HYDROCHLORIDE 1500 MG: 10 INJECTION, POWDER, LYOPHILIZED, FOR SOLUTION INTRAVENOUS at 18:53

## 2020-01-10 RX ADMIN — Medication 10 ML: at 13:20

## 2020-01-10 RX ADMIN — PIPERACILLIN AND TAZOBACTAM 3.38 G: 3; .375 INJECTION, POWDER, LYOPHILIZED, FOR SOLUTION INTRAVENOUS at 13:15

## 2020-01-10 RX ADMIN — THERA TABS 1 TABLET: TAB at 10:10

## 2020-01-10 RX ADMIN — SODIUM CHLORIDE 500 ML: 900 INJECTION, SOLUTION INTRAVENOUS at 20:06

## 2020-01-10 RX ADMIN — FUROSEMIDE 20 MG: 10 INJECTION, SOLUTION INTRAMUSCULAR; INTRAVENOUS at 10:12

## 2020-01-10 RX ADMIN — FOLIC ACID 1 MG: 1 TABLET ORAL at 10:10

## 2020-01-10 RX ADMIN — DIPHENHYDRAMINE HYDROCHLORIDE 25 MG: 50 INJECTION, SOLUTION INTRAMUSCULAR; INTRAVENOUS at 03:43

## 2020-01-10 RX ADMIN — Medication 1 AMPULE: at 20:57

## 2020-01-10 RX ADMIN — Medication 10 ML: at 21:37

## 2020-01-10 RX ADMIN — PIPERACILLIN AND TAZOBACTAM 3.38 G: 3; .375 INJECTION, POWDER, LYOPHILIZED, FOR SOLUTION INTRAVENOUS at 05:09

## 2020-01-10 RX ADMIN — Medication 10 ML: at 10:15

## 2020-01-10 RX ADMIN — PIPERACILLIN AND TAZOBACTAM 3.38 G: 3; .375 INJECTION, POWDER, LYOPHILIZED, FOR SOLUTION INTRAVENOUS at 21:36

## 2020-01-10 RX ADMIN — SODIUM CHLORIDE 40 MG: 9 INJECTION INTRAMUSCULAR; INTRAVENOUS; SUBCUTANEOUS at 10:14

## 2020-01-10 RX ADMIN — Medication 1 AMPULE: at 10:24

## 2020-01-10 RX ADMIN — SODIUM CHLORIDE 500 ML: 900 INJECTION, SOLUTION INTRAVENOUS at 15:31

## 2020-01-10 RX ADMIN — GABAPENTIN 100 MG: 100 CAPSULE ORAL at 13:19

## 2020-01-10 NOTE — PROGRESS NOTES
Plan:  -SNF (?[address])      4:46PM  Contuing with plan of care. Podiatry following. POD#1 from toe amputation. Referrals previously sent to SNFs in the  with [address] being first choice. CM will continue to follow and assist with d/c planning.         GRAY Rocha  Care Manager

## 2020-01-10 NOTE — PROGRESS NOTES
Fluid bolus complete. Radiology notified of need for portable chest xray for placement of PICC line.

## 2020-01-10 NOTE — PROGRESS NOTES
General Surgery End of Shift Nursing Note    Bedside shift change report given to  Rigoberto Jackson RN(oncoming nurse) by Mica Galloway RN (offgoing nurse). Report included the following information SBAR, Kardex and MAR. Shift worked:   4p-7p   Summary of shift:   Incontinent of urine. Groin staple lines, chest staple lines remain intact. Right foot dressing intact. Turning  Every 2 hours. Lactic acid 2.9 at 1251 today, repeat Lactic at 1857,not back yet. PICC line placed today in right arm. New pressure ulcer noted to left outer leg, wound care consult ordered, dressed with foam dressing. Posterior head with \"cellulitis\"  and weepiness. Issues for physician to address:       Number times ambulated in hallway past shift: 0    Number of times OOB to chair past shift: 1    Pain Management:  Current medication: see mar  Patient states pain is manageable on current pain medication: YES    GI:    Current diet:  DIET SNACKS HS Snack  DIET NUTRITIONAL SUPPLEMENTS No; Lunch; Ensure Enlive  DIET REGULAR    Tolerating current diet: YES  Passing flatus: YES  Last Bowel Movement: Today   Appearance: brown      Respiratory:    Incentive Spirometer at bedside: YES  Patient instructed on use: YES    Patient Safety:    Falls Score: 4  Bed Alarm On? No  Sitter?  No    Tim Laws RN

## 2020-01-10 NOTE — PROGRESS NOTES
Pharmacy Automatic Renal Dosing Protocol - Antimicrobials    Indication for Antimicrobials: UTI, osteomyelitis? Current Regimen of Each Antimicrobial:  Piperacillin/tazobactam 3.375g IV q8h - started 1/3 day 8  Vancomycin 1500 mg x 1 then 1000 mg Q12H. Previous Antimicrobial Therapy:  Ceftriaxone starting     Significant Cultures:    UCx - Citrobacter Braakii and ENTEROCOCCUS FAECALIS GROUP D, citrobacter - S zosyn   - wound - pending   - anaerobic - pending  Radiology / Imaging results: (X-ray, CT scan or MRI): none pertinent        Labs:  Recent Labs     01/10/20  1251 20  1908 20  0317 20  0441   CREA 0.54*  --  0.67* 0.53*   BUN 6  --  8 9   WBC 15.3* 17.3*  --  8.5     Temp (24hrs), Av.2 °F (36.8 °C), Min:97.9 °F (36.6 °C), Max:98.7 °F (37.1 °C)    Paralysis, amputations, malnutrition:  Decubitus Sacral Region,  L foot TMA, albumin 1.5, Total protein 4.8    Creatinine Clearance (mL/min) or Dialysis:  76 (SCR multiplier 1.4)      Impression/Plan:   SCr stable, WBCs bumped overnight  BMP daily  Appears UTI is no longer the only concern. RNs attempting to gather blood cultures and podiatry note states patient has osteomyelitis  Continue Zosyn regimen at this time  Antimicrobial stop date pending     Pharmacy will follow daily and adjust medications as appropriate for renal function and/or serum levels.     Thank you,  Tommie Treviño, [facility]

## 2020-01-10 NOTE — PROGRESS NOTES
Patient seen today, for dressing change right foot. Sterile dressing change carried out under sterile field, wound looks excellent, no dehiscence, no erythema, no foul odor. Patient states she has not had any foot pain, resting comfortably, no other issues he reports. No lymphatic tenderness lower extremity, ankle joint and subtalar joint range of motion within normal limits. Leave this dressing on, clean, intact, he can use the surgical shoe for weightbearing but he does not need to use it when he is resting in the bed. We're waiting for surgical cultures and surgical pathology.

## 2020-01-10 NOTE — PROGRESS NOTES
Day shift nurse Jerod Gifford matthew labs and one set of paired blood cultures, could not obtain other set as pt is difficult stick. She took specimens to the lab. 1st set drawn from [facility]. Lab called this RN and asked if other set would be drawn, pt refusing additional sticks at this time. Per lab, the blood cultures already drawn would be good for 24 hours. This RN will defer AM labs and 2nd set of blood cultures to PICC team to obtain today during day shift.

## 2020-01-10 NOTE — PROGRESS NOTES
Hospitalist Progress Note    NAME: Callie Began   :  [id number]   MRN:  [id number]     I reviewed pertinent labs/imaging and discussed plan of care with Dr. Martha Leavitt who is in agreement. Assessment / Plan:  Patient continues to require hospitalization 2/2 ongoing infection, IV abx, and surgical intervention. His condition remains guarded and he is at risk for deterioration. Shock, multifactorial (sepsis/acute blood loss), resolved  UTI  Leucocytosis  Lactic acidosis  Cellulitis of head  Urine culture 20:  Citrobacter braakii, Enterococcus Faecalis Group D. Urine culture 01/10/20:  Pending. Operative tissue culture:  No organisms seen on gram stain. Blood culture 01/10/19:  Sent but not resulted. Procalcitonin 20:  11.44  - No blood cultures were sent initially. - Remains hemodynamically stable at this time. - Afebrile and non-toxic in appearance. - Continue Zosyn 3.375 mg IV q8h.  - Add vancomycin 1000 mg IV q12h after loading dose. - Give fluid bolus, may require more but will be conservative given anasarca. - Monitor LA closely. Hypomagnesemia  - Replete. - Montitor. GIB, resolved  Diverticulosis  Acute blood loss anemia  Diarrhea, resolved    CT abdomen/pelvis 20:  1. No acute GI bleeding site demonstrated. 2. Colonic diverticulosis. 3. Moderate bilateral pleural effusions and adjacent atelectasis. 4. Recent right axillary femoral and cross femoral bypass. Anasarca. 5. No acute findings in the abdomen or pelvis. 6. Possible cholelithiasis. EGD 20:  Normal upper endoscopy. Enteric panel 20:  Negative. - Patient received total of 2 units PRBCs.  - H/H decreased by 1 gram today. - Continue pantoprazole 40 mg IV q12h. - Continue to hold DVT chemoprophylaxis, ongoing evaluation to determine if safe to resume.  - Not a candidate for SCDs 2/2 severe PVD. - Monitor cbc.     PVD (POA)  Peripheral neuropathy  - S/P Axillo-Bifemoral bypass on 01/02/20.  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 - Continue asa 81 mg po daily. - Continue gabapentin 100 mg po bid and 300 mg po at hs. Anasarca  Pulmonary edema  Pleural effusions  Echo 01/04/20:  · Normal cavity size and systolic function (ejection fraction normal). Increased wall thickness. Estimated left ventricular ejection fraction is 50 - 55%. Left ventricular diastolic dysfunction. · Trace mitral valve regurgitation is present. · Mild tricuspid valve regurgitation is present. · Small-to-moderate pericardial effusion. There is left pleural effusion.  - Furosemide on hold 2/2 lactic acidosis. - Hold scheduled KCl while furosemide on hold. EtOH abuse  EtOH withdrawal with DTs, resolved  Tobacco abuse  - No signs of EtOH withdrawal during hospitalization.    - Continue MVI 1 po daily. - Continue folic acid 1 mg po daily. - Continue thiamine 100 mg po daily. - Continue nicotine patch. HTN  Dyslipidemia  - BP adequate. - Continue to monitor off antihypertensive meds. - Continue pravastatin 40 mg po daily.    - Add home antihypertensives prn. COPD without exacerbation   - No tx indicated at this time. BPH  Urinary retention  - No further urinary retention.  - Continue tamsulosin 0.4 mg po daily. 18.5 - 24.9 Normal weight / Body mass index is 23.08 kg/m². Code status: DNR  Prophylaxis: H2B/PPI and DVT prophylaxis on hold 2/2 ABL anemia  Recommended Disposition: Home w/Family     Subjective:     Chief Complaint / Reason for Physician Visit  No complaints. Adequate pain control. Plan of care reviewed with bedside RN.      Review of Systems:  Symptom Y/N Comments  Symptom Y/N Comments   Fever/Chills N   Chest Pain N    Poor Appetite N   Edema Y    Cough N   Abdominal Pain N    Sputum N   Joint Pain N    SOB/[place] N   Pruritis/Rash N    Nausea/vomit N   Tolerating PT/OT     Diarrhea N   Tolerating Diet Y    Constipation N   Other       Could NOT obtain due to:      Objective:     VITALS:   Last 24hrs VS reviewed since prior progress note. Most recent are:  Patient Vitals for the past 24 hrs:   Temp Pulse Resp BP SpO2   01/10/20 1522 98.2 °F (36.8 °C) 76 18 126/71 98 %   01/10/20 1143 98.7 °F (37.1 °C) 78 17 148/87 99 %   01/10/20 0839 97.9 °F (36.6 °C) 87 17 (!) 158/92 98 %   01/10/20 0330 97.9 °F (36.6 °C) 83 17 149/78 98 %   01/10/20 0001 98.3 °F (36.8 °C) 72 18 102/60 100 %   01/09/20 1834 98 °F (36.7 °C) 91 17 120/55 100 %       Intake/Output Summary (Last 24 hours) at 1/10/2020 1703  Last data filed at 1/10/2020 1026  Gross per 24 hour   Intake 420 ml   Output    Net 420 ml        PHYSICAL EXAM:  General:  A/A/O X 3. NAD. HEENT:  Normocephalic. Sclera anicteric. EOMI. Mucous membranes moist.    Chest:  Resps even/unlabored with symmetrical CWE. Air entry diminished at bases curt. Lungs CTA. No use of accessory muscles. CV:  RRR. Anasarca with ongoing scrotal and penile edema. GI:  Abdomen soft/NT/ND. ABT X 4.    Neurologic:  Nonfocal.  CN II-XII grossly intact. Speech normal.     Psych:  Cooperative. No anxiety or agitation. Skin:  Left TMA noted well healed. Right toes/foot dressed post-operatively. AO-Bifem surgical sites well approximated without erythema/exudate. Left lateral lower leg with breakdown. Occipital area of head with cellulitis. Serous weeping. No jaundice. Reviewed most current lab test results and cultures  YES  Reviewed most current radiology test results   YES  Review and summation of old records today    NO  Reviewed patient's current orders and MAR    YES  PMH/SH reviewed - no change compared to H&P  ________________________________________________________________________  Care Plan discussed with:    Comments   Patient [address] [place] [address]     Consultant                        Multidiciplinary team rounds were held today with , nursing, pharmacist and clinical coordinator. Patient's plan of care was discussed; medications were reviewed and discharge planning was addressed. ___________________________________________________________________  Dyan Hsieh NP     Procedures: see electronic medical records for all procedures/Xrays and details which were not copied into this note but were reviewed prior to creation of Plan. LABS:  I reviewed today's most current labs and imaging studies.   Pertinent labs include:  Recent Labs     01/10/20  1251 01/09/20  1908 01/08/20  0441   WBC 15.3* 17.3* 8.5   HGB 11.7* 12.8 9.7*   HCT 34.7* 39.9 29.7*   * 349 386     Recent Labs     01/10/20  1251 01/09/20  0317 01/08/20  0441    137 139   K 3.5 3.6 3.7    106 108   CO2 24 25 25   GLU 97 80 70   BUN 6 8 9   CREA 0.54* 0.67* 0.53*   CA 8.0* 7.6* 7.5*   MG 1.4* 1.7 1.6       Signed: Dyan Hsieh NP

## 2020-01-10 NOTE — PROGRESS NOTES
Problem: Mobility Impaired (Adult and Pediatric)  Goal: *Acute Goals and Plan of Care (Insert Text)  Description  FUNCTIONAL STATUS PRIOR TO ADMISSION: Pt was living at home alone on first level of 2 story home with 2 step entry. Per chart, he was falling and having a difficult time taking care of himself. Pt states he was not falling and that the only time he fell was when he had his stroke. HOME SUPPORT PRIOR TO ADMISSION: The patient lived alone with no local support. Physical Therapy Goals    Revised 1/6/2020  1. Patient will move from supine to sit and sit to supine, scoot up and down and roll side to side in bed with contact guard assistance within 7 days. 2.  Patient will transfer from bed to chair and chair to bed with minimal assistance using the least restrictive device within 7 days. 3.  Patient will perform sit to stand with CGA within 7 days. 4.  Patient will ambulate with moderate assistance 10 feet with a rolling walker within 7 days. Reviewed 12/31/2019 and downgraded  1. Patient will move from supine to sit and sit to supine, scoot up and down and roll side to side in bed with minimal assistance/contact guard assistance within 7 days. 2.  Patient will transfer from bed to chair and chair to bed with moderate assistance using the least restrictive device within 7 days. 3.  Patient will perform sit to stand with moderate assistance within 7 days. 4.  Patient will ambulate with moderate assistance of 1-2 for 10 feet with a rolling walker within 7 days. Initiated 12/24/2019  1. Patient will move from supine to sit and sit to supine , scoot up and down and roll side to side in bed with independence within 7 day(s). 2.  Patient will transfer from bed to chair and chair to bed with modified independence using the least restrictive device within 7 day(s). 3.  Patient will perform sit to stand with modified independence within 7 day(s).   4.  Patient will ambulate with supervision/set-up for 75 feet with the least restrictive device within 7 day(s). Outcome: Progressing Towards Goal   PHYSICAL THERAPY TREATMENT  Patient: Malou Khalil (48 y.o. male)  Date: 1/10/2020  Diagnosis: Alcohol withdrawal ([facility] [F10.239]   Alcohol withdrawal ([facility]  Procedure(s) (LRB):  AMPUTATION 2ND TOE RIGHT FOOT (Right) 1 Day Post-Op  Precautions: Fall, Contact, Seizure, DNR  Chart, physical therapy assessment, plan of care and goals were reviewed. ASSESSMENT  Patient continues with skilled PT services and is progressing slowly towards goals. Pt cleared for PT session, received in bed, and slow to agree to PT session, but eventually did with some coaxing. Pt continues to require moderate assist x 1-2 people for all mobility, with no significant change in function after his R second toe amputation yesterday. Pt transferred to the bedside chair where he was left at the conclusion of the session. Unable to have pt ambulate this visit due to no RW in the room. RN made aware that pt needs a RW, recliner, and gait belt in his room for more productive therapy sessions. Current Level of Function Impacting Discharge (mobility/balance): mod a x 1-2    Other factors to consider for discharge: pt continues with decreased safety         PLAN :  Patient continues to benefit from skilled intervention to address the above impairments. Continue treatment per established plan of care. to address goals.     Recommendation for discharge: (in order for the patient to meet his/her long term goals)  Therapy up to 5 days/week in SNF setting with transition to LTC    This discharge recommendation:  Has been made in collaboration with the attending provider and/or case management    IF patient discharges home will need the following DME: to be determined (TBD)       SUBJECTIVE:   Patient stated my toe hurts    OBJECTIVE DATA SUMMARY:   Critical Behavior:  Neurologic State: Alert  Orientation Level: Oriented to person, Oriented to place, Oriented to situation  Cognition: Follows commands  Safety/Judgement: Awareness of environment  Functional Mobility Training:  Bed Mobility:  Rolling: Minimum assistance  Supine to Sit: Moderate assistance     Scooting: Supervision;Stand-by assistance(to EOB)        Transfers:  Sit to Stand: Moderate assistance; Additional time;Assist x2  Stand to Sit: Minimum assistance        Bed to Chair: Moderate assistance; Additional time;Assist x2                    Balance:  Sitting: Intact  Sitting - Static: Good (unsupported)  Sitting - Dynamic: Good (unsupported)(for flexing forward to reach feet)  Standing: Impaired  Standing - Static: Constant support;Fair(pain in R 2nd toe)  Standing - Dynamic : Constant support; Fair  Pain Rating:  R foot where toe was amputated, but didn't quantify    Activity Tolerance:   Fair  Please refer to the flowsheet for vital signs taken during this treatment.     After treatment patient left in no apparent distress:   Sitting in chair and Call bell within reach    COMMUNICATION/COLLABORATION:   The patients plan of care was discussed with: Occupational Therapist and Registered Nurse    Mary Vallecillo PT   Time Calculation: 24 mins

## 2020-01-10 NOTE — PROGRESS NOTES
1120 Call placed to Dr. Nohemi Michael re:  Difficult IV stick, pt refused to have nurse draw labs this am, still needs another Blood culture, lab said we can do another one to make up the paired blood cultures up until 1800 tonight. Is he a candidate for a PICC line, will he be in the hospital much longer? He has very fragile skin. Also , back of patient's head keeps getting  Moist?  Unable to see any openings or abrasions, drainage is serous. 1245 2nd call placed for above,  PICC team at bedside, needing to know orders.    [phone] Call placed to Jesica Flores NP re: above

## 2020-01-10 NOTE — PROGRESS NOTES
Problem: Risk for Spread of Infection  Goal: Prevent transmission of infectious organism to others  Description  Prevent the transmission of infectious organisms to other patients, staff members, and visitors. Outcome: Progressing Towards Goal     Problem: Patient Education:  Go to Education Activity  Goal: Patient/Family Education  Outcome: Progressing Towards Goal     Problem: Falls - Risk of  Goal: *Absence of Falls  Description  Document Tia Manus Fall Risk and appropriate interventions in the flowsheet.   Outcome: Progressing Towards Goal  Note: Fall Risk Interventions:  Mobility Interventions: Assess mobility with egress test, PT Consult for mobility concerns, PT Consult for assist device competence    Mentation Interventions: Adequate sleep, hydration, pain control    Medication Interventions: Teach patient to arise slowly    Elimination Interventions: Call light in reach    History of Falls Interventions: Door open when patient unattended

## 2020-01-10 NOTE — PROGRESS NOTES
Problem: Risk for Spread of Infection  Goal: Prevent transmission of infectious organism to others  Description  Prevent the transmission of infectious organisms to other patients, staff members, and visitors. Outcome: Progressing Towards Goal     Problem: Patient Education:  Go to Education Activity  Goal: Patient/Family Education  Outcome: Progressing Towards Goal     Problem: Falls - Risk of  Goal: *Absence of Falls  Description  Document Russ Taylor Fall Risk and appropriate interventions in the flowsheet. Outcome: Progressing Towards Goal  Note: Fall Risk Interventions:  Mobility Interventions: Bed/chair exit alarm, Communicate number of staff needed for ambulation/transfer, Patient to call before getting OOB    Mentation Interventions: Adequate sleep, hydration, pain control, Door open when patient unattended    Medication Interventions: Bed/chair exit alarm, Evaluate medications/consider consulting pharmacy, Patient to call before getting OOB    Elimination Interventions: Bed/chair exit alarm, Call light in reach, Toileting schedule/hourly rounds    History of Falls Interventions: Door open when patient unattended         Problem: Patient Education: Go to Patient Education Activity  Goal: Patient/Family Education  Outcome: Progressing Towards Goal     Problem: Pressure Injury - Risk of  Goal: *Prevention of pressure injury  Description  Document Troy Scale and appropriate interventions in the flowsheet. Outcome: Progressing Towards Goal  Note: Pressure Injury Interventions:  Sensory Interventions: Assess changes in LOC, Float heels, Keep linens dry and wrinkle-free    Moisture Interventions: Absorbent underpads, Internal/External urinary devices    Activity Interventions: Pressure redistribution bed/mattress(bed type), PT/OT evaluation    Mobility Interventions: Pressure redistribution bed/mattress (bed type), PT/OT evaluation, Turn and reposition approx.  every two hours(pillow and wedges)    Nutrition Interventions: Document food/fluid/supplement intake    Friction and Shear Interventions: Apply protective barrier, creams and emollients, Lift sheet, Lift team/patient mobility team                Problem: Patient Education: Go to Patient Education Activity  Goal: Patient/Family Education  Outcome: Progressing Towards Goal     Problem: Delirium Treatment  Goal: *Level of consciousness restored to baseline  Outcome: Progressing Towards Goal  Goal: *Level of environmental perceptions restored to baseline  Outcome: Progressing Towards Goal  Goal: *Sensory perception restored to baseline  Outcome: Progressing Towards Goal  Goal: *Emotional stability restored to baseline  Outcome: Progressing Towards Goal  Goal: *Functional assessment restored to baseline  Outcome: Progressing Towards Goal  Goal: *Absence of falls  Outcome: Progressing Towards Goal  Goal: *Will remain free of delirium, CAM Score negative  Outcome: Progressing Towards Goal  Goal: *Cognitive status will be restored to baseline  Outcome: Progressing Towards Goal  Goal: Interventions  Outcome: Progressing Towards Goal     Problem: Patient Education: Go to Patient Education Activity  Goal: Patient/Family Education  Outcome: Progressing Towards Goal     Problem: Patient Education: Go to Patient Education Activity  Goal: Patient/Family Education  Outcome: Progressing Towards Goal     Problem: Patient Education: Go to Patient Education Activity  Goal: Patient/Family Education  Outcome: Progressing Towards Goal     Problem: Alcohol Withdrawal  Goal: *STG: Participates in treatment plan  Outcome: Progressing Towards Goal  Goal: *STG: Remains safe in hospital  Outcome: Progressing Towards Goal  Goal: *STG: Seeks staff when symptoms of withdrawal increase  Outcome: Progressing Towards Goal  Goal: *STG: Complies with medication therapy  Outcome: Progressing Towards Goal  Goal: *STG: Attends activities and groups  Outcome: Progressing Towards Goal  Goal: *STG: Will identify negative impact of chemical dependency including the use of tobacco, alcohol, and other substances  Outcome: Progressing Towards Goal  Goal: *STG: Verbalizes abstinence as an achievable goal  Outcome: Progressing Towards Goal  Goal: *STG: Agrees to participate in outpatient after care program to support ongoing mental health  Outcome: Progressing Towards Goal  Goal: *STG: Able to indentify relapse triggers including interpersonal/social and familial factors  Outcome: Progressing Towards Goal  Goal: *STG: Identify lifestyle changes to support long term sobriety such as vocation, employment, education, and legal issues  Outcome: Progressing Towards Goal  Goal: *STG: Maintains appropriate nutrition and hydration  Outcome: Progressing Towards Goal  Goal: *STG: Vital signs within defined limits  Outcome: Progressing Towards Goal  Goal: *STG/LTG: Relapse prevention plan in place to include housing/aftercare, leisure activities, and spirituality  Outcome: Progressing Towards Goal  Goal: Interventions  Outcome: Progressing Towards Goal     Problem: Patient Education: Go to Patient Education Activity  Goal: Patient/Family Education  Outcome: Progressing Towards Goal     Problem: Hypertension  Goal: *Blood pressure within specified parameters  Outcome: Progressing Towards Goal  Goal: *Fluid volume balance  Outcome: Progressing Towards Goal  Goal: *Labs within defined limits  Outcome: Progressing Towards Goal     Problem: Patient Education: Go to Patient Education Activity  Goal: Patient/Family Education  Outcome: Progressing Towards Goal

## 2020-01-10 NOTE — PROGRESS NOTES
Problem: Self Care Deficits Care Plan (Adult)  Goal: *Acute Goals and Plan of Care (Insert Text)  Description    FUNCTIONAL STATUS PRIOR TO ADMISSION: The patient was functional at baseline per his report. Reports Has DME at home    HOME SUPPORT: pt lived alone; he reports that his neighbors assist him    Occupational Therapy Goals    Goals reviewed on 1/10/2020 post surgery (R 2nd toe amputation) :    Change goal #3  All other goals remain appropriate to continue for 7 days. Initiated 12/26/2019; Re-evaluation 1/6/2020, goals updated below    1. Patient will perform grooming with set-up within 7 day(s). Progressing  2. Patient will perform sit to  preparation for functional transfers OOB with minimal assistance/contact guard assist within 7 day(s). Progressing  3. Patient will perform supine to sit EOB in preparation for bed level adls with minimal assistance/contact guard assist within 7 day(s). MET 1/6/2020. Post surgery on 1/10/2020-decline in function:  Continue Arnoldo level goal.  4.  Patient will perform toilet transfers with moderate assistance  within 7 day(s). Upgrade to CGA x1  5. Patient will perform all aspects of toileting with minimal assistance/contact guard assist within 7 day(s). Progressing  6. Patient will participate in upper extremity therapeutic exercise/activities with supervision/set-up for 10 minutes within 7 day(s). Progressing  7. Patient will demonstrate safe technique during functional mobility/ADL transfers within 7 days. Progressing  8. Patient will perform upper body dressing with minimal assistance within 7 days. Upgrade to supervision/set up  Goals added 1/6/2020:  9. Patient will tolerate static standing for 2 minutes in preparation for OOB ADLs with supervision/set-up within 7 day(s). 10.  Patient will perform anterior bathing from neck to thighs with supervision/set-up within 7 day(s).           Outcome: Progressing Towards Goal   OCCUPATIONAL THERAPY TREATMENT  Patient: Aline Edgar (60 y.o. male)  Date: 1/10/2020  Diagnosis: Alcohol withdrawal ([facility] [address] [F10.239]   Alcohol withdrawal ([facility] [address]  Procedure(s) (LRB):  AMPUTATION 2ND TOE RIGHT FOOT (Right) 1 Day Post-Op  Precautions: Fall, Contact, Seizure, DNR  Chart, occupational therapy assessment, plan of care, and goals were reviewed. ASSESSMENT  Patient continues with skilled OT services and is slowly progressing towards goals. Pt is POD 1 of R second toe amputation. He is allowed to weight bear onto RLE using the surgical shoe. Pt did complain of pain in R amputation site and elected to weight bear into his heel for transfer bed to chair. Goals were reviewed and generally remain appropriate. Pt is requiring minimal to moderate assistance X 2 for functional mobility-bed and transfer performed this date. He was able to reach forward to his feet for lower body adl while seated EOB. .   Noted pt very itchy skin and appears to have open area on LLE lateral shin area--nursing informed. Pt reports incontinence of bowel when exerting effort (during mobility this date) and reports that he was continent PTA. Needs total assistance for toileting at this time. Continue to recommend SNF at [facility]. Current Level of Function Impacting Discharge (ADLs): moderate to maximal assistance     Other factors to consider for discharge: lives alone, has had several surgeries during this hospitalization         PLAN :  Patient continues to benefit from skilled intervention to address the above impairments. Continue treatment per established plan of care. to address goals. Recommend with staff: encourage OOB to c hair for meals and to toilet. Encourage self care participation.       Recommend next OT session: standing adl, toilet transfer    Recommendation for discharge: (in order for the patient to meet his/her long term goals)  Therapy up to 5 days/week in SNF setting  And will likely need LTC    This discharge recommendation:  Has not yet been discussed the attending provider and/or case management    IF patient discharges home will need the following DME: to be determined (TBD)       SUBJECTIVE:   Patient stated I just had my toe cut off yesterday.     OBJECTIVE DATA SUMMARY:   Cognitive/Behavioral Status:  Neurologic State: Alert  Orientation Level: Oriented to person;Oriented to place;Oriented to situation  Cognition: Follows commands  Perception: Appears intact  Perseveration: No perseveration noted  Safety/Judgement: Awareness of environment    Functional Mobility and Transfers for ADLs:  Bed Mobility:  Rolling: Minimum assistance  Supine to Sit: Moderate assistance  Scooting: Supervision;Stand-by assistance(to EOB)    Transfers:  Sit to Stand: Moderate assistance; Additional time;Assist x2  Functional Transfers  Bathroom Mobility: (defer this date)  Bed to Chair: Moderate assistance; Additional time;Assist x2    Balance:  Sitting: Intact  Sitting - Static: Good (unsupported)  Sitting - Dynamic: Good (unsupported)(for flexing forward to reach feet)  Standing: Impaired  Standing - Static: Constant support;Fair(pain in R 2nd toe)  Standing - Dynamic : Constant support; Fair    ADL Intervention:     Initiated bed mobility and seated lower body adl. Lower Body Dressing Assistance  Socks: Stand-by assistance(additional time ; while seated EOB; several trials for socks)  Slip on Shoes with Back: Set-up; Supervision;Stand-by assistance  Leg Crossed Method Used: No  Position Performed: Seated edge of bed  Cues: Don    Toileting  Toileting Assistance: (pt is incontinent, wearing depends)  Bladder Hygiene: Total assistance (dependent)  Bowel Hygiene: Total assistance (dependent)    Cognitive Retraining  Following Commands:  Follows one step commands/directions  Safety/Judgement: Awareness of environment    Therapeutic Exercises:   Encouraged OOB to chair  for meals    Pain:  Did not rate pain in R toe amputation site--complained of pain in weight bearing in standing. Activity Tolerance:   Fair  Please refer to the flowsheet for vital signs taken during this treatment.     After treatment patient left in no apparent distress:   Sitting in chair, Call bell within reach, and nursing tech and NP present and to provide pt care     COMMUNICATION/COLLABORATION:   The patients plan of care was discussed with: Physical Therapist, Registered Nurse, and Certified Nursing [id number] [place] Yrn, OTR/L  Time Calculation: 26 mins

## 2020-01-10 NOTE — PROGRESS NOTES
Bedside shift change report given to RN (oncoming nurse) by Shabnam Lunsford (offgoing nurse). Report included the following information SBAR, Kardex, Intake/Output, MAR and Recent Results. Pt had two small BM's overnight. Incontinent of urine and stool x2. Needs labs and blood cultures drawn by PICC team. Refused to let this RN try to draw labs. Dressing to R foot CDI, will be changed by podiatry today.

## 2020-01-10 NOTE — OP NOTES
[address]  OPERATIVE REPORT    Name:  Benjamín Browning  MR#:  [id number]  :  1945  ACCOUNT #:  [de-identified]  DATE OF SERVICE:  2020      PREOPERATIVE DIAGNOSIS:  Infected second digit osteomyelitis, right foot. POSTOPERATIVE DIAGNOSIS:  Infected second digit osteomyelitis, right foot. PROCEDURE PERFORMED:  Amputation of second digit, right foot. SURGEON:  Lacy Arias MD.    ASSISTANT:  None. ANESTHESIA:  General with local anesthesia. INJECTABLES:  7 mL of 0.5% Marcaine plain, right foot. COMPLICATIONS:  None apparent. SPECIMENS REMOVED:  Necrotic digit second right foot. Microbiology, bone culture sent from ulcerative site of medial aspect, head of the proximal phalanx second digit right foot. Pathology, as stated above, necrotic second digit with a viable proximal phalanx base sent to pathology for identification. IMPLANTS:  None. ESTIMATED BLOOD LOSS:  Less than 1 mL. OPERATIVE TECHNIQUE:  The patient was brought to the operating room, placed on the operating room table in supine position. Anesthesiologist administered general anesthesia, followed by local infiltrative block, the above-mentioned anesthesia. The right foot was then prepped and draped in usual aseptic technique. An esmarch bandage was utilized as a tourniquet for hemostasis, midfoot level throughout the procedure. Once the field and tourniquet was applied, access was gained towards the dorsal aspect of the second digit base, right foot. An elliptical incision was placed along the base of the proximal phalanx just distal to the metaphyseal-diaphyseal region. Full-thickness flap was carried out. Care was taken not to skive the edges and not to press too hard on the viable tissue margins proximally.   Once this was completed, an Allis forceps was used distally on the second digit to distract and the second metatarsophalangeal joint was disarticulated with the base of the proximal phalanx disarticulated from the head of the second metatarsal.  This was then placed on the back table and the bone culture was procured and placed in the culture tube. A deep anaerobic swab culture was placed along the second metatarsal head region. Also the second digit was prepared for pathology administration in total, in order to asses for a viable proximal bone margin at the base of the proximal phalanx second digit, right foot. The surgical site was then examined showing no purulence, no sinus tracking proximally, no foul odor. Good bleeding tissue edges were appreciated and viable plantar flap had been preserved, the wound looked excellent at this time frame. Pinking of the tissue was noted to be seen at this time frame. The area was copiously irrigated with sterile saline solution. The tendons and bone were examined at the head of the second metatarsal showing within normal limits, with normal Hyaline cartilage and normal tendon appearance remaining once distal resection was completed. At this point, second irrigation with sterile saline was carried out. This was followed by reapproximation and coaptation of the wound edges carefully with 5-0 nylon. The midfoot tourniquet was now removed, showing additional hyperemic flush to distal digits on the right foot, good pinking up of the distal tissue at the amputation site was seen distal right foot. The dressing was then completed with Xeroform, 3-inch Kerlix and 3-inch Coban right foot. The patient tolerated above procedures and anesthesia well, was discharged from the operating room with vital signs stable and vascular status intact to the right foot.       Danielle Decker MD      [id number]  D:  01/09/2020 13:46  T:  01/09/2020 21:55  JOB #:  [id number]

## 2020-01-10 NOTE — PROGRESS NOTES
PICC (Peripherally Inserted Central Catheter) line insertion  procedure note     1720 : Procedure explained to patient    along with risks and benefits and  patient    agreed to proceed. Informed consent obtained from patient    Patient teaching completed. Timeout completed. Pre-procedure assessment done. Maximum sterile barrier precautions observed throughout procedure. Lidocaine 1%  2.0    ml SQ given prior to cannulation. Cannulated   basilic  vein using ultrasound guidance and modified seldinger technique. Inserted   5  Micronesian  double  lumen PICC to   right  arm using Odoo (formerly OpenERP) Tip Location system. Blood return verified and flushed with 20 ml normal saline in each port. Sterile dressing applied with Biopatch, StatLock and occlusive dressing as per protocol. Curos caps applied to each port. Patient tolerated procedure well with minimal blood loss. Patient education material provided. PICC procedure performed by  :  Luis Mcdonough RN. PICC nurse  Assisted by  : Sang Grove RN. PICC nurse  Reason for access : MD order / Reliable access / Limited vascular access   Complications related to insertion  : none  Total Trimmed Length    37  cm   External Length : At the hub      cm     PICC line site arm circumference:     24  cm   PICC catheter occupies    14  %  of vein  Type of PICC: Bard Power PICC SOLO  Ref # :  [id number]  Lot # :  [id number]  Expiration Date :  2020-12-31  Portable Chest X-Ray ordered. Pt has  A fib  rhythm. Primary nurse   Tim Higginbotham RN aware not use until  PICC Tip placement  Is confirmed by  Radiologist.     1900 :   PICC line placement : tip of PICC line lies over the cava atrial junction per Dr Amador Solomon  radiologist.    Notified to primary nurse  Tim Higginbotham RN that PICC line is in satisfactory position per radiologist and  it   can be used now. Luis Mcdonough RN. BSN. CRNI,CMSRN.  PICC Nurse, Vascular Access Team

## 2020-01-11 LAB
ALBUMIN SERPL-MCNC: 1.4 G/DL (ref 3.5–5)
ALBUMIN/GLOB SERPL: 0.5 {RATIO} (ref 1.1–2.2)
ALP SERPL-CCNC: 50 U/L (ref 45–117)
ALT SERPL-CCNC: 13 U/L (ref 12–78)
ANION GAP SERPL CALC-SCNC: 7 MMOL/L (ref 5–15)
APPEARANCE UR: CLEAR
AST SERPL-CCNC: 10 U/L (ref 15–37)
BACTERIA URNS QL MICRO: NEGATIVE /HPF
BASOPHILS # BLD: 0.1 K/UL (ref 0–0.1)
BASOPHILS NFR BLD: 1 % (ref 0–1)
BILIRUB SERPL-MCNC: 0.2 MG/DL (ref 0.2–1)
BILIRUB UR QL: NEGATIVE
BUN SERPL-MCNC: 7 MG/DL (ref 6–20)
BUN/CREAT SERPL: 12 (ref 12–20)
CALCIUM SERPL-MCNC: 7.2 MG/DL (ref 8.5–10.1)
CHLORIDE SERPL-SCNC: 109 MMOL/L (ref 97–108)
CO2 SERPL-SCNC: 23 MMOL/L (ref 21–32)
COLOR UR: ABNORMAL
CREAT SERPL-MCNC: 0.58 MG/DL (ref 0.7–1.3)
DIFFERENTIAL METHOD BLD: ABNORMAL
EOSINOPHIL # BLD: 1.4 K/UL (ref 0–0.4)
EOSINOPHIL NFR BLD: 13 % (ref 0–7)
EPITH CASTS URNS QL MICRO: ABNORMAL /LPF
ERYTHROCYTE [DISTWIDTH] IN BLOOD BY AUTOMATED COUNT: 16.2 % (ref 11.5–14.5)
GLOBULIN SER CALC-MCNC: 2.8 G/DL (ref 2–4)
GLUCOSE SERPL-MCNC: 83 MG/DL (ref 65–100)
GLUCOSE UR STRIP.AUTO-MCNC: NEGATIVE MG/DL
HCT VFR BLD AUTO: 27.9 % (ref 36.6–50.3)
HGB BLD-MCNC: 8.9 G/DL (ref 12.1–17)
HGB UR QL STRIP: NEGATIVE
IMM GRANULOCYTES # BLD AUTO: 0.1 K/UL (ref 0–0.04)
IMM GRANULOCYTES NFR BLD AUTO: 1 % (ref 0–0.5)
KETONES UR QL STRIP.AUTO: NEGATIVE MG/DL
LACTATE SERPL-SCNC: 0.8 MMOL/L (ref 0.4–2)
LEUKOCYTE ESTERASE UR QL STRIP.AUTO: ABNORMAL
LYMPHOCYTES # BLD: 1 K/UL (ref 0.8–3.5)
LYMPHOCYTES NFR BLD: 9 % (ref 12–49)
MAGNESIUM SERPL-MCNC: 1.6 MG/DL (ref 1.6–2.4)
MCH RBC QN AUTO: 34.1 PG (ref 26–34)
MCHC RBC AUTO-ENTMCNC: 31.9 G/DL (ref 30–36.5)
MCV RBC AUTO: 106.9 FL (ref 80–99)
MONOCYTES # BLD: 0.8 K/UL (ref 0–1)
MONOCYTES NFR BLD: 7 % (ref 5–13)
NEUTS SEG # BLD: 7.5 K/UL (ref 1.8–8)
NEUTS SEG NFR BLD: 69 % (ref 32–75)
NITRITE UR QL STRIP.AUTO: NEGATIVE
NRBC # BLD: 0 K/UL (ref 0–0.01)
NRBC BLD-RTO: 0 PER 100 WBC
PH UR STRIP: 7 [PH] (ref 5–8)
PHOSPHATE SERPL-MCNC: 3 MG/DL (ref 2.6–4.7)
PLATELET # BLD AUTO: 382 K/UL (ref 150–400)
PMV BLD AUTO: 10.3 FL (ref 8.9–12.9)
POTASSIUM SERPL-SCNC: 3.8 MMOL/L (ref 3.5–5.1)
PROT SERPL-MCNC: 4.2 G/DL (ref 6.4–8.2)
PROT UR STRIP-MCNC: NEGATIVE MG/DL
RBC # BLD AUTO: 2.61 M/UL (ref 4.1–5.7)
RBC #/AREA URNS HPF: ABNORMAL /HPF (ref 0–5)
RBC MORPH BLD: ABNORMAL
SODIUM SERPL-SCNC: 139 MMOL/L (ref 136–145)
SP GR UR REFRACTOMETRY: 1.01 (ref 1–1.03)
UROBILINOGEN UR QL STRIP.AUTO: 0.2 EU/DL (ref 0.2–1)
WBC # BLD AUTO: 10.9 K/UL (ref 4.1–11.1)
WBC URNS QL MICRO: ABNORMAL /HPF (ref 0–4)
YEAST BUDDING URNS QL: PRESENT

## 2020-01-11 PROCEDURE — C9113 INJ PANTOPRAZOLE SODIUM, VIA: HCPCS | Performed by: FAMILY MEDICINE

## 2020-01-11 PROCEDURE — 74011250637 HC RX REV CODE- 250/637: Performed by: NURSE PRACTITIONER

## 2020-01-11 PROCEDURE — 77030019905 HC CATH URETH INTMIT MDII -A

## 2020-01-11 PROCEDURE — 94760 N-INVAS EAR/PLS OXIMETRY 1: CPT

## 2020-01-11 PROCEDURE — 81001 URINALYSIS AUTO W/SCOPE: CPT

## 2020-01-11 PROCEDURE — 65660000000 HC RM CCU STEPDOWN

## 2020-01-11 PROCEDURE — 74011250637 HC RX REV CODE- 250/637: Performed by: SURGERY

## 2020-01-11 PROCEDURE — 74011250636 HC RX REV CODE- 250/636: Performed by: NURSE PRACTITIONER

## 2020-01-11 PROCEDURE — 80053 COMPREHEN METABOLIC PANEL: CPT

## 2020-01-11 PROCEDURE — 74011250636 HC RX REV CODE- 250/636: Performed by: INTERNAL MEDICINE

## 2020-01-11 PROCEDURE — 74011250637 HC RX REV CODE- 250/637: Performed by: INTERNAL MEDICINE

## 2020-01-11 PROCEDURE — 77030037877 HC DRSG MEPILEX >48IN BORD MOLN -A

## 2020-01-11 PROCEDURE — 84100 ASSAY OF PHOSPHORUS: CPT

## 2020-01-11 PROCEDURE — 85025 COMPLETE CBC W/AUTO DIFF WBC: CPT

## 2020-01-11 PROCEDURE — 74011000258 HC RX REV CODE- 258: Performed by: INTERNAL MEDICINE

## 2020-01-11 PROCEDURE — 74011250636 HC RX REV CODE- 250/636: Performed by: FAMILY MEDICINE

## 2020-01-11 PROCEDURE — 77010033678 HC OXYGEN DAILY

## 2020-01-11 PROCEDURE — 74011000250 HC RX REV CODE- 250: Performed by: FAMILY MEDICINE

## 2020-01-11 PROCEDURE — 83735 ASSAY OF MAGNESIUM: CPT

## 2020-01-11 PROCEDURE — 83605 ASSAY OF LACTIC ACID: CPT

## 2020-01-11 PROCEDURE — 77030041247 HC PROTECTOR HEEL HEELMEDIX MDII -B

## 2020-01-11 PROCEDURE — 36415 COLL VENOUS BLD VENIPUNCTURE: CPT

## 2020-01-11 RX ORDER — LANOLIN ALCOHOL/MO/W.PET/CERES
3 CREAM (GRAM) TOPICAL
Status: DISCONTINUED | OUTPATIENT
Start: 2020-01-11 | End: 2020-01-22 | Stop reason: HOSPADM

## 2020-01-11 RX ORDER — VANCOMYCIN HYDROCHLORIDE 1 G/20ML
INJECTION, POWDER, LYOPHILIZED, FOR SOLUTION INTRAVENOUS
Status: DISPENSED
Start: 2020-01-11 | End: 2020-01-11

## 2020-01-11 RX ORDER — SODIUM CHLORIDE 900 MG/100ML
INJECTION INTRAVENOUS
Status: DISPENSED
Start: 2020-01-11 | End: 2020-01-11

## 2020-01-11 RX ADMIN — GABAPENTIN 300 MG: 300 CAPSULE ORAL at 21:53

## 2020-01-11 RX ADMIN — PRAVASTATIN SODIUM 40 MG: 40 TABLET ORAL at 21:53

## 2020-01-11 RX ADMIN — ACETAMINOPHEN 650 MG: 325 TABLET ORAL at 21:56

## 2020-01-11 RX ADMIN — GABAPENTIN 100 MG: 100 CAPSULE ORAL at 09:36

## 2020-01-11 RX ADMIN — VANCOMYCIN HYDROCHLORIDE 1000 MG: 1 INJECTION, POWDER, LYOPHILIZED, FOR SOLUTION INTRAVENOUS at 22:47

## 2020-01-11 RX ADMIN — Medication 10 ML: at 09:34

## 2020-01-11 RX ADMIN — PIPERACILLIN AND TAZOBACTAM 3.38 G: 3; .375 INJECTION, POWDER, LYOPHILIZED, FOR SOLUTION INTRAVENOUS at 13:30

## 2020-01-11 RX ADMIN — ASPIRIN 81 MG 81 MG: 81 TABLET ORAL at 09:37

## 2020-01-11 RX ADMIN — Medication 10 ML: at 21:54

## 2020-01-11 RX ADMIN — Medication 100 MG: at 09:36

## 2020-01-11 RX ADMIN — TAMSULOSIN HYDROCHLORIDE 0.4 MG: 0.4 CAPSULE ORAL at 09:37

## 2020-01-11 RX ADMIN — THERA TABS 1 TABLET: TAB at 09:37

## 2020-01-11 RX ADMIN — SODIUM CHLORIDE 40 MG: 9 INJECTION INTRAMUSCULAR; INTRAVENOUS; SUBCUTANEOUS at 09:34

## 2020-01-11 RX ADMIN — PIPERACILLIN AND TAZOBACTAM 3.38 G: 3; .375 INJECTION, POWDER, LYOPHILIZED, FOR SOLUTION INTRAVENOUS at 21:53

## 2020-01-11 RX ADMIN — Medication 1 AMPULE: at 21:53

## 2020-01-11 RX ADMIN — GABAPENTIN 100 MG: 100 CAPSULE ORAL at 13:47

## 2020-01-11 RX ADMIN — Medication 1 AMPULE: at 09:29

## 2020-01-11 RX ADMIN — SODIUM CHLORIDE 75 ML/HR: 900 INJECTION, SOLUTION INTRAVENOUS at 22:52

## 2020-01-11 RX ADMIN — Medication 20 ML: at 13:34

## 2020-01-11 RX ADMIN — FOLIC ACID 1 MG: 1 TABLET ORAL at 09:36

## 2020-01-11 RX ADMIN — PIPERACILLIN AND TAZOBACTAM 3.38 G: 3; .375 INJECTION, POWDER, LYOPHILIZED, FOR SOLUTION INTRAVENOUS at 04:00

## 2020-01-11 RX ADMIN — Medication 10 ML: at 13:34

## 2020-01-11 RX ADMIN — SODIUM CHLORIDE 40 MG: 9 INJECTION INTRAMUSCULAR; INTRAVENOUS; SUBCUTANEOUS at 21:53

## 2020-01-11 RX ADMIN — VANCOMYCIN HYDROCHLORIDE 1000 MG: 1 INJECTION, POWDER, LYOPHILIZED, FOR SOLUTION INTRAVENOUS at 09:57

## 2020-01-11 RX ADMIN — SODIUM CHLORIDE 75 ML/HR: 900 INJECTION, SOLUTION INTRAVENOUS at 07:29

## 2020-01-11 RX ADMIN — Medication 10 ML: at 05:36

## 2020-01-11 NOTE — PROGRESS NOTES
Hospitalist Progress Note    NAME: Leesa Delacruz   :     MRN:  [id number]     I reviewed pertinent labs/imaging and discussed plan of care with Dr. Devora Borges who is in agreement. Assessment / Plan:  Patient continues to require hospitalization 2/2 ongoing infection, IV abx, and surgical intervention. His condition remains guarded and he is at risk for deterioration. Shock, multifactorial (sepsis/acute blood loss), resolved  UTI  Leucocytosis  Lactic acidosis  Cellulitis of head  Urine culture 20:  Citrobacter braakii, Enterococcus Faecalis Group D. Urine culture 01/10/20:  Pending. Operative tissue culture:  No organisms seen on gram stain. Blood culture 01/10/19:  NG at 15 hours. Procalcitonin 20:  11.44  - Remains hemodynamically stable at this time. - Afebrile and non-toxic in appearance. - LA now WNL after fluid boluses. - Continue IVF. - Continue Zosyn 3.375 mg IV q8h.  - Continue vancomycin 1000 mg IV q12h, dosing per pharmacy. Hypomagnesemia, improved  - Montitor. Protein calorie malnutrition  - Continue nutritional supplements. GIB, resolved  Diverticulosis  Acute blood loss anemia  Diarrhea, resolved  CT abdomen/pelvis 20:  1. No acute GI bleeding site demonstrated. 2. Colonic diverticulosis. 3. Moderate bilateral pleural effusions and adjacent atelectasis. 4. Recent right axillary femoral and cross femoral bypass. Anasarca. 5. No acute findings in the abdomen or pelvis. 6. Possible cholelithiasis. EGD 20:  Normal upper endoscopy. Enteric panel 20:  Negative. - Patient received total of 2 units PRBCs.  - H/H decreased by almost 2 grams again today, likely dilutional element. - Continue pantoprazole 40 mg IV q12h. - Continue to hold DVT chemoprophylaxis, ongoing evaluation to determine if safe to resume.  - Not a candidate for SCDs 2/2 severe PVD.   - Guaiac stools. - Monitor cbc.     PVD (POA)  Peripheral neuropathy  - S/P Axillo-Bifemoral bypass on 01/02/20, incisions look good. 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 - Continue asa 81 mg po daily. - Continue gabapentin 100 mg po bid and 300 mg po at hs. Anasarca, improving   Pulmonary edema  Pleural effusions  Echo 01/04/20:  · Normal cavity size and systolic function (ejection fraction normal). Increased wall thickness. Estimated left ventricular ejection fraction is 50 - 55%. Left ventricular diastolic dysfunction. · Trace mitral valve regurgitation is present. · Mild tricuspid valve regurgitation is present. · Small-to-moderate pericardial effusion. There is left pleural effusion.  - Continue to hold furosemide at this time. - Hold scheduled KCl while furosemide on hold. EtOH abuse  EtOH withdrawal with DTs, resolved  Tobacco abuse  - No signs of EtOH withdrawal during hospitalization.    - Continue MVI 1 po daily. - Continue folic acid 1 mg po daily. - Continue thiamine 100 mg po daily. - Continue nicotine patch. HTN  Dyslipidemia  - BP adequate. - Continue to monitor off antihypertensive meds. - Continue pravastatin 40 mg po daily.    - Add home antihypertensives prn. COPD without exacerbation   - No tx indicated at this time. BPH  Urinary retention  - No further urinary retention.  - Patient incontinent.    - Continue tamsulosin 0.4 mg po daily. 18.5 - 24.9 Normal weight / Body mass index is 23.08 kg/m². Code status: DNR  Prophylaxis: H2B/PPI and DVT prophylaxis on hold 2/2 ABL anemia  Recommended Disposition: Home w/Family     Subjective:     Chief Complaint / Reason for Physician Visit  No complaints. Adequate pain control. Plan of care reviewed with bedside RN.      Review of Systems:  Symptom Y/N Comments  Symptom Y/N Comments   Fever/Chills N   Chest Pain N    Poor Appetite N   Edema Y    Cough N   Abdominal Pain N    Sputum N   Joint Pain N    SOB/MCFARLAND N   Pruritis/Rash N Nausea/vomit N   Tolerating PT/OT     Diarrhea N   Tolerating Diet Y    Constipation N   Other       Could NOT obtain due to:      Objective:     VITALS:   Last 24hrs VS reviewed since prior progress note. Most recent are:  Patient Vitals for the past 24 hrs:   Temp Pulse Resp BP SpO2   01/11/20 1457 98.6 °F (37 °C) (!) 104 16 118/58 96 %   01/11/20 1121 98.4 °F (36.9 °C) 66 18 124/66 93 %   01/11/20 0741 98.1 °F (36.7 °C) 92 17 142/70 97 %   01/11/20 0255 98.7 °F (37.1 °C) 83 18 121/66 99 %   01/11/20 0116  82  126/69    01/11/20 0009 98.3 °F (36.8 °C) 83 18 107/56 98 %   01/10/20 2337    92/50    01/10/20 2254 98.5 °F (36.9 °C) 87 18 (!) 86/43 100 %   01/10/20 1929 98.1 °F (36.7 °C) 94 18 101/64 99 %   01/10/20 1522 98.2 °F (36.8 °C) 76 18 126/71 98 %       Intake/Output Summary (Last 24 hours) at 1/11/2020 1510  Last data filed at 1/11/2020 1342  Gross per 24 hour   Intake 2767.5 ml   Output 100 ml   Net 2667.5 ml        PHYSICAL EXAM:  General:  A/A/O X 3. NAD. HEENT:  Normocephalic. Sclera anicteric. EOMI. Mucous membranes moist.    Chest:  Resps even/unlabored with symmetrical CWE. Air entry diminished at bases curt. Lungs CTA. No use of accessory muscles. CV:  RRR. Generalized edema improved. Continues to have penile and scrotal edema. 1 mm pretibial edema curt. GI:  Abdomen soft/NT/ND. ABT X 4.    Neurologic:  Nonfocal.  CN II-XII grossly intact. Speech normal.     Psych:  Cooperative. No anxiety or agitation. Skin:  Left TMA noted well healed. Right toes/foot dressed post-operatively. AO-Bifem surgical sites well approximated without erythema/exudate. Left lateral lower leg with breakdown. Occipital area of head with cellulitis. Serous weeping. No jaundice.       Reviewed most current lab test results and cultures  YES  Reviewed most current radiology test results   YES  Review and summation of old records today    NO  Reviewed patient's current orders and [facility] YES  PMH/SH reviewed - no change compared to H&P  ________________________________________________________________________  Care Plan discussed with:    Comments   Patient [address] [place] [address]     Consultant                        Multidiciplinary team rounds were held today with , nursing, pharmacist and clinical coordinator. Patient's plan of care was discussed; medications were reviewed and discharge planning was addressed. ___________________________________________________________________  Kajal Jordan NP     Procedures: see electronic medical records for all procedures/Xrays and details which were not copied into this note but were reviewed prior to creation of Plan. LABS:  I reviewed today's most current labs and imaging studies.   Pertinent labs include:  Recent Labs     01/11/20  0100 01/10/20  1251 01/09/20  1908   WBC 10.9 15.3* 17.3*   HGB 8.9* 11.7* 12.8   HCT 27.9* 34.7* 39.9    467* 349     Recent Labs     01/11/20  0100 01/10/20  1251 01/09/20  0317    137 137   K 3.8 3.5 3.6   * 106 106   CO2 23 24 25   GLU 83 97 80   BUN 7 6 8   CREA 0.58* 0.54* 0.67*   CA 7.2* 8.0* 7.6*   MG 1.6 1.4* 1.7   PHOS 3.0  --   --    ALB 1.4*  --   --    TBILI 0.2  --   --    SGOT 10*  --   --    ALT 13  --   --        Signed: Kajal Jordan NP

## 2020-01-11 NOTE — PROGRESS NOTES
[address] MRN [id number]  Consult ID [id number]  Facility Time Zone ET  Date and Time of Request 01/10/2020 11:14:15 PM  Requesting Clinician Andrae Chan  Patient Name Sandeep Nielson  Date of Birth [id number]  Gender Male  Clinical Note  Clinical Note Per RN,\"Patient hypotensive BP 86/43; appears to be asymptomatic. However  lactic acid @ 1842 is 2.3 ( this a repeat 4hrs after NS bolus given @ 1531). NS  500 ml bolus given again @ 2006. Will order another 500 cc N.S bolus, continue  IVF.

## 2020-01-11 NOTE — PROGRESS NOTES
Unable to obtain US ordered yesterday due to patient's incontinence, Clay Louise NP aware and gave OK to straight cath for it.

## 2020-01-11 NOTE — PROGRESS NOTES
Patient hypotensive BP 86/43; appears asymptomatic. Hospitalist on call notified. Will give NS bolus as ordered.

## 2020-01-11 NOTE — PROGRESS NOTES
General Surgery End of Shift Nursing Note    Bedside shift change report given to  Vitor Mckinney RN(oncoming nurse) by Jackson Bourgeois RN (offgoing nurse). Report included the following information SBAR, Kardex and MAR. Shift worked:   7a-7p   Summary of shift:   Feeling better today,  Left outer leg with pressure ulcer. Straight cathed x 1 for UA, negative for bacteria. Refused Prevalon boots  for legs. Needs hemoccult stool sent next BM   Issues for physician to address:       Number times ambulated in hallway past shift: 0    Number of times OOB to chair past shift: 0    Pain Management:  Current medication: see mar  Patient states pain is manageable on current pain medication: YES    GI:    Current diet:  DIET SNACKS HS Snack  DIET REGULAR  DIET NUTRITIONAL SUPPLEMENTS No; Lunch, Breakfast, Dinner; Ensure [place] current diet: YES  Passing flatus: YES  Last Bowel Movement: Today   Appearance: brown      Respiratory:    Incentive Spirometer at bedside: YES  Patient instructed on use: YES    Patient Safety:    Falls Score: 4  Bed Alarm On? No  Sitter?  No    Tae Person RN

## 2020-01-11 NOTE — PROGRESS NOTES
Nutrition Assessment:    INTERVENTIONS/RECOMMENDATIONS:   Continue Regular diet  Increase ensure enlive to TID and discontinue ensure clear per patient preference/request     ASSESSMENT:   Chart reviewed; new consult received for general nutrition management. Patient has been followed by RD since initial assessment on 12/23. He's had a fairly good appetite throughout admission; mostly eating % of meals. Patient reports a good appetite today. Meals are going well; using menu/room service. He likes/prefers ensure enlive as he typically drinks boost at home. NP added ensure clear this morning; patient was not interested in trying this at all. Instead he would prefer to increase ensure enlive from daily to TID. He had no other nutrition questions/concerns other than receiving ensure enlive with each meal. RD will continue to follow. Diet Order: Regular(Ensure clear TID, Ensure enlive daily)  % Eaten:    Patient Vitals for the past 72 hrs:   % Diet Eaten   01/10/20 0839 50 %   01/08/20 1806 100 %     Pertinent Medications: [x] Reviewed []Other: Folic Acid, Protonix, Pravastatin, MVI, Thiamine   Pertinent Labs: [x]Reviewed  []Other:  Food Allergies: [x]None []Yes:     Last BM: 1/9   [x]Active     []Hyperactive  []Hypoactive       [] Absent  BS  Skin:    [] Intact   [x] Incision  [] Breakdown   [x]Edema   []Other:    Anthropometrics: Height: 5' 6\" (167.6 cm) Weight: 64.9 kg (143 lb)    IBW (%IBW):   ( ) UBW (%UBW):   (  %)    BMI: Body mass index is 23.08 kg/m². This BMI is indicative of:  []Underweight   [x]Normal   []Overweight   [] Obesity   [] Extreme Obesity (BMI>40)  Last Weight Metrics:  Weight Loss Metrics 1/4/2020 12/22/2019 12/21/2019 12/20/2019 12/20/2019 12/20/2019 7/31/2019   Today's Wt 143 lb - 135 lb 1.6 oz - 139 lb 1.8 oz - 131 lb 3.2 oz   BMI - 23.08 kg/m2 - 21.81 kg/m2 - 22.45 kg/m2 21.18 kg/m2       Estimated Nutrition Needs (Based on): 1726 Kcals/day(BMR (1328) x 1. 3AF) , 78 g(1.2 g/kg bw) Protein  Carbohydrate: At Least 130 g/day  Fluids: 1700 mL/day     Pt expected to meet estimated nutrient needs: [x]Yes []No    NUTRITION DIAGNOSES:   Problem:  No nutritional diagnosis at this time      Etiology: related to      Signs/Symptoms: as evidenced by       NUTRITION INTERVENTIONS:  Meals/Snacks: General/healthful diet   Supplements: Commercial supplement              GOAL:   PO intake >70% of meals/supplements next 4-6 days    NUTRITION MONITORING AND EVALUATION   Food/Nutrient Intake Outcomes:  Total energy intake  Physical Signs/Symptoms Outcomes: Weight/weight change, Electrolyte and renal profile, GI profile    Previous Goal Met:   [x] Met              [] Progressing Towards Goal              [] Not Progressing Towards Goal   Previous Recommendations:   [x] Implemented          [] Not Implemented          [] Not Applicable    LEARNING NEEDS (Diet, Food/Nutrient-Drug Interaction):    [x] None Identified   [] Identified and Education Provided/Documented   [] Identified and Pt declined/was not appropriate     Cultural, Zoroastrianism, OR Ethnic Dietary Needs:    [x] None Identified   [] Identified and Addressed     [x] Interdisciplinary Care Plan Reviewed/Documented    [x] Discharge Planning: Regular diet + ensure enlive 2-3x/day    [] Participated in Interdisciplinary Rounds    NUTRITION RISK:    [] Patient At Nutritional Risk             [x] Patient Not At Nutritional Risk      Denisa Beckett [id number]  Pager [phone]    Weekend Pager [phone]

## 2020-01-11 NOTE — PROGRESS NOTES
Problem: Risk for Spread of Infection  Goal: Prevent transmission of infectious organism to others  Description  Prevent the transmission of infectious organisms to other patients, staff members, and visitors. Outcome: Progressing Towards Goal     Problem: Patient Education:  Go to Education Activity  Goal: Patient/Family Education  Outcome: Progressing Towards Goal     Problem: Falls - Risk of  Goal: *Absence of Falls  Description  Document Derick Senior Fall Risk and appropriate interventions in the flowsheet.   Outcome: Progressing Towards Goal  Note: Fall Risk Interventions:  Mobility Interventions: Assess mobility with egress test, Patient to call before getting OOB, PT Consult for mobility concerns    Mentation Interventions: Adequate sleep, hydration, pain control    Medication Interventions: Teach patient to arise slowly, Patient to call before getting OOB    Elimination Interventions: Call light in reach, Patient to call for help with toileting needs, Toileting schedule/hourly rounds    History of Falls Interventions: Consult care management for discharge planning

## 2020-01-12 LAB
ANION GAP SERPL CALC-SCNC: 6 MMOL/L (ref 5–15)
BASOPHILS # BLD: 0.1 K/UL (ref 0–0.1)
BASOPHILS NFR BLD: 1 % (ref 0–1)
BUN SERPL-MCNC: 7 MG/DL (ref 6–20)
BUN/CREAT SERPL: 14 (ref 12–20)
CALCIUM SERPL-MCNC: 6.9 MG/DL (ref 8.5–10.1)
CHLORIDE SERPL-SCNC: 112 MMOL/L (ref 97–108)
CO2 SERPL-SCNC: 24 MMOL/L (ref 21–32)
CREAT SERPL-MCNC: 0.51 MG/DL (ref 0.7–1.3)
DIFFERENTIAL METHOD BLD: ABNORMAL
EOSINOPHIL # BLD: 1.3 K/UL (ref 0–0.4)
EOSINOPHIL NFR BLD: 15 % (ref 0–7)
ERYTHROCYTE [DISTWIDTH] IN BLOOD BY AUTOMATED COUNT: 16 % (ref 11.5–14.5)
GLUCOSE SERPL-MCNC: 85 MG/DL (ref 65–100)
HCT VFR BLD AUTO: 26.3 % (ref 36.6–50.3)
HEMOCCULT STL QL: POSITIVE
HGB BLD-MCNC: 8.5 G/DL (ref 12.1–17)
IMM GRANULOCYTES # BLD AUTO: 0.1 K/UL (ref 0–0.04)
IMM GRANULOCYTES NFR BLD AUTO: 1 % (ref 0–0.5)
LYMPHOCYTES # BLD: 1 K/UL (ref 0.8–3.5)
LYMPHOCYTES NFR BLD: 12 % (ref 12–49)
MAGNESIUM SERPL-MCNC: 1.2 MG/DL (ref 1.6–2.4)
MCH RBC QN AUTO: 33.9 PG (ref 26–34)
MCHC RBC AUTO-ENTMCNC: 32.3 G/DL (ref 30–36.5)
MCV RBC AUTO: 104.8 FL (ref 80–99)
MONOCYTES # BLD: 0.9 K/UL (ref 0–1)
MONOCYTES NFR BLD: 10 % (ref 5–13)
NEUTS SEG # BLD: 5.1 K/UL (ref 1.8–8)
NEUTS SEG NFR BLD: 61 % (ref 32–75)
NRBC # BLD: 0 K/UL (ref 0–0.01)
NRBC BLD-RTO: 0 PER 100 WBC
PLATELET # BLD AUTO: 371 K/UL (ref 150–400)
PMV BLD AUTO: 10.7 FL (ref 8.9–12.9)
POTASSIUM SERPL-SCNC: 3.5 MMOL/L (ref 3.5–5.1)
RBC # BLD AUTO: 2.51 M/UL (ref 4.1–5.7)
RBC MORPH BLD: ABNORMAL
SODIUM SERPL-SCNC: 142 MMOL/L (ref 136–145)
WBC # BLD AUTO: 8.5 K/UL (ref 4.1–11.1)

## 2020-01-12 PROCEDURE — 74011250637 HC RX REV CODE- 250/637: Performed by: SURGERY

## 2020-01-12 PROCEDURE — 80048 BASIC METABOLIC PNL TOTAL CA: CPT

## 2020-01-12 PROCEDURE — 74011250636 HC RX REV CODE- 250/636: Performed by: INTERNAL MEDICINE

## 2020-01-12 PROCEDURE — 74011250637 HC RX REV CODE- 250/637: Performed by: INTERNAL MEDICINE

## 2020-01-12 PROCEDURE — 74011250636 HC RX REV CODE- 250/636: Performed by: NURSE PRACTITIONER

## 2020-01-12 PROCEDURE — 85025 COMPLETE CBC W/AUTO DIFF WBC: CPT

## 2020-01-12 PROCEDURE — 94760 N-INVAS EAR/PLS OXIMETRY 1: CPT

## 2020-01-12 PROCEDURE — 65660000000 HC RM CCU STEPDOWN

## 2020-01-12 PROCEDURE — 36415 COLL VENOUS BLD VENIPUNCTURE: CPT

## 2020-01-12 PROCEDURE — 77010033678 HC OXYGEN DAILY

## 2020-01-12 PROCEDURE — 74011250637 HC RX REV CODE- 250/637: Performed by: NURSE PRACTITIONER

## 2020-01-12 PROCEDURE — 82272 OCCULT BLD FECES 1-3 TESTS: CPT

## 2020-01-12 PROCEDURE — 74011250636 HC RX REV CODE- 250/636: Performed by: FAMILY MEDICINE

## 2020-01-12 PROCEDURE — 83735 ASSAY OF MAGNESIUM: CPT

## 2020-01-12 PROCEDURE — 74011000250 HC RX REV CODE- 250: Performed by: FAMILY MEDICINE

## 2020-01-12 PROCEDURE — C9113 INJ PANTOPRAZOLE SODIUM, VIA: HCPCS | Performed by: FAMILY MEDICINE

## 2020-01-12 PROCEDURE — 74011000258 HC RX REV CODE- 258: Performed by: INTERNAL MEDICINE

## 2020-01-12 PROCEDURE — P9047 ALBUMIN (HUMAN), 25%, 50ML: HCPCS | Performed by: NURSE PRACTITIONER

## 2020-01-12 RX ORDER — POTASSIUM CHLORIDE 750 MG/1
20 TABLET, FILM COATED, EXTENDED RELEASE ORAL
Status: COMPLETED | OUTPATIENT
Start: 2020-01-12 | End: 2020-01-12

## 2020-01-12 RX ORDER — MAGNESIUM SULFATE HEPTAHYDRATE 40 MG/ML
4 INJECTION, SOLUTION INTRAVENOUS ONCE
Status: COMPLETED | OUTPATIENT
Start: 2020-01-12 | End: 2020-01-12

## 2020-01-12 RX ORDER — MAGNESIUM SULFATE 4 G/50ML
4 INJECTION INTRAVENOUS ONCE
Status: DISCONTINUED | OUTPATIENT
Start: 2020-01-12 | End: 2020-01-12

## 2020-01-12 RX ORDER — ALBUMIN HUMAN 250 G/1000ML
12.5 SOLUTION INTRAVENOUS EVERY 8 HOURS
Status: COMPLETED | OUTPATIENT
Start: 2020-01-12 | End: 2020-01-13

## 2020-01-12 RX ADMIN — ALBUMIN (HUMAN) 12.5 G: 0.25 INJECTION, SOLUTION INTRAVENOUS at 21:48

## 2020-01-12 RX ADMIN — GABAPENTIN 100 MG: 100 CAPSULE ORAL at 13:22

## 2020-01-12 RX ADMIN — POTASSIUM CHLORIDE 20 MEQ: 750 TABLET, FILM COATED, EXTENDED RELEASE ORAL at 08:18

## 2020-01-12 RX ADMIN — PIPERACILLIN AND TAZOBACTAM 3.38 G: 3; .375 INJECTION, POWDER, LYOPHILIZED, FOR SOLUTION INTRAVENOUS at 21:47

## 2020-01-12 RX ADMIN — Medication 1 AMPULE: at 07:56

## 2020-01-12 RX ADMIN — PIPERACILLIN AND TAZOBACTAM 3.38 G: 3; .375 INJECTION, POWDER, LYOPHILIZED, FOR SOLUTION INTRAVENOUS at 04:24

## 2020-01-12 RX ADMIN — ASPIRIN 81 MG 81 MG: 81 TABLET ORAL at 08:09

## 2020-01-12 RX ADMIN — SODIUM CHLORIDE 40 MG: 9 INJECTION INTRAMUSCULAR; INTRAVENOUS; SUBCUTANEOUS at 08:18

## 2020-01-12 RX ADMIN — GABAPENTIN 300 MG: 300 CAPSULE ORAL at 21:47

## 2020-01-12 RX ADMIN — Medication 100 MG: at 08:10

## 2020-01-12 RX ADMIN — Medication 10 ML: at 08:09

## 2020-01-12 RX ADMIN — ALBUMIN (HUMAN) 12.5 G: 0.25 INJECTION, SOLUTION INTRAVENOUS at 15:00

## 2020-01-12 RX ADMIN — ACETAMINOPHEN 650 MG: 325 TABLET ORAL at 14:58

## 2020-01-12 RX ADMIN — GABAPENTIN 100 MG: 100 CAPSULE ORAL at 08:09

## 2020-01-12 RX ADMIN — TAMSULOSIN HYDROCHLORIDE 0.4 MG: 0.4 CAPSULE ORAL at 08:10

## 2020-01-12 RX ADMIN — VANCOMYCIN HYDROCHLORIDE 1000 MG: 1 INJECTION, POWDER, LYOPHILIZED, FOR SOLUTION INTRAVENOUS at 09:20

## 2020-01-12 RX ADMIN — FOLIC ACID 1 MG: 1 TABLET ORAL at 08:10

## 2020-01-12 RX ADMIN — VANCOMYCIN HYDROCHLORIDE 1000 MG: 1 INJECTION, POWDER, LYOPHILIZED, FOR SOLUTION INTRAVENOUS at 21:53

## 2020-01-12 RX ADMIN — PIPERACILLIN AND TAZOBACTAM 3.38 G: 3; .375 INJECTION, POWDER, LYOPHILIZED, FOR SOLUTION INTRAVENOUS at 12:46

## 2020-01-12 RX ADMIN — ACETAMINOPHEN 650 MG: 325 TABLET ORAL at 22:16

## 2020-01-12 RX ADMIN — THERA TABS 1 TABLET: TAB at 08:09

## 2020-01-12 RX ADMIN — Medication 20 ML: at 13:22

## 2020-01-12 RX ADMIN — MAGNESIUM SULFATE HEPTAHYDRATE 4 G: 40 INJECTION, SOLUTION INTRAVENOUS at 09:11

## 2020-01-12 RX ADMIN — Medication 10 ML: at 07:09

## 2020-01-12 RX ADMIN — SODIUM CHLORIDE 40 MG: 9 INJECTION INTRAMUSCULAR; INTRAVENOUS; SUBCUTANEOUS at 21:49

## 2020-01-12 RX ADMIN — Medication 10 ML: at 21:49

## 2020-01-12 RX ADMIN — PRAVASTATIN SODIUM 40 MG: 40 TABLET ORAL at 21:47

## 2020-01-12 NOTE — PROGRESS NOTES
Hemoccult stool was positive for blood,  BM was soft brown, no red seen,  Excoriation noted around anus,  Zinc cream applied after specimen obtained.

## 2020-01-12 NOTE — PROGRESS NOTES
Pharmacy Automatic Renal Dosing Protocol - Antimicrobials    Indication for Antimicrobials: UTI, osteomyelitis? Current Regimen of Each Antimicrobial:  Piperacillin/tazobactam 3.375g IV q8h - started 1/3 day 10  Vancomycin 1500 mg x 1 then 1000 mg Q12H (start: 1/10, day 3)    Previous Antimicrobial Therapy:  Ceftriaxone starting     Goal Level: VANCOMYCIN TROUGH GOAL RANGE    Vancomycin Trough: 15 - 20 mcg/mL  (AUC: 400 - 600 mg/hr/Liter/day)     Date Dose & Interval Measured (mcg/mL) Extrapolated (mcg/mL)                       Date & time of next level:  @ 0900    Significant Cultures:    UCx - Citrobacter Braakii and ENTEROCOCCUS FAECALIS GROUP D, citrobacter - S zosyn   - wound - rare staph species - pending   - anaerobic - NGTD - pending  1/10: blood - NGTD - pending    Radiology / Imaging results: (X-ray, CT scan or MRI): none pertinent    Labs:  Recent Labs     20  0356 20  0100 01/10/20  1251   CREA 0.51* 0.58* 0.54*   BUN 7 7 6   WBC 8.5 10.9 15.3*     Temp (24hrs), Av.3 °F (36.8 °C), Min:97.9 °F (36.6 °C), Max:98.6 °F (37 °C)    Paralysis, amputations, malnutrition:  Decubitus Sacral Region,  L foot TMA, albumin 1.5, Total protein 4.8    Creatinine Clearance (mL/min) or Dialysis:  76 (SCR multiplier 1.4)      Impression/Plan:   SCr stable, WBCs bumped overnight  BMP daily  Appears UTI is no longer the only concern. RNs attempting to gather blood cultures and podiatry note states patient has osteomyelitis  Continue Zosyn and vancomycin regimen at this time  Vancomycin trough  prior to 1000 dose  Antimicrobial stop date pending     Pharmacy will follow daily and adjust medications as appropriate for renal function and/or serum levels.     Thank you,  MIRIAN Coats

## 2020-01-12 NOTE — PROGRESS NOTES
Hospitalist Progress Note    NAME: Jennifer Camacho   :  [id number]   MRN:  [id number]     I reviewed pertinent labs/imaging and discussed plan of care with Dr. Royce Samuel who is in agreement. Assessment / Plan:  Patient continues to require hospitalization 2/2 ongoing infection, IV abx, and surgical intervention. His condition remains guarded and he is at risk for deterioration. Shock, multifactorial (sepsis/acute blood loss), resolved  UTI  Leucocytosis  Lactic acidosis  Cellulitis of head  Urine culture 20:  Citrobacter braakii, Enterococcus Faecalis Group D.  UA 20:  Nitrites negative, moderate LE, no bacteria.  + yeast.    Urine culture 01/10/20:  Pending. Operative tissue culture:  No organisms seen on gram stain. Rare possible Staph species. Anaerobic culture NG at 3 days. Blood culture 01/10/19:  NG at 2 days. Procalcitonin 20:  11.44  - Remains hemodynamically stable at this time. - Afebrile and non-toxic in appearance. - Stop IVF. - Continue Zosyn 3.375 mg IV q8h.  - Continue vancomycin 1000 mg IV q12h, dosing per pharmacy. Hypomagnesemia  - Replete. - Monitor. Protein calorie malnutrition  - Continue nutritional supplements. GIB, resolved  Diverticulosis  Acute blood loss anemia  Diarrhea, resolved  CT abdomen/pelvis 20:  1. No acute GI bleeding site demonstrated. 2. Colonic diverticulosis. 3. Moderate bilateral pleural effusions and adjacent atelectasis. 4. Recent right axillary femoral and cross femoral bypass. Anasarca. 5. No acute findings in the abdomen or pelvis. 6. Possible cholelithiasis. EGD 20:  Normal upper endoscopy. Enteric panel 20:  Negative. - Patient received total of 2 units PRBCs.  - H/H stable since drop on 20.  - Continue pantoprazole 40 mg IV q12h. - Continue to hold DVT chemoprophylaxis, ongoing evaluation to determine if safe to resume.  - Not a candidate for SCDs 2/2 severe PVD.    - Stool guaiac + today. - Monitor cbc. PVD (POA)  Peripheral neuropathy  - S/P Axillo-Bifemoral bypass on 01/02/20, incisions look good. 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  - Continue asa 81 mg po daily. - Continue gabapentin 100 mg po bid and 300 mg po at hs. Anasarca, improving   Pulmonary edema  Pleural effusions  Severe hyperalbuminemia   Echo 01/04/20:  · Normal cavity size and systolic function (ejection fraction normal). Increased wall thickness. Estimated left ventricular ejection fraction is 50 - 55%. Left ventricular diastolic dysfunction. · Trace mitral valve regurgitation is present. · Mild tricuspid valve regurgitation is present. · Small-to-moderate pericardial effusion. There is left pleural effusion.  - Continue to hold furosemide at this time. - Hold scheduled KCl while furosemide on hold. - Severe hypoalbuminemia likely contributing to 3rd spacing.    - Give 25% albumin 12.5 gms IV q8h x 3 doses. EtOH abuse  EtOH withdrawal with DTs, resolved  Tobacco abuse  - No signs of EtOH withdrawal during hospitalization.    - Continue MVI 1 po daily. - Continue folic acid 1 mg po daily. - Continue thiamine 100 mg po daily. - Continue nicotine patch. HTN  Dyslipidemia  - BP adequate. - Continue to monitor off antihypertensive meds. - Continue pravastatin 40 mg po daily.    - Add home antihypertensives prn. COPD without exacerbation   - No tx indicated at this time. BPH  Urinary retention  - No further urinary retention.  - Patient incontinent.    - Continue tamsulosin 0.4 mg po daily. 18.5 - 24.9 Normal weight / Body mass index is 23.08 kg/m². Code status: DNR  Prophylaxis: H2B/PPI and DVT prophylaxis on hold 2/2 ABL anemia  Recommended Disposition: Home w/Family     Subjective:     Chief Complaint / Reason for Physician Visit  No complaints. Adequate pain control. Plan of care reviewed with bedside RN.      Review of Systems:  Symptom Y/N Comments  Symptom Y/N Comments   Fever/Chills N   Chest Pain N    Poor Appetite N   Edema Y    Cough N   Abdominal Pain N    Sputum N   Joint Pain N    SOB/[place] N   Pruritis/Rash N    Nausea/vomit N   Tolerating PT/OT     Diarrhea N   Tolerating Diet Y    Constipation N   Other       Could NOT obtain due to:      Objective:     VITALS:   Last 24hrs VS reviewed since prior progress note. Most recent are:  Patient Vitals for the past 24 hrs:   Temp Pulse Resp BP SpO2   01/12/20 1200 97.9 °F (36.6 °C) 96 18 139/73 96 %   01/12/20 0751 98.1 °F (36.7 °C) 91  128/67 96 %   01/12/20 0700  90      01/12/20 0400  92      01/12/20 0000 98.6 °F (37 °C) 92 18 112/70 97 %   01/11/20 2000 98.5 °F (36.9 °C) 96 16 117/62 96 %   01/11/20 1457 98.6 °F (37 °C) (!) 104 16 118/58 96 %       Intake/Output Summary (Last 24 hours) at 1/12/2020 1308  Last data filed at 1/11/2020 2000  Gross per 24 hour   Intake 1360 ml   Output 100 ml   Net 1260 ml        PHYSICAL EXAM:  General:  A/A/O X 3. NAD. HEENT:  Normocephalic. Sclera anicteric. EOMI. Mucous membranes moist.    Chest:  Resps even/unlabored with symmetrical CWE. Air entry diminished at bases curt. Lungs CTA. No use of accessory muscles. CV:  RRR. Generalized edema improved. Continues to have penile and scrotal edema. 1 mm pretibial edema curt. GI:  Abdomen soft/NT/ND. ABT X 4.    Neurologic:  Nonfocal.  CN II-XII grossly intact. Speech normal.     Psych:  Cooperative. No anxiety or agitation. Skin:  Left TMA noted well healed. Right toes/foot dressed post-operatively. AO-Bifem surgical sites well approximated without erythema/exudate. Left lateral lower leg with breakdown. Occipital area of head with cellulitis. Serous weeping improved. No jaundice.       Reviewed most current lab test results and cultures  YES  Reviewed most current radiology test results   YES  Review and summation of old records today    NO  Reviewed patient's current orders and MAR    YES  PMH/SH reviewed - no change compared to H&P  ________________________________________________________________________  Care Plan discussed with:    Comments   Patient [address] [place] [address]     Consultant                        Multidiciplinary team rounds were held today with , nursing, pharmacist and clinical coordinator. Patient's plan of care was discussed; medications were reviewed and discharge planning was addressed. ___________________________________________________________________  Janel Rosa NP     Procedures: see electronic medical records for all procedures/Xrays and details which were not copied into this note but were reviewed prior to creation of Plan. LABS:  I reviewed today's most current labs and imaging studies.   Pertinent labs include:  Recent Labs     01/12/20  0356 01/11/20  0100 01/10/20  1251   WBC 8.5 10.9 15.3*   HGB 8.5* 8.9* 11.7*   HCT 26.3* 27.9* 34.7*    382 467*     Recent Labs     01/12/20  0356 01/11/20  0100 01/10/20  1251    139 137   K 3.5 3.8 3.5   * 109* 106   CO2 24 23 24   GLU 85 83 97   BUN 7 7 6   CREA 0.51* 0.58* 0.54*   CA 6.9* 7.2* 8.0*   MG 1.2* 1.6 1.4*   PHOS  --  3.0  --    ALB  --  1.4*  --    TBILI  --  0.2  --    SGOT  --  10*  --    ALT  --  13  --        Signed: Janel Rosa NP

## 2020-01-12 NOTE — PROGRESS NOTES
General Surgery End of Shift Nursing Note    Bedside shift change report given to  Nargis Padron RN(oncoming nurse) by Diamond Pineda RN (offgoing nurse). Report included the following information SBAR, Kardex and MAR. Shift worked:   7a-7p   Summary of shift:   Hemoccult stool positive, Large soft brown BM today. Albumin added to meds today every 8 hours. Wound care to see patient for eval of left lateral lower leg wound. Vanc trough 1/13 with 1000 dose. Issues for physician to address:       Number times ambulated in hallway past shift: 0    Number of times OOB to chair past shift: 0    Pain Management:  Current medication: see mar  Patient states pain is manageable on current pain medication: YES    GI:    Current diet:  DIET SNACKS HS Snack  DIET REGULAR  DIET NUTRITIONAL SUPPLEMENTS No; Lunch, Breakfast, Dinner; Ensure [place] current diet: YES  Passing flatus: YES  Last Bowel Movement: Today   Appearance: brown      Respiratory:    Incentive Spirometer at bedside: YES  Patient instructed on use: YES    Patient Safety:    Falls Score: 4  Bed Alarm On? No  Sitter?  No    Mckay Castorena RN

## 2020-01-12 NOTE — PROGRESS NOTES
Problem: Risk for Spread of Infection  Goal: Prevent transmission of infectious organism to others  Description  Prevent the transmission of infectious organisms to other patients, staff members, and visitors. Outcome: Progressing Towards Goal     Problem: Falls - Risk of  Goal: *Absence of Falls  Description  Document Derick Senior Fall Risk and appropriate interventions in the flowsheet.   Outcome: Progressing Towards Goal  Note: Fall Risk Interventions:  Mobility Interventions: Assess mobility with egress test    Mentation Interventions: Adequate sleep, hydration, pain control    Medication Interventions: Teach patient to arise slowly    Elimination Interventions: Call light in reach    History of Falls Interventions: Consult care management for discharge planning

## 2020-01-13 LAB
ANION GAP SERPL CALC-SCNC: 5 MMOL/L (ref 5–15)
BACTERIA SPEC CULT: ABNORMAL
BACTERIA SPEC CULT: NORMAL
BASOPHILS # BLD: 0.1 K/UL (ref 0–0.1)
BASOPHILS NFR BLD: 1 % (ref 0–1)
BUN SERPL-MCNC: 6 MG/DL (ref 6–20)
BUN/CREAT SERPL: 10 (ref 12–20)
CALCIUM SERPL-MCNC: 7.9 MG/DL (ref 8.5–10.1)
CHLORIDE SERPL-SCNC: 110 MMOL/L (ref 97–108)
CO2 SERPL-SCNC: 25 MMOL/L (ref 21–32)
CREAT SERPL-MCNC: 0.59 MG/DL (ref 0.7–1.3)
DATE LAST DOSE: ABNORMAL
DIFFERENTIAL METHOD BLD: ABNORMAL
EOSINOPHIL # BLD: 1.2 K/UL (ref 0–0.4)
EOSINOPHIL NFR BLD: 14 % (ref 0–7)
ERYTHROCYTE [DISTWIDTH] IN BLOOD BY AUTOMATED COUNT: 16 % (ref 11.5–14.5)
GLUCOSE SERPL-MCNC: 78 MG/DL (ref 65–100)
GRAM STN SPEC: ABNORMAL
GRAM STN SPEC: ABNORMAL
HCT VFR BLD AUTO: 27.8 % (ref 36.6–50.3)
HGB BLD-MCNC: 9.1 G/DL (ref 12.1–17)
IMM GRANULOCYTES # BLD AUTO: 0.1 K/UL (ref 0–0.04)
IMM GRANULOCYTES NFR BLD AUTO: 1 % (ref 0–0.5)
LYMPHOCYTES # BLD: 1 K/UL (ref 0.8–3.5)
LYMPHOCYTES NFR BLD: 12 % (ref 12–49)
MAGNESIUM SERPL-MCNC: 1.8 MG/DL (ref 1.6–2.4)
MCH RBC QN AUTO: 34.1 PG (ref 26–34)
MCHC RBC AUTO-ENTMCNC: 32.7 G/DL (ref 30–36.5)
MCV RBC AUTO: 104.1 FL (ref 80–99)
MONOCYTES # BLD: 0.9 K/UL (ref 0–1)
MONOCYTES NFR BLD: 11 % (ref 5–13)
NEUTS SEG # BLD: 5.3 K/UL (ref 1.8–8)
NEUTS SEG NFR BLD: 61 % (ref 32–75)
NRBC # BLD: 0 K/UL (ref 0–0.01)
NRBC BLD-RTO: 0 PER 100 WBC
PLATELET # BLD AUTO: 384 K/UL (ref 150–400)
PMV BLD AUTO: 10.8 FL (ref 8.9–12.9)
POTASSIUM SERPL-SCNC: 3.8 MMOL/L (ref 3.5–5.1)
RBC # BLD AUTO: 2.67 M/UL (ref 4.1–5.7)
RBC MORPH BLD: ABNORMAL
REPORTED DOSE,DOSE: ABNORMAL UNITS
REPORTED DOSE/TIME,TMG: ABNORMAL
SERVICE CMNT-IMP: ABNORMAL
SERVICE CMNT-IMP: NORMAL
SODIUM SERPL-SCNC: 140 MMOL/L (ref 136–145)
VANCOMYCIN TROUGH SERPL-MCNC: 21.7 UG/ML (ref 5–10)
WBC # BLD AUTO: 8.6 K/UL (ref 4.1–11.1)

## 2020-01-13 PROCEDURE — 97530 THERAPEUTIC ACTIVITIES: CPT

## 2020-01-13 PROCEDURE — 74011250636 HC RX REV CODE- 250/636: Performed by: NURSE PRACTITIONER

## 2020-01-13 PROCEDURE — 80048 BASIC METABOLIC PNL TOTAL CA: CPT

## 2020-01-13 PROCEDURE — C9113 INJ PANTOPRAZOLE SODIUM, VIA: HCPCS | Performed by: FAMILY MEDICINE

## 2020-01-13 PROCEDURE — 74011250636 HC RX REV CODE- 250/636: Performed by: FAMILY MEDICINE

## 2020-01-13 PROCEDURE — 36415 COLL VENOUS BLD VENIPUNCTURE: CPT

## 2020-01-13 PROCEDURE — 74011250637 HC RX REV CODE- 250/637: Performed by: INTERNAL MEDICINE

## 2020-01-13 PROCEDURE — 65660000000 HC RM CCU STEPDOWN

## 2020-01-13 PROCEDURE — 74011250636 HC RX REV CODE- 250/636: Performed by: INTERNAL MEDICINE

## 2020-01-13 PROCEDURE — 97116 GAIT TRAINING THERAPY: CPT

## 2020-01-13 PROCEDURE — 97530 THERAPEUTIC ACTIVITIES: CPT | Performed by: OCCUPATIONAL THERAPIST

## 2020-01-13 PROCEDURE — 74011000250 HC RX REV CODE- 250: Performed by: FAMILY MEDICINE

## 2020-01-13 PROCEDURE — 83735 ASSAY OF MAGNESIUM: CPT

## 2020-01-13 PROCEDURE — 74011250637 HC RX REV CODE- 250/637: Performed by: SURGERY

## 2020-01-13 PROCEDURE — 97535 SELF CARE MNGMENT TRAINING: CPT | Performed by: OCCUPATIONAL THERAPIST

## 2020-01-13 PROCEDURE — 74011250637 HC RX REV CODE- 250/637: Performed by: NURSE PRACTITIONER

## 2020-01-13 PROCEDURE — 85025 COMPLETE CBC W/AUTO DIFF WBC: CPT

## 2020-01-13 PROCEDURE — 94760 N-INVAS EAR/PLS OXIMETRY 1: CPT

## 2020-01-13 PROCEDURE — 80202 ASSAY OF VANCOMYCIN: CPT

## 2020-01-13 PROCEDURE — 97110 THERAPEUTIC EXERCISES: CPT | Performed by: OCCUPATIONAL THERAPIST

## 2020-01-13 PROCEDURE — P9047 ALBUMIN (HUMAN), 25%, 50ML: HCPCS | Performed by: NURSE PRACTITIONER

## 2020-01-13 PROCEDURE — 74011000258 HC RX REV CODE- 258: Performed by: INTERNAL MEDICINE

## 2020-01-13 RX ORDER — LOSARTAN POTASSIUM 25 MG/1
25 TABLET ORAL DAILY
Status: DISCONTINUED | OUTPATIENT
Start: 2020-01-13 | End: 2020-01-13

## 2020-01-13 RX ORDER — BALSAM PERU/CASTOR OIL
OINTMENT (GRAM) TOPICAL 2 TIMES DAILY
Status: DISCONTINUED | OUTPATIENT
Start: 2020-01-13 | End: 2020-01-22 | Stop reason: HOSPADM

## 2020-01-13 RX ADMIN — THERA TABS 1 TABLET: TAB at 09:40

## 2020-01-13 RX ADMIN — Medication 10 ML: at 22:03

## 2020-01-13 RX ADMIN — Medication 10 ML: at 05:16

## 2020-01-13 RX ADMIN — SODIUM CHLORIDE 40 MG: 9 INJECTION INTRAMUSCULAR; INTRAVENOUS; SUBCUTANEOUS at 09:40

## 2020-01-13 RX ADMIN — GABAPENTIN 300 MG: 300 CAPSULE ORAL at 21:59

## 2020-01-13 RX ADMIN — ACETAMINOPHEN 650 MG: 325 TABLET ORAL at 15:04

## 2020-01-13 RX ADMIN — DIPHENHYDRAMINE HYDROCHLORIDE 25 MG: 50 INJECTION, SOLUTION INTRAMUSCULAR; INTRAVENOUS at 14:49

## 2020-01-13 RX ADMIN — GABAPENTIN 100 MG: 100 CAPSULE ORAL at 14:43

## 2020-01-13 RX ADMIN — DIPHENHYDRAMINE HYDROCHLORIDE 25 MG: 50 INJECTION, SOLUTION INTRAMUSCULAR; INTRAVENOUS at 01:14

## 2020-01-13 RX ADMIN — PRAVASTATIN SODIUM 40 MG: 40 TABLET ORAL at 21:59

## 2020-01-13 RX ADMIN — ACETAMINOPHEN 650 MG: 325 TABLET ORAL at 21:59

## 2020-01-13 RX ADMIN — DIPHENHYDRAMINE HYDROCHLORIDE 25 MG: 50 INJECTION, SOLUTION INTRAMUSCULAR; INTRAVENOUS at 21:59

## 2020-01-13 RX ADMIN — FOLIC ACID 1 MG: 1 TABLET ORAL at 09:40

## 2020-01-13 RX ADMIN — GABAPENTIN 100 MG: 100 CAPSULE ORAL at 09:40

## 2020-01-13 RX ADMIN — VANCOMYCIN HYDROCHLORIDE 1000 MG: 1 INJECTION, POWDER, LYOPHILIZED, FOR SOLUTION INTRAVENOUS at 09:39

## 2020-01-13 RX ADMIN — PIPERACILLIN AND TAZOBACTAM 3.38 G: 3; .375 INJECTION, POWDER, LYOPHILIZED, FOR SOLUTION INTRAVENOUS at 21:59

## 2020-01-13 RX ADMIN — SODIUM CHLORIDE 40 MG: 9 INJECTION INTRAMUSCULAR; INTRAVENOUS; SUBCUTANEOUS at 21:58

## 2020-01-13 RX ADMIN — TAMSULOSIN HYDROCHLORIDE 0.4 MG: 0.4 CAPSULE ORAL at 09:00

## 2020-01-13 RX ADMIN — Medication 100 MG: at 09:40

## 2020-01-13 RX ADMIN — Medication 1 AMPULE: at 21:00

## 2020-01-13 RX ADMIN — PIPERACILLIN AND TAZOBACTAM 3.38 G: 3; .375 INJECTION, POWDER, LYOPHILIZED, FOR SOLUTION INTRAVENOUS at 14:43

## 2020-01-13 RX ADMIN — PIPERACILLIN AND TAZOBACTAM 3.38 G: 3; .375 INJECTION, POWDER, LYOPHILIZED, FOR SOLUTION INTRAVENOUS at 04:29

## 2020-01-13 RX ADMIN — ALBUMIN (HUMAN) 12.5 G: 0.25 INJECTION, SOLUTION INTRAVENOUS at 05:56

## 2020-01-13 RX ADMIN — ASPIRIN 81 MG 81 MG: 81 TABLET ORAL at 09:40

## 2020-01-13 NOTE — PROGRESS NOTES
Bedside and Verbal shift change report given to [address] (oncoming nurse) . Report included the following information SBAR, Kardex, ED Summary, OR Summary, Intake/Output, MAR and Recent Results.

## 2020-01-13 NOTE — PROGRESS NOTES
Problem: Mobility Impaired (Adult and Pediatric)  Goal: *Acute Goals and Plan of Care (Insert Text)  Description  FUNCTIONAL STATUS PRIOR TO ADMISSION: Pt was living at home alone on first level of 2 story home with 2 step entry. Per chart, he was falling and having a difficult time taking care of himself. Pt states he was not falling and that the only time he fell was when he had his stroke. HOME SUPPORT PRIOR TO ADMISSION: The patient lived alone with no local support. Physical Therapy Goals  Revised 1/13/2020  1. Patient will move from sup<>Sit, scoot up/down and roll side to side in bed with contact guard assistance within 7 days. 2.  Patient will transfer bed<>chair with cga using least restrictive device within 7 days. 3.  Patient will perform sit<>stand with S within 7 days. 4.  Patient will ambulate with sba 150 feet with a rolling walker within 7 days. Revised 1/6/2020  1. Patient will move from supine to sit and sit to supine, scoot up and down and roll side to side in bed with contact guard assistance within 7 days. 2.  Patient will transfer from bed to chair and chair to bed with minimal assistance using the least restrictive device within 7 days. 3.  Patient will perform sit to stand with CGA within 7 days. 4.  Patient will ambulate with moderate assistance 10 feet with a rolling walker within 7 days. Reviewed 12/31/2019 and downgraded  1. Patient will move from supine to sit and sit to supine, scoot up and down and roll side to side in bed with minimal assistance/contact guard assistance within 7 days. 2.  Patient will transfer from bed to chair and chair to bed with moderate assistance using the least restrictive device within 7 days. 3.  Patient will perform sit to stand with moderate assistance within 7 days. 4.  Patient will ambulate with moderate assistance of 1-2 for 10 feet with a rolling walker within 7 days. Initiated 12/24/2019  1.   Patient will move from supine to sit and sit to supine , scoot up and down and roll side to side in bed with independence within 7 day(s). 2.  Patient will transfer from bed to chair and chair to bed with modified independence using the least restrictive device within 7 day(s). 3.  Patient will perform sit to stand with modified independence within 7 day(s). 4.  Patient will ambulate with supervision/set-up for 75 feet with the least restrictive device within 7 day(s). Outcome: Progressing Towards Goal   PHYSICAL THERAPY TREATMENT: WEEKLY REASSESSMENT  Patient: Kelly Jewish (51 y.o. male)  Date: 1/13/2020  Primary Diagnosis: Alcohol withdrawal ([facility] [address] [F10.239]  Procedure(s) (LRB):  AMPUTATION 2ND TOE RIGHT FOOT (Right) 4 Days Post-Op   Precautions:   Fall, Contact, Seizure, DNR      ASSESSMENT  Patient continues with skilled PT services and is progressing towards goals, but continues to want to be waited on. Pt unwilling to compromise during tx session(became incontinent of bowel and offered to help get him cleaned up in the bathroom), regarding activity to address standing tolerance, balance, OOB time, endurance and LE strength. Pt is very self limiting, despite having the strength and ROM to work on mobility and and adl tasks. Surprisingly, pt able to ambulate in the room today with cga using a RW, with no report of pain. Unsure how much more progress pt will make at this point, due to his resistance to allow assessment of various activities. Patient's progression toward goals since last assessment: cga with ambulation and transfers, continues to require verbal safety cues    Current Level of Function Impacting Discharge (mobility/balance): mod a x 1 sup>sit, cga transfers/gait with RW    Functional Outcome Measure: The patient scored 45/100 on the Barthel Index outcome measure which is indicative of 55% impaired function/adls.       Other factors to consider for discharge: lives alone         PLAN :  Goals have been updated based on progression since last assessment. Patient continues to benefit from skilled intervention to address the above impairments. Recommendations and Planned Interventions: bed mobility training, transfer training, gait training, therapeutic exercises, patient and family training/education, and therapeutic activities      Frequency/Duration: Patient will be followed by physical therapy:  3 times a week to address goals.     Recommendation for discharge: (in order for the patient to meet his/her long term goals)  Therapy up to 5 days/week in SNF setting    This discharge recommendation:  Has been made in collaboration with the attending provider and/or case management    IF patient discharges home will need the following DME: to be determined (TBD)         SUBJECTIVE:   Patient stated no, no, when offered     OBJECTIVE DATA SUMMARY:   HISTORY:    Past Medical History:   Diagnosis Date    Abuse     alcoholism, quit 2012    Claudication of calf muscles ([facility] [address] 2013    Colovesical fistula     Dr Yashira Lemus    Esophageal stricture     Esophagitis     GI bleed 10/2016    Hemochromatosis     Hyperlipidemia     Hypertension     Peripheral artery disease ([facility] [address]     Dr. Brennan Rob    Pulmonary nodule     right lung     Past Surgical History:   Procedure Laterality Date    COLONOSCOPY N/A 9/14/2016    COLONOSCOPY performed by Geoff Ko MD at [facility] ENDOSCOPY    COLONOSCOPY N/A 12/19/2016    COLONOSCOPY performed by Geoff Ko MD at [facility] ENDOSCOPY    HX AMPUTATION Left 07/2016    left 4th toe from gangrene    HX ENDOSCOPY  10/2016    HX OTHER SURGICAL      left partial foot amputation       Personal factors and/or comorbidities impacting plan of care: resistance to instruction    Home Situation  Home Environment: Private residence  # Steps to Enter: 2  Rails to Enter: Yes  Hand Rails : Right  One/Two Story Residence: Two story, live on 1st floor  Living Alone: Yes  Support Systems: Family member(s)  Patient Expects to be Discharged to[de-identified] Private residence  Current DME Used/Available at Home: Cheryl Marquez, straight, Commode, bedside, Grab bars, Walker, rolling, Tub transfer bench  Tub or Shower Type: Tub/Shower combination    EXAMINATION/PRESENTATION/DECISION MAKING:   Critical Behavior:  Neurologic State: Alert  Orientation Level: Oriented X4  Cognition: Follows commands  Safety/Judgement: Awareness of environment  Hearing: Auditory  Auditory Impairment: None  Range Of Motion:  AROM: Generally decreased, functional                       Strength:    Strength: Generally decreased, functional                    Tone & Sensation:   Tone: Normal              Sensation: Impaired(reports neuropathy)               Coordination:  Coordination: Within functional limits       Functional Mobility:  Bed Mobility:  Rolling: Minimum assistance;Assist x1  Supine to Sit: Moderate assistance; Additional time;Assist x1     Scooting: Supervision; Additional time  Transfers:  Sit to Stand: Minimum assistance; Additional time;Assist x1(verbal cues to push up not pull on RW)  Stand to Sit: Contact guard assistance(verbal safety cues for hand placement)            Balance:   Sitting: Intact  Sitting - Static: Good (unsupported)  Sitting - Dynamic: Good (unsupported)  Standing: Impaired; Without support  Standing - Static: Constant support;Good  Standing - Dynamic : Constant support;Good  Ambulation/Gait Training:  Distance (ft): 80 Feet (ft)  Assistive Device: Gait belt;Walker, rolling  Ambulation - Level of Assistance: Contact guard assistance        Gait Abnormalities: Decreased step clearance; Other(forward trunk)              Speed/Amanda: Pace decreased (<100 feet/min)  Step Length: Left shortened;Right shortened         Therapeutic Exercises:   Encouraged pt to participate however pt said he would keep trying later    Functional Measure:  Barthel Index:    Bathin  Bladder: 0  Bowels: 0  Groomin  Dressin  Feeding: 10  Mobility: 10  Stairs: 0  Toilet Use: 5  Transfer (Bed to Chair and Back): 10  Total: 45/100       The Barthel ADL Index: Guidelines  1. The index should be used as a record of what a patient does, not as a record of what a patient could do. 2. The main aim is to establish degree of independence from any help, physical or verbal, however minor and for whatever reason. 3. The need for supervision renders the patient not independent. 4. A patient's performance should be established using the best available evidence. Asking the patient, friends/relatives and nurses are the usual sources, but direct observation and common sense are also important. However direct testing is not needed. 5. Usually the patient's performance over the preceding 24-48 hours is important, but occasionally longer periods will be relevant. 6. Middle categories imply that the patient supplies over 50 per cent of the effort. 7. Use of aids to be independent is allowed. Carlos Yeung., Barthel, D.W. (0648). Functional evaluation: the Barthel Index. [address] [facility] (14)2. MIRIAN Chandler, Kina Patel., [facility], [address] [place] [address] (1999). Measuring the change indisability after inpatient rehabilitation; comparison of the responsiveness of the Barthel Index and Functional [place] Measure. Journal of Neurology, Neurosurgery, and Psychiatry, 66(4), 311-993. Cristy Vela, N.J.A, BLAKE Burr, & Jocelyne Stewart, MMARSHALL. (2004.) Assessment of post-stroke quality of life in cost-effectiveness studies: The usefulness of the Barthel Index and the EuroQoL-5D. Quality of Life Research, 13, 427-43          Pain Rating:  None reported this visit    Activity Tolerance:   Fair  Please refer to the flowsheet for vital signs taken during this treatment.     After treatment patient left in no apparent distress:   Supine in bed, Heels elevated for pressure relief, and Call bell within reach    COMMUNICATION/EDUCATION:   The patients plan of care was discussed with: Occupational Therapist and Registered Nurse. Fall prevention education was provided and the patient/caregiver indicated understanding., Patient/family have participated as able in goal setting and plan of care. , and Patient/family agree to work toward stated goals and plan of care.     Thank you for this referral.  Tram Dickens, PT   Time Calculation: 31 mins

## 2020-01-13 NOTE — PROGRESS NOTES
Problem: Self Care Deficits Care Plan (Adult)  Goal: *Acute Goals and Plan of Care (Insert Text)  Description    FUNCTIONAL STATUS PRIOR TO ADMISSION: The patient was functional at baseline per his report. Reports Has DME at home    HOME SUPPORT: pt lived alone; he reports that his neighbors assist him    Occupational Therapy Goals    Goals reviewed on 1/10/2020 post surgery (R 2nd toe amputation) :    Change goal #3  All other goals remain appropriate to continue for 7 days. Initiated 12/26/2019; Re-evaluation 1/6/2020, goals updated below    1. Patient will perform grooming with set-up within 7 day(s). Progressing  2. Patient will perform sit to  preparation for functional transfers OOB with minimal assistance/contact guard assist within 7 day(s). Progressing  3. Patient will perform supine to sit EOB in preparation for bed level adls with minimal assistance/contact guard assist within 7 day(s). MET 1/6/2020. Post surgery on 1/10/2020-decline in function:  Continue Arnoldo level goal.  4.  Patient will perform toilet transfers with moderate assistance  within 7 day(s). Upgrade to CGA x1  5. Patient will perform all aspects of toileting with minimal assistance/contact guard assist within 7 day(s). Progressing  6. Patient will participate in upper extremity therapeutic exercise/activities with supervision/set-up for 10 minutes within 7 day(s). Progressing  7. Patient will demonstrate safe technique during functional mobility/ADL transfers within 7 days. Progressing  8. Patient will perform upper body dressing with minimal assistance within 7 days. Upgrade to supervision/set up  Goals added 1/6/2020:  9. Patient will tolerate static standing for 2 minutes in preparation for OOB ADLs with supervision/set-up within 7 day(s). 10.  Patient will perform anterior bathing from neck to thighs with supervision/set-up within 7 day(s).           Outcome: Progressing Towards Goal   OCCUPATIONAL THERAPY TREATMENT  Patient: Malou Khalil (50 y.o. male)  Date: 1/13/2020  Diagnosis: Alcohol withdrawal ([facility] [address] [F10.239]   Alcohol withdrawal ([facility] [address]  Procedure(s) (LRB):  AMPUTATION 2ND TOE RIGHT FOOT (Right) 4 Days Post-Op  Precautions: Fall, Contact, Seizure, DNR  Chart, occupational therapy assessment, plan of care, and goals were reviewed. ASSESSMENT  Patient continues with skilled OT services and is progressing towards goals. Pt demonstrated less pain and increased endurance using the RW this date. He was able to manage his socks and shoes with moderate assistance today and no pain. Pt  declined ambulating into the bathroom for toileting this date and preferred to have bed level toilet hygiene performed despite encouragement to participate and education on benefits of the activity OOB and in the bathroom. Pt is self limiting in his behavior and choices that he makes during tx session. He does not act on therapist's suggestions/treatment plan despite his goal to increase independence. He will need rehab at discharge. Current Level of Function Impacting Discharge (ADLs): minimal to total assistance    Other factors to consider for discharge: length of stay in hospital and several procedures. Limited control of his self and environment while hospitalized. PLAN :  Patient continues to benefit from skilled intervention to address the above impairments. Continue treatment per established plan of care. to address goals. Recommend with staff: encourage OOB toileting-can ambulate to BR using RW, surgical shoe, and his personal shoe. Up to chair for meals. Regular trips to bathroom may help with incontinence that pt is concerned about.       Recommend next OT session: ambulate to bathroom for self care activities    Recommendation for discharge: (in order for the patient to meet his/her long term goals)  Therapy up to 5 days/week in SNF setting    This discharge recommendation:  Has not yet been discussed the attending provider and/or case management    IF patient discharges home will need the following DME: to be determined (TBD)       SUBJECTIVE:   Patient stated No, I'm going back to bed . ... the nurses will help me.    (pt incontinent)    OBJECTIVE DATA SUMMARY:   Cognitive/Behavioral Status:  Neurologic State: Alert  Orientation Level: Appropriate for age  Cognition: [de-identified] commands(directing tx session  and is self limiting)  Perception: Appears intact  Perseveration: No perseveration noted  Safety/Judgement: Awareness of environment    Functional Mobility and Transfers for ADLs:  Bed Mobility:  Rolling: Minimum assistance;Assist x1  Supine to Sit: Moderate assistance; Additional time;Assist x1  Scooting: Supervision; Additional time    Transfers:  Sit to Stand: Minimum assistance; Additional time;Assist x1(verbal cues to push up not pull on RW)  Functional Transfers  Bathroom Mobility: (declines despite need for toileting, pericare and brief chg)  Toilet Transfer : (declines--may benefit from [facility], but would encourage mobility)  Adaptive Equipment: Walker (comment)       Balance:  Sitting: Intact  Sitting - Static: Good (unsupported)  Sitting - Dynamic: Good (unsupported)  Standing: Impaired; Without support  Standing - Static: Constant support;Good  Standing - Dynamic : Constant support;Good    ADL Intervention:  Feeding  Feeding Assistance: Independent                        Lower Body Dressing Assistance  Socks: Total assistance (dependent)  Shoes with Velcro: Moderate assistance  Slip on Shoes with Back: Moderate assistance  Leg Crossed Method Used: No  Position Performed: Seated edge of bed  Cues: Doff;Don;Physical assistance;Verbal cues provided    Toileting  Toileting Assistance:  Total assistance(dependent)(declines to perform in bathroom-requests nsg bed level [place])  Bladder Hygiene: (incontinent)  Bowel Hygiene: (incontinent)  Discussed regular bathroom/toileting to occur at same time  (hour to 2 hours) throughout the day as pt complains that he was not incontinent PTA. Cognitive Retraining  Safety/Judgement: Awareness of environment    Therapeutic Exercises:   Educated on using water pitcher for B shoulder flex/ext, B shoulder protraction/retraction, and biceps curls. Encouraged to participate as long as he can several times a day. Encouraged using his unused Ensure bottles as an alternative. Pt verbalized understanding, but he will likely need reinforcement      Pain:  No complaints of pain today. Activity Tolerance:   Fair  improved for ambulation in his room--declined self care post mobility with limited interest in therapeutic exercises.     After treatment patient left in no apparent distress:   Supine in bed, Heels elevated for pressure relief, Call bell within reach, Side rails x 3, and nursing informed     COMMUNICATION/COLLABORATION:   The patients plan of care was discussed with: Physical Therapist and Registered Nurse    Annemarie Garcia OTR/L  Time Calculation: 40 mins

## 2020-01-13 NOTE — WOUND CARE
Wound Care nurse consult from staff nurse for LLE wound. Patient is a 74 y/o CM who was admitted 3 weeks ago with alcohol withdrawal and has since had vascular surgeon and podiatrist do surgery on him for severe PAD with osteomyelitis of right foot/toes.  nurse all ready has seen patient for left heel and right toe wounds on 1/7  Past Medical History:   Diagnosis Date    Abuse     alcoholism, quit 2012    Claudication of calf muscles [facility]) 2013    Colovesical fistula     Dr Roberto Tovar Esophageal stricture     Esophagitis     GI bleed 10/2016    Hemochromatosis     Hyperlipidemia     Hypertension     Peripheral artery disease (HCC)     Dr. Edge Stagefabiana Pulmonary nodule     right lung     Past Surgical History:   Procedure Laterality Date    COLONOSCOPY N/A 9/14/2016    COLONOSCOPY performed by Claudia Neri MD at [facility] ENDOSCOPY    COLONOSCOPY N/A 12/19/2016    COLONOSCOPY performed by Claudia Neri MD at [facility] ENDOSCOPY    HX AMPUTATION Left 07/2016    left 4th toe from gangrene    HX ENDOSCOPY  10/2016    HX OTHER SURGICAL      left partial foot amputation     Patient states since the vascular surgery  He has ayleen with his left lateral leg on a pillow and this is where wounds occurred. Partial thickness wounds to LLE lateral leg measuring aprox 4 x 1 x 0.2 cm's.         Wound Leg lower Lateral;Left;Lower Stage 2, partial thickness woundabout size of quarter 01/10/20 (Active)   Dressing Status Intact 1/13/2020  1:58 PM   Dressing Type Foam 1/13/2020  1:58 PM   Non-staged Wound Description Partial thickness 1/13/2020  1:58 PM   Wound Length (cm) 4 cm 1/13/2020  1:58 PM   Wound Width (cm) 1 cm 1/13/2020  1:58 PM   Wound Depth (cm) 0.2 cm 1/13/2020  1:58 PM   Wound Surface Area (cm^2) 4 cm^2 1/13/2020  1:58 PM   Wound Volume (cm^3) 0.8 cm^3 1/13/2020  1:58 PM   Condition of [place] 1/13/2020  1:58 PM   Condition of Edges Open 1/13/2020  1:58 PM   Tissue Type Percent Pink 100 1/13/2020  1:58 PM Drainage Amount Scant 1/13/2020  1:58 PM   Drainage Color Serous 1/13/2020  1:58 PM   Wound Odor None 1/13/2020  1:58 PM   Cindy-wound Assessment Intact 1/13/2020  1:58 PM   Cleansing and Cleansing Agents  Dermal wound cleanser 1/12/2020  8:04 AM   Dressing Changed Changed/New 1/12/2020  8:04 AM   Dressing Type Applied Foam 1/11/2020  9:00 AM   Procedure Tolerated Well 1/13/2020  1:58 PM   Number of days: 3      Recommend:    LLE lateral wound: BID, cleanse with dermal wound cleanser, wipe clean. Apply Venelex ointment to wound and cover with a foam dressing.     Larry Levine RN, [employer]

## 2020-01-13 NOTE — PROGRESS NOTES
Pharmacy Automatic Renal Dosing Protocol - Antimicrobials    Indication for Antimicrobials: UTI, osteomyelitis? Current Regimen of Each Antimicrobial:  Piperacillin/tazobactam 3.375g IV q8h - started 1/3 day 11  Vancomycin 1500 mg x 1 then 1000 mg Q12H (start: 1/10, day 4)    Previous Antimicrobial Therapy:  Ceftriaxone starting     Goal Level: VANCOMYCIN TROUGH GOAL RANGE    Vancomycin Trough: 15 - 20 mcg/mL  (AUC: 400 - 600 mg/hr/Liter/day)     Date Dose & Interval Measured (mcg/mL) Extrapolated (mcg/mL)   [email or web address] Vanco 1g q12h 21.7 accurate                 Date & time of next level:  @ 0900    Significant Cultures:    UCx - Citrobacter Braakii and ENTEROCOCCUS FAECALIS GROUP D, citrobacter - S zosyn   - wound - rare staph species - pending   - anaerobic - NGTD - pending  1/10: blood - NGTD - pending    Radiology / Imaging results: (X-ray, CT scan or MRI): none pertinent    Labs:  Recent Labs     20  0225 20  0356 20  0100   CREA 0.59* 0.51* 0.58*   BUN 6 7 7   WBC 8.6 8.5 10.9     Temp (24hrs), Av.9 °F (36.6 °C), Min:97.5 °F (36.4 °C), Max:98.1 °F (36.7 °C)    Paralysis, amputations, malnutrition:  Decubitus Sacral Region,  L foot TMA, albumin 1.5, Total protein 4.8    Creatinine Clearance (mL/min) or Dialysis:  76 (SCR multiplier 1.4)      Impression/Plan:   SCr stable, WBCs bumped overnight  BMP daily  Appears UTI is no longer the only concern. RNs attempting to gather blood cultures and podiatry note states patient has osteomyelitis  Received requested trough prior to 20 1000 dose= 21.7, supra therapeutic, will adjust regimen to 750 mg q12h to achieve theoretic peak/ trough/ AUC 6  Continue Zosyn and vancomycin regimen at this time  Antimicrobial stop date pending     Pharmacy will follow daily and adjust medications as appropriate for renal function and/or serum levels.

## 2020-01-13 NOTE — PROGRESS NOTES
General Surgery End of Shift Nursing Note    Bedside shift change report given to Grey Garcia RN (oncoming nurse) by Best Raza RN (offgoing nurse). Report included the following information SBAR. Shift worked:   7am-7pm   Summary of shift:    Patient in bed resting quietly\    Several soft BM today    Heal boots in place    Patient complaining of left foot pain. (given tylenol)    Tolerating diet    All incision sited CDI       Issues for physician to address:   Woundcare orders call for muperocin. Need order to be put in. Number times ambulated in hallway past shift: 0    Number of times OOB to chair past shift: 0    Pain Management:  Current medication: tylenol  Patient states pain is manageable on current pain medication: YES    GI:    Current diet:  DIET SNACKS HS Snack  DIET REGULAR  DIET NUTRITIONAL SUPPLEMENTS No; Lunch, Breakfast, Dinner; Ensure [place] current diet: YES  Passing flatus: YES  Last Bowel Movement: today   Appearance: soft    Respiratory:    Incentive Spirometer at bedside: YES  Patient instructed on use: YES    Patient Safety:    Falls Score: 3  Bed Alarm On? Not applicable  Sitter?  Not applicable    Best Raza

## 2020-01-13 NOTE — PROGRESS NOTES
Vascular Surgery Progress Note  Zuleima Calvert ACNP-BC  1/13/2020       Subjective:     Mr. Joey Son has a pmhx significant for alcoholism, COPD, DM, PAFib, HTN, HLD, and GERD. Spencer Gonzales continues to smoke daily. Spencer Gonzales has a pmhx significant for LLE stenting per his report. Spencer Gonzales is s/p TMA of the left foot (10/2016). Spencer Gonzales presented to the clinic in November 2019 w/ compliant of BLE claudication, BLE nocturnal cramping, and a wound to the incision of the left TMA after hitting his foot.  He has ischemia of the left foot and rubor of the right foot.  His ABIs were right 0.40 w/ a flatline TBI and left 0.32 w/ a surgically absent left great toe.  He underwent an arteriogram on 11/5/2019 that revealed severe bilateral aorto iliac disease that was not amendable to endovascular intervention. Spencer Gonzales returned to the clinic  w/ a CTA that was significant for an occluded left common iliac and left external iliac artery and occluded right external iliac artery. . A ax-bifemoral bypass was planned.  Spencer Gonzales then presented to the emergency room on 12/16/2019 w/ complaint of BLE weakness. Spencer Gonzales was admitted to [facility] and diagnosed w/ cystitis.  While admitted he was noted to have left lateral heel ulcer.  He was discharged to the [address] [place] [address] at [facility] on 12/20/2019 for rehabilitation.  Late that evening he developed active w/d symptoms and returned to the emergency room at [address] was then transferred to [facility] for management of alcohol withdrawal which has resolved.  His initial urine cx grew Klebsiella and he completed a course of antibx.  A follow up urine cx showed no growth. He then developed a low grade fever and leukocytosis. Repeat urine cx was + for citrobacter and enterococcus. He was noted to have urinary retention and Urology was consulted. Outpatient follow up was recommended. His hospitalization was complicated by bilateral pleural effusions w/ LLL partial collapse resulting in hypoxic respiratory failure.   On 01/02/2020 he underwent axillo-bifemoral bypass. His post procedure course was complicated by hypotension requiring vasopressor support which was weaned off on 01/05/2020. He developed a GI Bleed that was treated w/ transfusion and PPI. His EGD was unremarkable. Since admission he has developed pressure ulcer right heel,  left lateral ankle, and left calf. He is s/p amputation of the right second toe on 01/09/2020. This am he reports excoriation of the \"anus\" after sitting in stool one night over the weekend for greater than 2 hrs per his reports. He was noted to have blood in his stool yesterday and he believes it is from the excoriation. Despite developing PU to his left leg this admission on my arrival his heels are resting on a pillow w/o suspension boots this am.  His feet remains warm and perfused. Nursing Data:     Patient Vitals for the past 24 hrs:   BP Temp Pulse Resp SpO2 Weight   01/13/20 0806 149/83 97.5 °F (36.4 °C) 90 16 95 %    01/13/20 0313 136/73 98.1 °F (36.7 °C) 78 18 96 %    01/12/20 2352 140/69 98 °F (36.7 °C) 87 18 98 %    01/12/20 1946 136/72 98.1 °F (36.7 °C) 94 18 98 %    01/12/20 1507 153/72 98.1 °F (36.7 °C) 95 17 99 %    01/12/20 1457      64.1 kg (141 lb 6.4 oz)   01/12/20 1200 139/73 97.9 °F (36.6 °C) 96 18 96 %      ---------------------------------------------------------------------------------------------------------    Intake/Output Summary (Last 24 hours) at 1/13/2020 1008  Last data filed at 1/13/2020 0537  Gross per 24 hour   Intake 2621.25 ml   Output    Net 2621.25 ml       Exam:     Physical Exam  Constitutional:       Comments: Appears generally deconditioned and chronically ill. HENT:      Head: Normocephalic. Nose: Nose normal.      Mouth/Throat:      Mouth: Mucous membranes are moist.   Eyes:      Pupils: Pupils are equal, round, and reactive to light. Neck:      Musculoskeletal: Normal range of motion.    Cardiovascular:      Rate and Rhythm: Normal rate and regular rhythm. Pulses:           Femoral pulses are 2+ on the right side and 2+ on the left side. Comments: His feet are warm and perfused. Pulmonary:      Effort: Pulmonary effort is normal. No tachypnea, accessory muscle usage or respiratory distress. Breath sounds: No rales. Abdominal:      General: Abdomen is flat. Palpations: Abdomen is soft. Musculoskeletal: Normal range of motion. Skin:  Right heel DTI   Right second toe amputation   Bulky dry dressing to right foot  Left TMA incision ulceration   Resolved   Left heel ulceration is stable  Left leg calf PU  Left lateral ankle PU  His surgical incision to the BL groins are intact. Neurological:      Mental Status: He is alert. Mental status is at baseline. Psychiatric:         Mood and Affect: Mood normal.         Behavior: Behavior normal.      Lab Review:     . Recent Results (from the past 24 hour(s))   OCCULT BLOOD, STOOL    Collection Time: 01/12/20 12:00 PM   Result Value Ref Range    Occult blood, stool POSITIVE (A) NEG     CBC WITH AUTOMATED DIFF    Collection Time: 01/13/20  2:25 AM   Result Value Ref Range    WBC 8.6 4.1 - 11.1 K/uL    RBC 2.67 (L) 4.10 - 5.70 M/uL    HGB 9.1 (L) 12.1 - 17.0 g/dL    HCT 27.8 (L) 36.6 - 50.3 %    .1 (H) 80.0 - 99.0 FL    MCH 34.1 (H) 26.0 - 34.0 PG    MCHC 32.7 30.0 - 36.5 g/dL    RDW 16.0 (H) 11.5 - 14.5 %    PLATELET 084 725 - 155 K/uL    MPV 10.8 8.9 - 12.9 FL    NRBC 0.0 0  WBC    ABSOLUTE NRBC 0.00 0.00 - 0.01 K/uL    NEUTROPHILS 61 32 - 75 %    LYMPHOCYTES 12 12 - 49 %    MONOCYTES 11 5 - 13 %    EOSINOPHILS 14 (H) 0 - 7 %    BASOPHILS 1 0 - 1 %    IMMATURE GRANULOCYTES 1 (H) 0.0 - 0.5 %    ABS. NEUTROPHILS 5.3 1.8 - 8.0 K/UL    ABS. LYMPHOCYTES 1.0 0.8 - 3.5 K/UL    ABS. MONOCYTES 0.9 0.0 - 1.0 K/UL    ABS. EOSINOPHILS 1.2 (H) 0.0 - 0.4 K/UL    ABS. BASOPHILS 0.1 0.0 - 0.1 K/UL    ABS. IMM.  GRANS. 0.1 (H) 0.00 - 0.04 K/UL    DF AUTOMATED      RBC COMMENTS NORMOCYTIC, NORMOCHROMIC     MAGNESIUM    Collection Time: 01/13/20  2:25 AM   Result Value Ref Range    Magnesium 1.8 1.6 - 2.4 mg/dL   METABOLIC PANEL, BASIC    Collection Time: 01/13/20  2:25 AM   Result Value Ref Range    Sodium 140 136 - 145 mmol/L    Potassium 3.8 3.5 - 5.1 mmol/L    Chloride 110 (H) 97 - 108 mmol/L    CO2 25 21 - 32 mmol/L    Anion gap 5 5 - 15 mmol/L    Glucose 78 65 - 100 mg/dL    BUN 6 6 - 20 MG/DL    Creatinine 0.59 (L) 0.70 - 1.30 MG/DL    BUN/Creatinine ratio 10 (L) 12 - 20      GFR est AA >60 >60 ml/min/1.73m2    GFR est non-AA >60 >60 ml/min/1.73m2    Calcium 7.9 (L) 8.5 - 10.1 MG/DL          Assessment/Plan:      Consult problem  Severe bilateral PAD w/ multiple ulcerations of the BLE  -s/p axillo-bifemoral bypass on 01/02/2020.     Stable from a vascular standpoint. Continue to encourage OOB w/ timi life vs lift team.  Heel suspension boots at all times. Active problems  Excoriation  POA no  Right heel DTI POA no  Right second toe ulcer POA yes   -s/p amputation 01/09/2020   -bulky dry dressing to right foot C/D/I  Left TMA incision ulceration POA yes    Resolved   Left heel ulceration is stable POA yes   Left leg calf PU POA no  Left lateral ankle PU POA no   Recommend re-evaluation by wound care team.  Old wounds since admission that are slow to improve and multiple new wounds to BLE despite revascularization. Post procedural hypotension POA no  -requiring vasopressor support  -resolved  Hypertensive disorder   -consider resuming HCTZ   Hypoalbuminemia   PAfib w/ RVR   -tachycardia resolved   -poor candidate for [facility] w/ hx of GIB of unknown etiology and cognitive deficit. Hyperlipidemia  -on statin   Acute hypoxic respiratory failure   -patient continues to require oxygen. Wean as tolerated. Bilateral pleural effusion w/ partial LLL collapse  ECHO 01/04/2020  · Normal cavity size and systolic function (ejection fraction normal). Increased wall thickness.  Estimated left ventricular ejection fraction is 50 - 55%. Left ventricular diastolic dysfunction. · Trace mitral valve regurgitation is present. · Mild tricuspid valve regurgitation is present. · Small-to-moderate pericardial effusion. There is left pleural effusion  COPD  -not in exacerbation     Metabolic encephalopathy   -resolved @ baseline   Chronic cognitive deficit     Sepsis  -resolved   Urinary retention  -patient voided twice prior to procedure and had retained urine when isabel was placed for procedure. Recurrent UTI   -initial urine cxKLEBISIELLA PNEUMONIAE ( 3 day course of Rocephin completed)  -2nd urine cx NG  -3rd urine cx CITROBACTER BRAAKII & ENTEROCOCCUS (continues on Zosyn day 7)  Outpatient f/u w/ Urology.       GI Bleed  Diverticulosis   GERD  -EGD unremarkable   Macrocytic Anemia   -H&H continues to be labile   Thrombocytopenia  -resolved   Plan per gastroenterology       Alcohol withdrawal  -resolved   Thiamine deficiency   -on oral supplementation   Diabetes Mellitus w/ hypoglycemia and hypoalbuminemia    -on liberalized diet w/ hs snack   -suspect malnutrition of moderate degree  -possible liver dysfunction resulting in poor glycogen stores  -hypoglycemia improved     Hypernatremia   -resolved   Hypokalemia   -resolved   Hypophosphatemia   Hypomagnesemia  -resolved   Management of comorbid conditions by primary team.     VTE Prophylaxis:  SCDs: contraindicated in severe BLE PAD  Not a candidate for SQ w/ hx of GIB     Disposition:  SNF.  Agree w/ ECF following rehabilitation.         Vascular surgery will continue to follow patient intermittently during admission. We appreciate the opportunity to participate in the care of Mr. Phil Cobos. Patient should f/u in 3 weeks with Dr. Elizabeth Mitchell. Please call for any urgent vascular issues.

## 2020-01-13 NOTE — PROGRESS NOTES
Plan:  -SNF       2:30PM  CM PC to [facility] at [facility]. CM team continuing to follow for possible admission to a swing bed. Therapy continuing to recommend SNF. CM will continue to follow and assist with d/c planning.        Efren Mijares MSW  Care Manager

## 2020-01-14 LAB
ALBUMIN SERPL-MCNC: 1.9 G/DL (ref 3.5–5)
ALBUMIN/GLOB SERPL: 0.6 {RATIO} (ref 1.1–2.2)
ALP SERPL-CCNC: 42 U/L (ref 45–117)
ALT SERPL-CCNC: 13 U/L (ref 12–78)
ANION GAP SERPL CALC-SCNC: 8 MMOL/L (ref 5–15)
AST SERPL-CCNC: 12 U/L (ref 15–37)
BASOPHILS # BLD: 0.1 K/UL (ref 0–0.1)
BASOPHILS NFR BLD: 1 % (ref 0–1)
BILIRUB SERPL-MCNC: 0.3 MG/DL (ref 0.2–1)
BUN SERPL-MCNC: 5 MG/DL (ref 6–20)
BUN/CREAT SERPL: 9 (ref 12–20)
CALCIUM SERPL-MCNC: 8 MG/DL (ref 8.5–10.1)
CHLORIDE SERPL-SCNC: 110 MMOL/L (ref 97–108)
CO2 SERPL-SCNC: 24 MMOL/L (ref 21–32)
CREAT SERPL-MCNC: 0.57 MG/DL (ref 0.7–1.3)
DIFFERENTIAL METHOD BLD: ABNORMAL
EOSINOPHIL # BLD: 1.1 K/UL (ref 0–0.4)
EOSINOPHIL NFR BLD: 13 % (ref 0–7)
ERYTHROCYTE [DISTWIDTH] IN BLOOD BY AUTOMATED COUNT: 15.9 % (ref 11.5–14.5)
GLOBULIN SER CALC-MCNC: 3 G/DL (ref 2–4)
GLUCOSE SERPL-MCNC: 72 MG/DL (ref 65–100)
HCT VFR BLD AUTO: 27.2 % (ref 36.6–50.3)
HGB BLD-MCNC: 8.8 G/DL (ref 12.1–17)
IMM GRANULOCYTES # BLD AUTO: 0.1 K/UL (ref 0–0.04)
IMM GRANULOCYTES NFR BLD AUTO: 1 % (ref 0–0.5)
LYMPHOCYTES # BLD: 1.1 K/UL (ref 0.8–3.5)
LYMPHOCYTES NFR BLD: 13 % (ref 12–49)
MAGNESIUM SERPL-MCNC: 1.4 MG/DL (ref 1.6–2.4)
MCH RBC QN AUTO: 33.6 PG (ref 26–34)
MCHC RBC AUTO-ENTMCNC: 32.4 G/DL (ref 30–36.5)
MCV RBC AUTO: 103.8 FL (ref 80–99)
MONOCYTES # BLD: 1 K/UL (ref 0–1)
MONOCYTES NFR BLD: 12 % (ref 5–13)
NEUTS SEG # BLD: 4.8 K/UL (ref 1.8–8)
NEUTS SEG NFR BLD: 60 % (ref 32–75)
NRBC # BLD: 0 K/UL (ref 0–0.01)
NRBC BLD-RTO: 0 PER 100 WBC
PLATELET # BLD AUTO: 369 K/UL (ref 150–400)
PMV BLD AUTO: 11 FL (ref 8.9–12.9)
POTASSIUM SERPL-SCNC: 3.5 MMOL/L (ref 3.5–5.1)
PROT SERPL-MCNC: 4.9 G/DL (ref 6.4–8.2)
RBC # BLD AUTO: 2.62 M/UL (ref 4.1–5.7)
RBC MORPH BLD: ABNORMAL
SODIUM SERPL-SCNC: 142 MMOL/L (ref 136–145)
WBC # BLD AUTO: 8.2 K/UL (ref 4.1–11.1)

## 2020-01-14 PROCEDURE — 74011250637 HC RX REV CODE- 250/637: Performed by: NURSE PRACTITIONER

## 2020-01-14 PROCEDURE — 65660000000 HC RM CCU STEPDOWN

## 2020-01-14 PROCEDURE — 74011250636 HC RX REV CODE- 250/636: Performed by: FAMILY MEDICINE

## 2020-01-14 PROCEDURE — 74011250637 HC RX REV CODE- 250/637: Performed by: INTERNAL MEDICINE

## 2020-01-14 PROCEDURE — 94760 N-INVAS EAR/PLS OXIMETRY 1: CPT

## 2020-01-14 PROCEDURE — 85025 COMPLETE CBC W/AUTO DIFF WBC: CPT

## 2020-01-14 PROCEDURE — 74011000258 HC RX REV CODE- 258: Performed by: INTERNAL MEDICINE

## 2020-01-14 PROCEDURE — C9113 INJ PANTOPRAZOLE SODIUM, VIA: HCPCS | Performed by: FAMILY MEDICINE

## 2020-01-14 PROCEDURE — 74011250636 HC RX REV CODE- 250/636: Performed by: INTERNAL MEDICINE

## 2020-01-14 PROCEDURE — 77030040393 HC DRSG OPTIFOAM GENT MDII -B

## 2020-01-14 PROCEDURE — 74011250637 HC RX REV CODE- 250/637: Performed by: SURGERY

## 2020-01-14 PROCEDURE — 74011250636 HC RX REV CODE- 250/636: Performed by: NEUROMUSCULOSKELETAL MEDICINE & OMM

## 2020-01-14 PROCEDURE — 74011250637 HC RX REV CODE- 250/637: Performed by: PODIATRIST

## 2020-01-14 PROCEDURE — 80053 COMPREHEN METABOLIC PANEL: CPT

## 2020-01-14 PROCEDURE — 83735 ASSAY OF MAGNESIUM: CPT

## 2020-01-14 PROCEDURE — 77030037877 HC DRSG MEPILEX >48IN BORD MOLN -A

## 2020-01-14 PROCEDURE — 74011000250 HC RX REV CODE- 250: Performed by: FAMILY MEDICINE

## 2020-01-14 PROCEDURE — 36415 COLL VENOUS BLD VENIPUNCTURE: CPT

## 2020-01-14 RX ORDER — MAGNESIUM SULFATE 1 G/100ML
1 INJECTION INTRAVENOUS ONCE
Status: COMPLETED | OUTPATIENT
Start: 2020-01-14 | End: 2020-01-14

## 2020-01-14 RX ORDER — MUPIROCIN 20 MG/G
OINTMENT TOPICAL DAILY
Status: DISCONTINUED | OUTPATIENT
Start: 2020-01-14 | End: 2020-01-22 | Stop reason: HOSPADM

## 2020-01-14 RX ADMIN — Medication 10 ML: at 06:00

## 2020-01-14 RX ADMIN — PIPERACILLIN AND TAZOBACTAM 3.38 G: 3; .375 INJECTION, POWDER, LYOPHILIZED, FOR SOLUTION INTRAVENOUS at 05:50

## 2020-01-14 RX ADMIN — Medication 10 ML: at 17:16

## 2020-01-14 RX ADMIN — Medication 1 AMPULE: at 09:32

## 2020-01-14 RX ADMIN — GABAPENTIN 100 MG: 100 CAPSULE ORAL at 14:35

## 2020-01-14 RX ADMIN — Medication 100 MG: at 09:25

## 2020-01-14 RX ADMIN — MUPIROCIN: 20 OINTMENT TOPICAL at 10:26

## 2020-01-14 RX ADMIN — VANCOMYCIN HYDROCHLORIDE 750 MG: 750 INJECTION, POWDER, LYOPHILIZED, FOR SOLUTION INTRAVENOUS at 03:59

## 2020-01-14 RX ADMIN — PIPERACILLIN AND TAZOBACTAM 3.38 G: 3; .375 INJECTION, POWDER, LYOPHILIZED, FOR SOLUTION INTRAVENOUS at 14:35

## 2020-01-14 RX ADMIN — PRAVASTATIN SODIUM 40 MG: 40 TABLET ORAL at 21:11

## 2020-01-14 RX ADMIN — GABAPENTIN 300 MG: 300 CAPSULE ORAL at 21:09

## 2020-01-14 RX ADMIN — SODIUM CHLORIDE 40 MG: 9 INJECTION INTRAMUSCULAR; INTRAVENOUS; SUBCUTANEOUS at 21:10

## 2020-01-14 RX ADMIN — MAGNESIUM SULFATE HEPTAHYDRATE 1 G: 1 INJECTION, SOLUTION INTRAVENOUS at 11:54

## 2020-01-14 RX ADMIN — THERA TABS 1 TABLET: TAB at 09:25

## 2020-01-14 RX ADMIN — FOLIC ACID 1 MG: 1 TABLET ORAL at 09:25

## 2020-01-14 RX ADMIN — VANCOMYCIN HYDROCHLORIDE 750 MG: 750 INJECTION, POWDER, LYOPHILIZED, FOR SOLUTION INTRAVENOUS at 17:15

## 2020-01-14 RX ADMIN — ASPIRIN 81 MG 81 MG: 81 TABLET ORAL at 09:25

## 2020-01-14 RX ADMIN — GABAPENTIN 100 MG: 100 CAPSULE ORAL at 09:25

## 2020-01-14 RX ADMIN — ACETAMINOPHEN 650 MG: 325 TABLET ORAL at 22:11

## 2020-01-14 RX ADMIN — Medication 10 ML: at 21:11

## 2020-01-14 RX ADMIN — CASTOR OIL AND BALSAM, PERU: 788; 87 OINTMENT TOPICAL at 17:20

## 2020-01-14 RX ADMIN — TAMSULOSIN HYDROCHLORIDE 0.4 MG: 0.4 CAPSULE ORAL at 09:25

## 2020-01-14 RX ADMIN — Medication 1 AMPULE: at 21:00

## 2020-01-14 RX ADMIN — CASTOR OIL AND BALSAM, PERU: 788; 87 OINTMENT TOPICAL at 09:33

## 2020-01-14 RX ADMIN — SODIUM CHLORIDE 40 MG: 9 INJECTION INTRAMUSCULAR; INTRAVENOUS; SUBCUTANEOUS at 09:25

## 2020-01-14 RX ADMIN — PIPERACILLIN AND TAZOBACTAM 3.38 G: 3; .375 INJECTION, POWDER, LYOPHILIZED, FOR SOLUTION INTRAVENOUS at 22:00

## 2020-01-14 NOTE — PROGRESS NOTES
Jocelyne Corporal RN  7am-7pm    Patient in bed this morning. No complaints of pain    Wound care team changed right foot dressing    Left lower leg dressing changed    Scalp assessed by wound care.  Dry and flaky nothing can be done at this time    BM X4    Report given to [facility]

## 2020-01-14 NOTE — PROGRESS NOTES
Hospitalist Progress Note    NAME: Callie Began   :  [id number]   MRN:  [id number]       Assessment / Plan:  Shock, multifactorial (sepsis/acute blood loss), resolved  UTI  Leucocytosis  Lactic acidosis  Cellulitis of head  Urine culture 20:  Citrobacter braakii, Enterococcus Faecalis Group D.  UA 20:  Nitrites negative, moderate LE, no bacteria.  + yeast.    Urine culture 01/10/20:  Pending. Operative tissue culture:  No organisms seen on gram stain. Rare possible Staph species. Anaerobic culture NGTD    Blood culture 01/10/19:  NGTD.    - Continue Zosyn 3.375 mg IV q8h.  - Continue vancomycin 1000 mg IV q12h, dosing per pharmacy.       Hypomagnesemia  - give 1gm mgso4      Protein calorie malnutrition  - Continue nutritional supplements.       GIB, resolved  Diverticulosis  Acute blood loss anemia  Diarrhea, resolved  CT abdomen/pelvis 20:  1. No acute GI bleeding site demonstrated. 2. Colonic diverticulosis. 3. Moderate bilateral pleural effusions and adjacent atelectasis. 4. Recent right axillary femoral and cross femoral bypass. Anasarca. 5. No acute findings in the abdomen or pelvis. 6. Possible cholelithiasis. EGD 20:  Normal upper endoscopy. Enteric panel 20:  Negative. - Patient received total of 2 units PRBCs.  - H/H stable since drop on 20.  - Continue pantoprazole 40 mg IV q12h. - Continue to hold DVT chemoprophylaxis, ongoing evaluation to determine if safe to resume.  - Not a candidate for SCDs 2/2 severe PVD. - Stool guaiac + today. - Monitor cbc.     PVD (POA)  Peripheral neuropathy  - S/P Axillo-Bifemoral bypass on 20, incisions look good. - S/P amputation of right second toe 20.  - Continue asa 81 mg po daily.     - Continue gabapentin 100 mg po bid and 300 mg po at hs.     Anasarca, improving   Pulmonary edema  Pleural effusions  Severe hyperalbuminemia   Normal ef with diastolic dysfunction and moderate pericardial effusion. Lasix on hold.     EtOH abuse  EtOH withdrawal with DTs, resolved  Tobacco abuse  - no signs of etoh withdrawal.     HTN  Dyslipidemia  - bp controlled.     COPD without exacerbation        BPH  Urinary retention    - Continue tamsulosin 0.4 mg po daily.               Code status: DNR  Prophylaxis: H2B/PPI and DVT prophylaxis on hold 2/2 ABL anemia  Recommended Disposition: Home w/Family      18.5 - 24.9 Normal weight / Body mass index is 22.82 kg/m². Subjective:     Chief Complaint / Reason for Physician Visit  \"no pain or chills. \". Discussed with RN events overnight. Review of Systems:  Symptom Y/N Comments  Symptom Y/N Comments   Fever/Chills    Chest Pain     Poor Appetite    Edema     Cough    Abdominal Pain     Sputum    Joint Pain     SOB/MCFARLAND    Pruritis/Rash     Nausea/vomit    Tolerating PT/OT     Diarrhea    Tolerating Diet     Constipation    Other       Could NOT obtain due to:      Objective:     VITALS:   Last 24hrs VS reviewed since prior progress note. Most recent are:  Patient Vitals for the past 24 hrs:   Temp Pulse Resp BP SpO2   01/14/20 0741 98.1 °F (36.7 °C) 88 16 154/73 97 %   01/14/20 0000 98.1 °F (36.7 °C) 73 16 125/77 92 %   01/13/20 1937 98.3 °F (36.8 °C) 90 18 138/64 97 %   01/13/20 1556 98.3 °F (36.8 °C) 82 18 121/65 95 %   01/13/20 1123 97.8 °F (36.6 °C) 88 16 142/74 96 %       Intake/Output Summary (Last 24 hours) at 1/14/2020 1019  Last data filed at 1/14/2020 0359  Gross per 24 hour   Intake 700 ml   Output    Net 700 ml        PHYSICAL EXAM:  General: WD, WN. Alert, cooperative, no acute distress    EENT:  EOMI. Anicteric sclerae. MMM  Resp:  CTA bilaterally, no wheezing or rales. No accessory muscle use  CV:  Regular  rhythm,  No edema  GI:  Soft, Non distended, Non tender.  +Bowel sounds  Neurologic:  Alert and oriented X 3, normal speech,   Psych:   Good insight. Not anxious nor agitated  Skin:  No rashes.   No jaundice    Reviewed most current lab test results and cultures  YES  Reviewed most current radiology test results   YES  Review and summation of old records today    NO  Reviewed patient's current orders and MAR    YES  PMH/SH reviewed - no change compared to H&P  ________________________________________________________________________  Care Plan discussed with:    Comments   Patient     Family      RN     Care Manager     Consultant                        Multidiciplinary team rounds were held today with , nursing, pharmacist and clinical coordinator. Patient's plan of care was discussed; medications were reviewed and discharge planning was addressed. ________________________________________________________________________  Total NON critical care TIME:  20   Minutes    Total CRITICAL CARE TIME Spent:   Minutes non procedure based      Comments   >50% of visit spent in counseling and coordination of care     ________________________________________________________________________  Maurizio Jc, DO     Procedures: see electronic medical records for all procedures/Xrays and details which were not copied into this note but were reviewed prior to creation of Plan. LABS:  I reviewed today's most current labs and imaging studies.   Pertinent labs include:  Recent Labs     01/14/20 0407 01/13/20 0225 01/12/20  0356   WBC 8.2 8.6 8.5   HGB 8.8* 9.1* 8.5*   HCT 27.2* 27.8* 26.3*    384 371     Recent Labs     01/14/20 0407 01/13/20 0225 01/12/20  0356    140 142   K 3.5 3.8 3.5   * 110* 112*   CO2 24 25 24   GLU 72 78 85   BUN 5* 6 7   CREA 0.57* 0.59* 0.51*   CA 8.0* 7.9* 6.9*   MG 1.4* 1.8 1.2*   ALB 1.9*  --   --    TBILI 0.3  --   --    SGOT 12*  --   --    ALT 13  --   --        Signed: Maurizio Greene, DO

## 2020-01-14 NOTE — WOUND CARE
Wound care nurse consulted AGAIN by hospitalist NP Wanda Garrett to \"revaluate all wounds\". Patient has: 1. 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 2. Right 2nd toe amputation site by Dr Judy Hector 1/9/20 with intact dry sutures and orders to daily apply Bactroban ointment and cover with dry gauze. 3. Left Heel Unstageable PI mixed with vascular: dry, stable eschar, heels floated - best practice to leave JEFFREY, paint with betadine/povidine to keep dry and infection free - patient refuses heel boots because \"they hurt\". 4. Left lateral LE partial thickness wounds from vascular in combination with laying in bed constantly with foot rolled out. 5. Staples to BL groins dry and intact - vascular surgery  6.  Staples to right chest D&I - vascular surgery    Esther Steward RN, [employer]

## 2020-01-14 NOTE — PROGRESS NOTES
Hospitalist Progress Note    NAME: Concetta Guerin   :  [id number]   MRN:  [id number]     I reviewed pertinent labs/imaging and discussed plan of care with Dr. Darion Briscoe who is in agreement. Assessment / Plan:  Patient continues to require hospitalization 2/2 ongoing infection, IV abx, and surgical intervention. His condition remains guarded and he is at risk for deterioration. Shock, multifactorial (sepsis/acute blood loss), resolved  UTI  Leucocytosis, resolved  Lactic acidosis, resolved  Cellulitis of head  Urine culture 20:  Citrobacter braakii, Enterococcus Faecalis Group D.  UA 20:  Nitrites negative, moderate LE, no bacteria.  + yeast.    Urine culture 01/10/20:  Pending. Operative tissue culture:  Rare Staph Simulans. Anaerobic culture NG at 4 days. Blood culture 01/10/19:  NG at 3 days. Procalcitonin 20:  11.44  - Remains hemodynamically stable at this time. - Afebrile and non-toxic in appearance. - Stop IVF. - Continue Zosyn 3.375 mg IV q8h.  - Continue vancomycin 1000 mg IV q12h, dosing per pharmacy. Hypomagnesemia, resolved  - Monitor. Protein calorie malnutrition  - Continue nutritional supplements. GIB, resolved  Diverticulosis  Acute blood loss anemia  Diarrhea, resolved  CT abdomen/pelvis 20:  1. No acute GI bleeding site demonstrated. 2. Colonic diverticulosis. 3. Moderate bilateral pleural effusions and adjacent atelectasis. 4. Recent right axillary femoral and cross femoral bypass. Anasarca. 5. No acute findings in the abdomen or pelvis. 6. Possible cholelithiasis. EGD 20:  Normal upper endoscopy. Enteric panel 20:  Negative. - Patient received total of 2 units PRBCs.  - H/H stable since drop on 20.  - Continue pantoprazole 40 mg IV q12h. - Continue to hold DVT chemoprophylaxis, ongoing evaluation to determine if safe to resume.  - Not a candidate for SCDs 2/2 severe PVD.    - Stool guaiac +.    - Monitor cbc.    PVD (POA)  Peripheral neuropathy  Vascular surgery input appreciated. - S/P Axillo-Bifemoral bypass on 01/02/20, incisions look good. 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  - Continue asa 81 mg po daily. - Continue gabapentin 100 mg po bid and 300 mg po at hs. Anasarca, improving   Pulmonary edema  Pleural effusions  Severe hyperalbuminemia   Echo 01/04/20:  · Normal cavity size and systolic function (ejection fraction normal). Increased wall thickness. Estimated left ventricular ejection fraction is 50 - 55%. Left ventricular diastolic dysfunction. · Trace mitral valve regurgitation is present. · Mild tricuspid valve regurgitation is present. · Small-to-moderate pericardial effusion. There is left pleural effusion.  - Continue to hold furosemide at this time. - Hold scheduled KCl while furosemide on hold. - Severe hypoalbuminemia likely contributing to 3rd spacing.    - Patient received 25% albumin 12.5 gms IV q8h x 3 doses. - Edema stable. - Recheck albumin tomorrow. EtOH abuse  EtOH withdrawal with DTs, resolved  Tobacco abuse  - No signs of EtOH withdrawal during hospitalization.    - Continue MVI 1 po daily. - Continue folic acid 1 mg po daily. - Continue thiamine 100 mg po daily. - Continue nicotine patch. HTN  Dyslipidemia  - BP adequate. - Continue to monitor off antihypertensive meds. - Continue pravastatin 40 mg po daily.    - Add home antihypertensives prn. COPD without exacerbation   - No tx indicated at this time. BPH  Urinary retention  - No further urinary retention.  - Patient incontinent.    - Continue tamsulosin 0.4 mg po daily. 18.5 - 24.9 Normal weight / Body mass index is 22.82 kg/m². Code status: DNR  Prophylaxis: H2B/PPI and DVT prophylaxis on hold 2/2 ABL anemia  Recommended Disposition: Home w/Family     Subjective:     Chief Complaint / Reason for Physician Visit  No complaints. Adequate pain control.   Up to chair and tolerating   Plan of care reviewed with bedside RN. Review of Systems:  Symptom Y/N Comments  Symptom Y/N Comments   Fever/Chills N   Chest Pain N    Poor Appetite N   Edema Y    Cough N   Abdominal Pain N    Sputum N   Joint Pain N    SOB/MCFARLAND N   Pruritis/Rash N    Nausea/vomit N   Tolerating PT/OT     Diarrhea N   Tolerating Diet Y    Constipation N   Other       Could NOT obtain due to:      Objective:     VITALS:   Last 24hrs VS reviewed since prior progress note. Most recent are:  Patient Vitals for the past 24 hrs:   Temp Pulse Resp BP SpO2   01/13/20 1556 98.3 °F (36.8 °C) 82 18 121/65 95 %   01/13/20 1123 97.8 °F (36.6 °C) 88 16 142/74 96 %   01/13/20 0806 97.5 °F (36.4 °C) 90 16 149/83 95 %   01/13/20 0313 98.1 °F (36.7 °C) 78 18 136/73 96 %   01/12/20 2352 98 °F (36.7 °C) 87 18 140/69 98 %   01/12/20 1946 98.1 °F (36.7 °C) 94 18 136/72 98 %       Intake/Output Summary (Last 24 hours) at 1/13/2020 1934  Last data filed at 1/13/2020 1821  Gross per 24 hour   Intake 900 ml   Output    Net 900 ml        PHYSICAL EXAM:  General:  A/A/O X 3. NAD. HEENT:  Normocephalic. Sclera anicteric. EOMI. Mucous membranes moist.    Chest:  Resps even/unlabored with symmetrical CWE. Air entry diminished at bases curt. Lungs CTA. No use of accessory muscles. CV:  RRR. Generalized edema improved. Continues to have penile and scrotal edema. 1 mm pretibial edema curt. GI:  Abdomen soft/NT/ND. ABT X 4.    Neurologic:  Nonfocal.  CN II-XII grossly intact. Speech normal.     Psych:  Cooperative. No anxiety or agitation. Skin:  Left TMA noted well healed. Right toes/foot dressed post-operatively. AO-Bifem surgical sites well approximated without erythema/exudate. Left lateral lower leg with breakdown. Occipital area of head with cellulitis. Serous weeping improved. No jaundice.       Reviewed most current lab test results and cultures  YES  Reviewed most current radiology test results YES  Review and summation of old records today    NO  Reviewed patient's current orders and MAR    YES  PMH/SH reviewed - no change compared to H&P  ________________________________________________________________________  Care Plan discussed with:    Comments   Patient [address]     Consultant                        Multidiciplinary team rounds were held today with , nursing, pharmacist and clinical coordinator. Patient's plan of care was discussed; medications were reviewed and discharge planning was addressed. ___________________________________________________________________  Nasreen Boogie NP     Procedures: see electronic medical records for all procedures/Xrays and details which were not copied into this note but were reviewed prior to creation of Plan. LABS:  I reviewed today's most current labs and imaging studies.   Pertinent labs include:  Recent Labs     01/13/20 0225 01/12/20 0356 01/11/20  0100   WBC 8.6 8.5 10.9   HGB 9.1* 8.5* 8.9*   HCT 27.8* 26.3* 27.9*    371 382     Recent Labs     01/13/20 0225 01/12/20 0356 01/11/20  0100    142 139   K 3.8 3.5 3.8   * 112* 109*   CO2 25 24 23   GLU 78 85 83   BUN 6 7 7   CREA 0.59* 0.51* 0.58*   CA 7.9* 6.9* 7.2*   MG 1.8 1.2* 1.6   PHOS  --   --  3.0   ALB  --   --  1.4*   TBILI  --   --  0.2   SGOT  --   --  10*   ALT  --   --  13       Signed: Nasreen Boogie NP

## 2020-01-15 LAB
ANION GAP SERPL CALC-SCNC: 5 MMOL/L (ref 5–15)
BUN SERPL-MCNC: 6 MG/DL (ref 6–20)
BUN/CREAT SERPL: 10 (ref 12–20)
CALCIUM SERPL-MCNC: 8 MG/DL (ref 8.5–10.1)
CHLORIDE SERPL-SCNC: 109 MMOL/L (ref 97–108)
CO2 SERPL-SCNC: 26 MMOL/L (ref 21–32)
CREAT SERPL-MCNC: 0.62 MG/DL (ref 0.7–1.3)
GLUCOSE SERPL-MCNC: 82 MG/DL (ref 65–100)
MAGNESIUM SERPL-MCNC: 1.4 MG/DL (ref 1.6–2.4)
POTASSIUM SERPL-SCNC: 3.4 MMOL/L (ref 3.5–5.1)
SODIUM SERPL-SCNC: 140 MMOL/L (ref 136–145)

## 2020-01-15 PROCEDURE — 74011250637 HC RX REV CODE- 250/637: Performed by: SURGERY

## 2020-01-15 PROCEDURE — 97530 THERAPEUTIC ACTIVITIES: CPT

## 2020-01-15 PROCEDURE — 74011250636 HC RX REV CODE- 250/636: Performed by: INTERNAL MEDICINE

## 2020-01-15 PROCEDURE — C9113 INJ PANTOPRAZOLE SODIUM, VIA: HCPCS | Performed by: FAMILY MEDICINE

## 2020-01-15 PROCEDURE — 80048 BASIC METABOLIC PNL TOTAL CA: CPT

## 2020-01-15 PROCEDURE — 74011250637 HC RX REV CODE- 250/637: Performed by: NEUROMUSCULOSKELETAL MEDICINE & OMM

## 2020-01-15 PROCEDURE — 65660000000 HC RM CCU STEPDOWN

## 2020-01-15 PROCEDURE — 74011250637 HC RX REV CODE- 250/637: Performed by: INTERNAL MEDICINE

## 2020-01-15 PROCEDURE — 74011250637 HC RX REV CODE- 250/637: Performed by: NURSE PRACTITIONER

## 2020-01-15 PROCEDURE — 97535 SELF CARE MNGMENT TRAINING: CPT

## 2020-01-15 PROCEDURE — 36415 COLL VENOUS BLD VENIPUNCTURE: CPT

## 2020-01-15 PROCEDURE — 74011250636 HC RX REV CODE- 250/636: Performed by: FAMILY MEDICINE

## 2020-01-15 PROCEDURE — 74011000250 HC RX REV CODE- 250: Performed by: FAMILY MEDICINE

## 2020-01-15 PROCEDURE — 97116 GAIT TRAINING THERAPY: CPT

## 2020-01-15 PROCEDURE — 74011000258 HC RX REV CODE- 258: Performed by: INTERNAL MEDICINE

## 2020-01-15 PROCEDURE — 83735 ASSAY OF MAGNESIUM: CPT

## 2020-01-15 RX ORDER — MAGNESIUM SULFATE HEPTAHYDRATE 40 MG/ML
2 INJECTION, SOLUTION INTRAVENOUS ONCE
Status: COMPLETED | OUTPATIENT
Start: 2020-01-15 | End: 2020-01-15

## 2020-01-15 RX ORDER — PANTOPRAZOLE SODIUM 40 MG/1
40 TABLET, DELAYED RELEASE ORAL
Status: DISCONTINUED | OUTPATIENT
Start: 2020-01-15 | End: 2020-01-22 | Stop reason: HOSPADM

## 2020-01-15 RX ADMIN — ACETAMINOPHEN 650 MG: 325 TABLET ORAL at 21:53

## 2020-01-15 RX ADMIN — SODIUM CHLORIDE 40 MG: 9 INJECTION INTRAMUSCULAR; INTRAVENOUS; SUBCUTANEOUS at 09:59

## 2020-01-15 RX ADMIN — Medication 10 ML: at 06:00

## 2020-01-15 RX ADMIN — FOLIC ACID 1 MG: 1 TABLET ORAL at 10:00

## 2020-01-15 RX ADMIN — PRAVASTATIN SODIUM 40 MG: 40 TABLET ORAL at 21:53

## 2020-01-15 RX ADMIN — TAMSULOSIN HYDROCHLORIDE 0.4 MG: 0.4 CAPSULE ORAL at 10:00

## 2020-01-15 RX ADMIN — Medication 100 MG: at 10:00

## 2020-01-15 RX ADMIN — GABAPENTIN 300 MG: 300 CAPSULE ORAL at 21:54

## 2020-01-15 RX ADMIN — VANCOMYCIN HYDROCHLORIDE 750 MG: 750 INJECTION, POWDER, LYOPHILIZED, FOR SOLUTION INTRAVENOUS at 16:17

## 2020-01-15 RX ADMIN — PIPERACILLIN AND TAZOBACTAM 3.38 G: 3; .375 INJECTION, POWDER, LYOPHILIZED, FOR SOLUTION INTRAVENOUS at 04:30

## 2020-01-15 RX ADMIN — Medication 10 ML: at 21:54

## 2020-01-15 RX ADMIN — Medication 10 ML: at 14:39

## 2020-01-15 RX ADMIN — THERA TABS 1 TABLET: TAB at 10:00

## 2020-01-15 RX ADMIN — ACETAMINOPHEN 650 MG: 325 TABLET ORAL at 16:20

## 2020-01-15 RX ADMIN — MUPIROCIN: 20 OINTMENT TOPICAL at 10:05

## 2020-01-15 RX ADMIN — MAGNESIUM SULFATE HEPTAHYDRATE 2 G: 40 INJECTION, SOLUTION INTRAVENOUS at 14:37

## 2020-01-15 RX ADMIN — CASTOR OIL AND BALSAM, PERU: 788; 87 OINTMENT TOPICAL at 10:01

## 2020-01-15 RX ADMIN — VANCOMYCIN HYDROCHLORIDE 750 MG: 750 INJECTION, POWDER, LYOPHILIZED, FOR SOLUTION INTRAVENOUS at 04:15

## 2020-01-15 RX ADMIN — GABAPENTIN 100 MG: 100 CAPSULE ORAL at 14:38

## 2020-01-15 RX ADMIN — PIPERACILLIN AND TAZOBACTAM 3.38 G: 3; .375 INJECTION, POWDER, LYOPHILIZED, FOR SOLUTION INTRAVENOUS at 21:55

## 2020-01-15 RX ADMIN — ASPIRIN 81 MG 81 MG: 81 TABLET ORAL at 10:00

## 2020-01-15 RX ADMIN — GABAPENTIN 100 MG: 100 CAPSULE ORAL at 10:00

## 2020-01-15 RX ADMIN — Medication 1 AMPULE: at 10:00

## 2020-01-15 RX ADMIN — PANTOPRAZOLE SODIUM 40 MG: 40 TABLET, DELAYED RELEASE ORAL at 16:20

## 2020-01-15 RX ADMIN — PIPERACILLIN AND TAZOBACTAM 3.38 G: 3; .375 INJECTION, POWDER, LYOPHILIZED, FOR SOLUTION INTRAVENOUS at 14:38

## 2020-01-15 NOTE — PROGRESS NOTES
Problem: Self Care Deficits Care Plan (Adult)  Goal: *Acute Goals and Plan of Care (Insert Text)  Description    FUNCTIONAL STATUS PRIOR TO ADMISSION: The patient was functional at baseline per his report. Reports Has DME at home    HOME SUPPORT: pt lived alone; he reports that his neighbors assist him    Occupational Therapy Goals    Goals reviewed on 1/10/2020 post surgery (R 2nd toe amputation) :    Change goal #3  All other goals remain appropriate to continue for 7 days. Initiated 12/26/2019; Re-evaluation 1/6/2020, goals updated below    1. Patient will perform grooming with set-up within 7 day(s). Progressing  2. Patient will perform sit to  preparation for functional transfers OOB with minimal assistance/contact guard assist within 7 day(s). Progressing  3. Patient will perform supine to sit EOB in preparation for bed level adls with minimal assistance/contact guard assist within 7 day(s). MET 1/6/2020. Post surgery on 1/10/2020-decline in function:  Continue Arnoldo level goal.  4.  Patient will perform toilet transfers with moderate assistance  within 7 day(s). Upgrade to CGA x1  5. Patient will perform all aspects of toileting with minimal assistance/contact guard assist within 7 day(s). Progressing  6. Patient will participate in upper extremity therapeutic exercise/activities with supervision/set-up for 10 minutes within 7 day(s). Progressing  7. Patient will demonstrate safe technique during functional mobility/ADL transfers within 7 days. Progressing  8. Patient will perform upper body dressing with minimal assistance within 7 days. Upgrade to supervision/set up  Goals added 1/6/2020:  9. Patient will tolerate static standing for 2 minutes in preparation for OOB ADLs with supervision/set-up within 7 day(s). 10.  Patient will perform anterior bathing from neck to thighs with supervision/set-up within 7 day(s).           Outcome: Progressing Towards Goal   OCCUPATIONAL THERAPY TREATMENT  Patient: Roxanne Martinez (27 y.o. male)  Date: 1/15/2020  Diagnosis: Alcohol withdrawal ([facility] [address] [F10.239]   Alcohol withdrawal ([facility] [address]  Procedure(s) (LRB):  AMPUTATION 2ND TOE RIGHT FOOT (Right) 6 Days Post-Op  Precautions: Fall, Contact, Seizure, DNR  Chart, occupational therapy assessment, plan of care, and goals were reviewed. ASSESSMENT  Patient continues with skilled OT services and is progressing towards goals. Patient received incontinent of B and B with large amount of diarrhea. Patient willing to work with OT in cleaning process, performing bed mobility as noted, at S level, even after rolling each way x 8 to maximize hygiene. Current Level of Function Impacting Discharge (ADLs): S bed mobility for LE bathing; S bridging and pressure relief for heels and buttocks    Other factors to consider for discharge: has all DME         PLAN :  Patient continues to benefit from skilled intervention to address the above impairments. Continue treatment per established plan of care. to address goals. Recommend with staff: walk to bathroom for [facility] over commode toileting    Recommend next OT session: walk to bathroom; stand to groom    Recommendation for discharge: (in order for the patient to meet his/her long term goals)  Therapy up to 5 days/week in SNF setting    This discharge recommendation:  Has been made in collaboration with the attending provider and/or case management    IF patient discharges home will need the following DME: patient owns DME required for discharge       SUBJECTIVE:   Patient stated I am glad to do the exercises on my own.     OBJECTIVE DATA SUMMARY:   Cognitive/Behavioral Status:  Neurologic State: Alert; Appropriate for age  Orientation Level: Oriented X4  Cognition: Appropriate decision making; Appropriate for age attention/concentration; Appropriate safety awareness; Follows commands  Perception: Appears intact  Perseveration: No perseveration noted  Safety/Judgement: Insight into deficits    Functional Mobility and Transfers for ADLs:  Bed Mobility:  Rolling: Supervision(L and R x 16 with cleaning of diarrhea and bed)        ADL Intervention:         Lower Body Bathing  Bathing Assistance: Maximum assistance         Lower Body Dressing Assistance  Dressing Assistance: Moderate assistance    Toileting  Toileting Assistance: Total assistance(dependent)(incontinent of B & B with diarrhea)    Cognitive Retraining  Attention to Task: Single task  Maintains Attention For (Time): Greater than 10 minutes  Following Commands: Follows two step commands/directions  Safety/Judgement: Insight into deficits        Pain:  \"bottom burning with incontinence; better once clean    Activity Tolerance:   Fair  Please refer to the flowsheet for vital signs taken during this treatment.     After treatment patient left in no apparent distress:   Supine in bed, Heels elevated for pressure relief, Call bell within reach, and Side rails x 3    COMMUNICATION/COLLABORATION:   The patients plan of care was discussed with: Registered Nurse    Claudeen Curl OTR/L  Time Calculation: 24 mins

## 2020-01-15 NOTE — PROGRESS NOTES
Hospitalist Progress Note    NAME: Raj Olszewski   :  1945   MRN:  [id number]       Assessment / Plan:  Shock, multifactorial (sepsis/acute blood loss), resolved  Recurrent UTI  Lactic acidosis  Cellulitis of head, appears resolved on physical exam  -Ucx :  Citrobacter braakii, Enterococcus Faecalis Group D.  -repeat Ucx  negative for bacteria  -Bcx 1/10 NGTD  -tissue cx from toe  grew Staph simulans  -procal 11 on .  -pt has been on IV abx for >10 days. He had a isabel cath while in the ICU so will plan to treat as complicated UTI. Will repeat a procal in the AM and likely d/c abx  -CM to help with dispo    Hypomagnesemia  Hypokalemia  - mag 1.4, will replete and monitor  -K 3.4, will replete     Protein calorie malnutrition  - Continue nutritional supplements.       GIB, resolved  Diverticulosis  Acute blood loss anemia  Diarrhea, resolved  -CT abdomen/pelvis without active GI bleed  -s/p EGD  was unremarkable. Enteric panel  neg   -ss/p 2 units prbc thus far.  hgb labile.  -cont' PPI   -reviewed GI's note, Cscope recommended after discharge     PVD, POA  Peripheral neuropathy  -S/P Axillo-Bifemoral bypass on  , s/p amputation of right second toe   -continue asa, gabapentin   -vascular surgery input is appreciated, notes reviewed     Anasarca, improving   Pulmonary edema  Pleural effusions  Severe hypoalbuminemia   Normal ef with diastolic dysfunction and moderate pericardial effusion. On lasix, monitor cr     EtOH withdrawal with DTs, resolved  Tobacco abuse, nicotine patch     HTN  Dyslipidemia  -stable     COPD without exacerbation   BPH  Urinary retention  -continue tamsulosin 0.4 mg po daily. -nebs prn           Code status: DNR  Prophylaxis: H2B/PPI and DVT prophylaxis on hold  ABL anemia  Recommended Disposition: Home w/Family  18.5 - 24.9 Normal weight / Body mass index is 25.05 kg/m².        Subjective:     Chief Complaint / Reason for Physician Visit  Pt seen, NAD. Discussed with RN events overnight. Review of Systems:  Symptom Y/N Comments  Symptom Y/N Comments   Fever/Chills n   Chest Pain n    Poor Appetite    Edema     Cough    Abdominal Pain n    Sputum    Joint Pain     SOB/[place] n   Pruritis/Rash     Nausea/vomit    Tolerating PT/OT     Diarrhea    Tolerating Diet     Constipation    Other       Could NOT obtain due to:      Objective:     VITALS:   Last 24hrs VS reviewed since prior progress note. Most recent are:  Patient Vitals for the past 24 hrs:   Temp Pulse Resp BP SpO2   01/15/20 1057 98.6 °F (37 °C) 80 18 135/79 98 %   01/15/20 0740 98.9 °F (37.2 °C) 67 18 146/76 93 %   01/14/20 2300 98.7 °F (37.1 °C) 85 14 149/75 95 %   01/14/20 1522 98.6 °F (37 °C) 91 16 168/87 97 %       Intake/Output Summary (Last 24 hours) at 1/15/2020 1219  Last data filed at 1/14/2020 1830  Gross per 24 hour   Intake 550 ml   Output    Net 550 ml        PHYSICAL EXAM:  General: WD, WN. Alert, cooperative, no acute distress    EENT:  EOMI. Anicteric sclerae. MMM  Resp:  CTA bilaterally, no wheezing or rales. No accessory muscle use  CV:  Regular  rhythm,  No edema  GI:  Soft, Non distended, Non tender.  +Bowel sounds  Neurologic:  Alert and oriented X 3, normal speech,   Psych:   Fair insight. Not anxious nor agitated  Skin:  No rashes. No jaundice    Reviewed most current lab test results and cultures  YES  Reviewed most current radiology test results   YES  Review and summation of old records today    NO  Reviewed patient's current orders and MAR    YES  PMH/SH reviewed - no change compared to H&P  ________________________________________________________________________  Care Plan discussed with:    Comments   Patient     Family      RN     Care Manager     Consultant                        Multidiciplinary team rounds were held today with , nursing, pharmacist and clinical coordinator.   Patient's plan of care was discussed; medications were reviewed and discharge planning was addressed. ________________________________________________________________________  Total NON critical care TIME:  20   Minutes    Total CRITICAL CARE TIME Spent:   Minutes non procedure based      Comments   >50% of visit spent in counseling and coordination of care     ________________________________________________________________________  Ellouise Alpers, MD     Procedures: see electronic medical records for all procedures/Xrays and details which were not copied into this note but were reviewed prior to creation of Plan. LABS:  I reviewed today's most current labs and imaging studies.   Pertinent labs include:  Recent Labs     01/14/20 0407 01/13/20 0225   WBC 8.2 8.6   HGB 8.8* 9.1*   HCT 27.2* 27.8*    384     Recent Labs     01/15/20  0422 01/14/20  0407 01/13/20  0225    142 140   K 3.4* 3.5 3.8   * 110* 110*   CO2 26 24 25   GLU 82 72 78   BUN 6 5* 6   CREA 0.62* 0.57* 0.59*   CA 8.0* 8.0* 7.9*   MG 1.4* 1.4* 1.8   ALB  --  1.9*  --    TBILI  --  0.3  --    SGOT  --  12*  --    ALT  --  13  --        Signed: Ellouise Alpers, MD

## 2020-01-15 NOTE — PROGRESS NOTES
Problem: Mobility Impaired (Adult and Pediatric)  Goal: *Acute Goals and Plan of Care (Insert Text)  Description  FUNCTIONAL STATUS PRIOR TO ADMISSION: Pt was living at home alone on first level of 2 story home with 2 step entry. Per chart, he was falling and having a difficult time taking care of himself. Pt states he was not falling and that the only time he fell was when he had his stroke. HOME SUPPORT PRIOR TO ADMISSION: The patient lived alone with no local support. Physical Therapy Goals  Revised 1/13/2020  1. Patient will move from sup<>Sit, scoot up/down and roll side to side in bed with contact guard assistance within 7 days. 2.  Patient will transfer bed<>chair with cga using least restrictive device within 7 days. 3.  Patient will perform sit<>stand with S within 7 days. 4.  Patient will ambulate with sba 150 feet with a rolling walker within 7 days. Goals previously updated/revised 12/31/2019 and 1/6/2020. See documentation for details. Initiated 12/24/2019  1. Patient will move from supine to sit and sit to supine , scoot up and down and roll side to side in bed with independence within 7 day(s). 2.  Patient will transfer from bed to chair and chair to bed with modified independence using the least restrictive device within 7 day(s). 3.  Patient will perform sit to stand with modified independence within 7 day(s). 4.  Patient will ambulate with supervision/set-up for 75 feet with the least restrictive device within 7 day(s). Outcome: Progressing Towards Goal  PHYSICAL THERAPY TREATMENT  Patient: Altaf Champagne (17 y.o. male)  Date: 1/15/2020  Diagnosis: Alcohol withdrawal ([address] [F10.239]   Alcohol withdrawal ([address]  Procedure(s) (LRB):  AMPUTATION 2ND TOE RIGHT FOOT (Right) 6 Days Post-Op  Precautions: Fall, Contact, Seizure, DNR  Chart, physical therapy assessment, plan of care and goals were reviewed.     ASSESSMENT  Patient continues with skilled PT services and is progressing slowly towards goals. Patient in bed on arrival and agreeable to therapy. Tolerated short distance ambulation in room but limited in distance secondary to weakness, fatigue and dyspnea. Patient reports dizziness with positional changes but resolves with time and prior to obtaining vitals. Patient educated on safety with taking time during transitions and seated BLE ROM exercises to assist with BP recovery upon sitting. Demonstrated understanding. Encouraged patient to sit in chair post session, but patient with c/o fatigue and requesting to return to bed. Encouraged patient to get up to chair with nursing assistance at dinner time today and to increase frequency of times up OOB throughout the day. Recommend with nursing patient to complete as able in order to maintain strength, endurance and independence: OOB to chair 3x/day and walking daily in room using RW and with one person assist. Thank you for your assistance. Current Level of Function Impacting Discharge (mobility/balance): min A bed mobility, Mod A sit<>stand transfers, Min A short ambulation with RW    Other factors to consider for discharge: lives alone         PLAN :  Patient continues to benefit from skilled intervention to address the above impairments. Continue treatment per established plan of care. to address goals. Recommendation for discharge: (in order for the patient to meet his/her long term goals)  Therapy up to 5 days/week in SNF setting    This discharge recommendation:  Has been made in collaboration with the attending provider and/or case management    IF patient discharges home will need the following DME: to be determined (TBD)     SUBJECTIVE:   Patient stated I could do two laps on Monday.     OBJECTIVE DATA SUMMARY:   Critical Behavior:  Neurologic State: Alert, Appropriate for age  Orientation Level: Oriented X4  Cognition: Appropriate decision making, Appropriate for age attention/concentration, Appropriate safety awareness, Follows commands  Safety/Judgement: Insight into deficits  Functional Mobility Training:  Bed Mobility:  Rolling: Supervision; Additional time  Supine to Sit: Minimum assistance; Additional time; Adaptive equipment(use of bed rail)     Scooting: Moderate assistance; Additional time        Transfers:  Sit to Stand: Moderate assistance; Additional time(required bed elevated)  Stand to Sit: Contact guard assistance; Additional time(cues for safety)                             Balance:  Sitting: Intact  Standing: Impaired  Standing - Static: Fair  Standing - Dynamic : Fair  Ambulation/Gait Training:  Distance (ft): 12 Feet (ft)  Assistive Device: Walker, rolling;Gait belt(Right post op shoe)  Ambulation - Level of Assistance: Contact guard assistance;Minimal assistance(increased assistance with distance/fatigue)        Gait Abnormalities: Decreased step clearance(forward flexed posture)        Base of Support: Narrowed     Speed/Amanda: Slow  Step Length: Left shortened;Right shortened                  Slow, increased dyspnea with distance. Limited distance and reps secondary to fatigue/weakness. Definite use of RW. Therapeutic Exercises:   Increased in seated BLE ankle pumps and LAQ to assist with c/o dizziness upon sitting EOB.    Pain Rating:  No c/o pain    Activity Tolerance:   Fair, requires rest breaks, and observed SOB with activity    After treatment patient left in no apparent distress:   Supine in bed, Heels elevated for pressure relief, and Call bell within reach    COMMUNICATION/COLLABORATION:   The patients plan of care was discussed with: Registered Nurse    Courtney Sharpe, PT, DPT   Time Calculation: 25 mins

## 2020-01-16 LAB
ANION GAP SERPL CALC-SCNC: 5 MMOL/L (ref 5–15)
BACTERIA SPEC CULT: NORMAL
BUN SERPL-MCNC: 8 MG/DL (ref 6–20)
BUN/CREAT SERPL: 14 (ref 12–20)
CALCIUM SERPL-MCNC: 8.3 MG/DL (ref 8.5–10.1)
CHLORIDE SERPL-SCNC: 108 MMOL/L (ref 97–108)
CO2 SERPL-SCNC: 26 MMOL/L (ref 21–32)
CREAT SERPL-MCNC: 0.58 MG/DL (ref 0.7–1.3)
DATE LAST DOSE: ABNORMAL
GLUCOSE SERPL-MCNC: 79 MG/DL (ref 65–100)
MAGNESIUM SERPL-MCNC: 1.6 MG/DL (ref 1.6–2.4)
POTASSIUM SERPL-SCNC: 3.3 MMOL/L (ref 3.5–5.1)
PROCALCITONIN SERPL-MCNC: <0.05 NG/ML
REPORTED DOSE,DOSE: ABNORMAL UNITS
REPORTED DOSE/TIME,TMG: 1600
SERVICE CMNT-IMP: NORMAL
SODIUM SERPL-SCNC: 139 MMOL/L (ref 136–145)
VANCOMYCIN TROUGH SERPL-MCNC: 20.3 UG/ML (ref 5–10)

## 2020-01-16 PROCEDURE — 74011000258 HC RX REV CODE- 258: Performed by: INTERNAL MEDICINE

## 2020-01-16 PROCEDURE — 80048 BASIC METABOLIC PNL TOTAL CA: CPT

## 2020-01-16 PROCEDURE — 83735 ASSAY OF MAGNESIUM: CPT

## 2020-01-16 PROCEDURE — 74011250636 HC RX REV CODE- 250/636: Performed by: INTERNAL MEDICINE

## 2020-01-16 PROCEDURE — 74011250637 HC RX REV CODE- 250/637: Performed by: SURGERY

## 2020-01-16 PROCEDURE — 74011250637 HC RX REV CODE- 250/637: Performed by: INTERNAL MEDICINE

## 2020-01-16 PROCEDURE — 84145 PROCALCITONIN (PCT): CPT

## 2020-01-16 PROCEDURE — 74011250637 HC RX REV CODE- 250/637: Performed by: NURSE PRACTITIONER

## 2020-01-16 PROCEDURE — 94760 N-INVAS EAR/PLS OXIMETRY 1: CPT

## 2020-01-16 PROCEDURE — 36415 COLL VENOUS BLD VENIPUNCTURE: CPT

## 2020-01-16 PROCEDURE — 97530 THERAPEUTIC ACTIVITIES: CPT

## 2020-01-16 PROCEDURE — 74011250637 HC RX REV CODE- 250/637: Performed by: NEUROMUSCULOSKELETAL MEDICINE & OMM

## 2020-01-16 PROCEDURE — 80202 ASSAY OF VANCOMYCIN: CPT

## 2020-01-16 PROCEDURE — 65660000000 HC RM CCU STEPDOWN

## 2020-01-16 PROCEDURE — 97110 THERAPEUTIC EXERCISES: CPT | Performed by: OCCUPATIONAL THERAPIST

## 2020-01-16 PROCEDURE — 97116 GAIT TRAINING THERAPY: CPT

## 2020-01-16 RX ORDER — LOPERAMIDE HYDROCHLORIDE 2 MG/1
2 CAPSULE ORAL AS NEEDED
Status: DISCONTINUED | OUTPATIENT
Start: 2020-01-16 | End: 2020-01-22 | Stop reason: HOSPADM

## 2020-01-16 RX ORDER — POTASSIUM CHLORIDE 750 MG/1
40 TABLET, FILM COATED, EXTENDED RELEASE ORAL
Status: COMPLETED | OUTPATIENT
Start: 2020-01-16 | End: 2020-01-16

## 2020-01-16 RX ADMIN — ASPIRIN 81 MG 81 MG: 81 TABLET ORAL at 08:47

## 2020-01-16 RX ADMIN — FUROSEMIDE 20 MG: 10 INJECTION, SOLUTION INTRAMUSCULAR; INTRAVENOUS at 08:47

## 2020-01-16 RX ADMIN — PRAVASTATIN SODIUM 40 MG: 40 TABLET ORAL at 21:14

## 2020-01-16 RX ADMIN — POTASSIUM CHLORIDE 20 MEQ: 20 TABLET, EXTENDED RELEASE ORAL at 08:47

## 2020-01-16 RX ADMIN — MUPIROCIN: 20 OINTMENT TOPICAL at 09:00

## 2020-01-16 RX ADMIN — Medication 1 AMPULE: at 09:00

## 2020-01-16 RX ADMIN — VANCOMYCIN HYDROCHLORIDE 750 MG: 750 INJECTION, POWDER, LYOPHILIZED, FOR SOLUTION INTRAVENOUS at 03:59

## 2020-01-16 RX ADMIN — PIPERACILLIN AND TAZOBACTAM 3.38 G: 3; .375 INJECTION, POWDER, LYOPHILIZED, FOR SOLUTION INTRAVENOUS at 04:00

## 2020-01-16 RX ADMIN — Medication 100 MG: at 08:47

## 2020-01-16 RX ADMIN — Medication 10 ML: at 21:14

## 2020-01-16 RX ADMIN — ACETAMINOPHEN 650 MG: 325 TABLET ORAL at 22:25

## 2020-01-16 RX ADMIN — PANTOPRAZOLE SODIUM 40 MG: 40 TABLET, DELAYED RELEASE ORAL at 07:00

## 2020-01-16 RX ADMIN — CASTOR OIL AND BALSAM, PERU: 788; 87 OINTMENT TOPICAL at 08:49

## 2020-01-16 RX ADMIN — Medication 10 ML: at 05:24

## 2020-01-16 RX ADMIN — POTASSIUM CHLORIDE 40 MEQ: 750 TABLET, FILM COATED, EXTENDED RELEASE ORAL at 11:59

## 2020-01-16 RX ADMIN — Medication 10 ML: at 14:00

## 2020-01-16 RX ADMIN — ACETAMINOPHEN 650 MG: 325 TABLET ORAL at 14:16

## 2020-01-16 RX ADMIN — GABAPENTIN 100 MG: 100 CAPSULE ORAL at 08:47

## 2020-01-16 RX ADMIN — FOLIC ACID 1 MG: 1 TABLET ORAL at 08:47

## 2020-01-16 RX ADMIN — LOPERAMIDE HYDROCHLORIDE 2 MG: 2 CAPSULE ORAL at 17:53

## 2020-01-16 RX ADMIN — PANTOPRAZOLE SODIUM 40 MG: 40 TABLET, DELAYED RELEASE ORAL at 17:12

## 2020-01-16 RX ADMIN — ACETAMINOPHEN 650 MG: 325 TABLET ORAL at 05:24

## 2020-01-16 RX ADMIN — GABAPENTIN 100 MG: 100 CAPSULE ORAL at 14:16

## 2020-01-16 RX ADMIN — GABAPENTIN 300 MG: 300 CAPSULE ORAL at 21:14

## 2020-01-16 RX ADMIN — CASTOR OIL AND BALSAM, PERU: 788; 87 OINTMENT TOPICAL at 18:00

## 2020-01-16 RX ADMIN — TAMSULOSIN HYDROCHLORIDE 0.4 MG: 0.4 CAPSULE ORAL at 08:47

## 2020-01-16 RX ADMIN — THERA TABS 1 TABLET: TAB at 08:47

## 2020-01-16 RX ADMIN — Medication 1 AMPULE: at 21:14

## 2020-01-16 NOTE — PROGRESS NOTES
Problem: Mobility Impaired (Adult and Pediatric)  Goal: *Acute Goals and Plan of Care (Insert Text)  Description  FUNCTIONAL STATUS PRIOR TO ADMISSION: Pt was living at home alone on first level of 2 story home with 2 step entry. Per chart, he was falling and having a difficult time taking care of himself. Pt states he was not falling and that the only time he fell was when he had his stroke. HOME SUPPORT PRIOR TO ADMISSION: The patient lived alone with no local support. Physical Therapy Goals  Revised 1/13/2020  1. Patient will move from sup<>Sit, scoot up/down and roll side to side in bed with contact guard assistance within 7 days. 2.  Patient will transfer bed<>chair with cga using least restrictive device within 7 days. 3.  Patient will perform sit<>stand with S within 7 days. 4.  Patient will ambulate with sba 150 feet with a rolling walker within 7 days. Goals previously updated/revised 12/31/2019 and 1/6/2020. See documentation for details. Initiated 12/24/2019  1. Patient will move from supine to sit and sit to supine , scoot up and down and roll side to side in bed with independence within 7 day(s). 2.  Patient will transfer from bed to chair and chair to bed with modified independence using the least restrictive device within 7 day(s). 3.  Patient will perform sit to stand with modified independence within 7 day(s). 4.  Patient will ambulate with supervision/set-up for 75 feet with the least restrictive device within 7 day(s). Outcome: Progressing Towards Goal   PHYSICAL THERAPY TREATMENT  Patient: Collette Ellison (4 y.o. male)  Date: 1/16/2020  Diagnosis: Alcohol withdrawal ([address] [F10.239]   Alcohol withdrawal ([address]  Procedure(s) (LRB):  AMPUTATION 2ND TOE RIGHT FOOT (Right) 7 Days Post-Op  Precautions: Fall, Contact, Seizure, DNR  Chart, physical therapy assessment, plan of care and goals were reviewed.     ASSESSMENT  Patient continues with skilled PT services and is progressing towards goals. Pt presents with decreased strength and endurance. Pt performed bed mobility at supervision. Pt soiled of urine and requiring hygiene. Pt able to stand at [facility]  for hygiene at [facility]/SBA. Pt ambulated 50ft  with RW within room at [place] [address]. Pt with improved mobility tolerance and strength. Pt educated on getting up to chair x3 times a day to improve strength. Pt with understanding. Current Level of Function Impacting Discharge (mobility/balance): bed mobility at supervision, sit to stand transfer at min A x2, ambulation at [place] [address] with RW. Other factors to consider for discharge: lives alone         PLAN :  Patient continues to benefit from skilled intervention to address the above impairments. Continue treatment per established plan of care. to address goals. Recommendation for discharge: (in order for the patient to meet his/her long term goals)  Therapy up to 5 days/week in SNF setting    This discharge recommendation:  Has been made in collaboration with the attending provider and/or case management    IF patient discharges home will need the following DME: to be determined (TBD)       SUBJECTIVE:   Patient stated It's feels better sitting up.     OBJECTIVE DATA SUMMARY:   Critical Behavior:  Neurologic State: Alert, Appropriate for age  Orientation Level: Oriented X4  Cognition: Appropriate decision making, Appropriate for age attention/concentration, Appropriate safety awareness, Follows commands  Safety/Judgement: Insight into deficits  Functional Mobility Training:  Bed Mobility:  Rolling: Supervision  Supine to Sit: Supervision     Scooting: Supervision        Transfers:  Sit to Stand: Minimum assistance;Assist x2  Stand to Sit: Contact guard assistance                             Balance:  Sitting: Intact  Sitting - Static: Good (unsupported)  Sitting - Dynamic: Good (unsupported)  Standing: Impaired  Standing - Static: Good;Constant support  Standing - Dynamic : Fair;Constant support  Ambulation/Gait Training:  Distance (ft): 50 Feet (ft)  Assistive Device: Gait belt;Walker, rolling  Ambulation - Level of Assistance: Contact guard assistance        Gait Abnormalities: Decreased step clearance(trunk flexion )        Base of Support: Narrowed     Speed/Amanda: Shuffled; Slow  Step Length: Right shortened;Left shortened          Pain Rating:  Pt with no complaints of pain    Activity Tolerance:   Good  Please refer to the flowsheet for vital signs taken during this treatment.     After treatment patient left in no apparent distress:   Sitting in chair and Call bell within reach    COMMUNICATION/COLLABORATION:   The patients plan of care was discussed with: Registered Nurse    Hailey Marley PTA   Time Calculation: 23 mins

## 2020-01-16 NOTE — PROGRESS NOTES
Problem: Self Care Deficits Care Plan (Adult)  Goal: *Acute Goals and Plan of Care (Insert Text)  Description    FUNCTIONAL STATUS PRIOR TO ADMISSION: The patient was functional at baseline per his report. Reports Has DME at home    HOME SUPPORT: pt lived alone; he reports that his neighbors assist him    Occupational Therapy Goals    Goals reviewed on 1/10/2020 post surgery (R 2nd toe amputation) :    Change goal #3  All other goals remain appropriate to continue for 7 days. Initiated 12/26/2019; Re-evaluation 1/6/2020, goals updated below    1. Patient will perform grooming with set-up within 7 day(s). Progressing  2. Patient will perform sit to  preparation for functional transfers OOB with minimal assistance/contact guard assist within 7 day(s). Progressing  3. Patient will perform supine to sit EOB in preparation for bed level adls with minimal assistance/contact guard assist within 7 day(s). MET 1/6/2020. Post surgery on 1/10/2020-decline in function:  Continue Arnoldo level goal.  4.  Patient will perform toilet transfers with moderate assistance  within 7 day(s). Upgrade to CGA x1  5. Patient will perform all aspects of toileting with minimal assistance/contact guard assist within 7 day(s). Progressing  6. Patient will participate in upper extremity therapeutic exercise/activities with supervision/set-up for 10 minutes within 7 day(s). Progressing  7. Patient will demonstrate safe technique during functional mobility/ADL transfers within 7 days. Progressing  8. Patient will perform upper body dressing with minimal assistance within 7 days. Upgrade to supervision/set up  Goals added 1/6/2020:  9. Patient will tolerate static standing for 2 minutes in preparation for OOB ADLs with supervision/set-up within 7 day(s). 10.  Patient will perform anterior bathing from neck to thighs with supervision/set-up within 7 day(s).           Outcome: Progressing Towards Goal   OCCUPATIONAL THERAPY TREATMENT  Patient: Amy Washington (89 y.o. male)  Date: 1/16/2020  Diagnosis: Alcohol withdrawal ([facility] [address] [F10.239]   Alcohol withdrawal ([facility] [address]  Procedure(s) (LRB):  AMPUTATION 2ND TOE RIGHT FOOT (Right) 7 Days Post-Op  Precautions: Fall, Contact, Seizure, DNR  Chart, occupational therapy assessment, plan of care, and goals were reviewed. ASSESSMENT  Patient continues with skilled OT services and is progressing towards goals. Pt was agreeable to bed level exercises, nurse recently provided wound care and pt reported that he'd already worked in therapy, declining further OOB activity, but agreeable to bed level exercises. Performed BUE warm up exercises - B shoulder elevation, shoulder flex/ext, shoulder rolls, internal/ext. Rotation. Educated on using water pitcher as resistance for B shoulder protraction/retraction and shoulder raises. Pt is very talkative throughout, demonstrating decreased focus on the exercise tasks. He continues to perseverate on his incontinence. Pt was agreeable to a trial of theraband (red) with fair understanding via teachback and will need reinforcement  Current Level of Function Impacting Discharge (ADLs): generally mod/max assistance    Other factors to consider for discharge: pt has had an extended hospital stay. PLAN :  Patient continues to benefit from skilled intervention to address the above impairments. Continue treatment per established plan of care. to address goals.     Recommend with staff: encourage OOB and ambulating to bathroom for toileting and changing protective garments    Recommend next OT session: ambulate to bathroom/standing grooming/reinforce therapeutic exercises    Recommendation for discharge: (in order for the patient to meet his/her long term goals)  Therapy up to 5 days/week in SNF setting and will likely need LTC following rehab    This discharge recommendation:  Has not yet been discussed the attending provider and/or case management    IF patient discharges home will need the following DME: tbd       SUBJECTIVE:   Patient stated I already did therapy.     OBJECTIVE DATA SUMMARY:   Cognitive/Behavioral Status:      Alert, very talkative, cooperative    perseverates on his incontinence  (pt reports that he was continent prior to his surgery)  Would likely benefit from a bowel/bladder program             Functional Mobility and Transfers for ADLs:  Pt declined at this time                                     Therapeutic Exercises:   Warm up exercises for BUEs  Use of water pitcher for resistance in shoulder protraction/retraction; shoulder flex/ext.; biceps curls  10+ reps  Red theraband:  Horizontal scap add/abuction: 5 reps;  Diagonal plane; triceps curls. All 5 reps  Cool down/same as warm up    Pain:  Has crepitus in R shoulder at approx 45 degrees shoulder flexion plane-encouraged pt to avoid crepitus.       Activity Tolerance:   Good  No complaints from pt      After treatment patient left in no apparent distress:   Supine in bed-HOB raised to his tolerance, Call bell within reach, Side rails x 3 and nursing notified    COMMUNICATION/COLLABORATION:   The patients plan of care was discussed with: Registered Nurse Brody Carver OTR/L  Time Calculation: 29 mins

## 2020-01-16 NOTE — PROGRESS NOTES
Vascular Surgery Progress Note  Chilango Ford ACNP-BC  1/16/2020       Subjective:     Mr. Logan Smith has a pmhx significant for alcoholism, COPD, DM, PAFib, HTN, HLD, and GERD. Karl Vela continues to smoke daily. Karl Vela has a pmhx significant for LLE stenting per his report. Karl Vela is s/p TMA of the left foot (10/2016). Karl Vela presented to the clinic in November 2019 w/ compliant of BLE claudication, BLE nocturnal cramping, and a wound to the incision of the left TMA after hitting his foot.  He has ischemia of the left foot and rubor of the right foot.  His ABIs were right 0.40 w/ a flatline TBI and left 0.32 w/ a surgically absent left great toe.  He underwent an arteriogram on 11/5/2019 that revealed severe bilateral aorto iliac disease that was not amendable to endovascular intervention. Karl Vela returned to the clinic  w/ a CTA that was significant for an occluded left common iliac and left external iliac artery and occluded right external iliac artery. . A ax-bifemoral bypass was planned.  Karl Vela then presented to the emergency room on 12/16/2019 w/ complaint of BLE weakness. Karl Vela was admitted to [facility] and diagnosed w/ cystitis.  While admitted he was noted to have left lateral heel ulcer.  He was discharged to the [address] [place] [address] at [facility] on 12/20/2019 for rehabilitation.  Late that evening he developed active w/d symptoms and returned to the emergency room at [address] was then transferred to [facility] for management of alcohol withdrawal which has resolved.  His initial urine cx grew Klebsiella and he completed a course of antibx.  A follow up urine cx showed no growth. He then developed a low grade fever and leukocytosis. Repeat urine cx was + for citrobacter and enterococcus. He was noted to have urinary retention and Urology was consulted. Outpatient follow up was recommended. His hospitalization was complicated by bilateral pleural effusions w/ LLL partial collapse resulting in hypoxic respiratory failure.   On 01/02/2020 he underwent axillo-bifemoral bypass. His post procedure course was complicated by hypotension requiring vasopressor support which was weaned off on 01/05/2020. He developed a GI Bleed that was treated w/ transfusion and PPI. His EGD was unremarkable. Since admission he has developed pressure ulcer right heel,  left lateral ankle, and left calf. He is s/p amputation of the right second toe on 01/09/2020. He continues to have loose stool w/ excoriation of his buttocks and sacrum. His feet remain warm and perfused. Nursing Data:     Patient Vitals for the past 24 hrs:   BP Temp Pulse Resp SpO2   01/16/20 0812 124/67 98.4 °F (36.9 °C) 88 16    01/15/20 2300 135/77 98.1 °F (36.7 °C) 77 16    01/15/20 1855 138/67 98 °F (36.7 °C) 90 18 95 %   01/15/20 1519 131/69 97.9 °F (36.6 °C) 90 18 96 %   01/15/20 1057 135/79 98.6 °F (37 °C) 80 18 98 %     ---------------------------------------------------------------------------------------------------------  No intake or output data in the 24 hours ending 01/16/20 1033    Exam:     Physical Exam  Constitutional:       Comments: Appears generally deconditioned and chronically ill. HENT:      Head: Normocephalic. Nose: Nose normal.      Mouth/Throat:      Mouth: Mucous membranes are moist.   Eyes:      Pupils: Pupils are equal, round, and reactive to light. Neck:      Musculoskeletal: Normal range of motion. Cardiovascular:      Rate and Rhythm: Normal rate and regular rhythm. Pulses:           Femoral pulses are 2+ on the right side and 2+ on the left side. Comments: His feet are warm and perfused. Pulmonary:      Effort: Pulmonary effort is normal. No tachypnea, accessory muscle usage or respiratory distress. Breath sounds: No rales. Abdominal:      General: Abdomen is flat. Palpations: Abdomen is soft. Musculoskeletal: Normal range of motion.    Skin:  Right heel DTI   Right second toe amputation   Bulky dry dressing to right foot  Left TMA incision ulceration   Resolved   Left heel ulceration is stable  Left leg calf PU  Left lateral ankle PU  His surgical incision to groin is intact. Buttocks and sacral excoriation  Neurological:      Mental Status: He is alert. Mental status is at baseline. Psychiatric:         Mood and Affect: Mood normal.         Behavior: Behavior normal.      Lab Review:     . Recent Results (from the past 24 hour(s))   METABOLIC PANEL, BASIC    Collection Time: 01/16/20  3:52 AM   Result Value Ref Range    Sodium 139 136 - 145 mmol/L    Potassium 3.3 (L) 3.5 - 5.1 mmol/L    Chloride 108 97 - 108 mmol/L    CO2 26 21 - 32 mmol/L    Anion gap 5 5 - 15 mmol/L    Glucose 79 65 - 100 mg/dL    BUN 8 6 - 20 MG/DL    Creatinine 0.58 (L) 0.70 - 1.30 MG/DL    BUN/Creatinine ratio 14 12 - 20      GFR est AA >60 >60 ml/min/1.73m2    GFR est non-AA >60 >60 ml/min/1.73m2    Calcium 8.3 (L) 8.5 - 10.1 MG/DL   VANCOMYCIN, TROUGH    Collection Time: 01/16/20  3:52 AM   Result Value Ref Range    Vancomycin,trough 20.3 (HH) 5.0 - 10.0 ug/mL    Reported dose date: 20200115      Reported dose time: 1600      Reported dose: 750 MG UNITS   PROCALCITONIN    Collection Time: 01/16/20  3:52 AM   Result Value Ref Range    Procalcitonin <0.05 ng/mL   MAGNESIUM    Collection Time: 01/16/20  3:52 AM   Result Value Ref Range    Magnesium 1.6 1.6 - 2.4 mg/dL          Assessment/Plan:      Consult problem  Severe bilateral PAD w/ multiple ulcerations of the BLE  -s/p axillo-bifemoral bypass on 01/02/2020.     Stable from a vascular standpoint. Encourage OOB w/ timi life vs lift team.  Heel suspension boots at all times. Discontinue stable to clavicle and right chest wall.       Active problems  Excoriation  POA no  Right heel DTI POA no  Right second toe ulcer POA yes   -s/p amputation 01/09/2020   -bulky dry dressing to right foot C/D/I  Left TMA incision ulceration POA yes    Resolved   Left heel ulceration is stable POA yes   Left leg calf PU POA no  Left lateral ankle PU POA no   Appreciate input from wound care team.      Post procedural hypotension POA no  -requiring vasopressor support  -resolved  Hypertensive disorder   -IV lasix administered. BP now improved   -consider resuming HCTZ     Hypoalbuminemia   PAfib w/ RVR   -tachycardia resolved   -poor candidate for [facility] w/ hx of GIB of unknown etiology and cognitive deficit. Hyperlipidemia  -on statin   Acute hypoxic respiratory failure   Bilateral pleural effusion w/ partial LLL collapse  ECHO 01/04/2020  · Normal cavity size and systolic function (ejection fraction normal). Increased wall thickness. Estimated left ventricular ejection fraction is 50 - 55%. Left ventricular diastolic dysfunction. · Trace mitral valve regurgitation is present. · Mild tricuspid valve regurgitation is present. · Small-to-moderate pericardial effusion. There is left pleural effusion  COPD  -not in exacerbation     Metabolic encephalopathy   -resolved @ baseline   Chronic cognitive deficit     Sepsis  -resolved   Urinary retention  -patient voided twice prior to procedure and had retained urine when isabel was placed for procedure.    Recurrent UTI   -initial urine cxKLEBISIELLA PNEUMONIAE ( 3 day course of Rocephin completed)  -2nd urine cx NG  -3rd urine cx CITROBACTER BRAAKII & ENTEROCOCCUS (continues on Zosyn day 7)  Outpatient f/u w/ Urology.       GI Bleed  Diverticulosis   GERD  -EGD unremarkable   Macrocytic Anemia   -H&H continues to be labile   Thrombocytopenia  -resolved   Plan per gastroenterology       Alcohol withdrawal  -resolved   Thiamine deficiency   -on oral supplementation   Diabetes Mellitus w/ hypoglycemia and hypoalbuminemia    -on liberalized diet w/ hs snack   -suspect malnutrition of moderate degree  -possible liver dysfunction resulting in poor glycogen stores     Hypernatremia   -resolved   Hypokalemia   Hypophosphatemia   Hypomagnesemia  -resolved   Management of comorbid conditions by primary team.     VTE Prophylaxis:  SCDs: contraindicated in severe BLE PAD  Not a candidate for SQ w/ hx of GIB     Disposition:  SNF. Kulwinder Vaughn w/ ECF following rehabilitation.         Vascular surgery will continue to follow patient intermittently during admission. We appreciate the opportunity to participate in the care of Mr. Lavonne Maharaj. Patient should f/u with  Dr. Robert Sexton on 1/22/2020 as scheduled. Please call for any urgent vascular issues.

## 2020-01-16 NOTE — PROGRESS NOTES
Pharmacy Automatic Renal Dosing Protocol - Antimicrobials    Indication for Antimicrobials: UTI, osteomyelitis? Current Regimen of Each Antimicrobial:  Piperacillin/tazobactam 3.375g IV q8h - started 1/3 day 12  Vancomycin 1500 mg x 1 then 1000 mg Q12H (start: 1/10, day #5)    Previous Antimicrobial Therapy:  Ceftriaxone starting     Goal Level: VANCOMYCIN TROUGH GOAL RANGE    Vancomycin Trough: 15 - 20 mcg/mL  (AUC: 400 - 600 mg/hr/Liter/day)     Date Dose & Interval Measured (mcg/mL) Extrapolated (mcg/mL)   [email or web address] Vanco 1g q12h 21.7 accurate                 Date & time of next level:  @ 0400    Significant Cultures:    UCx - Citrobacter Braakii and ENTEROCOCCUS FAECALIS GROUP D, citrobacter - S zosyn   - tissue - staph simulans - S to Zosyn= FINAL   - anaerobic - NEG  1/10: blood - NGX5d= pending    Radiology / Imaging results: (X-ray, CT scan or MRI): none pertinent    Labs:  Recent Labs     20  0352 01/15/20  0422 20  0407   CREA 0.58* 0.62* 0.57*   BUN 8 6 5*   WBC  --   --  8.2     Temp (24hrs), Av.3 °F (36.8 °C), Min:97.9 °F (36.6 °C), Max:98.9 °F (37.2 °C)    Paralysis, amputations, malnutrition:  Decubitus Sacral Region,  L foot TMA, albumin 1.5, Total protein 4.8    Creatinine Clearance (mL/min) or Dialysis:  76 (SCR multiplier 1.4)      Impression/Plan:   SCr stable, WBCs bumped overnight  BMP daily  Appears UTI is no longer the only concern. RNs attempting to gather blood cultures and podiatry note states patient has osteomyelitis  Received requested trough prior to 20 0400 dose= 20.3 (19.7), safe and therapeutic, but very close to upper limit of range, will c/w same for now, if not d/c'ed as below change to vanco 1g q18h (34/15/492)  Continue Zosyn and vancomycin regimen at this time  Antimicrobial stop date pending:, per hospitalist \"pt has been on IV abx for >10 days. He had a isabel cath while in the ICU so will plan to treat as complicated UTI.   Will repeat a procal in the AM and likely d/c abx\"     Pharmacy will follow daily and adjust medications as appropriate for renal function and/or serum levels.

## 2020-01-16 NOTE — PROGRESS NOTES
Plan:  -?[facility] for rehab   -RADHA SINGLETARY      10:31AM  CM in to speak with pt regarding d/c plan. Discussed d/c to rehab. Pt agreeable to [facility] for SNF. CM discussed need for long-term plan. Pt optimistic that he will be able to return home after rehab. He states that he has friends who will be able to assist him at home. Pt detailing to CM how he will dress and cook and care for himself. CM asked pt about previous Medicaid application. Pt stating he was over income. CM clarified with manager/[facility] CM. Appears that pt has been unwilling to sell his home in order to qualify for Medicaid. [facility] following. Long-term plan still uncertain. CM will continue to follow and assist with d/c planning.         Cm Bazan MSW  Care Manager

## 2020-01-16 NOTE — PROGRESS NOTES
I reviewed pertinent labs and imaging, and discussed /agreed on the plan of care with Dr. Sujit Metz. Hospitalist Progress Note    NAME: Hansa Truong   :  1945   MRN:  [id number]     History of Present Illness:  75years old male with past medical history significant for peripheral vascular disease, hypertension, paroxysmal atrial fibrillation, COPD, GERD was transferred from [facility] for evaluation and treatment of alcohol withdrawal, patient was initially admitted to [facility] hospital  for evaluation and treatment UTI, yesterday patient developed confusion associated with tremor patient was transferred for [facility] for further evaluation and treatment of alcohol withdrawal.     Assessment / Plan:  Shock, multifactorial (sepsis/acute blood loss)- resolved   Recurrent UTI  Lactic acidosis- resolved   Cellulitis of head- appears resolved   · WBC 8.2  · Hgb 8.8  · Procalcitonin <0.05  · Lactic acid 0.8  · BC NGTD  · 2020:  Tissue culture with RARE STAPHYLOCOCCUS SIMULANS   · 01/10/2020-CXR: Bilateral pleural effusions  · Discontinued IV antibiotics   · CM to assist with disposition     Hypomagnesemia  Hypokalemia  · K 3.3; replace and monitor   · Mg 1.6    Severe protein calorie malnutrition   · Appreciate dietician input  · Continue nutritional supplements    GI bleed- resolved  Diverticulosis  Acute blood loss anemia   Diarrhea  · Hgb 8.8  · CT abd/pel without active bleeding   · 2020:  EGD unremarkable. Enteric panel negative   · Has received 2 units PRBC this admission   · Continue PPI  · Appreciate GI input; will need colonoscopy OP     PVD, POA  Peripheral neuropathy  · 2020:  Axillo-Bifemoral bypass  · 2020:   Amputation of right second toe  · Continue ASA and gabapentin  · Appreciate vascular surgery input  · Appreciate podiatry input     Anasarca- improving  Pulmonary edema  Pleural effusions  Severe hypoalbuminemia   · ECHO:  Normal cavity size and systolic function (ejection fraction normal). Increased wall thickness. Estimated left ventricular ejection fraction is 50 - 55%. Left ventricular diastolic dysfunction. Trace mitral valve regurgitation is present. Mild tricuspid valve regurgitation is present. Small-to-moderate pericardial effusion. There is left pleural effusion  · Albumin 1.9; improving   · Continue Lasix     ETOH withdrawal with DT's- resolved   Tobacco abuse   · Patient educated on the importance of smoking cessation and the health benefits associated with quitting. · Continue Nicotine patch   · Continue folic acid, multivitamin, and vitamin B     HTN   Dyslipidemia   · Continue statin and Lasix     COPD, not in exacerbation   · PRN nebs     BPH  Urinary retention   · Continue flomax    25.0 - 29.9 Overweight / Body mass index is 25.05 kg/m². Code status: DNR  Prophylaxis: On hold secondary to anemia   Recommended Disposition: SNF/LTC     Subjective:     Chief Complaint / Reason for Physician Visit  \"Im feeling better. \"  Discussed with RN events overnight. Review of Systems:  Symptom Y/N Comments  Symptom Y/N Comments   Fever/Chills n   Chest Pain n    Poor Appetite    Edema     Cough n   Abdominal Pain     Sputum    Joint Pain     SOB/[place] n   Pruritis/Rash     Nausea/vomit n   Tolerating PT/OT y    Diarrhea y   Tolerating Diet     Constipation    Other       Could NOT obtain due to:      Objective:     VITALS:   Last 24hrs VS reviewed since prior progress note. Most recent are:  Patient Vitals for the past 24 hrs:   Temp Pulse Resp BP SpO2   01/16/20 1442 98.4 °F (36.9 °C) 98 17 142/81 99 %   01/16/20 1121 98.2 °F (36.8 °C) 85 17 149/79 96 %   01/16/20 0812 98.4 °F (36.9 °C) 88 16 124/67    01/15/20 2300 98.1 °F (36.7 °C) 77 16 135/77    01/15/20 1855 98 °F (36.7 °C) 90 18 138/67 95 %     No intake or output data in the 24 hours ending 01/16/20 1519     PHYSICAL EXAM:  General:  Pleasant frail  male. NAD  EENT:  EOMI. Anicteric sclerae. MMM  Resp:  CTA bilaterally, no wheezing or rales. No accessory muscle use  CV:  Regular rate rhythm,  No edema  GI:  Soft, Non distended, Non tender.  +Bowel sounds  Neurologic:  Alert and oriented X 3, normal speech,   Psych:   Fair insight. Not anxious nor agitated  Skin:  No rashes. No jaundice, staples to bilateral groins, right flank, and right chest     Reviewed most current lab test results and cultures  YES  Reviewed most current radiology test results   YES  Review and summation of old records today    NO  Reviewed patient's current orders and MAR    YES  PMH/SH reviewed - no change compared to H&P  ________________________________________________________________________  Care Plan discussed with:    Comments   Patient x    Family      RN x    Care Manager     Consultant                        Multidiciplinary team rounds were held today with , nursing, pharmacist and clinical coordinator. Patient's plan of care was discussed; medications were reviewed and discharge planning was addressed. ________________________________________________________________________  Total NON critical care TIME:  25   Minutes    Total CRITICAL CARE TIME Spent:   Minutes non procedure based      Comments   >50% of visit spent in counseling and coordination of care     ________________________________________________________________________  Calleen Claudio, NP     Procedures: see electronic medical records for all procedures/Xrays and details which were not copied into this note but were reviewed prior to creation of Plan. LABS:  I reviewed today's most current labs and imaging studies.   Pertinent labs include:  Recent Labs     01/14/20  0407   WBC 8.2   HGB 8.8*   HCT 27.2*        Recent Labs     01/16/20  0352 01/15/20  0422 01/14/20  0407    140 142   K 3.3* 3.4* 3.5    109* 110*   CO2 26 26 24   GLU 79 82 72   BUN 8 6 5*   CREA 0.58* 0.62* 0.57*   CA 8.3* 8.0* 8.0*   MG 1.6 1.4* 1.4*   ALB  --   --  1.9*   TBILI  --   --  0.3   SGOT  --   --  12*   ALT  --   --  13       Signed: Carson Crowder NP

## 2020-01-17 LAB
ANION GAP SERPL CALC-SCNC: 5 MMOL/L (ref 5–15)
BUN SERPL-MCNC: 8 MG/DL (ref 6–20)
BUN/CREAT SERPL: 13 (ref 12–20)
CALCIUM SERPL-MCNC: 8.4 MG/DL (ref 8.5–10.1)
CHLORIDE SERPL-SCNC: 108 MMOL/L (ref 97–108)
CO2 SERPL-SCNC: 26 MMOL/L (ref 21–32)
CREAT SERPL-MCNC: 0.6 MG/DL (ref 0.7–1.3)
ERYTHROCYTE [DISTWIDTH] IN BLOOD BY AUTOMATED COUNT: 15.5 % (ref 11.5–14.5)
GLUCOSE SERPL-MCNC: 74 MG/DL (ref 65–100)
HCT VFR BLD AUTO: 27.6 % (ref 36.6–50.3)
HGB BLD-MCNC: 9 G/DL (ref 12.1–17)
MAGNESIUM SERPL-MCNC: 1.2 MG/DL (ref 1.6–2.4)
MCH RBC QN AUTO: 33.6 PG (ref 26–34)
MCHC RBC AUTO-ENTMCNC: 32.6 G/DL (ref 30–36.5)
MCV RBC AUTO: 103 FL (ref 80–99)
NRBC # BLD: 0 K/UL (ref 0–0.01)
NRBC BLD-RTO: 0 PER 100 WBC
PLATELET # BLD AUTO: 349 K/UL (ref 150–400)
PMV BLD AUTO: 10.8 FL (ref 8.9–12.9)
POTASSIUM SERPL-SCNC: 3.7 MMOL/L (ref 3.5–5.1)
RBC # BLD AUTO: 2.68 M/UL (ref 4.1–5.7)
SODIUM SERPL-SCNC: 139 MMOL/L (ref 136–145)
WBC # BLD AUTO: 9.5 K/UL (ref 4.1–11.1)

## 2020-01-17 PROCEDURE — 65660000000 HC RM CCU STEPDOWN

## 2020-01-17 PROCEDURE — 74011250637 HC RX REV CODE- 250/637: Performed by: INTERNAL MEDICINE

## 2020-01-17 PROCEDURE — 74011250636 HC RX REV CODE- 250/636: Performed by: INTERNAL MEDICINE

## 2020-01-17 PROCEDURE — 74011250637 HC RX REV CODE- 250/637: Performed by: NURSE PRACTITIONER

## 2020-01-17 PROCEDURE — 74011250636 HC RX REV CODE- 250/636: Performed by: FAMILY MEDICINE

## 2020-01-17 PROCEDURE — 74011250637 HC RX REV CODE- 250/637: Performed by: SURGERY

## 2020-01-17 PROCEDURE — 36415 COLL VENOUS BLD VENIPUNCTURE: CPT

## 2020-01-17 PROCEDURE — 83735 ASSAY OF MAGNESIUM: CPT

## 2020-01-17 PROCEDURE — 85027 COMPLETE CBC AUTOMATED: CPT

## 2020-01-17 PROCEDURE — 74011250637 HC RX REV CODE- 250/637: Performed by: NEUROMUSCULOSKELETAL MEDICINE & OMM

## 2020-01-17 PROCEDURE — 74011250636 HC RX REV CODE- 250/636: Performed by: NURSE PRACTITIONER

## 2020-01-17 PROCEDURE — 80048 BASIC METABOLIC PNL TOTAL CA: CPT

## 2020-01-17 PROCEDURE — 94760 N-INVAS EAR/PLS OXIMETRY 1: CPT

## 2020-01-17 RX ORDER — MAGNESIUM SULFATE HEPTAHYDRATE 40 MG/ML
2 INJECTION, SOLUTION INTRAVENOUS ONCE
Status: COMPLETED | OUTPATIENT
Start: 2020-01-17 | End: 2020-01-17

## 2020-01-17 RX ADMIN — PANTOPRAZOLE SODIUM 40 MG: 40 TABLET, DELAYED RELEASE ORAL at 07:30

## 2020-01-17 RX ADMIN — Medication 10 ML: at 06:00

## 2020-01-17 RX ADMIN — Medication 10 ML: at 15:01

## 2020-01-17 RX ADMIN — DIPHENHYDRAMINE HYDROCHLORIDE 25 MG: 50 INJECTION, SOLUTION INTRAMUSCULAR; INTRAVENOUS at 01:22

## 2020-01-17 RX ADMIN — POTASSIUM CHLORIDE 20 MEQ: 20 TABLET, EXTENDED RELEASE ORAL at 08:27

## 2020-01-17 RX ADMIN — DIPHENHYDRAMINE HYDROCHLORIDE 25 MG: 50 INJECTION, SOLUTION INTRAMUSCULAR; INTRAVENOUS at 12:46

## 2020-01-17 RX ADMIN — MUPIROCIN: 20 OINTMENT TOPICAL at 08:30

## 2020-01-17 RX ADMIN — Medication 10 ML: at 10:14

## 2020-01-17 RX ADMIN — MAGNESIUM SULFATE HEPTAHYDRATE 2 G: 40 INJECTION, SOLUTION INTRAVENOUS at 12:19

## 2020-01-17 RX ADMIN — GABAPENTIN 100 MG: 100 CAPSULE ORAL at 08:28

## 2020-01-17 RX ADMIN — THERA TABS 1 TABLET: TAB at 08:28

## 2020-01-17 RX ADMIN — CASTOR OIL AND BALSAM, PERU: 788; 87 OINTMENT TOPICAL at 17:02

## 2020-01-17 RX ADMIN — Medication 10 ML: at 22:02

## 2020-01-17 RX ADMIN — PANTOPRAZOLE SODIUM 40 MG: 40 TABLET, DELAYED RELEASE ORAL at 17:01

## 2020-01-17 RX ADMIN — CASTOR OIL AND BALSAM, PERU: 788; 87 OINTMENT TOPICAL at 08:29

## 2020-01-17 RX ADMIN — LOPERAMIDE HYDROCHLORIDE 2 MG: 2 CAPSULE ORAL at 06:25

## 2020-01-17 RX ADMIN — Medication 100 MG: at 08:27

## 2020-01-17 RX ADMIN — Medication 10 ML: at 10:13

## 2020-01-17 RX ADMIN — Medication 1 AMPULE: at 09:00

## 2020-01-17 RX ADMIN — FOLIC ACID 1 MG: 1 TABLET ORAL at 08:28

## 2020-01-17 RX ADMIN — Medication 1 AMPULE: at 22:01

## 2020-01-17 RX ADMIN — ASPIRIN 81 MG 81 MG: 81 TABLET ORAL at 08:28

## 2020-01-17 RX ADMIN — PANTOPRAZOLE SODIUM 40 MG: 40 TABLET, DELAYED RELEASE ORAL at 06:25

## 2020-01-17 RX ADMIN — GABAPENTIN 100 MG: 100 CAPSULE ORAL at 15:01

## 2020-01-17 RX ADMIN — ACETAMINOPHEN 650 MG: 325 TABLET ORAL at 12:46

## 2020-01-17 RX ADMIN — ACETAMINOPHEN 650 MG: 325 TABLET ORAL at 22:07

## 2020-01-17 RX ADMIN — LOPERAMIDE HYDROCHLORIDE 2 MG: 2 CAPSULE ORAL at 18:16

## 2020-01-17 RX ADMIN — TAMSULOSIN HYDROCHLORIDE 0.4 MG: 0.4 CAPSULE ORAL at 08:28

## 2020-01-17 RX ADMIN — GABAPENTIN 300 MG: 300 CAPSULE ORAL at 22:02

## 2020-01-17 RX ADMIN — PRAVASTATIN SODIUM 40 MG: 40 TABLET ORAL at 22:01

## 2020-01-17 NOTE — PROGRESS NOTES
Problem: Risk for Spread of Infection  Goal: Prevent transmission of infectious organism to others  Description  Prevent the transmission of infectious organisms to other patients, staff members, and visitors. Outcome: Progressing Towards Goal     Problem: Falls - Risk of  Goal: *Absence of Falls  Description  Document Loc Grant Fall Risk and appropriate interventions in the flowsheet. Outcome: Progressing Towards Goal  Note: Fall Risk Interventions:  Mobility Interventions: Patient to call before getting OOB    Mentation Interventions: Adequate sleep, hydration, pain control    Medication Interventions: Patient to call before getting OOB    Elimination Interventions: Call light in reach    History of Falls Interventions: Consult care management for discharge planning         Problem: Patient Education: Go to Patient Education Activity  Goal: Patient/Family Education  Outcome: Progressing Towards Goal     Problem: Pressure Injury - Risk of  Goal: *Prevention of pressure injury  Description  Document Troy Scale and appropriate interventions in the flowsheet. Outcome: Progressing Towards Goal  Note: Pressure Injury Interventions:  Sensory Interventions: Maintain/enhance activity level    Moisture Interventions: Apply protective barrier, creams and emollients    Activity Interventions: Increase time out of bed    Mobility Interventions: Turn and reposition approx.  every two hours(pillow and wedges)    Nutrition Interventions: Document food/fluid/supplement intake    Friction and Shear Interventions: Apply protective barrier, creams and emollients                Problem: Patient Education: Go to Patient Education Activity  Goal: Patient/Family Education  Outcome: Progressing Towards Goal     Problem: Delirium Treatment  Goal: *Level of consciousness restored to baseline  Outcome: Progressing Towards Goal  Goal: *Level of environmental perceptions restored to baseline  Outcome: Progressing Towards Goal  Goal: *Sensory perception restored to baseline  Outcome: Progressing Towards Goal  Goal: *Emotional stability restored to baseline  Outcome: Progressing Towards Goal  Goal: *Functional assessment restored to baseline  Outcome: Progressing Towards Goal  Goal: *Absence of falls  Outcome: Progressing Towards Goal  Goal: *Will remain free of delirium, CAM Score negative  Outcome: Progressing Towards Goal  Goal: *Cognitive status will be restored to baseline  Outcome: Progressing Towards Goal  Goal: Interventions  Outcome: Progressing Towards Goal     Problem: Patient Education: Go to Patient Education Activity  Goal: Patient/Family Education  Outcome: Progressing Towards Goal     Problem: Patient Education: Go to Patient Education Activity  Goal: Patient/Family Education  Outcome: Progressing Towards Goal     Problem: Patient Education: Go to Patient Education Activity  Goal: Patient/Family Education  Outcome: Progressing Towards Goal     Problem: Alcohol Withdrawal  Goal: *STG: Participates in treatment plan  Outcome: Progressing Towards Goal  Goal: *STG: Remains safe in hospital  Outcome: Progressing Towards Goal  Goal: *STG: Seeks staff when symptoms of withdrawal increase  Outcome: Progressing Towards Goal  Goal: *STG: Complies with medication therapy  Outcome: Progressing Towards Goal  Goal: *STG: Attends activities and groups  Outcome: Progressing Towards Goal  Goal: *STG: Will identify negative impact of chemical dependency including the use of tobacco, alcohol, and other substances  Outcome: Progressing Towards Goal  Goal: *STG: Verbalizes abstinence as an achievable goal  Outcome: Progressing Towards Goal  Goal: *STG: Agrees to participate in outpatient after care program to support ongoing mental health  Outcome: Progressing Towards Goal  Goal: *STG: Able to indentify relapse triggers including interpersonal/social and familial factors  Outcome: Progressing Towards Goal  Goal: *STG: Identify lifestyle changes to support long term sobriety such as vocation, employment, education, and legal issues  Outcome: Progressing Towards Goal  Goal: *STG: Maintains appropriate nutrition and hydration  Outcome: Progressing Towards Goal  Goal: *STG: Vital signs within defined limits  Outcome: Progressing Towards Goal  Goal: *STG/LTG: Relapse prevention plan in place to include housing/aftercare, leisure activities, and spirituality  Outcome: Progressing Towards Goal  Goal: Interventions  Outcome: Progressing Towards Goal     Problem: Patient Education: Go to Patient Education Activity  Goal: Patient/Family Education  Outcome: Progressing Towards Goal     Problem: Hypertension  Goal: *Blood pressure within specified parameters  Outcome: Progressing Towards Goal  Goal: *Fluid volume balance  Outcome: Progressing Towards Goal  Goal: *Labs within defined limits  Outcome: Progressing Towards Goal     Problem: Patient Education: Go to Patient Education Activity  Goal: Patient/Family Education  Outcome: Progressing Towards Goal

## 2020-01-17 NOTE — PROGRESS NOTES
General Surgery End of Shift Nursing Note    Shift worked:   [id number]   Summary of shift:  Uneventful shift, patient remained in and out of sleep. Wound care done on leg and foot. Patient refused AM dose of lasix because he states \"I'm peeing fine\". Patient was medicated with PRN tylenol (see MAR). Patient is also ready to go home/rehab facility and spoke with CM today to work out the details. Patient has had immodium x2 today (once this AM and once this evening). Issues for physician to address:   None at this time. Number times ambulated in hallway past shift: 0    Number of times OOB to chair past shift: 0    Pain Management:  Current medication: Tylenol, Roxicodone  Patient states pain is manageable on current pain medication: YES    GI:    Current diet:  DIET SNACKS HS Snack  DIET REGULAR  DIET NUTRITIONAL SUPPLEMENTS No; Dinner; Ensure [place] current diet: YES  Passing flatus: YES  Last Bowel Movement: today   Appearance: small, brown, soft. Respiratory:    Incentive Spirometer at bedside: YES  Patient instructed on use: YES    Patient Safety:    Falls Score: 4  Bed Alarm On? No  Sitter?  No    Jose Antonio Nguyen LPN

## 2020-01-17 NOTE — PROGRESS NOTES
I reviewed pertinent labs and imaging, and discussed /agreed on the plan of care with Dr. Darion Briscoe. Hospitalist Progress Note    NAME: Concetta Guerin   :  1945   MRN:  [id number]     History of Present Illness:  75years old male with past medical history significant for peripheral vascular disease, hypertension, paroxysmal atrial fibrillation, COPD, GERD was transferred from [facility] for evaluation and treatment of alcohol withdrawal, patient was initially admitted to [facility] hospital  for evaluation and treatment UTI, yesterday patient developed confusion associated with tremor patient was transferred for [facility] for further evaluation and treatment of alcohol withdrawal.     Assessment / Plan:  Patient is stable for discharge. CM following and working on placement. Staples to groin need to be removed on 2020    Shock, multifactorial (sepsis/acute blood loss)- resolved   Recurrent UTI  Lactic acidosis- resolved   Cellulitis of head- appears resolved   · WBC 9.5  · Hgb 9.0  · Procalcitonin <0.05  · Lactic acid 0.8  · BC NGTD  · 2020:  Tissue culture with RARE STAPHYLOCOCCUS SIMULANS   · 01/10/2020-CXR: Bilateral pleural effusions  · Discontinued IV antibiotics   · CM to assist with disposition     Hypomagnesemia  Hypokalemia  · K 3.7   · Mg 1.2; replace and monitor     Severe protein calorie malnutrition   · Appreciate dietician input  · Continue nutritional supplements    GI bleed- resolved  Diverticulosis  Acute blood loss anemia   Diarrhea  · Hgb 9.0  · CT abd/pel without active bleeding   · 2020:  EGD unremarkable. Enteric panel negative   · Has received 2 units PRBC this admission   · Continue PPI  · Appreciate GI input; will need colonoscopy OP     PVD, POA  Peripheral neuropathy  · 2020:  Axillo-Bifemoral bypass  · 2020:   Amputation of right second toe  · Continue ASA and gabapentin  · Appreciate vascular surgery input  · Appreciate podiatry input     Anasarca- improving  Pulmonary edema  Pleural effusions  Severe hypoalbuminemia   · ECHO:  Normal cavity size and systolic function (ejection fraction normal). Increased wall thickness. Estimated left ventricular ejection fraction is 50 - 55%. Left ventricular diastolic dysfunction. Trace mitral valve regurgitation is present. Mild tricuspid valve regurgitation is present. Small-to-moderate pericardial effusion. There is left pleural effusion  · Albumin 1.9; improving   · Continue Lasix     ETOH withdrawal with DT's- resolved   Tobacco abuse   · Patient educated on the importance of smoking cessation and the health benefits associated with quitting. · Continue Nicotine patch   · Continue folic acid, multivitamin, and vitamin B     HTN   Dyslipidemia   · Continue statin and Lasix     COPD, not in exacerbation   · PRN nebs     BPH  Urinary retention   · Continue flomax    25.0 - 29.9 Overweight / Body mass index is 25.05 kg/m². Code status: DNR  Prophylaxis: On hold secondary to anemia   Recommended Disposition: SNF/LTC     Subjective:     Chief Complaint / Reason for Physician Visit  \"It's freezing in here. \"  Discussed with RN events overnight. Review of Systems:  Symptom Y/N Comments  Symptom Y/N Comments   Fever/Chills n   Chest Pain n    Poor Appetite    Edema     Cough n   Abdominal Pain     Sputum    Joint Pain     SOB/[place] n   Pruritis/Rash     Nausea/vomit n   Tolerating PT/OT y    Diarrhea n   Tolerating Diet     Constipation    Other       Could NOT obtain due to:      Objective:     VITALS:   Last 24hrs VS reviewed since prior progress note.  Most recent are:  Patient Vitals for the past 24 hrs:   Temp Pulse Resp BP SpO2   01/17/20 0750 98.1 °F (36.7 °C)  16 140/65 99 %   01/17/20 0500 98.6 °F (37 °C) 88 18 153/75 95 %   01/16/20 2336 99.1 °F (37.3 °C) 91 16 117/53 93 %   01/16/20 2000 97.9 °F (36.6 °C) 88 16 152/74 96 %   01/16/20 1848 97.9 °F (36.6 °C) 88 16 152/74 96 %   01/16/20 1442 98.4 °F (36.9 °C) 98 17 142/81 99 %   01/16/20 1121 98.2 °F (36.8 °C) 85 17 149/79 96 %       Intake/Output Summary (Last 24 hours) at 1/17/2020 1046  Last data filed at 1/17/2020 0747  Gross per 24 hour   Intake 340 ml   Output    Net 340 ml        PHYSICAL EXAM:  General: No acute distress. Pleasant frail elderly male   EENT:  EOMI. Anicteric sclerae. MMM  Resp:  Lung sounds clear bilaterally, no wheezing or rales. No accessory muscle use  CV:  RRR,  No edema  GI:  Soft, Non distended, Non tender.  +Bowel sounds  Neurologic:  Alert and oriented X 3, normal speech,   Psych:   Fair insight. Not anxious nor agitated  Skin:  No rashes. No jaundice, staples to bilateral groins    Reviewed most current lab test results and cultures  YES  Reviewed most current radiology test results   YES  Review and summation of old records today    NO  Reviewed patient's current orders and MAR    YES  PMH/ reviewed - no change compared to H&P  ________________________________________________________________________  Care Plan discussed with:    Comments   Patient x    Family      RN x    Care Manager x    Consultant                        Multidiciplinary team rounds were held today with , nursing, pharmacist and clinical coordinator. Patient's plan of care was discussed; medications were reviewed and discharge planning was addressed. ________________________________________________________________________  Total NON critical care TIME:  25   Minutes    Total CRITICAL CARE TIME Spent:   Minutes non procedure based      Comments   >50% of visit spent in counseling and coordination of care     ________________________________________________________________________  Caffie Crimes, NP     Procedures: see electronic medical records for all procedures/Xrays and details which were not copied into this note but were reviewed prior to creation of Plan.       LABS:  I reviewed today's most current labs and imaging studies.   Pertinent labs include:  Recent Labs     01/17/20  0340   WBC 9.5   HGB 9.0*   HCT 27.6*        Recent Labs     01/17/20  0340 01/16/20  0352 01/15/20  0422    139 140   K 3.7 3.3* 3.4*    108 109*   CO2 26 26 26   GLU 74 79 82   BUN 8 8 6   CREA 0.60* 0.58* 0.62*   CA 8.4* 8.3* 8.0*   MG 1.2* 1.6 1.4*       Signed: Armani Zepeda, NP

## 2020-01-17 NOTE — PROGRESS NOTES
Plan:  -?[facility] for rehab  -[facility]       3:28PM  Request sent to [employer] to re-screen/continue LTC Medicaid application. Hospitalist NP and pt updated on d/c planning progress. 2:54PM  Return call from Víctor at the [facility]. No LTC beds available. 2:28PM  CM reviewed [employer]. Referrals sent to several SNF a few wks ago. CM PC to [facility]. Had to leave  for Yin in admissions asking about status of bed availability. CM PC to Vida Gunn at [facility]. Still reviewing pt to determine if able to accept. No beds available until Monday, at the earliest.      11:30AM  Pt requesting to speak with CM. Pt now stating that he will sell his house and wants to go to the [facility] (presumably for LTC) and is still agreeable to [facility] for rehab as well.     9:02AM  CM in to speak with pt about d/c plan/long-term plan. Discussed Medicaid and need to sell home. Pt unwilling at this time. CM explored how pt plans to care for self at home. Pt acknowledged that he is not able to care for himself in his current condition and will not be able to after rehab unless he makes great strides. Pt really hoping he can rehab but verbalized understanding that he will have to sell his house and go to LTC if he remains deconditioned. CM PC to Vida Gunn CM at [facility] with update. Vida Gunn to review with team and look at bed avaiablity. CM updated that pt is stable for d/c. Vida Gunn to call CM back. CM will continue to follow and assist with d/c planning.        Polly Mercado, MSW  Care Manager

## 2020-01-17 NOTE — PROGRESS NOTES
Nutrition Assessment:    RECOMMENDATIONS:   Continue Regular diet and supplements    ASSESSMENT:   Chart reviewed, pt sleeping at time of attempted visit. His appetite has been excellent today per RN flowsheet's. He enjoys Ensure and is receiving this TID. Awaiting disposition, possibly early next week per case management. BM noted yesterday. Mag 1.2, being repleted. Dietitians Intervention(s)/Plan(s): Continue diet and supplements  SUBJECTIVE/OBJECTIVE:   Pt sleeping   Diet Order: Regular, Other (comment)(Ensure TID )  % Eaten:    Patient Vitals for the past 72 hrs:   % Diet Eaten   01/17/20 1230 100 %   01/17/20 0747 100 %       Pertinent Medications:folvite, lasix, MagSO4, protonix, KCl, MVI, thiamin    Chemistries:  Lab Results   Component Value Date/Time    Sodium 139 01/17/2020 03:40 AM    Potassium 3.7 01/17/2020 03:40 AM    Chloride 108 01/17/2020 03:40 AM    CO2 26 01/17/2020 03:40 AM    Anion gap 5 01/17/2020 03:40 AM    Glucose 74 01/17/2020 03:40 AM    BUN 8 01/17/2020 03:40 AM    Creatinine 0.60 (L) 01/17/2020 03:40 AM    BUN/Creatinine ratio 13 01/17/2020 03:40 AM    GFR est AA >60 01/17/2020 03:40 AM    GFR est non-AA >60 01/17/2020 03:40 AM    Calcium 8.4 (L) 01/17/2020 03:40 AM    Albumin 1.9 (L) 01/14/2020 04:07 AM      Anthropometrics: Height: 5' 6\" (167.6 cm) Weight: 66.9 kg (147 lb 7.8 oz)  [] standing scale    []bed scale    []stated   []unknown     IBW (%IBW):   ( ) UBW (%UBW):   (  %)    BMI: Body mass index is 23.81 kg/m². This BMI is indicative of:  []Underweight   [x]Normal   []Overweight   [] Obesity   [] Extreme Obesity (BMI>40)  Estimated Nutrition Needs (Based on): 1726 Kcals/day(BMR (1328) x 1. 3AF) , 78 g(1.2 g/kg bw) Protein  Carbohydrate:  At Least 130 g/day  Fluids: 1800 mL/day    Last BM: 1/16   [x]Active     []Hyperactive  []Hypoactive       [] Absent   BS  Skin:    [] Intact   [x] Incision  [x] Breakdown(stage 2-L leg; partial thickness-sacrum; unstageable-L heel) [] DTI   [] Tears/Excoriation/Abrasion  []Edema [] Other: Wt Readings from Last 30 Encounters:   01/17/20 66.9 kg (147 lb 7.8 oz)   12/21/19 61.3 kg (135 lb 1.6 oz)   12/20/19 63.1 kg (139 lb 1.8 oz)   07/31/19 59.5 kg (131 lb 3.2 oz)   07/12/19 60.5 kg (133 lb 6.4 oz)   06/03/19 62.5 kg (137 lb 12.8 oz)   05/21/19 63 kg (139 lb)   05/10/19 62.1 kg (137 lb)   09/27/18 58.2 kg (128 lb 3.2 oz)   09/23/18 57.6 kg (127 lb)   08/30/18 57.6 kg (127 lb)   08/22/18 56.7 kg (125 lb)   08/07/18 56.7 kg (125 lb)   07/20/18 56.7 kg (125 lb)   06/07/18 58.5 kg (129 lb)   05/07/18 58.9 kg (129 lb 12.8 oz)   03/12/18 60.5 kg (133 lb 6.4 oz)   02/23/18 60.7 kg (133 lb 12.8 oz)   02/09/18 59.9 kg (132 lb)   02/08/18 60.3 kg (133 lb)   01/16/18 57.5 kg (126 lb 12.8 oz)   01/02/18 59 kg (130 lb)   10/05/17 57.4 kg (126 lb 9.6 oz)   09/21/17 57.6 kg (127 lb)   03/06/17 56.9 kg (125 lb 6.4 oz)   01/31/17 58.2 kg (128 lb 6.4 oz)   01/17/17 57.3 kg (126 lb 6.4 oz)   12/19/16 53.2 kg (117 lb 4 oz)   12/15/16 55.3 kg (122 lb)   10/21/16 53.1 kg (117 lb)        Dx of Malnutrition since admission: no    NUTRITION DIAGNOSES:   Problem:  No nutritional diagnosis at this time        NUTRITION INTERVENTIONS:  Meals/Snacks: General/healthful diet   Supplements: Commercial supplement              GOAL:   Pt will consume >70% of meals/supplements in 4-6 days.      NUTRITION MONITORING AND EVALUATION   Previous Goal: PO intake >70% of meals/supplements next 4-6 days   Previous Goal Met: Yes   Previous Recommendations Implemented: Yes   Cultural, Episcopalian, or Ethnic Dietary Needs: None   LEARNING NEEDS (Diet, Food/Nutrient-Drug Interaction):    [x] None Identified   [] Identified and Education Provided/Documented   [] Identified and Pt declined/was not appropriate      [x] Interdisciplinary Care Plan Reviewed/Documented    [x] Participated in Discharge Planning: Regular diet + Ensure 2-3 x day    [] Interdisciplinary Rounds     NUTRITION RISK:    [] High              [] Moderate           [x]  Low  []  Minimal/Uncompromised      Linn Cotton, [address] [place] [address], [address] [place] [address] Dr  Pager [phone]  Weekend Pager [phone]

## 2020-01-17 NOTE — PROGRESS NOTES
.General Surgery End of Shift Nursing Note    Bedside shift change report given to [facility] (oncoming nurse) by Ethan Mckeon (offgoing nurse). Report included the following information SBAR. Shift worked:   7p-7a   Summary of shift: Three BMs overnight. Immodium administered x2. No complaints of pain. Issues for physician to address:        Number times ambulated in hallway past shift: 0    Number of times OOB to chair past shift: 0    Pain Management:  Current medication: tylenol  Patient states pain is manageable on current pain medication: NO    GI:    Current diet:  DIET SNACKS HS Snack  DIET REGULAR  DIET NUTRITIONAL SUPPLEMENTS No; Dinner; Ensure [place] current diet: YES  Passing flatus: YES  Last Bowel Movement: today   Appearance: loose     Respiratory:    Incentive Spirometer at bedside: YES  Patient instructed on use: YES    Patient Safety:    Falls Score: 4  Bed Alarm On? No  Sitter?  No    [place] Handing

## 2020-01-18 LAB
ANION GAP SERPL CALC-SCNC: 6 MMOL/L (ref 5–15)
BUN SERPL-MCNC: 8 MG/DL (ref 6–20)
BUN/CREAT SERPL: 13 (ref 12–20)
CALCIUM SERPL-MCNC: 8.6 MG/DL (ref 8.5–10.1)
CHLORIDE SERPL-SCNC: 107 MMOL/L (ref 97–108)
CO2 SERPL-SCNC: 26 MMOL/L (ref 21–32)
CREAT SERPL-MCNC: 0.61 MG/DL (ref 0.7–1.3)
ERYTHROCYTE [DISTWIDTH] IN BLOOD BY AUTOMATED COUNT: 15.5 % (ref 11.5–14.5)
GLUCOSE SERPL-MCNC: 78 MG/DL (ref 65–100)
HCT VFR BLD AUTO: 27.7 % (ref 36.6–50.3)
HGB BLD-MCNC: 9.2 G/DL (ref 12.1–17)
MAGNESIUM SERPL-MCNC: 1.2 MG/DL (ref 1.6–2.4)
MCH RBC QN AUTO: 34.1 PG (ref 26–34)
MCHC RBC AUTO-ENTMCNC: 33.2 G/DL (ref 30–36.5)
MCV RBC AUTO: 102.6 FL (ref 80–99)
NRBC # BLD: 0 K/UL (ref 0–0.01)
NRBC BLD-RTO: 0 PER 100 WBC
PLATELET # BLD AUTO: 342 K/UL (ref 150–400)
PMV BLD AUTO: 10.3 FL (ref 8.9–12.9)
POTASSIUM SERPL-SCNC: 3.6 MMOL/L (ref 3.5–5.1)
RBC # BLD AUTO: 2.7 M/UL (ref 4.1–5.7)
SODIUM SERPL-SCNC: 139 MMOL/L (ref 136–145)
WBC # BLD AUTO: 10 K/UL (ref 4.1–11.1)

## 2020-01-18 PROCEDURE — 77030040393 HC DRSG OPTIFOAM GENT MDII -B

## 2020-01-18 PROCEDURE — 74011250637 HC RX REV CODE- 250/637: Performed by: SURGERY

## 2020-01-18 PROCEDURE — 74011250637 HC RX REV CODE- 250/637: Performed by: NEUROMUSCULOSKELETAL MEDICINE & OMM

## 2020-01-18 PROCEDURE — 83735 ASSAY OF MAGNESIUM: CPT

## 2020-01-18 PROCEDURE — 65660000000 HC RM CCU STEPDOWN

## 2020-01-18 PROCEDURE — 36415 COLL VENOUS BLD VENIPUNCTURE: CPT

## 2020-01-18 PROCEDURE — 80048 BASIC METABOLIC PNL TOTAL CA: CPT

## 2020-01-18 PROCEDURE — 77030037877 HC DRSG MEPILEX >48IN BORD MOLN -A

## 2020-01-18 PROCEDURE — 74011250636 HC RX REV CODE- 250/636: Performed by: NURSE PRACTITIONER

## 2020-01-18 PROCEDURE — 74011250637 HC RX REV CODE- 250/637: Performed by: NURSE PRACTITIONER

## 2020-01-18 PROCEDURE — 94760 N-INVAS EAR/PLS OXIMETRY 1: CPT

## 2020-01-18 PROCEDURE — 85027 COMPLETE CBC AUTOMATED: CPT

## 2020-01-18 PROCEDURE — 74011250637 HC RX REV CODE- 250/637: Performed by: INTERNAL MEDICINE

## 2020-01-18 PROCEDURE — 74011250636 HC RX REV CODE- 250/636: Performed by: FAMILY MEDICINE

## 2020-01-18 RX ORDER — MAGNESIUM SULFATE HEPTAHYDRATE 40 MG/ML
2 INJECTION, SOLUTION INTRAVENOUS
Status: COMPLETED | OUTPATIENT
Start: 2020-01-18 | End: 2020-01-18

## 2020-01-18 RX ORDER — LANOLIN ALCOHOL/MO/W.PET/CERES
400 CREAM (GRAM) TOPICAL 2 TIMES DAILY
Status: DISCONTINUED | OUTPATIENT
Start: 2020-01-19 | End: 2020-01-22 | Stop reason: HOSPADM

## 2020-01-18 RX ORDER — LOSARTAN POTASSIUM 25 MG/1
25 TABLET ORAL DAILY
Status: DISCONTINUED | OUTPATIENT
Start: 2020-01-18 | End: 2020-01-20

## 2020-01-18 RX ORDER — KETOCONAZOLE 20 MG/ML
SHAMPOO TOPICAL
Status: COMPLETED | OUTPATIENT
Start: 2020-01-18 | End: 2020-01-18

## 2020-01-18 RX ADMIN — POTASSIUM CHLORIDE 20 MEQ: 20 TABLET, EXTENDED RELEASE ORAL at 08:54

## 2020-01-18 RX ADMIN — FOLIC ACID 1 MG: 1 TABLET ORAL at 08:54

## 2020-01-18 RX ADMIN — LOSARTAN POTASSIUM 25 MG: 25 TABLET ORAL at 18:18

## 2020-01-18 RX ADMIN — PRAVASTATIN SODIUM 40 MG: 40 TABLET ORAL at 21:56

## 2020-01-18 RX ADMIN — Medication 1 AMPULE: at 09:00

## 2020-01-18 RX ADMIN — PANTOPRAZOLE SODIUM 40 MG: 40 TABLET, DELAYED RELEASE ORAL at 16:51

## 2020-01-18 RX ADMIN — Medication 10 ML: at 13:55

## 2020-01-18 RX ADMIN — ASPIRIN 81 MG 81 MG: 81 TABLET ORAL at 08:54

## 2020-01-18 RX ADMIN — MAGNESIUM SULFATE HEPTAHYDRATE 2 G: 40 INJECTION, SOLUTION INTRAVENOUS at 20:28

## 2020-01-18 RX ADMIN — Medication 100 MG: at 08:54

## 2020-01-18 RX ADMIN — ACETAMINOPHEN 650 MG: 325 TABLET ORAL at 21:56

## 2020-01-18 RX ADMIN — MUPIROCIN: 20 OINTMENT TOPICAL at 08:56

## 2020-01-18 RX ADMIN — GABAPENTIN 100 MG: 100 CAPSULE ORAL at 08:54

## 2020-01-18 RX ADMIN — CASTOR OIL AND BALSAM, PERU: 788; 87 OINTMENT TOPICAL at 08:54

## 2020-01-18 RX ADMIN — THERA TABS 1 TABLET: TAB at 08:54

## 2020-01-18 RX ADMIN — KETOCONAZOLE: 20 SHAMPOO, SUSPENSION TOPICAL at 18:30

## 2020-01-18 RX ADMIN — Medication 10 ML: at 06:34

## 2020-01-18 RX ADMIN — TAMSULOSIN HYDROCHLORIDE 0.4 MG: 0.4 CAPSULE ORAL at 08:54

## 2020-01-18 RX ADMIN — MAGNESIUM SULFATE HEPTAHYDRATE 2 G: 40 INJECTION, SOLUTION INTRAVENOUS at 18:18

## 2020-01-18 RX ADMIN — ACETAMINOPHEN 650 MG: 325 TABLET ORAL at 13:58

## 2020-01-18 RX ADMIN — GABAPENTIN 300 MG: 300 CAPSULE ORAL at 21:55

## 2020-01-18 RX ADMIN — Medication 10 ML: at 21:56

## 2020-01-18 RX ADMIN — DIPHENHYDRAMINE HYDROCHLORIDE 25 MG: 50 INJECTION, SOLUTION INTRAMUSCULAR; INTRAVENOUS at 00:55

## 2020-01-18 RX ADMIN — Medication 1 AMPULE: at 21:57

## 2020-01-18 RX ADMIN — GABAPENTIN 100 MG: 100 CAPSULE ORAL at 13:55

## 2020-01-18 RX ADMIN — PANTOPRAZOLE SODIUM 40 MG: 40 TABLET, DELAYED RELEASE ORAL at 06:34

## 2020-01-18 RX ADMIN — MAGNESIUM SULFATE HEPTAHYDRATE 2 G: 40 INJECTION, SOLUTION INTRAVENOUS at 19:24

## 2020-01-18 RX ADMIN — DIPHENHYDRAMINE HYDROCHLORIDE 25 MG: 50 INJECTION, SOLUTION INTRAMUSCULAR; INTRAVENOUS at 21:56

## 2020-01-18 RX ADMIN — CASTOR OIL AND BALSAM, PERU: 788; 87 OINTMENT TOPICAL at 18:22

## 2020-01-18 NOTE — PROGRESS NOTES
Pt.very agitated and refused to be checked if he had urinated or BM. Educated on the importance to stay dry and skin break down. He verbalized understanding. He requested not to be disturbed until 0400.

## 2020-01-18 NOTE — PROGRESS NOTES
Verbal shift change report given to Dk (oncoming nurse) by Harrison Mahmood (offgoing nurse). Report included the following information SBAR, Kardex, Intake/Output, MAR and Recent Results. Pt changed frequently for incontinence of urine. Small BM this morning. Gave imodium near end of shift. PICC line dressing changed by PICC team and C/D/I with blood return. Medicated for pain (see MAR). Wound care performed by this nurse and Issa Pérez LPN.

## 2020-01-18 NOTE — PROGRESS NOTES
Problem: Risk for Spread of Infection  Goal: Prevent transmission of infectious organism to others  Description  Prevent the transmission of infectious organisms to other patients, staff members, and visitors. Outcome: Progressing Towards Goal     Problem: Falls - Risk of  Goal: *Absence of Falls  Description  Document Niles Witt Fall Risk and appropriate interventions in the flowsheet. Outcome: Progressing Towards Goal  Note: Fall Risk Interventions:  Mobility Interventions: PT Consult for mobility concerns, Patient to call before getting OOB    Mentation Interventions: Adequate sleep, hydration, pain control    Medication Interventions: Patient to call before getting OOB    Elimination Interventions: Call light in reach    History of Falls Interventions: Utilize gait belt for transfer/ambulation         Problem: Patient Education: Go to Patient Education Activity  Goal: Patient/Family Education  Outcome: Progressing Towards Goal     Problem: Pressure Injury - Risk of  Goal: *Prevention of pressure injury  Description  Document Troy Scale and appropriate interventions in the flowsheet. Outcome: Progressing Towards Goal  Note: Pressure Injury Interventions:  Sensory Interventions: Maintain/enhance activity level    Moisture Interventions: Apply protective barrier, creams and emollients    Activity Interventions: Increase time out of bed    Mobility Interventions: Turn and reposition approx.  every two hours(pillow and wedges)    Nutrition Interventions: Document food/fluid/supplement intake    Friction and Shear Interventions: Apply protective barrier, creams and emollients, Feet elevated on foot rest, Lift sheet                Problem: Patient Education: Go to Patient Education Activity  Goal: Patient/Family Education  Outcome: Progressing Towards Goal     Problem: Delirium Treatment  Goal: *Level of consciousness restored to baseline  Outcome: Progressing Towards Goal  Goal: *Level of environmental perceptions restored to baseline  Outcome: Progressing Towards Goal  Goal: *Sensory perception restored to baseline  Outcome: Progressing Towards Goal  Goal: *Emotional stability restored to baseline  Outcome: Progressing Towards Goal  Goal: *Functional assessment restored to baseline  Outcome: Progressing Towards Goal  Goal: *Absence of falls  Outcome: Progressing Towards Goal  Goal: *Will remain free of delirium, CAM Score negative  Outcome: Progressing Towards Goal  Goal: *Cognitive status will be restored to baseline  Outcome: Progressing Towards Goal  Goal: Interventions  Outcome: Progressing Towards Goal     Problem: Patient Education: Go to Patient Education Activity  Goal: Patient/Family Education  Outcome: Progressing Towards Goal     Problem: Patient Education: Go to Patient Education Activity  Goal: Patient/Family Education  Outcome: Progressing Towards Goal     Problem: Patient Education: Go to Patient Education Activity  Goal: Patient/Family Education  Outcome: Progressing Towards Goal     Problem: Alcohol Withdrawal  Goal: *STG: Participates in treatment plan  Outcome: Progressing Towards Goal  Goal: *STG: Remains safe in hospital  Outcome: Progressing Towards Goal  Goal: *STG: Seeks staff when symptoms of withdrawal increase  Outcome: Progressing Towards Goal  Goal: *STG: Complies with medication therapy  Outcome: Progressing Towards Goal  Goal: *STG: Attends activities and groups  Outcome: Progressing Towards Goal  Goal: *STG: Will identify negative impact of chemical dependency including the use of tobacco, alcohol, and other substances  Outcome: Progressing Towards Goal  Goal: *STG: Verbalizes abstinence as an achievable goal  Outcome: Progressing Towards Goal  Goal: *STG: Agrees to participate in outpatient after care program to support ongoing mental health  Outcome: Progressing Towards Goal  Goal: *STG: Able to indentify relapse triggers including interpersonal/social and familial factors  Outcome: Progressing Towards Goal  Goal: *STG: Identify lifestyle changes to support long term sobriety such as vocation, employment, education, and legal issues  Outcome: Progressing Towards Goal  Goal: *STG: Maintains appropriate nutrition and hydration  Outcome: Progressing Towards Goal  Goal: *STG: Vital signs within defined limits  Outcome: Progressing Towards Goal  Goal: *STG/LTG: Relapse prevention plan in place to include housing/aftercare, leisure activities, and spirituality  Outcome: Progressing Towards Goal  Goal: Interventions  Outcome: Progressing Towards Goal     Problem: Patient Education: Go to Patient Education Activity  Goal: Patient/Family Education  Outcome: Progressing Towards Goal     Problem: Hypertension  Goal: *Blood pressure within specified parameters  Outcome: Progressing Towards Goal  Goal: *Fluid volume balance  Outcome: Progressing Towards Goal  Goal: *Labs within defined limits  Outcome: Progressing Towards Goal     Problem: Patient Education: Go to Patient Education Activity  Goal: Patient/Family Education  Outcome: Progressing Towards Goal

## 2020-01-18 NOTE — PROGRESS NOTES
General Surgery End of Shift Nursing Note    Shift worked:   [id number]   Summary of shift:    Patient remained in bed all shift today. Patient did not have any BM's today, but he was not given any PRN immodium. Patient was bathed today and incontinence brief was changed multiple times. Patient was prescribed ketoconazole shampoo for the itching, patients hair was washed. Patient was encouraged to stop scratching because it will make the itching worse, he understood. Issues for physician to address:   None at this time. Number times ambulated in hallway past shift: 0    Number of times OOB to chair past shift: 0    Pain Management:  Current medication: tylenol, roxicodone  Patient states pain is manageable on current pain medication: YES    GI:    Current diet:  DIET SNACKS HS Snack  DIET REGULAR  DIET NUTRITIONAL SUPPLEMENTS No; Dinner; Ensure [employer]  DIET ONE TIME MESSAGE    Tolerating current diet: YES  Passing flatus: YES  Last Bowel Movement: yesterday   Appearance: small, brown, soft. Respiratory:    Incentive Spirometer at bedside: YES  Patient instructed on use: YES    Patient Safety:    Falls Score: 4  Bed Alarm On? No  Sitter?  No    Taylor Ballard LPN

## 2020-01-18 NOTE — PROGRESS NOTES
General Surgery End of Shift Nursing Note    Bedside shift change report given to Dk (oncoming nurse) by Vivian Tripp (offgoing nurse). Report included the following information SBAR, Kardex, Procedure Summary, Intake/Output, MAR and Recent Results. Shift worked:   7a-7p   Summary of shift:   Pt tolerating diet. Pt incontinent but voiding appropriately. Dressings changed per order. Pt magnesium 1.2 which was replaced by IV.     Issues for physician to address:        Number times ambulated in hallway past shift: 0    Number of times OOB to chair past shift: 0        GI:    Current diet:  DIET SNACKS HS Snack  DIET REGULAR  DIET NUTRITIONAL SUPPLEMENTS No; Dinner; Ensure [place] current diet: YES  Passing flatus: YES  Last Bowel Movement: yesterday               Ana Valencia

## 2020-01-18 NOTE — PROGRESS NOTES
.General Surgery End of Shift Nursing Note    Bedside shift change report given to Massimo Ruelas (oncoming nurse) by Lian Cordon (offgoing nurse). Report included the following information SBAR. Shift worked:   7p-7a   Summary of shift:    No significant events overnight. No complaints of pain. Vitals remain stable. Patient given benadryl x 1 for itching. Dressing changes done per order. Lotion applied to patient for complaints and visible dry skin. No BM overnight. Issues for physician to address:   Plan of care     Number times ambulated in hallway past shift: 0    Number of times OOB to chair past shift: 0    Pain Management:  Current medication: tylenol  Patient states pain is manageable on current pain medication: YES    GI:    Current diet:  DIET SNACKS HS Snack  DIET REGULAR  DIET NUTRITIONAL SUPPLEMENTS No; Dinner; Ensure [place] current diet: YES  Passing flatus: YES  Last Bowel Movement: yesterday   Appearance: loose    Patient Safety:    Falls Score: 5  Bed Alarm On? Yes  Sitter?  No    Maya Fontaine

## 2020-01-19 LAB
ANION GAP SERPL CALC-SCNC: 6 MMOL/L (ref 5–15)
BUN SERPL-MCNC: 11 MG/DL (ref 6–20)
BUN/CREAT SERPL: 16 (ref 12–20)
CALCIUM SERPL-MCNC: 8.1 MG/DL (ref 8.5–10.1)
CHLORIDE SERPL-SCNC: 106 MMOL/L (ref 97–108)
CO2 SERPL-SCNC: 26 MMOL/L (ref 21–32)
CREAT SERPL-MCNC: 0.68 MG/DL (ref 0.7–1.3)
GLUCOSE SERPL-MCNC: 89 MG/DL (ref 65–100)
MAGNESIUM SERPL-MCNC: 2.4 MG/DL (ref 1.6–2.4)
POTASSIUM SERPL-SCNC: 3.8 MMOL/L (ref 3.5–5.1)
SODIUM SERPL-SCNC: 138 MMOL/L (ref 136–145)

## 2020-01-19 PROCEDURE — 74011250637 HC RX REV CODE- 250/637: Performed by: SURGERY

## 2020-01-19 PROCEDURE — 74011250636 HC RX REV CODE- 250/636: Performed by: FAMILY MEDICINE

## 2020-01-19 PROCEDURE — 74011250637 HC RX REV CODE- 250/637: Performed by: NEUROMUSCULOSKELETAL MEDICINE & OMM

## 2020-01-19 PROCEDURE — 65660000000 HC RM CCU STEPDOWN

## 2020-01-19 PROCEDURE — 74011250637 HC RX REV CODE- 250/637: Performed by: INTERNAL MEDICINE

## 2020-01-19 PROCEDURE — 74011250637 HC RX REV CODE- 250/637: Performed by: NURSE PRACTITIONER

## 2020-01-19 PROCEDURE — 36415 COLL VENOUS BLD VENIPUNCTURE: CPT

## 2020-01-19 PROCEDURE — 77030040393 HC DRSG OPTIFOAM GENT MDII -B

## 2020-01-19 PROCEDURE — 77030037878 HC DRSG MEPILEX >48IN BORD MOLN -B

## 2020-01-19 PROCEDURE — 83735 ASSAY OF MAGNESIUM: CPT

## 2020-01-19 PROCEDURE — 80048 BASIC METABOLIC PNL TOTAL CA: CPT

## 2020-01-19 PROCEDURE — 94760 N-INVAS EAR/PLS OXIMETRY 1: CPT

## 2020-01-19 RX ORDER — FUROSEMIDE 20 MG/1
20 TABLET ORAL DAILY
Status: DISCONTINUED | OUTPATIENT
Start: 2020-01-19 | End: 2020-01-20

## 2020-01-19 RX ADMIN — CASTOR OIL AND BALSAM, PERU: 788; 87 OINTMENT TOPICAL at 17:59

## 2020-01-19 RX ADMIN — CASTOR OIL AND BALSAM, PERU: 788; 87 OINTMENT TOPICAL at 08:29

## 2020-01-19 RX ADMIN — ACETAMINOPHEN 650 MG: 325 TABLET ORAL at 16:00

## 2020-01-19 RX ADMIN — Medication 1 AMPULE: at 09:00

## 2020-01-19 RX ADMIN — TAMSULOSIN HYDROCHLORIDE 0.4 MG: 0.4 CAPSULE ORAL at 08:28

## 2020-01-19 RX ADMIN — FUROSEMIDE 20 MG: 20 TABLET ORAL at 16:55

## 2020-01-19 RX ADMIN — PANTOPRAZOLE SODIUM 40 MG: 40 TABLET, DELAYED RELEASE ORAL at 06:36

## 2020-01-19 RX ADMIN — Medication 100 MG: at 08:28

## 2020-01-19 RX ADMIN — PANTOPRAZOLE SODIUM 40 MG: 40 TABLET, DELAYED RELEASE ORAL at 16:55

## 2020-01-19 RX ADMIN — GABAPENTIN 100 MG: 100 CAPSULE ORAL at 14:22

## 2020-01-19 RX ADMIN — MUPIROCIN: 20 OINTMENT TOPICAL at 08:29

## 2020-01-19 RX ADMIN — ASPIRIN 81 MG 81 MG: 81 TABLET ORAL at 08:28

## 2020-01-19 RX ADMIN — FOLIC ACID 1 MG: 1 TABLET ORAL at 08:28

## 2020-01-19 RX ADMIN — POTASSIUM CHLORIDE 20 MEQ: 20 TABLET, EXTENDED RELEASE ORAL at 08:29

## 2020-01-19 RX ADMIN — Medication 1 AMPULE: at 22:09

## 2020-01-19 RX ADMIN — ACETAMINOPHEN 650 MG: 325 TABLET ORAL at 22:07

## 2020-01-19 RX ADMIN — DIPHENHYDRAMINE HYDROCHLORIDE 25 MG: 50 INJECTION, SOLUTION INTRAMUSCULAR; INTRAVENOUS at 22:07

## 2020-01-19 RX ADMIN — LOSARTAN POTASSIUM 25 MG: 25 TABLET ORAL at 08:29

## 2020-01-19 RX ADMIN — Medication 20 ML: at 06:36

## 2020-01-19 RX ADMIN — MAGNESIUM OXIDE 400 MG (241.3 MG MAGNESIUM) TABLET 400 MG: TABLET at 08:29

## 2020-01-19 RX ADMIN — Medication 10 ML: at 22:12

## 2020-01-19 RX ADMIN — GABAPENTIN 300 MG: 300 CAPSULE ORAL at 22:07

## 2020-01-19 RX ADMIN — MAGNESIUM OXIDE 400 MG (241.3 MG MAGNESIUM) TABLET 400 MG: TABLET at 17:46

## 2020-01-19 RX ADMIN — Medication 10 ML: at 14:23

## 2020-01-19 RX ADMIN — THERA TABS 1 TABLET: TAB at 08:29

## 2020-01-19 RX ADMIN — GABAPENTIN 100 MG: 100 CAPSULE ORAL at 08:28

## 2020-01-19 NOTE — PROGRESS NOTES
Problem: Risk for Spread of Infection  Goal: Prevent transmission of infectious organism to others  Description  Prevent the transmission of infectious organisms to other patients, staff members, and visitors. Outcome: Progressing Towards Goal     Problem: Falls - Risk of  Goal: *Absence of Falls  Description  Document Luis Fernando President Fall Risk and appropriate interventions in the flowsheet. Outcome: Progressing Towards Goal  Note: Fall Risk Interventions:  Mobility Interventions: Communicate number of staff needed for ambulation/transfer    Mentation Interventions: Adequate sleep, hydration, pain control    Medication Interventions: Teach patient to arise slowly    Elimination Interventions: Call light in reach    History of Falls Interventions: Utilize gait belt for transfer/ambulation         Problem: Patient Education: Go to Patient Education Activity  Goal: Patient/Family Education  Outcome: Progressing Towards Goal     Problem: Pressure Injury - Risk of  Goal: *Prevention of pressure injury  Description  Document Troy Scale and appropriate interventions in the flowsheet. Outcome: Progressing Towards Goal  Note: Pressure Injury Interventions:  Sensory Interventions: Keep linens dry and wrinkle-free, Assess need for specialty bed, Turn and reposition approx.  every two hours (pillows and wedges if needed)    Moisture Interventions: Absorbent underpads, Apply protective barrier, creams and emollients, Minimize layers    Activity Interventions: Pressure redistribution bed/mattress(bed type), PT/OT evaluation    Mobility Interventions: HOB 30 degrees or less, Pressure redistribution bed/mattress (bed type), PT/OT evaluation    Nutrition Interventions: Document food/fluid/supplement intake    Friction and Shear Interventions: Apply protective barrier, creams and emollients, HOB 30 degrees or less                Problem: Patient Education: Go to Patient Education Activity  Goal: Patient/Family Education  Outcome: Progressing Towards Goal     Problem: Delirium Treatment  Goal: *Level of consciousness restored to baseline  Outcome: Progressing Towards Goal  Goal: *Level of environmental perceptions restored to baseline  Outcome: Progressing Towards Goal  Goal: *Sensory perception restored to baseline  Outcome: Progressing Towards Goal  Goal: *Emotional stability restored to baseline  Outcome: Progressing Towards Goal  Goal: *Functional assessment restored to baseline  Outcome: Progressing Towards Goal  Goal: *Absence of falls  Outcome: Progressing Towards Goal  Goal: *Will remain free of delirium, CAM Score negative  Outcome: Progressing Towards Goal  Goal: *Cognitive status will be restored to baseline  Outcome: Progressing Towards Goal  Goal: Interventions  Outcome: Progressing Towards Goal     Problem: Patient Education: Go to Patient Education Activity  Goal: Patient/Family Education  Outcome: Progressing Towards Goal     Problem: Patient Education: Go to Patient Education Activity  Goal: Patient/Family Education  Outcome: Progressing Towards Goal     Problem: Patient Education: Go to Patient Education Activity  Goal: Patient/Family Education  Outcome: Progressing Towards Goal     Problem: Alcohol Withdrawal  Goal: *STG: Participates in treatment plan  Outcome: Progressing Towards Goal  Goal: *STG: Remains safe in hospital  Outcome: Progressing Towards Goal  Goal: *STG: Seeks staff when symptoms of withdrawal increase  Outcome: Progressing Towards Goal  Goal: *STG: Complies with medication therapy  Outcome: Progressing Towards Goal  Goal: *STG: Attends activities and groups  Outcome: Progressing Towards Goal  Goal: *STG: Will identify negative impact of chemical dependency including the use of tobacco, alcohol, and other substances  Outcome: Progressing Towards Goal  Goal: *STG: Verbalizes abstinence as an achievable goal  Outcome: Progressing Towards Goal  Goal: *STG: Agrees to participate in outpatient after care program to support ongoing mental health  Outcome: Progressing Towards Goal  Goal: *STG: Able to indentify relapse triggers including interpersonal/social and familial factors  Outcome: Progressing Towards Goal  Goal: *STG: Identify lifestyle changes to support long term sobriety such as vocation, employment, education, and legal issues  Outcome: Progressing Towards Goal  Goal: *STG: Maintains appropriate nutrition and hydration  Outcome: Progressing Towards Goal  Goal: *STG: Vital signs within defined limits  Outcome: Progressing Towards Goal  Goal: *STG/LTG: Relapse prevention plan in place to include housing/aftercare, leisure activities, and spirituality  Outcome: Progressing Towards Goal  Goal: Interventions  Outcome: Progressing Towards Goal     Problem: Patient Education: Go to Patient Education Activity  Goal: Patient/Family Education  Outcome: Progressing Towards Goal     Problem: Hypertension  Goal: *Blood pressure within specified parameters  Outcome: Progressing Towards Goal  Goal: *Fluid volume balance  Outcome: Progressing Towards Goal  Goal: *Labs within defined limits  Outcome: Progressing Towards Goal     Problem: Patient Education: Go to Patient Education Activity  Goal: Patient/Family Education  Outcome: Progressing Towards Goal

## 2020-01-19 NOTE — PROGRESS NOTES
General Surgery End of Shift Nursing Note    Bedside shift change report given to Dk (oncoming nurse) by Nanci Austin (offgoing nurse). Report included the following information SBAR, Kardex, Procedure Summary, Intake/Output, MAR and Recent Results. Shift worked:   7a-7p   Summary of shift:   Uneventful shift. Pt tolerating diet. Dressing to foot changed per order. Vital signs stable.     Issues for physician to address:        Number times ambulated in hallway past shift: 0    Number of times OOB to chair past shift: 0    Pain Management:  Current medication: tylenol  Patient states pain is manageable on current pain medication: YES    GI:    Current diet:  DIET SNACKS HS Snack  DIET REGULAR  DIET NUTRITIONAL SUPPLEMENTS No; Dinner; Ensure [employer]  DIET ONE TIME MESSAGE    Tolerating current diet: YES  Passing flatus: YES          Mitra Sagastume

## 2020-01-19 NOTE — PROGRESS NOTES
Hospitalist Progress Note    NAME: Jennifer Camacho   :  [birth year]   MRN:  [id number]     I reviewed with Dr. Royce Samuel about the medical history and the findings on the physical examination. I discussed with Dr. Royce Samuel the patient's diagnosis and concur with the plan. Interim Hospital Summary: 75 y.o. male whom presented on 2019 with alcohol withdrawal. Pt was discharged from the hospital on 2019 and 2019 after being treated for alcohol withdrawal and UTI. Assessment / Plan:  Pt was living by himself, he is unable to take care of himself at this time. CM currently working LTC placement. Pt is medically stable to be discharged to LTC once the place is confirmed. Pt denies any discomfort at this time. Shock, multifactorial (sepsis/acute blood loss)- resolved   UTI (completed the therapy)  Lactic acidosis- resolved   Cellulitis of head - no s/sx of infection  - no leukocytosis, remain afebrile    Procalcitonin <0.05    Lactic acid 0.8    Blood Cx & urine cx (1/10/2020) NGTD    Tissue culture (2020) with RARE STAPHYLOCOCCUS SIMULANS. Anaerobic cx no growth; was treated with IV zosyn. No further ABX tmt needed at this time. scalp eczema  - ketoconazole shampoo daily as need    GIB, resolved  Diverticulosis  Acute blood loss anemia  Diarrhea, resolved  - CT abdomen/pelvis 20:  1. No acute GI bleeding site demonstrated. 2. Colonic diverticulosis. 3. Moderate bilateral pleural effusions and adjacent atelectasis. 4. Recent right axillary femoral and cross femoral bypass. Anasarca. 5. No acute findings in the abdomen or pelvis. 6. Possible cholelithiasis. EGD 20:  Normal upper endoscopy. Enteric panel 20:  Negative. Patient received total of 2 units PRBCs during this admission    hgb stable at 9.2; no s/sx of bleeding    Continue with PPI    Continue to hold DVT chemoprophylaxis, ongoing evaluation to determine if safe to resume.     Not a candidate for SCDs 2/2 severe PVD. 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 Continue asa 81 mg po daily. Continue gabapentin 100 mg po bid and 300 mg po at hs. Electrolyte imbalance (low Mg)  - resolved. Continue with daily mg supplement.      Anasarca, improving   Pulmonary edema  Pleural effusions  Severe hyperalbuminemia   Echo 01/04/20:  · Normal cavity size and systolic function (ejection fraction normal). Increased wall thickness. Estimated left ventricular ejection fraction is 50 - 55%. Left ventricular diastolic dysfunction. · Trace mitral valve regurgitation is present. · Mild tricuspid valve regurgitation is present. · Small-to-moderate pericardial effusion. There is left pleural effusion. Continue with furosemide and lasix    Severe hypoalbuminemia likely contributing to 3rd spacing. Severe protein calorie malnutrition   - Appreciate dietician input    Continue nutritional supplements       ETOH withdrawal with DT's- resolved   Tobacco abuse   - Patient educated on the importance of smoking cessation and the health benefits associated with quitting. Pt hasn't been smoking since the admission    Continue Nicotine patch     Continue folic acid, multivitamin, and vitamin B      HTN   Dyslipidemia   - started on losartan    Continue statin and ASA     COPD, not in exacerbation   - PRN nebs      BPH  Urinary retention   - Continue flomax     25.0 - 29.9 Overweight / Body mass index is 25.05 kg/m².     Code status: DNR  Prophylaxis: On hold secondary to anemia   Recommended Disposition: SNF/LTC       Subjective:     Chief Complaint / Reason for Physician Visit  \"my scalp feels much better today\". Discussed with RN events overnight.      Review of Systems:  Symptom Y/N Comments Symptom Y/N Comments   Fever/Chills n   Chest Pain n    Poor Appetite    Edema     Cough    Abdominal Pain     Sputum    Joint Pain     SOB/[place] n   Pruritis/Rash     Nausea/vomit n   Tolerating PT/OT     Diarrhea n   Tolerating Diet     Constipation n   Other       Could NOT obtain due to:      Objective:     VITALS:   Last 24hrs VS reviewed since prior progress note. Most recent are:  Patient Vitals for the past 24 hrs:   Temp Pulse Resp BP SpO2   01/19/20 1049 97.5 °F (36.4 °C) 81 16 130/64 98 %   01/19/20 0708 97.6 °F (36.4 °C) 85 16 140/68 92 %   01/19/20 0238 98.7 °F (37.1 °C) 76 16 114/60 98 %   01/18/20 2308 98.1 °F (36.7 °C) 90 18 151/73 98 %   01/18/20 2214 98.4 °F (36.9 °C) 96 18 144/82 97 %   01/18/20 1937 98.4 °F (36.9 °C) 93 16 159/76 100 %   01/18/20 1728 98 °F (36.7 °C) 88 18 161/72 96 %       Intake/Output Summary (Last 24 hours) at 1/19/2020 1108  Last data filed at 1/19/2020 0904  Gross per 24 hour   Intake 875 ml   Output    Net 875 ml        PHYSICAL EXAM:  General: WD, WN. Alert, cooperative, no acute distress    EENT:  EOMI. Anicteric sclerae. MMM  Resp:  CTA bilaterally, no wheezing or rales. No accessory muscle use  CV:  Regular  rhythm,  No edema  GI:  Soft, Non distended, Non tender. +Bowel sounds  Neurologic:  Alert and oriented X 3, normal speech,   Psych:   Good insight. Not anxious nor agitated  Skin:  Dry but no flaky scalp. Left TMA noted well healed. Right toes/foot dressed post-operatively. AO-Bifem surgical sites well approximated without erythema/exudate. Left lateral lower leg with breakdown is healing.  No jaundice    Reviewed most current lab test results and cultures  YES  Reviewed most current radiology test results   YES  Review and summation of old records today    NO  Reviewed patient's current orders and MAR    YES  PMH/SH reviewed - no change compared to H&P  ________________________________________________________________________  Care Plan discussed with: Comments   Patient y    Family      RN y    Care Manager     Consultant                        Multidiciplinary team rounds were held today with , nursing, pharmacist and clinical coordinator. Patient's plan of care was discussed; medications were reviewed and discharge planning was addressed. _________________________________________________________________________________________________________________________________________  Brent Delatorre NP     Procedures: see electronic medical records for all procedures/Xrays and details which were not copied into this note but were reviewed prior to creation of Plan. LABS:  I reviewed today's most current labs and imaging studies.   Pertinent labs include:  Recent Labs     01/18/20  0339 01/17/20  0340   WBC 10.0 9.5   HGB 9.2* 9.0*   HCT 27.7* 27.6*    349     Recent Labs     01/19/20  0255 01/18/20 0339 01/17/20  0340    139 139   K 3.8 3.6 3.7    107 108   CO2 26 26 26   GLU 89 78 74   BUN 11 8 8   CREA 0.68* 0.61* 0.60*   CA 8.1* 8.6 8.4*   MG 2.4 1.2* 1.2*       Signed: )Camille Dickens NP

## 2020-01-19 NOTE — PROGRESS NOTES
.General Surgery End of Shift Nursing Note    Bedside shift change report given to Chaya Em (oncoming nurse) by David Tadeo (offgoing nurse). Report included the following information SBAR. Shift worked:   7p-7a   Summary of shift:   No significant events overnight. Eczema on back of head improved by shampoo treatment. Wound care preformed. Scrotal edema appears to be improved. Zinc ointment and mepalex placed on coccyx for preventative measures. Tylenol given for pain relief x1. Vitals stable    Issues for physician to address:   None at this time. Number times ambulated in hallway past shift: 0    Number of times OOB to chair past shift: 0    Pain Management:  Current medication: Tylenol  Patient states pain is manageable on current pain medication: YES    GI:    Current diet:  DIET SNACKS HS Snack  DIET REGULAR  DIET NUTRITIONAL SUPPLEMENTS No; Dinner; Ensure [employer]  DIET ONE TIME MESSAGE    Tolerating current diet: YES  Passing flatus: YES  Last Bowel Movement: several days ago   Appearance: N/A    Respiratory:    Incentive Spirometer at bedside: YES  Patient instructed on use: YES    Patient Safety:    Falls Score: 5  Bed Alarm On? No  Sitter?  No    Parris Vazquez

## 2020-01-20 PROCEDURE — 74011250637 HC RX REV CODE- 250/637: Performed by: SURGERY

## 2020-01-20 PROCEDURE — 74011000250 HC RX REV CODE- 250: Performed by: NURSE PRACTITIONER

## 2020-01-20 PROCEDURE — 94760 N-INVAS EAR/PLS OXIMETRY 1: CPT

## 2020-01-20 PROCEDURE — 74011250637 HC RX REV CODE- 250/637: Performed by: NURSE PRACTITIONER

## 2020-01-20 PROCEDURE — 74011250636 HC RX REV CODE- 250/636: Performed by: NURSE PRACTITIONER

## 2020-01-20 PROCEDURE — 97535 SELF CARE MNGMENT TRAINING: CPT | Performed by: OCCUPATIONAL THERAPIST

## 2020-01-20 PROCEDURE — 97116 GAIT TRAINING THERAPY: CPT

## 2020-01-20 PROCEDURE — 74011250636 HC RX REV CODE- 250/636: Performed by: FAMILY MEDICINE

## 2020-01-20 PROCEDURE — 74011250637 HC RX REV CODE- 250/637: Performed by: NEUROMUSCULOSKELETAL MEDICINE & OMM

## 2020-01-20 PROCEDURE — 97530 THERAPEUTIC ACTIVITIES: CPT

## 2020-01-20 PROCEDURE — 65660000000 HC RM CCU STEPDOWN

## 2020-01-20 PROCEDURE — 74011250637 HC RX REV CODE- 250/637: Performed by: INTERNAL MEDICINE

## 2020-01-20 RX ORDER — LOSARTAN POTASSIUM 25 MG/1
12.5 TABLET ORAL DAILY
Status: DISCONTINUED | OUTPATIENT
Start: 2020-01-21 | End: 2020-01-22 | Stop reason: HOSPADM

## 2020-01-20 RX ORDER — MUPIROCIN 20 MG/G
OINTMENT TOPICAL 2 TIMES DAILY
Status: DISCONTINUED | OUTPATIENT
Start: 2020-01-20 | End: 2020-01-22 | Stop reason: HOSPADM

## 2020-01-20 RX ORDER — FUROSEMIDE 20 MG/1
20 TABLET ORAL EVERY OTHER DAY
Status: DISCONTINUED | OUTPATIENT
Start: 2020-01-22 | End: 2020-01-22 | Stop reason: HOSPADM

## 2020-01-20 RX ORDER — CIPROFLOXACIN AND FLUOCINOLONE ACETONIDE .75; .0625 MG/.25ML; MG/.25ML
0.25 SOLUTION AURICULAR (OTIC) 2 TIMES DAILY
Status: DISCONTINUED | OUTPATIENT
Start: 2020-01-20 | End: 2020-01-22 | Stop reason: HOSPADM

## 2020-01-20 RX ADMIN — POTASSIUM CHLORIDE 20 MEQ: 20 TABLET, EXTENDED RELEASE ORAL at 08:13

## 2020-01-20 RX ADMIN — Medication 10 ML: at 06:32

## 2020-01-20 RX ADMIN — WATER 2 G: 1 INJECTION INTRAMUSCULAR; INTRAVENOUS; SUBCUTANEOUS at 17:12

## 2020-01-20 RX ADMIN — MAGNESIUM OXIDE 400 MG (241.3 MG MAGNESIUM) TABLET 400 MG: TABLET at 17:12

## 2020-01-20 RX ADMIN — Medication 10 ML: at 22:13

## 2020-01-20 RX ADMIN — FUROSEMIDE 20 MG: 20 TABLET ORAL at 08:13

## 2020-01-20 RX ADMIN — THERA TABS 1 TABLET: TAB at 08:13

## 2020-01-20 RX ADMIN — GABAPENTIN 300 MG: 300 CAPSULE ORAL at 22:13

## 2020-01-20 RX ADMIN — Medication 1 AMPULE: at 22:13

## 2020-01-20 RX ADMIN — PANTOPRAZOLE SODIUM 40 MG: 40 TABLET, DELAYED RELEASE ORAL at 06:33

## 2020-01-20 RX ADMIN — GABAPENTIN 100 MG: 100 CAPSULE ORAL at 15:44

## 2020-01-20 RX ADMIN — PANTOPRAZOLE SODIUM 40 MG: 40 TABLET, DELAYED RELEASE ORAL at 17:12

## 2020-01-20 RX ADMIN — TAMSULOSIN HYDROCHLORIDE 0.4 MG: 0.4 CAPSULE ORAL at 08:13

## 2020-01-20 RX ADMIN — FOLIC ACID 1 MG: 1 TABLET ORAL at 08:13

## 2020-01-20 RX ADMIN — ACETAMINOPHEN 650 MG: 325 TABLET ORAL at 13:31

## 2020-01-20 RX ADMIN — WATER 2 G: 1 INJECTION INTRAMUSCULAR; INTRAVENOUS; SUBCUTANEOUS at 10:13

## 2020-01-20 RX ADMIN — DIPHENHYDRAMINE HYDROCHLORIDE 25 MG: 50 INJECTION, SOLUTION INTRAMUSCULAR; INTRAVENOUS at 22:14

## 2020-01-20 RX ADMIN — Medication 100 MG: at 08:13

## 2020-01-20 RX ADMIN — Medication 1 AMPULE: at 08:12

## 2020-01-20 RX ADMIN — CASTOR OIL AND BALSAM, PERU: 788; 87 OINTMENT TOPICAL at 17:01

## 2020-01-20 RX ADMIN — Medication 10 ML: at 15:44

## 2020-01-20 RX ADMIN — (CIPROFLOXACIN AND FLUOCINOLONE ACETONIDE) 0.25 ML: .75; .0625 SOLUTION AURICULAR (OTIC) at 18:14

## 2020-01-20 RX ADMIN — LOSARTAN POTASSIUM 25 MG: 25 TABLET ORAL at 08:13

## 2020-01-20 RX ADMIN — MUPIROCIN: 20 OINTMENT TOPICAL at 12:30

## 2020-01-20 RX ADMIN — MUPIROCIN: 20 OINTMENT TOPICAL at 08:51

## 2020-01-20 RX ADMIN — MUPIROCIN: 20 OINTMENT TOPICAL at 17:01

## 2020-01-20 RX ADMIN — CASTOR OIL AND BALSAM, PERU: 788; 87 OINTMENT TOPICAL at 08:51

## 2020-01-20 RX ADMIN — GABAPENTIN 100 MG: 100 CAPSULE ORAL at 08:13

## 2020-01-20 RX ADMIN — ASPIRIN 81 MG 81 MG: 81 TABLET ORAL at 08:13

## 2020-01-20 RX ADMIN — MAGNESIUM OXIDE 400 MG (241.3 MG MAGNESIUM) TABLET 400 MG: TABLET at 08:13

## 2020-01-20 NOTE — OP NOTES
[address]  OPERATIVE REPORT    Name:  Ivelisse Muñoz  MR#:  [id number]  :  1945  ACCOUNT #:  [de-identified]  DATE OF SERVICE:  2020    PREOPERATIVE DIAGNOSIS:  Peripheral arterial disease with ulceration. POSTOPERATIVE DIAGNOSIS:  Peripheral arterial disease with ulceration. PROCEDURE PERFORMED:  Axillobifemoral bypass. SURGEON:  Digna Agosto MD.    ASSISTANT:  None. ANESTHESIA:  General.    COMPLICATIONS:  None. SPECIMENS REMOVED:  None. DRAINS:  None. IMPLANTS:  Alpha 8-mm bifurcated axillobifemoral graft. ESTIMATED BLOOD LOSS:  100 mL. INDICATIONS:  The patient is a 79-year-old male with severe bilateral lower extremity ischemia and ulceration of the right second toe. Angiography reveals bilateral iliac artery occlusions. He is not a candidate for aortobifemoral bypass. He presents for axillobifemoral bypass. PROCEDURE:  After informed consent was obtained, the patient was given preoperative intravenous antibiotics. He was taken to the operating room and after induction of adequate general anesthesia, the right chest and right lateral chest wall and abdominal wall and both groins were prepped and draped as a sterile field and covered with Chelly Kayser. Through an incision just under the right clavicle, the axillary artery was dissected free. There was significant lymphatic tissue overlying the axillary artery and care was taken to divide this with a combination of Harmonic Focus and multiple clips to avoid a lymphocele. The axillary artery was exposed and encircled with vessel loops. Through bilateral oblique groin incision, the common femoral arteries were dissected free and encircled proximally and distally with vessel loops. The patient was systemically heparinized.   An 8-mm bifurcated ringed Propaten Alpha-Shun graft was then tunneled from the right femoral incision retrograde up the right abdominal wall and lateral chest wall using a single counter incision on the lower chest wall to assist with tunneling. The graft was tunneled behind the pectoralis minor muscle and brought up to the axillary artery. Care was taken to ensure there was no kinking or twisting or redundancy on the graft. An end-to-side anastomosis was then performed between the axillary artery and the graft using running 5-0 Hiram-Shun suture. When this was completed, the anastomosis was hemostatic. The graft lay in good position. The right arm was abducted. So the graft was already positioned with significant length to permit shoulder movement. Next, an end-to-side anastomosis was created between the right limb of the bifurcated graft and the right common femoral artery using running 5-0 Hiram-Shun suture. Flushing maneuvers were performed. The left limb was tunneled subcutaneously to the left groin staying above the pubis. An end-to-side anastomosis was then created between the left limb of the graft and the left common femoral artery using running 5-0 Hiram-Shun suture. Flushing maneuvers were performed prior to completing the anastomosis and then flow was restored to the left leg. Femoral arteries were interrogated with continuous wave Doppler and both had good flow, which was severely decreased with occlusion of the graft. All 3 anastomoses were hemostatic and there was no apparent lymphatic drainage. All 3 wounds were irrigated with antibiotic irrigation. The axillary and bilateral groin incisions were closed with 2-0 and then 3-0 Vicryl and then skin staples. A small counter incision on the right lateral chest wall was irrigated with antibiotic irrigation and closed with 3-0 Vicryl and skin staples. Dry dressings were applied. The patient was extubated and transferred to the PACU in stable condition. All counts were correct.       Karena Godoy MD      [id number]  D:  01/20/2020 5:47  T:  01/20/2020 14:19  JOB #:  [id number]

## 2020-01-20 NOTE — PROGRESS NOTES
.General Surgery End of Shift Nursing Note    Bedside shift change report given to Kilo Ponce (oncoming nurse) by David Zavala (offgoing nurse). Report included the following information SBAR. Shift worked:   7p-7a   Summary of shift:    No significant events overnight. Patients vitals stable. No BM overnight. Patient complaining of sticky substance behind his ear and in his inner ear. No visible source. Pillow case did appear saturated and was changed. Patient states that he had this issue previously during his hospital stay and was treated with antibiotics? Issues for physician to address:  Right ear/ head drainage     Number times ambulated in hallway past shift: 0    Number of times OOB to chair past shift: 0    Pain Management:  Current medication: Tylenol  Patient states pain is manageable on current pain medication: YES    GI:    Current diet:  DIET SNACKS HS Snack  DIET REGULAR  DIET NUTRITIONAL SUPPLEMENTS No; Dinner; Ensure [employer]  DIET ONE TIME MESSAGE    Tolerating current diet: YES  Passing flatus: YES  Last Bowel Movement: several days ago   Appearance:     Respiratory:    Incentive Spirometer at bedside: YES  Patient instructed on use: YES    Patient Safety:    Falls Score: 5  Bed Alarm On? No  Sitter?  No    Anju Murguia

## 2020-01-20 NOTE — PROGRESS NOTES
Problem: Self Care Deficits Care Plan (Adult)  Goal: *Acute Goals and Plan of Care (Insert Text)  Description    FUNCTIONAL STATUS PRIOR TO ADMISSION: The patient was functional at baseline per his report. Reports Has DME at home    HOME SUPPORT: pt lived alone; he reports that his neighbors assist him    Occupational Therapy Goals  Goals were reviewed on 1/20/2020  All goals are appropriate to continue for 7 days. Goals reviewed on 1/10/2020 post surgery (R 2nd toe amputation) :    Change goal #3  All other goals remain appropriate to continue for 7 days. Initiated 12/26/2019; Re-evaluation 1/6/2020, goals updated below    1. Patient will perform grooming with set-up within 7 day(s). Progressing  2. Patient will perform sit to  preparation for functional transfers OOB with minimal assistance/contact guard assist within 7 day(s). Progressing  3. Patient will perform supine to sit EOB in preparation for bed level adls with minimal assistance/contact guard assist within 7 day(s). MET 1/6/2020. Post surgery on 1/10/2020-decline in function:  Continue Arnoldo level goal.  4.  Patient will perform toilet transfers with moderate assistance  within 7 day(s). Upgrade to CGA x1 and ambulate to the bathroom. 5.  Patient will perform all aspects of toileting with minimal assistance/contact guard assist within 7 day(s). Progressing  6. Patient will participate in upper extremity therapeutic exercise/activities with supervision/set-up for 10 minutes within 7 day(s). Progressing  7. Patient will demonstrate safe technique during functional mobility/ADL transfers within 7 days. Progressing  8. Patient will perform upper body dressing with minimal assistance within 7 days. Upgrade to supervision/set up  Goals added 1/6/2020:  9. Patient will tolerate static standing for 2 minutes in preparation for OOB ADLs with supervision/set-up within 7 day(s).   10.  Patient will perform anterior bathing from neck to thighs with supervision/set-up within 7 day(s). Outcome: Progressing Towards Goal   OCCUPATIONAL THERAPY TREATMENT/WEEKLY RE-EVALUATION  Patient: Faustina Osorio (51 y.o. male)  Date: 1/20/2020  Diagnosis: Alcohol withdrawal ([facility] [F10.239]   Alcohol withdrawal ([facility]  Procedure(s) (LRB):  AMPUTATION 2ND TOE RIGHT FOOT (Right) 11 Days Post-Op  Precautions: Fall, Contact, Seizure, DNR  Chart, occupational therapy assessment, plan of care, and goals were reviewed. ASSESSMENT  Based on the objective data described below, Pt was agreeable to treatment and performed bed mobility with stand by assistance, lower body dressing with moderate assistance. He ambulated into the bathroom using the RW with CGA/Anitha for toilet transfer and toileting that he performed with minimal assistance. While seated on the toilet pt complained of dizziness and BP had dropped signficantly requiring immediate dependent transfer to bed. BP improved in supine and nursing present to assist with pt's comfort. Improved overall mobility this date. Pt will need 24 hour assistance at discharge. Current Level of Function Impacting Discharge (ADLs): up to maximal assistance for adls    Other factors to consider for discharge: pt lives alone and is planning on transitioning to LTC         PLAN :  Goals have been updated based on progression since last assessment. Patient continues to benefit from skilled intervention to address the above impairments. Continue to follow patient 3 times a week to address goals.     Recommend with staff: encourage pt to ambulate to the bathroom regularly for performing toileting needs as independently as possible    Recommend next OT session: toileting in bathroom, standing grooming    Recommendation for discharge: (in order for the patient to meet his/her long term goals)  Therapy up to 5 days/week in SNF setting  Pt is planning transition to LTC  This discharge recommendation:  Has not yet been discussed the attending provider and/or case management    Equipment recommendations for successful discharge (if) home: tbd       SUBJECTIVE:   Patient stated I have to get my ears looked at .     OBJECTIVE DATA SUMMARY:   Cognitive/Behavioral Status:  Neurologic State: Alert  Orientation Level: Appropriate for age  Cognition: Follows commands  Perception: Appears intact  Perseveration: No perseveration noted  Safety/Judgement: Awareness of environment;Decreased insight into deficits; Fall prevention    Functional Mobility and Transfers for ADLs:  Bed Mobility:  Rolling: Modified independent;Supervision  Supine to Sit: Modified independent;Supervision; Adaptive equipment;Bed Modified(hob elevated and pt using bed rail)  Scooting: Supervision    Transfers:  Sit to Stand: Stand-by assistance(verbal cue for technique)  Functional Transfers  Bathroom Mobility: Supervision/set up;Stand-by assistance;Contact guard assistance  Toilet Transfer : Supervision;Contact guard assistance  Adaptive Equipment: Grab bars; Walker (comment)       Balance:  Sitting: Intact  Sitting - Static: Good (unsupported)  Sitting - Dynamic: Good (unsupported)  Standing: Impaired; Without support  Standing - Static: Constant support;Good  Standing - Dynamic : Constant support;Good;Fair    ADL Intervention:  Feeding  Feeding Assistance: Independent  Cues: (no tremors)              Lower Body Bathing  Perineal  : Set-up; Stand-by assistance  Position Performed: Seated on toilet  Adaptive Equipment: Grab bar;Wipes(personal cleansing cloth) set up required         Lower Body [address]: Supervision; Total assistance (dependent)(supervision to doff;  total A to don)  Socks: Set-up(seated for L foot; R foot is bandaged)  Shoes with Velcro:  Total assistance (dependent)(surgical shoe - total A on R)  Slip on Shoes with Back: Set-up(for L shoe in seated posittion)  Leg Crossed Method Used: Yes  Position Performed: Seated edge of bed  Cues: Doff;Don;Physical assistance;Verbal cues provided    Toileting  Bladder Hygiene: Set-up  Bowel Hygiene: Minimum assistance  Clothing Management: Minimum assistance  Cues: Verbal cues provided(cues to doff his briefs at toilet; total A to replace at bed)  Adaptive Equipment: Grab bars; Walker    Cognitive Retraining  Attention to Task: Distractibility  Following Commands: Follows two step commands/directions  Safety/Judgement: Awareness of environment;Decreased insight into deficits; Fall prevention  Cues: Tactile cues provided;Verbal cues provided    Pain:  No complaints this date    Activity Tolerance:   Poor  BP decreasead to 53/31 while seated on toilet. Nursing and NP aware  Please refer to the flowsheet for vital signs taken during this treatment.     After treatment patient left in no apparent distress:   Supine in bed HOB raised, all needs met, call bell and personal items within reach    COMMUNICATION/COLLABORATION:   The patients plan of care was discussed with: Physical Therapist, Registered Nurse and NP    AMY Dougherty/L  Time Calculation: 43 mins

## 2020-01-20 NOTE — PROGRESS NOTES
Problem: Mobility Impaired (Adult and Pediatric)  Goal: *Acute Goals and Plan of Care (Insert Text)  Description  FUNCTIONAL STATUS PRIOR TO ADMISSION: Pt was living at home alone on first level of 2 story home with 2 step entry. Per chart, he was falling and having a difficult time taking care of himself. Pt states he was not falling and that the only time he fell was when he had his stroke. HOME SUPPORT PRIOR TO ADMISSION: The patient lived alone with no local support. Physical Therapy Goals  Revised 1/13/2020  Reviewed 1/20/2020 and remain appropriate for the next 7 days  1. Patient will move from sup<>Sit, scoot up/down and roll side to side in bed with contact guard assistance within 7 days. 2.  Patient will transfer bed<>chair with cga using least restrictive device within 7 days. 3.  Patient will perform sit<>stand with S within 7 days. 4.  Patient will ambulate with sba 150 feet with a rolling walker within 7 days. Goals previously updated/revised 12/31/2019 and 1/6/2020. See documentation for details. Initiated 12/24/2019  1. Patient will move from supine to sit and sit to supine , scoot up and down and roll side to side in bed with independence within 7 day(s). 2.  Patient will transfer from bed to chair and chair to bed with modified independence using the least restrictive device within 7 day(s). 3.  Patient will perform sit to stand with modified independence within 7 day(s). 4.  Patient will ambulate with supervision/set-up for 75 feet with the least restrictive device within 7 day(s).            Outcome: Progressing Towards Goal   PHYSICAL THERAPY TREATMENT: WEEKLY REASSESSMENT  Patient: Dayan Putnam (50 y.o. male)  Date: 1/20/2020  Primary Diagnosis: Alcohol withdrawal ([facility] [address] [F10.239]  Procedure(s) (LRB):  AMPUTATION 2ND TOE RIGHT FOOT (Right) 11 Days Post-Op   Precautions:   Fall, Contact, Seizure, DNR      ASSESSMENT  Patient continues with skilled PT services and is progressing towards goals. Pt received in bed on arrival, reporting that he was in bed all weekend. Pt agreeable to PT session and demonstrates all OOB mobility at [facility]. Pt able to perform bed mobility with S to Mod I. Pt raised the hob bed to come to sitting eob from supine. Verbal cues to scoot to eob and get and keep cog anterior to sergo to effectively perform sit>stand. Pt ambulated into the bathroom, where he sat on the toilet and urinated and had a firm BM. Pt unaware that he had to use the bathroom, stating he went already and didn't feel like he had to go again. Pt reported some dizziness and a feeling like he was going to pass out, while wiping himself seated on the toilet. BP checked and it was 52/31. Pt assisted back to bed and placed in chair position. BP rechecked and it was 140/71. Patient's progression toward goals since last assessment: pt continues to move better, requiring less physical assistance. Now CGA x 1    Current Level of Function Impacting Discharge (mobility/balance): CGA x 1 with OOB mobility,  RW for ambulation    Functional Outcome Measure: The patient scored 45/100 on the Barthel Index outcome measure which is indicative of  55% impaired function/adls. Other factors to consider for discharge: pt lives alone, pt is self limiting with decreased insight into deficits and capabilities         PLAN :  Goals have been updated based on progression since last assessment. Patient continues to benefit from skilled intervention to address the above impairments. Recommendations and Planned Interventions: bed mobility training, transfer training, gait training, therapeutic exercises, patient and family training/education and therapeutic activities      Frequency/Duration: Patient will be followed by physical therapy:  3 times a week to address goals.     Recommendation for discharge: (in order for the patient to meet his/her long term goals)  Therapy up to 5 days/week in SNF setting    This discharge recommendation:  Has been made in collaboration with the attending provider and/or case management    IF patient discharges home will need the following DME: to be determined (TBD)         SUBJECTIVE:   Patient stated I used the  bathroom already.     OBJECTIVE DATA SUMMARY:   HISTORY:    Past Medical History:   Diagnosis Date    Abuse     alcoholism, quit 2012    Claudication of calf muscles [facility]) 2013    Colovesical fistula     Dr Yuly Mei Esophageal stricture     Esophagitis     GI bleed 10/2016    Hemochromatosis     Hyperlipidemia     Hypertension     Peripheral artery disease (HCC)     Dr. Meghan Machuca Pulmonary nodule     right lung     Past Surgical History:   Procedure Laterality Date    COLONOSCOPY N/A 9/14/2016    COLONOSCOPY performed by William Carmichael MD at [facility] ENDOSCOPY    COLONOSCOPY N/A 12/19/2016    COLONOSCOPY performed by William Carmichael MD at [facility] ENDOSCOPY    HX AMPUTATION Left 07/2016    left 4th toe from gangrene    HX ENDOSCOPY  10/2016    HX OTHER SURGICAL      left partial foot amputation       Personal factors and/or comorbidities impacting plan of care: decreased insight to deficits and self limiting    Home Situation  Home Environment: Private residence  # Steps to Enter: 2  Rails to Enter: Yes  Hand Rails : Right  One/Two Story Residence: Two story, live on 1st floor  Living Alone: Yes  Support Systems: Family member(s)  Patient Expects to be Discharged to[de-identified] Private residence  Current DME Used/Available at Home: [address], [address], straight, Commode, bedside, Grab bars, Walker, rolling, Tub transfer bench  Tub or Shower Type: Tub/Shower combination    EXAMINATION/PRESENTATION/DECISION MAKING:   Critical Behavior:  Neurologic State: Alert  Orientation Level: Appropriate for age  Cognition: Follows commands  Safety/Judgement: Awareness of environment, Decreased insight into deficits, Fall prevention  Hearing:   Auditory  Auditory Impairment: None  Range Of Motion:  AROM: Generally decreased, functional           PROM: Generally decreased, functional           Strength:    Strength: Generally decreased, functional                    Tone & Sensation:   Tone: Normal              Sensation: Impaired(neuropathy B foot)               Coordination:  Coordination: Within functional limits     Functional Mobility:  Bed Mobility:  Rolling: Modified independent;Supervision  Supine to Sit: Modified independent;Supervision; Adaptive equipment;Bed Modified(hob elevated and pt using bed rail)     Scooting: Supervision  Transfers:  Sit to Stand: Stand-by assistance(verbal cue for technique)  Stand to Sit: Stand-by assistance                       Balance:   Sitting: Intact  Sitting - Static: Good (unsupported)  Sitting - Dynamic: Good (unsupported)  Standing: Impaired; Without support  Standing - Static: Constant support;Good  Standing - Dynamic : Constant support;Good;Fair  Ambulation/Gait Training:  Distance (ft): 10 Feet (ft)(linited by hypotension)  Assistive Device: Gait belt;Walker, rolling  Ambulation - Level of Assistance: Contact guard assistance        Gait Abnormalities: Decreased step clearance(trunk flexion)        Base of Support: Narrowed     Speed/Amanda: Slow  Step Length: Left shortened;Right shortened               Functional Measure:  Barthel Index:    Bathin  Bladder: 0  Bowels: 0  Groomin  Dressin  Feeding: 10  Mobility: 10  Stairs: 0  Toilet Use: 5  Transfer (Bed to Chair and Back): 10  Total: 45/100       The Barthel ADL Index: Guidelines  1. The index should be used as a record of what a patient does, not as a record of what a patient could do. 2. The main aim is to establish degree of independence from any help, physical or verbal, however minor and for whatever reason. 3. The need for supervision renders the patient not independent. 4. A patient's performance should be established using the best available evidence.  Asking the patient, friends/relatives and nurses are the usual sources, but direct observation and common sense are also important. However direct testing is not needed. 5. Usually the patient's performance over the preceding 24-48 hours is important, but occasionally longer periods will be relevant. 6. Middle categories imply that the patient supplies over 50 per cent of the effort. 7. Use of aids to be independent is allowed. Sharron Osei., Barthel, D.W. (1536). Functional evaluation: the Barthel Index. [facility] (14)2. Nettie Stewart paige MIRIAN Nielsen, Wendy Ya., Zenaidaban Briceño., [place], [address] [facility] (1999). Measuring the change indisability after inpatient rehabilitation; comparison of the responsiveness of the Barthel Index and Functional [place] Measure. Journal of Neurology, Neurosurgery, and Psychiatry, 66(4), 911-724. Ivan Ellsworth NPAULA, BLAKE Burr, & Jas Oconnell M.A. (2004.) Assessment of post-stroke quality of life in cost-effectiveness studies: The usefulness of the Barthel Index and the EuroQoL-5D. Quality of Life Research, 13, 427-43          Pain Rating:  None reported    Activity Tolerance:   Fair and signs and symptoms of orthostatic hypotension  Please refer to the flowsheet for vital signs taken during this treatment. After treatment patient left in no apparent distress:   Chair position in bed, Call bell within reach, and Side rails x 3    COMMUNICATION/EDUCATION:   The patients plan of care was discussed with: Occupational Therapist and Registered Nurse. Fall prevention education was provided and the patient/caregiver indicated understanding., Patient/family have participated as able in goal setting and plan of care. , and Patient/family agree to work toward stated goals and plan of care.     Thank you for this referral.  Valentina Haddad, PT   Time Calculation: 41 mins

## 2020-01-20 NOTE — WOUND CARE
Wound care nurse called by hospitalist NP, Rand Mclean, for patients scalp issues. Patient seen in past for severe seborrheal dermitis of the scalp that is dry and flaky and patient keeps scratching. Patient needs dermatologic recommendations for severe case, over the counter tx is to use Neutrogena T-gel extra strength shampoo daily and left sit on scalp 5-10 minutes before rinsing. Patient apparently has scratched his scalp open in a few places. Recommend: Scalp: cleanse as best you can with dermal wound cleanser sray and 4x4 where scalp is open. Apply mupirocin ointment BID to opened areas of scalp.  
 
Melvi Diss, RN

## 2020-01-20 NOTE — PROGRESS NOTES
Hospitalist Progress Note    NAME: Gloria Buchanan   :     MRN:  [id number]     I reviewed with Dr. Kojo Fisher about the medical history and the findings on the physical examination. I discussed with Dr. Kojo Fisher the patient's diagnosis and concur with the plan. Interim Hospital Summary: 75 y.o. male whom presented on 2019 with alcohol withdrawal. Pt was discharged from the hospital on 2019 and 2019 after being treated for alcohol withdrawal and UTI. Assessment / Plan:  Pt was living by himself, he is unable to take care of himself at this time. CM currently working LTC placement. Pt is medically stable to be discharged to LTC once the place is confirmed. Pt experienced isolated orthostatic hypotension while working with PT. Pt is only on Losartan for his antihypertensive agent. Dose decreased to 12.5mg from 25mg. Lasix changed to every other day. Right Externa Otitis  · - inflammation of the external auditory canal or auricle; otovel ear drop to right ear  ·   Apply mupirocin ointment BID after cleaned ear lobe and behind right ear with water    Shock, multifactorial (sepsis/acute blood loss)- resolved   UTI (completed the therapy)  Lactic acidosis- resolved   Cellulitis of head - no s/sx of infection  - no leukocytosis, remain afebrile    Procalcitonin <0.05    Lactic acid 0.8    Blood Cx & urine cx (1/10/2020) NGTD    Tissue culture (2020) with RARE STAPHYLOCOCCUS SIMULANS. Anaerobic cx no growth; was treated with IV zosyn. No further ABX tmt needed at this time. scalp eczema/serborrhectic dermatitis of scalp  - ketoconazole shampoo daily as need    Clean scalp with wound cleanser spray then apply     GIB, resolved  Diverticulosis  Acute blood loss anemia  Diarrhea, resolved  - CT abdomen/pelvis 20:  1. No acute GI bleeding site demonstrated. 2. Colonic diverticulosis. 3. Moderate bilateral pleural effusions and adjacent atelectasis.   4. Recent right axillary femoral and cross femoral bypass. Anasarca. 5. No acute findings in the abdomen or pelvis. 6. Possible cholelithiasis. EGD 01/05/20:  Normal upper endoscopy. Enteric panel 01/07/20:  Negative. Patient received total of 2 units PRBCs during this admission    hgb stable at 9.2; no s/sx of bleeding    Continue with PPI    Continue to hold DVT chemoprophylaxis, ongoing evaluation to determine if safe to resume. Not a candidate for SCDs 2/2 severe PVD. 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 Continue asa 81 mg po daily. Continue gabapentin 100 mg po bid and 300 mg po at hs. Electrolyte imbalance (low Mg)  - resolved. Continue with daily mg supplement.      Anasarca, improving   Pulmonary edema  Pleural effusions  Severe hyperalbuminemia   Echo 01/04/20:  · Normal cavity size and systolic function (ejection fraction normal). Increased wall thickness. Estimated left ventricular ejection fraction is 50 - 55%. Left ventricular diastolic dysfunction. · Trace mitral valve regurgitation is present. · Mild tricuspid valve regurgitation is present. · Small-to-moderate pericardial effusion. There is left pleural effusion. Continue with furosemide (to be given every other day); request to check daily weight    Severe hypoalbuminemia likely contributing to 3rd spacing. Severe protein calorie malnutrition   - Appreciate dietician input    Continue nutritional supplements       ETOH withdrawal with DT's- resolved   Tobacco abuse   - Patient educated on the importance of smoking cessation and the health benefits associated with quitting.  Pt hasn't been smoking since the admission    Continue Nicotine patch     Continue folic acid, multivitamin, and vitamin B      HTN   Dyslipidemia - started on losartan (dose decreased to 12.5 from 25mg due to orthostatic hypotensive episode while working with PT/OT. Check daily orthostatic blood pressure and requested to enter the information to EMR)    Continue statin and ASA     COPD, not in exacerbation   - PRN nebs      BPH  Urinary retention   - Continue flomax     25.0 - 29.9 Overweight / Body mass index is 25.05 kg/m².     Code status: DNR  Prophylaxis: On hold secondary to anemia   Recommended Disposition: SNF/LTC       Subjective:     Chief Complaint / Reason for Physician Visit  \"my scalp feels much better today\". Discussed with RN events overnight. Review of Systems:  Symptom Y/N Comments  Symptom Y/N Comments   Fever/Chills n   Chest Pain n    Poor Appetite    Edema     Cough    Abdominal Pain     Sputum    Joint Pain     SOB/MCFARLAND n   Pruritis/Rash     Nausea/vomit n   Tolerating PT/OT     Diarrhea n   Tolerating Diet     Constipation n   Other       Could NOT obtain due to:      Objective:     VITALS:   Last 24hrs VS reviewed since prior progress note. Most recent are:  Patient Vitals for the past 24 hrs:   Temp Pulse Resp BP SpO2   01/20/20 1131 97.8 °F (36.6 °C) 91 18 141/51 98 %   01/20/20 1009    141/70    01/20/20 0956    (!) 52/31    01/20/20 0818 98.5 °F (36.9 °C) 91 16 147/67 98 %   01/20/20 0401 97.8 °F (36.6 °C) 88 16 108/60 97 %   01/19/20 2323 97.6 °F (36.4 °C) 92 16 142/80 98 %   01/19/20 1954 97.8 °F (36.6 °C) 91 16 122/63 96 %   01/19/20 1504 97.8 °F (36.6 °C) 87 16 136/70 98 %       Intake/Output Summary (Last 24 hours) at 1/20/2020 1209  Last data filed at 1/20/2020 1013  Gross per 24 hour   Intake 748 ml   Output    Net 748 ml        PHYSICAL EXAM:  General: WD, WN. Alert, cooperative, no acute distress    EENT:  EOMI. Anicteric sclerae. MMM  Resp:  CTA bilaterally, no wheezing or rales. No accessory muscle use  CV:  Regular  rhythm,  No edema  GI:  Soft, Non distended, Non tender.   +Bowel sounds  Neurologic: Alert and oriented X 3, normal speech,   Psych:   Good insight. Not anxious nor agitated  Skin:  Dry but no flaky scalp. Left TMA noted well healed. Right toes/foot dressed post-operatively. AO-Bifem surgical sites well approximated without erythema/exudate. Left lateral lower leg with breakdown is healing. No jaundice    Reviewed most current lab test results and cultures  YES  Reviewed most current radiology test results   YES  Review and summation of old records today    NO  Reviewed patient's current orders and MAR    YES  PMH/SH reviewed - no change compared to H&P  ________________________________________________________________________  Care Plan discussed with:    Comments   Patient y    Family      RN y    Care Manager     Consultant                        Multidiciplinary team rounds were held today with , nursing, pharmacist and clinical coordinator. Patient's plan of care was discussed; medications were reviewed and discharge planning was addressed. _________________________________________________________________________________________________________________________________________  Ely Miller NP     Procedures: see electronic medical records for all procedures/Xrays and details which were not copied into this note but were reviewed prior to creation of Plan. LABS:  I reviewed today's most current labs and imaging studies.   Pertinent labs include:  Recent Labs     01/18/20  0339   WBC 10.0   HGB 9.2*   HCT 27.7*        Recent Labs     01/19/20  0255 01/18/20  0339    139   K 3.8 3.6    107   CO2 26 26   GLU 89 78   BUN 11 8   CREA 0.68* 0.61*   CA 8.1* 8.6   MG 2.4 1.2*       Signed: )Camille Dickens, NP

## 2020-01-20 NOTE — PROGRESS NOTES
Problem: Risk for Spread of Infection  Goal: Prevent transmission of infectious organism to others  Description  Prevent the transmission of infectious organisms to other patients, staff members, and visitors. Outcome: Progressing Towards Goal     Problem: Falls - Risk of  Goal: *Absence of Falls  Description  Document Libradonolvia Heydi Fall Risk and appropriate interventions in the flowsheet. Outcome: Progressing Towards Goal  Note: Fall Risk Interventions:  Mobility Interventions: Communicate number of staff needed for ambulation/transfer    Mentation Interventions: More frequent rounding, Room close to nurse's station, Toileting rounds    Medication Interventions: Teach patient to arise slowly    Elimination Interventions: Call light in reach    History of Falls Interventions: Utilize gait belt for transfer/ambulation         Problem: Patient Education: Go to Patient Education Activity  Goal: Patient/Family Education  Outcome: Progressing Towards Goal     Problem: Pressure Injury - Risk of  Goal: *Prevention of pressure injury  Description  Document Troy Scale and appropriate interventions in the flowsheet.   Outcome: Progressing Towards Goal  Note: Pressure Injury Interventions:  Sensory Interventions: Assess need for specialty bed, Float heels, Keep linens dry and wrinkle-free    Moisture Interventions: Absorbent underpads, Apply protective barrier, creams and emollients    Activity Interventions: Pressure redistribution bed/mattress(bed type)    Mobility Interventions: HOB 30 degrees or less, Pressure redistribution bed/mattress (bed type), Float heels    Nutrition Interventions: Document food/fluid/supplement intake    Friction and Shear Interventions: Apply protective barrier, creams and emollients, HOB 30 degrees or less, Foam dressings/transparent film/skin sealants, Minimize layers                Problem: Patient Education: Go to Patient Education Activity  Goal: Patient/Family Education  Outcome: Progressing Towards Goal     Problem: Delirium Treatment  Goal: *Level of consciousness restored to baseline  Outcome: Progressing Towards Goal  Goal: *Level of environmental perceptions restored to baseline  Outcome: Progressing Towards Goal  Goal: *Sensory perception restored to baseline  Outcome: Progressing Towards Goal  Goal: *Emotional stability restored to baseline  Outcome: Progressing Towards Goal  Goal: *Functional assessment restored to baseline  Outcome: Progressing Towards Goal  Goal: *Absence of falls  Outcome: Progressing Towards Goal  Goal: *Will remain free of delirium, CAM Score negative  Outcome: Progressing Towards Goal  Goal: *Cognitive status will be restored to baseline  Outcome: Progressing Towards Goal  Goal: Interventions  Outcome: Progressing Towards Goal     Problem: Patient Education: Go to Patient Education Activity  Goal: Patient/Family Education  Outcome: Progressing Towards Goal     Problem: Patient Education: Go to Patient Education Activity  Goal: Patient/Family Education  Outcome: Progressing Towards Goal     Problem: Patient Education: Go to Patient Education Activity  Goal: Patient/Family Education  Outcome: Progressing Towards Goal     Problem: Alcohol Withdrawal  Goal: *STG: Participates in treatment plan  Outcome: Progressing Towards Goal  Goal: *STG: Remains safe in hospital  Outcome: Progressing Towards Goal  Goal: *STG: Seeks staff when symptoms of withdrawal increase  Outcome: Progressing Towards Goal  Goal: *STG: Complies with medication therapy  Outcome: Progressing Towards Goal  Goal: *STG: Attends activities and groups  Outcome: Progressing Towards Goal  Goal: *STG: Will identify negative impact of chemical dependency including the use of tobacco, alcohol, and other substances  Outcome: Progressing Towards Goal  Goal: *STG: Verbalizes abstinence as an achievable goal  Outcome: Progressing Towards Goal  Goal: *STG: Agrees to participate in outpatient after care program to support ongoing mental health  Outcome: Progressing Towards Goal  Goal: *STG: Able to indentify relapse triggers including interpersonal/social and familial factors  Outcome: Progressing Towards Goal  Goal: *STG: Identify lifestyle changes to support long term sobriety such as vocation, employment, education, and legal issues  Outcome: Progressing Towards Goal  Goal: *STG: Maintains appropriate nutrition and hydration  Outcome: Progressing Towards Goal  Goal: *STG: Vital signs within defined limits  Outcome: Progressing Towards Goal  Goal: *STG/LTG: Relapse prevention plan in place to include housing/aftercare, leisure activities, and spirituality  Outcome: Progressing Towards Goal  Goal: Interventions  Outcome: Progressing Towards Goal     Problem: Patient Education: Go to Patient Education Activity  Goal: Patient/Family Education  Outcome: Progressing Towards Goal     Problem: Hypertension  Goal: *Blood pressure within specified parameters  Outcome: Progressing Towards Goal  Goal: *Fluid volume balance  Outcome: Progressing Towards Goal  Goal: *Labs within defined limits  Outcome: Progressing Towards Goal     Problem: Patient Education: Go to Patient Education Activity  Goal: Patient/Family Education  Outcome: Progressing Towards Goal

## 2020-01-20 NOTE — PROGRESS NOTES
Plan:  -?[facility] for rehab      4:01PM  CM updated pt.     1:38PM  CM PC to Chas Lino at [facility] to check on status of swing bed acceptance and availability. No answer and unable to leave message. [facility]. CM email to [facility] requesting update. CM will continue to follow and assist with d/c planning.        Rob Doyle MSW  Care Manager

## 2020-01-21 LAB
ANION GAP SERPL CALC-SCNC: 7 MMOL/L (ref 5–15)
BASOPHILS # BLD: 0.1 K/UL (ref 0–0.1)
BASOPHILS NFR BLD: 1 % (ref 0–1)
BUN SERPL-MCNC: 14 MG/DL (ref 6–20)
BUN/CREAT SERPL: 21 (ref 12–20)
CALCIUM SERPL-MCNC: 8.3 MG/DL (ref 8.5–10.1)
CHLORIDE SERPL-SCNC: 107 MMOL/L (ref 97–108)
CO2 SERPL-SCNC: 24 MMOL/L (ref 21–32)
CREAT SERPL-MCNC: 0.68 MG/DL (ref 0.7–1.3)
DIFFERENTIAL METHOD BLD: ABNORMAL
EOSINOPHIL # BLD: 1.7 K/UL (ref 0–0.4)
EOSINOPHIL NFR BLD: 19 % (ref 0–7)
ERYTHROCYTE [DISTWIDTH] IN BLOOD BY AUTOMATED COUNT: 15.1 % (ref 11.5–14.5)
GLUCOSE SERPL-MCNC: 83 MG/DL (ref 65–100)
HCT VFR BLD AUTO: 27.8 % (ref 36.6–50.3)
HGB BLD-MCNC: 9.1 G/DL (ref 12.1–17)
IMM GRANULOCYTES # BLD AUTO: 0.1 K/UL (ref 0–0.04)
IMM GRANULOCYTES NFR BLD AUTO: 1 % (ref 0–0.5)
LYMPHOCYTES # BLD: 1.7 K/UL (ref 0.8–3.5)
LYMPHOCYTES NFR BLD: 19 % (ref 12–49)
MAGNESIUM SERPL-MCNC: 1.4 MG/DL (ref 1.6–2.4)
MCH RBC QN AUTO: 33.8 PG (ref 26–34)
MCHC RBC AUTO-ENTMCNC: 32.7 G/DL (ref 30–36.5)
MCV RBC AUTO: 103.3 FL (ref 80–99)
MONOCYTES # BLD: 0.9 K/UL (ref 0–1)
MONOCYTES NFR BLD: 10 % (ref 5–13)
NEUTS SEG # BLD: 4.2 K/UL (ref 1.8–8)
NEUTS SEG NFR BLD: 50 % (ref 32–75)
NRBC # BLD: 0 K/UL (ref 0–0.01)
NRBC BLD-RTO: 0 PER 100 WBC
PLATELET # BLD AUTO: 301 K/UL (ref 150–400)
PMV BLD AUTO: 11 FL (ref 8.9–12.9)
POTASSIUM SERPL-SCNC: 3.8 MMOL/L (ref 3.5–5.1)
RBC # BLD AUTO: 2.69 M/UL (ref 4.1–5.7)
RBC MORPH BLD: ABNORMAL
SODIUM SERPL-SCNC: 138 MMOL/L (ref 136–145)
WBC # BLD AUTO: 8.7 K/UL (ref 4.1–11.1)

## 2020-01-21 PROCEDURE — 74011250637 HC RX REV CODE- 250/637: Performed by: SURGERY

## 2020-01-21 PROCEDURE — 74011250637 HC RX REV CODE- 250/637: Performed by: NURSE PRACTITIONER

## 2020-01-21 PROCEDURE — 74011250636 HC RX REV CODE- 250/636: Performed by: NURSE PRACTITIONER

## 2020-01-21 PROCEDURE — 74011250637 HC RX REV CODE- 250/637: Performed by: INTERNAL MEDICINE

## 2020-01-21 PROCEDURE — 36415 COLL VENOUS BLD VENIPUNCTURE: CPT

## 2020-01-21 PROCEDURE — 80048 BASIC METABOLIC PNL TOTAL CA: CPT

## 2020-01-21 PROCEDURE — 74011250636 HC RX REV CODE- 250/636: Performed by: FAMILY MEDICINE

## 2020-01-21 PROCEDURE — 94760 N-INVAS EAR/PLS OXIMETRY 1: CPT

## 2020-01-21 PROCEDURE — 65660000000 HC RM CCU STEPDOWN

## 2020-01-21 PROCEDURE — 83735 ASSAY OF MAGNESIUM: CPT

## 2020-01-21 PROCEDURE — 74011250637 HC RX REV CODE- 250/637: Performed by: NEUROMUSCULOSKELETAL MEDICINE & OMM

## 2020-01-21 PROCEDURE — 74011000250 HC RX REV CODE- 250: Performed by: NURSE PRACTITIONER

## 2020-01-21 PROCEDURE — 85025 COMPLETE CBC W/AUTO DIFF WBC: CPT

## 2020-01-21 RX ORDER — MAGNESIUM SULFATE HEPTAHYDRATE 40 MG/ML
2 INJECTION, SOLUTION INTRAVENOUS ONCE
Status: COMPLETED | OUTPATIENT
Start: 2020-01-21 | End: 2020-01-21

## 2020-01-21 RX ADMIN — Medication 100 MG: at 10:03

## 2020-01-21 RX ADMIN — Medication 10 ML: at 14:41

## 2020-01-21 RX ADMIN — PANTOPRAZOLE SODIUM 40 MG: 40 TABLET, DELAYED RELEASE ORAL at 10:02

## 2020-01-21 RX ADMIN — MAGNESIUM OXIDE 400 MG (241.3 MG MAGNESIUM) TABLET 400 MG: TABLET at 17:28

## 2020-01-21 RX ADMIN — (CIPROFLOXACIN AND FLUOCINOLONE ACETONIDE) 0.25 ML: .75; .0625 SOLUTION AURICULAR (OTIC) at 22:44

## 2020-01-21 RX ADMIN — PANTOPRAZOLE SODIUM 40 MG: 40 TABLET, DELAYED RELEASE ORAL at 17:28

## 2020-01-21 RX ADMIN — WATER 2 G: 1 INJECTION INTRAMUSCULAR; INTRAVENOUS; SUBCUTANEOUS at 17:31

## 2020-01-21 RX ADMIN — MUPIROCIN: 20 OINTMENT TOPICAL at 10:04

## 2020-01-21 RX ADMIN — FOLIC ACID 1 MG: 1 TABLET ORAL at 10:02

## 2020-01-21 RX ADMIN — DIPHENHYDRAMINE HYDROCHLORIDE 25 MG: 50 INJECTION, SOLUTION INTRAMUSCULAR; INTRAVENOUS at 22:44

## 2020-01-21 RX ADMIN — LOSARTAN POTASSIUM 12.5 MG: 25 TABLET ORAL at 10:02

## 2020-01-21 RX ADMIN — ACETAMINOPHEN 650 MG: 325 TABLET ORAL at 22:44

## 2020-01-21 RX ADMIN — CASTOR OIL AND BALSAM, PERU: 788; 87 OINTMENT TOPICAL at 10:04

## 2020-01-21 RX ADMIN — Medication 20 ML: at 22:00

## 2020-01-21 RX ADMIN — TAMSULOSIN HYDROCHLORIDE 0.4 MG: 0.4 CAPSULE ORAL at 10:02

## 2020-01-21 RX ADMIN — POTASSIUM CHLORIDE 20 MEQ: 20 TABLET, EXTENDED RELEASE ORAL at 10:02

## 2020-01-21 RX ADMIN — GABAPENTIN 100 MG: 100 CAPSULE ORAL at 10:03

## 2020-01-21 RX ADMIN — GABAPENTIN 100 MG: 100 CAPSULE ORAL at 14:41

## 2020-01-21 RX ADMIN — THERA TABS 1 TABLET: TAB at 10:03

## 2020-01-21 RX ADMIN — GABAPENTIN 300 MG: 300 CAPSULE ORAL at 22:44

## 2020-01-21 RX ADMIN — MAGNESIUM SULFATE HEPTAHYDRATE 2 G: 40 INJECTION, SOLUTION INTRAVENOUS at 22:43

## 2020-01-21 RX ADMIN — MAGNESIUM OXIDE 400 MG (241.3 MG MAGNESIUM) TABLET 400 MG: TABLET at 10:01

## 2020-01-21 RX ADMIN — ASPIRIN 81 MG 81 MG: 81 TABLET ORAL at 10:02

## 2020-01-21 RX ADMIN — CASTOR OIL AND BALSAM, PERU: 788; 87 OINTMENT TOPICAL at 22:46

## 2020-01-21 RX ADMIN — ACETAMINOPHEN 650 MG: 325 TABLET ORAL at 12:19

## 2020-01-21 RX ADMIN — (CIPROFLOXACIN AND FLUOCINOLONE ACETONIDE) 0.25 ML: .75; .0625 SOLUTION AURICULAR (OTIC) at 10:10

## 2020-01-21 RX ADMIN — Medication 1 AMPULE: at 21:00

## 2020-01-21 RX ADMIN — MUPIROCIN: 20 OINTMENT TOPICAL at 09:00

## 2020-01-21 RX ADMIN — WATER 2 G: 1 INJECTION INTRAMUSCULAR; INTRAVENOUS; SUBCUTANEOUS at 10:02

## 2020-01-21 RX ADMIN — WATER 2 G: 1 INJECTION INTRAMUSCULAR; INTRAVENOUS; SUBCUTANEOUS at 01:38

## 2020-01-21 RX ADMIN — MUPIROCIN: 20 OINTMENT TOPICAL at 22:47

## 2020-01-21 RX ADMIN — Medication 1 AMPULE: at 10:02

## 2020-01-21 RX ADMIN — Medication 10 ML: at 05:50

## 2020-01-21 NOTE — PROGRESS NOTES
Plan:  [facility] for rehab   -[facility] transport       2:30PM  Return call from [facility]. Able to accept pt. Should have bed tomorrow but CM to call and confirm in the AM. CM discussed with pt. Request sent to [facility] for 1PM transport. Attending in agreement with plan. 2nd IM notice provided to pt. No signature due to contact room. Pt with no questions or concerns at this time. 11:51AM  PC from [facility]. MD review of chart. Wondering about any possible derm needs. CM discussed with attending. All wound care needs handled at this time. CM updated [facility] who will speak with [facility] MD and call CM back. Continuing conversation with [employer] to determine how to proceed with Medicaid david after previous screening at [facility].     9:38AM  VM from Claude Aurora at [facility] at end of day yesterday. Team at [facility] has reviewed pt. Plan for hospitalist coming on today to look over chart and given answer. CM will continue to follow and assist with d/c planning.        GRAY Blackburn  Care Manager

## 2020-01-21 NOTE — PROGRESS NOTES
Hospitalist Progress Note    NAME: Jay Louise   :     MRN:  [id number]     I reviewed with Dr. William Toledo about the medical history and the findings on the physical examination. I discussed with Dr. William Toledo the patient's diagnosis and concur with the plan. Interim Hospital Summary: 75 y.o. male whom presented on 2019 with alcohol withdrawal. Pt was discharged from the hospital on 2019 and 2019 after being treated for alcohol withdrawal and UTI. Assessment / Plan:  Pt was living by himself, he is unable to take care of himself at this time. CM currently working LTC placement. Tentative discharge tomorrow    Right Externa Otitis (improving)  · - inflammation of the external auditory canal or auricle; otovel ear drop to right ear  ·   Apply mupirocin ointment BID after cleaned ear lobe and behind right ear with water    Shock, multifactorial (sepsis/acute blood loss)- resolved   UTI (completed the therapy)  Lactic acidosis- resolved   Cellulitis of head - no s/sx of infection  - no leukocytosis, remain afebrile    Procalcitonin <0.05    Lactic acid 0.8    Blood Cx & urine cx (1/10/2020) NGTD    Tissue culture (2020) with RARE STAPHYLOCOCCUS SIMULANS. Anaerobic cx no growth; was treated with IV zosyn. No further ABX tmt needed at this time. scalp eczema/serborrhectic dermatitis of scalp  - ketoconazole shampoo daily as need    Clean scalp with wound cleanser spray then apply     GIB, resolved  Diverticulosis  Acute blood loss anemia  Diarrhea, resolved  - CT abdomen/pelvis 20:  1. No acute GI bleeding site demonstrated. 2. Colonic diverticulosis. 3. Moderate bilateral pleural effusions and adjacent atelectasis. 4. Recent right axillary femoral and cross femoral bypass. Anasarca. 5. No acute findings in the abdomen or pelvis. 6. Possible cholelithiasis. EGD 20:  Normal upper endoscopy. Enteric panel 20:  Negative.      Patient received total of 2 units PRBCs during this admission    hgb stable at 9.2; no s/sx of bleeding    Continue with PPI    Continue to hold DVT chemoprophylaxis, ongoing evaluation to determine if safe to resume. Not a candidate for SCDs 2/2 severe PVD. 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 Continue asa 81 mg po daily. Continue gabapentin 100 mg po bid and 300 mg po at hs. Electrolyte imbalance (low Mg)  - resolved. Continue with daily mg supplement.      Anasarca, improving   Pulmonary edema  Pleural effusions  Severe hyperalbuminemia   Echo 01/04/20:  · Normal cavity size and systolic function (ejection fraction normal). Increased wall thickness. Estimated left ventricular ejection fraction is 50 - 55%. Left ventricular diastolic dysfunction. · Trace mitral valve regurgitation is present. · Mild tricuspid valve regurgitation is present. · Small-to-moderate pericardial effusion. There is left pleural effusion. Continue with furosemide (to be given every other day); request to check daily weight    Severe hypoalbuminemia likely contributing to 3rd spacing. Severe protein calorie malnutrition   - Appreciate dietician input    Continue nutritional supplements       ETOH withdrawal with DT's- resolved   Tobacco abuse   - Patient educated on the importance of smoking cessation and the health benefits associated with quitting. Pt hasn't been smoking since the admission    Continue Nicotine patch     Continue folic acid, multivitamin, and vitamin B      HTN   Dyslipidemia   - started on losartan (dose decreased to 12.5 from 25mg due to orthostatic hypotensive episode while working with PT/OT.  Check daily orthostatic blood pressure and requested to enter the information to EMR)    Continue statin and ASA     COPD, not in exacerbation   - PRN nebs      BPH  Urinary retention   - Continue flomax     25.0 - 29.9 Overweight / Body mass index is 25.05 kg/m².     Code status: DNR  Prophylaxis: On hold secondary to anemia   Recommended Disposition: SNF/LTC       Subjective:     Chief Complaint / Reason for Physician Visit  \"my scalp feels much better today\". Discussed with RN events overnight. Review of Systems:  Symptom Y/N Comments  Symptom Y/N Comments   Fever/Chills n   Chest Pain n    Poor Appetite    Edema     Cough    Abdominal Pain     Sputum    Joint Pain     SOB/[place] n   Pruritis/Rash     Nausea/vomit n   Tolerating PT/OT     Diarrhea n   Tolerating Diet     Constipation n   Other       Could NOT obtain due to:      Objective:     VITALS:   Last 24hrs VS reviewed since prior progress note. Most recent are:  Patient Vitals for the past 24 hrs:   Temp Pulse Resp BP SpO2   01/21/20 1527 98 °F (36.7 °C) 76      01/21/20 1202 98.4 °F (36.9 °C) 78 17 147/74 96 %   01/21/20 0807 98.2 °F (36.8 °C) 96 17 158/74 96 %   01/21/20 0257 98 °F (36.7 °C) 94 18 124/64 97 %   01/20/20 2325 98.9 °F (37.2 °C) 97 18 134/64    01/20/20 1938 98.4 °F (36.9 °C) 94 16 112/59 94 %       Intake/Output Summary (Last 24 hours) at 1/21/2020 1816  Last data filed at 1/21/2020 0408  Gross per 24 hour   Intake 40 ml   Output 100 ml   Net -60 ml        PHYSICAL EXAM:  General: WD, WN. Alert, cooperative, no acute distress    EENT:  EOMI. Anicteric sclerae. MMM,  Left ear: inflammation of the external auditory canal or auricle; Resp:  CTA bilaterally, no wheezing or rales. No accessory muscle use  CV:  Regular  rhythm,  No edema  GI:  Soft, Non distended, Non tender. +Bowel sounds  Neurologic:  Alert and oriented X 3, normal speech,   Psych:   Good insight. Not anxious nor agitated  Skin:  Dry but no flaky scalp. Left TMA noted well healed. Right toes/foot dressed post-operatively.   AO-Bifem surgical sites well approximated without erythema/exudate. Left lateral lower leg with breakdown is healing. No jaundice    Reviewed most current lab test results and cultures  YES  Reviewed most current radiology test results   YES  Review and summation of old records today    NO  Reviewed patient's current orders and MAR    YES  PMH/SH reviewed - no change compared to H&P  ________________________________________________________________________  Care Plan discussed with:    Comments   Patient y    Family      RN y    Care Manager     Consultant                        Multidiciplinary team rounds were held today with , nursing, pharmacist and clinical coordinator. Patient's plan of care was discussed; medications were reviewed and discharge planning was addressed. _________________________________________________________________________________________________________________________________________  Jaron Reeder NP     Procedures: see electronic medical records for all procedures/Xrays and details which were not copied into this note but were reviewed prior to creation of Plan. LABS:  I reviewed today's most current labs and imaging studies.   Pertinent labs include:  Recent Labs     01/21/20  0256   WBC 8.7   HGB 9.1*   HCT 27.8*        Recent Labs     01/21/20  0256 01/19/20  0255    138   K 3.8 3.8    106   CO2 24 26   GLU 83 89   BUN 14 11   CREA 0.68* 0.68*   CA 8.3* 8.1*   MG 1.4* 2.4       Signed: )Camille Dickens NP

## 2020-01-21 NOTE — PROGRESS NOTES
General Surgery End of Shift Nursing Note    Bedside shift change report given to Madhavi De (oncoming nurse) by Danette Madrid (offgoing nurse). Report included the following information SBAR, Kardex, Procedure Summary, Intake/Output, MAR and Recent Results. Shift worked:   7a-7p   Summary of shift:  Pt with drainage from back of his scalp which he was started on antibiotics for. Wound care completed to scalp and dressing applied. Dressing to right foot and left leg changed per order. PRN tylenol given once for neck pain. Pt up with PT and ambulated to bathroom. Blood pressure dropped and pt extremely dizzy feeling like he was going to pass out. Orthostatic blood pressures obtained. Pt up to chair once with nursing but again complained of dizziness and was returned to bed. Pt tolerating diet. Pt had one formed bowel movement during shift.     Issues for physician to address:        Number times ambulated in hallway past shift: 0    Number of times OOB to chair past shift: 1    Pain Management:  Current medication: tylenol   Patient states pain is manageable on current pain medication: YES    GI:    Current diet:  DIET SNACKS HS Snack  DIET REGULAR  DIET NUTRITIONAL SUPPLEMENTS No; Dinner; Ensure [employer]  DIET ONE TIME MESSAGE    Tolerating current diet: YES  Passing flatus: YES  Last Bowel Movement: today   Appearance: formed     Respiratory:    Incentive Spirometer at bedside: YES  Patient instructed on use: YES      [email or web address]

## 2020-01-22 VITALS
HEIGHT: 66 IN | TEMPERATURE: 99.8 F | OXYGEN SATURATION: 100 % | BODY MASS INDEX: 23.7 KG/M2 | WEIGHT: 147.49 LBS | HEART RATE: 95 BPM | RESPIRATION RATE: 18 BRPM | SYSTOLIC BLOOD PRESSURE: 164 MMHG | DIASTOLIC BLOOD PRESSURE: 80 MMHG

## 2020-01-22 PROBLEM — L30.9 ECZEMA: Status: ACTIVE | Noted: 2019-12-16

## 2020-01-22 PROBLEM — E43 SEVERE PROTEIN-CALORIE MALNUTRITION (HCC): Chronic | Status: ACTIVE | Noted: 2020-01-22

## 2020-01-22 PROBLEM — Z72.0 TOBACCO ABUSE: Chronic | Status: ACTIVE | Noted: 2020-01-22

## 2020-01-22 PROCEDURE — 74011000250 HC RX REV CODE- 250: Performed by: NURSE PRACTITIONER

## 2020-01-22 PROCEDURE — 74011250637 HC RX REV CODE- 250/637: Performed by: SURGERY

## 2020-01-22 PROCEDURE — 74011250636 HC RX REV CODE- 250/636: Performed by: NURSE PRACTITIONER

## 2020-01-22 PROCEDURE — 94760 N-INVAS EAR/PLS OXIMETRY 1: CPT

## 2020-01-22 PROCEDURE — 74011250637 HC RX REV CODE- 250/637: Performed by: INTERNAL MEDICINE

## 2020-01-22 PROCEDURE — 74011250637 HC RX REV CODE- 250/637: Performed by: NURSE PRACTITIONER

## 2020-01-22 PROCEDURE — 74011250637 HC RX REV CODE- 250/637: Performed by: NEUROMUSCULOSKELETAL MEDICINE & OMM

## 2020-01-22 RX ORDER — DEXTROMETHORPHAN/PSEUDOEPHED 2.5-7.5/.8
80 DROPS ORAL
Qty: 15 ML | Refills: 0 | Status: SHIPPED
Start: 2020-01-22 | End: 2020-01-31

## 2020-01-22 RX ORDER — ASPIRIN 325 MG/1
100 TABLET, FILM COATED ORAL DAILY
Qty: 30 TAB | Refills: 0 | Status: SHIPPED
Start: 2020-01-22 | End: 2020-01-31

## 2020-01-22 RX ORDER — FOLIC ACID 1 MG/1
1 TABLET ORAL DAILY
Qty: 30 TAB | Refills: 0 | Status: SHIPPED
Start: 2020-01-22 | End: 2020-01-31

## 2020-01-22 RX ORDER — GABAPENTIN 300 MG/1
300 CAPSULE ORAL
Qty: 30 CAP | Refills: 0 | Status: SHIPPED | OUTPATIENT
Start: 2020-01-22 | End: 2020-02-17 | Stop reason: SDUPTHER

## 2020-01-22 RX ORDER — LOSARTAN POTASSIUM 25 MG/1
12.5 TABLET ORAL DAILY
Qty: 15 TAB | Refills: 0 | Status: SHIPPED | OUTPATIENT
Start: 2020-01-22 | End: 2020-02-17 | Stop reason: SDUPTHER

## 2020-01-22 RX ORDER — GABAPENTIN 100 MG/1
100 CAPSULE ORAL 2 TIMES DAILY
Qty: 60 CAP | Refills: 0 | Status: SHIPPED | OUTPATIENT
Start: 2020-01-22 | End: 2020-02-17 | Stop reason: SDUPTHER

## 2020-01-22 RX ORDER — IBUPROFEN 200 MG
1 TABLET ORAL DAILY
Qty: 30 PATCH | Refills: 0 | Status: SHIPPED | OUTPATIENT
Start: 2020-01-22 | End: 2020-01-31

## 2020-01-22 RX ORDER — LORAZEPAM 1 MG/1
2 TABLET ORAL ONCE
Status: COMPLETED | OUTPATIENT
Start: 2020-01-22 | End: 2020-01-22

## 2020-01-22 RX ADMIN — MUPIROCIN: 20 OINTMENT TOPICAL at 09:24

## 2020-01-22 RX ADMIN — POTASSIUM CHLORIDE 20 MEQ: 20 TABLET, EXTENDED RELEASE ORAL at 09:22

## 2020-01-22 RX ADMIN — WATER 2 G: 1 INJECTION INTRAMUSCULAR; INTRAVENOUS; SUBCUTANEOUS at 09:22

## 2020-01-22 RX ADMIN — Medication 1 AMPULE: at 09:00

## 2020-01-22 RX ADMIN — CASTOR OIL AND BALSAM, PERU: 788; 87 OINTMENT TOPICAL at 09:24

## 2020-01-22 RX ADMIN — GABAPENTIN 100 MG: 100 CAPSULE ORAL at 09:22

## 2020-01-22 RX ADMIN — (CIPROFLOXACIN AND FLUOCINOLONE ACETONIDE) 0.25 ML: .75; .0625 SOLUTION AURICULAR (OTIC) at 09:32

## 2020-01-22 RX ADMIN — FOLIC ACID 1 MG: 1 TABLET ORAL at 09:22

## 2020-01-22 RX ADMIN — Medication 10 ML: at 06:59

## 2020-01-22 RX ADMIN — MUPIROCIN: 20 OINTMENT TOPICAL at 09:23

## 2020-01-22 RX ADMIN — LOSARTAN POTASSIUM 12.5 MG: 25 TABLET ORAL at 09:21

## 2020-01-22 RX ADMIN — TAMSULOSIN HYDROCHLORIDE 0.4 MG: 0.4 CAPSULE ORAL at 09:21

## 2020-01-22 RX ADMIN — Medication 100 MG: at 09:22

## 2020-01-22 RX ADMIN — MAGNESIUM OXIDE 400 MG (241.3 MG MAGNESIUM) TABLET 400 MG: TABLET at 09:21

## 2020-01-22 RX ADMIN — OXYCODONE 10 MG: 5 TABLET ORAL at 12:44

## 2020-01-22 RX ADMIN — WATER 2 G: 1 INJECTION INTRAMUSCULAR; INTRAVENOUS; SUBCUTANEOUS at 01:30

## 2020-01-22 RX ADMIN — ASPIRIN 81 MG 81 MG: 81 TABLET ORAL at 09:22

## 2020-01-22 RX ADMIN — FUROSEMIDE 20 MG: 20 TABLET ORAL at 09:21

## 2020-01-22 RX ADMIN — LORAZEPAM 2 MG: 1 TABLET ORAL at 09:21

## 2020-01-22 RX ADMIN — THERA TABS 1 TABLET: TAB at 09:22

## 2020-01-22 RX ADMIN — PANTOPRAZOLE SODIUM 40 MG: 40 TABLET, DELAYED RELEASE ORAL at 06:59

## 2020-01-22 NOTE — DISCHARGE INSTRUCTIONS
VASCULAR SURGERY DISCHARGE INSTRUCTIONS  Wound care: Bilateral groins. Dry honeycomb dressing changes daily. Wound care of open wounds per the wound care team.      Keep all wounds clean and dry. Patient should not attempt to drive a car. No heavy lifting (7 lbs limit). Mild exercise and light duties are OK. Call the office at [phone] if there are any problems. Patient Discharge Instructions     Pt Name  Amy Washington   Date of Birth 1945   Age  75 y.o. Medical Record Number  [id number]   PCP Suyapa Murcia MD    Admit date:  12/22/2019 @    [place] [address]    Room Number  6714/40   Date of Discharge 1/22/2020     Admission Diagnoses:     Alcohol withdrawal ([address]          Allergies   Allergen Reactions    Contrast Agent [Iodine] Rash     Hives on back        You were admitted to [address] for  Alcohol withdrawal ([address]    YOUR OTHER MEDICAL DIAGNOSES INCLUDE (BUT NOT LIMITED TO ):  Present on Admission:   Alcohol withdrawal ([address]   PVD (peripheral vascular disease) ([address]   Alcoholism ([address]   COPD (chronic obstructive pulmonary disease) ([address]   Cystitis   Encounter for rehabilitation   HTN (hypertension)      DIET:  Regular Diet       Recommended activity: Activity as tolerated  Follow up : Follow-up Information     Follow up With Specialties Details Why Contact Info    Carlos Jimenez MD Vascular Surgery On 1/22/2020 @ 10:15 am  [address] [facility].  [phone]      Suyapa Murcia MD Internal Medicine   [address] [place] [address]  [phone]             Skilled nursing facility MD responsible for above upon discharge. · It is important that you take the medication exactly as they are prescribed. · Keep your medication in the bottles provided by the pharmacist and keep a list of the medication names, dosages, and times to be taken in your wallet.    · Do not take other medications without consulting your doctor. ADDITIONAL INFORMATION: If you experience any of the following symptoms or have any health problem not listed below, then please call your primary care physician or return to the emergency room if you cannot get hold of your doctor: Fever, chills, nausea, vomiting, diarrhea, change in mentation, falling, bleeding, shortness of breath. I understand that if any problems occur once I am discharged, I am supposed to call my Primary care physician for further care or seek help in the Emergency Department at the nearest Healthcare facility. I have had an opportunity to discuss my clinical issues with my doctor and nursing staff. I understand and acknowledge receipt of the above instructions.                                                                                                                                            Physician's or R.N.'s Signature                                                            Date/Time                                                                                                                                              Patient or Representative Signature                                                 Date/Time

## 2020-01-22 NOTE — PROGRESS NOTES
Plan:  -[facility] for rehab   -[facility] transport at [id number] [place] [address] [place]      1:53PM  PC from [facility] asking about plan for pt's remaining staples. CM discussion with vascular NP. [facility] should remove staples on Monday, 1/27. Vascular will make contact with pt to schedule f/u. CM PC to [facility] to update. 10:16AM  PC to [facility]. Confirmed with [facility] that pt can come today. [facility] transport arranged for 1PM. PCS on chart. Nursing call report to [phone]. Pt going to room 138. Pt ready for d/c from CM perspective. This CM to complete [facility]. CM available for any additional needs. Care Management Interventions  PCP Verified by CM:  Yes  Mode of Transport at Discharge: BLS  Transition of Care Consult (CM Consult): SNF  Partner SNF: Yes  Discharge Durable Medical Equipment: No  Physical Therapy Consult: Yes  Occupational Therapy Consult: Yes  Speech Therapy Consult: No  Current Support Network: Lives Alone  Confirm Follow Up Transport: Self  The Plan for Transition of Care is Related to the Following Treatment Goals : Zafar Martin   The Patient and/or Patient Representative was Provided with a Choice of Provider and Agrees with the Discharge Plan?: Yes  Name of the Patient Representative Who was Provided with a Choice of Provider and Agrees with the Discharge Plan: Patient   Freedom of Choice List was Provided with Basic Dialogue that Supports the Patient's Individualized Plan of Care/Goals, Treatment Preferences and Shares the Quality Data Associated with the Providers?: Yes  Discharge Location  Discharge Placement: Skilled nursing facility([facility] )      GRAY Mae  Care Manager

## 2020-01-22 NOTE — PROGRESS NOTES
General Surgery End of Shift Nursing Note    Bedside shift change report given to Lisa (oncoming nurse) by Ephraim Hart (offgoing nurse). Report included the following information SBAR, Kardex, Intake/Output and MAR. Shift worked:   7p   Summary of shift:  Patient noted resting, no distress noted at this time. Pt. To dc to [facility] today. Is requesting to have something ordered for anxiety due to length of trip. MD aware.      Issues for physician to address: None     Number times ambulated in hallway past shift: 0    Number of times OOB to chair past shift: 0    Pain Management:  Current medication: tylenol  Patient states pain is manageable on current pain medication: YES    GI:    Current diet:  DIET SNACKS HS Snack  DIET REGULAR  DIET NUTRITIONAL SUPPLEMENTS No; Dinner; Ensure [employer]  DIET ONE TIME MESSAGE    Tolerating current diet: YES  Passing flatus: YES      Karly Mccauley

## 2020-01-22 NOTE — PROGRESS NOTES
Transport arrived, report given to Transport. Patient medicated with Oxyocdone 10 mg prior to discharge. Discharge instructions reviewed with patient, voice understanding to teaching.

## 2020-01-22 NOTE — DISCHARGE SUMMARY
Hospitalist Discharge Summary     Patient ID:  Sarah Lindsey  [id number]  75 y.o.  1945    PCP on record: Corona New MD    Admit date: 12/22/2019  Discharge date and time: 1/22/2020      DISCHARGE DIAGNOSIS:  Right Externa Otitis (improving)  Shock, multifactorial (sepsis/acute blood loss)- resolved   UTI (completed the therapy)  Lactic acidosis- resolved   Cellulitis of head - no s/sx of infection  scalp eczema/serborrhectic dermatitis of scalp  GIB, resolved  Diverticulosis  Acute blood loss anemia  Diarrhea, resolved  PVD (POA)  Peripheral neuropathy  Electrolyte imbalance (low Mg)  Anasarca, improving   Pulmonary edema  Pleural effusions  Severe hyperalbuminemia   Severe protein calorie malnutrition   ETOH withdrawal with DT's- resolved   Tobacco abuse   HTN   Dyslipidemia   COPD, not in exacerbation   BPH  Urinary retention   25.0 - 29.9 Overweight / Body mass index is 25.05 kg/m². CONSULTATIONS:  IP CONSULT TO VASCULAR SURGERY  IP CONSULT TO UROLOGY  IP CONSULT TO GASTROENTEROLOGY  IP CONSULT TO PODIATRY    Excerpted HPI from H&P of Luis Alfredo Hernández MD:  75years old male with past medical history significant for peripheral vascular disease, hypertension, paroxysmal atrial fibrillation, COPD, GERD was transferred from [facility] for evaluation and treatment of alcohol withdrawal, patient was initially admitted to [facility] hospital December 20 for evaluation and treatment UTI, yesterday patient developed confusion associated with tremor patient was transferred for [facility] for further evaluation and treatment of alcohol withdrawal.       ______________________________________________________________________  DISCHARGE SUMMARY/HOSPITAL COURSE:  for full details see H&P, daily progress notes, labs, consult notes.    Right Externa Otitis (improving)  - inflammation of the external auditory canal or auricle; otovel ear drop to right ear    Apply mupirocin ointment BID after cleaned ear lobe and behind right ear with water     Shock, multifactorial (sepsis/acute blood loss)- resolved   UTI (completed the therapy)  Lactic acidosis- resolved   Cellulitis of head - no s/sx of infection  - no leukocytosis, remain afebrile    Procalcitonin <0.05    Lactic acid 0.8    Blood Cx & urine cx (1/10/2020) NGTD    Tissue culture (1/9/2020) with RARE STAPHYLOCOCCUS SIMULANS. Anaerobic cx no growth; was treated with IV zosyn. No further ABX tmt needed at this time.     scalp eczema/serborrhectic dermatitis of scalp  - ketoconazole shampoo daily as need    Clean scalp with wound cleanser spray then apply mupirocin ointment      GIB, resolved  Diverticulosis  Acute blood loss anemia  Diarrhea, resolved  - CT abdomen/pelvis 01/04/20:  1. No acute GI bleeding site demonstrated. 2. Colonic diverticulosis. 3. Moderate bilateral pleural effusions and adjacent atelectasis. 4. Recent right axillary femoral and cross femoral bypass.  Anasarca. 5. No acute findings in the abdomen or pelvis. 6. Possible cholelithiasis. EGD 01/05/20:  Normal upper endoscopy. Enteric panel 01/07/20:  Negative. Patient received total of 2 units PRBCs during this admission    hgb stable at 9.2; no s/sx of bleeding    Continue with PPI    Continue to hold DVT chemoprophylaxis, ongoing evaluation to determine if safe to resume. Not a candidate for SCDs 2/2 severe PVD. Appreciate GI input; recommend outpatient colonoscopy     PVD (POA)  Peripheral neuropathy  - Vascular surgery input appreciated;      S/P Axillo-Bifemoral bypass on 01/02/20, incisions look good.      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 Continue asa 81 mg po daily.      Continue gabapentin 100 mg po bid and 300 mg po at hs.     Electrolyte imbalance (low Mg)  - resolved.  Continue with daily mg supplement.      Anasarca, improving   Pulmonary edema  Pleural effusions  Severe hyperalbuminemia   Echo 01/04/20:  · Normal cavity size and systolic function (ejection fraction normal). Increased wall thickness. Estimated left ventricular ejection fraction is 50 - 55%. Left ventricular diastolic dysfunction. · Trace mitral valve regurgitation is present. · Mild tricuspid valve regurgitation is present. · Small-to-moderate pericardial effusion. There is left pleural effusion. Continue with furosemide (to be given every other day); request to check daily weight    Severe hypoalbuminemia likely contributing to 3rd spacing.       Severe protein calorie malnutrition   - Appreciate dietician input    Continue nutritional supplements        ETOH withdrawal with DT's- resolved   Tobacco abuse - has not been able to smoke for the past 31dyas  - Patient educated on the importance of smoking cessation and the health benefits associated with quitting. Pt hasn't been smoking since the admission    Continue Nicotine patch; perhaps he can weaned off from nicotine patch in near future    Continue folic acid, multivitamin, and vitamin B      HTN   Dyslipidemia   - started on losartan (dose decreased to 12.5 from 25mg due to orthostatic hypotensive episode while working with PT/OT. No further orthostatic BP once we adjusted the dose of ARB. Unfortunately, his supine BP will be a little higher than we desire.     COPD, not in exacerbation   - PRN nebs      BPH  Urinary retention   - Continue flomax     25.0 - 29.9 Overweight / Body mass index is 25.05 kg/m².          _______________________________________________________________________  Patient seen and examined by me on discharge day. Pertinent Findings:  Gen:    Not in distress  Chest: Clear lungs  CVS:   Regular rhythm.   No edema  Abd:  Soft, not distended, not tender  Neuro:  Alert, orient x 4  SKIN:    Dry but no flaky scalp. Left TMA noted well healed.  Right toes/foot dressed post-operatively.  AO-Bifem surgical sites well approximated without erythema/exudate.  Left lateral lower leg with breakdown is healing  _______________________________________________________________________  DISCHARGE MEDICATIONS:   Current Discharge Medication List      START taking these medications    Details   simethicone (MYLICON) 40 UU/8.5 mL drops Take 1.2 mL by mouth Multiple (gas pain). Qty: 15 mL, Refills: 0      folic acid (FOLVITE) 1 mg tablet Take 1 Tab by mouth daily. Qty: 30 Tab, Refills: 0      thiamine mononitrate (B-1) 100 mg tablet Take 1 Tab by mouth daily. Qty: 30 Tab, Refills: 0      nicotine (NICODERM CQ) 21 mg/24 hr 1 Patch by TransDERmal route daily for 30 days. Qty: 30 Patch, Refills: 0      !! gabapentin (NEURONTIN) 100 mg capsule Take 1 Cap by mouth two (2) times a day. Max Daily Amount: 200 mg. Qty: 60 Cap, Refills: 0    Associated Diagnoses: Neuropathy      !! gabapentin (NEURONTIN) 300 mg capsule Take 1 Cap by mouth nightly. Max Daily Amount: 300 mg. Qty: 30 Cap, Refills: 0    Associated Diagnoses: Neuropathy       !! - Potential duplicate medications found. Please discuss with provider. CONTINUE these medications which have CHANGED    Details   losartan (COZAAR) 25 mg tablet Take 0.5 Tabs by mouth daily. Qty: 15 Tab, Refills: 0         CONTINUE these medications which have NOT CHANGED    Details   pravastatin (PRAVACHOL) 40 mg tablet Take 40 mg by mouth nightly. aspirin 81 mg chewable tablet Take 81 mg by mouth daily. omeprazole (PRILOSEC) 20 mg capsule Take 20 mg by mouth daily as needed.          STOP taking these medications       hydroCHLOROthiazide (HYDRODIURIL) 25 mg tablet Comments:   Reason for Stopping:         alclometasone (ACLOVATE) 0.05 % topical cream Comments:   Reason for Stopping:         betamethasone dipropionate (DIPROLENE) 0.05 % ointment Comments:   Reason for Stopping:         propranolol (INDERAL) 20 mg tablet Comments:   Reason for Stopping:               My Recommended Diet, Activity, Wound Care, and follow-up labs are listed in the patient's Discharge Insturctions which I have personally completed and reviewed.     _______________________________________________________________________  DISPOSITION:    Home with Family:    Home with HH/PT/OT/RN:    SNF/LTC: y   ALICIA:    OTHER:        Condition at Discharge:  Stable  _______________________________________________________________________  Follow up with:   PCP : Oliva Skinner MD  Follow-up Information     Follow up With Specialties Details Why Contact Info    Severa Harter, MD Vascular Surgery On 1/22/2020 @ 10:15 am  [address]  [phone]      Oliva Skinner MD Internal Medicine   [address] [place] [address]  [phone]                Total time in minutes spent coordinating this discharge (includes going over instructions, follow-up, prescriptions, and preparing report for sign off to her PCP) : 40 minutes    Signed:  Camille Doyle NP

## 2020-01-24 PROBLEM — L89.890 PRESSURE INJURY OF LEFT FOOT, UNSTAGEABLE (HCC): Chronic | Status: ACTIVE | Noted: 2020-01-24

## 2020-01-31 ENCOUNTER — HOME HEALTH ADMISSION (OUTPATIENT)
Dept: HOME HEALTH SERVICES | Facility: HOME HEALTH | Age: 75
End: 2020-01-31
Payer: MEDICARE

## 2020-02-02 ENCOUNTER — HOME CARE VISIT (OUTPATIENT)
Dept: SCHEDULING | Facility: HOME HEALTH | Age: 75
End: 2020-02-02
Payer: MEDICARE

## 2020-02-02 PROCEDURE — 3331090001 HH PPS REVENUE CREDIT

## 2020-02-02 PROCEDURE — 3331090002 HH PPS REVENUE DEBIT

## 2020-02-02 PROCEDURE — 400013 HH SOC

## 2020-02-02 PROCEDURE — G0299 HHS/HOSPICE OF RN EA 15 MIN: HCPCS

## 2020-02-03 PROCEDURE — 3331090002 HH PPS REVENUE DEBIT

## 2020-02-03 PROCEDURE — 3331090001 HH PPS REVENUE CREDIT

## 2020-02-04 PROCEDURE — 3331090001 HH PPS REVENUE CREDIT

## 2020-02-04 PROCEDURE — 3331090002 HH PPS REVENUE DEBIT

## 2020-02-05 ENCOUNTER — HOME CARE VISIT (OUTPATIENT)
Dept: SCHEDULING | Facility: HOME HEALTH | Age: 75
End: 2020-02-05
Payer: MEDICARE

## 2020-02-05 VITALS
OXYGEN SATURATION: 97 % | RESPIRATION RATE: 18 BRPM | TEMPERATURE: 97.1 F | DIASTOLIC BLOOD PRESSURE: 60 MMHG | HEART RATE: 82 BPM | SYSTOLIC BLOOD PRESSURE: 110 MMHG

## 2020-02-05 PROCEDURE — 3331090002 HH PPS REVENUE DEBIT

## 2020-02-05 PROCEDURE — G0299 HHS/HOSPICE OF RN EA 15 MIN: HCPCS

## 2020-02-05 PROCEDURE — 3331090001 HH PPS REVENUE CREDIT

## 2020-02-06 VITALS
HEART RATE: 73 BPM | TEMPERATURE: 98.4 F | SYSTOLIC BLOOD PRESSURE: 140 MMHG | DIASTOLIC BLOOD PRESSURE: 70 MMHG | RESPIRATION RATE: 14 BRPM

## 2020-02-06 PROCEDURE — 3331090002 HH PPS REVENUE DEBIT

## 2020-02-06 PROCEDURE — 3331090001 HH PPS REVENUE CREDIT

## 2020-02-07 ENCOUNTER — HOME CARE VISIT (OUTPATIENT)
Dept: HOME HEALTH SERVICES | Facility: HOME HEALTH | Age: 75
End: 2020-02-07
Payer: MEDICARE

## 2020-02-07 VITALS
HEART RATE: 79 BPM | SYSTOLIC BLOOD PRESSURE: 110 MMHG | OXYGEN SATURATION: 93 % | DIASTOLIC BLOOD PRESSURE: 60 MMHG | TEMPERATURE: 97.8 F

## 2020-02-07 PROCEDURE — G0299 HHS/HOSPICE OF RN EA 15 MIN: HCPCS

## 2020-02-07 PROCEDURE — 3331090002 HH PPS REVENUE DEBIT

## 2020-02-07 PROCEDURE — 3331090001 HH PPS REVENUE CREDIT

## 2020-02-08 PROCEDURE — 3331090001 HH PPS REVENUE CREDIT

## 2020-02-08 PROCEDURE — 3331090002 HH PPS REVENUE DEBIT

## 2020-02-09 PROCEDURE — 3331090001 HH PPS REVENUE CREDIT

## 2020-02-09 PROCEDURE — 3331090002 HH PPS REVENUE DEBIT

## 2020-02-10 ENCOUNTER — HOME CARE VISIT (OUTPATIENT)
Dept: SCHEDULING | Facility: HOME HEALTH | Age: 75
End: 2020-02-10
Payer: MEDICARE

## 2020-02-10 PROCEDURE — 3331090002 HH PPS REVENUE DEBIT

## 2020-02-10 PROCEDURE — G0299 HHS/HOSPICE OF RN EA 15 MIN: HCPCS

## 2020-02-10 PROCEDURE — 3331090001 HH PPS REVENUE CREDIT

## 2020-02-11 ENCOUNTER — OFFICE VISIT (OUTPATIENT)
Dept: SURGERY | Age: 75
End: 2020-02-11

## 2020-02-11 VITALS
WEIGHT: 128.7 LBS | OXYGEN SATURATION: 100 % | DIASTOLIC BLOOD PRESSURE: 65 MMHG | BODY MASS INDEX: 19.51 KG/M2 | HEIGHT: 68 IN | SYSTOLIC BLOOD PRESSURE: 137 MMHG | HEART RATE: 85 BPM | RESPIRATION RATE: 16 BRPM

## 2020-02-11 VITALS
RESPIRATION RATE: 20 BRPM | TEMPERATURE: 98.2 F | DIASTOLIC BLOOD PRESSURE: 66 MMHG | HEART RATE: 74 BPM | OXYGEN SATURATION: 98 % | SYSTOLIC BLOOD PRESSURE: 122 MMHG

## 2020-02-11 DIAGNOSIS — L89.892 PRESSURE INJURY OF LEFT FOOT, STAGE 2 (HCC): Primary | ICD-10-CM

## 2020-02-11 PROCEDURE — 3331090002 HH PPS REVENUE DEBIT

## 2020-02-11 PROCEDURE — 3331090001 HH PPS REVENUE CREDIT

## 2020-02-11 NOTE — PROGRESS NOTES
1. Have you been to the ER, urgent care clinic since your last visit? Hospitalized since your last visit? Yes When: 1/31/20, [facility]    2. Have you seen or consulted any other health care providers outside of the [address] [place] [address] since your last visit? Include any pap smears or colon screening.  No

## 2020-02-12 ENCOUNTER — HOME CARE VISIT (OUTPATIENT)
Dept: SCHEDULING | Facility: HOME HEALTH | Age: 75
End: 2020-02-12
Payer: MEDICARE

## 2020-02-12 PROCEDURE — 3331090001 HH PPS REVENUE CREDIT

## 2020-02-12 PROCEDURE — 3331090002 HH PPS REVENUE DEBIT

## 2020-02-12 PROCEDURE — G0299 HHS/HOSPICE OF RN EA 15 MIN: HCPCS

## 2020-02-13 PROCEDURE — 3331090001 HH PPS REVENUE CREDIT

## 2020-02-13 PROCEDURE — 3331090002 HH PPS REVENUE DEBIT

## 2020-02-14 ENCOUNTER — HOME CARE VISIT (OUTPATIENT)
Dept: SCHEDULING | Facility: HOME HEALTH | Age: 75
End: 2020-02-14
Payer: MEDICARE

## 2020-02-14 VITALS
DIASTOLIC BLOOD PRESSURE: 64 MMHG | SYSTOLIC BLOOD PRESSURE: 115 MMHG | OXYGEN SATURATION: 97 % | TEMPERATURE: 98.8 F | RESPIRATION RATE: 20 BRPM | HEART RATE: 78 BPM

## 2020-02-14 PROCEDURE — A6216 NON-STERILE GAUZE<=16 SQ IN: HCPCS

## 2020-02-14 PROCEDURE — A9270 NON-COVERED ITEM OR SERVICE: HCPCS

## 2020-02-14 PROCEDURE — G0299 HHS/HOSPICE OF RN EA 15 MIN: HCPCS

## 2020-02-14 PROCEDURE — A6446 CONFORM BAND S W>=3" <5"/YD: HCPCS

## 2020-02-14 PROCEDURE — 3331090002 HH PPS REVENUE DEBIT

## 2020-02-14 PROCEDURE — A6449 LT COMPRES BAND >=3" <5"/YD: HCPCS

## 2020-02-14 PROCEDURE — A4452 WATERPROOF TAPE: HCPCS

## 2020-02-14 PROCEDURE — A6212 FOAM DRG <=16 SQ IN W/BORDER: HCPCS

## 2020-02-14 PROCEDURE — 3331090001 HH PPS REVENUE CREDIT

## 2020-02-14 PROCEDURE — A6402 STERILE GAUZE <= 16 SQ IN: HCPCS

## 2020-02-14 PROCEDURE — A4216 STERILE WATER/SALINE, 10 ML: HCPCS

## 2020-02-15 PROCEDURE — 3331090002 HH PPS REVENUE DEBIT

## 2020-02-15 PROCEDURE — 3331090001 HH PPS REVENUE CREDIT

## 2020-02-16 PROCEDURE — 3331090001 HH PPS REVENUE CREDIT

## 2020-02-16 PROCEDURE — 3331090002 HH PPS REVENUE DEBIT

## 2020-02-17 ENCOUNTER — HOME CARE VISIT (OUTPATIENT)
Dept: SCHEDULING | Facility: HOME HEALTH | Age: 75
End: 2020-02-17
Payer: MEDICARE

## 2020-02-17 VITALS
TEMPERATURE: 98.3 F | DIASTOLIC BLOOD PRESSURE: 70 MMHG | HEART RATE: 78 BPM | OXYGEN SATURATION: 98 % | RESPIRATION RATE: 20 BRPM | SYSTOLIC BLOOD PRESSURE: 124 MMHG

## 2020-02-17 VITALS
TEMPERATURE: 98.6 F | RESPIRATION RATE: 20 BRPM | DIASTOLIC BLOOD PRESSURE: 68 MMHG | SYSTOLIC BLOOD PRESSURE: 124 MMHG | OXYGEN SATURATION: 98 % | HEART RATE: 88 BPM

## 2020-02-17 PROCEDURE — 3331090002 HH PPS REVENUE DEBIT

## 2020-02-17 PROCEDURE — G0299 HHS/HOSPICE OF RN EA 15 MIN: HCPCS

## 2020-02-17 PROCEDURE — 3331090001 HH PPS REVENUE CREDIT

## 2020-02-18 PROCEDURE — 3331090002 HH PPS REVENUE DEBIT

## 2020-02-18 PROCEDURE — 3331090001 HH PPS REVENUE CREDIT

## 2020-02-19 ENCOUNTER — HOME CARE VISIT (OUTPATIENT)
Dept: SCHEDULING | Facility: HOME HEALTH | Age: 75
End: 2020-02-19
Payer: MEDICARE

## 2020-02-19 VITALS
DIASTOLIC BLOOD PRESSURE: 70 MMHG | HEART RATE: 75 BPM | TEMPERATURE: 97.5 F | RESPIRATION RATE: 18 BRPM | SYSTOLIC BLOOD PRESSURE: 140 MMHG

## 2020-02-19 PROCEDURE — 3331090001 HH PPS REVENUE CREDIT

## 2020-02-19 PROCEDURE — 3331090002 HH PPS REVENUE DEBIT

## 2020-02-19 PROCEDURE — G0299 HHS/HOSPICE OF RN EA 15 MIN: HCPCS

## 2020-02-20 PROCEDURE — 3331090001 HH PPS REVENUE CREDIT

## 2020-02-20 PROCEDURE — 3331090002 HH PPS REVENUE DEBIT

## 2020-02-21 ENCOUNTER — HOME CARE VISIT (OUTPATIENT)
Dept: SCHEDULING | Facility: HOME HEALTH | Age: 75
End: 2020-02-21
Payer: MEDICARE

## 2020-02-21 PROCEDURE — G0299 HHS/HOSPICE OF RN EA 15 MIN: HCPCS

## 2020-02-21 PROCEDURE — 3331090001 HH PPS REVENUE CREDIT

## 2020-02-21 PROCEDURE — 3331090002 HH PPS REVENUE DEBIT

## 2020-02-22 PROCEDURE — 3331090002 HH PPS REVENUE DEBIT

## 2020-02-22 PROCEDURE — 3331090001 HH PPS REVENUE CREDIT

## 2020-02-23 PROCEDURE — 3331090001 HH PPS REVENUE CREDIT

## 2020-02-23 PROCEDURE — 3331090002 HH PPS REVENUE DEBIT

## 2020-02-24 ENCOUNTER — HOME CARE VISIT (OUTPATIENT)
Dept: SCHEDULING | Facility: HOME HEALTH | Age: 75
End: 2020-02-24
Payer: MEDICARE

## 2020-02-24 VITALS
DIASTOLIC BLOOD PRESSURE: 72 MMHG | HEART RATE: 94 BPM | TEMPERATURE: 98.4 F | SYSTOLIC BLOOD PRESSURE: 124 MMHG | OXYGEN SATURATION: 97 % | RESPIRATION RATE: 20 BRPM

## 2020-02-24 PROCEDURE — G0299 HHS/HOSPICE OF RN EA 15 MIN: HCPCS

## 2020-02-24 PROCEDURE — 3331090002 HH PPS REVENUE DEBIT

## 2020-02-24 PROCEDURE — 3331090001 HH PPS REVENUE CREDIT

## 2020-02-25 VITALS
HEART RATE: 78 BPM | SYSTOLIC BLOOD PRESSURE: 124 MMHG | TEMPERATURE: 98.8 F | OXYGEN SATURATION: 98 % | RESPIRATION RATE: 20 BRPM | DIASTOLIC BLOOD PRESSURE: 74 MMHG

## 2020-02-25 PROCEDURE — 3331090002 HH PPS REVENUE DEBIT

## 2020-02-25 PROCEDURE — 3331090001 HH PPS REVENUE CREDIT

## 2020-02-26 ENCOUNTER — HOME CARE VISIT (OUTPATIENT)
Dept: SCHEDULING | Facility: HOME HEALTH | Age: 75
End: 2020-02-26
Payer: MEDICARE

## 2020-02-26 VITALS
HEART RATE: 72 BPM | DIASTOLIC BLOOD PRESSURE: 62 MMHG | RESPIRATION RATE: 18 BRPM | TEMPERATURE: 97.2 F | SYSTOLIC BLOOD PRESSURE: 110 MMHG

## 2020-02-26 PROCEDURE — 3331090002 HH PPS REVENUE DEBIT

## 2020-02-26 PROCEDURE — G0299 HHS/HOSPICE OF RN EA 15 MIN: HCPCS

## 2020-02-26 PROCEDURE — 3331090001 HH PPS REVENUE CREDIT

## 2020-02-27 PROCEDURE — 3331090001 HH PPS REVENUE CREDIT

## 2020-02-27 PROCEDURE — 3331090002 HH PPS REVENUE DEBIT

## 2020-02-28 ENCOUNTER — HOME CARE VISIT (OUTPATIENT)
Dept: SCHEDULING | Facility: HOME HEALTH | Age: 75
End: 2020-02-28
Payer: MEDICARE

## 2020-02-28 VITALS
HEART RATE: 68 BPM | DIASTOLIC BLOOD PRESSURE: 70 MMHG | TEMPERATURE: 97.7 F | RESPIRATION RATE: 18 BRPM | SYSTOLIC BLOOD PRESSURE: 140 MMHG | OXYGEN SATURATION: 99 %

## 2020-02-28 PROCEDURE — 3331090001 HH PPS REVENUE CREDIT

## 2020-02-28 PROCEDURE — 3331090002 HH PPS REVENUE DEBIT

## 2020-02-28 PROCEDURE — G0299 HHS/HOSPICE OF RN EA 15 MIN: HCPCS

## 2020-02-29 PROCEDURE — 3331090001 HH PPS REVENUE CREDIT

## 2020-02-29 PROCEDURE — 3331090002 HH PPS REVENUE DEBIT

## 2020-03-01 PROCEDURE — 3331090002 HH PPS REVENUE DEBIT

## 2020-03-01 PROCEDURE — 3331090001 HH PPS REVENUE CREDIT

## 2020-03-02 ENCOUNTER — HOME CARE VISIT (OUTPATIENT)
Dept: SCHEDULING | Facility: HOME HEALTH | Age: 75
End: 2020-03-02
Payer: MEDICARE

## 2020-03-02 PROCEDURE — A6212 FOAM DRG <=16 SQ IN W/BORDER: HCPCS

## 2020-03-02 PROCEDURE — 3331090001 HH PPS REVENUE CREDIT

## 2020-03-02 PROCEDURE — G0299 HHS/HOSPICE OF RN EA 15 MIN: HCPCS

## 2020-03-02 PROCEDURE — 3331090002 HH PPS REVENUE DEBIT

## 2020-03-03 ENCOUNTER — OFFICE VISIT (OUTPATIENT)
Dept: SURGERY | Age: 75
End: 2020-03-03

## 2020-03-03 VITALS
HEIGHT: 68 IN | WEIGHT: 128 LBS | HEART RATE: 83 BPM | SYSTOLIC BLOOD PRESSURE: 174 MMHG | BODY MASS INDEX: 19.4 KG/M2 | OXYGEN SATURATION: 100 % | RESPIRATION RATE: 16 BRPM | DIASTOLIC BLOOD PRESSURE: 91 MMHG

## 2020-03-03 DIAGNOSIS — L89.892 PRESSURE INJURY OF LEFT FOOT, STAGE 2 (HCC): Primary | ICD-10-CM

## 2020-03-03 PROCEDURE — 3331090001 HH PPS REVENUE CREDIT

## 2020-03-03 PROCEDURE — 3331090002 HH PPS REVENUE DEBIT

## 2020-03-03 NOTE — PROGRESS NOTES
1. Have you been to the ER, urgent care clinic since your last visit? Hospitalized since your last visit? No    2. Have you seen or consulted any other health care providers outside of the [address] [place] [address] since your last visit? Include any pap smears or colon screening.  No

## 2020-03-03 NOTE — PROGRESS NOTES
Patient seen in hospital with two pressure ulcers on the left foot: one at lateral malleolus and other at heel. Both Stage 2, down to subcutaneous tissue, almost Stage 3. Treatment with Santyl now to try to get eschar loosened and off. No c/o. He is home. Has a recliner. He is not wearing stockings because they made his legs sweat. EXAM:  Left transmet amputation looks good. No edema noted      Left Lateral malleolar ulcer had some yellow exudate that was removed with gauze  Base looking better, 4 mm wide but star shaped  Surrounding skin showing signs of irritation possibly from adhesive/dressing    Heel with less eschar, debrided with knife, some bleeding noted  7  Mm diameter    Both dressed with silvadene, telfa. gauze Wrap at ankle and bandaid on heel   Discussed continue elevation to decrease edema. IMP:   and getting smaller    PLAN:  change both ulcer to silvadene and telfa every other day. RTO 3 weeks. Spent over half of the 15 minute visit in discussion and coordination of care of wound, including treatment plans, interventions patient can do to help healing and symptoms to watch out for and when to be seen immediately. me

## 2020-03-04 ENCOUNTER — HOME CARE VISIT (OUTPATIENT)
Dept: SCHEDULING | Facility: HOME HEALTH | Age: 75
End: 2020-03-04
Payer: MEDICARE

## 2020-03-04 VITALS
RESPIRATION RATE: 20 BRPM | HEART RATE: 70 BPM | DIASTOLIC BLOOD PRESSURE: 80 MMHG | SYSTOLIC BLOOD PRESSURE: 125 MMHG | TEMPERATURE: 98.9 F

## 2020-03-04 VITALS
RESPIRATION RATE: 18 BRPM | DIASTOLIC BLOOD PRESSURE: 64 MMHG | TEMPERATURE: 98.4 F | HEART RATE: 72 BPM | SYSTOLIC BLOOD PRESSURE: 116 MMHG

## 2020-03-04 PROCEDURE — 400013 HH SOC

## 2020-03-04 PROCEDURE — 3331090001 HH PPS REVENUE CREDIT

## 2020-03-04 PROCEDURE — G0299 HHS/HOSPICE OF RN EA 15 MIN: HCPCS

## 2020-03-04 PROCEDURE — A6402 STERILE GAUZE <= 16 SQ IN: HCPCS

## 2020-03-04 PROCEDURE — 3331090002 HH PPS REVENUE DEBIT

## 2020-03-05 PROCEDURE — 3331090001 HH PPS REVENUE CREDIT

## 2020-03-05 PROCEDURE — 3331090002 HH PPS REVENUE DEBIT

## 2020-03-06 ENCOUNTER — HOME CARE VISIT (OUTPATIENT)
Dept: SCHEDULING | Facility: HOME HEALTH | Age: 75
End: 2020-03-06
Payer: MEDICARE

## 2020-03-06 VITALS
DIASTOLIC BLOOD PRESSURE: 78 MMHG | TEMPERATURE: 99.2 F | HEART RATE: 68 BPM | RESPIRATION RATE: 16 BRPM | SYSTOLIC BLOOD PRESSURE: 120 MMHG

## 2020-03-06 PROCEDURE — 3331090001 HH PPS REVENUE CREDIT

## 2020-03-06 PROCEDURE — 3331090002 HH PPS REVENUE DEBIT

## 2020-03-06 PROCEDURE — G0299 HHS/HOSPICE OF RN EA 15 MIN: HCPCS

## 2020-03-07 PROCEDURE — 3331090002 HH PPS REVENUE DEBIT

## 2020-03-07 PROCEDURE — 3331090001 HH PPS REVENUE CREDIT

## 2020-03-08 PROCEDURE — 3331090001 HH PPS REVENUE CREDIT

## 2020-03-08 PROCEDURE — 3331090002 HH PPS REVENUE DEBIT

## 2020-03-09 ENCOUNTER — HOME CARE VISIT (OUTPATIENT)
Dept: SCHEDULING | Facility: HOME HEALTH | Age: 75
End: 2020-03-09
Payer: MEDICARE

## 2020-03-09 PROCEDURE — 3331090002 HH PPS REVENUE DEBIT

## 2020-03-09 PROCEDURE — 3331090001 HH PPS REVENUE CREDIT

## 2020-03-09 PROCEDURE — G0299 HHS/HOSPICE OF RN EA 15 MIN: HCPCS

## 2020-03-10 VITALS
RESPIRATION RATE: 16 BRPM | DIASTOLIC BLOOD PRESSURE: 66 MMHG | HEART RATE: 88 BPM | SYSTOLIC BLOOD PRESSURE: 108 MMHG | TEMPERATURE: 97.5 F | OXYGEN SATURATION: 98 %

## 2020-03-10 PROCEDURE — 3331090002 HH PPS REVENUE DEBIT

## 2020-03-10 PROCEDURE — 3331090001 HH PPS REVENUE CREDIT

## 2020-03-11 ENCOUNTER — HOME CARE VISIT (OUTPATIENT)
Dept: SCHEDULING | Facility: HOME HEALTH | Age: 75
End: 2020-03-11
Payer: MEDICARE

## 2020-03-11 PROCEDURE — G0299 HHS/HOSPICE OF RN EA 15 MIN: HCPCS

## 2020-03-11 PROCEDURE — 3331090001 HH PPS REVENUE CREDIT

## 2020-03-11 PROCEDURE — 3331090002 HH PPS REVENUE DEBIT

## 2020-03-12 VITALS
HEART RATE: 68 BPM | RESPIRATION RATE: 18 BRPM | OXYGEN SATURATION: 98 % | TEMPERATURE: 97.7 F | SYSTOLIC BLOOD PRESSURE: 124 MMHG | DIASTOLIC BLOOD PRESSURE: 70 MMHG

## 2020-03-12 PROCEDURE — 3331090001 HH PPS REVENUE CREDIT

## 2020-03-12 PROCEDURE — 3331090002 HH PPS REVENUE DEBIT

## 2020-03-13 ENCOUNTER — HOME CARE VISIT (OUTPATIENT)
Dept: SCHEDULING | Facility: HOME HEALTH | Age: 75
End: 2020-03-13
Payer: MEDICARE

## 2020-03-13 VITALS
TEMPERATURE: 97.4 F | RESPIRATION RATE: 18 BRPM | HEART RATE: 92 BPM | SYSTOLIC BLOOD PRESSURE: 100 MMHG | OXYGEN SATURATION: 98 % | DIASTOLIC BLOOD PRESSURE: 60 MMHG

## 2020-03-13 PROCEDURE — 3331090002 HH PPS REVENUE DEBIT

## 2020-03-13 PROCEDURE — G0299 HHS/HOSPICE OF RN EA 15 MIN: HCPCS

## 2020-03-13 PROCEDURE — 3331090001 HH PPS REVENUE CREDIT

## 2020-03-14 PROCEDURE — 3331090001 HH PPS REVENUE CREDIT

## 2020-03-14 PROCEDURE — 3331090002 HH PPS REVENUE DEBIT

## 2020-03-15 PROCEDURE — 3331090001 HH PPS REVENUE CREDIT

## 2020-03-15 PROCEDURE — 3331090002 HH PPS REVENUE DEBIT

## 2020-03-16 ENCOUNTER — HOME CARE VISIT (OUTPATIENT)
Dept: SCHEDULING | Facility: HOME HEALTH | Age: 75
End: 2020-03-16
Payer: MEDICARE

## 2020-03-16 PROCEDURE — 3331090002 HH PPS REVENUE DEBIT

## 2020-03-16 PROCEDURE — G0300 HHS/HOSPICE OF LPN EA 15 MIN: HCPCS

## 2020-03-16 PROCEDURE — 3331090001 HH PPS REVENUE CREDIT

## 2020-03-17 PROCEDURE — 3331090001 HH PPS REVENUE CREDIT

## 2020-03-17 PROCEDURE — 3331090002 HH PPS REVENUE DEBIT

## 2020-03-18 ENCOUNTER — HOME CARE VISIT (OUTPATIENT)
Dept: SCHEDULING | Facility: HOME HEALTH | Age: 75
End: 2020-03-18
Payer: MEDICARE

## 2020-03-18 VITALS
DIASTOLIC BLOOD PRESSURE: 70 MMHG | HEART RATE: 70 BPM | OXYGEN SATURATION: 90 % | RESPIRATION RATE: 18 BRPM | SYSTOLIC BLOOD PRESSURE: 122 MMHG | TEMPERATURE: 98.7 F

## 2020-03-18 PROCEDURE — A6212 FOAM DRG <=16 SQ IN W/BORDER: HCPCS

## 2020-03-18 PROCEDURE — G0299 HHS/HOSPICE OF RN EA 15 MIN: HCPCS

## 2020-03-18 PROCEDURE — A9270 NON-COVERED ITEM OR SERVICE: HCPCS

## 2020-03-18 PROCEDURE — 3331090002 HH PPS REVENUE DEBIT

## 2020-03-18 PROCEDURE — 3331090001 HH PPS REVENUE CREDIT

## 2020-03-19 PROCEDURE — 3331090001 HH PPS REVENUE CREDIT

## 2020-03-19 PROCEDURE — 3331090002 HH PPS REVENUE DEBIT

## 2020-03-20 ENCOUNTER — HOME CARE VISIT (OUTPATIENT)
Dept: SCHEDULING | Facility: HOME HEALTH | Age: 75
End: 2020-03-20
Payer: MEDICARE

## 2020-03-20 VITALS
TEMPERATURE: 98.8 F | OXYGEN SATURATION: 98 % | HEART RATE: 89 BPM | DIASTOLIC BLOOD PRESSURE: 68 MMHG | SYSTOLIC BLOOD PRESSURE: 110 MMHG | RESPIRATION RATE: 20 BRPM

## 2020-03-20 PROCEDURE — 3331090001 HH PPS REVENUE CREDIT

## 2020-03-20 PROCEDURE — G0300 HHS/HOSPICE OF LPN EA 15 MIN: HCPCS

## 2020-03-20 PROCEDURE — 3331090002 HH PPS REVENUE DEBIT

## 2020-03-21 PROCEDURE — 3331090002 HH PPS REVENUE DEBIT

## 2020-03-21 PROCEDURE — 3331090001 HH PPS REVENUE CREDIT

## 2020-03-22 PROCEDURE — 3331090001 HH PPS REVENUE CREDIT

## 2020-03-22 PROCEDURE — 3331090002 HH PPS REVENUE DEBIT

## 2020-03-23 ENCOUNTER — HOME CARE VISIT (OUTPATIENT)
Dept: HOME HEALTH SERVICES | Facility: HOME HEALTH | Age: 75
End: 2020-03-23
Payer: MEDICARE

## 2020-03-23 ENCOUNTER — HOME CARE VISIT (OUTPATIENT)
Dept: SCHEDULING | Facility: HOME HEALTH | Age: 75
End: 2020-03-23
Payer: MEDICARE

## 2020-03-23 VITALS
OXYGEN SATURATION: 94 % | DIASTOLIC BLOOD PRESSURE: 65 MMHG | RESPIRATION RATE: 18 BRPM | HEART RATE: 72 BPM | TEMPERATURE: 98.5 F | SYSTOLIC BLOOD PRESSURE: 110 MMHG

## 2020-03-23 PROCEDURE — 3331090001 HH PPS REVENUE CREDIT

## 2020-03-23 PROCEDURE — G0299 HHS/HOSPICE OF RN EA 15 MIN: HCPCS

## 2020-03-23 PROCEDURE — 3331090002 HH PPS REVENUE DEBIT

## 2020-03-24 PROCEDURE — 3331090002 HH PPS REVENUE DEBIT

## 2020-03-24 PROCEDURE — 3331090001 HH PPS REVENUE CREDIT

## 2020-03-25 ENCOUNTER — HOME CARE VISIT (OUTPATIENT)
Dept: SCHEDULING | Facility: HOME HEALTH | Age: 75
End: 2020-03-25
Payer: MEDICARE

## 2020-03-25 VITALS
DIASTOLIC BLOOD PRESSURE: 60 MMHG | RESPIRATION RATE: 18 BRPM | TEMPERATURE: 97.6 F | SYSTOLIC BLOOD PRESSURE: 132 MMHG | HEART RATE: 81 BPM | OXYGEN SATURATION: 98 %

## 2020-03-25 PROCEDURE — G0299 HHS/HOSPICE OF RN EA 15 MIN: HCPCS

## 2020-03-25 PROCEDURE — 3331090002 HH PPS REVENUE DEBIT

## 2020-03-25 PROCEDURE — 3331090001 HH PPS REVENUE CREDIT

## 2020-03-26 PROCEDURE — 3331090002 HH PPS REVENUE DEBIT

## 2020-03-26 PROCEDURE — 3331090001 HH PPS REVENUE CREDIT

## 2020-03-27 ENCOUNTER — HOME CARE VISIT (OUTPATIENT)
Dept: SCHEDULING | Facility: HOME HEALTH | Age: 75
End: 2020-03-27
Payer: MEDICARE

## 2020-03-27 ENCOUNTER — HOME CARE VISIT (OUTPATIENT)
Dept: HOME HEALTH SERVICES | Facility: HOME HEALTH | Age: 75
End: 2020-03-27
Payer: MEDICARE

## 2020-03-27 PROCEDURE — 3331090002 HH PPS REVENUE DEBIT

## 2020-03-27 PROCEDURE — 3331090001 HH PPS REVENUE CREDIT

## 2020-03-27 PROCEDURE — G0300 HHS/HOSPICE OF LPN EA 15 MIN: HCPCS

## 2020-03-28 ENCOUNTER — HOME CARE VISIT (OUTPATIENT)
Dept: SCHEDULING | Facility: HOME HEALTH | Age: 75
End: 2020-03-28
Payer: MEDICARE

## 2020-03-28 ENCOUNTER — HOME CARE VISIT (OUTPATIENT)
Dept: HOME HEALTH SERVICES | Facility: HOME HEALTH | Age: 75
End: 2020-03-28
Payer: MEDICARE

## 2020-03-28 VITALS
SYSTOLIC BLOOD PRESSURE: 130 MMHG | TEMPERATURE: 100.4 F | HEART RATE: 96 BPM | OXYGEN SATURATION: 92 % | RESPIRATION RATE: 18 BRPM | DIASTOLIC BLOOD PRESSURE: 70 MMHG

## 2020-03-28 PROCEDURE — 3331090002 HH PPS REVENUE DEBIT

## 2020-03-28 PROCEDURE — 3331090001 HH PPS REVENUE CREDIT

## 2020-03-28 PROCEDURE — G0299 HHS/HOSPICE OF RN EA 15 MIN: HCPCS

## 2020-03-28 RX ORDER — CIPROFLOXACIN 500 MG/1
500 TABLET ORAL 2 TIMES DAILY
Qty: 20 TAB | Refills: 0 | Status: SHIPPED | OUTPATIENT
Start: 2020-03-28 | End: 2020-04-07

## 2020-03-29 PROCEDURE — 3331090001 HH PPS REVENUE CREDIT

## 2020-03-29 PROCEDURE — 3331090002 HH PPS REVENUE DEBIT

## 2020-03-30 ENCOUNTER — HOME CARE VISIT (OUTPATIENT)
Dept: HOME HEALTH SERVICES | Facility: HOME HEALTH | Age: 75
End: 2020-03-30
Payer: MEDICARE

## 2020-03-30 ENCOUNTER — HOME CARE VISIT (OUTPATIENT)
Dept: SCHEDULING | Facility: HOME HEALTH | Age: 75
End: 2020-03-30
Payer: MEDICARE

## 2020-03-30 PROCEDURE — 3331090001 HH PPS REVENUE CREDIT

## 2020-03-30 PROCEDURE — 3331090002 HH PPS REVENUE DEBIT

## 2020-03-30 PROCEDURE — G0300 HHS/HOSPICE OF LPN EA 15 MIN: HCPCS

## 2020-03-31 ENCOUNTER — OFFICE VISIT (OUTPATIENT)
Dept: SURGERY | Age: 75
End: 2020-03-31

## 2020-03-31 VITALS
HEART RATE: 60 BPM | OXYGEN SATURATION: 100 % | RESPIRATION RATE: 16 BRPM | TEMPERATURE: 97.5 F | DIASTOLIC BLOOD PRESSURE: 70 MMHG | WEIGHT: 128 LBS | HEIGHT: 68 IN | SYSTOLIC BLOOD PRESSURE: 150 MMHG | BODY MASS INDEX: 19.4 KG/M2

## 2020-03-31 DIAGNOSIS — L89.892 PRESSURE INJURY OF LEFT FOOT, STAGE 2 (HCC): Primary | ICD-10-CM

## 2020-03-31 PROCEDURE — 3331090002 HH PPS REVENUE DEBIT

## 2020-03-31 PROCEDURE — 3331090001 HH PPS REVENUE CREDIT

## 2020-03-31 NOTE — PROGRESS NOTES
1. Have you been to the ER, urgent care clinic since your last visit? Hospitalized since your last visit? No    2. Have you seen or consulted any other health care providers outside of the [address] [place] [address] since your last visit? Include any pap smears or colon screening.  No

## 2020-03-31 NOTE — PROGRESS NOTES
Patient seen in hospital with two pressure ulcers on the left foot: one at lateral malleolus and other at heel. Both Stage 2, down to subcutaneous tissue  No c/o. He is home. Has a recliner. He is not wearing stockings because they made his legs sweat. EXAM:  Left transmet amputation looks good. No edema noted      Left Lateral malleolar ulcer, healed, still a little pink and needs watching  Two other areas on lateral foot are pink, pressure spots  Foot very dry  Heel with thick callous, debrided with knife, some bleeding noted, very superficial  No ulcer  Vaseline to foot  Heel dressed with medihoney and bandaid   Discussed continue elevation to decrease edema. IMP:  All healed but at high risk for recurrence from pressure    PLAN:  Watch for pressure spots  RTO prn    Spent over half of the 15 minute visit in discussion and coordination of care of wound, including treatment plans, interventions patient can do to help healing and symptoms to watch out for and when to be seen immediately. me

## 2020-04-01 ENCOUNTER — HOME CARE VISIT (OUTPATIENT)
Dept: HOME HEALTH SERVICES | Facility: HOME HEALTH | Age: 75
End: 2020-04-01
Payer: MEDICARE

## 2020-04-01 VITALS
TEMPERATURE: 98.2 F | HEART RATE: 88 BPM | OXYGEN SATURATION: 99 % | HEART RATE: 73 BPM | DIASTOLIC BLOOD PRESSURE: 64 MMHG | SYSTOLIC BLOOD PRESSURE: 130 MMHG | RESPIRATION RATE: 20 BRPM | RESPIRATION RATE: 20 BRPM | OXYGEN SATURATION: 94 % | TEMPERATURE: 99.9 F | SYSTOLIC BLOOD PRESSURE: 118 MMHG | DIASTOLIC BLOOD PRESSURE: 64 MMHG

## 2020-04-01 VITALS
DIASTOLIC BLOOD PRESSURE: 64 MMHG | TEMPERATURE: 96.8 F | HEART RATE: 74 BPM | SYSTOLIC BLOOD PRESSURE: 110 MMHG | OXYGEN SATURATION: 97 % | RESPIRATION RATE: 18 BRPM

## 2020-04-01 PROCEDURE — 3331090001 HH PPS REVENUE CREDIT

## 2020-04-01 PROCEDURE — 3331090002 HH PPS REVENUE DEBIT

## 2020-04-01 PROCEDURE — G0299 HHS/HOSPICE OF RN EA 15 MIN: HCPCS

## 2020-04-07 RX ORDER — CIPROFLOXACIN 500 MG/1
TABLET ORAL
Qty: 20 TAB | Refills: 0 | Status: SHIPPED | OUTPATIENT
Start: 2020-04-07 | End: 2020-06-10

## 2020-06-10 PROBLEM — G62.9 NEUROPATHY: Status: ACTIVE | Noted: 2020-06-10

## 2020-06-10 PROBLEM — D53.9 MACROCYTIC ANEMIA: Status: ACTIVE | Noted: 2020-06-10

## 2020-06-15 ENCOUNTER — TELEPHONE (OUTPATIENT)
Dept: PRIMARY CARE CLINIC | Age: 75
End: 2020-06-15

## 2020-06-15 NOTE — TELEPHONE ENCOUNTER
Call to patient and advised I will check with Alondra Carrasco and have her call him. Message sent to Alondra Carrasco.

## 2020-06-15 NOTE — TELEPHONE ENCOUNTER
----- Message from Nazario Sosa sent at 6/15/2020  9:39 AM EDT ----- Regarding: Dr. Robi Mishra Caller's first and last name: N/A Reason for call: Pt stated he has already had an X-ray on his neck and was recommended for surgery. Pt would like to know what to do next as far as getting surgery done. Callback required yes/no and why: Yes Best contact number(s): [phone] Details to clarify the request: N/A

## 2020-08-14 ENCOUNTER — HOSPITAL ENCOUNTER (OUTPATIENT)
Dept: MRI IMAGING | Age: 75
Discharge: HOME | End: 2020-08-14
Attending: ORTHOPAEDIC SURGERY
Payer: MEDICARE

## 2020-08-14 DIAGNOSIS — M79.18 MYOFASCIAL PAIN: ICD-10-CM

## 2020-08-14 DIAGNOSIS — M54.12 CERVICAL RADICULOPATHY: ICD-10-CM

## 2020-08-14 DIAGNOSIS — M54.2 CERVICALGIA: ICD-10-CM

## 2020-08-14 DIAGNOSIS — M47.22 OSTEOARTHRITIS OF SPINE WITH RADICULOPATHY, CERVICAL REGION: ICD-10-CM

## 2020-08-14 PROCEDURE — 72141 MRI NECK SPINE W/O DYE: CPT

## 2020-09-24 ENCOUNTER — HOSPITAL ENCOUNTER (OUTPATIENT)
Dept: PREADMISSION TESTING | Age: 75
Discharge: HOME | End: 2020-09-24
Attending: SURGERY
Payer: MEDICARE

## 2020-09-24 ENCOUNTER — HOSPITAL ENCOUNTER (OUTPATIENT)
Dept: GENERAL RADIOLOGY | Age: 75
End: 2020-09-24
Attending: SURGERY
Payer: MEDICARE

## 2020-09-24 ENCOUNTER — HOSPITAL ENCOUNTER (OUTPATIENT)
Dept: GENERAL RADIOLOGY | Age: 75
Discharge: HOME | End: 2020-09-24
Attending: SURGERY
Payer: MEDICARE

## 2020-09-24 VITALS
BODY MASS INDEX: 24.23 KG/M2 | RESPIRATION RATE: 20 BRPM | WEIGHT: 150.79 LBS | SYSTOLIC BLOOD PRESSURE: 190 MMHG | HEIGHT: 66 IN | OXYGEN SATURATION: 100 % | HEART RATE: 81 BPM | TEMPERATURE: 97.4 F | DIASTOLIC BLOOD PRESSURE: 90 MMHG

## 2020-09-24 LAB
ABO + RH BLD: NORMAL
ALBUMIN SERPL-MCNC: 3.5 G/DL (ref 3.5–5)
ALBUMIN/GLOB SERPL: 0.7 {RATIO} (ref 1.1–2.2)
ALP SERPL-CCNC: 101 U/L (ref 45–117)
ALT SERPL-CCNC: 14 U/L (ref 12–78)
ANION GAP SERPL CALC-SCNC: 3 MMOL/L (ref 5–15)
APPEARANCE UR: ABNORMAL
APTT PPP: 29 SEC (ref 22.1–32)
AST SERPL-CCNC: 17 U/L (ref 15–37)
ATRIAL RATE: 75 BPM
BACTERIA URNS QL MICRO: NEGATIVE /HPF
BILIRUB SERPL-MCNC: 0.4 MG/DL (ref 0.2–1)
BILIRUB UR QL: NEGATIVE
BLOOD GROUP ANTIBODIES SERPL: NORMAL
BUN SERPL-MCNC: 18 MG/DL (ref 6–20)
BUN/CREAT SERPL: 18 (ref 12–20)
CALCIUM SERPL-MCNC: 9.5 MG/DL (ref 8.5–10.1)
CALCULATED P AXIS, ECG09: 52 DEGREES
CALCULATED R AXIS, ECG10: -11 DEGREES
CALCULATED T AXIS, ECG11: 58 DEGREES
CHLORIDE SERPL-SCNC: 104 MMOL/L (ref 97–108)
CO2 SERPL-SCNC: 27 MMOL/L (ref 21–32)
COLOR UR: ABNORMAL
COMMENT, HOLDF: NORMAL
CREAT SERPL-MCNC: 1 MG/DL (ref 0.7–1.3)
DIAGNOSIS, 93000: NORMAL
EPITH CASTS URNS QL MICRO: ABNORMAL /LPF
ERYTHROCYTE [DISTWIDTH] IN BLOOD BY AUTOMATED COUNT: 12.3 % (ref 11.5–14.5)
EST. AVERAGE GLUCOSE BLD GHB EST-MCNC: 120 MG/DL
GLOBULIN SER CALC-MCNC: 5 G/DL (ref 2–4)
GLUCOSE SERPL-MCNC: 83 MG/DL (ref 65–100)
GLUCOSE UR STRIP.AUTO-MCNC: NEGATIVE MG/DL
HBA1C MFR BLD: 5.8 % (ref 4–5.6)
HCT VFR BLD AUTO: 43.8 % (ref 36.6–50.3)
HGB BLD-MCNC: 14.1 G/DL (ref 12.1–17)
HGB UR QL STRIP: ABNORMAL
HYALINE CASTS URNS QL MICRO: ABNORMAL /LPF (ref 0–5)
INR PPP: 1 (ref 0.9–1.1)
KETONES UR QL STRIP.AUTO: NEGATIVE MG/DL
LEUKOCYTE ESTERASE UR QL STRIP.AUTO: ABNORMAL
MAGNESIUM SERPL-MCNC: 2 MG/DL (ref 1.6–2.4)
MCH RBC QN AUTO: 29.7 PG (ref 26–34)
MCHC RBC AUTO-ENTMCNC: 32.2 G/DL (ref 30–36.5)
MCV RBC AUTO: 92.2 FL (ref 80–99)
NITRITE UR QL STRIP.AUTO: NEGATIVE
NRBC # BLD: 0 K/UL (ref 0–0.01)
NRBC BLD-RTO: 0 PER 100 WBC
P-R INTERVAL, ECG05: 158 MS
PH UR STRIP: 5.5 [PH] (ref 5–8)
PHOSPHATE SERPL-MCNC: 4 MG/DL (ref 2.6–4.7)
PLATELET # BLD AUTO: 271 K/UL (ref 150–400)
PMV BLD AUTO: 10.6 FL (ref 8.9–12.9)
POTASSIUM SERPL-SCNC: 4.2 MMOL/L (ref 3.5–5.1)
PROT SERPL-MCNC: 8.5 G/DL (ref 6.4–8.2)
PROT UR STRIP-MCNC: NEGATIVE MG/DL
PROTHROMBIN TIME: 10.6 SEC (ref 9–11.1)
Q-T INTERVAL, ECG07: 374 MS
QRS DURATION, ECG06: 78 MS
QTC CALCULATION (BEZET), ECG08: 417 MS
RBC # BLD AUTO: 4.75 M/UL (ref 4.1–5.7)
RBC #/AREA URNS HPF: ABNORMAL /HPF (ref 0–5)
SAMPLES BEING HELD,HOLD: NORMAL
SODIUM SERPL-SCNC: 134 MMOL/L (ref 136–145)
SP GR UR REFRACTOMETRY: 1.01 (ref 1–1.03)
SPECIMEN EXP DATE BLD: NORMAL
THERAPEUTIC RANGE,PTTT: NORMAL SECS (ref 58–77)
UA: UC IF INDICATED,UAUC: ABNORMAL
UROBILINOGEN UR QL STRIP.AUTO: 0.2 EU/DL (ref 0.2–1)
VENTRICULAR RATE, ECG03: 75 BPM
WBC # BLD AUTO: 7.5 K/UL (ref 4.1–11.1)
WBC URNS QL MICRO: ABNORMAL /HPF (ref 0–4)

## 2020-09-24 PROCEDURE — 85610 PROTHROMBIN TIME: CPT

## 2020-09-24 PROCEDURE — 71046 X-RAY EXAM CHEST 2 VIEWS: CPT

## 2020-09-24 PROCEDURE — 83735 ASSAY OF MAGNESIUM: CPT

## 2020-09-24 PROCEDURE — 85027 COMPLETE CBC AUTOMATED: CPT

## 2020-09-24 PROCEDURE — 83036 HEMOGLOBIN GLYCOSYLATED A1C: CPT

## 2020-09-24 PROCEDURE — 87086 URINE CULTURE/COLONY COUNT: CPT

## 2020-09-24 PROCEDURE — 81001 URINALYSIS AUTO W/SCOPE: CPT

## 2020-09-24 PROCEDURE — 36415 COLL VENOUS BLD VENIPUNCTURE: CPT

## 2020-09-24 PROCEDURE — 86900 BLOOD TYPING SEROLOGIC ABO: CPT

## 2020-09-24 PROCEDURE — 93005 ELECTROCARDIOGRAM TRACING: CPT

## 2020-09-24 PROCEDURE — 85730 THROMBOPLASTIN TIME PARTIAL: CPT

## 2020-09-24 PROCEDURE — 80053 COMPREHEN METABOLIC PANEL: CPT

## 2020-09-24 PROCEDURE — 84100 ASSAY OF PHOSPHORUS: CPT

## 2020-09-24 RX ORDER — METOCLOPRAMIDE 10 MG/1
10 TABLET ORAL ONCE
COMMUNITY
End: 2020-10-06

## 2020-09-24 RX ORDER — METRONIDAZOLE 500 MG/1
500 TABLET ORAL ONCE
COMMUNITY
End: 2020-10-06

## 2020-09-24 NOTE — PERIOP NOTES
[facility]  Preoperative Instructions    COVID Test 09/26/20 @0900 [facility] General    Surgery Date 10/01/20         Time of Darrell Sam  Contact # [phone]    1. On the day of your surgery, please report to the Surgical Services Registration Desk and sign in at your designated time. The Surgery Center is located to the right of the Emergency Room. 2. You must have someone with you to drive you home. You should not drive a car for 24 hours following surgery. Please make arrangements for a friend or family member to stay with you for the first 24 hours after your surgery. 3. Refer to your handbook for instructions on drinking liquids prior to surgery. 4. We recommend you do not drink any alcoholic beverages for 24 hours before and after your surgery. 5. Contact your surgeons office for instructions on the following medications: non-steroidal anti-inflammatory drugs (i.e. Advil, Aleve), vitamins, and supplements. (Some surgeons will want you to stop these medications prior to surgery and others may allow you to take them)  **If you are currently taking Plavix, Coumadin, Aspirin and/or other blood-thinning agents, contact your surgeon for instructions. ** Your surgeon will partner with the physician prescribing these medications to determine if it is safe to stop or if you need to continue taking. Please do not stop taking these medications without instructions from your surgeon    6. Wear comfortable clothes. Wear glasses instead of contacts. Do not bring any money or jewelry. Please bring picture ID, insurance card, and any prearranged co-payment or hospital payment. Do not wear make-up, particularly mascara the morning of your surgery. Do not wear nail polish, particularly if you are having foot /hand surgery. Wear your hair loose or down, no ponytails, buns, constantino pins or clips. All body piercings must be removed.   Please shower with antibacterial soap for three consecutive days before and on the morning of surgery, but do not apply any lotions, powders or deodorants after the shower on the day of surgery. Please use a fresh towels after each shower. Please sleep in clean clothes and change bed linens the night before surgery. Please do not shave for 48 hours prior to surgery. Shaving of the face is acceptable. 7. You should understand that if you do not follow these instructions your surgery may be cancelled. If your physical condition changes (I.e. fever, cold or flu) please contact your surgeon as soon as possible. 8. It is important that you be on time. If a situation occurs where you may be late, please call [phone] (OR Holding Area). 9. If you have any questions and or problems, please call [phone] (Pre-admission Testing). 10. Your surgery time may be subject to change. You will receive a phone call the evening prior if your time changes. 11.  If having outpatient surgery, you must have someone to drive you here, stay with you during the duration of your stay, and to drive you home at time of discharge. 12.   In an effort to improve the efficiency, privacy, and safety for all of our Pre-op patients visitors are not allowed in the Holding area. Once you arrive and are registered your family/visitors will be asked to remain in your vehicle. The Pre-op staff will call you when they are ready for you to enter the building and will explain to you and your family/visitors that the Pre-op phase is beginning. At this time, if your procedure is outpatient, your family member will be asked to stay in their vehicle until the surgery is complete and you are ready for discharge. If you are going to be admitted after your surgery, once you are called to come inside the building, your family will be able to leave the parking lot.    At this time the staff will also ask for your designated spokesperson information in the event that the physician or staff need to provide an update or obtain any pertinent information. The designated spokesperson will be notified if the physician needs to speak to family during the pre-operative phase.and/or in the post op phase. Special Instructions: TAKE ALL MEDICATIONS THE DAY OF SURGERY EXCEPT:  Bret Yang stopped per Dr. Nery Phan    I understand a pre-operative phone call will be made to verify my surgery time. In the event that I am not available, I give permission for a message to be left on my answering service and/or with another person?   yes         ___________________      __________   _________    (Signature of Patient)             (Witness)                (Date and Time)

## 2020-09-24 NOTE — PERIOP NOTES
Incentive Spirometer        Using the incentive spirometer helps expand the small air sacs of your lungs, helps you breathe deeply, and helps improve your lung function. Use your incentive spirometer twice a day (10 breaths each time) prior to surgery. How to Use Your Incentive Spirometer:  1. Hold the incentive spirometer in an upright position. 2. Breathe out as usual.   3. Place the mouthpiece in your mouth and seal your lips tightly around it. 4. Take a deep breath. Breathe in slowly and as deeply as possible. Keep the blue flow rate guide between the arrows. 5. Hold your breath as long as possible. Then exhale slowly and allow the piston to fall to the bottom of the column. 6. Rest for a few seconds and repeat steps one through five at least 10 times. PAT Tidal Volume__2250________________  x___2_____________  Date__09/24/20____________________    Saul Kid THE INCENTIVE SPIROMETER WITH YOU TO THE HOSPITAL ON THE DAY OF YOUR SURGERY. Opportunity given to ask and answer questions as well as to observe return demonstration.     Patient signature_____________________________    Witness____________________________

## 2020-09-24 NOTE — PERIOP NOTES
Hibiclens/Chlorhexidine    Preventing Infections Before and After  Your Surgery    IMPORTANT INSTRUCTIONS    Please read and follow these instructions carefully. If you are unable to comply with the below instructions your procedure will be cancelled. Every Night for Three (3) nights before your surgery:  1. Shower with an antibacterial soap, such as Dial, or the soap provided at your preassessment appointment. A shower is better than a bath for cleaning your skin. 2. If needed, ask someone to help you reach all areas of your body. Dont forget to clean your belly button with every shower. The night before your surgery: If you lose your Hibiclens/chlorhexidine please contact surgery center or you can purchase it at a local pharmacy  1. On the night before your surgery, shower with an antibacterial soap, such as Dial, or the soap provided at your preassessment appointment. 2. With one packet of Hibiclens/Chlorhexidine in hand, turn water off.  3. Apply Hibiclens antiseptic skin cleanser with a clean, freshly washed washcloth. ? Gently apply to your body from chin to toes (except the genital area) and especially the area(s) where your incision(s) will be. ? Leave Hibiclens/Chlorhexidine on your skin for at least 20 seconds. CAUTION: If needed, Hibiclens/chlorhexidine may be used to clean the folds of skin of the legs (such as in the area of the groin) and on your buttocks and hips. However, do not use Hibiclens/Chlorhexidine above the neck or in the genital area (your bottom) or put inside any area of your body. 4. Turn the water back on and rinse. 5. Dry gently with a clean, freshly washed towel. 6. After your shower, do not use any powder, deodorant, perfumes or lotion. 7. Use clean, freshly washed towels and washcloths every time you shower. 8. Wear clean, freshly washed pajamas to bed the night before surgery. 9. Sleep on clean, freshly washed sheets.   10. Do not allow pets to sleep in your bed with you. The Morning of your surgery:  1. Shower again thoroughly with an antibacterial soap, such as Dial or the soap provided at your preassessment appointment. If needed, ask someone for help to reach all areas of your body. Dont forget to clean your belly button! Rinse. 2. Dry gently with a clean, freshly washed towel. 3. After your shower, do not use any powder, deodorant, perfumes or lotion prior to surgery. 4. Put on clean, freshly washed clothing. Tips to help prevent infections after your surgery:  1. Protect your surgical wound from germs:  ? Hand washing is the most important thing you and your caregivers can do to prevent infections. ? Keep your bandage clean and dry! ? Do not touch your surgical wound. 2. Use clean, freshly washed towels and washcloths every time you shower; do not share bath linens with others. 3. Until your surgical wound is healed, wear clothing and sleep on bed linens each day that are clean and freshly washed. 4. Do not allow pets to sleep in your bed with you or touch your surgical wound. 5. Do not smoke  smoking delays wound healing. This may be a good time to stop smoking. 6. If you have diabetes, it is important for you to manage your blood sugar levels properly before your surgery as well as after your surgery. Poorly managed blood sugar levels slow down wound healing and prevent you from healing completely. If you lose your Hibiclens/chlorhexidine, please call the [facility], or it is available for purchase at your pharmacy.                ___________________      ___________________      ________________  (Signature of Patient)          (Witness)                   (Date and Time)

## 2020-09-25 LAB
BACTERIA SPEC CULT: ABNORMAL
BACTERIA SPEC CULT: ABNORMAL
BACTERIA SPEC CULT: NORMAL
SERVICE CMNT-IMP: ABNORMAL
SERVICE CMNT-IMP: NORMAL

## 2020-09-26 ENCOUNTER — HOSPITAL ENCOUNTER (OUTPATIENT)
Dept: PREADMISSION TESTING | Age: 75
Discharge: HOME | End: 2020-09-26

## 2020-09-28 RX ORDER — MUPIROCIN 20 MG/G
OINTMENT TOPICAL 2 TIMES DAILY
Qty: 22 G | Refills: 0 | Status: SHIPPED | OUTPATIENT
Start: 2020-09-28 | End: 2020-10-06

## 2020-09-28 RX ORDER — MUPIROCIN 20 MG/G
OINTMENT TOPICAL 2 TIMES DAILY
COMMUNITY
Start: 2020-09-28 | End: 2020-10-06

## 2020-09-28 NOTE — ADVANCED PRACTICE NURSE
PC to pt regarding positive nasal cx (MRSA) and need to start Mupirocin ointment BID x 5 days to B nostrils starting today. Pt verbalized understanding of instructions and will start today as recommended. Allergies and pharmacy of choice reviewed. Rx escribed to pt's pharmacy of choice. PTA medlist updated. Vancomycin added to preop abx per protocol.

## 2020-09-30 ENCOUNTER — ANESTHESIA EVENT (OUTPATIENT)
Dept: ENDOSCOPY | Age: 75
DRG: 660 | End: 2020-09-30
Payer: MEDICARE

## 2020-09-30 ENCOUNTER — HOSPITAL ENCOUNTER (OUTPATIENT)
Age: 75
Setting detail: OUTPATIENT SURGERY
Discharge: HOME | DRG: 660 | End: 2020-09-30
Attending: SURGERY | Admitting: SURGERY
Payer: MEDICARE

## 2020-09-30 ENCOUNTER — ANESTHESIA (OUTPATIENT)
Dept: ENDOSCOPY | Age: 75
DRG: 660 | End: 2020-09-30
Payer: MEDICARE

## 2020-09-30 ENCOUNTER — APPOINTMENT (OUTPATIENT)
Dept: GENERAL RADIOLOGY | Age: 75
DRG: 660 | End: 2020-09-30
Attending: ANESTHESIOLOGY
Payer: MEDICARE

## 2020-09-30 VITALS
BODY MASS INDEX: 23.78 KG/M2 | HEIGHT: 66 IN | HEART RATE: 96 BPM | DIASTOLIC BLOOD PRESSURE: 62 MMHG | SYSTOLIC BLOOD PRESSURE: 158 MMHG | TEMPERATURE: 97.5 F | RESPIRATION RATE: 26 BRPM | OXYGEN SATURATION: 97 % | WEIGHT: 148 LBS

## 2020-09-30 LAB
GLUCOSE BLD STRIP.AUTO-MCNC: 102 MG/DL (ref 65–100)
SERVICE CMNT-IMP: ABNORMAL

## 2020-09-30 PROCEDURE — 88305 TISSUE EXAM BY PATHOLOGIST: CPT

## 2020-09-30 PROCEDURE — 76040000008: Performed by: SURGERY

## 2020-09-30 PROCEDURE — 76060000033 HC ANESTHESIA 1 TO 1.5 HR: Performed by: SURGERY

## 2020-09-30 PROCEDURE — 74011000250 HC RX REV CODE- 250: Performed by: NURSE ANESTHETIST, CERTIFIED REGISTERED

## 2020-09-30 PROCEDURE — 74011250636 HC RX REV CODE- 250/636: Performed by: NURSE ANESTHETIST, CERTIFIED REGISTERED

## 2020-09-30 PROCEDURE — 82962 GLUCOSE BLOOD TEST: CPT

## 2020-09-30 PROCEDURE — 71045 X-RAY EXAM CHEST 1 VIEW: CPT

## 2020-09-30 PROCEDURE — 2709999900 HC NON-CHARGEABLE SUPPLY: Performed by: SURGERY

## 2020-09-30 PROCEDURE — 0DBK8ZX EXCISION OF ASCENDING COLON, VIA NATURAL OR ARTIFICIAL OPENING ENDOSCOPIC, DIAGNOSTIC: ICD-10-PCS | Performed by: SURGERY

## 2020-09-30 PROCEDURE — 77030013992 HC SNR POLYP ENDOSC BSC -B: Performed by: SURGERY

## 2020-09-30 RX ORDER — SODIUM CHLORIDE 0.9 % (FLUSH) 0.9 %
5-40 SYRINGE (ML) INJECTION EVERY 8 HOURS
Status: DISCONTINUED | OUTPATIENT
Start: 2020-09-30 | End: 2020-09-30 | Stop reason: HOSPADM

## 2020-09-30 RX ORDER — ATROPINE SULFATE 0.1 MG/ML
0.5 INJECTION INTRAVENOUS
Status: DISCONTINUED | OUTPATIENT
Start: 2020-09-30 | End: 2020-09-30 | Stop reason: HOSPADM

## 2020-09-30 RX ORDER — LIDOCAINE HYDROCHLORIDE 20 MG/ML
INJECTION, SOLUTION EPIDURAL; INFILTRATION; INTRACAUDAL; PERINEURAL AS NEEDED
Status: DISCONTINUED | OUTPATIENT
Start: 2020-09-30 | End: 2020-09-30 | Stop reason: HOSPADM

## 2020-09-30 RX ORDER — SODIUM CHLORIDE 0.9 % (FLUSH) 0.9 %
5-40 SYRINGE (ML) INJECTION AS NEEDED
Status: DISCONTINUED | OUTPATIENT
Start: 2020-09-30 | End: 2020-09-30 | Stop reason: HOSPADM

## 2020-09-30 RX ORDER — EPINEPHRINE 0.1 MG/ML
1 INJECTION INTRACARDIAC; INTRAVENOUS
Status: DISCONTINUED | OUTPATIENT
Start: 2020-09-30 | End: 2020-09-30 | Stop reason: HOSPADM

## 2020-09-30 RX ORDER — EPHEDRINE SULFATE/0.9% NACL/PF 50 MG/5 ML
SYRINGE (ML) INTRAVENOUS AS NEEDED
Status: DISCONTINUED | OUTPATIENT
Start: 2020-09-30 | End: 2020-09-30 | Stop reason: HOSPADM

## 2020-09-30 RX ORDER — SODIUM CHLORIDE 9 MG/ML
50 INJECTION, SOLUTION INTRAVENOUS CONTINUOUS
Status: DISCONTINUED | OUTPATIENT
Start: 2020-09-30 | End: 2020-09-30 | Stop reason: HOSPADM

## 2020-09-30 RX ORDER — SODIUM CHLORIDE 9 MG/ML
INJECTION, SOLUTION INTRAVENOUS
Status: DISCONTINUED | OUTPATIENT
Start: 2020-09-30 | End: 2020-09-30 | Stop reason: HOSPADM

## 2020-09-30 RX ORDER — NALOXONE HYDROCHLORIDE 0.4 MG/ML
0.4 INJECTION, SOLUTION INTRAMUSCULAR; INTRAVENOUS; SUBCUTANEOUS
Status: DISCONTINUED | OUTPATIENT
Start: 2020-09-30 | End: 2020-09-30 | Stop reason: HOSPADM

## 2020-09-30 RX ORDER — PROPOFOL 10 MG/ML
INJECTION, EMULSION INTRAVENOUS AS NEEDED
Status: DISCONTINUED | OUTPATIENT
Start: 2020-09-30 | End: 2020-09-30 | Stop reason: HOSPADM

## 2020-09-30 RX ORDER — FLUMAZENIL 0.1 MG/ML
0.2 INJECTION INTRAVENOUS
Status: DISCONTINUED | OUTPATIENT
Start: 2020-09-30 | End: 2020-09-30 | Stop reason: HOSPADM

## 2020-09-30 RX ORDER — DEXTROMETHORPHAN/PSEUDOEPHED 2.5-7.5/.8
1.2 DROPS ORAL
Status: DISCONTINUED | OUTPATIENT
Start: 2020-09-30 | End: 2020-09-30 | Stop reason: HOSPADM

## 2020-09-30 RX ADMIN — LIDOCAINE HYDROCHLORIDE 60 MG: 20 INJECTION, SOLUTION EPIDURAL; INFILTRATION; INTRACAUDAL; PERINEURAL at 13:02

## 2020-09-30 RX ADMIN — PROPOFOL 50 MG: 10 INJECTION, EMULSION INTRAVENOUS at 13:21

## 2020-09-30 RX ADMIN — SODIUM CHLORIDE: 900 INJECTION, SOLUTION INTRAVENOUS at 12:31

## 2020-09-30 RX ADMIN — PROPOFOL 50 MG: 10 INJECTION, EMULSION INTRAVENOUS at 13:10

## 2020-09-30 RX ADMIN — PROPOFOL 50 MG: 10 INJECTION, EMULSION INTRAVENOUS at 13:07

## 2020-09-30 RX ADMIN — PROPOFOL 50 MG: 10 INJECTION, EMULSION INTRAVENOUS at 13:13

## 2020-09-30 RX ADMIN — PROPOFOL 50 MG: 10 INJECTION, EMULSION INTRAVENOUS at 13:25

## 2020-09-30 RX ADMIN — PROPOFOL 50 MG: 10 INJECTION, EMULSION INTRAVENOUS at 13:02

## 2020-09-30 RX ADMIN — PROPOFOL 50 MG: 10 INJECTION, EMULSION INTRAVENOUS at 13:05

## 2020-09-30 RX ADMIN — Medication 20 MG: at 13:27

## 2020-09-30 RX ADMIN — PROPOFOL 50 MG: 10 INJECTION, EMULSION INTRAVENOUS at 13:18

## 2020-09-30 RX ADMIN — Medication 20 MG: at 13:17

## 2020-09-30 NOTE — PERIOP NOTES
Endoscope was pre-cleaned at bedside immediately following procedure by [facility] Y[facility]ra Glasses returned to patient post procure.   See anesthesia notes for medication information

## 2020-09-30 NOTE — PROGRESS NOTES
Report received from [facility]. Patient is moaning and not as speaking the same as prior to procedure. Vital signs are stable. Patient was repositioned. He sounded wet, patient suctioned with moderate amounts of secretions.

## 2020-09-30 NOTE — PERIOP NOTES
Upon receiving pt from procedure nurse, pt complaining of not feeling weel and that his neck has become much worse than when he arrived. That he feels weak an doesn't think that he can dress himself and take care of himself to go home. Dr. Marisol Dominguez in [facility] see pt in endo recovery. Portable chest xray ordered and completed. Pt reports seeing black dots in front of his eyes. VSS. No resp distress noted. Resting at present on stretcher awaiting disposition.

## 2020-09-30 NOTE — PERIOP NOTES
[place] Feeling  1945  [id number]    Situation:  Verbal report received from: SOHA Elmore  Procedure: Procedure(s):  COLONOSCOPY  ENDOSCOPIC POLYPECTOMY    Background:    Preoperative diagnosis: COLOVESICAL FISTULA   HX OF POLYPS  Postoperative diagnosis: Diverticulosis, Polyp, Hemorrhoids    :  Dr. Clau Montiel  Assistant(s): Endoscopy Technician-1: Andre England  Endoscopy RN-1: Jayden Perez    Specimens:   ID Type Source Tests Collected by Time Destination   1 : Polyp Preservative Colon, Ascending  David Johnson MD 9/30/2020 1313 Pathology     H. Pylori  no    Assessment:  Intra-procedure medications   Anesthesia gave intra-procedure sedation and medications, see anesthesia flow sheet yes    Intravenous fluids: NS@ KVO     Vital signs stable     Abdominal assessment: round and soft     Recommendation:  Discharge patient per MD order.   Family or Friend   Permission to share finding with family or friend yes

## 2020-09-30 NOTE — ANESTHESIA PREPROCEDURE EVALUATION
Anesthetic History   No history of anesthetic complications            Review of Systems / Medical History  Patient summary reviewed, nursing notes reviewed and pertinent labs reviewed    Pulmonary    COPD: mild      Smoker      Comments: 50 pack year history  Pulmonary nodules   Neuro/Psych       CVA  TIA    Comments: Walks with a cane Cardiovascular    Hypertension: well controlled        Dysrhythmias : atrial fibrillation  PAD and hyperlipidemia    Exercise tolerance: <4 METS  Comments: Recent ECHO showed a 50-55% EF with mild TR and trace MR   GI/Hepatic/Renal     GERD: well controlled          Comments: Esophageal stricture  Hx of polyps and colovesical fistula  Hx of GI bleed Endo/Other        Blood dyscrasia     Other Findings   Comments: etoh abuse hx  Hemochromatosis  Chronic pain          Physical Exam    Airway  Mallampati: II  TM Distance: 4 - 6 cm  Neck ROM: normal range of motion   Mouth opening: Normal     Cardiovascular  Regular rate and rhythm,  S1 and S2 normal,  no murmur, click, rub, or gallop             Dental    Dentition: Edentulous     Pulmonary  Breath sounds clear to auscultation               Abdominal  GI exam deferred       Other Findings            Anesthetic Plan    ASA: 3  Anesthesia type: MAC and total IV anesthesia            Anesthetic plan and risks discussed with: Patient      Propofol MAC

## 2020-09-30 NOTE — H&P
History and Physical    Patient: Sirisha Bagley MRN: [id number]  SSN: [id number]    YOB: 1945  Age: 67 y.o. Sex: male      Subjective:      Sirisha Bagley is a 67 y.o. male who presents for colonoscopy.      Past Medical History:   Diagnosis Date    Abuse     alcoholism    Aneurysm ([address]     Chronic obstructive pulmonary disease ([address]     pt denies    Chronic pain     neck /arthritis-injections    Claudication of calf muscles ([address] 2013    Colovesical fistula     Dr Dalila Hampton Esophageal stricture     Esophagitis     GI bleed 10/2016    Hemochromatosis     Hyperlipidemia     Hypertension     Ill-defined condition     blood transfusion hx    Peripheral artery disease (HCC)     Dr. Chilango Rojas Pulmonary nodule     right lung    Stroke [facility])      Past Surgical History:   Procedure Laterality Date    COLONOSCOPY N/A 2016    COL[facility]axson Perkins MD at [facility] ENDOSCOPY    COLONOSCOPY N/A 2016    COLONOSCOPY performed by Jaxson Perkins MD at [facility] ENDOSCOPY    HX AMPUTATION Left 2016    left 4th toe from gangrene    HX AMPUTATION Left     transmetatarsal    HX CATARACT REMOVAL Bilateral     HX ENDOSCOPY  10/2016    HX OTHER SURGICAL      partial transmetatarsal amputation, lt foot all toes amputated and rt foot has 2nd tow amputation    HX TONSILLECTOMY      VASCULAR SURGERY PROCEDURE UNLIST  2020    axillo-bifemoral      Family History   Problem Relation Age of Onset    Stroke Mother     Alzheimer Father      Social History     Tobacco Use    Smoking status: Former Smoker     Packs/day: 0.00     Years: 50.00     Pack years: 0.00     Types: Cigarettes     Last attempt to quit: 2018     Years since quittin.7    Smokeless tobacco: Never Used   Substance Use Topics    Alcohol use: Not Currently     Alcohol/week: 21.0 standard drinks     Types: 21 Glasses of wine per week     Binge frequency: Never     Comment: \"about 2-3 glasses of wine per day, a HUGE decrease from before\"09/20 none      Prior to Admission medications    Medication Sig Start Date End Date Taking? Authorizing Provider   mupirocin (BACTROBAN) 2 % ointment by Both Nostrils route two (2) times a day for 5 days. 9/28/20 10/3/20 Yes Chata Douglas NP   mupirocin (BACTROBAN) 2 % ointment by Both Nostrils route two (2) times a day. Indications: methicillin-resistant Staphylococcus aureus bacteria in nose 9/28/20 10/3/20 Yes Provider, Historical   metroNIDAZOLE (FLAGYL) 500 mg tablet Take 500 mg by mouth once. Per surgeon [id number] day before surgery   Yes Provider, Historical   metoclopramide HCl (REGLAN) 10 mg tablet Take 10 mg by mouth once. Per surgeon 1612,01 noon,1700 day before surgery   Yes Provider, Historical   neomycin sulfate (NEOMYCIN PO) Take 1,000 mg by mouth once. Per surgeon [id number] day before surgery   Yes Provider, Historical   omeprazole (PRILOSEC) 20 mg capsule TAKE 1 CAPSULE BY MOUTH ONCE DAILY 8/28/20  Yes Low Conteh MD   acetaminophen (Tylenol Extra Strength) 500 mg tablet Take  by mouth every six (6) hours as needed for Pain. Yes Provider, Historical   magnesium oxide (MAG-OX) 400 mg tablet Take 400 mg by mouth daily. Yes Provider, Historical   pravastatin (PRAVACHOL) 40 mg tablet Take 1 Tab by mouth nightly. 4/13/20  Yes Abraham Nieves MD   losartan (COZAAR) 25 mg tablet TAKE 1/2 TABLET BY MOUTH DAILY 3/31/20  Yes Abraham Nieves MD   aspirin 81 mg chewable tablet Take 81 mg by mouth daily. Other, MD Ad        Allergies   Allergen Reactions    Contrast Agent [Iodine] Rash     Hives on back       Review of Systems:  A comprehensive review of systems was negative except for that written in the History of Present Illness.     Objective:     Vitals:    09/30/20 1225 09/30/20 1231   BP: (!) 145/74 (!) 145/74   Pulse:  99   Resp:  21   Temp:  98.2 °F (36.8 °C)   SpO2:  98%   Weight:  67.1 kg (148 lb)   Height:  5' 6\" (1.676 m)        Physical Exam:  General:  Alert, cooperative, no distress, appears stated age. Eyes:  Conjunctivae/corneas clear. PERRL, EOMs intact. Fundi benign   Ears:  Normal TMs and external ear canals both ears. Nose: Nares normal. Septum midline. Mucosa normal. No drainage or sinus tenderness. Mouth/Throat: Lips, mucosa, and tongue normal. Teeth and gums normal.   Neck: Supple, symmetrical, trachea midline, no adenopathy, thyroid: no enlargment/tenderness/nodules, no carotid bruit and no JVD. Back:   Symmetric, no curvature. ROM normal. No CVA tenderness. Lungs:   Clear to auscultation bilaterally. Heart:  Regular rate and rhythm, S1, S2 normal, no murmur, click, rub or gallop. Abdomen:   Soft, non-tender. Bowel sounds normal. No masses,  No organomegaly. Extremities: Extremities normal, atraumatic, no cyanosis or edema. Pulses: 2+ and symmetric all extremities. Skin: Skin color, texture, turgor normal. No rashes or lesions   Lymph nodes: Cervical, supraclavicular, and axillary nodes normal.   Neurologic: CNII-XII intact. Normal strength, sensation and reflexes throughout.        Assessment:     Hospital Problems  Date Reviewed: 9/30/2020    None          Plan:     Colonoscopy    Signed By: Marysol Flowers MD     September 30, 2020

## 2020-09-30 NOTE — DISCHARGE INSTRUCTIONS
Valeria Lai  [id number]  1945    COLON DISCHARGE INSTRUCTIONS  Discomfort:  Redness at IV site- apply warm compress to area; if redness or soreness persist- contact your physician  There may be a slight amount of blood passed from the rectum  Gaseous discomfort- walking, belching will help relieve any discomfort  You may not operate a vehicle for 12 hours  You may not engage in an occupation involving machinery or appliances for rest of today  You may not drink alcoholic beverages for at least 12 hours  Avoid making any critical decisions for at least 24 hour  DIET:   Liquid diet until surgery tomorrow       ACTIVITY:  You may resume your normal daily activities it is recommended that you spend the remainder of the day resting -  avoid any strenuous activity. CALL M.D. ANY SIGN OF:   Increasing pain, nausea, vomiting  Abdominal distension (swelling)  New increased bleeding (oral or rectal)  Fever (chills)  Pain in chest area  Bloody discharge from nose or mouth  Shortness of breath     Follow-up Instructions:   Call Basil Evans MD if any questions or problems. Telephone # [phone]  Biopsy results will be available in  7 to10 days  Should have a repeat colonoscopy in 3 years. COLONOSCOPY FINDINGS:  Your colonoscopy showed: 1 polyp (removed), diverticulosis, and hemorrhoids.

## 2020-09-30 NOTE — PROGRESS NOTES
He more awake is following slow to commands. Oriented to person and place. Dr. Pilar Arevalo at the bed side. Blood glucose in progress.

## 2020-09-30 NOTE — PROGRESS NOTES
Dr Lamar Gardiner came down and assessed the pt and talked to Pt sister- [place]. Pt discharged home.

## 2020-09-30 NOTE — PROCEDURES
Colonoscopy Procedure Note    Indications: Previous adenomatous polyp    Anesthesia/Sedation: MAC anesthesia Propofol    Pre-Procedure Exam:  Airway: clear   Heart: normal S1and S2    Lungs: clear bilateral  Abdomen: soft, nontender, bowel sounds present and normal in all quadrants   Mental Status: awake, alert, and oriented to person, place, and time      Procedure in Detail:  Informed consent was obtained for the procedure, including sedation. Risks of perforation, hemorrhage, adverse drug reaction, and aspiration were discussed. The patient was placed in the left lateral decubitus position. Based on the pre-procedure assessment, including review of the patient's medical history, medications, allergies, and review of systems, he had been deemed to be an appropriate candidate for moderate sedation; he was therefore sedated with the medications listed above. The patient was monitored continuously with ECG tracing, pulse oximetry, blood pressure monitoring, and direct observations. A rectal examination was performed. The [id number] was inserted into the rectum and advanced under direct vision to the cecum, which was identified by the ileocecal valve and appendiceal orifice. The quality of the colonic preparation was fair. A careful inspection was made as the colonoscope was withdrawn, including a retroflexed view of the rectum; findings and interventions are described below. Appropriate photodocumentation was obtained. Findings:   Rectum:     - Hemorrhoids  Sigmoid:     - Diverticulosis with mild narrowing and stiffness  Descending Colon:     - Diverticulosis  Transverse Colon:     - Diverticulosis  Ascending Colon:     - Elongated polyp (~1cm), sessile. Removed and retrieved with hot snare  Cecum:   Normal          Specimens: Specimens were collected and sent to pathology. EBL: Minimal    Complications: None; patient tolerated the procedure well.     Attending Attestation: I performed the procedure. Recommendations:   - Repeat colonoscopy in 3 years.

## 2020-09-30 NOTE — PROGRESS NOTES
Patient is alert and oriented. Asking appropriate questions. He has generalized weakness moving equally. Complaining of neck pain similar to neck pain prior to procedure. Vital signs stable. Okay to move to recovery.

## 2020-09-30 NOTE — ANESTHESIA POSTPROCEDURE EVALUATION
Procedure(s):  COLONOSCOPY  ENDOSCOPIC POLYPECTOMY. MAC, total IV anesthesia    Anesthesia Post Evaluation        Patient location during evaluation: PACU  Note status: Adequate. Level of consciousness: responsive to verbal stimuli and sleepy but conscious  Pain management: satisfactory to patient  Airway patency: patent  Anesthetic complications: no  Cardiovascular status: acceptable  Respiratory status: acceptable  Hydration status: acceptable  Comments: +Post-Anesthesia Evaluation and Assessment    Patient: Juan Ramon Stevenson MRN: [id number]  SSN: [id number]   YOB: 1945  Age: 67 y.o. Sex: male      Cardiovascular Function/Vital Signs    BP (!) 157/65   Pulse 85   Temp 36.4 °C (97.5 °F)   Resp 24   Ht 5' 6\" (1.676 m)   Wt 67.1 kg (148 lb)   SpO2 93%   BMI 23.89 kg/m²     Patient is status post Procedure(s):  COLONOSCOPY  ENDOSCOPIC POLYPECTOMY. Nausea/Vomiting: Controlled. Postoperative hydration reviewed and adequate. Pain:  Pain Scale 1: Numeric (0 - 10) (09/30/20 1508)  Pain Intensity 1: 0 (09/30/20 1508)   Managed. Neurological Status: At baseline. Mental Status and Level of Consciousness: Arousable. Pulmonary Status:   O2 Device: Room air (09/30/20 1508)   Adequate oxygenation and airway patent. Complications related to anesthesia: None    Post-anesthesia assessment completed. No concerns. Signed By: Dao Levy MD    9/30/2020  Post anesthesia nausea and vomiting:  controlled      INITIAL Post-op Vital signs:   Vitals Value Taken Time   /67 9/30/2020  3:27 PM   Temp 36.4 °C (97.5 °F) 9/30/2020  2:13 PM   Pulse 96 9/30/2020  3:29 PM   Resp 20 9/30/2020  3:29 PM   SpO2 95 % 9/30/2020  3:29 PM   Vitals shown include unvalidated device data.

## 2020-10-01 ENCOUNTER — ANESTHESIA EVENT (OUTPATIENT)
Dept: SURGERY | Age: 75
DRG: 660 | End: 2020-10-01
Payer: MEDICARE

## 2020-10-01 ENCOUNTER — HOSPITAL ENCOUNTER (INPATIENT)
Age: 75
LOS: 5 days | Discharge: HOME | DRG: 660 | End: 2020-10-06
Attending: SURGERY | Admitting: SURGERY
Payer: MEDICARE

## 2020-10-01 ENCOUNTER — ANESTHESIA (OUTPATIENT)
Dept: SURGERY | Age: 75
DRG: 660 | End: 2020-10-01
Payer: MEDICARE

## 2020-10-01 DIAGNOSIS — N32.1 COLOVESICAL FISTULA: Primary | ICD-10-CM

## 2020-10-01 PROCEDURE — 76010000134 HC OR TIME 3.5 TO 4 HR: Performed by: SURGERY

## 2020-10-01 PROCEDURE — 74011250636 HC RX REV CODE- 250/636: Performed by: ANESTHESIOLOGY

## 2020-10-01 PROCEDURE — 77030034628 HC LIGASURE LAP SEAL DIV MD COVD -F: Performed by: SURGERY

## 2020-10-01 PROCEDURE — 77030034667 HC ACC PLATFRM ENDO GELPNT AMR -E: Performed by: SURGERY

## 2020-10-01 PROCEDURE — 74011000250 HC RX REV CODE- 250: Performed by: SURGERY

## 2020-10-01 PROCEDURE — 74011250636 HC RX REV CODE- 250/636: Performed by: NURSE ANESTHETIST, CERTIFIED REGISTERED

## 2020-10-01 PROCEDURE — 77030018673: Performed by: SURGERY

## 2020-10-01 PROCEDURE — 77030031139 HC SUT VCRL2 J&J -A: Performed by: SURGERY

## 2020-10-01 PROCEDURE — 88305 TISSUE EXAM BY PATHOLOGIST: CPT

## 2020-10-01 PROCEDURE — 77030018684: Performed by: SURGERY

## 2020-10-01 PROCEDURE — 77030008684 HC TU ET CUF COVD -B: Performed by: NURSE ANESTHETIST, CERTIFIED REGISTERED

## 2020-10-01 PROCEDURE — 77030009527 HC GEL PRT SYS AMR -E: Performed by: SURGERY

## 2020-10-01 PROCEDURE — 77030012961 HC IRR KT CYSTO/TUR ICUM -A: Performed by: SURGERY

## 2020-10-01 PROCEDURE — C1758 CATHETER, URETERAL: HCPCS | Performed by: SURGERY

## 2020-10-01 PROCEDURE — 77030008606 HC TRCR ENDOSC KII AMR -B: Performed by: SURGERY

## 2020-10-01 PROCEDURE — 2709999900 HC NON-CHARGEABLE SUPPLY: Performed by: SURGERY

## 2020-10-01 PROCEDURE — 77030002933 HC SUT MCRYL J&J -A: Performed by: SURGERY

## 2020-10-01 PROCEDURE — P9045 ALBUMIN (HUMAN), 5%, 250 ML: HCPCS | Performed by: NURSE ANESTHETIST, CERTIFIED REGISTERED

## 2020-10-01 PROCEDURE — 77030013079 HC BLNKT BAIR HGGR 3M -A: Performed by: NURSE ANESTHETIST, CERTIFIED REGISTERED

## 2020-10-01 PROCEDURE — 77010033678 HC OXYGEN DAILY

## 2020-10-01 PROCEDURE — 77030005513 HC CATH URETH FOL11 MDII -B: Performed by: SURGERY

## 2020-10-01 PROCEDURE — 77030012799 HC TRCR GELPRT BLN AMR -B: Performed by: SURGERY

## 2020-10-01 PROCEDURE — 77030010285 HC STPLR INT PRSTRNG COVD -B: Performed by: SURGERY

## 2020-10-01 PROCEDURE — 77030025171 HC FCPS ENDOSC DISP 2 J&J -B: Performed by: SURGERY

## 2020-10-01 PROCEDURE — 0DTN4ZZ RESECTION OF SIGMOID COLON, PERCUTANEOUS ENDOSCOPIC APPROACH: ICD-10-PCS | Performed by: SURGERY

## 2020-10-01 PROCEDURE — 77030008756 HC TU IRR SUC STRY -B: Performed by: SURGERY

## 2020-10-01 PROCEDURE — 76060000038 HC ANESTHESIA 3.5 TO 4 HR: Performed by: SURGERY

## 2020-10-01 PROCEDURE — 0T788DZ DILATION OF BILATERAL URETERS WITH INTRALUMINAL DEVICE, VIA NATURAL OR ARTIFICIAL OPENING ENDOSCOPIC: ICD-10-PCS | Performed by: UROLOGY

## 2020-10-01 PROCEDURE — 77030009957 HC RELD ENDOSTCH COVD -C: Performed by: SURGERY

## 2020-10-01 PROCEDURE — 74011000250 HC RX REV CODE- 250: Performed by: NURSE ANESTHETIST, CERTIFIED REGISTERED

## 2020-10-01 PROCEDURE — 88307 TISSUE EXAM BY PATHOLOGIST: CPT

## 2020-10-01 PROCEDURE — 77030002916 HC SUT ETHLN J&J -A: Performed by: SURGERY

## 2020-10-01 PROCEDURE — 74011250637 HC RX REV CODE- 250/637: Performed by: SURGERY

## 2020-10-01 PROCEDURE — 77030016151 HC PROTCTR LNS DFOG COVD -B: Performed by: SURGERY

## 2020-10-01 PROCEDURE — 77030020061 HC IV BLD WRMR ADMIN SET 3M -B: Performed by: NURSE ANESTHETIST, CERTIFIED REGISTERED

## 2020-10-01 PROCEDURE — 76210000016 HC OR PH I REC 1 TO 1.5 HR: Performed by: SURGERY

## 2020-10-01 PROCEDURE — 77030019908 HC STETH ESOPH SIMS -A: Performed by: NURSE ANESTHETIST, CERTIFIED REGISTERED

## 2020-10-01 PROCEDURE — 94760 N-INVAS EAR/PLS OXIMETRY 1: CPT

## 2020-10-01 PROCEDURE — C1769 GUIDE WIRE: HCPCS | Performed by: SURGERY

## 2020-10-01 PROCEDURE — 77030026438 HC STYL ET INTUB CARD -A: Performed by: NURSE ANESTHETIST, CERTIFIED REGISTERED

## 2020-10-01 PROCEDURE — 74011000258 HC RX REV CODE- 258: Performed by: SURGERY

## 2020-10-01 PROCEDURE — 77030002986 HC SUT PROL J&J -A: Performed by: SURGERY

## 2020-10-01 PROCEDURE — 74011000254 HC RX REV CODE- 254: Performed by: NURSE ANESTHETIST, CERTIFIED REGISTERED

## 2020-10-01 PROCEDURE — 74011250636 HC RX REV CODE- 250/636: Performed by: NURSE PRACTITIONER

## 2020-10-01 PROCEDURE — 74011250636 HC RX REV CODE- 250/636: Performed by: SURGERY

## 2020-10-01 PROCEDURE — 65270000029 HC RM PRIVATE

## 2020-10-01 PROCEDURE — 77030002966 HC SUT PDS J&J -A: Performed by: SURGERY

## 2020-10-01 PROCEDURE — 77030036731 HC STPLR ENDOSC J&J -F: Performed by: SURGERY

## 2020-10-01 PROCEDURE — 77030018836 HC SOL IRR NACL ICUM -A: Performed by: SURGERY

## 2020-10-01 PROCEDURE — 77030018846 HC SOL IRR STRL H20 ICUM -A: Performed by: SURGERY

## 2020-10-01 PROCEDURE — 77030002996 HC SUT SLK J&J -A: Performed by: SURGERY

## 2020-10-01 PROCEDURE — 77030008771 HC TU NG SALEM SUMP -A: Performed by: NURSE ANESTHETIST, CERTIFIED REGISTERED

## 2020-10-01 PROCEDURE — 77030027876 HC STPLR ENDOSC FLX PWR J&J -G1: Performed by: SURGERY

## 2020-10-01 PROCEDURE — 0DJD8ZZ INSPECTION OF LOWER INTESTINAL TRACT, VIA NATURAL OR ARTIFICIAL OPENING ENDOSCOPIC: ICD-10-PCS | Performed by: SURGERY

## 2020-10-01 RX ORDER — ENOXAPARIN SODIUM 100 MG/ML
40 INJECTION SUBCUTANEOUS EVERY 24 HOURS
Status: DISCONTINUED | OUTPATIENT
Start: 2020-10-02 | End: 2020-10-06 | Stop reason: HOSPADM

## 2020-10-01 RX ORDER — NALOXONE HYDROCHLORIDE 0.4 MG/ML
0.4 INJECTION, SOLUTION INTRAMUSCULAR; INTRAVENOUS; SUBCUTANEOUS AS NEEDED
Status: DISCONTINUED | OUTPATIENT
Start: 2020-10-01 | End: 2020-10-06 | Stop reason: HOSPADM

## 2020-10-01 RX ORDER — SODIUM CHLORIDE, SODIUM LACTATE, POTASSIUM CHLORIDE, CALCIUM CHLORIDE 600; 310; 30; 20 MG/100ML; MG/100ML; MG/100ML; MG/100ML
25 INJECTION, SOLUTION INTRAVENOUS CONTINUOUS
Status: DISCONTINUED | OUTPATIENT
Start: 2020-10-01 | End: 2020-10-01 | Stop reason: HOSPADM

## 2020-10-01 RX ORDER — LIDOCAINE HYDROCHLORIDE 20 MG/ML
INJECTION, SOLUTION EPIDURAL; INFILTRATION; INTRACAUDAL; PERINEURAL AS NEEDED
Status: DISCONTINUED | OUTPATIENT
Start: 2020-10-01 | End: 2020-10-01 | Stop reason: HOSPADM

## 2020-10-01 RX ORDER — INDOCYANINE GREEN AND WATER 25 MG
KIT INJECTION AS NEEDED
Status: DISCONTINUED | OUTPATIENT
Start: 2020-10-01 | End: 2020-10-01 | Stop reason: HOSPADM

## 2020-10-01 RX ORDER — HYDROMORPHONE HYDROCHLORIDE 1 MG/ML
0.5 INJECTION, SOLUTION INTRAMUSCULAR; INTRAVENOUS; SUBCUTANEOUS
Status: DISCONTINUED | OUTPATIENT
Start: 2020-10-01 | End: 2020-10-03

## 2020-10-01 RX ORDER — FENTANYL CITRATE 50 UG/ML
INJECTION, SOLUTION INTRAMUSCULAR; INTRAVENOUS AS NEEDED
Status: DISCONTINUED | OUTPATIENT
Start: 2020-10-01 | End: 2020-10-01 | Stop reason: HOSPADM

## 2020-10-01 RX ORDER — ONDANSETRON 4 MG/1
4 TABLET, ORALLY DISINTEGRATING ORAL
Status: DISCONTINUED | OUTPATIENT
Start: 2020-10-01 | End: 2020-10-06 | Stop reason: HOSPADM

## 2020-10-01 RX ORDER — ROCURONIUM BROMIDE 10 MG/ML
INJECTION, SOLUTION INTRAVENOUS AS NEEDED
Status: DISCONTINUED | OUTPATIENT
Start: 2020-10-01 | End: 2020-10-01 | Stop reason: HOSPADM

## 2020-10-01 RX ORDER — SODIUM CHLORIDE, SODIUM LACTATE, POTASSIUM CHLORIDE, CALCIUM CHLORIDE 600; 310; 30; 20 MG/100ML; MG/100ML; MG/100ML; MG/100ML
75 INJECTION, SOLUTION INTRAVENOUS CONTINUOUS
Status: DISPENSED | OUTPATIENT
Start: 2020-10-01 | End: 2020-10-02

## 2020-10-01 RX ORDER — NEOSTIGMINE METHYLSULFATE 1 MG/ML
INJECTION, SOLUTION INTRAVENOUS AS NEEDED
Status: DISCONTINUED | OUTPATIENT
Start: 2020-10-01 | End: 2020-10-01 | Stop reason: HOSPADM

## 2020-10-01 RX ORDER — ALVIMOPAN 12 MG/1
12 CAPSULE ORAL 2 TIMES DAILY
Status: DISCONTINUED | OUTPATIENT
Start: 2020-10-01 | End: 2020-10-04

## 2020-10-01 RX ORDER — BUPIVACAINE HYDROCHLORIDE AND EPINEPHRINE 2.5; 5 MG/ML; UG/ML
INJECTION, SOLUTION EPIDURAL; INFILTRATION; INTRACAUDAL; PERINEURAL AS NEEDED
Status: DISCONTINUED | OUTPATIENT
Start: 2020-10-01 | End: 2020-10-01 | Stop reason: HOSPADM

## 2020-10-01 RX ORDER — ONDANSETRON 2 MG/ML
4 INJECTION INTRAMUSCULAR; INTRAVENOUS AS NEEDED
Status: DISCONTINUED | OUTPATIENT
Start: 2020-10-01 | End: 2020-10-01 | Stop reason: HOSPADM

## 2020-10-01 RX ORDER — LIDOCAINE HYDROCHLORIDE ANHYDROUS AND DEXTROSE MONOHYDRATE .8; 5 G/100ML; G/100ML
1 INJECTION, SOLUTION INTRAVENOUS CONTINUOUS
Status: DISCONTINUED | OUTPATIENT
Start: 2020-10-01 | End: 2020-10-03

## 2020-10-01 RX ORDER — ALBUMIN HUMAN 50 G/1000ML
SOLUTION INTRAVENOUS AS NEEDED
Status: DISCONTINUED | OUTPATIENT
Start: 2020-10-01 | End: 2020-10-01 | Stop reason: HOSPADM

## 2020-10-01 RX ORDER — FENTANYL CITRATE 50 UG/ML
25 INJECTION, SOLUTION INTRAMUSCULAR; INTRAVENOUS
Status: COMPLETED | OUTPATIENT
Start: 2020-10-01 | End: 2020-10-01

## 2020-10-01 RX ORDER — DEXAMETHASONE SODIUM PHOSPHATE 4 MG/ML
INJECTION, SOLUTION INTRA-ARTICULAR; INTRALESIONAL; INTRAMUSCULAR; INTRAVENOUS; SOFT TISSUE AS NEEDED
Status: DISCONTINUED | OUTPATIENT
Start: 2020-10-01 | End: 2020-10-01 | Stop reason: HOSPADM

## 2020-10-01 RX ORDER — OXYCODONE HYDROCHLORIDE 5 MG/1
5 TABLET ORAL
Status: DISCONTINUED | OUTPATIENT
Start: 2020-10-01 | End: 2020-10-06 | Stop reason: HOSPADM

## 2020-10-01 RX ORDER — ETOMIDATE 2 MG/ML
INJECTION INTRAVENOUS AS NEEDED
Status: DISCONTINUED | OUTPATIENT
Start: 2020-10-01 | End: 2020-10-01 | Stop reason: HOSPADM

## 2020-10-01 RX ORDER — KETAMINE HYDROCHLORIDE 50 MG/ML
INJECTION, SOLUTION INTRAMUSCULAR; INTRAVENOUS AS NEEDED
Status: DISCONTINUED | OUTPATIENT
Start: 2020-10-01 | End: 2020-10-01 | Stop reason: HOSPADM

## 2020-10-01 RX ORDER — ALVIMOPAN 12 MG/1
12 CAPSULE ORAL ONCE
Status: COMPLETED | OUTPATIENT
Start: 2020-10-01 | End: 2020-10-01

## 2020-10-01 RX ORDER — PHENYLEPHRINE HCL IN 0.9% NACL 0.4MG/10ML
SYRINGE (ML) INTRAVENOUS AS NEEDED
Status: DISCONTINUED | OUTPATIENT
Start: 2020-10-01 | End: 2020-10-01 | Stop reason: HOSPADM

## 2020-10-01 RX ORDER — ACETAMINOPHEN 500 MG
1000 TABLET ORAL EVERY 6 HOURS
Status: DISCONTINUED | OUTPATIENT
Start: 2020-10-01 | End: 2020-10-06 | Stop reason: HOSPADM

## 2020-10-01 RX ORDER — ACETAMINOPHEN 500 MG
1000 TABLET ORAL ONCE
Status: COMPLETED | OUTPATIENT
Start: 2020-10-01 | End: 2020-10-01

## 2020-10-01 RX ORDER — OXYCODONE HYDROCHLORIDE 5 MG/1
10 TABLET ORAL
Status: DISCONTINUED | OUTPATIENT
Start: 2020-10-01 | End: 2020-10-06 | Stop reason: HOSPADM

## 2020-10-01 RX ORDER — GABAPENTIN 300 MG/1
600 CAPSULE ORAL ONCE
Status: COMPLETED | OUTPATIENT
Start: 2020-10-01 | End: 2020-10-01

## 2020-10-01 RX ORDER — LIDOCAINE HYDROCHLORIDE ANHYDROUS AND DEXTROSE MONOHYDRATE .4; 5 G/100ML; G/100ML
INJECTION, SOLUTION INTRAVENOUS
Status: DISCONTINUED | OUTPATIENT
Start: 2020-10-01 | End: 2020-10-01 | Stop reason: HOSPADM

## 2020-10-01 RX ORDER — MAGNESIUM SULFATE HEPTAHYDRATE 40 MG/ML
INJECTION, SOLUTION INTRAVENOUS AS NEEDED
Status: DISCONTINUED | OUTPATIENT
Start: 2020-10-01 | End: 2020-10-01 | Stop reason: HOSPADM

## 2020-10-01 RX ORDER — CELECOXIB 200 MG/1
200 CAPSULE ORAL ONCE
Status: COMPLETED | OUTPATIENT
Start: 2020-10-01 | End: 2020-10-01

## 2020-10-01 RX ORDER — GLYCOPYRROLATE 0.2 MG/ML
INJECTION INTRAMUSCULAR; INTRAVENOUS AS NEEDED
Status: DISCONTINUED | OUTPATIENT
Start: 2020-10-01 | End: 2020-10-01 | Stop reason: HOSPADM

## 2020-10-01 RX ORDER — SODIUM CHLORIDE, SODIUM LACTATE, POTASSIUM CHLORIDE, CALCIUM CHLORIDE 600; 310; 30; 20 MG/100ML; MG/100ML; MG/100ML; MG/100ML
75 INJECTION, SOLUTION INTRAVENOUS CONTINUOUS
Status: DISCONTINUED | OUTPATIENT
Start: 2020-10-01 | End: 2020-10-01 | Stop reason: HOSPADM

## 2020-10-01 RX ORDER — ONDANSETRON 2 MG/ML
INJECTION INTRAMUSCULAR; INTRAVENOUS AS NEEDED
Status: DISCONTINUED | OUTPATIENT
Start: 2020-10-01 | End: 2020-10-01 | Stop reason: HOSPADM

## 2020-10-01 RX ORDER — MIDAZOLAM HYDROCHLORIDE 1 MG/ML
1 INJECTION, SOLUTION INTRAMUSCULAR; INTRAVENOUS AS NEEDED
Status: DISCONTINUED | OUTPATIENT
Start: 2020-10-01 | End: 2020-10-01 | Stop reason: HOSPADM

## 2020-10-01 RX ADMIN — ALBUMIN (HUMAN) 250 ML: 2.5 SOLUTION INTRAVENOUS at 08:59

## 2020-10-01 RX ADMIN — OXYCODONE 5 MG: 5 TABLET ORAL at 23:13

## 2020-10-01 RX ADMIN — SODIUM CHLORIDE, SODIUM LACTATE, POTASSIUM CHLORIDE, AND CALCIUM CHLORIDE 75 ML/HR: 600; 310; 30; 20 INJECTION, SOLUTION INTRAVENOUS at 14:11

## 2020-10-01 RX ADMIN — ACETAMINOPHEN 1000 MG: 500 TABLET ORAL at 20:13

## 2020-10-01 RX ADMIN — Medication 80 MCG: at 08:12

## 2020-10-01 RX ADMIN — Medication 1 AMPULE: at 13:18

## 2020-10-01 RX ADMIN — ROCURONIUM BROMIDE 50 MG: 10 INJECTION INTRAVENOUS at 07:40

## 2020-10-01 RX ADMIN — INDOCYANINE GREEN 5 MG: KIT INTRAVENOUS at 09:28

## 2020-10-01 RX ADMIN — Medication 1000 MG: at 07:01

## 2020-10-01 RX ADMIN — GLYCOPYRROLATE 0.4 MG: 0.2 INJECTION, SOLUTION INTRAMUSCULAR; INTRAVENOUS at 10:52

## 2020-10-01 RX ADMIN — SODIUM CHLORIDE, SODIUM LACTATE, POTASSIUM CHLORIDE, AND CALCIUM CHLORIDE 25 ML/HR: 600; 310; 30; 20 INJECTION, SOLUTION INTRAVENOUS at 07:04

## 2020-10-01 RX ADMIN — Medication 80 MCG: at 10:02

## 2020-10-01 RX ADMIN — GABAPENTIN 600 MG: 300 CAPSULE ORAL at 07:01

## 2020-10-01 RX ADMIN — ONDANSETRON 4 MG: 4 TABLET, ORALLY DISINTEGRATING ORAL at 16:31

## 2020-10-01 RX ADMIN — Medication 40 MCG: at 09:58

## 2020-10-01 RX ADMIN — LIDOCAINE HYDROCHLORIDE 100 MG: 20 INJECTION, SOLUTION INTRAVENOUS at 07:40

## 2020-10-01 RX ADMIN — ROCURONIUM BROMIDE 10 MG: 10 INJECTION INTRAVENOUS at 09:14

## 2020-10-01 RX ADMIN — MAGNESIUM SULFATE IN WATER 2 G: 40 INJECTION, SOLUTION INTRAVENOUS at 08:10

## 2020-10-01 RX ADMIN — Medication 3 MG: at 10:52

## 2020-10-01 RX ADMIN — CEFOTETAN DISODIUM 2 G: 2 INJECTION, POWDER, FOR SOLUTION INTRAMUSCULAR; INTRAVENOUS at 08:00

## 2020-10-01 RX ADMIN — ONDANSETRON HYDROCHLORIDE 4 MG: 2 INJECTION, SOLUTION INTRAMUSCULAR; INTRAVENOUS at 10:27

## 2020-10-01 RX ADMIN — INDOCYANINE GREEN 3.75 MG: KIT INTRAVENOUS at 09:30

## 2020-10-01 RX ADMIN — SODIUM CHLORIDE, POTASSIUM CHLORIDE, SODIUM LACTATE AND CALCIUM CHLORIDE: 600; 310; 30; 20 INJECTION, SOLUTION INTRAVENOUS at 07:50

## 2020-10-01 RX ADMIN — LIDOCAINE HYDROCHLORIDE 2 MG/KG/HR: 4 INJECTION, SOLUTION INTRAVENOUS at 08:10

## 2020-10-01 RX ADMIN — OXYCODONE 10 MG: 5 TABLET ORAL at 16:32

## 2020-10-01 RX ADMIN — KETAMINE HYDROCHLORIDE 30 MG: 50 INJECTION, SOLUTION, CONCENTRATE INTRAMUSCULAR; INTRAVENOUS at 08:00

## 2020-10-01 RX ADMIN — FENTANYL CITRATE 50 MCG: 50 INJECTION, SOLUTION INTRAMUSCULAR; INTRAVENOUS at 08:15

## 2020-10-01 RX ADMIN — Medication 1 AMPULE: at 20:13

## 2020-10-01 RX ADMIN — Medication 40 MCG: at 09:31

## 2020-10-01 RX ADMIN — Medication 3 AMPULE: at 06:45

## 2020-10-01 RX ADMIN — ALVIMOPAN 12 MG: 12 CAPSULE ORAL at 20:13

## 2020-10-01 RX ADMIN — FENTANYL CITRATE 50 MCG: 50 INJECTION, SOLUTION INTRAMUSCULAR; INTRAVENOUS at 07:40

## 2020-10-01 RX ADMIN — DEXAMETHASONE SODIUM PHOSPHATE 4 MG: 4 INJECTION, SOLUTION INTRAMUSCULAR; INTRAVENOUS at 08:01

## 2020-10-01 RX ADMIN — CELECOXIB 200 MG: 200 CAPSULE ORAL at 07:01

## 2020-10-01 RX ADMIN — FENTANYL CITRATE 25 MCG: 50 INJECTION, SOLUTION INTRAMUSCULAR; INTRAVENOUS at 12:15

## 2020-10-01 RX ADMIN — ROCURONIUM BROMIDE 10 MG: 10 INJECTION INTRAVENOUS at 10:25

## 2020-10-01 RX ADMIN — ETOMIDATE 30 MG: 2 INJECTION, SOLUTION INTRAVENOUS at 07:40

## 2020-10-01 RX ADMIN — ALVIMOPAN 12 MG: 12 CAPSULE ORAL at 07:01

## 2020-10-01 RX ADMIN — ACETAMINOPHEN 1000 MG: 500 TABLET ORAL at 14:10

## 2020-10-01 RX ADMIN — VANCOMYCIN HYDROCHLORIDE 1000 MG: 1 INJECTION, POWDER, LYOPHILIZED, FOR SOLUTION INTRAVENOUS at 07:14

## 2020-10-01 NOTE — OP NOTES
[address]  OPERATIVE REPORT    Name:  Leonard Arias  MR#:  [id number]  :  1945  ACCOUNT #:  [de-identified]  DATE OF SERVICE:  10/01/2020    PREOPERATIVE DIAGNOSIS:  Colovesical fistula. POSTOPERATIVE DIAGNOSIS:  Colovesical fistula. PROCEDURE PERFORMED:  Laparoscopic sigmoid colectomy, revision of coloproctostomy and intraoperative flexible sigmoidoscopy. SURGEON:  Janett Bhandari MD    ASSISTANT:  Aileen Edwards. ANESTHESIA:  General.    COMPLICATIONS:  Anastomotic leak requiring takedown anastomosis and redo of coloproctostomy. SPECIMENS REMOVED:  Sigmoid colon with proximal and distal doughnuts. IMPLANTS:  None. ESTIMATED BLOOD LOSS:  400 mL. DRAINS:  A 19-Maltese round OSMANI drain. INDICATIONS:  This is a 41-year-old male with a history of multiple urinary tract infections and a CT scan showing a colovesical fistula with tenting of the colon into the bladder. He presents today for definitive resection. I explained to him all the risks and benefits of the surgery including, but not limited to bleeding, infection, anastomotic leak, damage to surrounding structures, and need for colostomy. He understood all the risks and he elected to proceed. PROCEDURE:  The patient was brought to the operating suite and placed in the supine position. General endotracheal anesthesia was induced. Venodyne stockings were placed. He was then placed in the low lithotomy position and his abdomen was prepped and draped in the usual sterile fashion. This was performed after a cystoscopy and bilateral ureteral catheter placement were performed by Dr. Rosalie Hernández. After his abdomen was prepped and draped and a time out was performed, a 12-mm vertical midline incision was made above the umbilicus. The incision was taken down to the level of the fascia. The fascia was divided and the abdomen was entered.   A 12-mm Umesh trocar was placed in the abdominal cavity and the abdomen was insufflated. I then placed a 12-mm trocar in the right lower quadrant and a hand port using lower midline incision. It was through these three incisions that the entirety of the procedure was completed. The patient was placed in a steep Trendelenburg position with the right side down and left side up. The small bowel was retracted out of the pelvis. The patient was noted to have a sigmoid colon densely plastered along the lower pelvic wall up towards the bladder. Using a finger fracture technique, I was able to separate the colon from the bladder. I then identified the white line of Toldt along the lateral peritoneal reflection. This was taken down all the way up to the splenic flexure. The avascular plane between the descending colon and retroperitoneum were developed and this was freed up. I then took down the lateral attachments along the sigmoid colon distally. A mesenteric  was created at the level of the rectosigmoid junction and a staple was fired across the proximal rectum using a green load [place] stapler. The mesentery was divided to the level of the proximal resection margin at the distal descending colon. Once hemostasis was achieved, I had anesthesia inject indocyanine green dye to confirm blood supply to the proximal and distal margins. Care was taken to avoid any injury to the left ureter during this dissection. I then exteriorized the bowel through the hand port and placed an automatic pursestring device at the proximal resection margin. The specimen was removed and sent to pathology for further examination. A 29 EEA anvil was placed in the colotomy and the pursestring suture was cinched down. A second pursestring suture was placed and the anvil was dropped back into the abdominal cavity. I then placed sequential dilators transanally up to the transverse staple line followed by a 29 EEA stapler. The spike was deployed. The spike and the anvil were mated.   The staple was closed and fired. A two complete doughnuts were identified; however, after performing a flexible sigmoidoscopy, there was noted to be a large defect in the anastomosis with leakage of air on my leak test.    Upon further examination, there was in fact a large defect posteriorly. This could not be repaired. I decided to take down the anastomosis and redo it. An [place] green load stapler was fired across the rectum just below the anastomosis. The descending colon was further mobilized by taking down some posterior attachments. I then once again brought it up through the lower midline incision and divided just above the anastomosis with an automatic pursestring device. Once again, the anvil was placed in the colotomy and the pursestring suture was cinched down. Once again placed a second pursestring suture using 2-0 Prolene, the anvil was dropped back into the abdominal cavity. I then placed a 29 EEA stapler transanally. The spike was deployed. The spike and the anvil were mated and two complete doughnuts were identified. At this time, there was no evidence of leak. The anastomosis was completely intact and flexible sigmoidoscopy was utilized to inspect the anastomosis internally. There was pink healthy mucosa on both sides. The air was gently insufflated at the anastomosis while simultaneously irrigating the pelvis. No leaks were identified. No bubbles were seen. I decided to place a 19-Czech round OSMANI drain in the pelvis using the right lower quadrant incision. This was secured in place with a 3-0 nylon. Prior to closing the lower midline incision, I did place sutures overlying the bladder defect using 2-0 Vicryl. The lower midline incision was closed with a running 0 looped PDS suture. The supraumbilical incision was closed with a 0 Vicryl suture. All the skin incisions were closed with 4-0 Monocryl followed by Dermabond.   A 30 mL of 0.25% Marcaine with epinephrine was injected subcutaneously. The patient was awakened, extubated, and taken to the recovery room in stable condition.         Sergio Torres MD      [id number]  D:  10/01/2020 12:15  T:  10/01/2020 15:32  JOB #:  [id number]

## 2020-10-01 NOTE — BRIEF OP NOTE
Brief Postoperative Note    Patient: Fabrice Elliott  YOB: 1945  MRN: [id number]    Date of Procedure: 10/1/2020     Pre-Op Diagnosis: COLOVESICAL FISTULA    Post-Op Diagnosis: Same as preoperative diagnosis. Procedure(s):  LAPAROSCOPIC SIGMOID COLECTOMY, REVISION OF COLOPROCTOSTOMY, INTRAOPERATIVE FLEXIBLE SIGMOIDOSCOPY, CYSTOSCOPY BILATERAL URETERAL STENT INSERTION  (E R A S)  CYSTOSCOPY URETERAL STENT INSERTION    Surgeon(s):  MD Malena Vegas MD    Surgical Assistant: Alexa Tobias    Anesthesia: General     Estimated Blood Loss (mL): 692    Complications: Other: Anastomotic leak requiring takedown of anastomosis and re-do of coloproctostomy.     Specimens:   ID Type Source Tests Collected by Time Destination   1 : proximal donut Preservative Colon  Vicente Brian MD 10/1/2020 0930 Pathology   2 : distal donut Preservative Colon  Vicente Brian MD 10/1/2020 5386 Pathology   3 : sigmoid colon Preservative Colon, Recto-sigmoid  Vicente Brian MD 10/1/2020 3714 Pathology        Implants: * No implants in log *    Drains:   Nasogastric Tube 10/01/20 (Active)       [REMOVED] Condom Catheter 12/24/19 (Removed)       [REMOVED] Condom Catheter 01/22/20 (Removed)         Electronically Signed by Alexandria Brown MD on 10/1/2020 at 10:55 AM

## 2020-10-01 NOTE — PROGRESS NOTES
Received pt from PACU. 3 trocar sites to ABD intact with dermabond. 3LNC O2 to nares. sats 94-98%. Vitals signs as noted. Pt alert and oriented x 4. Lidocaine gtt intact at 1mg/kg/hr.

## 2020-10-01 NOTE — ANESTHESIA PREPROCEDURE EVALUATION
Anesthetic History   No history of anesthetic complications            Review of Systems / Medical History  Patient summary reviewed, nursing notes reviewed and pertinent labs reviewed    Pulmonary    COPD: mild      Smoker      Comments: 50 pack year history  Pulmonary nodules   Neuro/Psych       CVA  TIA    Comments: Walks with a cane Cardiovascular    Hypertension: well controlled        Dysrhythmias : atrial fibrillation  PAD and hyperlipidemia    Exercise tolerance: <4 METS  Comments: Recent ECHO showed a 50-55% EF with mild TR and trace MR   GI/Hepatic/Renal     GERD: well controlled          Comments: Esophageal stricture  Hx of polyps and colovesical fistula  Hx of GI bleed Endo/Other        Blood dyscrasia     Other Findings   Comments: etoh abuse hx  Hemochromatosis  Chronic pain            Physical Exam    Airway  Mallampati: II  TM Distance: 4 - 6 cm  Neck ROM: normal range of motion   Mouth opening: Normal     Cardiovascular  Regular rate and rhythm,  S1 and S2 normal,  no murmur, click, rub, or gallop             Dental    Dentition: Edentulous     Pulmonary  Breath sounds clear to auscultation               Abdominal  GI exam deferred       Other Findings            Anesthetic Plan    ASA: 3  Anesthesia type: general            Anesthetic plan and risks discussed with: Patient

## 2020-10-01 NOTE — ANESTHESIA POSTPROCEDURE EVALUATION
Procedure(s):  LAPAROSCOPIC SIGMOID COLECTOMY AND INTRAOPERATIVE FLEXIBLE SIGMOIDOSCOPY, CYSTOSCOPY BILATERAL URETERAL STENT INSERTION    CYSTOSCOPY URETERAL STENT INSERTION. general    Anesthesia Post Evaluation        Patient location during evaluation: PACU  Note status: Adequate. Level of consciousness: responsive to verbal stimuli and sleepy but conscious  Pain management: satisfactory to patient  Airway patency: patent  Anesthetic complications: no  Cardiovascular status: acceptable  Respiratory status: acceptable  Hydration status: acceptable  Comments: +Post-Anesthesia Evaluation and Assessment    Patient: Jose Wang MRN: [id number]  SSN: [id number]   YOB: 1945  Age: 67 y.o. Sex: male      Cardiovascular Function/Vital Signs    BP (!) 121/54   Pulse 75   Temp 36.6 °C (97.8 °F)   Resp 13   Ht 5' 6\" (1.676 m)   Wt 67.3 kg (148 lb 5.9 oz)   SpO2 96%   BMI 23.95 kg/m²     Patient is status post Procedure(s):  LAPAROSCOPIC SIGMOID COLECTOMY AND INTRAOPERATIVE FLEXIBLE SIGMOIDOSCOPY, CYSTOSCOPY BILATERAL URETERAL STENT INSERTION    CYSTOSCOPY URETERAL STENT INSERTION. Nausea/Vomiting: Controlled. Postoperative hydration reviewed and adequate. Pain:  Pain Scale 1: Numeric (0 - 10) (10/01/20 1215)  Pain Intensity 1: 10 (10/01/20 1215)   Managed. Neurological Status:   Neuro (WDL): Exceptions to WDL (10/01/20 1130)   At baseline. Mental Status and Level of Consciousness: Arousable. Pulmonary Status:   O2 Device: Nasal cannula (10/01/20 1230)   Adequate oxygenation and airway patent. Complications related to anesthesia: None    Post-anesthesia assessment completed. No concerns.     Signed By: Ramses Carrera MD    10/1/2020  Post anesthesia nausea and vomiting:  controlled      INITIAL Post-op Vital signs:   Vitals Value Taken Time   /46 10/1/2020 12:45 PM   Temp 36.6 °C (97.8 °F) 10/1/2020 12:15 PM   Pulse 78 10/1/2020 12:51 PM   Resp 13 10/1/2020 12:51 PM SpO2 95 % 10/1/2020 12:51 PM   Vitals shown include unvalidated device data.

## 2020-10-01 NOTE — PERIOP NOTES
Era Yu with [facility] patients brother in law, would like to be notified every 2 hours.

## 2020-10-01 NOTE — PERIOP NOTES
TRANSFER - OUT REPORT:    Verbal report given to Yolanda HOYOS(name) on Rebecca Nino  being transferred to [facility](unit) for routine post - op       Report consisted of patients Situation, Background, Assessment and   Recommendations(SBAR). Information from the following report(s) SBAR, OR Summary, Intake/Output, MAR and Cardiac Rhythm NSR was reviewed with the receiving nurse. Opportunity for questions and clarification was provided. Patient transported with:   O2 @ 3 liters  [employer] notified of transfer.

## 2020-10-02 LAB
ANION GAP SERPL CALC-SCNC: 8 MMOL/L (ref 5–15)
BUN SERPL-MCNC: 23 MG/DL (ref 6–20)
BUN/CREAT SERPL: 15 (ref 12–20)
CALCIUM SERPL-MCNC: 8.6 MG/DL (ref 8.5–10.1)
CHLORIDE SERPL-SCNC: 105 MMOL/L (ref 97–108)
CO2 SERPL-SCNC: 22 MMOL/L (ref 21–32)
CREAT SERPL-MCNC: 1.53 MG/DL (ref 0.7–1.3)
ERYTHROCYTE [DISTWIDTH] IN BLOOD BY AUTOMATED COUNT: 13.2 % (ref 11.5–14.5)
GLUCOSE SERPL-MCNC: 97 MG/DL (ref 65–100)
HCT VFR BLD AUTO: 29.5 % (ref 36.6–50.3)
HGB BLD-MCNC: 9.6 G/DL (ref 12.1–17)
MAGNESIUM SERPL-MCNC: 1.9 MG/DL (ref 1.6–2.4)
MCH RBC QN AUTO: 30.2 PG (ref 26–34)
MCHC RBC AUTO-ENTMCNC: 32.5 G/DL (ref 30–36.5)
MCV RBC AUTO: 92.8 FL (ref 80–99)
NRBC # BLD: 0 K/UL (ref 0–0.01)
NRBC BLD-RTO: 0 PER 100 WBC
PHOSPHATE SERPL-MCNC: 3.8 MG/DL (ref 2.6–4.7)
PLATELET # BLD AUTO: 202 K/UL (ref 150–400)
PMV BLD AUTO: 10.5 FL (ref 8.9–12.9)
POTASSIUM SERPL-SCNC: 4.2 MMOL/L (ref 3.5–5.1)
RBC # BLD AUTO: 3.18 M/UL (ref 4.1–5.7)
SODIUM SERPL-SCNC: 135 MMOL/L (ref 136–145)
WBC # BLD AUTO: 10.3 K/UL (ref 4.1–11.1)

## 2020-10-02 PROCEDURE — 77030005518 HC CATH URETH FOL 2W BARD -B

## 2020-10-02 PROCEDURE — 51600 INJECTION FOR BLADDER X-RAY: CPT

## 2020-10-02 PROCEDURE — 77010033678 HC OXYGEN DAILY

## 2020-10-02 PROCEDURE — 65270000029 HC RM PRIVATE

## 2020-10-02 PROCEDURE — 74011250637 HC RX REV CODE- 250/637: Performed by: SURGERY

## 2020-10-02 PROCEDURE — 85027 COMPLETE CBC AUTOMATED: CPT

## 2020-10-02 PROCEDURE — 84100 ASSAY OF PHOSPHORUS: CPT

## 2020-10-02 PROCEDURE — 80048 BASIC METABOLIC PNL TOTAL CA: CPT

## 2020-10-02 PROCEDURE — 94760 N-INVAS EAR/PLS OXIMETRY 1: CPT

## 2020-10-02 PROCEDURE — 74011250636 HC RX REV CODE- 250/636: Performed by: SURGERY

## 2020-10-02 PROCEDURE — 83735 ASSAY OF MAGNESIUM: CPT

## 2020-10-02 PROCEDURE — 36415 COLL VENOUS BLD VENIPUNCTURE: CPT

## 2020-10-02 RX ORDER — ACETAMINOPHEN 500 MG
1000 TABLET ORAL EVERY 6 HOURS
Qty: 40 TAB | Refills: 0 | Status: SHIPPED | OUTPATIENT
Start: 2020-10-02 | End: 2020-10-07

## 2020-10-02 RX ORDER — OXYCODONE HYDROCHLORIDE 5 MG/1
5 TABLET ORAL
Qty: 15 TAB | Refills: 0 | Status: SHIPPED | OUTPATIENT
Start: 2020-10-02 | End: 2020-10-05

## 2020-10-02 RX ADMIN — Medication 1 AMPULE: at 20:16

## 2020-10-02 RX ADMIN — ACETAMINOPHEN 1000 MG: 500 TABLET ORAL at 20:15

## 2020-10-02 RX ADMIN — ACETAMINOPHEN 1000 MG: 500 TABLET ORAL at 09:44

## 2020-10-02 RX ADMIN — OXYCODONE 5 MG: 5 TABLET ORAL at 09:58

## 2020-10-02 RX ADMIN — ENOXAPARIN SODIUM 40 MG: 40 INJECTION SUBCUTANEOUS at 09:58

## 2020-10-02 RX ADMIN — Medication 1 AMPULE: at 09:00

## 2020-10-02 RX ADMIN — ALVIMOPAN 12 MG: 12 CAPSULE ORAL at 09:44

## 2020-10-02 RX ADMIN — SODIUM CHLORIDE, SODIUM LACTATE, POTASSIUM CHLORIDE, AND CALCIUM CHLORIDE 75 ML/HR: 600; 310; 30; 20 INJECTION, SOLUTION INTRAVENOUS at 01:42

## 2020-10-02 RX ADMIN — LIDOCAINE HYDROCHLORIDE 1 MG/KG/HR: 8 INJECTION, SOLUTION INTRAVENOUS at 17:37

## 2020-10-02 RX ADMIN — ACETAMINOPHEN 1000 MG: 500 TABLET ORAL at 16:36

## 2020-10-02 RX ADMIN — OXYCODONE 5 MG: 5 TABLET ORAL at 04:21

## 2020-10-02 RX ADMIN — ALVIMOPAN 12 MG: 12 CAPSULE ORAL at 20:15

## 2020-10-02 RX ADMIN — ACETAMINOPHEN 1000 MG: 500 TABLET ORAL at 01:33

## 2020-10-02 RX ADMIN — ONDANSETRON 4 MG: 4 TABLET, ORALLY DISINTEGRATING ORAL at 13:06

## 2020-10-02 NOTE — PROGRESS NOTES
General Surgery End of Shift Nursing Note    Bedside shift change report given to Burns Soulier, RN (oncoming nurse) by Terrell Gutierrez RN (offgoing nurse). Report included the following information SBAR, Kardex, Procedure Summary, Intake/Output, MAR and Recent Results. Shift worked:   7p-7a   Summary of shift:    Patient treated for pain overnight. Ambulated in the hallway x1 with walker. Noonan removed at 0548, OSMANI patent and draining. Patient up in chair for breakfast   Issues for physician to address:   None at this time     Number times ambulated in hallway past shift: 1  Number of times OOB to chair past shift: 0    Pain Management:  Current medication: See MAR  Patient states pain is manageable on current pain medication: YES    GI:    Current diet:  DIET REGULAR Low Fiber  DIET NUTRITIONAL SUPPLEMENTS Breakfast, Lunch; Ensure [place] current diet: YES    Respiratory:    Incentive Spirometer at bedside: YES  Patient instructed on use: YES    Patient Safety:    Bed Alarm On? Yes  Sitter?  No    Oleh Schirmer

## 2020-10-02 NOTE — PROGRESS NOTES
Surgery NP Progress Note    Rebecca Nino  [id number]  male  67 y.o.  1945    s/p LAPAROSCOPIC SIGMOID COLECTOMY AND INTRAOPERATIVE FLEXIBLE SIGMOIDOSCOPY, CYSTOSCOPY BILATERAL URETERAL STENT INSERTION   on 10/1/2020         Assessment:   Active Problems:    Colovesical fistula ()      Overview: Dr Nilda Hunter        Expected post-op progress. Ambulating, tolerating diet, moving bowels    Plan/Recommendations/Medical Decision Making:     - Mobilize with nursing and OOB to chair for meals  - Continue diet  - Pain management- Continue current pain control methods.   - VTE Prophylaxis: Lovenox  - Discharge patient with catheter in place    Discharge Planning    Plan for patient to discharge to Home- No Needs    Anticipated discharge date 10/3/2020    Incision/Wound Care Needs:  Routine post-op, patient will self manage as instructed    Discharge plan discussed with:  Patient    Subjective:     Patient has no complaints. Pain control adequate. Patient reports PO intake adequate. No nausea/vomiting. Positive flatus. Positive stool output. Voiding status: has isabel catheter in place    Objective:     Blood pressure (!) 159/63, pulse (!) 105, temperature 98.2 °F (36.8 °C), resp. rate 18, height 5' 6\" (1.676 m), weight 67.3 kg (148 lb 5.9 oz), SpO2 90 %.     Temp (24hrs), Av.1 °F (36.7 °C), Min:97.8 °F (36.6 °C), Max:98.5 °F (36.9 °C)      Recent Results (from the past 48 hour(s))   CBC W/O DIFF    Collection Time: 10/02/20  1:39 AM   Result Value Ref Range    WBC 10.3 4.1 - 11.1 K/uL    RBC 3.18 (L) 4.10 - 5.70 M/uL    HGB 9.6 (L) 12.1 - 17.0 g/dL    HCT 29.5 (L) 36.6 - 50.3 %    MCV 92.8 80.0 - 99.0 FL    MCH 30.2 26.0 - 34.0 PG    MCHC 32.5 30.0 - 36.5 g/dL    RDW 13.2 11.5 - 14.5 %    PLATELET 619 694 - 929 K/uL    MPV 10.5 8.9 - 12.9 FL    NRBC 0.0 0  WBC    ABSOLUTE NRBC 0.00 0.00 - 6.34 K/uL   METABOLIC PANEL, BASIC    Collection Time: 10/02/20  1:39 AM   Result Value Ref Range    Sodium 135 (L) 136 - 145 mmol/L    Potassium 4.2 3.5 - 5.1 mmol/L    Chloride 105 97 - 108 mmol/L    CO2 22 21 - 32 mmol/L    Anion gap 8 5 - 15 mmol/L    Glucose 97 65 - 100 mg/dL    BUN 23 (H) 6 - 20 MG/DL    Creatinine 1.53 (H) 0.70 - 1.30 MG/DL    BUN/Creatinine ratio 15 12 - 20      GFR est AA 54 (L) >60 ml/min/1.73m2    GFR est non-AA 44 (L) >60 ml/min/1.73m2    Calcium 8.6 8.5 - 10.1 MG/DL   MAGNESIUM    Collection Time: 10/02/20  1:39 AM   Result Value Ref Range    Magnesium 1.9 1.6 - 2.4 mg/dL   PHOSPHORUS    Collection Time: 10/02/20  1:39 AM   Result Value Ref Range    Phosphorus 3.8 2.6 - 4.7 MG/DL       Pt resting in bed. NAD   Incisions CDI. OSMANI Drain  in place. Serosanguinous drainage  Output: 220ml   SCDs for mechanical DVT proph while in bed    Body mass index is 23.95 kg/m². Reference: BMI greater than 30 is classified as obesity and greater than 40 is classified as morbid obesity.      Last 3 Recorded Weights in this Encounter    10/01/20 0627 10/02/20 1305   Weight: 67.3 kg (148 lb 5.9 oz) 67.3 kg (148 lb 5.9 oz)         Bandar Willingham     10/02/20

## 2020-10-02 NOTE — OP NOTES
[address]  OPERATIVE REPORT    Name:  Leonel Contreras  MR#:  [id number]  :  1945  ACCOUNT #:  [de-identified]  DATE OF SERVICE:  10/01/2020    PREOPERATIVE DIAGNOSIS:  Colovesical fistula. POSTOPERATIVE DIAGNOSIS:  Colovesical fistula. PROCEDURE PERFORMED:  Cystoscopy, bilateral ureteral stent insertion. SURGEON:  Jhonatan Guajardo MD    ASSISTANT:  None. ANESTHESIA:  General.    COMPLICATIONS:  None. SPECIMENS REMOVED:  None. IMPLANTS:  None. ESTIMATED BLOOD LOSS:  None. FINDINGS:  Likely fistulous connection in the left posterolateral aspect of the bladder visualized by myself and Dr. Claus Maurer. DRAINS:  A light whistle tip 5-[place] angiocath and a standard 5-[place] left Tigertail angiocath. DISPOSITION:  Continuation of procedure by Dr. Claus Maurer. CHIEF COMPLAINT AND REASON FOR PROCEDURE:  This 50-year-old gentleman who has been diagnosed with some chronic colovesical fistula. This became increasingly problematic and Dr. Claus Maurer discussed options with the patient and proceeded with a cystoscopy, fistula takedown, and likely partial colectomy. Risks and benefits of this were discussed from his perspective and it was recommended that he have bilateral ureteral stents inserted. The risks and benefits of stent insertion were discussed with me preoperatively. He understands this does not guarantee that no injury would occur to the ureters, but hopefully it will help reduce the risk of this and help with repair if needed. I also discussed that during the procedure that part of the bladder was likely involved and it would need repair. He would need a catheter prolonged after the procedure to allow for healing. The volume loss of the bladder could also occur. TECHNICAL PROCEDURE:  The patient was taken to the operating room, placed supine on the operating table. General anesthesia was established.   Prepped and draped in the usual sterile fashion in lithotomy position. A 21-[place] cystoscope inserted transversely towards the patient's bladder. There was some mild debris in the bladder which was washed out. A pancystoscopy was performed. There was some protrusion of the bowel into the bladder, but no obvious fistula until the left posterolateral aspect was inspected, which appeared to show a small connection to the bowel potentially. At this point in time, the ureteral orifices were identified. These were somewhat laterally located. Then I took a 5-[place] whistle tip catheter, initially I tried to insert it within the right ureteral orifice. Due to angulation, this would not easily pass. A sensor wire was then placed through the whistle tip and into the orifice. I am able to advance this without much difficulty. The whistle tip then easily went up to between 26 and 28 cm. This was externalized and 2 mL of IC-Green injected. Next, attention was turned to the patient's left hand side. Attempts to catheterize the ureteral orifice on the left had similar difficulties. At this time, I used a standard 5-[place] tigertail Angiocath. I was able to get the wire to advance without difficulty and then advanced the catheter also to 26 to 28 cm. Next, a 2 mL IC-Green was also injected. After this, stent was externalized. A 16-[place] Noonan catheter was inserted. The two stents were then tied to the catheter and the case was turned over to Dr. Manfred De Paz for completion.         MD ANN [id number]  D:  10/01/2020 23:11  T:  10/02/2020 7:38  JOB #:  [id number]

## 2020-10-02 NOTE — PROGRESS NOTES
General Surgery End of Shift Nursing Note    Bedside shift change report given to Leila Dejesus (oncoming nurse) by Kenton Romero (offgoing nurse). Report included the following information SBAR, Kardex and MAR. Shift worked:   7a-7p   Summary of shift:    Pt had isabel catheter  put back in. Urine is bloody. Pt pee aroudn catheter times 2 days. This RN educated about not to pee around it. Pt up to chair for breakfast, refused to get in chair for lunch and dinner. Issues for physician to address:   none     Number times ambulated in hallway past shift: 0    Number of times OOB to chair past shift: 1    Pain Management:  Current medication: see mar  Patient states pain is manageable on current pain medication: YES    GI:    Current diet:  DIET REGULAR Low Fiber  DIET NUTRITIONAL SUPPLEMENTS Breakfast, Lunch; Ensure [place] current diet: YES  Passing flatus: YES  Last Bowel Movement: today   Appearance: loose brown small amount    Respiratory:    Incentive Spirometer at bedside: YES  Patient instructed on use: YES    Patient Safety:    Falls Score: 2  Bed Alarm On? No  Sitter?  No    Baron Hernandes

## 2020-10-02 NOTE — DISCHARGE INSTRUCTIONS
Crawford C. Maxwell Collet, MD, [address] Kathy Mcgovern MD, [address] [facility] Yue Linton MD, FACS  Joseph Johansen. MD Kristin Pearson MD Guadelupe Marten, MD Alvera Never, MD    [facility] Discharge Instructions for Colon Surgery Patients    1. Diagnosis: Sigmoid colectomy  2. Low fiber diet. 3. Do not drive while taking narcotic pain medications. 4. Leave surgical glue on incision. It may fall off on it's own. 5. May take a shower. 6. No lifting any objects weighing more than 15 pounds. Do not do any housework, such as vacuuming, scrubbing, etc for at least a month. 7. When you get tired during the day, take naps, as you need your rest.  8. Multiple bowel movements are normal each day for a while. 9. May walk as desired. May go up and down stairs. 10. Take pain medication as prescribed: (NO DRIVING WHILE ON PAIN MEDICATIONS). Oxycodone EVERY 4-6 HOURS AS NEEDED. 11.  See me in the office in 10-14 days. Call as soon as discharged for an appointment 21 [phone]. IF SURGERY INVOLVED AN OSTOMY BAG, PLEASE BRING YOUR SUPPLIES TO YOUR 1ST VISIT! 12.  Call the Exchange [phone], if you have any questions or problems after office hours.

## 2020-10-03 PROCEDURE — 74011250637 HC RX REV CODE- 250/637: Performed by: COLON & RECTAL SURGERY

## 2020-10-03 PROCEDURE — 65270000029 HC RM PRIVATE

## 2020-10-03 PROCEDURE — 74011250636 HC RX REV CODE- 250/636: Performed by: SURGERY

## 2020-10-03 PROCEDURE — 74011250637 HC RX REV CODE- 250/637: Performed by: SURGERY

## 2020-10-03 PROCEDURE — 74011250636 HC RX REV CODE- 250/636: Performed by: NURSE PRACTITIONER

## 2020-10-03 PROCEDURE — 74011250637 HC RX REV CODE- 250/637: Performed by: NURSE PRACTITIONER

## 2020-10-03 PROCEDURE — 94760 N-INVAS EAR/PLS OXIMETRY 1: CPT

## 2020-10-03 RX ORDER — HYDRALAZINE HYDROCHLORIDE 20 MG/ML
10 INJECTION INTRAMUSCULAR; INTRAVENOUS
Status: DISCONTINUED | OUTPATIENT
Start: 2020-10-03 | End: 2020-10-06 | Stop reason: HOSPADM

## 2020-10-03 RX ORDER — METOCLOPRAMIDE 10 MG/1
10 TABLET ORAL
Status: DISCONTINUED | OUTPATIENT
Start: 2020-10-03 | End: 2020-10-06 | Stop reason: HOSPADM

## 2020-10-03 RX ORDER — LOSARTAN POTASSIUM 25 MG/1
25 TABLET ORAL DAILY
Status: DISCONTINUED | OUTPATIENT
Start: 2020-10-03 | End: 2020-10-06 | Stop reason: HOSPADM

## 2020-10-03 RX ADMIN — LOSARTAN POTASSIUM 25 MG: 25 TABLET, FILM COATED ORAL at 17:15

## 2020-10-03 RX ADMIN — ACETAMINOPHEN 1000 MG: 500 TABLET ORAL at 20:04

## 2020-10-03 RX ADMIN — Medication 1 AMPULE: at 09:00

## 2020-10-03 RX ADMIN — Medication 1 AMPULE: at 20:03

## 2020-10-03 RX ADMIN — METOCLOPRAMIDE 10 MG: 10 TABLET ORAL at 16:30

## 2020-10-03 RX ADMIN — ALVIMOPAN 12 MG: 12 CAPSULE ORAL at 09:01

## 2020-10-03 RX ADMIN — METOCLOPRAMIDE 10 MG: 10 TABLET ORAL at 13:00

## 2020-10-03 RX ADMIN — ACETAMINOPHEN 1000 MG: 500 TABLET ORAL at 15:12

## 2020-10-03 RX ADMIN — HYDRALAZINE HYDROCHLORIDE 10 MG: 20 INJECTION INTRAMUSCULAR; INTRAVENOUS at 20:39

## 2020-10-03 RX ADMIN — ACETAMINOPHEN 1000 MG: 500 TABLET ORAL at 08:00

## 2020-10-03 RX ADMIN — ENOXAPARIN SODIUM 40 MG: 40 INJECTION SUBCUTANEOUS at 13:01

## 2020-10-03 RX ADMIN — METOCLOPRAMIDE 10 MG: 10 TABLET ORAL at 21:28

## 2020-10-03 RX ADMIN — ACETAMINOPHEN 1000 MG: 500 TABLET ORAL at 02:33

## 2020-10-03 RX ADMIN — ONDANSETRON 4 MG: 4 TABLET, ORALLY DISINTEGRATING ORAL at 02:45

## 2020-10-03 RX ADMIN — OXYCODONE 10 MG: 5 TABLET ORAL at 20:52

## 2020-10-03 RX ADMIN — OXYCODONE 5 MG: 5 TABLET ORAL at 15:12

## 2020-10-03 RX ADMIN — ALVIMOPAN 12 MG: 12 CAPSULE ORAL at 20:04

## 2020-10-03 NOTE — PROGRESS NOTES
General Surgery End of Shift Nursing Note    75yr old  male admitted for colovesical fistula on 10/01/20    Shift worked:   1900 - 0700   Summary of shift:    A&O x3, verbal able to voice needs and wants, tolerated all PO medications whole with thin liquids. Noonan patent and draining, using bedpan, bowels are black and loose. currently on reg diet. No issues noted. Patient resting quietly in bed with call bell in reach   Issues for physician to address: No new issues to report     Number times ambulated in hallway past shift: 0    Number of times OOB to chair past shift: 0    Pain Management:  Current medication: see MAR  Patient states pain is manageable on current pain medication: YES    GI:    Current diet:  DIET REGULAR Low Fiber  DIET NUTRITIONAL SUPPLEMENTS Breakfast, Lunch; Ensure [place] current diet: YES  Passing flatus: YES  Last Bowel Movement: today   Appearance: black, loose, medium    Respiratory:    Incentive Spirometer at bedside: YES  Patient instructed on use: YES    Patient Safety:    Falls Score: 3  Bed Alarm On? No  Sitter?  No    Erika Castillo LPN

## 2020-10-03 NOTE — PROGRESS NOTES
General Surgery End of Shift Nursing Note    Bedside shift change report given to Marcella Romero (oncoming nurse) . Report included the following information SBAR, Kardex, OR Summary, Intake/Output, MAR and Recent Results. Shift worked:   0138-8615   Summary of shift:   Pain well-controlled. Had BM x1. Issues for physician to address:        Number times ambulated in hallway past shift:     Number of times OOB to chair past shift:     Pain Management:  Current medication: Lidocaine drip  Patient states pain is manageable on current pain medication: YES    GI:    Current diet:  DIET REGULAR Low Fiber  DIET NUTRITIONAL SUPPLEMENTS Breakfast, Lunch; Ensure [place] current diet: YES  Passing flatus: YES  Last Bowel Movement: today   Appearance: loose    Respiratory:    Incentive Spirometer at bedside: YES  Patient instructed on use: YES    Patient Safety:    Falls Score: 2  Bed Alarm On? No  Sitter?  No    Santana Moyer RN

## 2020-10-03 NOTE — PROGRESS NOTES
Pt /92, Osei Israel NP notified, Mariaa Elva asked for BP med. Per Osei Israel will put an order in. Pt up to chair times 1 today. Pt had large dark brown BM soft formed. Noonan line moved down times 2 today. This writer put more water in balloon so would hold. PT had had 2 more soft BM today. General Surgery End of Shift Nursing Note    Bedside shift change report given to Felipa Gil (oncoming nurse) by Anson Cabral (offgoing nurse). Report included the following information SBAR, ED Summary and MAR. Shift worked:   7a-7p   Summary of shift:   Lidocaine d/c today. pt up to chair times 1 today. Only ambulated to chair today. Issues for physician to address:   none     Number times ambulated in hallway past shift: 0    Number of times OOB to chair past shift: 1    Pain Management:  Current medication: see mar  Patient states pain is manageable on current pain medication: YES    GI:    Current diet:  DIET REGULAR Low Fiber  DIET NUTRITIONAL SUPPLEMENTS Breakfast, Lunch; Ensure [place] current diet: YES  Passing flatus: YES  Last Bowel Movement: today   Appearance: soft dark bown    Respiratory:    Incentive Spirometer at bedside: YES  Patient instructed on use: YES    Patient Safety:    Falls Score: 3  Bed Alarm On? No  Sitter?  No    Amaris Ates

## 2020-10-04 PROCEDURE — 74011250636 HC RX REV CODE- 250/636: Performed by: SURGERY

## 2020-10-04 PROCEDURE — 65270000029 HC RM PRIVATE

## 2020-10-04 PROCEDURE — 94760 N-INVAS EAR/PLS OXIMETRY 1: CPT

## 2020-10-04 PROCEDURE — 74011250637 HC RX REV CODE- 250/637: Performed by: COLON & RECTAL SURGERY

## 2020-10-04 PROCEDURE — 74011250637 HC RX REV CODE- 250/637: Performed by: NURSE PRACTITIONER

## 2020-10-04 PROCEDURE — 74011250637 HC RX REV CODE- 250/637: Performed by: SURGERY

## 2020-10-04 RX ADMIN — OXYCODONE 10 MG: 5 TABLET ORAL at 04:04

## 2020-10-04 RX ADMIN — METOCLOPRAMIDE 10 MG: 10 TABLET ORAL at 06:37

## 2020-10-04 RX ADMIN — Medication 1 AMPULE: at 21:19

## 2020-10-04 RX ADMIN — LOSARTAN POTASSIUM 25 MG: 25 TABLET, FILM COATED ORAL at 08:44

## 2020-10-04 RX ADMIN — OXYCODONE 10 MG: 5 TABLET ORAL at 09:08

## 2020-10-04 RX ADMIN — OXYCODONE 5 MG: 5 TABLET ORAL at 17:50

## 2020-10-04 RX ADMIN — ENOXAPARIN SODIUM 40 MG: 40 INJECTION SUBCUTANEOUS at 09:08

## 2020-10-04 RX ADMIN — ACETAMINOPHEN 1000 MG: 500 TABLET ORAL at 02:04

## 2020-10-04 RX ADMIN — METOCLOPRAMIDE 10 MG: 10 TABLET ORAL at 10:43

## 2020-10-04 RX ADMIN — ACETAMINOPHEN 1000 MG: 500 TABLET ORAL at 16:09

## 2020-10-04 RX ADMIN — ALVIMOPAN 12 MG: 12 CAPSULE ORAL at 08:44

## 2020-10-04 RX ADMIN — METOCLOPRAMIDE 10 MG: 10 TABLET ORAL at 16:09

## 2020-10-04 RX ADMIN — ACETAMINOPHEN 1000 MG: 500 TABLET ORAL at 08:44

## 2020-10-04 RX ADMIN — ACETAMINOPHEN 1000 MG: 500 TABLET ORAL at 21:18

## 2020-10-04 RX ADMIN — Medication 1 AMPULE: at 09:53

## 2020-10-04 RX ADMIN — METOCLOPRAMIDE 10 MG: 10 TABLET ORAL at 21:18

## 2020-10-04 NOTE — PROGRESS NOTES
General Surgery End of Shift Nursing Note    75yr old  male admitted 10/01/20, colovesical fistula    Shift worked:   1900 - 0700   Summary of shift:    A&O x4, verbal able to voice needs and wants, c/o pain x1, medicated and relief achieved, tolerates all PO medications whole with thin liquids, continent of bowel, isabel patent and draining, currently resting quietly in bed with call bell in reach   Issues for physician to address: No new issues     Number times ambulated in hallway past shift: 0    Number of times OOB to chair past shift: 1    Pain Management:  Current medication: see MAR  Patient states pain is manageable on current pain medication: YES    GI:    Current diet:  DIET REGULAR Low Fiber  DIET NUTRITIONAL SUPPLEMENTS Breakfast, Lunch; Ensure [place] current diet: YES  Passing flatus: YES  Last Bowel Movement: yesterday   Appearance: brown/black, soft, medium    Respiratory:    Incentive Spirometer at bedside: YES  Patient instructed on use: YES    Patient Safety:    Falls Score: 3  Bed Alarm On? No  Sitter?  No    Umesh Negro LPN

## 2020-10-04 NOTE — PROGRESS NOTES
1900 vital signs taken, tech alerted writer to pt BP of 197/99, hydralazine administered, see MAR    BP 40 minutes later; 179/80

## 2020-10-04 NOTE — PROGRESS NOTES
General Surgery End of Shift Nursing Note    Bedside shift change report given to [address] [place] [address] (oncoming nurse) by Radha Dalton (offgoing nurse). Report included the following information SBAR, Kardex, ED Summary, Intake/Output, MAR and Recent Results. Shift worked:   1617-5125   Summary of shift:    Pt had no c/o pain. Verified the isabel d/c order and removed isabel per order. Pt voided urine and stool in bathroom. Pt tolerate care well throughout shift. Issues for physician to address:   none     Number times ambulated in hallway past shift: 3    Number of times OOB to chair past shift: 4    Pain Management:  Current medication: oxycodone, tylenol  Patient states pain is manageable on current pain medication: YES    GI:    Current diet:  DIET REGULAR Low Fiber  DIET NUTRITIONAL SUPPLEMENTS Breakfast, Lunch; Ensure [place] current diet: YES  Passing flatus: YES  Last Bowel Movement: today    Respiratory:    Incentive Spirometer at bedside: YES  Patient instructed on use: YES    Patient Safety:    Falls Score: 3  Bed Alarm On? Not applicable  Sitter?  Not applicable    Dominique Booth

## 2020-10-04 NOTE — PROGRESS NOTES
Visit Vitals  BP (!) 165/75 (BP 1 Location: Left arm, BP Patient Position: Head of bed elevated (Comment degrees))   Pulse (!) 123   Temp 97.5 °F (36.4 °C)   Resp 16   Ht 5' 6\" (1.676 m)   Wt 67.8 kg (149 lb 7.6 oz)   SpO2 94%   BMI 24.13 kg/m²     MEWS of 3, pt stated pain 9/10 given PRN meds, see MAR. Also breathing exercises done. Heart rate now 82.     Will continue to monitor

## 2020-10-04 NOTE — PROGRESS NOTES
Problem: Risk for Spread of Infection  Goal: Prevent transmission of infectious organism to others  Description: Prevent the transmission of infectious organisms to other patients, staff members, and visitors. Outcome: Progressing Towards Goal     Problem: Patient Education:  Go to Education Activity  Goal: Patient/Family Education  Outcome: Progressing Towards Goal     Problem: Falls - Risk of  Goal: *Absence of Falls  Description: Document Gris Juarez Fall Risk and appropriate interventions in the flowsheet.   Outcome: Progressing Towards Goal  Note: Fall Risk Interventions:  Mobility Interventions: Communicate number of staff needed for ambulation/transfer, Patient to call before getting OOB, Utilize walker, cane, or other assistive device         Medication Interventions: Patient to call before getting OOB, Teach patient to arise slowly    Elimination Interventions: Call light in reach, Patient to call for help with toileting needs, Toilet paper/wipes in reach              Problem: Patient Education: Go to Patient Education Activity  Goal: Patient/Family Education  Outcome: Progressing Towards Goal     Problem: Surgical Pathway Day of Surgery  Goal: Consults, if ordered  Outcome: Progressing Towards Goal  Goal: Nutrition/Diet  Outcome: Progressing Towards Goal  Goal: Medications  Outcome: Progressing Towards Goal  Goal: Respiratory  Outcome: Progressing Towards Goal  Goal: Treatments/Interventions/Procedures  Outcome: Progressing Towards Goal  Goal: Psychosocial  Outcome: Progressing Towards Goal     Problem: Surgical Pathway Post-Op Day 1  Goal: Diagnostic Test/Procedures  Outcome: Progressing Towards Goal  Goal: Nutrition/Diet  Outcome: Progressing Towards Goal  Goal: Discharge Planning  Outcome: Progressing Towards Goal  Goal: Medications  Outcome: Progressing Towards Goal  Goal: Respiratory  Outcome: Progressing Towards Goal  Goal: Treatments/Interventions/Procedures  Outcome: Progressing Towards Goal  Goal: Psychosocial  Outcome: Progressing Towards Goal     Problem: Surgical Pathway Post-Op Day 2 through Discharge  Goal: Medications  Outcome: Progressing Towards Goal  Goal: Respiratory  Outcome: Progressing Towards Goal  Goal: Treatments/Interventions/Procedures  Outcome: Progressing Towards Goal

## 2020-10-04 NOTE — PROGRESS NOTES
Pt looks good. 2 BM liquid in nature. No nausea and bowels working  vss afebrile    Chest: clear. No rub, gallop  Lung: clear no wheeze  Abd: soft, non tender, + bs, mildly distended, inc c/i    Lab ok, bs: ok    A/P POD # 2, Looks good for now, diet as tolerated  2. Will go slow on po  3. Noonan can come out  4. Ambulate in hallway

## 2020-10-04 NOTE — PROGRESS NOTES
Problem: Risk for Spread of Infection  Goal: Prevent transmission of infectious organism to others  Description: Prevent the transmission of infectious organisms to other patients, staff members, and visitors. Outcome: Progressing Towards Goal     Problem: Patient Education:  Go to Education Activity  Goal: Patient/Family Education  Outcome: Progressing Towards Goal     Problem: Falls - Risk of  Goal: *Absence of Falls  Description: Document Alexia Telles Fall Risk and appropriate interventions in the flowsheet.   Outcome: Progressing Towards Goal  Note: Fall Risk Interventions:  Mobility Interventions: Communicate number of staff needed for ambulation/transfer, Patient to call before getting OOB, Utilize walker, cane, or other assistive device         Medication Interventions: Patient to call before getting OOB, Teach patient to arise slowly    Elimination Interventions: Call light in reach, Patient to call for help with toileting needs, Toilet paper/wipes in reach, Toileting schedule/hourly rounds              Problem: Patient Education: Go to Patient Education Activity  Goal: Patient/Family Education  Outcome: Progressing Towards Goal     Problem: Surgical Pathway Day of Surgery  Goal: Consults, if ordered  Outcome: Progressing Towards Goal  Goal: Nutrition/Diet  Outcome: Progressing Towards Goal  Goal: Medications  Outcome: Progressing Towards Goal  Goal: Respiratory  Outcome: Progressing Towards Goal  Goal: Treatments/Interventions/Procedures  Outcome: Progressing Towards Goal  Goal: Psychosocial  Outcome: Progressing Towards Goal     Problem: Surgical Pathway Post-Op Day 1  Goal: Diagnostic Test/Procedures  Outcome: Progressing Towards Goal  Goal: Nutrition/Diet  Outcome: Progressing Towards Goal  Goal: Discharge Planning  Outcome: Progressing Towards Goal  Goal: Medications  Outcome: Progressing Towards Goal  Goal: Respiratory  Outcome: Progressing Towards Goal  Goal: Treatments/Interventions/Procedures  Outcome: Progressing Towards Goal  Goal: Psychosocial  Outcome: Progressing Towards Goal     Problem: Surgical Pathway Post-Op Day 2 through Discharge  Goal: Medications  Outcome: Progressing Towards Goal  Goal: Respiratory  Outcome: Progressing Towards Goal  Goal: Treatments/Interventions/Procedures  Outcome: Progressing Towards Goal

## 2020-10-04 NOTE — PROGRESS NOTES
General Surgery End of Shift Nursing Note    Bedside shift change report given to Araseli Reid RN (oncoming nurse) by Felipa Gil RN (offgoing nurse). Report included the following information SBAR, Kardex, Intake/Output, MAR and Recent Results. Shift worked:   7p-7a   Summary of shift:    Pt c/o pain and given PRN meds x2, see MAR. Pt having multiple loose stools overnight. States he cannot feel when he goes. Pt isabel draining appropriately and CHG care complete. Pt incision and OSMANI intact. Pt states isabel is leaking, but appears to be working appropriately and when trouble shooted, is fine. Pt BP high, given PRNs see progress notes. MEWs gotup to 3, returned to 0 see progress notes. Issues for physician to address:  Possible D/C? Previous note states anticipated D/C sate of 10/3     Number times ambulated in hallway past shift: 0    Number of times OOB to chair past shift: 0    Pain Management:  Current medication: Oxycodome  Patient states pain is manageable on current pain medication: YES    GI:    Current diet:  DIET REGULAR Low Fiber  DIET NUTRITIONAL SUPPLEMENTS Breakfast, Lunch; Ensure [place] current diet: YES  Passing flatus: YES  Last Bowel Movement: today   Appearance: loose, brown/green    Respiratory:    Incentive Spirometer at bedside: YES  Patient instructed on use: YES    Patient Safety:    Falls Score: 3  Bed Alarm On? No  Sitter?  No    Juwan Spearing

## 2020-10-04 NOTE — PROGRESS NOTES
[address] notified Primary Nurse Jaclyn Riley RN of patient IV site infiltrating when making rounds. Removed by writer; pt tolerated well. No c/o pain/discomfort. Will cont to monitor.     Cheryl Esparza LPN

## 2020-10-04 NOTE — PROGRESS NOTES
Patient is argumentative and refusing to sit up, walk, or get to chair. He c/o having multiple bowel movements. He is refusing to put a brief on to walk or to use to ambulate. He is refusing to leave a isabel in and is expecting. Patient would definitely benefit from a skilled nursing facility for rehab. Joel Fonseca RN      [id number] -after continued prompting and discussion - pt agreed to allow writer to put a brief on him and sit in recliner.

## 2020-10-05 ENCOUNTER — HOSPITAL ENCOUNTER (OUTPATIENT)
Dept: GENERAL RADIOLOGY | Age: 75
Discharge: HOME | DRG: 660 | End: 2020-10-05
Attending: SURGERY | Admitting: SURGERY
Payer: MEDICARE

## 2020-10-05 LAB
ANION GAP SERPL CALC-SCNC: 9 MMOL/L (ref 5–15)
BASOPHILS # BLD: 0.1 K/UL (ref 0–0.1)
BASOPHILS NFR BLD: 1 % (ref 0–1)
BUN SERPL-MCNC: 27 MG/DL (ref 6–20)
BUN/CREAT SERPL: 26 (ref 12–20)
CALCIUM SERPL-MCNC: 9.1 MG/DL (ref 8.5–10.1)
CHLORIDE SERPL-SCNC: 101 MMOL/L (ref 97–108)
CO2 SERPL-SCNC: 25 MMOL/L (ref 21–32)
CREAT SERPL-MCNC: 1.02 MG/DL (ref 0.7–1.3)
DIFFERENTIAL METHOD BLD: ABNORMAL
EOSINOPHIL # BLD: 0.2 K/UL (ref 0–0.4)
EOSINOPHIL NFR BLD: 3 % (ref 0–7)
ERYTHROCYTE [DISTWIDTH] IN BLOOD BY AUTOMATED COUNT: 13.2 % (ref 11.5–14.5)
GLUCOSE BLD STRIP.AUTO-MCNC: 121 MG/DL (ref 65–100)
GLUCOSE SERPL-MCNC: 124 MG/DL (ref 65–100)
HCT VFR BLD AUTO: 34 % (ref 36.6–50.3)
HGB BLD-MCNC: 11.4 G/DL (ref 12.1–17)
IMM GRANULOCYTES # BLD AUTO: 0.1 K/UL (ref 0–0.04)
IMM GRANULOCYTES NFR BLD AUTO: 1 % (ref 0–0.5)
LYMPHOCYTES # BLD: 1.1 K/UL (ref 0.8–3.5)
LYMPHOCYTES NFR BLD: 15 % (ref 12–49)
MCH RBC QN AUTO: 30.2 PG (ref 26–34)
MCHC RBC AUTO-ENTMCNC: 33.5 G/DL (ref 30–36.5)
MCV RBC AUTO: 89.9 FL (ref 80–99)
MONOCYTES # BLD: 0.9 K/UL (ref 0–1)
MONOCYTES NFR BLD: 11 % (ref 5–13)
NEUTS SEG # BLD: 5.4 K/UL (ref 1.8–8)
NEUTS SEG NFR BLD: 69 % (ref 32–75)
NRBC # BLD: 0 K/UL (ref 0–0.01)
NRBC BLD-RTO: 0 PER 100 WBC
PLATELET # BLD AUTO: 286 K/UL (ref 150–400)
PMV BLD AUTO: 10.2 FL (ref 8.9–12.9)
POTASSIUM SERPL-SCNC: 3.1 MMOL/L (ref 3.5–5.1)
RBC # BLD AUTO: 3.78 M/UL (ref 4.1–5.7)
SERVICE CMNT-IMP: ABNORMAL
SODIUM SERPL-SCNC: 135 MMOL/L (ref 136–145)
WBC # BLD AUTO: 7.7 K/UL (ref 4.1–11.1)

## 2020-10-05 PROCEDURE — 74011250636 HC RX REV CODE- 250/636: Performed by: NURSE PRACTITIONER

## 2020-10-05 PROCEDURE — 74011250637 HC RX REV CODE- 250/637: Performed by: COLON & RECTAL SURGERY

## 2020-10-05 PROCEDURE — 85025 COMPLETE CBC W/AUTO DIFF WBC: CPT

## 2020-10-05 PROCEDURE — 36415 COLL VENOUS BLD VENIPUNCTURE: CPT

## 2020-10-05 PROCEDURE — 82962 GLUCOSE BLOOD TEST: CPT

## 2020-10-05 PROCEDURE — 74430 CONTRAST X-RAY BLADDER: CPT

## 2020-10-05 PROCEDURE — 77030005513 HC CATH URETH FOL11 MDII -B

## 2020-10-05 PROCEDURE — 74011636637 HC RX REV CODE- 636/637: Performed by: NURSE PRACTITIONER

## 2020-10-05 PROCEDURE — 80048 BASIC METABOLIC PNL TOTAL CA: CPT

## 2020-10-05 PROCEDURE — 74011250637 HC RX REV CODE- 250/637: Performed by: SURGERY

## 2020-10-05 PROCEDURE — 94760 N-INVAS EAR/PLS OXIMETRY 1: CPT

## 2020-10-05 PROCEDURE — 65270000029 HC RM PRIVATE

## 2020-10-05 PROCEDURE — 74011250637 HC RX REV CODE- 250/637: Performed by: NURSE PRACTITIONER

## 2020-10-05 PROCEDURE — 74011000636 HC RX REV CODE- 636: Performed by: SURGERY

## 2020-10-05 RX ORDER — DIPHENHYDRAMINE HYDROCHLORIDE 50 MG/ML
50 INJECTION, SOLUTION INTRAMUSCULAR; INTRAVENOUS ONCE
Status: COMPLETED | OUTPATIENT
Start: 2020-10-05 | End: 2020-10-05

## 2020-10-05 RX ORDER — DIPHENHYDRAMINE HCL 25 MG
50 CAPSULE ORAL ONCE
Status: COMPLETED | OUTPATIENT
Start: 2020-10-05 | End: 2020-10-05

## 2020-10-05 RX ORDER — DIPHENHYDRAMINE HYDROCHLORIDE 50 MG/ML
INJECTION, SOLUTION INTRAMUSCULAR; INTRAVENOUS
Status: DISPENSED
Start: 2020-10-05 | End: 2020-10-05

## 2020-10-05 RX ADMIN — PREDNISONE 50 MG: 10 TABLET ORAL at 14:59

## 2020-10-05 RX ADMIN — METOCLOPRAMIDE 10 MG: 10 TABLET ORAL at 21:59

## 2020-10-05 RX ADMIN — Medication 1 AMPULE: at 20:31

## 2020-10-05 RX ADMIN — OXYCODONE 10 MG: 5 TABLET ORAL at 06:49

## 2020-10-05 RX ADMIN — LOSARTAN POTASSIUM 25 MG: 25 TABLET, FILM COATED ORAL at 08:31

## 2020-10-05 RX ADMIN — ONDANSETRON 4 MG: 4 TABLET, ORALLY DISINTEGRATING ORAL at 20:47

## 2020-10-05 RX ADMIN — DIPHENHYDRAMINE HYDROCHLORIDE 50 MG: 50 INJECTION, SOLUTION INTRAMUSCULAR; INTRAVENOUS at 08:31

## 2020-10-05 RX ADMIN — PREDNISONE 50 MG: 10 TABLET ORAL at 20:29

## 2020-10-05 RX ADMIN — OXYCODONE 10 MG: 5 TABLET ORAL at 12:02

## 2020-10-05 RX ADMIN — ACETAMINOPHEN 1000 MG: 500 TABLET ORAL at 20:30

## 2020-10-05 RX ADMIN — Medication 1 AMPULE: at 08:31

## 2020-10-05 RX ADMIN — METOCLOPRAMIDE 10 MG: 10 TABLET ORAL at 06:35

## 2020-10-05 RX ADMIN — ACETAMINOPHEN 1000 MG: 500 TABLET ORAL at 02:08

## 2020-10-05 RX ADMIN — IOTHALAMATE MEGLUMINE 250 ML: 172 INJECTION URETERAL at 12:00

## 2020-10-05 RX ADMIN — ACETAMINOPHEN 1000 MG: 500 TABLET ORAL at 08:31

## 2020-10-05 RX ADMIN — OXYCODONE 10 MG: 5 TABLET ORAL at 20:30

## 2020-10-05 NOTE — PROGRESS NOTES
Problem: Risk for Spread of Infection  Goal: Prevent transmission of infectious organism to others  Description: Prevent the transmission of infectious organisms to other patients, staff members, and visitors. Outcome: Progressing Towards Goal     Problem: Patient Education:  Go to Education Activity  Goal: Patient/Family Education  Outcome: Progressing Towards Goal     Problem: Falls - Risk of  Goal: *Absence of Falls  Description: Document Dyllan Perea Fall Risk and appropriate interventions in the flowsheet.   Outcome: Progressing Towards Goal  Note: Fall Risk Interventions:  Mobility Interventions: Communicate number of staff needed for ambulation/transfer, Patient to call before getting OOB, Utilize walker, cane, or other assistive device         Medication Interventions: Patient to call before getting OOB, Teach patient to arise slowly    Elimination Interventions: Call light in reach, Patient to call for help with toileting needs, Toileting schedule/hourly rounds              Problem: Patient Education: Go to Patient Education Activity  Goal: Patient/Family Education  Outcome: Progressing Towards Goal     Problem: Surgical Pathway Day of Surgery  Goal: Consults, if ordered  Outcome: Progressing Towards Goal  Goal: Nutrition/Diet  Outcome: Progressing Towards Goal  Goal: Medications  Outcome: Progressing Towards Goal  Goal: Respiratory  Outcome: Progressing Towards Goal  Goal: Treatments/Interventions/Procedures  Outcome: Progressing Towards Goal  Goal: Psychosocial  Outcome: Progressing Towards Goal     Problem: Surgical Pathway Post-Op Day 1  Goal: Diagnostic Test/Procedures  Outcome: Progressing Towards Goal  Goal: Nutrition/Diet  Outcome: Progressing Towards Goal  Goal: Discharge Planning  Outcome: Progressing Towards Goal  Goal: Medications  Outcome: Progressing Towards Goal  Goal: Respiratory  Outcome: Progressing Towards Goal  Goal: Treatments/Interventions/Procedures  Outcome: Progressing Towards Goal  Goal: Psychosocial  Outcome: Progressing Towards Goal     Problem: Surgical Pathway Post-Op Day 2 through Discharge  Goal: Medications  Outcome: Progressing Towards Goal  Goal: Respiratory  Outcome: Progressing Towards Goal  Goal: Treatments/Interventions/Procedures  Outcome: Progressing Towards Goal

## 2020-10-05 NOTE — PROGRESS NOTES
Plan:  -Home  -F/u appts  -Sister to transport at d/c   -RUR 18%      Reason for Admission:   Sigmoid colectomy                   RUR Score:     18%             PCP: First and Last name:  Pepe Iqbal MD   Name of Practice:    Are you a current patient: Yes/No: Yes   Approximate date of last visit: This year    Can you participate in a virtual visit if needed:     Do you (patient/family) have any concerns for transition/discharge? None              Plan for utilizing home health:   Likely none    Current Advanced Directive/Advance Care Plan: On file, brother and sister decision makers             Transition of Care Plan:    Home         10:24AM  CM met with pt to complete assessment and discuss d/c planning. Pt confirmed demographic information. He is a 66 yo male admitted for an elective colectomy. Pt living independently prior to surgery and reports no issues at home. Has been ambulating IP with RW. AMD on file naming brother and sister as decision makers. PCP appt scheduled and added to AVS. Possible d/c home tomorrow. CM discussed goal for morning d/c. Pt states sister will transport him home. CM will continue to follow and assist with d/c planning. Care Management Interventions  PCP Verified by CM: Michael Funez MD)  Mode of Transport at Discharge:  Other (see comment)(Pt's sister )  Transition of Care Consult (CM Consult): Discharge Planning  Current Support Network: Lives Alone  Confirm Follow Up Transport: Family  Discharge Location  Discharge Placement: Home      GRAY Coleman  Care Manager

## 2020-10-05 NOTE — PROGRESS NOTES
Surgery NP Progress Note    Valeria Lai  [id number]  male  67 y.o.  1945    s/p LAPAROSCOPIC SIGMOID COLECTOMY AND INTRAOPERATIVE FLEXIBLE SIGMOIDOSCOPY, CYSTOSCOPY BILATERAL URETERAL STENT INSERTION   on 10/1/2020   Pt seen by Dr. James Guzmán-       Assessment:   Active Problems:    Colovesical fistula ()      Overview: Dr Kevin Holt        Expected post-op progress. Tolerating diet and having positive bowel function. Cystogram without extravasation of contrast so isabel no longer needed. Plan/Recommendations/Medical Decision Making:     - Mobilize with nursing and OOB to chair for meals  - Continue diet- changed texture to dental soft due to patient being edentulous and having difficulty chewing.   - Pain management- Continue current pain control methods.   - VTE Prophylaxis: Lovenox     Discharge Planning    Plan for patient to discharge to Home- No Needs    Anticipated discharge date 10/6/20    Incision/Wound Care Needs:  Routine post-op, patient will self manage as instructed    Discharge plan discussed with:  Patient, Care Manager and Primary Care Team Provider    Subjective:     Patient has no complaints. Pain control adequate. Patient reports PO intake adequate. No nausea/vomiting. Positive flatus. Positive stool output. Voiding status: Isabel in place but to be removed. Objective:     Blood pressure (!) 140/67, pulse 92, temperature 98.3 °F (36.8 °C), resp. rate 18, height 5' 6\" (1.676 m), weight 67.2 kg (148 lb 2.4 oz), SpO2 96 %.     Temp (24hrs), Av.1 °F (36.7 °C), Min:97.9 °F (36.6 °C), Max:98.3 °F (36.8 °C)      Recent Results (from the past 48 hour(s))   CBC WITH AUTOMATED DIFF    Collection Time: 10/05/20  8:47 AM   Result Value Ref Range    WBC 7.7 4.1 - 11.1 K/uL    RBC 3.78 (L) 4.10 - 5.70 M/uL    HGB 11.4 (L) 12.1 - 17.0 g/dL    HCT 34.0 (L) 36.6 - 50.3 %    MCV 89.9 80.0 - 99.0 FL    MCH 30.2 26.0 - 34.0 PG    MCHC 33.5 30.0 - 36.5 g/dL    RDW 13.2 11.5 - 14.5 % PLATELET 742 304 - 795 K/uL    MPV 10.2 8.9 - 12.9 FL    NRBC 0.0 0  WBC    ABSOLUTE NRBC 0.00 0.00 - 0.01 K/uL    NEUTROPHILS 69 32 - 75 %    LYMPHOCYTES 15 12 - 49 %    MONOCYTES 11 5 - 13 %    EOSINOPHILS 3 0 - 7 %    BASOPHILS 1 0 - 1 %    IMMATURE GRANULOCYTES 1 (H) 0.0 - 0.5 %    ABS. NEUTROPHILS 5.4 1.8 - 8.0 K/UL    ABS. LYMPHOCYTES 1.1 0.8 - 3.5 K/UL    ABS. MONOCYTES 0.9 0.0 - 1.0 K/UL    ABS. EOSINOPHILS 0.2 0.0 - 0.4 K/UL    ABS. BASOPHILS 0.1 0.0 - 0.1 K/UL    ABS. IMM. GRANS. 0.1 (H) 0.00 - 0.04 K/UL    DF AUTOMATED     METABOLIC PANEL, BASIC    Collection Time: 10/05/20  8:47 AM   Result Value Ref Range    Sodium 135 (L) 136 - 145 mmol/L    Potassium 3.1 (L) 3.5 - 5.1 mmol/L    Chloride 101 97 - 108 mmol/L    CO2 25 21 - 32 mmol/L    Anion gap 9 5 - 15 mmol/L    Glucose 124 (H) 65 - 100 mg/dL    BUN 27 (H) 6 - 20 MG/DL    Creatinine 1.02 0.70 - 1.30 MG/DL    BUN/Creatinine ratio 26 (H) 12 - 20      GFR est AA >60 >60 ml/min/1.73m2    GFR est non-AA >60 >60 ml/min/1.73m2    Calcium 9.1 8.5 - 10.1 MG/DL       Pt resting in bed. NAD   Incisions CDI. Drain  in place. To be removed. SCDs for mechanical DVT proph while in bed     Body mass index is 23.91 kg/m². Reference: BMI greater than 30 is classified as obesity and greater than 40 is classified as morbid obesity.      Last 3 Recorded Weights in this Encounter    10/04/20 0403 10/04/20 0457 10/05/20 0440   Weight: 67.8 kg (149 lb 7.6 oz) 67.8 kg (149 lb 7.6 oz) 67.2 kg (148 lb 2.4 oz)         Sacha Mata NP   MSN, APRN, FNP-C, CWOCN-AP    10/05/20

## 2020-10-05 NOTE — PROGRESS NOTES
General Surgery End of Shift Nursing Note    Bedside shift change report given to [address] [place] [address] (oncoming nurse) by Jamel Rousseau (offgoing nurse). Report included the following information SBAR, Kardex, ED Summary, Intake/Output, MAR and Recent Results. Shift worked:   9744-4635     Summary of shift:    Removed pt isabel and OSMANI drain. Pt voided during shift and had minimal complaints of pain. Pt tolerated diet well and is able to ambulate with minimal assistance. Issues for physician to address:   Discharge? Number times ambulated in hallway past shift: 2    Number of times OOB to chair past shift: 4    Pain Management:  Current medication: oxycodone  Patient states pain is manageable on current pain medication: YES    GI:    Current diet:  DIET NUTRITIONAL SUPPLEMENTS Breakfast, Lunch; Ensure [employer]  DIET DENTAL SOFT (SOFT SOLID)    Tolerating current diet: YES  Passing flatus: YES  Last Bowel Movement: today   Appearance: solid    Respiratory:    Incentive Spirometer at bedside: YES  Patient instructed on use: YES    Patient Safety:    Falls Score: 3  Bed Alarm On? Not applicable  Sitter?  Not applicable    Luz Evans

## 2020-10-05 NOTE — PROGRESS NOTES
Pt in Xray on procedure table for scan and asked to place isabel which was requested by Dr. Lieutenant Mason to be placed per Jayda Gonzalez RT tech - Placed 16FR isabel w/o difficulty pre protocol and instant urine return. Pt tolerated isabel placement well. Advised by mary Davis to leave isabel in place and pt will return to floor with isabel. Order was placed by Dr. Bianca Sood which isabel is needed for procedure and order left in because pt will be returning to floor with isabel - [place] [address] [address] RT Tech to advise nurse Dr. Bianca Sood will need to place orders for isabel care and management.

## 2020-10-05 NOTE — PROGRESS NOTES
General Surgery End of Shift Nursing Note    Bedside shift change report given to Topher Daniel RN (oncoming nurse) by US Walton RN (offgoing nurse). Report included the following information SBAR, Kardex, Intake/Output, MAR and Recent Results. Shift worked:   7p-7a   Summary of shift:    Pt voiding appropriately post isabel removal. Pt tolerating ambulation to restroom with walker. No complaints overnight. BP stable, no PRNs needed this shift. OSMANI draining serosanguinous fluid appropriately. Issues for physician to address:  none     Number times ambulated in hallway past shift: 0    Number of times OOB to chair past shift: 0    Pain Management:  Current medication: oxycodone  Patient states pain is manageable on current pain medication: YES    GI:    Current diet:  DIET REGULAR Low Fiber  DIET NUTRITIONAL SUPPLEMENTS Breakfast, Lunch; Ensure [place] current diet: YES  Passing flatus: YES  Last Bowel Movement: yesterday    Respiratory:    Incentive Spirometer at bedside: YES  Patient instructed on use: YES    Patient Safety:    Falls Score: 3  Bed Alarm On? No  Sitter?  No    Charles Wilson

## 2020-10-06 VITALS
OXYGEN SATURATION: 98 % | RESPIRATION RATE: 17 BRPM | DIASTOLIC BLOOD PRESSURE: 74 MMHG | BODY MASS INDEX: 22.1 KG/M2 | SYSTOLIC BLOOD PRESSURE: 132 MMHG | TEMPERATURE: 98 F | HEIGHT: 66 IN | HEART RATE: 87 BPM | WEIGHT: 137.5 LBS

## 2020-10-06 PROCEDURE — 74011250637 HC RX REV CODE- 250/637: Performed by: NURSE PRACTITIONER

## 2020-10-06 PROCEDURE — 74011250637 HC RX REV CODE- 250/637: Performed by: COLON & RECTAL SURGERY

## 2020-10-06 PROCEDURE — 74011250637 HC RX REV CODE- 250/637: Performed by: SURGERY

## 2020-10-06 PROCEDURE — 94760 N-INVAS EAR/PLS OXIMETRY 1: CPT

## 2020-10-06 PROCEDURE — 74011636637 HC RX REV CODE- 636/637: Performed by: NURSE PRACTITIONER

## 2020-10-06 RX ORDER — METOCLOPRAMIDE 10 MG/1
10 TABLET ORAL
Qty: 40 TAB | Refills: 0 | Status: SHIPPED | OUTPATIENT
Start: 2020-10-06 | End: 2020-10-16

## 2020-10-06 RX ADMIN — ACETAMINOPHEN 1000 MG: 500 TABLET ORAL at 02:38

## 2020-10-06 RX ADMIN — PREDNISONE 50 MG: 10 TABLET ORAL at 02:38

## 2020-10-06 RX ADMIN — METOCLOPRAMIDE 10 MG: 10 TABLET ORAL at 06:40

## 2020-10-06 RX ADMIN — ACETAMINOPHEN 1000 MG: 500 TABLET ORAL at 08:39

## 2020-10-06 RX ADMIN — LOSARTAN POTASSIUM 25 MG: 25 TABLET, FILM COATED ORAL at 08:38

## 2020-10-06 RX ADMIN — Medication 1 AMPULE: at 08:39

## 2020-10-06 NOTE — DISCHARGE SUMMARY
Post- Surgical Discharge Summary    Patient ID:  Concetta Guerin  [id number]  male  67 y.o.  1945    Admit date: 10/1/2020    Discharge date: 10/6/2020    Admitting Physician: Mer Aragon MD     Consulting Physician(s):   Treatment Team: Attending Provider: Tessie Apodaca MD; Utilization Review: Jeffery Reyes RN; Care Manager: Shari Costa; Primary Nurse: Bea Vaughn    Date of Surgery:   10/1/2020     Preoperative Diagnosis:  COLOVESICAL FISTULA    Postoperative Diagnosis:   COLOVESICAL FISTULA    Procedure(s):  LAPAROSCOPIC SIGMOID COLECTOMY AND INTRAOPERATIVE FLEXIBLE SIGMOIDOSCOPY, CYSTOSCOPY BILATERAL URETERAL STENT INSERTION       Anesthesia Type:   General     Surgeon: Tessie Apodaca MD                            HPI:  Pt is a 67 y.o. male who has a history of COLOVESICAL FISTULA who presents at this time for a sigmoid colectomy.     Problem List:   Problem List as of 10/6/2020 Date Reviewed: 9/30/2020          Codes Class Noted - Resolved    Macrocytic anemia ICD-10-CM: D53.9  ICD-9-CM: 281.9  6/10/2020 - Present        Neuropathy ICD-10-CM: G62.9  ICD-9-CM: 355.9  6/10/2020 - Present        Pressure injury of left foot, unstageable ([facility] (Chronic) ICD-10-CM: L89.890  ICD-9-CM: 707.09, 707.25  1/24/2020 - Present        Severe protein-calorie malnutrition ([facility] (Chronic) ICD-10-CM: E43  ICD-9-CM: 262  1/22/2020 - Present        Tobacco abuse (Chronic) ICD-10-CM: Z72.0  ICD-9-CM: 305.1  1/22/2020 - Present        Alcohol withdrawal ([facility] ICD-10-CM: C23.173  ICD-9-CM: 291.81  12/22/2019 - Present        Encounter for rehabilitation ICD-10-CM: Z51.89  ICD-9-CM: V57.9  12/20/2019 - Present        PVD (peripheral vascular disease) (HCC) (Chronic) ICD-10-CM: I73.9  ICD-9-CM: 443.9  12/16/2019 - Present        Cystitis ICD-10-CM: N30.90  ICD-9-CM: 595.9  12/16/2019 - Present        Eczema ICD-10-CM: L30.9  ICD-9-CM: 692.9  12/16/2019 - Present        Cystitis, acute ICD-10-CM: N30.00  ICD-9-CM: 595.0  12/16/2019 - Present        Decubitus ulcer of sacral region, stage 2 ([facility] ICD-10-CM: F05.147  ICD-9-CM: 707.03, 707.22  8/21/2018 - Present        Disorder of electrolytes ICD-10-CM: E87.8  ICD-9-CM: 276.9  8/19/2018 - Present    Overview Signed 7/30/2019  1:08 PM by Amanda Astudillo LPN     Last Assessment & Plan:   Hyponatremia, hypokalemia, hypomagnesemia. Continue with IV hydration. Continue Magnesium sulfate for a total 5 grams. Received 60 meq of KCL in the ED. Will repeat with morning labs. Paroxysmal atrial fibrillation [facility]) ICD-10-CM: I48.0  ICD-9-CM: 427.31  8/19/2018 - Present    Overview Signed 7/30/2019  1:08 PM by Amanda Astudillo LPN     Last Assessment & Plan:   He is currently in sinus rhythm,   The patient denied history of irregular heart beat. On Propranolol,   Not on anticoagulation. Essential hypertension ICD-10-CM: I10  ICD-9-CM: 401.9  8/19/2018 - Present    Overview Signed 7/30/2019  1:08 PM by Amanda Astudillo LPN     Last Assessment & Plan:   Continue with losartan, and inderide,   Currently controlled.               Colovesical fistula ICD-10-CM: N32.1  ICD-9-CM: 596.1  Unknown - Present    Overview Signed 7/20/2018 12:35 PM by MD Dr Courtney Connell of visual image ICD-10-CM: H53.8  ICD-9-CM: 368.8  1/2/2018 - Present        GI bleed ICD-10-CM: K92.2  ICD-9-CM: 578.9  10/14/2016 - Present        Intra-abdominal abscess ([facility] ICD-10-CM: K65.1  ICD-9-CM: 567.22  10/4/2016 - Present        Colonic mass ICD-10-CM: K63.89  ICD-9-CM: 569.89  9/13/2016 - Present        Diarrhea ICD-10-CM: R19.7  ICD-9-CM: 787.91  9/13/2016 - Present        Leukocytosis ICD-10-CM: Y94.370  ICD-9-CM: 288.60  9/13/2016 - Present        Hypokalemia ICD-10-CM: E87.6  ICD-9-CM: 276.8  9/13/2016 - Present        Hypomagnesemia ICD-10-CM: K59.58  ICD-9-CM: 275.2  9/13/2016 - Present        B12 deficiency ICD-10-CM: E53.8  ICD-9-CM: 266.2  8/1/2016 - Present        Claudication of calf muscles ([facility] ICD-10-CM: I73.9  ICD-9-CM: 443.9  8/6/2014 - Present        Hemochromatosis ICD-10-CM: E83.119  ICD-9-CM: 275.03  1/8/2014 - Present        Alcoholism ([facility] ICD-10-CM: F10.20  ICD-9-CM: 303.90  1/8/2014 - Present        Pulmonary nodules ICD-10-CM: R91.8  ICD-9-CM: 793.19  1/8/2014 - Present        COPD (chronic obstructive pulmonary disease) (HCC) ICD-10-CM: J44.9  ICD-9-CM: 496  1/8/2014 - Present        GERD (gastroesophageal reflux disease) ICD-10-CM: K21.9  ICD-9-CM: 530.81  1/8/2014 - Present        HTN (hypertension) ICD-10-CM: I10  ICD-9-CM: 401.9  1/8/2014 - Present        Esophageal stricture ICD-10-CM: K22.2  ICD-9-CM: 530.3  1/8/2014 - Present               Hospital Course: The patient underwent surgery. Intra-operative complications: None; patient tolerated the procedure well. Was taken to the PACU in stable condition and then transferred to the surgical floor. Catheter was maintained and patient had a cystogram on 10/5/20 showing no leak. Perioperative Antibiotics: Cefotetan    Postoperative Pain Management:  Oxycodone     Postoperative transfusions:    Number of units banked PRBCs =   none     Post Op complications: None     Incisions  - clean, dry and intact. No significant erythema or swelling. Wound(s) appear to be healing without any evidence of infection. Patient mobilized with nursing and was found to be safe and steady with ambulation. Discharged to: Home     Condition on Discharge: Stable     Discharge instructions:    - Take pain medications as prescribed  - Diet Low Fiber  - Discharge activity:    - Activity as tolerated    - Ambulate several times a day   - No heavy lifting for 4 weeks   - Do not drive for two weeks or while on opioid pain medications  - Wound Care: Keep wound(s) clean and dry. See discharge instruction sheet. Allergies:     Allergies   Allergen Reactions    Contrast Agent [Iodine] Rash     Hives on back              -DISCHARGE MEDICATION LIST     Current Discharge Medication List      START taking these medications    Details   !! acetaminophen (TYLENOL) 500 mg tablet Take 2 Tabs by mouth every six (6) hours for 5 days. Qty: 40 Tab, Refills: 0      oxyCODONE IR (ROXICODONE) 5 mg immediate release tablet Take 1 Tab by mouth every four (4) hours as needed for Pain for up to 3 days. Max Daily Amount: 30 mg.  Qty: 15 Tab, Refills: 0    Associated Diagnoses: Colovesical fistula       !! - Potential duplicate medications found. Please discuss with provider. CONTINUE these medications which have CHANGED    Details   metoclopramide HCl (REGLAN) 10 mg tablet Take 1 Tab by mouth Before breakfast, lunch, dinner and at bedtime for 10 days. Qty: 40 Tab, Refills: 0         CONTINUE these medications which have NOT CHANGED    Details   !! acetaminophen (Tylenol Extra Strength) 500 mg tablet Take  by mouth every six (6) hours as needed for Pain.      losartan (COZAAR) 25 mg tablet TAKE 1/2 TABLET BY MOUTH DAILY  Qty: 45 Tab, Refills: 3    Comments: **Patient requests 90 days supply**  Associated Diagnoses: Essential hypertension      omeprazole (PRILOSEC) 20 mg capsule TAKE 1 CAPSULE BY MOUTH ONCE DAILY  Qty: 90 Cap, Refills: 0      magnesium oxide (MAG-OX) 400 mg tablet Take 400 mg by mouth daily. pravastatin (PRAVACHOL) 40 mg tablet Take 1 Tab by mouth nightly. Qty: 90 Tab, Refills: 3      aspirin 81 mg chewable tablet Take 81 mg by mouth daily. !! - Potential duplicate medications found. Please discuss with provider.       STOP taking these medications       mupirocin (BACTROBAN) 2 % ointment Comments:   Reason for Stopping:         metroNIDAZOLE (FLAGYL) 500 mg tablet Comments:   Reason for Stopping:         neomycin sulfate (NEOMYCIN PO) Comments:   Reason for Stopping:         mupirocin (BACTROBAN) 2 % ointment Comments:   Reason for Stopping:            per medical continuation form      -Follow up in office in 2 weeks      Signed:  Boby Kemp.  Riley Hampton  MSN, APRN, FNP-C, [facility]  Surgical Nurse Practitioner    10/6/2020  9:30 AM

## 2020-10-06 NOTE — PROGRESS NOTES
Problem: Risk for Spread of Infection  Goal: Prevent transmission of infectious organism to others  Description: Prevent the transmission of infectious organisms to other patients, staff members, and visitors. Outcome: Progressing Towards Goal     Problem: Patient Education:  Go to Education Activity  Goal: Patient/Family Education  Outcome: Progressing Towards Goal     Problem: Falls - Risk of  Goal: *Absence of Falls  Description: Document Reva James Fall Risk and appropriate interventions in the flowsheet.   Outcome: Progressing Towards Goal  Note: Fall Risk Interventions:  Mobility Interventions: Communicate number of staff needed for ambulation/transfer, Patient to call before getting OOB, Utilize walker, cane, or other assistive device         Medication Interventions: Patient to call before getting OOB, Teach patient to arise slowly    Elimination Interventions: Call light in reach, Patient to call for help with toileting needs, Toileting schedule/hourly rounds              Problem: Patient Education: Go to Patient Education Activity  Goal: Patient/Family Education  Outcome: Progressing Towards Goal     Problem: Surgical Pathway Day of Surgery  Goal: Consults, if ordered  Outcome: Progressing Towards Goal  Goal: Nutrition/Diet  Outcome: Progressing Towards Goal  Goal: Medications  Outcome: Progressing Towards Goal  Goal: Respiratory  Outcome: Progressing Towards Goal  Goal: Treatments/Interventions/Procedures  Outcome: Progressing Towards Goal  Goal: Psychosocial  Outcome: Progressing Towards Goal     Problem: Surgical Pathway Post-Op Day 1  Goal: Diagnostic Test/Procedures  Outcome: Progressing Towards Goal  Goal: Nutrition/Diet  Outcome: Progressing Towards Goal  Goal: Discharge Planning  Outcome: Progressing Towards Goal  Goal: Medications  Outcome: Progressing Towards Goal  Goal: Respiratory  Outcome: Progressing Towards Goal  Goal: Treatments/Interventions/Procedures  Outcome: Progressing Towards Goal  Goal: Psychosocial  Outcome: Progressing Towards Goal     Problem: Surgical Pathway Post-Op Day 2 through Discharge  Goal: Medications  Outcome: Progressing Towards Goal  Goal: Respiratory  Outcome: Progressing Towards Goal  Goal: Treatments/Interventions/Procedures  Outcome: Progressing Towards Goal

## 2020-10-06 NOTE — PROGRESS NOTES
Pt with d/c order and states ready for d/c. Pt without PIV. Pt given d/c instructions both written and verbally and voices understanding. Pt will call Dr Rodriguez's office to make f/u appt. Pt given opportunity to have any questions answered. Pts sister providing transportation. Pt left unit via w/c with volunteer escort.

## 2020-10-06 NOTE — ROUTINE PROCESS
General Surgery End of Shift Nursing Note Bedside shift change report given to Topher Daniel RN (oncoming nurse) by Khang Vera RN (offgoing nurse). Report included the following information SBAR, Kardex, Intake/Output, MAR and Recent Results. Shift worked:   7p-7a Summary of shift:    Pt c/o pain and nausea, administered PRNs see MAR. Pt ambulated to restroom x2, tolerated well. PT voiding appropriately. Incision sites intact. Expected D/C today. Issues for physician to address:   none Number times ambulated in hallway past shift: 0 Number of times OOB to chair past shift: 0 Pain Management: 
Current medication: Oxycodone Patient states pain is manageable on current pain medication: YES 
 
GI: 
 
Current diet:  DIET NUTRITIONAL SUPPLEMENTS Breakfast, Lunch; Ensure [employer] DIET DENTAL SOFT (SOFT SOLID) Tolerating current diet: YES Passing flatus: YES Last Bowel Movement: yesterday Respiratory: 
 
Incentive Spirometer at bedside: YES Patient instructed on use: YES Patient Safety: 
 
Falls Score: 3 Bed Alarm On? No 
Sitter? No 
 
Charles Wilson

## 2020-10-07 ENCOUNTER — PATIENT OUTREACH (OUTPATIENT)
Dept: CASE MANAGEMENT | Age: 75
End: 2020-10-07

## 2020-10-07 NOTE — PROGRESS NOTES
Patient contacted regarding recent discharge and COVID-19 risk. Discussed COVID-19 related testing which was not done at this time. Test results were not done. Care Transition Nurse/ Ambulatory Care Manager/ LPN Care Coordinator contacted the patient by telephone to perform post discharge assessment. Verified name and  with patient as identifiers. States pain is not bad, about #2-3. Not taking any of the Oxycodone. Only taking the ESTylenol 500mg 2 tabs q 6 hours for 5 days if needed. Patient has following risk factors of: COPD. CTN/ACM/LPN reviewed discharge instructions, medical action plan and red flags related to discharge diagnosis. Reviewed and educated them on any new and changed medications related to discharge diagnosis. Advised obtaining a 90-day supply of all daily and as-needed medications. Advance Care Planning:   Does patient have an Advance Directive: yes, reviewed and current    Education provided regarding infection prevention, and signs and symptoms of COVID-19 and when to seek medical attention with patient who verbalized understanding. Discussed exposure protocols and quarantine from [address] you at higher risk for severe illness  and given an opportunity for questions and concerns. The patient agrees to contact the COVID-19 hotline [phone] or PCP office for questions related to their healthcare. CTN/ACM/LPN provided contact information for future reference. From CDC: Are you at higher risk for severe illness?  Wash your hands often.  Avoid close contact (6 feet, which is about two arm lengths) with people who are sick.  Put distance between yourself and other people if COVID-19 is spreading in your community.  Clean and disinfect frequently touched surfaces.  Avoid all cruise travel and non-essential air travel.  Call your healthcare professional if you have concerns about COVID-19 and your underlying condition or if you are sick.     For more information on steps you can take to protect yourself, see CDC's How to Protect Yourself      Patient/family/caregiver given information for [employer] and agrees to enroll no  Patient's preferred e-mail:  declines  Patient's preferred phone number: declines      Plan for follow-up call in 7-14 days based on severity of symptoms and risk factors.

## 2020-10-16 ENCOUNTER — PATIENT OUTREACH (OUTPATIENT)
Dept: CASE MANAGEMENT | Age: 75
End: 2020-10-16

## 2020-11-03 PROBLEM — I48.0 PAROXYSMAL ATRIAL FIBRILLATION (HCC): Status: RESOLVED | Noted: 2018-08-19 | Resolved: 2020-11-03

## 2021-01-04 NOTE — PROGRESS NOTES
Patient called to request a refill on Amlodipine-Benazepril 10-40 mg     Pt looks good. 2 BM liquid in nature. Mild nausea and vomit at eating/f/c  vss afebrile    Chest: clear. No rub, gallop  Lung: clear no wheeze  Abd: soft, non tender, + bs, mildly distended, inc c/i    Lab ok, bs: ok    A/P POD # 1, Looks good for now,   2. Will go slow on po  3. Noonan stays due to fistula  4. Ambulate in hallway

## 2021-06-24 ENCOUNTER — OFFICE VISIT (OUTPATIENT)
Dept: FAMILY MEDICINE CLINIC | Age: 76
End: 2021-06-24
Payer: MEDICARE

## 2021-06-24 VITALS
RESPIRATION RATE: 17 BRPM | BODY MASS INDEX: 22.56 KG/M2 | SYSTOLIC BLOOD PRESSURE: 130 MMHG | DIASTOLIC BLOOD PRESSURE: 80 MMHG | HEIGHT: 66 IN | OXYGEN SATURATION: 98 % | TEMPERATURE: 97.3 F | HEART RATE: 82 BPM | WEIGHT: 140.4 LBS

## 2021-06-24 DIAGNOSIS — I10 ESSENTIAL HYPERTENSION: Primary | ICD-10-CM

## 2021-06-24 DIAGNOSIS — F51.01 PRIMARY INSOMNIA: ICD-10-CM

## 2021-06-24 PROCEDURE — G8510 SCR DEP NEG, NO PLAN REQD: HCPCS | Performed by: INTERNAL MEDICINE

## 2021-06-24 PROCEDURE — 1101F PT FALLS ASSESS-DOCD LE1/YR: CPT | Performed by: INTERNAL MEDICINE

## 2021-06-24 PROCEDURE — G8420 CALC BMI NORM PARAMETERS: HCPCS | Performed by: INTERNAL MEDICINE

## 2021-06-24 PROCEDURE — 99213 OFFICE O/P EST LOW 20 MIN: CPT | Performed by: INTERNAL MEDICINE

## 2021-06-24 PROCEDURE — G8536 NO DOC ELDER MAL SCRN: HCPCS | Performed by: INTERNAL MEDICINE

## 2021-06-24 PROCEDURE — G8754 DIAS BP LESS 90: HCPCS | Performed by: INTERNAL MEDICINE

## 2021-06-24 PROCEDURE — G8427 DOCREV CUR MEDS BY ELIG CLIN: HCPCS | Performed by: INTERNAL MEDICINE

## 2021-06-24 PROCEDURE — G8752 SYS BP LESS 140: HCPCS | Performed by: INTERNAL MEDICINE

## 2021-06-24 NOTE — PROGRESS NOTES
Mr. Juan M Nj is a 67 y.o. male who is here for evaluation of   Chief Complaint   Patient presents with    Hypertension     reports BP better recently - Takes BP meds at 1200 and 0000    Insomnia     Stopped seroquel. States nothing helps let him get sleep   . ASSESSMENT AND PLAN:    1. Essential hypertension  He is at goal.      2. Primary insomnia  Sleep retraining discussed. No orders of the defined types were placed in this encounter. HPI  Insomnia for years. Desires \"sleeping pill\". Has failed OTC meds, melatonin, seroquel, trazodone. Typically will fall asleep at 0600 and sleep until 11:00. BP in the am typically in the 130 range. ROS:  Denies  fever, chills, cough, chest pain, SOB,  nausea, vomiting, or diarrhea. Denies wt loss, wt gain, hemoptysis, hematochezia or melena. Physical Examination:    Visit Vitals  /80   Pulse 82   Temp 97.3 °F (36.3 °C) (Temporal)   Resp 17   Ht 5' 6\" (1.676 m)   Wt 140 lb 6.4 oz (63.7 kg)   SpO2 98%   BMI 22.66 kg/m²      General:  Alert, cooperative, no distress. Head:  Normocephalic, without obvious abnormality, atraumatic. Eyes:  Conjunctivae/corneas clear. Pupils equal, round, reactive to light. Extraocular movements intact. Lungs:   Clear to auscultation bilaterally. Chest wall:  No tenderness or deformity. Cardiac:  RRR   Abdomen:   Soft, non-tender. Bowel sounds normal. No masses. No organomegaly. Extremities: Extremities normal, atraumatic, no cyanosis or edema. Pulses: 2+ and symmetric all extremities. Skin: Skin color, texture, turgor normal. No rashes or lesions. Lymph nodes: Cervical, supraclavicular, and axillary nodes normal.   Neurologic: CNII-XII intact. Normal strength, sensation, and reflexes throughout.      On this date 06/24/2021 I have spent 20 minutes reviewing previous notes, test results and face to face with the patient discussing the diagnosis and importance of compliance with the treatment plan as well as documenting on the day of the visit.     Delroy Jacobson MD FACP    (signed electronically) on 6/24/2021 at 2:27 PM

## 2021-06-24 NOTE — PROGRESS NOTES
Blayne Roberts is a 67 y.o. male presenting for/with:    Chief Complaint   Patient presents with    Hypertension     reports BP better recently - Takes BP meds at 1200 and 0000    Insomnia     Stopped seroquel. States nothing helps let him get sleep       Visit Vitals  BP (!) 172/80 (BP 1 Location: Left upper arm, BP Patient Position: Sitting, BP Cuff Size: Adult long)   Pulse 82   Temp 97.3 °F (36.3 °C) (Temporal)   Resp 17   Ht 5' 6\" (1.676 m)   Wt 140 lb 6.4 oz (63.7 kg)   SpO2 98%   BMI 22.66 kg/m²     Pain Scale: 0 - No pain/10  Pain Location:     1. Have you been to the ER, urgent care clinic since your last visit? Hospitalized since your last visit? NO    2. Have you seen or consulted any other health care providers outside of the [address] [place] [address] since your last visit? Include any pap smears or colon screening. NO    Symptom review:  NO  Fever   NO  Shaking chills  NO  Cough  NO  Body aches  NO  Coughing up blood  NO  Chest congestion  NO  Chest pain  NO  Shortness of breath  NO  Profound Loss of smell/taste  NO  Nausea/Vomiting   NO  Loose stool/Diarrhea  NO  any skin issues    Patient Risk Factors Reviewed as follows:  NO  have you been in Close contact with confirmed COVID19 patient   NO  History of recent travel to affected geographical areas within the past 14 days  NO  COPD  NO  Active Cancer/Leukemia/Lymphoma/Chemotherapy  NO  Oral steroid use  NO  Pregnant  NO  Diabetes Mellitus  YES  Heart disease  NO  Asthma  NO Health care worker at home  [address] care worker  NO Is there a Pregnant Woman in the home  NO Dialysis pt in the home   NO a large number of people living in the home    Learning Assessment 6/24/2021   PRIMARY LEARNER Patient   PRIMARY LANGUAGE ENGLISH   LEARNER PREFERENCE PRIMARY LISTENING     -   ANSWERED BY patient   RELATIONSHIP SELF     Fall Risk Assessment, last 12 mths 6/24/2021   Able to walk? Yes   Fall in past 12 months? 0   Do you feel unsteady?  0   Are you worried about falling 0   Number of falls in past 12 months -   Fall with injury? -       3 most recent PHQ Screens 6/24/2021   PHQ Not Done -   Little interest or pleasure in doing things Not at all   Feeling down, depressed, irritable, or hopeless Not at all   Total Score PHQ 2 0     Abuse Screening Questionnaire 6/24/2021   Do you ever feel afraid of your partner? N   Are you in a relationship with someone who physically or mentally threatens you? N   Is it safe for you to go home? Y       ADL Assessment 6/24/2021   Feeding yourself No Help Needed   Getting from bed to chair No Help Needed   Getting dressed No Help Needed   Bathing or showering No Help Needed   Walk across the room (includes cane/walker) No Help Needed   Using the telphone No Help Needed   Taking your medications No Help Needed   Preparing meals No Help Needed   Managing money (expenses/bills) No Help Needed   Moderately strenuous housework (laundry) No Help Needed   Shopping for personal items (toiletries/medicines) No Help Needed   Shopping for groceries No Help Needed   Driving No Help Needed   Climbing a flight of stairs No Help Needed   Getting to places beyond walking distances No Help Needed      Advance directive on file and verified. Brother Brandan Barbour passed away in 2021.  Emergency contact updated

## 2021-06-24 NOTE — ACP (ADVANCE CARE PLANNING)
Advance directive on file and verified. Brother Alan Early passed away in 2021.  Emergency contact updated

## 2021-07-18 PROBLEM — E43 SEVERE PROTEIN-CALORIE MALNUTRITION (HCC): Chronic | Status: RESOLVED | Noted: 2020-01-22 | Resolved: 2021-07-18

## 2021-07-18 PROBLEM — F10.939 ALCOHOL WITHDRAWAL (HCC): Status: RESOLVED | Noted: 2019-12-22 | Resolved: 2021-07-18

## 2021-07-18 PROBLEM — L89.890 PRESSURE INJURY OF LEFT FOOT, UNSTAGEABLE (HCC): Chronic | Status: RESOLVED | Noted: 2020-01-24 | Resolved: 2021-07-18

## 2021-07-18 NOTE — PROGRESS NOTES
Mr. Bk Fernandez is a 67 y.o. male who is here for evaluation of   Chief Complaint   Patient presents with    Annual Wellness Visit    Hypertension     routine F/U    Ear Drainage     States (L) ear is oozing around the formation of his ear. .       ASSESSMENT AND PLAN:    1. Medicare annual wellness visit, subsequent  2. PVD (peripheral vascular disease) ([address]  Needs repeat carotid doppler  3. Encounter for hepatitis C screening test for low risk patient  4. Mixed hyperlipidemia  5. Bruit of left carotid artery  - DUPLEX CAROTID BILATERAL; Future      Orders Placed This Encounter    DUPLEX CAROTID BILATERAL     Standing Status:   Future     Standing Expiration Date:   1/22/2022     Scheduling Instructions:      [facility]           HPI   65-year-old gentleman who lives alone who has recently recovered from surgery resecting a fistula between the colon and bladder.  Hypertension has been an issue and we have recently adjusted his medications 2 months ago and he returns today for interval assessment. He has a history of a left carotid bruit--remote eval was 40% several years ago     ROS:  Denies fever, chills, cough, chest pain, SOB,  nausea, vomiting, or diarrhea. Denies wt loss, wt gain, hemoptysis, hematochezia or melena. Physical Examination:    Visit Vitals  BP (!) 162/78 (BP 1 Location: Left upper arm, BP Patient Position: Sitting, BP Cuff Size: Adult long)   Pulse 96   Temp 97.8 °F (36.6 °C) (Temporal)   Resp 19   Ht 5' 6\" (1.676 m)   Wt 140 lb 3.2 oz (63.6 kg)   SpO2 100%   BMI 22.63 kg/m²      General:  Alert, cooperative, no distress. Head:  Normocephalic, without obvious abnormality, atraumatic. Left carotid bruit   Eyes:  Conjunctivae/corneas clear. Pupils equal, round, reactive to light. Extraocular movements intact. Lungs:   Clear to auscultation bilaterally. Chest wall:  No tenderness or deformity. Cardiac:  RRR   Abdomen:   Soft, non-tender. Bowel sounds normal. No masses.  No organomegaly. Extremities: Extremities normal, atraumatic, no cyanosis or edema. Pulses: 2+ and symmetric all extremities. Skin: Smooth pink lesion inside tragus AS   Lymph nodes: Cervical, supraclavicular, and axillary nodes normal.   Neurologic: CNII-XII intact. Normal strength, sensation, and reflexes throughout. On this date 07/22/2021 I have spent 30 minutes reviewing previous notes, test results and face to face with the patient discussing the diagnosis and importance of compliance with the treatment plan as well as documenting on the day of the visit.     Will Gatica MD FACP    (signed electronically) on 7/22/2021 at 1:46 PM

## 2021-07-22 ENCOUNTER — OFFICE VISIT (OUTPATIENT)
Dept: FAMILY MEDICINE CLINIC | Age: 76
End: 2021-07-22
Payer: MEDICARE

## 2021-07-22 VITALS
DIASTOLIC BLOOD PRESSURE: 78 MMHG | HEART RATE: 96 BPM | BODY MASS INDEX: 22.53 KG/M2 | HEIGHT: 66 IN | TEMPERATURE: 97.8 F | OXYGEN SATURATION: 100 % | SYSTOLIC BLOOD PRESSURE: 162 MMHG | RESPIRATION RATE: 19 BRPM | WEIGHT: 140.2 LBS

## 2021-07-22 DIAGNOSIS — Z00.00 MEDICARE ANNUAL WELLNESS VISIT, SUBSEQUENT: Primary | ICD-10-CM

## 2021-07-22 DIAGNOSIS — H93.90 EAR LESION: ICD-10-CM

## 2021-07-22 DIAGNOSIS — E78.2 MIXED HYPERLIPIDEMIA: ICD-10-CM

## 2021-07-22 DIAGNOSIS — Z11.59 ENCOUNTER FOR HEPATITIS C SCREENING TEST FOR LOW RISK PATIENT: ICD-10-CM

## 2021-07-22 DIAGNOSIS — R09.89 BRUIT OF LEFT CAROTID ARTERY: ICD-10-CM

## 2021-07-22 DIAGNOSIS — I73.9 PVD (PERIPHERAL VASCULAR DISEASE) (HCC): ICD-10-CM

## 2021-07-22 PROCEDURE — G0439 PPPS, SUBSEQ VISIT: HCPCS | Performed by: INTERNAL MEDICINE

## 2021-07-22 PROCEDURE — G8427 DOCREV CUR MEDS BY ELIG CLIN: HCPCS | Performed by: INTERNAL MEDICINE

## 2021-07-22 PROCEDURE — 1101F PT FALLS ASSESS-DOCD LE1/YR: CPT | Performed by: INTERNAL MEDICINE

## 2021-07-22 PROCEDURE — G8420 CALC BMI NORM PARAMETERS: HCPCS | Performed by: INTERNAL MEDICINE

## 2021-07-22 PROCEDURE — G8510 SCR DEP NEG, NO PLAN REQD: HCPCS | Performed by: INTERNAL MEDICINE

## 2021-07-22 PROCEDURE — G8754 DIAS BP LESS 90: HCPCS | Performed by: INTERNAL MEDICINE

## 2021-07-22 PROCEDURE — G8536 NO DOC ELDER MAL SCRN: HCPCS | Performed by: INTERNAL MEDICINE

## 2021-07-22 PROCEDURE — G8753 SYS BP > OR = 140: HCPCS | Performed by: INTERNAL MEDICINE

## 2021-07-22 NOTE — PATIENT INSTRUCTIONS
Medicare Wellness Visit, Male    The best way to live healthy is to have a lifestyle where you eat a well-balanced diet, exercise regularly, limit alcohol use, and quit all forms of tobacco/nicotine, if applicable. Regular preventive services are another way to keep healthy. Preventive services (vaccines, screening tests, monitoring & exams) can help personalize your care plan, which helps you manage your own care. Screening tests can find health problems at the earliest stages, when they are easiest to treat. [facility] follows the current, evidence-based guidelines published by the [facility]am[facility] Leonardo ([facility]) when recommending preventive services for our patients. Because we follow these guidelines, sometimes recommendations change over time as research supports it. (For example, a prostate screening blood test is no longer routinely recommended for men with no symptoms). Of course, you and your doctor may decide to screen more often for some diseases, based on your risk and co-morbidities (chronic disease you are already diagnosed with). Preventive services for you include:  - Medicare offers their members a free annual wellness visit, which is time for you and your primary care provider to discuss and plan for your preventive service needs. Take advantage of this benefit every year!  -All adults over age 72 should receive the recommended pneumonia vaccines. Current USPSTF guidelines recommend a series of two vaccines for the best pneumonia protection.   -All adults should have a flu vaccine yearly and tetanus vaccine every 10 years.  -All adults age 48 and older should receive the shingles vaccines (series of two vaccines).        -All adults age 38-68 who are overweight should have a diabetes screening test once every three years.   -Other screening tests & preventive services for persons with diabetes include: an eye exam to screen for diabetic retinopathy, a kidney function test, a foot exam, and stricter control over your cholesterol.   -Cardiovascular screening for adults with routine risk involves an electrocardiogram (ECG) at intervals determined by the provider.   -Colorectal cancer screening should be done for adults age 54-65 with no increased risk factors for colorectal cancer. There are a number of acceptable methods of screening for this type of cancer. Each test has its own benefits and drawbacks. Discuss with your provider what is most appropriate for you during your annual wellness visit. The different tests include: colonoscopy (considered the best screening method), a fecal occult blood test, a fecal DNA test, and sigmoidoscopy.  -All adults born between [facility] should be screened once for Hepatitis C.  -An Abdominal Aortic Aneurysm (AAA) Screening is recommended for men age 73-68 who has ever smoked in their lifetime.      Here is a list of your current Health Maintenance items (your personalized list of preventive services) with a due date:  Health Maintenance Due   Topic Date Due    Hepatitis C Test  Never done    Cholesterol Test   07/14/2021

## 2021-07-22 NOTE — PROGRESS NOTES
Anjana Vegas is a 67 y.o. male presenting for/with:    Chief Complaint   Patient presents with    Annual Wellness Visit    Hypertension     routine F/U    Ear Drainage     States (L) ear is oozing around the formation of his ear. Visit Vitals  BP (!) 162/78 (BP 1 Location: Left upper arm, BP Patient Position: Sitting, BP Cuff Size: Adult long)   Pulse 96   Temp 97.8 °F (36.6 °C) (Temporal)   Resp 19   Ht 5' 6\" (1.676 m)   Wt 140 lb 3.2 oz (63.6 kg)   SpO2 100%   BMI 22.63 kg/m²     Pain Scale: 0 - No pain/10  Pain Location:     1. Have you been to the ER, urgent care clinic since your last visit? Hospitalized since your last visit? NO    2. Have you seen or consulted any other health care providers outside of the [address] [place] [address] since your last visit? Include any pap smears or colon screening. NO    Symptom review:  NO  Fever   NO  Shaking chills  NO  Cough  NO  Body aches  NO  Coughing up blood  NO  Chest congestion  NO  Chest pain  NO  Shortness of breath  NO  Profound Loss of smell/taste  NO  Nausea/Vomiting   NO  Loose stool/Diarrhea  YES  any skin issues    Patient Risk Factors Reviewed as follows:  NO  have you been in Close contact with confirmed COVID19 patient   NO  History of recent travel to affected geographical areas within the past 14 days  NO  COPD  NO  Active Cancer/Leukemia/Lymphoma/Chemotherapy  NO  Oral steroid use  NO  Pregnant  NO  Diabetes Mellitus  NO  Heart disease  NO  Asthma  NO Health care worker at home  [address] care worker  NO Is there a Pregnant Woman in the home  NO Dialysis pt in the home   NO a large number of people living in the home    Learning Assessment 7/22/2021   PRIMARY LEARNER Patient   PRIMARY LANGUAGE ENGLISH   LEARNER PREFERENCE PRIMARY DEMONSTRATION     -   ANSWERED BY patient   RELATIONSHIP SELF     Fall Risk Assessment, last 12 mths 7/22/2021   Able to walk? Yes   Fall in past 12 months? 0   Do you feel unsteady?  0   Are you worried about falling 0   Number of falls in past 12 months -   Fall with injury? -       3 most recent PHQ Screens 7/22/2021   PHQ Not Done -   Little interest or pleasure in doing things Not at all   Feeling down, depressed, irritable, or hopeless Not at all   Total Score PHQ 2 0     Abuse Screening Questionnaire 7/22/2021   Do you ever feel afraid of your partner? N   Are you in a relationship with someone who physically or mentally threatens you? N   Is it safe for you to go home? Y       ADL Assessment 7/22/2021   Feeding yourself No Help Needed   Getting from bed to chair No Help Needed   Getting dressed No Help Needed   Bathing or showering No Help Needed   Walk across the room (includes cane/walker) No Help Needed   Using the telphone No Help Needed   Taking your medications No Help Needed   Preparing meals No Help Needed   Managing money (expenses/bills) No Help Needed   Moderately strenuous housework (laundry) No Help Needed   Shopping for personal items (toiletries/medicines) No Help Needed   Shopping for groceries No Help Needed   Driving No Help Needed   Climbing a flight of stairs No Help Needed   Getting to places beyond walking distances No Help Needed      Advance directive on file. Verified emergency contact      This is the Subsequent Medicare Annual Wellness Exam, performed 12 months or more after the Initial AWV or the last Subsequent AWV    I have reviewed the patient's medical history in detail and updated the computerized patient record. Assessment/Plan   Education and counseling provided:  Are appropriate based on today's review and evaluation    1. Medicare annual wellness visit, subsequent  2. PVD (peripheral vascular disease) ([facility] [address]  3. Encounter for hepatitis C screening test for low risk patient  -     HEPATITIS C AB; Future  4. Mixed hyperlipidemia  -     LIPID PANEL;  Future       Depression Risk Factor Screening     3 most recent PHQ Screens 7/22/2021   PHQ Not Done -   Little interest or pleasure in doing things Not at all   Feeling down, depressed, irritable, or hopeless Not at all   Total Score PHQ 2 0       Alcohol Risk Screen    Do you average more than 1 drink per night or more than 7 drinks a week: No    In the past three months have you have had more than 4 drinks containing alcohol on one occasion: No        Functional Ability and Level of Safety    Hearing: Hearing is good. Activities of Daily Living: The home contains: no safety equipment. Patient does total self care      Ambulation: with no difficulty     Fall Risk:  Fall Risk Assessment, last 12 mths 7/22/2021   Able to walk? Yes   Fall in past 12 months? 0   Do you feel unsteady? 0   Are you worried about falling 0   Number of falls in past 12 months -   Fall with injury?  -      Abuse Screen:  Patient is not abused       Cognitive Screening    Has your family/caregiver stated any concerns about your memory: no       Health Maintenance Due     Health Maintenance Due   Topic Date Due    Hepatitis C Screening  Never done    Lipid Screen  07/14/2021       Patient Care Team   Patient Care Team:  Umesh Balbuena MD as PCP - General (Internal Medicine)  Umesh Balbuena MD [facility] PCP [facility]  Jazmín Bull MD (Surgery)    History     Patient Active Problem List   Diagnosis Code    Hemochromatosis E83.119    Pulmonary nodules R91.8    GERD (gastroesophageal reflux disease) K21.9    HTN (hypertension) I10    Esophageal stricture K22.2    Claudication of calf muscles (HCC) I73.9    B12 deficiency E53.8    Colonic mass K63.89    Diarrhea R19.7    Leukocytosis D72.829    Hypokalemia E87.6    Hypomagnesemia E83.42    Intra-abdominal abscess ([address] K65.1    GI bleed K92.2    Colovesical fistula N32.1    Blurring of visual image H53.8    Decubitus ulcer of sacral region, stage 2 ([address] L89.152    Disorder of electrolytes E87.8    Essential hypertension I10    PVD (peripheral vascular disease) ([address] I73.9    Cystitis N30.90    Eczema L30.9    Cystitis, acute N30.00    Encounter for rehabilitation Z51.89    Tobacco abuse Z72.0    Macrocytic anemia D53.9    Neuropathy G62.9     Past Medical History:   Diagnosis Date    Abuse     alcoholism    [facility] [address]     Chronic obstructive pulmonary disease ([facility] [address]     pt denies    Chronic pain     neck /arthritis-injections    Claudication of calf muscles (HCC) 2013    Colovesical fistula     Dr Sarahy Bailey    Esophageal stricture     Esophagitis     GI bleed 10/2016    Hemochromatosis     Hyperlipidemia     Hypertension     Ill-defined condition 2020    blood transfusion hx    Peripheral artery disease (HCC)     Dr. Brooks Hodgkin Pulmonary nodule     right lung    Stroke [facility])       Past Surgical History:   Procedure Laterality Date    COLONOSCOPY N/A 9/14/2016    COLONOSCOPY performed by Nilsa Trejo MD at [facility] ENDOSCOPY    COLONOSCOPY N/A 12/19/2016    COLONOSCOPY performed by Nilsa Trejo MD at [facility] ENDOSCOPY    COLONOSCOPY N/A 9/30/2020    COLONOSCOPY performed by Efren Gomez MD at [facility] ENDOSCOPY    HX AMPUTATION Left 07/2016    left 4th toe from gangrene    HX AMPUTATION Left     transmetatarsal    HX CATARACT REMOVAL Bilateral     HX ENDOSCOPY  10/2016    HX OTHER SURGICAL      partial transmetatarsal amputation, lt foot all toes amputated and rt foot has 2nd tow amputation    HX TONSILLECTOMY      VASCULAR SURGERY PROCEDURE UNLIST  01/2020    axillo-bifemoral     Current Outpatient Medications   Medication Sig Dispense Refill    pravastatin (PRAVACHOL) 40 mg tablet TAKE 1 TABLET BY MOUTH EVERY NIGHT 90 Tab 0    hydroCHLOROthiazide (HYDRODIURIL) 25 mg tablet Take 1 Tab by mouth daily. 90 Tab 4    losartan (COZAAR) 50 mg tablet Take 1 Tab by mouth nightly. 90 Tab 4    amLODIPine (NORVASC) 5 mg tablet Take 1 Tab by mouth daily.  90 Tab 4    omeprazole (PRILOSEC) 20 mg capsule TAKE 1 CAPSULE BY MOUTH ONCE DAILY 90 Cap 0    acetaminophen (Tylenol Extra Strength) 500 mg tablet Take  by mouth every six (6) hours as needed for Pain.  aspirin 81 mg chewable tablet Take 81 mg by mouth daily.        Allergies   Allergen Reactions    Contrast Agent [Iodine] Rash     Hives on back       Family History   Problem Relation Age of Onset    Stroke Mother     Alzheimer Father      Social History     Tobacco Use    Smoking status: Former Smoker     Packs/day: 0.00     Years: 50.00     Pack years: 0.00     Types: Cigarettes     Quit date: 1/1/2018     Years since quitting: 3.5    Smokeless tobacco: Never Used   Substance Use Topics    Alcohol use: Not Currently     Alcohol/week: 21.0 standard drinks     Types: 21 Glasses of wine per week     Comment: pt states stopped drinking over 1 year ago         [place]

## 2021-07-26 ENCOUNTER — HOSPITAL ENCOUNTER (OUTPATIENT)
Dept: ULTRASOUND IMAGING | Age: 76
Discharge: HOME | End: 2021-07-26
Attending: INTERNAL MEDICINE
Payer: MEDICARE

## 2021-07-26 DIAGNOSIS — R09.89 BRUIT OF LEFT CAROTID ARTERY: ICD-10-CM

## 2021-07-26 LAB
LEFT CCA DIST DIAS: 20.6 CM/S
LEFT CCA DIST SYS: 76.2 CM/S
LEFT CCA PROX DIAS: 15.6 CM/S
LEFT CCA PROX SYS: 86.7 CM/S
LEFT ECA DIAS: 21.66 CM/S
LEFT ECA SYS: 225.7 CM/S
LEFT ICA DIST DIAS: 31.3 CM/S
LEFT ICA DIST SYS: 118 CM/S
LEFT ICA MID DIAS: 40.4 CM/S
LEFT ICA MID SYS: 217.5 CM/S
LEFT ICA PROX DIAS: 52.4 CM/S
LEFT ICA PROX SYS: 241 CM/S
LEFT ICA/CCA SYS: 3.16
LEFT VERTEBRAL DIAS: 9.93 CM/S
LEFT VERTEBRAL SYS: 42.6 CM/S
RIGHT CCA DIST DIAS: 15.5 CM/S
RIGHT CCA DIST SYS: 86 CM/S
RIGHT CCA PROX DIAS: 11.8 CM/S
RIGHT CCA PROX SYS: 81.2 CM/S
RIGHT ECA DIAS: 6.04 CM/S
RIGHT ECA SYS: 143.4 CM/S
RIGHT ICA DIST DIAS: 15.5 CM/S
RIGHT ICA DIST SYS: 94.3 CM/S
RIGHT ICA MID DIAS: 24.9 CM/S
RIGHT ICA MID SYS: 116.2 CM/S
RIGHT ICA PROX DIAS: 30.3 CM/S
RIGHT ICA PROX SYS: 125.2 CM/S
RIGHT ICA/CCA SYS: 1.5
RIGHT VERTEBRAL DIAS: 10.31 CM/S
RIGHT VERTEBRAL SYS: 53.4 CM/S

## 2021-07-26 PROCEDURE — 93880 EXTRACRANIAL BILAT STUDY: CPT

## 2021-07-26 NOTE — PROGRESS NOTES
Results have been discussed with the patient. He will need a repeat Carotid Doppler in a year. You do not need to take any action on this.   Mariann Kirk

## 2021-07-27 ENCOUNTER — TELEPHONE (OUTPATIENT)
Dept: FAMILY MEDICINE CLINIC | Age: 76
End: 2021-07-27

## 2021-07-27 NOTE — TELEPHONE ENCOUNTER
Appointment set for Dr Steve Martines (Dermatology) on August 12 @3:15pm. Office number [phone] Patient called no answer, message left with details.

## 2021-08-03 PROBLEM — I73.9 PVD (PERIPHERAL VASCULAR DISEASE) (HCC): Status: RESOLVED | Noted: 2019-12-16 | Resolved: 2021-08-03

## 2021-10-16 PROBLEM — L89.152 DECUBITUS ULCER OF SACRAL REGION, STAGE 2 (HCC): Status: RESOLVED | Noted: 2018-08-21 | Resolved: 2021-10-16

## 2021-10-16 NOTE — PROGRESS NOTES
Mr. Claudia Carter is a 67 y.o. male who is here for evaluation of   Chief Complaint   Patient presents with    Hypertension     3 month follow up   . ASSESSMENT AND PLAN:    1. PVD (peripheral vascular disease) ([address]  2. Bruit of left carotid artery  Asymptomatic. Continue Pravastatin. 3. Essential hypertension  He is at goal  4. Mixed hyperlipidemia    Orders Placed This Encounter    HEPATITIS C AB     Standing Status:   Future     Standing Expiration Date:   10/29/2021    LIPID PANEL     Standing Status:   Future     Standing Expiration Date:   73/44/1116    METABOLIC PANEL, BASIC     Standing Status:   Future     Standing Expiration Date:   10/29/2021           HPI 70-year-old gentleman who lives alone who has recently recovered from surgery resecting a fistula between the colon and bladder.  Hypertension has been an issue and we have recently adjusted his medications 2 months ago and he returns today for interval assessment. He has a history of a left carotid bruit--remote eval was 40% several years ago      ROS:  Denies  fever, chills, cough, chest pain, SOB,  nausea, vomiting, or diarrhea. Denies wt loss, wt gain, hemoptysis, hematochezia or melena. Physical Examination:    Visit Vitals  /62   Pulse 78   Temp 97.5 °F (36.4 °C) (Temporal)   Resp 18   Ht 5' 6\" (1.676 m)   Wt 133 lb 3.2 oz (60.4 kg)   SpO2 97%   BMI 21.50 kg/m²      General:  Alert, cooperative, no distress. Head:  Normocephalic, without obvious abnormality, atraumatic. Eyes:  Conjunctivae/corneas clear. Pupils equal, round, reactive to light. Extraocular movements intact. Lungs:   Clear to auscultation bilaterally. Chest wall:  No tenderness or deformity. Cardiac:  RRR   Abdomen:   Soft, non-tender. Bowel sounds normal. No masses. No organomegaly. Extremities: Extremities normal, atraumatic, no cyanosis or edema. Pulses: 2+ and symmetric all extremities.    Skin: Skin color, texture, turgor normal. No rashes or lesions. Lymph nodes: Cervical, supraclavicular, and axillary nodes normal.   Neurologic: CNII-XII intact. Normal strength, sensation, and reflexes throughout. On this date 10/21/2021 I have spent 30 minutes reviewing previous notes, test results and face to face with the patient discussing the diagnosis and importance of compliance with the treatment plan as well as documenting on the day of the visit.     Laina Barbour MD FACP    (signed electronically) on 10/21/2021 at 1:35 PM

## 2021-10-21 ENCOUNTER — OFFICE VISIT (OUTPATIENT)
Dept: FAMILY MEDICINE CLINIC | Age: 76
End: 2021-10-21
Payer: MEDICARE

## 2021-10-21 VITALS
HEART RATE: 78 BPM | WEIGHT: 133.2 LBS | TEMPERATURE: 97.5 F | RESPIRATION RATE: 18 BRPM | OXYGEN SATURATION: 97 % | HEIGHT: 66 IN | SYSTOLIC BLOOD PRESSURE: 132 MMHG | BODY MASS INDEX: 21.41 KG/M2 | DIASTOLIC BLOOD PRESSURE: 62 MMHG

## 2021-10-21 DIAGNOSIS — E78.2 MIXED HYPERLIPIDEMIA: ICD-10-CM

## 2021-10-21 DIAGNOSIS — R09.89 BRUIT OF LEFT CAROTID ARTERY: ICD-10-CM

## 2021-10-21 DIAGNOSIS — I10 ESSENTIAL HYPERTENSION: ICD-10-CM

## 2021-10-21 DIAGNOSIS — Z11.59 ENCOUNTER FOR HEPATITIS C SCREENING TEST FOR LOW RISK PATIENT: ICD-10-CM

## 2021-10-21 DIAGNOSIS — I73.9 PVD (PERIPHERAL VASCULAR DISEASE) (HCC): Primary | ICD-10-CM

## 2021-10-21 PROCEDURE — 99214 OFFICE O/P EST MOD 30 MIN: CPT | Performed by: INTERNAL MEDICINE

## 2021-10-21 NOTE — PROGRESS NOTES
Anna Ackerman is a 67 y.o. male presenting for/with:    Chief Complaint   Patient presents with    Hypertension     3 month follow up       Visit Vitals  /62   Pulse 78   Temp 97.5 °F (36.4 °C) (Temporal)   Resp 18   Ht 5' 6\" (1.676 m)   Wt 133 lb 3.2 oz (60.4 kg)   SpO2 97%   BMI 21.50 kg/m²     Pain Scale: 0 - No pain/10  Pain Location:     1. Have you been to the ER, urgent care clinic since your last visit? Hospitalized since your last visit? NO    2. Have you seen or consulted any other health care providers outside of the [address] [place] [address] since your last visit? Include any pap smears or colon screening. NO    Symptom review:  NO  Fever   NO  Shaking chills  NO  Cough  NO  Body aches  NO  Coughing up blood  NO  Chest congestion  NO  Chest pain  NO  Shortness of breath  NO  Profound Loss of smell/taste  NO  Nausea/Vomiting   NO  Loose stool/Diarrhea  NO  any skin issues    Patient Risk Factors Reviewed as follows:  NO  have you been in Close contact with confirmed COVID19 patient   NO  History of recent travel to affected geographical areas within the past 14 days  NO  COPD  NO  Active Cancer/Leukemia/Lymphoma/Chemotherapy  NO  Oral steroid use  NO  Pregnant  NO  Diabetes Mellitus  NO  Heart disease  NO  Asthma  NO Health care worker at home  [address] care worker  NO Is there a Pregnant Woman in the home  NO Dialysis pt in the home   NO a large number of people living in the home    Learning Assessment 10/21/2021   PRIMARY LEARNER Patient   PRIMARY LANGUAGE ENGLISH   LEARNER PREFERENCE PRIMARY DEMONSTRATION     -   ANSWERED BY pt   RELATIONSHIP SELF     Fall Risk Assessment, last 12 mths 10/21/2021   Able to walk? Yes   Fall in past 12 months? 0   Do you feel unsteady? 0   Are you worried about falling 0   Number of falls in past 12 months -   Fall with injury?  -       3 most recent PHQ Screens 10/21/2021   PHQ Not Done -   Little interest or pleasure in doing things Not at all   Feeling down, depressed, irritable, or hopeless Not at all   Total Score PHQ 2 0     Abuse Screening Questionnaire 10/21/2021   Do you ever feel afraid of your partner? N   Are you in a relationship with someone who physically or mentally threatens you? N   Is it safe for you to go home? Y       ADL Assessment 10/21/2021   Feeding yourself No Help Needed   Getting from bed to chair No Help Needed   Getting dressed No Help Needed   Bathing or showering No Help Needed   Walk across the room (includes cane/walker) No Help Needed   Using the telphone No Help Needed   Taking your medications No Help Needed   Preparing meals No Help Needed   Managing money (expenses/bills) No Help Needed   Moderately strenuous housework (laundry) No Help Needed   Shopping for personal items (toiletries/medicines) No Help Needed   Shopping for groceries No Help Needed   Driving No Help Needed   Climbing a flight of stairs No Help Needed   Getting to places beyond walking distances No Help Needed      Advanced directives on file. Verified emergency contact.

## 2021-10-22 LAB
ANION GAP SERPL CALC-SCNC: 7 MMOL/L (ref 5–15)
BUN SERPL-MCNC: 18 MG/DL (ref 6–20)
BUN/CREAT SERPL: 18 (ref 12–20)
CALCIUM SERPL-MCNC: 9.2 MG/DL (ref 8.5–10.1)
CHLORIDE SERPL-SCNC: 103 MMOL/L (ref 97–108)
CHOLEST SERPL-MCNC: 189 MG/DL
CO2 SERPL-SCNC: 28 MMOL/L (ref 21–32)
CREAT SERPL-MCNC: 1.01 MG/DL (ref 0.7–1.3)
GLUCOSE SERPL-MCNC: 97 MG/DL (ref 65–100)
HCV AB SERPL QL IA: NONREACTIVE
HDLC SERPL-MCNC: 41 MG/DL
HDLC SERPL: 4.6 {RATIO} (ref 0–5)
LDLC SERPL CALC-MCNC: 101 MG/DL (ref 0–100)
POTASSIUM SERPL-SCNC: 3.4 MMOL/L (ref 3.5–5.1)
SODIUM SERPL-SCNC: 138 MMOL/L (ref 136–145)
TRIGL SERPL-MCNC: 235 MG/DL (ref ?–150)
VLDLC SERPL CALC-MCNC: 47 MG/DL

## 2021-12-27 RX ORDER — OMEPRAZOLE 20 MG/1
CAPSULE, DELAYED RELEASE ORAL
Qty: 90 CAPSULE | Refills: 0 | Status: SHIPPED | OUTPATIENT
Start: 2021-12-27 | End: 2022-01-24 | Stop reason: SDUPTHER

## 2022-01-06 DIAGNOSIS — I10 ESSENTIAL HYPERTENSION: ICD-10-CM

## 2022-01-06 RX ORDER — HYDROCHLOROTHIAZIDE 25 MG/1
TABLET ORAL
Qty: 90 TABLET | Refills: 4 | Status: SHIPPED | OUTPATIENT
Start: 2022-01-06

## 2022-01-07 RX ORDER — LOSARTAN POTASSIUM 25 MG/1
TABLET ORAL
Qty: 90 TABLET | Refills: 4 | Status: SHIPPED | OUTPATIENT
Start: 2022-01-07

## 2022-01-24 ENCOUNTER — OFFICE VISIT (OUTPATIENT)
Dept: FAMILY MEDICINE CLINIC | Age: 77
End: 2022-01-24
Payer: MEDICARE

## 2022-01-24 VITALS
RESPIRATION RATE: 17 BRPM | TEMPERATURE: 98.2 F | DIASTOLIC BLOOD PRESSURE: 68 MMHG | HEIGHT: 66 IN | OXYGEN SATURATION: 98 % | WEIGHT: 137.2 LBS | BODY MASS INDEX: 22.05 KG/M2 | HEART RATE: 41 BPM | SYSTOLIC BLOOD PRESSURE: 170 MMHG

## 2022-01-24 DIAGNOSIS — R07.9 CHEST PAIN, UNSPECIFIED TYPE: Primary | ICD-10-CM

## 2022-01-24 DIAGNOSIS — I10 ESSENTIAL HYPERTENSION: ICD-10-CM

## 2022-01-24 PROCEDURE — 93005 ELECTROCARDIOGRAM TRACING: CPT | Performed by: INTERNAL MEDICINE

## 2022-01-24 PROCEDURE — 93010 ELECTROCARDIOGRAM REPORT: CPT | Performed by: INTERNAL MEDICINE

## 2022-01-24 PROCEDURE — 99214 OFFICE O/P EST MOD 30 MIN: CPT | Performed by: INTERNAL MEDICINE

## 2022-01-24 RX ORDER — TRIAMCINOLONE ACETONIDE 1 MG/G
OINTMENT TOPICAL
COMMUNITY
Start: 2021-12-23 | End: 2022-07-16 | Stop reason: ALTCHOICE

## 2022-01-24 RX ORDER — AMLODIPINE BESYLATE 5 MG/1
5 TABLET ORAL DAILY
Qty: 90 TABLET | Refills: 4 | Status: SHIPPED | OUTPATIENT
Start: 2022-01-24 | End: 2022-01-31

## 2022-01-24 RX ORDER — OMEPRAZOLE 20 MG/1
20 CAPSULE, DELAYED RELEASE ORAL DAILY
Qty: 90 CAPSULE | Refills: 4 | Status: SHIPPED | OUTPATIENT
Start: 2022-01-24

## 2022-01-24 NOTE — PROGRESS NOTES
Mr. Katy Cisse is a 67 y.o. male who is here for evaluation of   Chief Complaint   Patient presents with    Hypertension     routine 3 month F/U. Has NOT taking todays meds - usually takes meds aroudn 2/3pm daily    Irregular Heart Beat     Reports HRs in the upper 40s to 50s. EKG completed. Frequent PVC's seen. Reports int dizziness   . ASSESSMENT AND PLAN:    1. Chest pain, unspecified type  Actually currently not experiencing chest pain. More likely symptomatic PVCs. - AMB POC EKG ROUTINE W/ 12 LEADS, INTER & REP    2. Essential hypertension  Blood pressure is elevated because he skipped his medications morning. Will be checking potassium and magnesium today. - amLODIPine (NORVASC) 5 mg tablet; Take 1 Tablet by mouth daily. Dispense: 90 Tablet; Refill: 4  - METABOLIC PANEL, BASIC; Future  - MAGNESIUM; Future      Orders Placed This Encounter    METABOLIC PANEL, BASIC     Standing Status:   Future     Standing Expiration Date:   2/4/2022    MAGNESIUM     Standing Status:   Future     Standing Expiration Date:   2/4/2022    AMB POC EKG ROUTINE W/ 12 LEADS, INTER & REP     Order Specific Question:   Reason for Exam:     Answer:   chest pain    triamcinolone acetonide (KENALOG) 0.1 % ointment     Sig: APPLY TO INSIDE OF RIGHT ANKLE AND OUTSIDE OF LEFT ANKLE EXTERNALLY ONCE DAILY    omeprazole (PRILOSEC) 20 mg capsule     Sig: Take 1 Capsule by mouth daily. Indications: fax [facility]     Dispense:  90 Capsule     Refill:  4    amLODIPine (NORVASC) 5 mg tablet     Sig: Take 1 Tablet by mouth daily. Dispense:  90 Tablet     Refill:  4           HPI 41-year-old gentleman with a recent concern about lower heart rate when checking his blood pressure at home on his cough. Denies syncope or near syncope. History of low potassium previously. Has not taken his blood pressure medicines this morning. Typically takes them around 2 PM.  Denies chest pain.     ROS:  Denies  fever, chills, cough, chest pain, SOB,  nausea, vomiting, or diarrhea. Denies wt loss, wt gain, hemoptysis, hematochezia or melena. Physical Examination:    Visit Vitals  BP (!) 170/68 (BP 1 Location: Left upper arm, BP Patient Position: Sitting, BP Cuff Size: Adult long)   Pulse (!) 41   Temp 98.2 °F (36.8 °C)   Resp 17   Ht 5' 6\" (1.676 m)   Wt 137 lb 3.2 oz (62.2 kg)   SpO2 98%   BMI 22.14 kg/m²      General:  Alert, cooperative, no distress. Head:  Normocephalic, without obvious abnormality, atraumatic. Eyes:  Conjunctivae/corneas clear. Pupils equal, round, reactive to light. Extraocular movements intact. Lungs:   Clear to auscultation bilaterally. Chest wall:  No tenderness or deformity. Cardiac:  RRR   Abdomen:   Soft, non-tender. Bowel sounds normal. No masses. No organomegaly. Extremities: Extremities normal, atraumatic, no cyanosis or edema. Pulses: 2+ and symmetric all extremities. Skin: Skin color, texture, turgor normal. No rashes or lesions. Lymph nodes: Cervical, supraclavicular, and axillary nodes normal.   Neurologic: CNII-XII intact. Normal strength, sensation, and reflexes throughout. On this date 01/24/2022 I have spent 30 minutes reviewing previous notes, test results and face to face with the patient discussing the diagnosis and importance of compliance with the treatment plan as well as documenting on the day of the visit.     Josr Esparza MD FACP    (signed electronically) on 1/24/2022 at 1:49 PM

## 2022-01-24 NOTE — PROGRESS NOTES
Rosanne Quintanilla is a 67 y.o. male presenting for/with:    Chief Complaint   Patient presents with    Hypertension     routine 3 month F/U. Has NOT taking todays meds - usually takes meds aroudn 2/3pm daily    Irregular Heart Beat     Reports HRs in the upper 40s to 50s. EKG completed. Frequent PVC's seen. Reports int dizziness       Visit Vitals  BP (!) 170/68 (BP 1 Location: Left upper arm, BP Patient Position: Sitting, BP Cuff Size: Adult long)   Pulse (!) 41   Temp 98.2 °F (36.8 °C)   Resp 17   Ht 5' 6\" (1.676 m)   Wt 137 lb 3.2 oz (62.2 kg)   SpO2 98%   BMI 22.14 kg/m²     Pain Scale: 0 - No pain/10  Pain Location:     1. Have you been to the ER, urgent care clinic since your last visit? Hospitalized since your last visit? NO    2. Have you seen or consulted any other health care providers outside of the [address] [place] [address] since your last visit? Include any pap smears or colon screening. NO    Symptom review:  NO  Fever   NO  Shaking chills  NO  Cough  NO  Body aches  NO  Coughing up blood  NO  Chest congestion  NO  Chest pain  NO  Shortness of breath  NO  Profound Loss of smell/taste  NO  Nausea/Vomiting   NO  Loose stool/Diarrhea  NO  any skin issues    Patient Risk Factors Reviewed as follows:  NO  have you been in Close contact with confirmed COVID19 patient   NO  History of recent travel to affected geographical areas within the past 14 days  NO  COPD  NO  Active Cancer/Leukemia/Lymphoma/Chemotherapy  NO  Oral steroid use  NO  Pregnant  NO  Diabetes Mellitus  YES  Heart disease  NO  Asthma  NO Health care worker at home  [address] care worker  NO Is there a Pregnant Woman in the home  NO Dialysis pt in the home   NO a large number of people living in the home    Learning Assessment 10/21/2021   PRIMARY LEARNER Patient   PRIMARY LANGUAGE ENGLISH   LEARNER PREFERENCE PRIMARY DEMONSTRATION     -   ANSWERED BY pt   RELATIONSHIP SELF     Fall Risk Assessment, last 12 mths 1/24/2022   Able to walk? Yes   Fall in past 12 months? 0   Do you feel unsteady? 0   Are you worried about falling 0   Number of falls in past 12 months -   Fall with injury? -       3 most recent PHQ Screens 1/24/2022   PHQ Not Done -   Little interest or pleasure in doing things Not at all   Feeling down, depressed, irritable, or hopeless Not at all   Total Score PHQ 2 0     Abuse Screening Questionnaire 1/24/2022   Do you ever feel afraid of your partner? N   Are you in a relationship with someone who physically or mentally threatens you? N   Is it safe for you to go home?  Y       ADL Assessment 1/24/2022   Feeding yourself No Help Needed   Getting from bed to chair No Help Needed   Getting dressed No Help Needed   Bathing or showering No Help Needed   Walk across the room (includes cane/walker) No Help Needed   Using the telphone No Help Needed   Taking your medications No Help Needed   Preparing meals No Help Needed   Managing money (expenses/bills) No Help Needed   Moderately strenuous housework (laundry) No Help Needed   Shopping for personal items (toiletries/medicines) No Help Needed   Shopping for groceries No Help Needed   Driving No Help Needed   Climbing a flight of stairs No Help Needed   Getting to places beyond walking distances No Help Needed      Advance Care Planning 10/21/2021   Patient's Healthcare Decision Maker is: Named in scanned ACP document   Confirm Advance Directive Yes, on file   Patient Would Like to Complete Advance Directive -   Does the patient have other document types -

## 2022-01-25 LAB
ANION GAP SERPL CALC-SCNC: 6 MMOL/L (ref 5–15)
BUN SERPL-MCNC: 21 MG/DL (ref 6–20)
BUN/CREAT SERPL: 19 (ref 12–20)
CALCIUM SERPL-MCNC: 9.3 MG/DL (ref 8.5–10.1)
CHLORIDE SERPL-SCNC: 104 MMOL/L (ref 97–108)
CO2 SERPL-SCNC: 27 MMOL/L (ref 21–32)
CREAT SERPL-MCNC: 1.09 MG/DL (ref 0.7–1.3)
GLUCOSE SERPL-MCNC: 100 MG/DL (ref 65–100)
MAGNESIUM SERPL-MCNC: 1.5 MG/DL (ref 1.6–2.4)
POTASSIUM SERPL-SCNC: 4 MMOL/L (ref 3.5–5.1)
SODIUM SERPL-SCNC: 137 MMOL/L (ref 136–145)

## 2022-01-26 ENCOUNTER — TELEPHONE (OUTPATIENT)
Dept: FAMILY MEDICINE CLINIC | Age: 77
End: 2022-01-26

## 2022-01-26 NOTE — TELEPHONE ENCOUNTER
Patient called asking for lab results from draw on 1.24.2022. Patient states that potassium is low and pulse rate is low.   He

## 2022-02-20 NOTE — PROGRESS NOTES
Mr. Rossy Carrion is a 67 y.o. male who is here for evaluation of   Chief Complaint   Patient presents with    Hypertension     States BP has been better at home. Denies heart palps at this time.  Irregular Heart Beat     WHen checked BP today HR was 104 and last visit HR was 41. Wants to know if it is related to his betamethasone usages   . ASSESSMENT AND PLAN:    1. Essential hypertension  Currently at goal.  Continue current medications and return in July for reassessment  2. PVD (peripheral vascular disease) ([address]  Currently asymptomatic and followed by vascular  3. Fistula, bladder  No symptoms. No orders of the defined types were placed in this encounter. HPI 74-year-old gentleman with history of hypertension, peripheral arterial disease, and bladder fistula. Returns for interval follow-up for hypertension. Was evaluated about a month ago with some concern about chest discomfort and hypertension. Upon further discussion at his last visit he felt that he was not actually experiencing chest discomfort; rather, extra heartbeats which were felt to be PVCs. EKG at that time was normal.    ROS:  Denies  fever, chills, cough, chest pain, SOB,  nausea, vomiting, or diarrhea. Denies wt loss, wt gain, hemoptysis, hematochezia or melena. Physical Examination:    Visit Vitals  /60   Pulse 96   Temp 98.6 °F (37 °C) (Temporal)   Resp 18   Ht 5' 6\" (1.676 m)   Wt 134 lb 9.6 oz (61.1 kg)   SpO2 98%   BMI 21.73 kg/m²      General:  Alert, cooperative, no distress. Head:  Normocephalic, without obvious abnormality, atraumatic. Eyes:  Conjunctivae/corneas clear. Pupils equal, round, reactive to light. Extraocular movements intact. Lungs:   Clear to auscultation bilaterally. Chest wall:  No tenderness or deformity. Cardiac:  RRR 3/6 ALEXANDER   Abdomen:   Soft, non-tender. Bowel sounds normal. No masses. No organomegaly. Extremities: Extremities normal, atraumatic, no cyanosis or edema. Pulses: 2+ and symmetric all extremities. Skin: Skin color, texture, turgor normal. No rashes or lesions. Lymph nodes: Cervical, supraclavicular, and axillary nodes normal.   Neurologic: CNII-XII intact. Normal strength, sensation, and reflexes throughout. On this date 02/21/2022 I have spent 18 minutes reviewing previous notes, test results and face to face with the patient discussing the diagnosis and importance of compliance with the treatment plan as well as documenting on the day of the visit.     Margi Hargrove MD FACP    (signed electronically) on 2/21/2022 at 8:05 AM

## 2022-02-21 ENCOUNTER — OFFICE VISIT (OUTPATIENT)
Dept: FAMILY MEDICINE CLINIC | Age: 77
End: 2022-02-21
Payer: MEDICARE

## 2022-02-21 VITALS
HEART RATE: 96 BPM | SYSTOLIC BLOOD PRESSURE: 120 MMHG | DIASTOLIC BLOOD PRESSURE: 60 MMHG | WEIGHT: 134.6 LBS | RESPIRATION RATE: 18 BRPM | TEMPERATURE: 98.6 F | BODY MASS INDEX: 21.63 KG/M2 | HEIGHT: 66 IN | OXYGEN SATURATION: 98 %

## 2022-02-21 DIAGNOSIS — I10 ESSENTIAL HYPERTENSION: Primary | ICD-10-CM

## 2022-02-21 DIAGNOSIS — N32.2 FISTULA, BLADDER: ICD-10-CM

## 2022-02-21 DIAGNOSIS — I73.9 PVD (PERIPHERAL VASCULAR DISEASE) (HCC): ICD-10-CM

## 2022-02-21 PROCEDURE — 99213 OFFICE O/P EST LOW 20 MIN: CPT | Performed by: INTERNAL MEDICINE

## 2022-02-21 NOTE — PROGRESS NOTES
Bambi Barrett is a 67 y.o. male presenting for/with:    Chief Complaint   Patient presents with    Hypertension     States BP has been better at home. Denies heart palps at this time.  Irregular Heart Beat     WHen checked BP today HR was 104 and last visit HR was 41. Wants to know if it is related to his betamethasone usages       Visit Vitals  BP (!) 142/68 (BP 1 Location: Left upper arm, BP Patient Position: Sitting, BP Cuff Size: Adult long)   Pulse 96   Temp 98.6 °F (37 °C) (Temporal)   Resp 18   Ht 5' 6\" (1.676 m)   Wt 134 lb 9.6 oz (61.1 kg)   SpO2 98%   BMI 21.73 kg/m²     Pain Scale: 0 - No pain/10  Pain Location:     1. Have you been to the ER, urgent care clinic since your last visit? Hospitalized since your last visit? NO    2. Have you seen or consulted any other health care providers outside of the [address] [place] [address] since your last visit? Include any pap smears or colon screening.  NO    Symptom review:  NO  Fever   NO  Shaking chills  NO  Cough  NO  Body aches  NO  Coughing up blood  NO  Chest congestion  NO  Chest pain  NO  Shortness of breath  NO  Profound Loss of smell/taste  NO  Nausea/Vomiting   NO  Loose stool/Diarrhea  NO  any skin issues    Patient Risk Factors Reviewed as follows:  NO  have you been in Close contact with confirmed COVID19 patient   NO  History of recent travel to affected geographical areas within the past 14 days  NO  COPD  NO  Active Cancer/Leukemia/Lymphoma/Chemotherapy  NO  Oral steroid use  NO  Pregnant  NO  Diabetes Mellitus  YES  Heart disease  NO  Asthma  NO Health care worker at home  [address] care worker  NO Is there a Pregnant Woman in the home  NO Dialysis pt in the home   NO a large number of people living in the home    Learning Assessment 10/21/2021   PRIMARY LEARNER Patient   PRIMARY LANGUAGE ENGLISH   LEARNER PREFERENCE PRIMARY DEMONSTRATION     -   ANSWERED BY pt   RELATIONSHIP SELF     Fall Risk Assessment, last 12 mths 2/21/2022   Able to walk? Yes   Fall in past 12 months? 0   Do you feel unsteady? 0   Are you worried about falling 0   Number of falls in past 12 months -   Fall with injury? -       3 most recent PHQ Screens 2/21/2022   PHQ Not Done -   Little interest or pleasure in doing things Not at all   Feeling down, depressed, irritable, or hopeless Not at all   Total Score PHQ 2 0     Abuse Screening Questionnaire 2/21/2022   Do you ever feel afraid of your partner? N   Are you in a relationship with someone who physically or mentally threatens you? N   Is it safe for you to go home?  Y       ADL Assessment 2/21/2022   Feeding yourself No Help Needed   Getting from bed to chair No Help Needed   Getting dressed No Help Needed   Bathing or showering No Help Needed   Walk across the room (includes cane/walker) No Help Needed   Using the telphone No Help Needed   Taking your medications No Help Needed   Preparing meals No Help Needed   Managing money (expenses/bills) No Help Needed   Moderately strenuous housework (laundry) No Help Needed   Shopping for personal items (toiletries/medicines) No Help Needed   Shopping for groceries No Help Needed   Driving No Help Needed   Climbing a flight of stairs No Help Needed   Getting to places beyond walking distances No Help Needed      Advance Care Planning 10/21/2021   Patient's Healthcare Decision Maker is: Named in scanned ACP document   Confirm Advance Directive Yes, on file   Patient Would Like to Complete Advance Directive -   Does the patient have other document types -

## 2022-03-19 PROBLEM — E87.8 DISORDER OF ELECTROLYTES: Status: ACTIVE | Noted: 2018-08-19

## 2022-03-19 PROBLEM — L30.9 ECZEMA: Status: ACTIVE | Noted: 2019-12-16

## 2022-03-19 PROBLEM — G62.9 NEUROPATHY: Status: ACTIVE | Noted: 2020-06-10

## 2022-03-19 PROBLEM — N30.90 CYSTITIS: Status: ACTIVE | Noted: 2019-12-16

## 2022-03-19 PROBLEM — D53.9 MACROCYTIC ANEMIA: Status: ACTIVE | Noted: 2020-06-10

## 2022-03-19 PROBLEM — Z51.89 ENCOUNTER FOR REHABILITATION: Status: ACTIVE | Noted: 2019-12-20

## 2022-03-19 PROBLEM — Z72.0 TOBACCO ABUSE: Status: ACTIVE | Noted: 2020-01-22

## 2022-03-20 PROBLEM — H53.8 BLURRING OF VISUAL IMAGE: Status: ACTIVE | Noted: 2018-01-02

## 2022-03-20 PROBLEM — I10 ESSENTIAL HYPERTENSION: Status: ACTIVE | Noted: 2018-08-19

## 2022-03-20 PROBLEM — N30.00 CYSTITIS, ACUTE: Status: ACTIVE | Noted: 2019-12-16

## 2022-07-16 NOTE — PROGRESS NOTES
Mr. Claudia Carter is a 67 y.o. male who is here for evaluation of   Chief Complaint   Patient presents with    Follow-up     5 month follow-up   . ASSESSMENT AND PLAN:    1. Medicare annual wellness visit, subsequent  2. Essential hypertension  He is at goal  3. Mixed hyperlipidemia  Labs in November  4. Elevated ferritin  Will check ferritin in November      Orders Placed This Encounter    mupirocin calcium (BACTROBAN) 2 % topical cream     Sig: Apply  to affected area three (3) times daily. HPI  65-year-old gentleman who lives alone who has a history of hypertension, hyperlipidemia and who had a colovesicular fistula repair approximately 2 years ago. He arrives today for interval assessment of multiple chronic medical problems and for subsequent annual wellness visit. His last evaluation was in February 2022. He feels well. His appetite is good. He denies any urinary symptoms. Denies chest pain, pressure or tightness. Undergoing dermatology and ENT evaluation of left ear fullness. ROS:  Denies fever, chills, cough, chest pain, SOB,  nausea, vomiting, or diarrhea. Denies wt loss, wt gain, hemoptysis, hematochezia or melena. Physical Examination:    Visit Vitals  /60 (BP 1 Location: Left upper arm, BP Patient Position: Sitting, BP Cuff Size: Adult long)   Pulse 76   Temp 97.8 °F (36.6 °C)   Resp 18   Ht 5' 6\" (1.676 m)   Wt 131 lb 9.6 oz (59.7 kg)   SpO2 (!) 76%   BMI 21.24 kg/m²      General:  Alert, cooperative, no distress. Head:  Normocephalic, without obvious abnormality, atraumatic. Eyes:  Conjunctivae/corneas clear. Pupils equal, round, reactive to light. Extraocular movements intact. Lungs:   Clear to auscultation bilaterally. Chest wall:  No tenderness or deformity. Cardiac:  RRR   Abdomen:   Soft, non-tender. Bowel sounds normal. No masses. No organomegaly. Extremities: Extremities normal, atraumatic, no cyanosis or edema.    Pulses: 2+ and symmetric all extremities. Skin: Skin color, texture, turgor normal. No rashes or lesions. Lymph nodes: Cervical, supraclavicular, and axillary nodes normal.   Neurologic: CNII-XII intact. Normal strength, sensation, and reflexes throughout. On this date 07/18/2022 I have spent 30 minutes reviewing previous notes, test results and face to face with the patient discussing the diagnosis and importance of compliance with the treatment plan as well as documenting on the day of the visit. Gilford Reason MD FACP    (signed electronically) on 7/18/2022 at 1:29 PM        ______________________________________________________________________    Wen Gusman is a 67 y.o. male and presents for annual Medicare Wellness Visit. Problem List: Reviewed with patient and discussed risk factors. Patient Active Problem List   Diagnosis Code    Hemochromatosis E83.119    Pulmonary nodules R91.8    GERD (gastroesophageal reflux disease) K21.9    HTN (hypertension) I10    Esophageal stricture K22.2    Claudication of calf muscles (HCC) I73.9    B12 deficiency E53.8    Colonic mass K63.89    Diarrhea R19.7    Leukocytosis D72.829    Hypokalemia E87.6    Hypomagnesemia E83.42    Intra-abdominal abscess (HCC) K65.1    GI bleed K92.2    Colovesical fistula N32.1    Blurring of visual image H53.8    Disorder of electrolytes E87.8    Essential hypertension I10    Cystitis N30.90    Eczema L30.9    Cystitis, acute N30.00    Encounter for rehabilitation Z51.89    Tobacco abuse Z72.0    Macrocytic anemia D53.9    Neuropathy G62.9       Current medical providers:  Patient Care Team:  Jacinda Horner MD as PCP - General (Internal Medicine Physician)  Jacinda Horner MD as PCP - [facility]  Logan Chaudhary MD (Surgery Physician)    PM, , Medications/Allergies: reviewed, on chart. Male Alcohol Screening: On any occasion during the past 3 months, have you had more than 4 drinks containing alcohol? No    Do you average more than 14 drinks per week? No    Objective:  Visit Vitals  /60 (BP 1 Location: Left upper arm, BP Patient Position: Sitting, BP Cuff Size: Adult long)   Pulse 76   Temp 97.8 °F (36.6 °C)   Resp 18   Ht 5' 6\" (1.676 m)   Wt 131 lb 9.6 oz (59.7 kg)   SpO2 (!) 76%   BMI 21.24 kg/m²    Body mass index is 21.24 kg/m². Assessment of cognitive impairment: Alert and oriented x 3    Depression Screen:   3 most recent PHQ Screens 7/18/2022   PHQ Not Done -   Little interest or pleasure in doing things Not at all   Feeling down, depressed, irritable, or hopeless Not at all   Total Score PHQ 2 0       Fall Risk Assessment:    Fall Risk Assessment, last 12 mths 7/18/2022   Able to walk? Yes   Fall in past 12 months? 0   Do you feel unsteady? 1   Are you worried about falling 0   Number of falls in past 12 months -   Fall with injury? -       Functional Ability:   Does the patient exhibit a steady gait? yes   How long did it take the patient to get up and walk from a sitting position? 12 sec   Is the patient self reliant?  (ie can do own laundry, meals, household chores)  yes     Does the patient handle his/her own medications? yes     Does the patient handle his/her own money? yes     Is the patients home safe (ie good lighting, handrails on stairs and bath, etc.)? yes     Did you notice or did patient express any hearing difficulties? yes     Did you notice or did patient express any vision difficulties?    no       Advance Care Planning:   Patient was offered the opportunity to discuss advance care planning:  yes     Does patient have an Advance Directive:  yes   If no, did you provide information on Caring Connections?  no       Plan:      Orders Placed This Encounter    mupirocin calcium (BACTROBAN) 2 % topical cream       Health Maintenance   Topic Date Due    Medicare Yearly Exam  07/23/2022    Shingrix Vaccine Age 50> (1 of 2) 08/26/2025 (Originally 7/19/1994)    Flu Vaccine (1) 09/01/2022    Lipid Screen  10/21/2022    Depression Screen  07/18/2023    DTaP/Tdap/Td series (2 - Td or Tdap) 10/05/2027    Hepatitis C Screening  Completed    COVID-19 Vaccine  Completed    Pneumococcal 65+ years  Completed       *Patient verbalized understanding and agreement with the plan. A copy of the After Visit Summary with personalized health plan was given to the patient today.

## 2022-07-18 ENCOUNTER — OFFICE VISIT (OUTPATIENT)
Dept: FAMILY MEDICINE CLINIC | Age: 77
End: 2022-07-18
Payer: MEDICARE

## 2022-07-18 VITALS
DIASTOLIC BLOOD PRESSURE: 60 MMHG | OXYGEN SATURATION: 76 % | RESPIRATION RATE: 18 BRPM | HEART RATE: 76 BPM | HEIGHT: 66 IN | SYSTOLIC BLOOD PRESSURE: 130 MMHG | TEMPERATURE: 97.8 F | BODY MASS INDEX: 21.15 KG/M2 | WEIGHT: 131.6 LBS

## 2022-07-18 DIAGNOSIS — R79.89 ELEVATED FERRITIN: ICD-10-CM

## 2022-07-18 DIAGNOSIS — E78.2 MIXED HYPERLIPIDEMIA: ICD-10-CM

## 2022-07-18 DIAGNOSIS — Z00.00 MEDICARE ANNUAL WELLNESS VISIT, SUBSEQUENT: Primary | ICD-10-CM

## 2022-07-18 DIAGNOSIS — I10 ESSENTIAL HYPERTENSION: ICD-10-CM

## 2022-07-18 PROCEDURE — 99214 OFFICE O/P EST MOD 30 MIN: CPT | Performed by: INTERNAL MEDICINE

## 2022-07-18 PROCEDURE — G0439 PPPS, SUBSEQ VISIT: HCPCS | Performed by: INTERNAL MEDICINE

## 2022-07-18 RX ORDER — MUPIROCIN CALCIUM 20 MG/G
CREAM TOPICAL 3 TIMES DAILY
COMMUNITY

## 2022-07-18 NOTE — PROGRESS NOTES
Howard Hein is a 67 y.o. male presenting for/with:    Chief Complaint   Patient presents with    Follow-up     5 month follow-up       Visit Vitals  /60 (BP 1 Location: Left upper arm, BP Patient Position: Sitting, BP Cuff Size: Adult long)   Pulse 76   Temp 97.8 °F (36.6 °C)   Resp 18   Ht 5' 6\" (1.676 m)   Wt 131 lb 9.6 oz (59.7 kg)   SpO2 (!) 76%   BMI 21.24 kg/m²     Pain Scale: 0 - No pain/10  Pain Location:     1. \"Have you been to the ER, urgent care clinic since your last visit? Hospitalized since your last visit? \" No    2. \"Have you seen or consulted any other health care providers outside of the [address] [place] [address] since your last visit? \" No     3. For patients aged 39-70: Has the patient had a colonoscopy / FIT/ Cologuard? Yes - Care Gap present. Most recent result on file      If the patient is female:    4. For patients aged 41-77: Has the patient had a mammogram within the past 2 years? Yes - Care Gap present. Most recent result on file      5. For patients aged 21-65: Has the patient had a pap smear?  NA - based on age or sex      Symptom review:  NO  Fever   NO  Shaking chills  NO  Cough  NO  Body aches  NO  Coughing up blood  NO  Chest congestion  NO  Chest pain  NO  Shortness of breath  NO  Profound Loss of smell/taste  NO  Nausea/Vomiting   NO  Loose stool/Diarrhea  NO  any skin issues    Patient Risk Factors Reviewed as follows:  NO  have you been in Close contact with confirmed COVID19 patient   NO  History of recent travel to affected geographical areas within the past 14 days  NO  COPD  NO  Active Cancer/Leukemia/Lymphoma/Chemotherapy  NO  Oral steroid use  NO  Pregnant  NO  Diabetes Mellitus  Yes  Heart disease  NO  Asthma  NO Health care worker at home  NO Health care worker  NO Is there a Pregnant Woman in the home  NO Dialysis pt in the home   NO a large number of people living in the home    Learning Assessment 7/18/2022   PRIMARY LEARNER Patient   PRIMARY LANGUAGE ENGLISH   LEARNER PREFERENCE PRIMARY DEMONSTRATION     -   ANSWERED BY self   RELATIONSHIP SELF     Fall Risk Assessment, last 12 mths 7/18/2022   Able to walk? Yes   Fall in past 12 months? 0   Do you feel unsteady? 1   Are you worried about falling 0   Number of falls in past 12 months -   Fall with injury? -       3 most recent PHQ Screens 7/18/2022   PHQ Not Done -   Little interest or pleasure in doing things Not at all   Feeling down, depressed, irritable, or hopeless Not at all   Total Score PHQ 2 0     Abuse Screening Questionnaire 7/18/2022   Do you ever feel afraid of your partner? N   Are you in a relationship with someone who physically or mentally threatens you? N   Is it safe for you to go home?  Y       ADL Assessment 2/21/2022   Feeding yourself No Help Needed   Getting from bed to chair No Help Needed   Getting dressed No Help Needed   Bathing or showering No Help Needed   Walk across the room (includes cane/walker) No Help Needed   Using the telphone No Help Needed   Taking your medications No Help Needed   Preparing meals No Help Needed   Managing money (expenses/bills) No Help Needed   Moderately strenuous housework (laundry) No Help Needed   Shopping for personal items (toiletries/medicines) No Help Needed   Shopping for groceries No Help Needed   Driving No Help Needed   Climbing a flight of stairs No Help Needed   Getting to places beyond walking distances No Help Needed      Advance Care Planning 10/21/2021   Patient's Healthcare Decision Maker is: Named in scanned ACP document   Confirm Advance Directive Yes, on file   Patient Would Like to Complete Advance Directive -   Does the patient have other document types -

## 2022-11-20 NOTE — PROGRESS NOTES
Mr. Sunny Le is a 65 y.o. male who is here for evaluation of   Chief Complaint   Patient presents with    Annual Wellness Visit    Hypertension   . ASSESSMENT AND PLAN:    1. Medicare annual wellness visit, subsequent  2. Essential hypertension  At goal  3. Mixed hyperlipidemia  Labs in 6 months. No orders of the defined types were placed in this encounter. Follow-up and Dispositions    Return in about 6 months (around 5/21/2023). HPI  27-year-old gentleman who lives alone who has a history of hypertension, hyperlipidemia and who had a colovesicular fistula repair approximately 2 years ago. He arrives today for interval assessment of multiple chronic medical problems and for subsequent annual wellness visit. ROS:  Denies  fever, chills, cough, chest pain, SOB,  nausea, vomiting, or diarrhea. Denies wt loss, wt gain, hemoptysis, hematochezia or melena. Physical Examination:    Visit Vitals  /60 (BP 1 Location: Left upper arm, BP Patient Position: Standing, BP Cuff Size: Adult long)   Pulse 98   Resp 18   Ht 5' 6\" (1.676 m)   Wt 129 lb 3.2 oz (58.6 kg)   SpO2 98%   BMI 20.85 kg/m²      General:  Alert, cooperative, no distress. Head:  Normocephalic, without obvious abnormality, atraumatic. Eyes:  Conjunctivae/corneas clear. Pupils equal, round, reactive to light. Extraocular movements intact. Lungs:   Clear to auscultation bilaterally. Chest wall:  No tenderness or deformity. Cardiac:  RRR   Abdomen:   Soft, non-tender. Bowel sounds normal. No masses. No organomegaly. Extremities: Extremities normal, atraumatic, no cyanosis or edema. Pulses: 2+ and symmetric all extremities. Skin: Skin color, texture, turgor normal. No rashes or lesions. Lymph nodes: Cervical, supraclavicular, and axillary nodes normal.   Neurologic: CNII-XII intact. Normal strength, sensation, and reflexes throughout.      On this date 11/21/2022 I have spent 25 minutes reviewing previous notes, test results and face to face with the patient discussing the diagnosis and importance of compliance with the treatment plan as well as documenting on the day of the visit.     Rea Juarez MD FACP    (signed electronically) on 11/21/2022 at 1:54 PM

## 2022-11-21 ENCOUNTER — OFFICE VISIT (OUTPATIENT)
Dept: FAMILY MEDICINE CLINIC | Age: 77
End: 2022-11-21
Payer: MEDICARE

## 2022-11-21 VITALS
HEART RATE: 98 BPM | OXYGEN SATURATION: 98 % | BODY MASS INDEX: 20.76 KG/M2 | DIASTOLIC BLOOD PRESSURE: 60 MMHG | SYSTOLIC BLOOD PRESSURE: 139 MMHG | WEIGHT: 129.2 LBS | HEIGHT: 66 IN | RESPIRATION RATE: 18 BRPM

## 2022-11-21 DIAGNOSIS — I10 ESSENTIAL HYPERTENSION: ICD-10-CM

## 2022-11-21 DIAGNOSIS — E78.2 MIXED HYPERLIPIDEMIA: ICD-10-CM

## 2022-11-21 DIAGNOSIS — Z00.00 MEDICARE ANNUAL WELLNESS VISIT, SUBSEQUENT: Primary | ICD-10-CM

## 2022-11-21 PROCEDURE — 99213 OFFICE O/P EST LOW 20 MIN: CPT | Performed by: INTERNAL MEDICINE

## 2022-11-21 PROCEDURE — 1123F ACP DISCUSS/DSCN MKR DOCD: CPT | Performed by: INTERNAL MEDICINE

## 2022-11-21 PROCEDURE — 3074F SYST BP LT 130 MM HG: CPT | Performed by: INTERNAL MEDICINE

## 2022-11-21 PROCEDURE — G8752 SYS BP LESS 140: HCPCS | Performed by: INTERNAL MEDICINE

## 2022-11-21 PROCEDURE — G8427 DOCREV CUR MEDS BY ELIG CLIN: HCPCS | Performed by: INTERNAL MEDICINE

## 2022-11-21 PROCEDURE — G8536 NO DOC ELDER MAL SCRN: HCPCS | Performed by: INTERNAL MEDICINE

## 2022-11-21 PROCEDURE — G8432 DEP SCR NOT DOC, RNG: HCPCS | Performed by: INTERNAL MEDICINE

## 2022-11-21 PROCEDURE — 1101F PT FALLS ASSESS-DOCD LE1/YR: CPT | Performed by: INTERNAL MEDICINE

## 2022-11-21 PROCEDURE — G8754 DIAS BP LESS 90: HCPCS | Performed by: INTERNAL MEDICINE

## 2022-11-21 PROCEDURE — G0439 PPPS, SUBSEQ VISIT: HCPCS | Performed by: INTERNAL MEDICINE

## 2022-11-21 PROCEDURE — 3078F DIAST BP <80 MM HG: CPT | Performed by: INTERNAL MEDICINE

## 2022-11-21 PROCEDURE — G8420 CALC BMI NORM PARAMETERS: HCPCS | Performed by: INTERNAL MEDICINE

## 2022-11-21 NOTE — PROGRESS NOTES
Anthony Johnson is a 65 y.o. male presenting for/with:    Chief Complaint   Patient presents with    Annual Wellness Visit    Hypertension       Visit Vitals  /60 (BP 1 Location: Left upper arm, BP Patient Position: Standing, BP Cuff Size: Adult long)   Pulse 98   Resp 18   Ht 5' 6\" (1.676 m)   Wt 129 lb 3.2 oz (58.6 kg)   SpO2 98%   BMI 20.85 kg/m²     Pain Scale: 0 - No pain/10  Pain Location:     1. \"Have you been to the ER, urgent care clinic since your last visit? Hospitalized since your last visit? \" No    2. \"Have you seen or consulted any other health care providers outside of the [address] [place] [address] since your last visit? \" No     3. For patients aged 39-70: Has the patient had a colonoscopy / FIT/ Cologuard? NA - based on age      If the patient is female:    4. For patients aged 41-77: Has the patient had a mammogram within the past 2 years? NA - based on age or sex      11. For patients aged 21-65: Has the patient had a pap smear? NA - based on age or sex      Learning Assessment 7/18/2022   PRIMARY LEARNER Patient   PRIMARY LANGUAGE ENGLISH   LEARNER PREFERENCE PRIMARY DEMONSTRATION     -   ANSWERED BY self   RELATIONSHIP SELF     Fall Risk Assessment, last 12 mths 7/18/2022   Able to walk? Yes   Fall in past 12 months? 0   Do you feel unsteady? 1   Are you worried about falling 0   Number of falls in past 12 months -   Fall with injury? -       3 most recent PHQ Screens 7/18/2022   PHQ Not Done -   Little interest or pleasure in doing things Not at all   Feeling down, depressed, irritable, or hopeless Not at all   Total Score PHQ 2 0     Abuse Screening Questionnaire 7/18/2022   Do you ever feel afraid of your partner? N   Are you in a relationship with someone who physically or mentally threatens you? N   Is it safe for you to go home?  Y       ADL Assessment 2/21/2022   Feeding yourself No Help Needed   Getting from bed to chair No Help Needed   Getting dressed No Help Needed   Bathing or showering No Help Needed   Walk across the room (includes cane/walker) No Help Needed   Using the telphone No Help Needed   Taking your medications No Help Needed   Preparing meals No Help Needed   Managing money (expenses/bills) No Help Needed   Moderately strenuous housework (laundry) No Help Needed   Shopping for personal items (toiletries/medicines) No Help Needed   Shopping for groceries No Help Needed   Driving No Help Needed   Climbing a flight of stairs No Help Needed   Getting to places beyond walking distances No Help Needed      Advance Care Planning 10/21/2021   Patient's Healthcare Decision Maker is: Named in scanned ACP document   Confirm Advance Directive Yes, on file   Patient Would Like to Complete Advance Directive -   Does the patient have other document types -      This is the Subsequent Medicare Annual Wellness Exam, performed 12 months or more after the Initial AWV or the last Subsequent AWV    I have reviewed the patient's medical history in detail and updated the computerized patient record. Assessment/Plan   Education and counseling provided:  Are appropriate based on today's review and evaluation    1. Medicare annual wellness visit, subsequent  2. Essential hypertension  3. Mixed hyperlipidemia       Depression Risk Factor Screening     3 most recent PHQ Screens 7/18/2022   PHQ Not Done -   Little interest or pleasure in doing things Not at all   Feeling down, depressed, irritable, or hopeless Not at all   Total Score PHQ 2 0       Alcohol & Drug Abuse Risk Screen    Do you average more than 1 drink per night or more than 7 drinks a week: No    In the past three months have you have had more than 4 drinks containing alcohol on one occasion: No          Functional Ability and Level of Safety    Hearing: Hearing is good. Activities of Daily Living: The home contains: no safety equipment.   Patient does total self care      Ambulation: with no difficulty     Fall Risk:  Fall Risk Assessment, last 12 mths 7/18/2022   Able to walk? Yes   Fall in past 12 months? 0   Do you feel unsteady? 1   Are you worried about falling 0   Number of falls in past 12 months -   Fall with injury?  -      Abuse Screen:  Patient is not abused       Cognitive Screening    Has your family/caregiver stated any concerns about your memory: no     Health Maintenance Due     Health Maintenance Due   Topic Date Due    Lipid Screen  10/21/2022       Patient Care Team   Patient Care Team:  Elisabeth Hill MD as PCP - General (Internal Medicine Physician)  Elisabeth Hill MD as PCP - [facility]  Delonte Shaw MD (Surgery Physician)    History     Patient Active Problem List   Diagnosis Code    Pulmonary nodules R91.8    GERD (gastroesophageal reflux disease) K21.9    HTN (hypertension) I10    Esophageal stricture K22.2    Claudication of calf muscles (HCC) I73.9    B12 deficiency E53.8    Colonic mass K63.89    Diarrhea R19.7    Leukocytosis D72.829    Hypokalemia E87.6    Hypomagnesemia E83.42    Intra-abdominal abscess (HCC) K65.1    GI bleed K92.2    Colovesical fistula N32.1    Blurring of visual image H53.8    Disorder of electrolytes E87.8    Essential hypertension I10    Cystitis N30.90    Eczema L30.9    Cystitis, acute N30.00    Encounter for rehabilitation Z51.89    Tobacco abuse Z72.0    Macrocytic anemia D53.9    Neuropathy G62.9     Past Medical History:   Diagnosis Date    Abuse     alcoholism    Aneurysm ([address]     Chronic obstructive pulmonary disease ([address]     pt denies    Chronic pain     neck /arthritis-injections    Claudication of calf muscles ([address] 2013    Colovesical fistula     Dr Corley Severe    Esophageal stricture     Esophagitis     GI bleed 10/2016    Hemochromatosis     Hyperlipidemia     Hypertension     Ill-defined condition 2020    blood transfusion hx    Peripheral artery disease ([address]     Dr. Silvina Guzman    Pulmonary nodule     right lung    Stroke [facility])       Past Surgical History:   Procedure Laterality Date    COLONOSCOPY N/A 2016    COLONOSCOPY performed by Armaan Valente MD at [facility] ENDOSCOPY    COLONOSCOPY N/A 2016    COLONOSCOPY performed by Armaan Valente MD at [facility] ENDOSCOPY    COLONOSCOPY N/A 2020    COLONOSCOPY performed by Dimitry Bruno MD at [facility] ENDOSCOPY    HX AMPUTATION Left 2016    left 4th toe from gangrene    HX AMPUTATION Left     transmetatarsal    HX CATARACT REMOVAL Bilateral     HX ENDOSCOPY  10/2016    HX OTHER SURGICAL      partial transmetatarsal amputation, lt foot all toes amputated and rt foot has 2nd tow amputation    HX TONSILLECTOMY      VASCULAR SURGERY PROCEDURE UNLIST  2020    axillo-bifemoral     Current Outpatient Medications   Medication Sig Dispense Refill    pravastatin (PRAVACHOL) 40 mg tablet TAKE 1 TABLET BY MOUTH EVERY NIGHT 90 Tablet 4    amLODIPine (NORVASC) 5 mg tablet TAKE 1 TABLET BY MOUTH DAILY 90 Tablet 4    omeprazole (PRILOSEC) 20 mg capsule Take 1 Capsule by mouth daily. Indications: fax to [facility] Capsule 4    losartan (COZAAR) 25 mg tablet TAKE 1 TABLET BY MOUTH NIGHTLY 90 Tablet 4    hydroCHLOROthiazide (HYDRODIURIL) 25 mg tablet TAKE 1 TABLET BY MOUTH EVERY DAY 90 Tablet 4    acetaminophen (TYLENOL) 500 mg tablet Take  by mouth every six (6) hours as needed for Pain. aspirin 81 mg chewable tablet Take 81 mg by mouth daily. mupirocin calcium (BACTROBAN) 2 % topical cream Apply  to affected area three (3) times daily.  (Patient not taking: Reported on 2022)       Allergies   Allergen Reactions    Contrast Agent [Iodine] Rash     Hives on back       Family History   Problem Relation Age of Onset    Stroke Mother     Alzheimer's Disease Father      Social History     Tobacco Use    Smoking status: Former     Packs/day: 0.00     Years: 50.00     Pack years: 0.00     Types: Cigarettes     Quit date: 2018     Years since quittin.8    Smokeless tobacco: Never   Substance Use Topics Alcohol use: Not Currently     Comment: pt states stopped drinking over 1 year ago         [facility]

## 2022-11-21 NOTE — PATIENT INSTRUCTIONS
Medicare Wellness Visit, Male    The best way to live healthy is to have a lifestyle where you eat a well-balanced diet, exercise regularly, limit alcohol use, and quit all forms of tobacco/nicotine, if applicable. Regular preventive services are another way to keep healthy. Preventive services (vaccines, screening tests, monitoring & exams) can help personalize your care plan, which helps you manage your own care. Screening tests can find health problems at the earliest stages, when they are easiest to treat. [facility] follows the current, evidence-based guidelines published by the [employer] Leonardo ([facility]) when recommending preventive services for our patients. Because we follow these guidelines, sometimes recommendations change over time as research supports it. (For example, a prostate screening blood test is no longer routinely recommended for men with no symptoms). Of course, you and your doctor may decide to screen more often for some diseases, based on your risk and co-morbidities (chronic disease you are already diagnosed with). Preventive services for you include:  - Medicare offers their members a free annual wellness visit, which is time for you and your primary care provider to discuss and plan for your preventive service needs. Take advantage of this benefit every year!  -All adults over age 72 should receive the recommended pneumonia vaccines. Current USPSTF guidelines recommend a series of two vaccines for the best pneumonia protection.   -All adults should have a flu vaccine yearly and tetanus vaccine every 10 years.  -All adults age 48 and older should receive the shingles vaccines (series of two vaccines).        -All adults age 38-68 who are overweight should have a diabetes screening test once every three years.   -Other screening tests & preventive services for persons with diabetes include: an eye exam to screen for diabetic retinopathy, a kidney function test, a foot exam, and stricter control over your cholesterol.   -Cardiovascular screening for adults with routine risk involves an electrocardiogram (ECG) at intervals determined by the provider.   -Colorectal cancer screening should be done for adults age 54-65 with no increased risk factors for colorectal cancer. There are a number of acceptable methods of screening for this type of cancer. Each test has its own benefits and drawbacks. Discuss with your provider what is most appropriate for you during your annual wellness visit. The different tests include: colonoscopy (considered the best screening method), a fecal occult blood test, a fecal DNA test, and sigmoidoscopy.  -All adults born between [facility] should be screened once for Hepatitis C.  -An Abdominal Aortic Aneurysm (AAA) Screening is recommended for men age 73-68 who has ever smoked in their lifetime.      Here is a list of your current Health Maintenance items (your personalized list of preventive services) with a due date:  Health Maintenance Due   Topic Date Due    Cholesterol Test   10/21/2022

## 2023-03-20 DIAGNOSIS — I10 ESSENTIAL HYPERTENSION: ICD-10-CM

## 2023-03-20 RX ORDER — LOSARTAN POTASSIUM 25 MG/1
TABLET ORAL
Qty: 90 TABLET | Refills: 4 | Status: SHIPPED | OUTPATIENT
Start: 2023-03-20

## 2023-03-20 RX ORDER — HYDROCHLOROTHIAZIDE 25 MG/1
TABLET ORAL
Qty: 90 TABLET | Refills: 4 | Status: SHIPPED | OUTPATIENT
Start: 2023-03-20

## 2023-03-20 RX ORDER — OMEPRAZOLE 20 MG/1
CAPSULE, DELAYED RELEASE ORAL
Qty: 90 CAPSULE | Refills: 4 | Status: SHIPPED | OUTPATIENT
Start: 2023-03-20

## 2023-04-29 RX ORDER — HYDROCHLOROTHIAZIDE 25 MG/1
1 TABLET ORAL DAILY
COMMUNITY
Start: 2023-03-20

## 2023-04-29 RX ORDER — LOSARTAN POTASSIUM 25 MG/1
1 TABLET ORAL NIGHTLY
COMMUNITY
Start: 2023-03-20

## 2023-04-29 RX ORDER — AMLODIPINE BESYLATE 5 MG/1
5 TABLET ORAL DAILY
COMMUNITY
Start: 2023-04-21

## 2023-04-29 RX ORDER — PRAVASTATIN SODIUM 40 MG
1 TABLET ORAL NIGHTLY
COMMUNITY
Start: 2022-10-20

## 2023-04-29 RX ORDER — OMEPRAZOLE 20 MG/1
1 CAPSULE, DELAYED RELEASE ORAL DAILY
COMMUNITY
Start: 2023-03-20

## 2023-04-29 RX ORDER — ASPIRIN 81 MG/1
81 TABLET, CHEWABLE ORAL DAILY
COMMUNITY

## 2023-04-29 RX ORDER — ACETAMINOPHEN 500 MG
TABLET ORAL EVERY 6 HOURS PRN
COMMUNITY

## 2023-12-28 RX ORDER — PRAVASTATIN SODIUM 40 MG
40 TABLET ORAL NIGHTLY
Qty: 90 TABLET | Refills: 4 | Status: SHIPPED | OUTPATIENT
Start: 2023-12-28

## 2024-01-19 NOTE — PROGRESS NOTES
Much improved from last visit. Pt still has not gotten her walker.  The order was place on 12/11/2023. Will get nurse to call today to see what is the issue with getting her walker.   Noticed ulceration to left outer leg, open, yellow tissue noted. Wound care consult ordered for evaluation. Posterior head very pink with serous drainage noted, no open areas noted.

## 2024-03-27 RX ORDER — LOSARTAN POTASSIUM 25 MG/1
25 TABLET ORAL NIGHTLY
Qty: 90 TABLET | OUTPATIENT
Start: 2024-03-27

## 2024-03-27 RX ORDER — OMEPRAZOLE 20 MG/1
CAPSULE, DELAYED RELEASE ORAL DAILY
Qty: 90 CAPSULE | OUTPATIENT
Start: 2024-03-27

## 2024-03-27 RX ORDER — HYDROCHLOROTHIAZIDE 25 MG/1
25 TABLET ORAL DAILY
Qty: 90 TABLET | OUTPATIENT
Start: 2024-03-27

## 2024-04-01 NOTE — TELEPHONE ENCOUNTER
Patient called stating medications were not filled. Informed patient that he needs to be seen at least yearly to keep medications prescribed. Appt scheduled for AWV on 4/12. 30 day scripts requested to [facility]. Explained to patient that if he does not keep the visit his prescriptions will not be filled until he is seen. Patient verbalized understanding.

## 2024-04-02 RX ORDER — AMLODIPINE BESYLATE 5 MG/1
5 TABLET ORAL DAILY
Qty: 90 TABLET | Refills: 4 | Status: SHIPPED | OUTPATIENT
Start: 2024-04-02

## 2024-04-02 RX ORDER — LOSARTAN POTASSIUM 25 MG/1
25 TABLET ORAL NIGHTLY
Qty: 90 TABLET | Refills: 4 | Status: SHIPPED | OUTPATIENT
Start: 2024-04-02

## 2024-04-02 RX ORDER — HYDROCHLOROTHIAZIDE 25 MG/1
25 TABLET ORAL DAILY
Qty: 90 TABLET | Refills: 4 | Status: SHIPPED | OUTPATIENT
Start: 2024-04-02

## 2024-04-05 RX ORDER — OMEPRAZOLE 20 MG/1
CAPSULE, DELAYED RELEASE ORAL DAILY
Qty: 30 CAPSULE | Refills: 0 | Status: SHIPPED | OUTPATIENT
Start: 2024-04-05

## 2024-04-12 ENCOUNTER — OFFICE VISIT (OUTPATIENT)
Age: 79
End: 2024-04-12

## 2024-04-12 VITALS
RESPIRATION RATE: 18 BRPM | WEIGHT: 126.2 LBS | BODY MASS INDEX: 20.28 KG/M2 | HEART RATE: 109 BPM | HEIGHT: 66 IN | SYSTOLIC BLOOD PRESSURE: 121 MMHG | DIASTOLIC BLOOD PRESSURE: 64 MMHG | OXYGEN SATURATION: 96 %

## 2024-04-12 DIAGNOSIS — K21.9 GASTROESOPHAGEAL REFLUX DISEASE WITHOUT ESOPHAGITIS: ICD-10-CM

## 2024-04-12 DIAGNOSIS — I10 ESSENTIAL (PRIMARY) HYPERTENSION: ICD-10-CM

## 2024-04-12 DIAGNOSIS — E78.2 MIXED HYPERLIPIDEMIA: ICD-10-CM

## 2024-04-12 DIAGNOSIS — Z00.00 MEDICARE ANNUAL WELLNESS VISIT, SUBSEQUENT: Primary | ICD-10-CM

## 2024-04-12 LAB
ALBUMIN SERPL-MCNC: 3.5 G/DL (ref 3.5–5)
ALBUMIN/GLOB SERPL: 0.8 (ref 1.1–2.2)
ALP SERPL-CCNC: 97 U/L (ref 45–117)
ALT SERPL-CCNC: 12 U/L (ref 12–78)
ANION GAP SERPL CALC-SCNC: 9 MMOL/L (ref 5–15)
AST SERPL-CCNC: 11 U/L (ref 15–37)
BASOPHILS # BLD: 0.1 K/UL (ref 0–0.1)
BASOPHILS NFR BLD: 1 % (ref 0–1)
BILIRUB SERPL-MCNC: 0.4 MG/DL (ref 0.2–1)
BUN SERPL-MCNC: 29 MG/DL (ref 6–20)
BUN/CREAT SERPL: 27 (ref 12–20)
CALCIUM SERPL-MCNC: 10.2 MG/DL (ref 8.5–10.1)
CHLORIDE SERPL-SCNC: 102 MMOL/L (ref 97–108)
CHOLEST SERPL-MCNC: 181 MG/DL
CO2 SERPL-SCNC: 27 MMOL/L (ref 21–32)
CREAT SERPL-MCNC: 1.09 MG/DL (ref 0.7–1.3)
DIFFERENTIAL METHOD BLD: ABNORMAL
EOSINOPHIL # BLD: 0.1 K/UL (ref 0–0.4)
EOSINOPHIL NFR BLD: 1 % (ref 0–7)
ERYTHROCYTE [DISTWIDTH] IN BLOOD BY AUTOMATED COUNT: 11.8 % (ref 11.5–14.5)
GLOBULIN SER CALC-MCNC: 4.3 G/DL (ref 2–4)
GLUCOSE SERPL-MCNC: 97 MG/DL (ref 65–100)
HCT VFR BLD AUTO: 38.8 % (ref 36.6–50.3)
HDLC SERPL-MCNC: 34 MG/DL
HDLC SERPL: 5.3 (ref 0–5)
HGB BLD-MCNC: 12.9 G/DL (ref 12.1–17)
IMM GRANULOCYTES # BLD AUTO: 0 K/UL (ref 0–0.04)
IMM GRANULOCYTES NFR BLD AUTO: 0 % (ref 0–0.5)
LDLC SERPL CALC-MCNC: 111.2 MG/DL (ref 0–100)
LYMPHOCYTES # BLD: 1.4 K/UL (ref 0.8–3.5)
LYMPHOCYTES NFR BLD: 15 % (ref 12–49)
MCH RBC QN AUTO: 32.7 PG (ref 26–34)
MCHC RBC AUTO-ENTMCNC: 33.2 G/DL (ref 30–36.5)
MCV RBC AUTO: 98.5 FL (ref 80–99)
MONOCYTES # BLD: 0.9 K/UL (ref 0–1)
MONOCYTES NFR BLD: 10 % (ref 5–13)
NEUTS SEG # BLD: 6.6 K/UL (ref 1.8–8)
NEUTS SEG NFR BLD: 73 % (ref 32–75)
NRBC # BLD: 0 K/UL (ref 0–0.01)
NRBC BLD-RTO: 0 PER 100 WBC
PLATELET # BLD AUTO: 401 K/UL (ref 150–400)
PMV BLD AUTO: 10.7 FL (ref 8.9–12.9)
POTASSIUM SERPL-SCNC: 3.9 MMOL/L (ref 3.5–5.1)
PROT SERPL-MCNC: 7.8 G/DL (ref 6.4–8.2)
RBC # BLD AUTO: 3.94 M/UL (ref 4.1–5.7)
SODIUM SERPL-SCNC: 138 MMOL/L (ref 136–145)
TRIGL SERPL-MCNC: 179 MG/DL
TSH SERPL DL<=0.05 MIU/L-ACNC: 1.92 UIU/ML (ref 0.36–3.74)
VLDLC SERPL CALC-MCNC: 35.8 MG/DL
WBC # BLD AUTO: 9 K/UL (ref 4.1–11.1)

## 2024-04-12 RX ORDER — HYDROCHLOROTHIAZIDE 25 MG/1
25 TABLET ORAL DAILY
Qty: 90 TABLET | Refills: 4 | Status: SHIPPED | OUTPATIENT
Start: 2024-04-12

## 2024-04-12 RX ORDER — OMEPRAZOLE 20 MG/1
20 CAPSULE, DELAYED RELEASE ORAL DAILY
Qty: 90 CAPSULE | Refills: 4 | Status: SHIPPED | OUTPATIENT
Start: 2024-04-12

## 2024-04-12 RX ORDER — PRAVASTATIN SODIUM 40 MG
40 TABLET ORAL NIGHTLY
Qty: 90 TABLET | Refills: 4 | Status: SHIPPED | OUTPATIENT
Start: 2024-04-12

## 2024-04-12 RX ORDER — LOSARTAN POTASSIUM 25 MG/1
25 TABLET ORAL NIGHTLY
Qty: 90 TABLET | Refills: 4 | Status: SHIPPED | OUTPATIENT
Start: 2024-04-12

## 2024-04-12 RX ORDER — AMLODIPINE BESYLATE 5 MG/1
5 TABLET ORAL DAILY
Qty: 90 TABLET | Refills: 4 | Status: SHIPPED | OUTPATIENT
Start: 2024-04-12

## 2024-04-12 SDOH — ECONOMIC STABILITY: FOOD INSECURITY: WITHIN THE PAST 12 MONTHS, YOU WORRIED THAT YOUR FOOD WOULD RUN OUT BEFORE YOU GOT MONEY TO BUY MORE.: NEVER TRUE

## 2024-04-12 SDOH — ECONOMIC STABILITY: HOUSING INSECURITY
IN THE LAST 12 MONTHS, WAS THERE A TIME WHEN YOU DID NOT HAVE A STEADY PLACE TO SLEEP OR SLEPT IN A SHELTER (INCLUDING NOW)?: NO

## 2024-04-12 SDOH — ECONOMIC STABILITY: FOOD INSECURITY: WITHIN THE PAST 12 MONTHS, THE FOOD YOU BOUGHT JUST DIDN'T LAST AND YOU DIDN'T HAVE MONEY TO GET MORE.: NEVER TRUE

## 2024-04-12 SDOH — ECONOMIC STABILITY: INCOME INSECURITY: HOW HARD IS IT FOR YOU TO PAY FOR THE VERY BASICS LIKE FOOD, HOUSING, MEDICAL CARE, AND HEATING?: NOT HARD AT ALL

## 2024-04-12 ASSESSMENT — PATIENT HEALTH QUESTIONNAIRE - PHQ9
SUM OF ALL RESPONSES TO PHQ QUESTIONS 1-9: 0
SUM OF ALL RESPONSES TO PHQ9 QUESTIONS 1 & 2: 0
2. FEELING DOWN, DEPRESSED OR HOPELESS: NOT AT ALL
1. LITTLE INTEREST OR PLEASURE IN DOING THINGS: NOT AT ALL

## 2024-04-12 ASSESSMENT — LIFESTYLE VARIABLES
HOW OFTEN DO YOU HAVE A DRINK CONTAINING ALCOHOL: 2-3 TIMES A WEEK
HOW MANY STANDARD DRINKS CONTAINING ALCOHOL DO YOU HAVE ON A TYPICAL DAY: 1 OR 2

## 2024-04-12 NOTE — PROGRESS NOTES
Medicare Annual Wellness Visit    Scott Gardiner is here for Medicare AWV, Hypertension (States has been checking BP at home. Running 100/70s), and Medication Refill (Requests yearly refills. )    Assessment & Plan   Medicare annual wellness visit, subsequent  Essential (primary) hypertension  -     CBC with Auto Differential; Future  -     Comprehensive Metabolic Panel; Future  -     Lipid Panel; Future  -     TSH; Future    Recommendations for Preventive Services Due: see orders and patient instructions/AVS.  Recommended screening schedule for the next 5-10 years is provided to the patient in written form: see Patient Instructions/AVS.     No follow-ups on file.     Subjective     Patient's complete Health Risk Assessment and screening values have been reviewed and are found in Flowsheets. The following problems were reviewed today and where indicated follow up appointments were made and/or referrals ordered.    Positive Risk Factor Screenings with Interventions:       Cognitive:   Clock Drawing Test (CDT): Normal  Words recalled: 0 Words Recalled  Total Score: (!) 2  Total Score Interpretation: Abnormal Mini-Cog    Interventions:  Patient advised to follow-up in this office for further evaluation and treatment               Vision Screen:  Do you have difficulty driving, watching TV, or doing any of your daily activities because of your eyesight?: No  Have you had an eye exam within the past year?: (!) No  No results found.    Interventions:   Patient encouraged to make appointment with their eye specialist                Objective   Vitals:    04/12/24 1312   BP: 121/64   Site: Left Upper Arm   Position: Sitting   Cuff Size: Medium Adult   Pulse: (!) 109   Resp: 18   SpO2: 96%   Weight: 57.2 kg (126 lb 3.2 oz)   Height: 1.676 m (5' 6\")      Body mass index is 20.37 kg/m².          Allergies   Allergen Reactions    Iodine Rash     Hives on back     Prior to Visit Medications    Medication Sig Taking? 
Scott Gardiner is a 78 y.o. male presenting for/with:    Chief Complaint   Patient presents with    Medicare AWV    Hypertension     States has been checking BP at home. Running 100/70s    Medication Refill     Requests yearly refills.        Vitals:    04/12/24 1312   BP: 121/64   Site: Left Upper Arm   Position: Sitting   Cuff Size: Medium Adult   Pulse: (!) 109   Resp: 18   SpO2: 96%   Weight: 57.2 kg (126 lb 3.2 oz)   Height: 1.676 m (5' 6\")       Pain Scale: 0 - No pain/10  Pain Location:     \"Have you been to the ER, urgent care clinic since your last visit?  Hospitalized since your last visit?\"    NO    “Have you seen or consulted any other health care providers outside of [facility] since your last visit?”    NO                 4/12/2024     1:10 PM   PHQ-9    Little interest or pleasure in doing things 0   Feeling down, depressed, or hopeless 0   PHQ-2 Score 0   PHQ-9 Total Score 0           7/18/2022    12:00 AM 10/21/2021    12:00 AM 7/22/2021    12:00 AM 6/24/2021    12:00 AM   [facility] AMB LEARNING ASSESSMENT   Primary Learner Patient Patient Patient Patient   Primary Language ENGLISH ENGLISH ENGLISH ENGLISH   Learning Preference DEMONSTRATION DEMONSTRATION DEMONSTRATION LISTENING   Answered By self pt patient patient   Relationship to Learner SELF SELF SELF SELF            4/12/2024     1:10 PM   Amb Fall Risk Assessment and TUG Test   Do you feel unsteady or are you worried about falling?  no   2 or more falls in past year? no   Fall with injury in past year? no           4/12/2024     1:00 PM   ADL ASSESSMENT   Feeding yourself No Help Needed   Getting from bed to chair No Help Needed   Getting dressed No Help Needed   Bathing or showering No Help Needed   Walk across the room (includes cane/walker) No Help Needed   Using the telphone No Help Needed   Taking your medications No Help Needed   Preparing meals No Help Needed   Managing money (expenses/bills) No Help Needed   Moderately 
tablet 4    losartan (COZAAR) 25 MG tablet Take 1 tablet by mouth nightly 90 tablet 4    pravastatin (PRAVACHOL) 40 MG tablet TAKE 1 TABLET BY MOUTH EVERY NIGHT 90 tablet 4    acetaminophen (TYLENOL) 500 MG tablet Take by mouth every 6 hours as needed      aspirin 81 MG chewable tablet Take 1 tablet by mouth daily       No current facility-administered medications for this visit.        HPI this is a 78-year-old stroke survivor who is here for a subsequent annual wellness visit and for evaluation of his hypertension.  He feels great.  He has noted a slight increase in his heart rate but is not sure how long that has been the case and normally at home he says his heart rate is well below 100.    He is retired handy.  He lives alone.  He has no children.  He has a sister named Dolores who lives in [place] and he is very close to his older brother's wife Kaylie who lives in [place].  He also has a local friend named Kendall.    ROS:  Denies fever, chills, cough, chest pain, SOB,  nausea, vomiting, or diarrhea.  Denies wt loss, wt gain, hemoptysis, hematochezia or melena.    Physical Examination:    /64 (Site: Left Upper Arm, Position: Sitting, Cuff Size: Medium Adult)   Pulse (!) 109   Resp 18   Ht 1.676 m (5' 6\")   Wt 57.2 kg (126 lb 3.2 oz)   SpO2 96%   BMI 20.37 kg/m²    General:  Alert, cooperative, no distress.   Head:  Normocephalic, without obvious abnormality, atraumatic.   Eyes:  Conjunctivae/corneas clear. Pupils equal, round, reactive to light. Extraocular movements intact   Ears:  Nose:  Oropharynx:  Neck:  Lungs:    clear   Patent nares without obstruction  clear  Supple.    Clear to auscultation bilaterally.   Chest wall:  No tenderness or deformity.   Cardiac:  clear   Abdomen:   Soft, non-tender. Bowel sounds normal. No masses. No organomegaly.   Extremities: Extremities normal, atraumatic, no cyanosis or edema.   Pulses: 2+ and symmetric all extremities.   Skin: Skin color, texture,

## 2024-04-12 NOTE — PATIENT INSTRUCTIONS
visit is recommended every 6 months.  Try to get at least 150 minutes of exercise per week or 10,000 steps per day on a pedometer .  Order or download the FREE \"Exercise & Physical Activity: Your Everyday Guide\" from [employer]. Call 1-793.775.9461 or search [employer] online.  You need 9417-2423 mg of calcium and 2422-8995 IU of vitamin D per day. It is possible to meet your calcium requirement with diet alone, but a vitamin D supplement is usually necessary to meet this goal.  When exposed to the sun, use a sunscreen that protects against both UVA and UVB radiation with an SPF of 30 or greater. Reapply every 2 to 3 hours or after sweating, drying off with a towel, or swimming.  Always wear a seat belt when traveling in a car. Always wear a helmet when riding a bicycle or motorcycle.

## 2024-10-09 ENCOUNTER — TELEPHONE (OUTPATIENT)
Age: 79
End: 2024-10-09

## 2024-10-09 NOTE — TELEPHONE ENCOUNTER
Pt called the office stating that he was unable to sleep and he had a concern that his hands were shaking and he was experiencing SOB. Pt was instructed to go to the nearest ER. Pt stated that he was scared to drive and would hang up to call 911 to go to the nearest ER for evaluation.

## 2024-10-11 ENCOUNTER — OFFICE VISIT (OUTPATIENT)
Age: 79
End: 2024-10-11
Payer: MEDICARE

## 2024-10-11 ENCOUNTER — APPOINTMENT (OUTPATIENT)
Facility: HOSPITAL | Age: 79
End: 2024-10-11
Payer: MEDICARE

## 2024-10-11 ENCOUNTER — HOSPITAL ENCOUNTER (EMERGENCY)
Facility: HOSPITAL | Age: 79
Discharge: ANOTHER HOSPITAL | End: 2024-10-12
Attending: EMERGENCY MEDICINE
Payer: MEDICARE

## 2024-10-11 VITALS
OXYGEN SATURATION: 98 % | HEART RATE: 110 BPM | DIASTOLIC BLOOD PRESSURE: 63 MMHG | RESPIRATION RATE: 18 BRPM | WEIGHT: 110.2 LBS | SYSTOLIC BLOOD PRESSURE: 107 MMHG | TEMPERATURE: 98.7 F | HEIGHT: 66 IN | BODY MASS INDEX: 17.71 KG/M2

## 2024-10-11 DIAGNOSIS — R06.09 DOE (DYSPNEA ON EXERTION): ICD-10-CM

## 2024-10-11 DIAGNOSIS — J90 PLEURAL EFFUSION: Primary | ICD-10-CM

## 2024-10-11 DIAGNOSIS — R63.4 WEIGHT LOSS, UNINTENTIONAL: ICD-10-CM

## 2024-10-11 DIAGNOSIS — I10 ESSENTIAL (PRIMARY) HYPERTENSION: Primary | ICD-10-CM

## 2024-10-11 LAB
ALBUMIN SERPL-MCNC: 2.2 G/DL (ref 3.5–5)
ALBUMIN/GLOB SERPL: 0.4 (ref 1.1–2.2)
ALP SERPL-CCNC: 87 U/L (ref 45–117)
ALT SERPL-CCNC: 14 U/L (ref 12–78)
ANION GAP SERPL CALC-SCNC: 16 MMOL/L (ref 2–12)
APPEARANCE UR: CLEAR
AST SERPL-CCNC: 19 U/L (ref 15–37)
BACTERIA URNS QL MICRO: NEGATIVE /HPF
BASOPHILS # BLD: 0.1 K/UL (ref 0–0.1)
BASOPHILS NFR BLD: 1 % (ref 0–1)
BILIRUB SERPL-MCNC: 0.5 MG/DL (ref 0.2–1)
BILIRUB UR QL: NEGATIVE
BUN SERPL-MCNC: 28 MG/DL (ref 6–20)
BUN/CREAT SERPL: 26 (ref 12–20)
CALCIUM SERPL-MCNC: 6.9 MG/DL (ref 8.5–10.1)
CHLORIDE SERPL-SCNC: 98 MMOL/L (ref 97–108)
CO2 SERPL-SCNC: 24 MMOL/L (ref 21–32)
COLOR UR: NORMAL
CREAT SERPL-MCNC: 1.06 MG/DL (ref 0.7–1.3)
DIFFERENTIAL METHOD BLD: ABNORMAL
EKG ATRIAL RATE: 108 BPM
EKG DIAGNOSIS: NORMAL
EKG P AXIS: 74 DEGREES
EKG P-R INTERVAL: 142 MS
EKG Q-T INTERVAL: 356 MS
EKG QRS DURATION: 68 MS
EKG QTC CALCULATION (BAZETT): 475 MS
EKG R AXIS: 83 DEGREES
EKG T AXIS: 71 DEGREES
EKG VENTRICULAR RATE: 107 BPM
EOSINOPHIL # BLD: 0 K/UL (ref 0–0.4)
EOSINOPHIL NFR BLD: 0 % (ref 0–7)
EPITH CASTS URNS QL MICRO: NORMAL /LPF
ERYTHROCYTE [DISTWIDTH] IN BLOOD BY AUTOMATED COUNT: 14.2 % (ref 11.5–14.5)
FLUAV RNA SPEC QL NAA+PROBE: NOT DETECTED
FLUBV RNA SPEC QL NAA+PROBE: NOT DETECTED
GLOBULIN SER CALC-MCNC: 5.9 G/DL (ref 2–4)
GLUCOSE SERPL-MCNC: 104 MG/DL (ref 65–100)
GLUCOSE UR STRIP.AUTO-MCNC: NEGATIVE MG/DL
HCT VFR BLD AUTO: 29.6 % (ref 36.6–50.3)
HGB BLD-MCNC: 9.6 G/DL (ref 12.1–17)
HGB UR QL STRIP: NEGATIVE
IMM GRANULOCYTES # BLD AUTO: 0.1 K/UL (ref 0–0.04)
IMM GRANULOCYTES NFR BLD AUTO: 1 % (ref 0–0.5)
KETONES UR QL STRIP.AUTO: NEGATIVE MG/DL
LACTATE SERPL-SCNC: 1.3 MMOL/L (ref 0.4–2)
LACTATE SERPL-SCNC: 2.7 MMOL/L (ref 0.4–2)
LEUKOCYTE ESTERASE UR QL STRIP.AUTO: NEGATIVE
LYMPHOCYTES # BLD: 0.9 K/UL (ref 0.8–3.5)
LYMPHOCYTES NFR BLD: 7 % (ref 12–49)
MCH RBC QN AUTO: 29.4 PG (ref 26–34)
MCHC RBC AUTO-ENTMCNC: 32.4 G/DL (ref 30–36.5)
MCV RBC AUTO: 90.8 FL (ref 80–99)
MONOCYTES # BLD: 1.1 K/UL (ref 0–1)
MONOCYTES NFR BLD: 8 % (ref 5–13)
NEUTS SEG # BLD: 11.9 K/UL (ref 1.8–8)
NEUTS SEG NFR BLD: 83 % (ref 32–75)
NITRITE UR QL STRIP.AUTO: NEGATIVE
NRBC # BLD: 0 K/UL (ref 0–0.01)
NRBC BLD-RTO: 0 PER 100 WBC
PH UR STRIP: 6.5 (ref 5–8)
PLATELET # BLD AUTO: 579 K/UL (ref 150–400)
PMV BLD AUTO: 10.2 FL (ref 8.9–12.9)
POTASSIUM SERPL-SCNC: 3.8 MMOL/L (ref 3.5–5.1)
PROT SERPL-MCNC: 8.1 G/DL (ref 6.4–8.2)
PROT UR STRIP-MCNC: NEGATIVE MG/DL
RBC # BLD AUTO: 3.26 M/UL (ref 4.1–5.7)
RBC #/AREA URNS HPF: NORMAL /HPF (ref 0–5)
SARS-COV-2 RNA RESP QL NAA+PROBE: NOT DETECTED
SODIUM SERPL-SCNC: 138 MMOL/L (ref 136–145)
SOURCE: NORMAL
SP GR UR REFRACTOMETRY: 1.01 (ref 1–1.03)
URINE CULTURE IF INDICATED: NORMAL
UROBILINOGEN UR QL STRIP.AUTO: 0.2 EU/DL (ref 0.2–1)
WBC # BLD AUTO: 14.1 K/UL (ref 4.1–11.1)
WBC URNS QL MICRO: NORMAL /HPF (ref 0–4)

## 2024-10-11 PROCEDURE — G8419 CALC BMI OUT NRM PARAM NOF/U: HCPCS | Performed by: INTERNAL MEDICINE

## 2024-10-11 PROCEDURE — 96365 THER/PROPH/DIAG IV INF INIT: CPT

## 2024-10-11 PROCEDURE — 71045 X-RAY EXAM CHEST 1 VIEW: CPT

## 2024-10-11 PROCEDURE — 96361 HYDRATE IV INFUSION ADD-ON: CPT

## 2024-10-11 PROCEDURE — 87040 BLOOD CULTURE FOR BACTERIA: CPT

## 2024-10-11 PROCEDURE — 87636 SARSCOV2 & INF A&B AMP PRB: CPT

## 2024-10-11 PROCEDURE — 1123F ACP DISCUSS/DSCN MKR DOCD: CPT | Performed by: INTERNAL MEDICINE

## 2024-10-11 PROCEDURE — 2580000003 HC RX 258: Performed by: EMERGENCY MEDICINE

## 2024-10-11 PROCEDURE — 3078F DIAST BP <80 MM HG: CPT | Performed by: INTERNAL MEDICINE

## 2024-10-11 PROCEDURE — 85025 COMPLETE CBC W/AUTO DIFF WBC: CPT

## 2024-10-11 PROCEDURE — 96366 THER/PROPH/DIAG IV INF ADDON: CPT

## 2024-10-11 PROCEDURE — 3074F SYST BP LT 130 MM HG: CPT | Performed by: INTERNAL MEDICINE

## 2024-10-11 PROCEDURE — 36415 COLL VENOUS BLD VENIPUNCTURE: CPT

## 2024-10-11 PROCEDURE — 81001 URINALYSIS AUTO W/SCOPE: CPT

## 2024-10-11 PROCEDURE — G8427 DOCREV CUR MEDS BY ELIG CLIN: HCPCS | Performed by: INTERNAL MEDICINE

## 2024-10-11 PROCEDURE — 6370000000 HC RX 637 (ALT 250 FOR IP): Performed by: EMERGENCY MEDICINE

## 2024-10-11 PROCEDURE — 83605 ASSAY OF LACTIC ACID: CPT

## 2024-10-11 PROCEDURE — 99214 OFFICE O/P EST MOD 30 MIN: CPT | Performed by: INTERNAL MEDICINE

## 2024-10-11 PROCEDURE — 1036F TOBACCO NON-USER: CPT | Performed by: INTERNAL MEDICINE

## 2024-10-11 PROCEDURE — 99285 EMERGENCY DEPT VISIT HI MDM: CPT

## 2024-10-11 PROCEDURE — 80053 COMPREHEN METABOLIC PANEL: CPT

## 2024-10-11 PROCEDURE — G8484 FLU IMMUNIZE NO ADMIN: HCPCS | Performed by: INTERNAL MEDICINE

## 2024-10-11 PROCEDURE — 71250 CT THORAX DX C-: CPT

## 2024-10-11 PROCEDURE — 6360000002 HC RX W HCPCS: Performed by: EMERGENCY MEDICINE

## 2024-10-11 RX ORDER — ALPRAZOLAM 0.5 MG
0.5 TABLET ORAL
Status: COMPLETED | OUTPATIENT
Start: 2024-10-11 | End: 2024-10-11

## 2024-10-11 RX ORDER — VANCOMYCIN 1.75 G/350ML
25 INJECTION, SOLUTION INTRAVENOUS ONCE
Status: COMPLETED | OUTPATIENT
Start: 2024-10-11 | End: 2024-10-11

## 2024-10-11 RX ORDER — 0.9 % SODIUM CHLORIDE 0.9 %
30 INTRAVENOUS SOLUTION INTRAVENOUS ONCE
Status: COMPLETED | OUTPATIENT
Start: 2024-10-11 | End: 2024-10-11

## 2024-10-11 RX ADMIN — PIPERACILLIN AND TAZOBACTAM 4500 MG: 4; .5 INJECTION, POWDER, LYOPHILIZED, FOR SOLUTION INTRAVENOUS at 15:44

## 2024-10-11 RX ADMIN — VANCOMYCIN 1250 MG: 1.75 INJECTION, SOLUTION INTRAVENOUS at 16:23

## 2024-10-11 RX ADMIN — ALPRAZOLAM 0.5 MG: 0.5 TABLET ORAL at 19:28

## 2024-10-11 RX ADMIN — SODIUM CHLORIDE 1497 ML: 9 INJECTION, SOLUTION INTRAVENOUS at 14:50

## 2024-10-11 SDOH — ECONOMIC STABILITY: FOOD INSECURITY: WITHIN THE PAST 12 MONTHS, YOU WORRIED THAT YOUR FOOD WOULD RUN OUT BEFORE YOU GOT MONEY TO BUY MORE.: NEVER TRUE

## 2024-10-11 SDOH — ECONOMIC STABILITY: FOOD INSECURITY: WITHIN THE PAST 12 MONTHS, THE FOOD YOU BOUGHT JUST DIDN'T LAST AND YOU DIDN'T HAVE MONEY TO GET MORE.: NEVER TRUE

## 2024-10-11 SDOH — ECONOMIC STABILITY: INCOME INSECURITY: HOW HARD IS IT FOR YOU TO PAY FOR THE VERY BASICS LIKE FOOD, HOUSING, MEDICAL CARE, AND HEATING?: NOT HARD AT ALL

## 2024-10-11 ASSESSMENT — PAIN - FUNCTIONAL ASSESSMENT: PAIN_FUNCTIONAL_ASSESSMENT: NONE - DENIES PAIN

## 2024-10-11 ASSESSMENT — LIFESTYLE VARIABLES: HOW OFTEN DO YOU HAVE A DRINK CONTAINING ALCOHOL: NEVER

## 2024-10-11 ASSESSMENT — PATIENT HEALTH QUESTIONNAIRE - PHQ9
SUM OF ALL RESPONSES TO PHQ9 QUESTIONS 1 & 2: 0
2. FEELING DOWN, DEPRESSED OR HOPELESS: NOT AT ALL
SUM OF ALL RESPONSES TO PHQ QUESTIONS 1-9: 0
1. LITTLE INTEREST OR PLEASURE IN DOING THINGS: NOT AT ALL
SUM OF ALL RESPONSES TO PHQ QUESTIONS 1-9: 0

## 2024-10-11 NOTE — ED NOTES
Pt has left his van key, security has obtained. Pt states that his friend is going to be picking up his van tomorrow. Pt is becoming more anxious, almost tearful and would like to speak to ED Provider, be reassured. Pt has asked for something for his nerves. ED Provider informed.

## 2024-10-11 NOTE — ED TRIAGE NOTES
Patient presents to the ED with a complaint of SOB.  Started one week ago.  Denies cough.  Complains of tremors.  +19 pound weight loss in 6 months.  Able to speak in sentences.

## 2024-10-11 NOTE — ED NOTES
Knocked on door to announce to pt. Hands washed. Introduced self to pt and updated whiteboard. Explained role of self to pt. ED flow explained to pt. Pt verbalized understanding.  Pt placed on cardiac, hemodynamic and pulse oximetry at this time.

## 2024-10-11 NOTE — ED PROVIDER NOTES
[facility] EMERGENCY DEP  EMERGENCY DEPARTMENT ENCOUNTER       Pt Name: Scott Gardiner  MRN: [id number]  Birthdate 1945  Date of evaluation: 10/11/2024  Provider: Lidya Walton MD   PCP: Karan Almonte MD  Note Started: 6:26 PM EDT 10/11/24     CHIEF COMPLAINT       Chief Complaint   Patient presents with    Shortness of Breath        HISTORY OF PRESENT ILLNESS: 1 or more elements      History From: Patient, History limited by: none     Scott Gardiner is a 79 y.o. male presents to the emergency department after being sent by his primary care doctor.  Primary care doctor noted patient had lost weight and was tachycardic today in office.       Please See MDM for Additional Details of the HPI/PMH  Nursing Notes were all reviewed and agreed with or any disagreements were addressed in the HPI.     REVIEW OF SYSTEMS        Positives and Pertinent negatives as per HPI.    PAST HISTORY     Past Medical History:  Past Medical History:   Diagnosis Date    Abuse     alcoholism    Aneurysm (HCC)     Chronic obstructive pulmonary disease (HCC)     pt denies    Chronic pain     neck /arthritis-injections    Claudication of calf muscles (HCC) 2013    Colovesical fistula     Dr Rincon    Esophageal stricture     Esophagitis     GI bleed 10/2016    Hemochromatosis     Hyperlipidemia     Hypertension     Ill-defined condition 2020    blood transfusion hx    Peripheral artery disease (HCC)     Dr. Zavala    Pulmonary nodule     right lung    Stroke (HCC)        Past Surgical History:  Past Surgical History:   Procedure Laterality Date    AMPUTATION Left     transmetatarsal    AMPUTATION Left 07/2016    left 4th toe from gangrene    CATARACT REMOVAL Bilateral     COLONOSCOPY N/A 12/19/2016    COLONOSCOPY performed by Ramakrishna Morataya MD at [facility] ENDOSCOPY    COLONOSCOPY N/A 9/14/2016    COLONOSCOPY performed by Ramakrishna Morataya MD at [facility] ENDOSCOPY    COLONOSCOPY N/A 9/30/2020    COLONOSCOPY performed by Karan Lyle MD at [facility]  Collection Time: 10/11/24  2:36 PM   Result Value Ref Range    Sodium 138 136 - 145 mmol/L    Potassium 3.8 3.5 - 5.1 mmol/L    Chloride 98 97 - 108 mmol/L    CO2 24 21 - 32 mmol/L    Anion Gap 16 (H) 2 - 12 mmol/L    Glucose 104 (H) 65 - 100 mg/dL    BUN 28 (H) 6 - 20 MG/DL    Creatinine 1.06 0.70 - 1.30 MG/DL    BUN/Creatinine Ratio 26 (H) 12 - 20      Est, Glom Filt Rate 71 >60 ml/min/1.73m2    Calcium 6.9 (L) 8.5 - 10.1 MG/DL    Total Bilirubin 0.5 0.2 - 1.0 MG/DL    ALT 14 12 - 78 U/L    AST 19 15 - 37 U/L    Alk Phosphatase 87 45 - 117 U/L    Total Protein 8.1 6.4 - 8.2 g/dL    Albumin 2.2 (L) 3.5 - 5.0 g/dL    Globulin 5.9 (H) 2.0 - 4.0 g/dL    Albumin/Globulin Ratio 0.4 (L) 1.1 - 2.2     COVID-19 & Influenza Combo    Collection Time: 10/11/24  2:52 PM    Specimen: Nasopharyngeal   Result Value Ref Range    Source Nasopharyngeal      SARS-CoV-2, PCR Not detected NOTD      Rapid Influenza A By PCR Not detected NOTD      Rapid Influenza B By PCR Not detected NOTD     Lactate, Sepsis    Collection Time: 10/11/24  4:30 PM   Result Value Ref Range    Lactic Acid, Sepsis 1.3 0.4 - 2.0 MMOL/L   Urinalysis with Reflex to Culture    Collection Time: 10/11/24  5:13 PM    Specimen: Urine   Result Value Ref Range    Color, UA YELLOW/STRAW      Appearance CLEAR CLEAR      Specific Gravity, UA 1.010 1.003 - 1.030      pH, Urine 6.5 5.0 - 8.0      Protein, UA Negative NEG mg/dL    Glucose, Ur Negative NEG mg/dL    Ketones, Urine Negative NEG mg/dL    Bilirubin, Urine Negative NEG      Blood, Urine Negative NEG      Urobilinogen, Urine 0.2 0.2 - 1.0 EU/dL    Nitrite, Urine Negative NEG      Leukocyte Esterase, Urine Negative NEG      WBC, UA 0-4 0 - 4 /hpf    RBC, UA 0-5 0 - 5 /hpf    Epithelial Cells, UA FEW FEW /lpf    BACTERIA, URINE Negative NEG /hpf    Urine Culture if Indicated CULTURE NOT INDICATED BY UA RESULT CNI         EKG: If performed, independent interpretation documented below in the MDM section

## 2024-10-11 NOTE — ED NOTES
TRANSFER - OUT REPORT:    Verbal report given to Enoch Galeana RN on Scott Gardiner  being transferred to [facility] 2322 for routine progression of patient care       Report consisted of patient's Situation, Background, Assessment and   Recommendations(SBAR).     Information from the following report(s) Nurse Handoff Report, ED SBAR, Adult Overview, Recent Results, Med Rec Status, Cardiac Rhythm Sinus Tach, and Quality Measures was reviewed with the receiving nurse.    [place] Fall Assessment:    Presents to emergency department  because of falls (Syncope, seizure, or loss of consciousness): No  Age > 70: Yes  Altered Mental Status, Intoxication with alcohol or substance confusion (Disorientation, impaired judgment, poor safety awaremess, or inability to follow instructions): No  Impaired Mobility: Ambulates or transfers with assistive devices or assistance; Unable to ambulate or transer.: Yes  Nursing Judgement: Yes          Lines:   Peripheral IV 10/11/24 Right Forearm (Active)   Site Assessment Clean, dry & intact 10/11/24 1432   Line Status Blood return noted;Normal saline locked 10/11/24 1432        Opportunity for questions and clarification was provided.      Patient to be transported with:  [facility]

## 2024-10-11 NOTE — ED NOTES
Per ED Provider (shift change, ), per Tegan RN, transfer center coordinator at [facility], pt accepted at [facility] Bed 2322, number for report [phone]

## 2024-10-11 NOTE — ED NOTES
Pt placed on 2L NC O2 due to relatively low O2 saturations during this ED visit. ED Provider aware.

## 2024-10-11 NOTE — ED NOTES
Pt has requested lights dimmed, door closed but slightly cracked and time to rest and relax. Pt aware that it will be return time for transport and that the current situation will expect a later ETA tonight for his transport.

## 2024-10-11 NOTE — PROGRESS NOTES
Scott Gardiner is a 79 y.o. male presenting for/with:    Chief Complaint   Patient presents with    Hypertension    Medication Check       Vitals:    10/11/24 1300   BP: 107/63   Pulse: (!) 110   Resp: 18   Temp: 98.7 °F (37.1 °C)   TempSrc: Temporal   SpO2: 98%   Weight: 50 kg (110 lb 3.2 oz)   Height: 1.676 m (5' 6\")       Pain Scale: 0 - No pain/10  Pain Location:     \"Have you been to the ER, urgent care clinic since your last visit?  Hospitalized since your last visit?\"    NO    “Have you seen or consulted any other health care providers outside of [facility] since your last visit?”    NO                 10/11/2024     1:04 PM   PHQ-9    Little interest or pleasure in doing things 0   Feeling down, depressed, or hopeless 0   PHQ-2 Score 0   PHQ-9 Total Score 0           7/18/2022    12:00 AM 10/21/2021    12:00 AM 7/22/2021    12:00 AM 6/24/2021    12:00 AM   [facility] AMB LEARNING ASSESSMENT   Primary Learner Patient Patient Patient Patient   Primary Language ENGLISH ENGLISH ENGLISH ENGLISH   Learning Preference DEMONSTRATION DEMONSTRATION DEMONSTRATION LISTENING   Answered By self pt patient patient   Relationship to Learner SELF SELF SELF SELF            10/11/2024     1:04 PM   Amb Fall Risk Assessment and TUG Test   Do you feel unsteady or are you worried about falling?  no   2 or more falls in past year? no   Fall with injury in past year? no           10/11/2024     1:00 PM 4/12/2024     1:00 PM   ADL ASSESSMENT   Feeding yourself No Help Needed No Help Needed   Getting from bed to chair No Help Needed No Help Needed   Getting dressed No Help Needed No Help Needed   Bathing or showering No Help Needed No Help Needed   Walk across the room (includes cane/walker) No Help Needed No Help Needed   Using the telphone No Help Needed No Help Needed   Taking your medications No Help Needed No Help Needed   Preparing meals No Help Needed No Help Needed   Managing money (expenses/bills) No Help Needed

## 2024-10-12 ENCOUNTER — HOSPITAL ENCOUNTER (INPATIENT)
Facility: HOSPITAL | Age: 79
LOS: 18 days | Discharge: NURSING, REHAB OR LONG-TERM CARE | DRG: 871 | End: 2024-10-30
Attending: STUDENT IN AN ORGANIZED HEALTH CARE EDUCATION/TRAINING PROGRAM | Admitting: STUDENT IN AN ORGANIZED HEALTH CARE EDUCATION/TRAINING PROGRAM
Payer: MEDICARE

## 2024-10-12 ENCOUNTER — APPOINTMENT (OUTPATIENT)
Facility: HOSPITAL | Age: 79
DRG: 871 | End: 2024-10-12
Attending: STUDENT IN AN ORGANIZED HEALTH CARE EDUCATION/TRAINING PROGRAM
Payer: MEDICARE

## 2024-10-12 VITALS
WEIGHT: 110 LBS | RESPIRATION RATE: 22 BRPM | HEART RATE: 95 BPM | SYSTOLIC BLOOD PRESSURE: 106 MMHG | OXYGEN SATURATION: 95 % | HEIGHT: 66 IN | TEMPERATURE: 98.1 F | BODY MASS INDEX: 17.68 KG/M2 | DIASTOLIC BLOOD PRESSURE: 58 MMHG

## 2024-10-12 PROBLEM — J90 PLEURAL EFFUSION, RIGHT: Status: ACTIVE | Noted: 2024-10-12

## 2024-10-12 PROBLEM — E43 SEVERE PROTEIN-CALORIE MALNUTRITION (HCC): Status: ACTIVE | Noted: 2024-10-12

## 2024-10-12 LAB
ANION GAP SERPL CALC-SCNC: 11 MMOL/L (ref 2–12)
ANION GAP SERPL CALC-SCNC: 11 MMOL/L (ref 2–12)
BASOPHILS # BLD: 0.1 K/UL (ref 0–0.1)
BASOPHILS # BLD: 0.1 K/UL (ref 0–0.1)
BASOPHILS NFR BLD: 1 % (ref 0–1)
BASOPHILS NFR BLD: 1 % (ref 0–1)
BUN SERPL-MCNC: 23 MG/DL (ref 6–20)
BUN SERPL-MCNC: 31 MG/DL (ref 6–20)
BUN/CREAT SERPL: 29 (ref 12–20)
BUN/CREAT SERPL: 30 (ref 12–20)
CALCIUM SERPL-MCNC: 6.1 MG/DL (ref 8.5–10.1)
CALCIUM SERPL-MCNC: 6.6 MG/DL (ref 8.5–10.1)
CHLORIDE SERPL-SCNC: 105 MMOL/L (ref 97–108)
CHLORIDE SERPL-SCNC: 106 MMOL/L (ref 97–108)
CO2 SERPL-SCNC: 20 MMOL/L (ref 21–32)
CO2 SERPL-SCNC: 21 MMOL/L (ref 21–32)
CREAT SERPL-MCNC: 0.78 MG/DL (ref 0.7–1.3)
CREAT SERPL-MCNC: 1.05 MG/DL (ref 0.7–1.3)
DIFFERENTIAL METHOD BLD: ABNORMAL
DIFFERENTIAL METHOD BLD: ABNORMAL
EOSINOPHIL # BLD: 0 K/UL (ref 0–0.4)
EOSINOPHIL # BLD: 0.1 K/UL (ref 0–0.4)
EOSINOPHIL NFR BLD: 0 % (ref 0–7)
EOSINOPHIL NFR BLD: 1 % (ref 0–7)
ERYTHROCYTE [DISTWIDTH] IN BLOOD BY AUTOMATED COUNT: 14 % (ref 11.5–14.5)
ERYTHROCYTE [DISTWIDTH] IN BLOOD BY AUTOMATED COUNT: 14.2 % (ref 11.5–14.5)
GLUCOSE BLD STRIP.AUTO-MCNC: 116 MG/DL (ref 65–117)
GLUCOSE SERPL-MCNC: 127 MG/DL (ref 65–100)
GLUCOSE SERPL-MCNC: 89 MG/DL (ref 65–100)
HCT VFR BLD AUTO: 25.4 % (ref 36.6–50.3)
HCT VFR BLD AUTO: 26.2 % (ref 36.6–50.3)
HGB BLD-MCNC: 8.3 G/DL (ref 12.1–17)
HGB BLD-MCNC: 8.4 G/DL (ref 12.1–17)
IMM GRANULOCYTES # BLD AUTO: 0.1 K/UL (ref 0–0.04)
IMM GRANULOCYTES # BLD AUTO: 0.1 K/UL (ref 0–0.04)
IMM GRANULOCYTES NFR BLD AUTO: 1 % (ref 0–0.5)
IMM GRANULOCYTES NFR BLD AUTO: 1 % (ref 0–0.5)
LACTATE SERPL-SCNC: 2.8 MMOL/L (ref 0.4–2)
LYMPHOCYTES # BLD: 0.7 K/UL (ref 0.8–3.5)
LYMPHOCYTES # BLD: 0.9 K/UL (ref 0.8–3.5)
LYMPHOCYTES NFR BLD: 5 % (ref 12–49)
LYMPHOCYTES NFR BLD: 7 % (ref 12–49)
MCH RBC QN AUTO: 29.6 PG (ref 26–34)
MCH RBC QN AUTO: 29.9 PG (ref 26–34)
MCHC RBC AUTO-ENTMCNC: 32.1 G/DL (ref 30–36.5)
MCHC RBC AUTO-ENTMCNC: 32.7 G/DL (ref 30–36.5)
MCV RBC AUTO: 90.7 FL (ref 80–99)
MCV RBC AUTO: 93.2 FL (ref 80–99)
MONOCYTES # BLD: 1.2 K/UL (ref 0–1)
MONOCYTES # BLD: 1.2 K/UL (ref 0–1)
MONOCYTES NFR BLD: 10 % (ref 5–13)
MONOCYTES NFR BLD: 8 % (ref 5–13)
NEUTS SEG # BLD: 12.3 K/UL (ref 1.8–8)
NEUTS SEG # BLD: 9.9 K/UL (ref 1.8–8)
NEUTS SEG NFR BLD: 80 % (ref 32–75)
NEUTS SEG NFR BLD: 85 % (ref 32–75)
NRBC # BLD: 0 K/UL (ref 0–0.01)
NRBC # BLD: 0 K/UL (ref 0–0.01)
NRBC BLD-RTO: 0 PER 100 WBC
NRBC BLD-RTO: 0 PER 100 WBC
PLATELET # BLD AUTO: 476 K/UL (ref 150–400)
PLATELET # BLD AUTO: 476 K/UL (ref 150–400)
PMV BLD AUTO: 10.1 FL (ref 8.9–12.9)
PMV BLD AUTO: 10.2 FL (ref 8.9–12.9)
POTASSIUM SERPL-SCNC: 3.2 MMOL/L (ref 3.5–5.1)
POTASSIUM SERPL-SCNC: 3.7 MMOL/L (ref 3.5–5.1)
PROCALCITONIN SERPL-MCNC: 52.41 NG/ML
RBC # BLD AUTO: 2.8 M/UL (ref 4.1–5.7)
RBC # BLD AUTO: 2.81 M/UL (ref 4.1–5.7)
RBC MORPH BLD: ABNORMAL
SERVICE CMNT-IMP: NORMAL
SODIUM SERPL-SCNC: 136 MMOL/L (ref 136–145)
SODIUM SERPL-SCNC: 138 MMOL/L (ref 136–145)
WBC # BLD AUTO: 12.1 K/UL (ref 4.1–11.1)
WBC # BLD AUTO: 14.4 K/UL (ref 4.1–11.1)

## 2024-10-12 PROCEDURE — 94640 AIRWAY INHALATION TREATMENT: CPT

## 2024-10-12 PROCEDURE — 83605 ASSAY OF LACTIC ACID: CPT

## 2024-10-12 PROCEDURE — 2580000003 HC RX 258: Performed by: NURSE PRACTITIONER

## 2024-10-12 PROCEDURE — 6360000002 HC RX W HCPCS: Performed by: INTERNAL MEDICINE

## 2024-10-12 PROCEDURE — 84145 PROCALCITONIN (PCT): CPT

## 2024-10-12 PROCEDURE — 93005 ELECTROCARDIOGRAM TRACING: CPT | Performed by: NURSE PRACTITIONER

## 2024-10-12 PROCEDURE — 6360000002 HC RX W HCPCS: Performed by: NURSE PRACTITIONER

## 2024-10-12 PROCEDURE — 71045 X-RAY EXAM CHEST 1 VIEW: CPT

## 2024-10-12 PROCEDURE — 6370000000 HC RX 637 (ALT 250 FOR IP): Performed by: STUDENT IN AN ORGANIZED HEALTH CARE EDUCATION/TRAINING PROGRAM

## 2024-10-12 PROCEDURE — 36415 COLL VENOUS BLD VENIPUNCTURE: CPT

## 2024-10-12 PROCEDURE — 2700000000 HC OXYGEN THERAPY PER DAY

## 2024-10-12 PROCEDURE — 2580000003 HC RX 258: Performed by: STUDENT IN AN ORGANIZED HEALTH CARE EDUCATION/TRAINING PROGRAM

## 2024-10-12 PROCEDURE — 6370000000 HC RX 637 (ALT 250 FOR IP): Performed by: FAMILY MEDICINE

## 2024-10-12 PROCEDURE — 80048 BASIC METABOLIC PNL TOTAL CA: CPT

## 2024-10-12 PROCEDURE — 85025 COMPLETE CBC W/AUTO DIFF WBC: CPT

## 2024-10-12 PROCEDURE — 82962 GLUCOSE BLOOD TEST: CPT

## 2024-10-12 PROCEDURE — 1100000003 HC PRIVATE W/ TELEMETRY

## 2024-10-12 RX ORDER — LANOLIN ALCOHOL/MO/W.PET/CERES
3 CREAM (GRAM) TOPICAL NIGHTLY PRN
Status: DISCONTINUED | OUTPATIENT
Start: 2024-10-12 | End: 2024-10-30 | Stop reason: HOSPADM

## 2024-10-12 RX ORDER — IPRATROPIUM BROMIDE AND ALBUTEROL SULFATE 2.5; .5 MG/3ML; MG/3ML
1 SOLUTION RESPIRATORY (INHALATION) EVERY 4 HOURS PRN
Status: DISCONTINUED | OUTPATIENT
Start: 2024-10-12 | End: 2024-10-12

## 2024-10-12 RX ORDER — 0.9 % SODIUM CHLORIDE 0.9 %
500 INTRAVENOUS SOLUTION INTRAVENOUS ONCE
Status: COMPLETED | OUTPATIENT
Start: 2024-10-12 | End: 2024-10-13

## 2024-10-12 RX ORDER — GAUZE BANDAGE 2" X 2"
100 BANDAGE TOPICAL DAILY
Status: DISCONTINUED | OUTPATIENT
Start: 2024-10-12 | End: 2024-10-30 | Stop reason: HOSPADM

## 2024-10-12 RX ORDER — ACETAMINOPHEN 650 MG/1
650 SUPPOSITORY RECTAL EVERY 6 HOURS PRN
Status: DISCONTINUED | OUTPATIENT
Start: 2024-10-12 | End: 2024-10-18

## 2024-10-12 RX ORDER — CASTOR OIL AND BALSAM, PERU 788; 87 MG/G; MG/G
OINTMENT TOPICAL 2 TIMES DAILY
Status: DISCONTINUED | OUTPATIENT
Start: 2024-10-12 | End: 2024-10-24

## 2024-10-12 RX ORDER — CALCIUM GLUCONATE 20 MG/ML
2000 INJECTION, SOLUTION INTRAVENOUS ONCE
Status: COMPLETED | OUTPATIENT
Start: 2024-10-12 | End: 2024-10-12

## 2024-10-12 RX ORDER — LORAZEPAM 2 MG/ML
0.5 INJECTION INTRAMUSCULAR ONCE
Status: COMPLETED | OUTPATIENT
Start: 2024-10-12 | End: 2024-10-12

## 2024-10-12 RX ORDER — ONDANSETRON 2 MG/ML
4 INJECTION INTRAMUSCULAR; INTRAVENOUS EVERY 6 HOURS PRN
Status: DISCONTINUED | OUTPATIENT
Start: 2024-10-12 | End: 2024-10-30 | Stop reason: HOSPADM

## 2024-10-12 RX ORDER — POLYETHYLENE GLYCOL 3350 17 G/17G
17 POWDER, FOR SOLUTION ORAL DAILY PRN
Status: DISCONTINUED | OUTPATIENT
Start: 2024-10-12 | End: 2024-10-30 | Stop reason: HOSPADM

## 2024-10-12 RX ORDER — SODIUM CHLORIDE 9 MG/ML
INJECTION, SOLUTION INTRAVENOUS PRN
Status: DISCONTINUED | OUTPATIENT
Start: 2024-10-12 | End: 2024-10-30 | Stop reason: HOSPADM

## 2024-10-12 RX ORDER — ALPRAZOLAM 0.5 MG
0.25 TABLET ORAL
Status: COMPLETED | OUTPATIENT
Start: 2024-10-12 | End: 2024-10-12

## 2024-10-12 RX ORDER — FOLIC ACID 1 MG/1
1 TABLET ORAL DAILY
Status: DISCONTINUED | OUTPATIENT
Start: 2024-10-12 | End: 2024-10-30 | Stop reason: HOSPADM

## 2024-10-12 RX ORDER — LEVALBUTEROL INHALATION SOLUTION 1.25 MG/3ML
1.25 SOLUTION RESPIRATORY (INHALATION) EVERY 8 HOURS PRN
Status: DISCONTINUED | OUTPATIENT
Start: 2024-10-12 | End: 2024-10-30 | Stop reason: HOSPADM

## 2024-10-12 RX ORDER — ACETAMINOPHEN 500 MG
1000 TABLET ORAL
Status: COMPLETED | OUTPATIENT
Start: 2024-10-12 | End: 2024-10-12

## 2024-10-12 RX ORDER — CALCIUM CARBONATE 500 MG/1
500 TABLET, CHEWABLE ORAL 3 TIMES DAILY
Status: DISCONTINUED | OUTPATIENT
Start: 2024-10-12 | End: 2024-10-30 | Stop reason: HOSPADM

## 2024-10-12 RX ORDER — MULTIVITAMIN WITH IRON
1 TABLET ORAL DAILY
Status: DISCONTINUED | OUTPATIENT
Start: 2024-10-12 | End: 2024-10-30 | Stop reason: HOSPADM

## 2024-10-12 RX ORDER — PANTOPRAZOLE SODIUM 40 MG/1
40 TABLET, DELAYED RELEASE ORAL
Status: DISCONTINUED | OUTPATIENT
Start: 2024-10-12 | End: 2024-10-30 | Stop reason: HOSPADM

## 2024-10-12 RX ORDER — SODIUM CHLORIDE 0.9 % (FLUSH) 0.9 %
5-40 SYRINGE (ML) INJECTION EVERY 12 HOURS SCHEDULED
Status: DISCONTINUED | OUTPATIENT
Start: 2024-10-12 | End: 2024-10-30 | Stop reason: HOSPADM

## 2024-10-12 RX ORDER — ACETAMINOPHEN 325 MG/1
650 TABLET ORAL EVERY 6 HOURS PRN
Status: DISCONTINUED | OUTPATIENT
Start: 2024-10-12 | End: 2024-10-18

## 2024-10-12 RX ORDER — ONDANSETRON 4 MG/1
4 TABLET, ORALLY DISINTEGRATING ORAL EVERY 8 HOURS PRN
Status: DISCONTINUED | OUTPATIENT
Start: 2024-10-12 | End: 2024-10-30 | Stop reason: HOSPADM

## 2024-10-12 RX ORDER — SODIUM CHLORIDE 0.9 % (FLUSH) 0.9 %
5-40 SYRINGE (ML) INJECTION PRN
Status: DISCONTINUED | OUTPATIENT
Start: 2024-10-12 | End: 2024-10-30 | Stop reason: HOSPADM

## 2024-10-12 RX ORDER — LORAZEPAM 2 MG/ML
1 INJECTION INTRAMUSCULAR ONCE
Status: COMPLETED | OUTPATIENT
Start: 2024-10-12 | End: 2024-10-12

## 2024-10-12 RX ADMIN — Medication: at 21:53

## 2024-10-12 RX ADMIN — THERA TABS 1 TABLET: TAB at 08:52

## 2024-10-12 RX ADMIN — ACETAMINOPHEN 650 MG: 325 TABLET ORAL at 03:09

## 2024-10-12 RX ADMIN — CALCIUM CARBONATE 500 MG: 500 TABLET, CHEWABLE ORAL at 15:06

## 2024-10-12 RX ADMIN — CALCIUM GLUCONATE 2000 MG: 20 INJECTION, SOLUTION INTRAVENOUS at 07:01

## 2024-10-12 RX ADMIN — IPRATROPIUM BROMIDE AND ALBUTEROL SULFATE 1 DOSE: 2.5; .5 SOLUTION RESPIRATORY (INHALATION) at 16:39

## 2024-10-12 RX ADMIN — SODIUM CHLORIDE, PRESERVATIVE FREE 10 ML: 5 INJECTION INTRAVENOUS at 22:13

## 2024-10-12 RX ADMIN — CALCIUM CARBONATE 500 MG: 500 TABLET, CHEWABLE ORAL at 22:11

## 2024-10-12 RX ADMIN — Medication 3 MG: at 22:11

## 2024-10-12 RX ADMIN — Medication 3 MG: at 03:09

## 2024-10-12 RX ADMIN — LORAZEPAM 0.5 MG: 2 INJECTION INTRAMUSCULAR; INTRAVENOUS at 22:11

## 2024-10-12 RX ADMIN — CALCIUM CARBONATE 500 MG: 500 TABLET, CHEWABLE ORAL at 08:53

## 2024-10-12 RX ADMIN — LORAZEPAM 1 MG: 2 INJECTION INTRAMUSCULAR; INTRAVENOUS at 19:00

## 2024-10-12 RX ADMIN — SODIUM CHLORIDE 500 ML: 9 INJECTION, SOLUTION INTRAVENOUS at 21:49

## 2024-10-12 RX ADMIN — FOLIC ACID 1 MG: 1 TABLET ORAL at 08:53

## 2024-10-12 RX ADMIN — ACETAMINOPHEN 1000 MG: 500 TABLET ORAL at 00:27

## 2024-10-12 RX ADMIN — Medication: at 08:51

## 2024-10-12 RX ADMIN — Medication 100 MG: at 08:53

## 2024-10-12 RX ADMIN — PANTOPRAZOLE SODIUM 40 MG: 40 TABLET, DELAYED RELEASE ORAL at 06:59

## 2024-10-12 RX ADMIN — ACETAMINOPHEN 650 MG: 325 TABLET ORAL at 18:50

## 2024-10-12 RX ADMIN — ALPRAZOLAM 0.25 MG: 0.5 TABLET ORAL at 00:27

## 2024-10-12 ASSESSMENT — PAIN SCALES - GENERAL
PAINLEVEL_OUTOF10: 0
PAINLEVEL_OUTOF10: 5
PAINLEVEL_OUTOF10: 3
PAINLEVEL_OUTOF10: 0

## 2024-10-12 ASSESSMENT — PAIN DESCRIPTION - LOCATION
LOCATION: BACK
LOCATION: HEAD

## 2024-10-12 ASSESSMENT — PAIN DESCRIPTION - DESCRIPTORS: DESCRIPTORS: ACHING

## 2024-10-12 NOTE — ED NOTES
Report given to Janessa with [facility]. Patient requested Tylenol and Xanax for his transport. The patients request was relayed to .

## 2024-10-12 NOTE — CONSULTS
Pulmonary, Critical Care, and Sleep Medicine~Consult Note    Name: Scott Gardiner MRN: [id number]   : 1945 Hospital: [facility]   Date: 10/12/2024 10:47 AM Admission: 10/12/2024     Impression Plan   Abnormal chest imaging: lymphadenopathy, large right pleural effusion, probable right pulmonary masses  Suspected COPD, emphysema on CT; not in an acute exacerbation  Former tobacco abuse, 60pk year smoker Plan for thoracentesis with IR. Follow pleural fluid studies, cytology, cx. If pleural path is non-diagnostic, would benefit from re-imaging with contrasted chest CT s/p pleural fluid drainage to further evaluate possible biopsy.  Prn mookie  He would benefit from outpatient PFTs.    Pulmonary will plan to seen to see again on Monday when thoracentesis will likely be completed. Call for any questions/concerns.     HPI:  79 year old male who was referred to the hospital from his 6-month follow up PCP visit for tachpnea and tachycardia. He notes he had worsening SOB since  associated with decreased appetite and 16 lbs unintentional weight loss since . He denies any cough/congestion, edema, fevers/chills.      10/11/24 CXR revealed a large right pleural effusion  10/11/24 Chest CT without contrast: 17mm precarinal node, 76c20it right cardiophrenic node. Large right pleural effusion with near collapse of the right lung with probable pulmonary masses in the RUL. Mild emphysema to the left lung.     WBC 14.1 on admission, Hgn 9.6 (8.3 today)  Blood cultures pending  Flu and covid-19 negative  On room air on my exam. He reports unchanged SOB. No other complaints.    I have reviewed the labs and previous day’s notes.    Pertinent items are noted in HPI.  Past Medical History:   Diagnosis Date    Abuse     alcoholism    Aneurysm (HCC)     Chronic obstructive pulmonary disease (HCC)     pt denies    Chronic pain     neck /arthritis-injections    Claudication of

## 2024-10-12 NOTE — ED NOTES
At 1938, [facility] dispatch was notified of ALS transfer from [facility] ED bed #9 to [facility] Rm 2322. Spoke to Denise. Provided information needed for transport reservation (demographics, name, , weight, insurance information, cardiac monitor, oxygen, Covid negative, has a hx of MRSA and ESBL, Dx of malignant pleural effusion). ETA of 0230 was provided. ED nurse NIKOLAI Wayne was made aware of ETA. Nurse stated that ETA will most likely be later than 0230.

## 2024-10-12 NOTE — ED NOTES
Pt has requested crackers and peanut butter, pepsi as requested to 'snack on', otherwise resting in bed and watching TV. No other needs expressed at this time.

## 2024-10-12 NOTE — ED NOTES
5420 - 9280 Pt assisted to restroom, has had a liquid stool BM. Cleaned and assisted back to room by this RN. Pt reports he may need some additional anxiety medication before transport. Pt placed back on monitoring.

## 2024-10-12 NOTE — ED NOTES
Bedside and Verbal shift change report given to L Katz RN (oncoming nurse) by A Mink RN (offgoing nurse). Report included the following information Nurse Handoff Report, ED SBAR, Adult Overview, Recent Results, Med Rec Status, Cardiac Rhythm Sinus Tach, and Quality Measures.

## 2024-10-12 NOTE — H&P
Hospitalist Admission Note    NAME:  Scott Gardiner   :  1945   MRN:  [id number]     Date/Time:  10/12/2024 2:38 AM    Patient PCP: Karan Almonte MD    ______________________________________________________________________  Given the patient's current clinical presentation, I have a high level of concern for decompensation if discharged from the emergency department.  Complex decision making was performed, which includes reviewing the patient's available past medical records, laboratory results, and x-ray films.       My assessment of this patient's clinical condition and my plan of care is as follows.    Assessment / Plan:    Active Problems:  Large right pleural effusion-suspect malignant  Concern for right upper lobe pulmonary masses  Former smoker with 50-pack-year history  Unexplained weight loss  Suspected underlying COPD  Severe protein calorie malnutrition  GERD  PAD status post axillobifemoral graft  Essential hypertension  Hyperlipidemia    Plan:  Large right pleural effusion-suspect malignant  Concern for right upper lobe pulmonary masses  Former smoker with 50-pack-year history  Unexplained weight loss  Suspected underlying COPD  Admit to telemetry monitoring  Continuous pulse oximetry and supplemental oxygen as needed maintain saturations greater than 90%  Pulmonology consulted, greatly appreciate their expertise  Thoracentesis ordered  -Will hold DVT chemoprophylaxis for now, but if this will wait until Monday would recommend resumption of chemoprophylaxis over the weekend and high risk patient  -Fluid studies with cytology ordered  Appreciate pulmonology input regarding lung mass biopsy versus hilar biopsy    Severe protein calorie malnutrition  Protein shakes with meals  Supplement thiamine, folic acid, MVI  Dietitian consulted, greatly appreciate their expertise    GERD  Formulary substitution Protonix    PAD status post axillobifemoral graft  Essential

## 2024-10-13 LAB
ANION GAP SERPL CALC-SCNC: 9 MMOL/L (ref 2–12)
BACTERIA SPEC CULT: NORMAL
BACTERIA SPEC CULT: NORMAL
BUN SERPL-MCNC: 25 MG/DL (ref 6–20)
BUN/CREAT SERPL: 32 (ref 12–20)
CALCIUM SERPL-MCNC: 6.7 MG/DL (ref 8.5–10.1)
CHLORIDE SERPL-SCNC: 104 MMOL/L (ref 97–108)
CO2 SERPL-SCNC: 22 MMOL/L (ref 21–32)
CREAT SERPL-MCNC: 0.79 MG/DL (ref 0.7–1.3)
EKG ATRIAL RATE: 130 BPM
EKG DIAGNOSIS: NORMAL
EKG P-R INTERVAL: 184 MS
EKG Q-T INTERVAL: 304 MS
EKG QRS DURATION: 66 MS
EKG QTC CALCULATION (BAZETT): 447 MS
EKG R AXIS: 39 DEGREES
EKG T AXIS: 36 DEGREES
EKG VENTRICULAR RATE: 130 BPM
GLUCOSE SERPL-MCNC: 93 MG/DL (ref 65–100)
LACTATE SERPL-SCNC: 1.3 MMOL/L (ref 0.4–2)
PHOSPHATE SERPL-MCNC: 4 MG/DL (ref 2.6–4.7)
POTASSIUM SERPL-SCNC: 3.3 MMOL/L (ref 3.5–5.1)
SERVICE CMNT-IMP: NORMAL
SERVICE CMNT-IMP: NORMAL
SODIUM SERPL-SCNC: 135 MMOL/L (ref 136–145)

## 2024-10-13 PROCEDURE — 6370000000 HC RX 637 (ALT 250 FOR IP): Performed by: INTERNAL MEDICINE

## 2024-10-13 PROCEDURE — 36415 COLL VENOUS BLD VENIPUNCTURE: CPT

## 2024-10-13 PROCEDURE — 84100 ASSAY OF PHOSPHORUS: CPT

## 2024-10-13 PROCEDURE — 2700000000 HC OXYGEN THERAPY PER DAY

## 2024-10-13 PROCEDURE — 1100000003 HC PRIVATE W/ TELEMETRY

## 2024-10-13 PROCEDURE — 83605 ASSAY OF LACTIC ACID: CPT

## 2024-10-13 PROCEDURE — 80048 BASIC METABOLIC PNL TOTAL CA: CPT

## 2024-10-13 PROCEDURE — 2580000003 HC RX 258: Performed by: STUDENT IN AN ORGANIZED HEALTH CARE EDUCATION/TRAINING PROGRAM

## 2024-10-13 PROCEDURE — 6360000002 HC RX W HCPCS: Performed by: INTERNAL MEDICINE

## 2024-10-13 PROCEDURE — 6370000000 HC RX 637 (ALT 250 FOR IP): Performed by: STUDENT IN AN ORGANIZED HEALTH CARE EDUCATION/TRAINING PROGRAM

## 2024-10-13 RX ORDER — LORAZEPAM 2 MG/ML
0.5 INJECTION INTRAMUSCULAR EVERY 8 HOURS PRN
Status: DISCONTINUED | OUTPATIENT
Start: 2024-10-13 | End: 2024-10-16

## 2024-10-13 RX ORDER — POTASSIUM CHLORIDE 1500 MG/1
20 TABLET, EXTENDED RELEASE ORAL ONCE
Status: COMPLETED | OUTPATIENT
Start: 2024-10-13 | End: 2024-10-13

## 2024-10-13 RX ORDER — METRONIDAZOLE 250 MG/1
500 TABLET ORAL EVERY 8 HOURS SCHEDULED
Status: DISCONTINUED | OUTPATIENT
Start: 2024-10-13 | End: 2024-10-16

## 2024-10-13 RX ORDER — LEVOFLOXACIN 500 MG/1
500 TABLET, FILM COATED ORAL DAILY
Status: DISCONTINUED | OUTPATIENT
Start: 2024-10-13 | End: 2024-10-16

## 2024-10-13 RX ADMIN — PANTOPRAZOLE SODIUM 40 MG: 40 TABLET, DELAYED RELEASE ORAL at 07:00

## 2024-10-13 RX ADMIN — LORAZEPAM 0.5 MG: 2 INJECTION INTRAMUSCULAR; INTRAVENOUS at 22:53

## 2024-10-13 RX ADMIN — METRONIDAZOLE 500 MG: 250 TABLET ORAL at 16:18

## 2024-10-13 RX ADMIN — Medication 100 MG: at 08:47

## 2024-10-13 RX ADMIN — METRONIDAZOLE 500 MG: 250 TABLET ORAL at 10:54

## 2024-10-13 RX ADMIN — LORAZEPAM 0.5 MG: 2 INJECTION INTRAMUSCULAR; INTRAVENOUS at 20:17

## 2024-10-13 RX ADMIN — FOLIC ACID 1 MG: 1 TABLET ORAL at 08:47

## 2024-10-13 RX ADMIN — LORAZEPAM 0.5 MG: 2 INJECTION INTRAMUSCULAR; INTRAVENOUS at 11:38

## 2024-10-13 RX ADMIN — THERA TABS 1 TABLET: TAB at 08:48

## 2024-10-13 RX ADMIN — METRONIDAZOLE 500 MG: 250 TABLET ORAL at 23:40

## 2024-10-13 RX ADMIN — CALCIUM CARBONATE 500 MG: 500 TABLET, CHEWABLE ORAL at 08:47

## 2024-10-13 RX ADMIN — CALCIUM CARBONATE 500 MG: 500 TABLET, CHEWABLE ORAL at 20:17

## 2024-10-13 RX ADMIN — POTASSIUM CHLORIDE 20 MEQ: 1500 TABLET, EXTENDED RELEASE ORAL at 10:54

## 2024-10-13 RX ADMIN — SODIUM CHLORIDE, PRESERVATIVE FREE 10 ML: 5 INJECTION INTRAVENOUS at 20:17

## 2024-10-13 RX ADMIN — Medication 3 MG: at 22:27

## 2024-10-13 RX ADMIN — ACETAMINOPHEN 650 MG: 325 TABLET ORAL at 04:30

## 2024-10-13 RX ADMIN — ACETAMINOPHEN 650 MG: 325 TABLET ORAL at 23:40

## 2024-10-13 RX ADMIN — SODIUM CHLORIDE, PRESERVATIVE FREE 10 ML: 5 INJECTION INTRAVENOUS at 08:48

## 2024-10-13 RX ADMIN — LEVOFLOXACIN 500 MG: 500 TABLET, FILM COATED ORAL at 10:54

## 2024-10-13 RX ADMIN — CALCIUM CARBONATE 500 MG: 500 TABLET, CHEWABLE ORAL at 16:17

## 2024-10-13 RX ADMIN — Medication: at 20:18

## 2024-10-13 RX ADMIN — Medication: at 08:48

## 2024-10-13 ASSESSMENT — PAIN DESCRIPTION - ORIENTATION: ORIENTATION: POSTERIOR

## 2024-10-13 ASSESSMENT — PAIN SCALES - GENERAL
PAINLEVEL_OUTOF10: 2
PAINLEVEL_OUTOF10: 0

## 2024-10-13 ASSESSMENT — PAIN DESCRIPTION - DESCRIPTORS: DESCRIPTORS: ACHING

## 2024-10-13 ASSESSMENT — PAIN - FUNCTIONAL ASSESSMENT: PAIN_FUNCTIONAL_ASSESSMENT: ACTIVITIES ARE NOT PREVENTED

## 2024-10-13 ASSESSMENT — PAIN DESCRIPTION - LOCATION: LOCATION: NECK

## 2024-10-14 ENCOUNTER — APPOINTMENT (OUTPATIENT)
Facility: HOSPITAL | Age: 79
DRG: 871 | End: 2024-10-14
Attending: STUDENT IN AN ORGANIZED HEALTH CARE EDUCATION/TRAINING PROGRAM
Payer: MEDICARE

## 2024-10-14 LAB
ANION GAP SERPL CALC-SCNC: 11 MMOL/L (ref 2–12)
APPEARANCE FLD: ABNORMAL
BASOPHILS # BLD: 0.1 K/UL (ref 0–0.1)
BASOPHILS NFR BLD: 0 % (ref 0–1)
BUN SERPL-MCNC: 24 MG/DL (ref 6–20)
BUN/CREAT SERPL: 35 (ref 12–20)
CALCIUM SERPL-MCNC: 6.7 MG/DL (ref 8.5–10.1)
CHLORIDE SERPL-SCNC: 106 MMOL/L (ref 97–108)
CO2 SERPL-SCNC: 21 MMOL/L (ref 21–32)
COLOR FLD: YELLOW
COMMENT:: NORMAL
CREAT SERPL-MCNC: 0.68 MG/DL (ref 0.7–1.3)
DIFFERENTIAL METHOD BLD: ABNORMAL
EOSINOPHIL # BLD: 0.1 K/UL (ref 0–0.4)
EOSINOPHIL NFR BLD: 1 % (ref 0–7)
ERYTHROCYTE [DISTWIDTH] IN BLOOD BY AUTOMATED COUNT: 14.3 % (ref 11.5–14.5)
GLUCOSE SERPL-MCNC: 96 MG/DL (ref 65–100)
HCT VFR BLD AUTO: 26.9 % (ref 36.6–50.3)
HGB BLD-MCNC: 8.5 G/DL (ref 12.1–17)
IMM GRANULOCYTES # BLD AUTO: 0.1 K/UL (ref 0–0.04)
IMM GRANULOCYTES NFR BLD AUTO: 1 % (ref 0–0.5)
LACTATE SERPL-SCNC: 2.7 MMOL/L (ref 0.4–2)
LDH FLD L TO P-CCNC: 409 U/L
LYMPHOCYTES # BLD: 1.1 K/UL (ref 0.8–3.5)
LYMPHOCYTES NFR BLD: 8 % (ref 12–49)
LYMPHOCYTES NFR FLD: 84 %
MCH RBC QN AUTO: 29.7 PG (ref 26–34)
MCHC RBC AUTO-ENTMCNC: 31.6 G/DL (ref 30–36.5)
MCV RBC AUTO: 94.1 FL (ref 80–99)
MONOCYTES # BLD: 1.4 K/UL (ref 0–1)
MONOCYTES NFR BLD: 10 % (ref 5–13)
MONOS+MACROS NFR FLD: 13 %
NEUTROPHILS NFR FLD: 3 %
NEUTS SEG # BLD: 10.8 K/UL (ref 1.8–8)
NEUTS SEG NFR BLD: 80 % (ref 32–75)
NRBC # BLD: 0 K/UL (ref 0–0.01)
NRBC BLD-RTO: 0 PER 100 WBC
NUC CELL # FLD: 1126 /CU MM
PHOSPHATE SERPL-MCNC: 3.2 MG/DL (ref 2.6–4.7)
PLATELET # BLD AUTO: 445 K/UL (ref 150–400)
PMV BLD AUTO: 10.3 FL (ref 8.9–12.9)
POTASSIUM SERPL-SCNC: 4 MMOL/L (ref 3.5–5.1)
PROCALCITONIN SERPL-MCNC: 48.85 NG/ML
PROT FLD-MCNC: 4.4 G/DL
RBC # BLD AUTO: 2.86 M/UL (ref 4.1–5.7)
RBC # FLD: >100 /CU MM
SODIUM SERPL-SCNC: 138 MMOL/L (ref 136–145)
SPECIMEN HOLD: NORMAL
SPECIMEN SOURCE FLD: ABNORMAL
SPECIMEN SOURCE FLD: NORMAL
SPECIMEN SOURCE FLD: NORMAL
WBC # BLD AUTO: 13.6 K/UL (ref 4.1–11.1)

## 2024-10-14 PROCEDURE — 88341 IMHCHEM/IMCYTCHM EA ADD ANTB: CPT

## 2024-10-14 PROCEDURE — 6370000000 HC RX 637 (ALT 250 FOR IP): Performed by: STUDENT IN AN ORGANIZED HEALTH CARE EDUCATION/TRAINING PROGRAM

## 2024-10-14 PROCEDURE — 88305 TISSUE EXAM BY PATHOLOGIST: CPT

## 2024-10-14 PROCEDURE — 85025 COMPLETE CBC W/AUTO DIFF WBC: CPT

## 2024-10-14 PROCEDURE — 87070 CULTURE OTHR SPECIMN AEROBIC: CPT

## 2024-10-14 PROCEDURE — 6360000002 HC RX W HCPCS: Performed by: INTERNAL MEDICINE

## 2024-10-14 PROCEDURE — 84145 PROCALCITONIN (PCT): CPT

## 2024-10-14 PROCEDURE — 71045 X-RAY EXAM CHEST 1 VIEW: CPT

## 2024-10-14 PROCEDURE — 2500000003 HC RX 250 WO HCPCS: Performed by: PHYSICIAN ASSISTANT

## 2024-10-14 PROCEDURE — 80048 BASIC METABOLIC PNL TOTAL CA: CPT

## 2024-10-14 PROCEDURE — 2700000000 HC OXYGEN THERAPY PER DAY

## 2024-10-14 PROCEDURE — 84157 ASSAY OF PROTEIN OTHER: CPT

## 2024-10-14 PROCEDURE — 89050 BODY FLUID CELL COUNT: CPT

## 2024-10-14 PROCEDURE — 6370000000 HC RX 637 (ALT 250 FOR IP): Performed by: INTERNAL MEDICINE

## 2024-10-14 PROCEDURE — 1100000003 HC PRIVATE W/ TELEMETRY

## 2024-10-14 PROCEDURE — 88112 CYTOPATH CELL ENHANCE TECH: CPT

## 2024-10-14 PROCEDURE — 0W993ZZ DRAINAGE OF RIGHT PLEURAL CAVITY, PERCUTANEOUS APPROACH: ICD-10-PCS | Performed by: ANESTHESIOLOGY

## 2024-10-14 PROCEDURE — 2580000003 HC RX 258: Performed by: STUDENT IN AN ORGANIZED HEALTH CARE EDUCATION/TRAINING PROGRAM

## 2024-10-14 PROCEDURE — 2709999900 US THORACENTESIS

## 2024-10-14 PROCEDURE — 88342 IMHCHEM/IMCYTCHM 1ST ANTB: CPT

## 2024-10-14 PROCEDURE — 83615 LACTATE (LD) (LDH) ENZYME: CPT

## 2024-10-14 PROCEDURE — 84100 ASSAY OF PHOSPHORUS: CPT

## 2024-10-14 PROCEDURE — 87205 SMEAR GRAM STAIN: CPT

## 2024-10-14 RX ORDER — LIDOCAINE HYDROCHLORIDE 10 MG/ML
10 INJECTION, SOLUTION EPIDURAL; INFILTRATION; INTRACAUDAL; PERINEURAL ONCE
Status: COMPLETED | OUTPATIENT
Start: 2024-10-14 | End: 2024-10-14

## 2024-10-14 RX ORDER — LORAZEPAM 2 MG/ML
1 INJECTION INTRAMUSCULAR ONCE
Status: COMPLETED | OUTPATIENT
Start: 2024-10-14 | End: 2024-10-14

## 2024-10-14 RX ADMIN — THERA TABS 1 TABLET: TAB at 09:37

## 2024-10-14 RX ADMIN — PANTOPRAZOLE SODIUM 40 MG: 40 TABLET, DELAYED RELEASE ORAL at 05:38

## 2024-10-14 RX ADMIN — CALCIUM CARBONATE 500 MG: 500 TABLET, CHEWABLE ORAL at 14:52

## 2024-10-14 RX ADMIN — LORAZEPAM 1 MG: 2 INJECTION INTRAMUSCULAR; INTRAVENOUS at 16:20

## 2024-10-14 RX ADMIN — LEVOFLOXACIN 500 MG: 500 TABLET, FILM COATED ORAL at 09:37

## 2024-10-14 RX ADMIN — METRONIDAZOLE 500 MG: 250 TABLET ORAL at 09:37

## 2024-10-14 RX ADMIN — SODIUM CHLORIDE, PRESERVATIVE FREE 10 ML: 5 INJECTION INTRAVENOUS at 20:15

## 2024-10-14 RX ADMIN — Medication 3 MG: at 20:15

## 2024-10-14 RX ADMIN — METRONIDAZOLE 500 MG: 250 TABLET ORAL at 16:20

## 2024-10-14 RX ADMIN — CALCIUM CARBONATE 500 MG: 500 TABLET, CHEWABLE ORAL at 09:37

## 2024-10-14 RX ADMIN — LIDOCAINE HYDROCHLORIDE 10 ML: 10 INJECTION, SOLUTION EPIDURAL; INFILTRATION; INTRACAUDAL; PERINEURAL at 10:52

## 2024-10-14 RX ADMIN — SODIUM CHLORIDE, PRESERVATIVE FREE 10 ML: 5 INJECTION INTRAVENOUS at 09:38

## 2024-10-14 RX ADMIN — CALCIUM CARBONATE 500 MG: 500 TABLET, CHEWABLE ORAL at 20:15

## 2024-10-14 RX ADMIN — FOLIC ACID 1 MG: 1 TABLET ORAL at 09:37

## 2024-10-14 RX ADMIN — LORAZEPAM 0.5 MG: 2 INJECTION INTRAMUSCULAR; INTRAVENOUS at 05:38

## 2024-10-14 RX ADMIN — Medication: at 09:38

## 2024-10-14 RX ADMIN — Medication: at 20:14

## 2024-10-14 RX ADMIN — LORAZEPAM 0.5 MG: 2 INJECTION INTRAMUSCULAR; INTRAVENOUS at 20:15

## 2024-10-14 RX ADMIN — Medication 100 MG: at 09:37

## 2024-10-14 RX ADMIN — LORAZEPAM 0.5 MG: 2 INJECTION INTRAMUSCULAR; INTRAVENOUS at 13:06

## 2024-10-14 ASSESSMENT — PAIN SCALES - GENERAL
PAINLEVEL_OUTOF10: 0

## 2024-10-14 NOTE — CARE COORDINATION
Care Management Initial Assessment       RUR: 14%  Readmission? No  1st IM letter given? Yes      CM made room visit with patient to complete initial assessment (see below). Patient plans on returning home with friend to provide transportation. Patient does not have home O2, will need to try to wean prior to d/c.        10/14/24 1506   Service Assessment   Patient Orientation Alert and Oriented   Cognition Alert   History Provided By Patient   Primary Caregiver Self   Accompanied By/Relationship no one at bedside   Support Systems Friends/Neighbors;Family Members   Patient's Healthcare Decision Maker is: Named in Scanned ACP Document   PCP Verified by CM Yes   Last Visit to PCP Within last 3 months   Prior Functional Level Independent in ADLs/IADLs   Current Functional Level Independent in ADLs/IADLs   Can patient return to prior living arrangement Yes   Family able to assist with home care needs: Yes   Would you like for me to discuss the discharge plan with any other family members/significant others, and if so, who? No   Financial Resources Medicare   Social/Functional History   Lives With Alone   Type of Home House   Home Layout Two level;Able to Live on Main level with bedroom/bathroom   Home Access   (2 miguel)   Bathroom Equipment Shower chair;Toilet raiser   Home Equipment Walker - Rolling;Cane  (does not have home O2)   Active  Yes   Discharge Planning   Patient expects to be discharged to: House   Condition of Participation: Discharge Planning   The Plan for Transition of Care is related to the following treatment goals: home         Advance Care Planning     General Advance Care Planning (ACP) Conversation    Date of Conversation: 10/14/2024  Conducted with: Patient with Decision Making Capacity  Other persons present: None    Healthcare Decision Maker:    Today we documented Decision Maker(s) consistent with ACP documents on file.  Content/Action Overview:  Has ACP document(s) on file - reflects the

## 2024-10-15 LAB
BASOPHILS # BLD: 0.1 K/UL (ref 0–0.1)
BASOPHILS NFR BLD: 0 % (ref 0–1)
CYTOLOGY-NON GYN: NORMAL
DIFFERENTIAL METHOD BLD: ABNORMAL
EOSINOPHIL # BLD: 0.1 K/UL (ref 0–0.4)
EOSINOPHIL NFR BLD: 0 % (ref 0–7)
ERYTHROCYTE [DISTWIDTH] IN BLOOD BY AUTOMATED COUNT: 14.6 % (ref 11.5–14.5)
HCT VFR BLD AUTO: 27.7 % (ref 36.6–50.3)
HGB BLD-MCNC: 8.8 G/DL (ref 12.1–17)
IMM GRANULOCYTES # BLD AUTO: 0.2 K/UL (ref 0–0.04)
IMM GRANULOCYTES NFR BLD AUTO: 1 % (ref 0–0.5)
LYMPHOCYTES # BLD: 1.1 K/UL (ref 0.8–3.5)
LYMPHOCYTES NFR BLD: 7 % (ref 12–49)
MCH RBC QN AUTO: 29.5 PG (ref 26–34)
MCHC RBC AUTO-ENTMCNC: 31.8 G/DL (ref 30–36.5)
MCV RBC AUTO: 93 FL (ref 80–99)
MONOCYTES # BLD: 1.5 K/UL (ref 0–1)
MONOCYTES NFR BLD: 10 % (ref 5–13)
NEUTS SEG # BLD: 12.2 K/UL (ref 1.8–8)
NEUTS SEG NFR BLD: 82 % (ref 32–75)
NRBC # BLD: 0 K/UL (ref 0–0.01)
NRBC BLD-RTO: 0 PER 100 WBC
PHOSPHATE SERPL-MCNC: 2.8 MG/DL (ref 2.6–4.7)
PLATELET # BLD AUTO: 414 K/UL (ref 150–400)
PMV BLD AUTO: 10.8 FL (ref 8.9–12.9)
PROCALCITONIN SERPL-MCNC: 57.02 NG/ML
RBC # BLD AUTO: 2.98 M/UL (ref 4.1–5.7)
WBC # BLD AUTO: 15 K/UL (ref 4.1–11.1)

## 2024-10-15 PROCEDURE — 36415 COLL VENOUS BLD VENIPUNCTURE: CPT

## 2024-10-15 PROCEDURE — 6360000002 HC RX W HCPCS: Performed by: INTERNAL MEDICINE

## 2024-10-15 PROCEDURE — 99223 1ST HOSP IP/OBS HIGH 75: CPT | Performed by: INTERNAL MEDICINE

## 2024-10-15 PROCEDURE — 2580000003 HC RX 258: Performed by: STUDENT IN AN ORGANIZED HEALTH CARE EDUCATION/TRAINING PROGRAM

## 2024-10-15 PROCEDURE — 84100 ASSAY OF PHOSPHORUS: CPT

## 2024-10-15 PROCEDURE — 84145 PROCALCITONIN (PCT): CPT

## 2024-10-15 PROCEDURE — 87040 BLOOD CULTURE FOR BACTERIA: CPT

## 2024-10-15 PROCEDURE — 2580000003 HC RX 258: Performed by: INTERNAL MEDICINE

## 2024-10-15 PROCEDURE — 6370000000 HC RX 637 (ALT 250 FOR IP): Performed by: INTERNAL MEDICINE

## 2024-10-15 PROCEDURE — 85025 COMPLETE CBC W/AUTO DIFF WBC: CPT

## 2024-10-15 PROCEDURE — 2700000000 HC OXYGEN THERAPY PER DAY

## 2024-10-15 PROCEDURE — 6370000000 HC RX 637 (ALT 250 FOR IP): Performed by: STUDENT IN AN ORGANIZED HEALTH CARE EDUCATION/TRAINING PROGRAM

## 2024-10-15 PROCEDURE — 1100000003 HC PRIVATE W/ TELEMETRY

## 2024-10-15 RX ORDER — VANCOMYCIN 1.75 G/350ML
1250 INJECTION, SOLUTION INTRAVENOUS ONCE
Status: COMPLETED | OUTPATIENT
Start: 2024-10-15 | End: 2024-10-15

## 2024-10-15 RX ADMIN — SODIUM CHLORIDE: 9 INJECTION, SOLUTION INTRAVENOUS at 11:57

## 2024-10-15 RX ADMIN — LORAZEPAM 0.5 MG: 2 INJECTION INTRAMUSCULAR; INTRAVENOUS at 20:49

## 2024-10-15 RX ADMIN — CALCIUM CARBONATE 500 MG: 500 TABLET, CHEWABLE ORAL at 08:45

## 2024-10-15 RX ADMIN — Medication 3 MG: at 22:52

## 2024-10-15 RX ADMIN — LORAZEPAM 0.5 MG: 2 INJECTION INTRAMUSCULAR; INTRAVENOUS at 09:59

## 2024-10-15 RX ADMIN — CALCIUM CARBONATE 500 MG: 500 TABLET, CHEWABLE ORAL at 20:52

## 2024-10-15 RX ADMIN — FOLIC ACID 1 MG: 1 TABLET ORAL at 08:46

## 2024-10-15 RX ADMIN — METRONIDAZOLE 500 MG: 250 TABLET ORAL at 08:46

## 2024-10-15 RX ADMIN — SODIUM CHLORIDE, PRESERVATIVE FREE 10 ML: 5 INJECTION INTRAVENOUS at 08:47

## 2024-10-15 RX ADMIN — CALCIUM CARBONATE 500 MG: 500 TABLET, CHEWABLE ORAL at 16:28

## 2024-10-15 RX ADMIN — SODIUM CHLORIDE, PRESERVATIVE FREE 10 ML: 5 INJECTION INTRAVENOUS at 20:52

## 2024-10-15 RX ADMIN — VANCOMYCIN 1250 MG: 1.75 INJECTION, SOLUTION INTRAVENOUS at 11:59

## 2024-10-15 RX ADMIN — METRONIDAZOLE 500 MG: 250 TABLET ORAL at 00:47

## 2024-10-15 RX ADMIN — METRONIDAZOLE 500 MG: 250 TABLET ORAL at 23:43

## 2024-10-15 RX ADMIN — VANCOMYCIN HYDROCHLORIDE 750 MG: 750 INJECTION, POWDER, LYOPHILIZED, FOR SOLUTION INTRAVENOUS at 22:46

## 2024-10-15 RX ADMIN — METRONIDAZOLE 500 MG: 250 TABLET ORAL at 16:28

## 2024-10-15 RX ADMIN — Medication: at 23:43

## 2024-10-15 RX ADMIN — THERA TABS 1 TABLET: TAB at 08:45

## 2024-10-15 RX ADMIN — Medication 100 MG: at 08:46

## 2024-10-15 RX ADMIN — LEVOFLOXACIN 500 MG: 500 TABLET, FILM COATED ORAL at 08:46

## 2024-10-15 RX ADMIN — PANTOPRAZOLE SODIUM 40 MG: 40 TABLET, DELAYED RELEASE ORAL at 06:46

## 2024-10-15 RX ADMIN — Medication: at 08:46

## 2024-10-15 ASSESSMENT — PAIN SCALES - GENERAL
PAINLEVEL_OUTOF10: 0
PAINLEVEL_OUTOF10: 0

## 2024-10-15 NOTE — CONSULTS
Palliative Medicine  Patient Name: Scott Gardiner  YOB: 1945  MRN: [id number]  Age: 79 y.o.  Gender: male    Date of Initial Consult: 10/15/2024  Date of Service: 10/15/2024  Time: 11:47 AM  Provider: Madeline Devine MD  Hospital Day: 4  Admit Date: 10/12/2024  Referring Provider: Skyla Gamboa MD       Reasons for Consultation:  Goals of Care, overwhelming symptoms and end stage disease.    HISTORY OF PRESENT ILLNESS (HPI):   Scott Gardiner is a 79 y.o. male with a past medical history of htn, PAD s/p axillobifemoral graft who was admitted on 10/12/2024 transfer from   for thoracentesis a diagnosis of large right pleural effusion in  a setting of suspected malignant lung mass( former smoker with 50 pack year history), suspected COPD.   unexplained weight loss, severe protein calorie malnutrition .    Ct of chest 10/11/24  shows :  Large right pleural effusion with near complete collapse of the right lung    Probable pulmonary masses in the right upper lobe, concerning for a malignant effusion     2. Enlarged mediastinal and right cardiophrenic nodes.    Pulmonary following awaiting pleural fluid cytology he  is s/p right thoracentesis on 10/14 with 1450 mL of bloody fluid drained exudative and lymphocytic, awaiting fluid cytology if it is negative recommended for reimaging with contrast CT to further evaluate possible biopsy, IR for pleural fluid drainage  and empiric antibiotics.    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  He lives independently was driving prior to admission.    He lives in [facility], his main support is his friend Oc Paz who lives close to him, patient's sister Dolores Sanchez lives in [place] she is 82-year-old she is on his advanced directive as primary medical POA, because his brother Danish Gardiner  listed as primary medical POA passed away.    JOHNNR

## 2024-10-16 ENCOUNTER — HOSPITAL ENCOUNTER (INPATIENT)
Facility: HOSPITAL | Age: 79
Discharge: HOME | DRG: 871 | End: 2024-10-19
Attending: STUDENT IN AN ORGANIZED HEALTH CARE EDUCATION/TRAINING PROGRAM
Payer: MEDICARE

## 2024-10-16 LAB
ANION GAP SERPL CALC-SCNC: 11 MMOL/L (ref 2–12)
BACTERIA SPEC CULT: ABNORMAL
BACTERIA SPEC CULT: ABNORMAL
BASOPHILS # BLD: 0.1 K/UL (ref 0–0.1)
BASOPHILS NFR BLD: 0 % (ref 0–1)
BUN SERPL-MCNC: 27 MG/DL (ref 6–20)
BUN/CREAT SERPL: 38 (ref 12–20)
CALCIUM SERPL-MCNC: 6.9 MG/DL (ref 8.5–10.1)
CHLORIDE SERPL-SCNC: 105 MMOL/L (ref 97–108)
CO2 SERPL-SCNC: 21 MMOL/L (ref 21–32)
CREAT SERPL-MCNC: 0.72 MG/DL (ref 0.7–1.3)
DIFFERENTIAL METHOD BLD: ABNORMAL
EOSINOPHIL # BLD: 0 K/UL (ref 0–0.4)
EOSINOPHIL NFR BLD: 0 % (ref 0–7)
ERYTHROCYTE [DISTWIDTH] IN BLOOD BY AUTOMATED COUNT: 14.6 % (ref 11.5–14.5)
GLUCOSE SERPL-MCNC: 110 MG/DL (ref 65–100)
HCT VFR BLD AUTO: 24.7 % (ref 36.6–50.3)
HGB BLD-MCNC: 8 G/DL (ref 12.1–17)
IMM GRANULOCYTES # BLD AUTO: 0.1 K/UL (ref 0–0.04)
IMM GRANULOCYTES NFR BLD AUTO: 1 % (ref 0–0.5)
LYMPHOCYTES # BLD: 1.1 K/UL (ref 0.8–3.5)
LYMPHOCYTES NFR BLD: 6 % (ref 12–49)
MAGNESIUM SERPL-MCNC: 0.7 MG/DL (ref 1.6–2.4)
MCH RBC QN AUTO: 29.3 PG (ref 26–34)
MCHC RBC AUTO-ENTMCNC: 32.4 G/DL (ref 30–36.5)
MCV RBC AUTO: 90.5 FL (ref 80–99)
MONOCYTES # BLD: 1.4 K/UL (ref 0–1)
MONOCYTES NFR BLD: 8 % (ref 5–13)
NEUTS SEG # BLD: 15.3 K/UL (ref 1.8–8)
NEUTS SEG NFR BLD: 85 % (ref 32–75)
NRBC # BLD: 0 K/UL (ref 0–0.01)
NRBC BLD-RTO: 0 PER 100 WBC
PLATELET # BLD AUTO: 380 K/UL (ref 150–400)
PMV BLD AUTO: 10.1 FL (ref 8.9–12.9)
POTASSIUM SERPL-SCNC: 3.9 MMOL/L (ref 3.5–5.1)
PROCALCITONIN SERPL-MCNC: 60.01 NG/ML
RBC # BLD AUTO: 2.73 M/UL (ref 4.1–5.7)
SERVICE CMNT-IMP: ABNORMAL
SODIUM SERPL-SCNC: 137 MMOL/L (ref 136–145)
VANCOMYCIN SERPL-MCNC: 10.8 UG/ML
WBC # BLD AUTO: 18 K/UL (ref 4.1–11.1)

## 2024-10-16 PROCEDURE — 6370000000 HC RX 637 (ALT 250 FOR IP): Performed by: INTERNAL MEDICINE

## 2024-10-16 PROCEDURE — 87070 CULTURE OTHR SPECIMN AEROBIC: CPT

## 2024-10-16 PROCEDURE — 83735 ASSAY OF MAGNESIUM: CPT

## 2024-10-16 PROCEDURE — 85025 COMPLETE CBC W/AUTO DIFF WBC: CPT

## 2024-10-16 PROCEDURE — 87205 SMEAR GRAM STAIN: CPT

## 2024-10-16 PROCEDURE — 99233 SBSQ HOSP IP/OBS HIGH 50: CPT | Performed by: INTERNAL MEDICINE

## 2024-10-16 PROCEDURE — 2580000003 HC RX 258: Performed by: INTERNAL MEDICINE

## 2024-10-16 PROCEDURE — 80202 ASSAY OF VANCOMYCIN: CPT

## 2024-10-16 PROCEDURE — 1100000003 HC PRIVATE W/ TELEMETRY

## 2024-10-16 PROCEDURE — 2700000000 HC OXYGEN THERAPY PER DAY

## 2024-10-16 PROCEDURE — 84145 PROCALCITONIN (PCT): CPT

## 2024-10-16 PROCEDURE — 2580000003 HC RX 258: Performed by: STUDENT IN AN ORGANIZED HEALTH CARE EDUCATION/TRAINING PROGRAM

## 2024-10-16 PROCEDURE — 76604 US EXAM CHEST: CPT

## 2024-10-16 PROCEDURE — 36415 COLL VENOUS BLD VENIPUNCTURE: CPT

## 2024-10-16 PROCEDURE — 6360000002 HC RX W HCPCS: Performed by: INTERNAL MEDICINE

## 2024-10-16 PROCEDURE — 80048 BASIC METABOLIC PNL TOTAL CA: CPT

## 2024-10-16 PROCEDURE — 6370000000 HC RX 637 (ALT 250 FOR IP): Performed by: STUDENT IN AN ORGANIZED HEALTH CARE EDUCATION/TRAINING PROGRAM

## 2024-10-16 RX ORDER — MAGNESIUM SULFATE IN WATER 40 MG/ML
2000 INJECTION, SOLUTION INTRAVENOUS
Status: COMPLETED | OUTPATIENT
Start: 2024-10-16 | End: 2024-10-16

## 2024-10-16 RX ORDER — LORAZEPAM 0.5 MG/1
0.5 TABLET ORAL EVERY 8 HOURS PRN
Status: DISCONTINUED | OUTPATIENT
Start: 2024-10-16 | End: 2024-10-30 | Stop reason: HOSPADM

## 2024-10-16 RX ORDER — LEVOFLOXACIN 500 MG/1
500 TABLET, FILM COATED ORAL DAILY
Status: DISPENSED | OUTPATIENT
Start: 2024-10-17 | End: 2024-10-26

## 2024-10-16 RX ADMIN — PIPERACILLIN AND TAZOBACTAM 4500 MG: 4; .5 INJECTION, POWDER, LYOPHILIZED, FOR SOLUTION INTRAVENOUS at 12:14

## 2024-10-16 RX ADMIN — METRONIDAZOLE 500 MG: 250 TABLET ORAL at 08:08

## 2024-10-16 RX ADMIN — SODIUM CHLORIDE, PRESERVATIVE FREE 10 ML: 5 INJECTION INTRAVENOUS at 21:08

## 2024-10-16 RX ADMIN — VANCOMYCIN HYDROCHLORIDE 750 MG: 750 INJECTION, POWDER, LYOPHILIZED, FOR SOLUTION INTRAVENOUS at 12:01

## 2024-10-16 RX ADMIN — MAGNESIUM SULFATE HEPTAHYDRATE 2000 MG: 40 INJECTION, SOLUTION INTRAVENOUS at 15:46

## 2024-10-16 RX ADMIN — PANTOPRAZOLE SODIUM 40 MG: 40 TABLET, DELAYED RELEASE ORAL at 08:08

## 2024-10-16 RX ADMIN — THERA TABS 1 TABLET: TAB at 08:08

## 2024-10-16 RX ADMIN — SODIUM CHLORIDE, PRESERVATIVE FREE 10 ML: 5 INJECTION INTRAVENOUS at 08:08

## 2024-10-16 RX ADMIN — FOLIC ACID 1 MG: 1 TABLET ORAL at 08:08

## 2024-10-16 RX ADMIN — SODIUM CHLORIDE: 9 INJECTION, SOLUTION INTRAVENOUS at 11:59

## 2024-10-16 RX ADMIN — LORAZEPAM 0.5 MG: 0.5 TABLET ORAL at 21:07

## 2024-10-16 RX ADMIN — CALCIUM CARBONATE 500 MG: 500 TABLET, CHEWABLE ORAL at 08:08

## 2024-10-16 RX ADMIN — LEVOFLOXACIN 500 MG: 500 TABLET, FILM COATED ORAL at 08:08

## 2024-10-16 RX ADMIN — Medication 100 MG: at 08:08

## 2024-10-16 RX ADMIN — PIPERACILLIN AND TAZOBACTAM 3375 MG: 3; .375 INJECTION, POWDER, FOR SOLUTION INTRAVENOUS; PARENTERAL at 17:09

## 2024-10-16 RX ADMIN — ACETAMINOPHEN 650 MG: 325 TABLET ORAL at 04:58

## 2024-10-16 RX ADMIN — MAGNESIUM SULFATE HEPTAHYDRATE 2000 MG: 40 INJECTION, SOLUTION INTRAVENOUS at 18:25

## 2024-10-16 RX ADMIN — ACETAMINOPHEN 650 MG: 325 TABLET ORAL at 16:29

## 2024-10-16 RX ADMIN — Medication: at 21:08

## 2024-10-16 RX ADMIN — Medication: at 08:08

## 2024-10-16 RX ADMIN — LORAZEPAM 0.5 MG: 2 INJECTION INTRAMUSCULAR; INTRAVENOUS at 04:59

## 2024-10-16 RX ADMIN — Medication 3 MG: at 21:07

## 2024-10-16 RX ADMIN — CALCIUM CARBONATE 500 MG: 500 TABLET, CHEWABLE ORAL at 15:41

## 2024-10-16 ASSESSMENT — PAIN DESCRIPTION - ORIENTATION
ORIENTATION: RIGHT
ORIENTATION: MID

## 2024-10-16 ASSESSMENT — PAIN DESCRIPTION - DESCRIPTORS
DESCRIPTORS: ACHING
DESCRIPTORS: ACHING

## 2024-10-16 ASSESSMENT — PAIN DESCRIPTION - LOCATION
LOCATION: SHOULDER
LOCATION: HEAD

## 2024-10-16 ASSESSMENT — PAIN SCALES - GENERAL
PAINLEVEL_OUTOF10: 6
PAINLEVEL_OUTOF10: 4

## 2024-10-16 NOTE — ACP (ADVANCE CARE PLANNING)
Advance Care Planning     General Advance Care Planning (ACP) Conversation    Date of Conversation: 10/16/2024  Conducted with: Patient with Decision Making Capacity  Other persons present: None    Healthcare Decision Maker:     Primary Decision Maker: Dolores Sanchez - Other - [phone]    Secondary Decision Maker: Sri Sow - Niece/Nephew - [phone]       Content/Action Overview:  Has ACP document(s) on file - reflects the patient's care preferences  Reviewed DNR/DNI and patient confirms current DNR status - completed forms on file (place new order if needed)   DDNR is on his chart.    Health Care Instructions: \"I do not want any treatments to prolong life.\"  No anatomical donation.    Length of Voluntary ACP Conversation in minutes:  <16 minutes (Non-Billable)    Dipti Gray LCSW

## 2024-10-16 NOTE — PROGRESS NOTES
Report given to Tucker MENCHACA that no chest tube will be placed today due to lack of pleural fluid. See detailed note from [facility] PAC for further information.

## 2024-10-16 NOTE — CARE COORDINATION
Transition of Care Plan:    RUR: 13%  Prior Level of Functioning: independent   Disposition: anticipate home   If SNF or IPR: Date FOC offered:   Date FOC received:   Accepting facility:   Date authorization started with reference number:   Date authorization received and expires:   Follow up appointments: PCP, speciality   DME needed: none identified at this time   Transportation at discharge: friend   IM/IMM Medicare/ letter given: to be provided   Caregiver Contact: friend Dolores Sanchez [phone]  Discharge Caregiver contacted prior to discharge? To be contact upon pt request  Care Conference needed? Not at this time   Barriers to discharge: medical stability       MARCELINO completed chart review and will continue to follow along. Palliative is on board and following as well. Dc pending medical stability.    SERGIO Smith, CM  [id number]        18

## 2024-10-16 NOTE — CONSULTS
Palliative Medicine  Patient Name: Scott Gardiner  YOB: 1945  MRN: [id number]  Age: 79 y.o.  Gender: male    Date of Initial Consult: 10/15/2024  Date of Service: 10/16/2024  Time: 12:00 PM  Provider: Madeline Devine MD  Hospital Day: 5  Admit Date: 10/12/2024  Referring Provider: Skyla Gamboa MD       Reasons for Consultation:  Goals of Care, overwhelming symptoms and end stage disease.    HISTORY OF PRESENT ILLNESS (HPI):   Scott Gardiner is a 79 y.o. male with a past medical history of htn, PAD s/p axillobifemoral graft who was admitted on 10/12/2024 transfer from   for thoracentesis a diagnosis of large right pleural effusion in  a setting of suspected malignant lung mass( former smoker with 50 pack year history), suspected COPD.   unexplained weight loss, severe protein calorie malnutrition .    Ct of chest 10/11/24  shows :  Large right pleural effusion with near complete collapse of the right lung    Probable pulmonary masses in the right upper lobe, concerning for a malignant effusion     2. Enlarged mediastinal and right cardiophrenic nodes.    Pulmonary following awaiting pleural fluid cytology he  is s/p right thoracentesis on 10/14 with 1450 mL of bloody fluid drained exudative and lymphocytic, awaiting fluid cytology if it is negative recommended for reimaging with contrast CT to further evaluate possible biopsy, IR for pleural fluid drainage  and empiric antibiotics.    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  He lives independently was driving prior to admission.    He lives in [place] [facility] [place], his main support is his friend Oc Paz who lives close to him, patient's sister Dolores Sanchez lives in [place] she is 82-year-old she is on his advanced directive as primary medical POA, because his brother Danish Gardiner  listed as primary medical POA passed away.    JOHNNR

## 2024-10-17 LAB
ANION GAP SERPL CALC-SCNC: 11 MMOL/L (ref 2–12)
BACTERIA SPEC CULT: NORMAL
BACTERIA SPEC CULT: NORMAL
BUN SERPL-MCNC: 27 MG/DL (ref 6–20)
BUN/CREAT SERPL: 36 (ref 12–20)
CALCIUM SERPL-MCNC: 7.2 MG/DL (ref 8.5–10.1)
CHLORIDE SERPL-SCNC: 103 MMOL/L (ref 97–108)
CO2 SERPL-SCNC: 22 MMOL/L (ref 21–32)
CREAT SERPL-MCNC: 0.75 MG/DL (ref 0.7–1.3)
ERYTHROCYTE [DISTWIDTH] IN BLOOD BY AUTOMATED COUNT: 14.8 % (ref 11.5–14.5)
GLUCOSE SERPL-MCNC: 117 MG/DL (ref 65–100)
HCT VFR BLD AUTO: 26.3 % (ref 36.6–50.3)
HGB BLD-MCNC: 8.4 G/DL (ref 12.1–17)
MAGNESIUM SERPL-MCNC: 1.4 MG/DL (ref 1.6–2.4)
MCH RBC QN AUTO: 29.9 PG (ref 26–34)
MCHC RBC AUTO-ENTMCNC: 31.9 G/DL (ref 30–36.5)
MCV RBC AUTO: 93.6 FL (ref 80–99)
NRBC # BLD: 0 K/UL (ref 0–0.01)
NRBC BLD-RTO: 0 PER 100 WBC
PHOSPHATE SERPL-MCNC: 3.5 MG/DL (ref 2.6–4.7)
PLATELET # BLD AUTO: 431 K/UL (ref 150–400)
PMV BLD AUTO: 10.3 FL (ref 8.9–12.9)
POTASSIUM SERPL-SCNC: 4 MMOL/L (ref 3.5–5.1)
PROCALCITONIN SERPL-MCNC: 66.35 NG/ML
RBC # BLD AUTO: 2.81 M/UL (ref 4.1–5.7)
SERVICE CMNT-IMP: NORMAL
SERVICE CMNT-IMP: NORMAL
SODIUM SERPL-SCNC: 136 MMOL/L (ref 136–145)
WBC # BLD AUTO: 16.3 K/UL (ref 4.1–11.1)

## 2024-10-17 PROCEDURE — 85027 COMPLETE CBC AUTOMATED: CPT

## 2024-10-17 PROCEDURE — 6370000000 HC RX 637 (ALT 250 FOR IP): Performed by: PHYSICIAN ASSISTANT

## 2024-10-17 PROCEDURE — 83735 ASSAY OF MAGNESIUM: CPT

## 2024-10-17 PROCEDURE — 84145 PROCALCITONIN (PCT): CPT

## 2024-10-17 PROCEDURE — 6360000002 HC RX W HCPCS: Performed by: INTERNAL MEDICINE

## 2024-10-17 PROCEDURE — 2580000003 HC RX 258: Performed by: STUDENT IN AN ORGANIZED HEALTH CARE EDUCATION/TRAINING PROGRAM

## 2024-10-17 PROCEDURE — 2580000003 HC RX 258: Performed by: INTERNAL MEDICINE

## 2024-10-17 PROCEDURE — 80048 BASIC METABOLIC PNL TOTAL CA: CPT

## 2024-10-17 PROCEDURE — 1100000003 HC PRIVATE W/ TELEMETRY

## 2024-10-17 PROCEDURE — 84100 ASSAY OF PHOSPHORUS: CPT

## 2024-10-17 PROCEDURE — 6370000000 HC RX 637 (ALT 250 FOR IP): Performed by: STUDENT IN AN ORGANIZED HEALTH CARE EDUCATION/TRAINING PROGRAM

## 2024-10-17 PROCEDURE — 6370000000 HC RX 637 (ALT 250 FOR IP): Performed by: INTERNAL MEDICINE

## 2024-10-17 PROCEDURE — 2700000000 HC OXYGEN THERAPY PER DAY

## 2024-10-17 RX ORDER — LACTOBACILLUS RHAMNOSUS GG 10B CELL
1 CAPSULE ORAL
Status: DISCONTINUED | OUTPATIENT
Start: 2024-10-18 | End: 2024-10-30 | Stop reason: HOSPADM

## 2024-10-17 RX ORDER — MAGNESIUM SULFATE IN WATER 40 MG/ML
2000 INJECTION, SOLUTION INTRAVENOUS ONCE
Status: COMPLETED | OUTPATIENT
Start: 2024-10-17 | End: 2024-10-17

## 2024-10-17 RX ADMIN — SODIUM CHLORIDE, PRESERVATIVE FREE 10 ML: 5 INJECTION INTRAVENOUS at 10:02

## 2024-10-17 RX ADMIN — PIPERACILLIN AND TAZOBACTAM 3375 MG: 3; .375 INJECTION, POWDER, FOR SOLUTION INTRAVENOUS; PARENTERAL at 17:57

## 2024-10-17 RX ADMIN — Medication 100 MG: at 10:01

## 2024-10-17 RX ADMIN — THERA TABS 1 TABLET: TAB at 10:01

## 2024-10-17 RX ADMIN — CALCIUM CARBONATE 500 MG: 500 TABLET, CHEWABLE ORAL at 22:28

## 2024-10-17 RX ADMIN — PIPERACILLIN AND TAZOBACTAM 3375 MG: 3; .375 INJECTION, POWDER, FOR SOLUTION INTRAVENOUS; PARENTERAL at 01:49

## 2024-10-17 RX ADMIN — VANCOMYCIN HYDROCHLORIDE 750 MG: 750 INJECTION, POWDER, LYOPHILIZED, FOR SOLUTION INTRAVENOUS at 15:25

## 2024-10-17 RX ADMIN — CALCIUM CARBONATE 500 MG: 500 TABLET, CHEWABLE ORAL at 14:22

## 2024-10-17 RX ADMIN — Medication 3 MG: at 22:28

## 2024-10-17 RX ADMIN — Medication: at 22:28

## 2024-10-17 RX ADMIN — MAGNESIUM SULFATE HEPTAHYDRATE 2000 MG: 40 INJECTION, SOLUTION INTRAVENOUS at 10:06

## 2024-10-17 RX ADMIN — ACETAMINOPHEN 650 MG: 325 TABLET ORAL at 10:00

## 2024-10-17 RX ADMIN — SODIUM CHLORIDE, PRESERVATIVE FREE 10 ML: 5 INJECTION INTRAVENOUS at 22:28

## 2024-10-17 RX ADMIN — CALCIUM CARBONATE 500 MG: 500 TABLET, CHEWABLE ORAL at 10:01

## 2024-10-17 RX ADMIN — FOLIC ACID 1 MG: 1 TABLET ORAL at 10:01

## 2024-10-17 RX ADMIN — ACETAMINOPHEN 650 MG: 325 TABLET ORAL at 01:52

## 2024-10-17 RX ADMIN — LORAZEPAM 0.5 MG: 0.5 TABLET ORAL at 05:11

## 2024-10-17 RX ADMIN — LORAZEPAM 0.5 MG: 0.5 TABLET ORAL at 14:22

## 2024-10-17 RX ADMIN — Medication: at 10:01

## 2024-10-17 RX ADMIN — LORAZEPAM 0.5 MG: 0.5 TABLET ORAL at 22:28

## 2024-10-17 RX ADMIN — PANTOPRAZOLE SODIUM 40 MG: 40 TABLET, DELAYED RELEASE ORAL at 05:11

## 2024-10-17 RX ADMIN — VANCOMYCIN HYDROCHLORIDE 750 MG: 750 INJECTION, POWDER, LYOPHILIZED, FOR SOLUTION INTRAVENOUS at 00:20

## 2024-10-17 RX ADMIN — LEVOFLOXACIN 500 MG: 500 TABLET, FILM COATED ORAL at 10:01

## 2024-10-17 RX ADMIN — SODIUM CHLORIDE 25 ML: 9 INJECTION, SOLUTION INTRAVENOUS at 00:06

## 2024-10-17 RX ADMIN — PIPERACILLIN AND TAZOBACTAM 3375 MG: 3; .375 INJECTION, POWDER, FOR SOLUTION INTRAVENOUS; PARENTERAL at 10:06

## 2024-10-17 ASSESSMENT — PAIN SCALES - GENERAL: PAINLEVEL_OUTOF10: 3

## 2024-10-17 ASSESSMENT — PAIN DESCRIPTION - LOCATION: LOCATION: ABDOMEN;BACK

## 2024-10-18 ENCOUNTER — APPOINTMENT (OUTPATIENT)
Facility: HOSPITAL | Age: 79
DRG: 871 | End: 2024-10-18
Attending: STUDENT IN AN ORGANIZED HEALTH CARE EDUCATION/TRAINING PROGRAM
Payer: MEDICARE

## 2024-10-18 PROBLEM — F41.9 ANXIETY: Status: ACTIVE | Noted: 2024-10-18

## 2024-10-18 LAB
ANION GAP SERPL CALC-SCNC: 10 MMOL/L (ref 2–12)
BACTERIA SPEC CULT: NORMAL
BUN SERPL-MCNC: 26 MG/DL (ref 6–20)
BUN/CREAT SERPL: 38 (ref 12–20)
CALCIUM SERPL-MCNC: 7.9 MG/DL (ref 8.5–10.1)
CHLORIDE SERPL-SCNC: 102 MMOL/L (ref 97–108)
CO2 SERPL-SCNC: 24 MMOL/L (ref 21–32)
CREAT SERPL-MCNC: 0.69 MG/DL (ref 0.7–1.3)
EKG ATRIAL RATE: 108 BPM
EKG DIAGNOSIS: NORMAL
EKG P AXIS: 74 DEGREES
EKG P-R INTERVAL: 142 MS
EKG Q-T INTERVAL: 356 MS
EKG QRS DURATION: 68 MS
EKG QTC CALCULATION (BAZETT): 475 MS
EKG R AXIS: 83 DEGREES
EKG T AXIS: 71 DEGREES
EKG VENTRICULAR RATE: 107 BPM
ERYTHROCYTE [DISTWIDTH] IN BLOOD BY AUTOMATED COUNT: 14.8 % (ref 11.5–14.5)
GLUCOSE SERPL-MCNC: 98 MG/DL (ref 65–100)
GRAM STN SPEC: NORMAL
HCT VFR BLD AUTO: 24.5 % (ref 36.6–50.3)
HGB BLD-MCNC: 7.9 G/DL (ref 12.1–17)
MAGNESIUM SERPL-MCNC: 1.6 MG/DL (ref 1.6–2.4)
MCH RBC QN AUTO: 29.4 PG (ref 26–34)
MCHC RBC AUTO-ENTMCNC: 32.2 G/DL (ref 30–36.5)
MCV RBC AUTO: 91.1 FL (ref 80–99)
NRBC # BLD: 0 K/UL (ref 0–0.01)
NRBC BLD-RTO: 0 PER 100 WBC
PHOSPHATE SERPL-MCNC: 3.6 MG/DL (ref 2.6–4.7)
PLATELET # BLD AUTO: 440 K/UL (ref 150–400)
PMV BLD AUTO: 10.6 FL (ref 8.9–12.9)
POTASSIUM SERPL-SCNC: 4.1 MMOL/L (ref 3.5–5.1)
PROCALCITONIN SERPL-MCNC: 60.1 NG/ML
RBC # BLD AUTO: 2.69 M/UL (ref 4.1–5.7)
SERVICE CMNT-IMP: NORMAL
SERVICE CMNT-IMP: NORMAL
SODIUM SERPL-SCNC: 136 MMOL/L (ref 136–145)
WBC # BLD AUTO: 16 K/UL (ref 4.1–11.1)

## 2024-10-18 PROCEDURE — 6360000002 HC RX W HCPCS: Performed by: INTERNAL MEDICINE

## 2024-10-18 PROCEDURE — 85027 COMPLETE CBC AUTOMATED: CPT

## 2024-10-18 PROCEDURE — 6370000000 HC RX 637 (ALT 250 FOR IP): Performed by: STUDENT IN AN ORGANIZED HEALTH CARE EDUCATION/TRAINING PROGRAM

## 2024-10-18 PROCEDURE — 1100000003 HC PRIVATE W/ TELEMETRY

## 2024-10-18 PROCEDURE — 6370000000 HC RX 637 (ALT 250 FOR IP): Performed by: NURSE PRACTITIONER

## 2024-10-18 PROCEDURE — 80048 BASIC METABOLIC PNL TOTAL CA: CPT

## 2024-10-18 PROCEDURE — 6370000000 HC RX 637 (ALT 250 FOR IP): Performed by: PHYSICIAN ASSISTANT

## 2024-10-18 PROCEDURE — 6370000000 HC RX 637 (ALT 250 FOR IP): Performed by: INTERNAL MEDICINE

## 2024-10-18 PROCEDURE — 2580000003 HC RX 258: Performed by: STUDENT IN AN ORGANIZED HEALTH CARE EDUCATION/TRAINING PROGRAM

## 2024-10-18 PROCEDURE — 2580000003 HC RX 258: Performed by: INTERNAL MEDICINE

## 2024-10-18 PROCEDURE — 83735 ASSAY OF MAGNESIUM: CPT

## 2024-10-18 PROCEDURE — 84100 ASSAY OF PHOSPHORUS: CPT

## 2024-10-18 PROCEDURE — 84145 PROCALCITONIN (PCT): CPT

## 2024-10-18 PROCEDURE — 71250 CT THORAX DX C-: CPT

## 2024-10-18 PROCEDURE — 99232 SBSQ HOSP IP/OBS MODERATE 35: CPT | Performed by: NURSE PRACTITIONER

## 2024-10-18 PROCEDURE — 36415 COLL VENOUS BLD VENIPUNCTURE: CPT

## 2024-10-18 PROCEDURE — 74176 CT ABD & PELVIS W/O CONTRAST: CPT

## 2024-10-18 RX ORDER — OXYCODONE HYDROCHLORIDE 5 MG/1
2.5 TABLET ORAL EVERY 8 HOURS PRN
Status: DISCONTINUED | OUTPATIENT
Start: 2024-10-18 | End: 2024-10-30 | Stop reason: HOSPADM

## 2024-10-18 RX ORDER — ACETAMINOPHEN 500 MG
1000 TABLET ORAL 3 TIMES DAILY
Status: DISCONTINUED | OUTPATIENT
Start: 2024-10-18 | End: 2024-10-30 | Stop reason: HOSPADM

## 2024-10-18 RX ORDER — OXYCODONE HYDROCHLORIDE 5 MG/1
2.5 TABLET ORAL EVERY 6 HOURS PRN
Status: DISCONTINUED | OUTPATIENT
Start: 2024-10-18 | End: 2024-10-18

## 2024-10-18 RX ORDER — MAGNESIUM SULFATE IN WATER 40 MG/ML
2000 INJECTION, SOLUTION INTRAVENOUS ONCE
Status: COMPLETED | OUTPATIENT
Start: 2024-10-18 | End: 2024-10-18

## 2024-10-18 RX ADMIN — LORAZEPAM 0.5 MG: 0.5 TABLET ORAL at 07:01

## 2024-10-18 RX ADMIN — OXYCODONE 2.5 MG: 5 TABLET ORAL at 12:42

## 2024-10-18 RX ADMIN — Medication 1 CAPSULE: at 09:31

## 2024-10-18 RX ADMIN — Medication: at 20:10

## 2024-10-18 RX ADMIN — SODIUM CHLORIDE, PRESERVATIVE FREE 10 ML: 5 INJECTION INTRAVENOUS at 09:32

## 2024-10-18 RX ADMIN — ACETAMINOPHEN 1000 MG: 500 TABLET ORAL at 15:24

## 2024-10-18 RX ADMIN — CALCIUM CARBONATE 500 MG: 500 TABLET, CHEWABLE ORAL at 09:31

## 2024-10-18 RX ADMIN — LORAZEPAM 0.5 MG: 0.5 TABLET ORAL at 23:26

## 2024-10-18 RX ADMIN — LORAZEPAM 0.5 MG: 0.5 TABLET ORAL at 18:26

## 2024-10-18 RX ADMIN — VANCOMYCIN HYDROCHLORIDE 750 MG: 750 INJECTION, POWDER, LYOPHILIZED, FOR SOLUTION INTRAVENOUS at 15:28

## 2024-10-18 RX ADMIN — ACETAMINOPHEN 1000 MG: 500 TABLET ORAL at 20:08

## 2024-10-18 RX ADMIN — CALCIUM CARBONATE 500 MG: 500 TABLET, CHEWABLE ORAL at 15:24

## 2024-10-18 RX ADMIN — ACETAMINOPHEN 650 MG: 325 TABLET ORAL at 03:42

## 2024-10-18 RX ADMIN — THERA TABS 1 TABLET: TAB at 09:31

## 2024-10-18 RX ADMIN — PIPERACILLIN AND TAZOBACTAM 3375 MG: 3; .375 INJECTION, POWDER, FOR SOLUTION INTRAVENOUS; PARENTERAL at 17:58

## 2024-10-18 RX ADMIN — Medication 3 MG: at 23:22

## 2024-10-18 RX ADMIN — MAGNESIUM SULFATE HEPTAHYDRATE 2000 MG: 40 INJECTION, SOLUTION INTRAVENOUS at 09:36

## 2024-10-18 RX ADMIN — FOLIC ACID 1 MG: 1 TABLET ORAL at 09:31

## 2024-10-18 RX ADMIN — LEVOFLOXACIN 500 MG: 500 TABLET, FILM COATED ORAL at 09:31

## 2024-10-18 RX ADMIN — CALCIUM CARBONATE 500 MG: 500 TABLET, CHEWABLE ORAL at 20:10

## 2024-10-18 RX ADMIN — PANTOPRAZOLE SODIUM 40 MG: 40 TABLET, DELAYED RELEASE ORAL at 06:05

## 2024-10-18 RX ADMIN — SODIUM CHLORIDE, PRESERVATIVE FREE 10 ML: 5 INJECTION INTRAVENOUS at 20:12

## 2024-10-18 RX ADMIN — VANCOMYCIN HYDROCHLORIDE 750 MG: 750 INJECTION, POWDER, LYOPHILIZED, FOR SOLUTION INTRAVENOUS at 03:47

## 2024-10-18 RX ADMIN — Medication 100 MG: at 09:31

## 2024-10-18 RX ADMIN — OXYCODONE 2.5 MG: 5 TABLET ORAL at 23:21

## 2024-10-18 RX ADMIN — PIPERACILLIN AND TAZOBACTAM 3375 MG: 3; .375 INJECTION, POWDER, FOR SOLUTION INTRAVENOUS; PARENTERAL at 09:43

## 2024-10-18 RX ADMIN — Medication: at 09:31

## 2024-10-18 RX ADMIN — PIPERACILLIN AND TAZOBACTAM 3375 MG: 3; .375 INJECTION, POWDER, FOR SOLUTION INTRAVENOUS; PARENTERAL at 01:31

## 2024-10-18 ASSESSMENT — PAIN DESCRIPTION - DESCRIPTORS
DESCRIPTORS: ACHING

## 2024-10-18 ASSESSMENT — PAIN SCALES - GENERAL
PAINLEVEL_OUTOF10: 6
PAINLEVEL_OUTOF10: 6
PAINLEVEL_OUTOF10: 3
PAINLEVEL_OUTOF10: 3
PAINLEVEL_OUTOF10: 6
PAINLEVEL_OUTOF10: 0
PAINLEVEL_OUTOF10: 7
PAINLEVEL_OUTOF10: 3

## 2024-10-18 ASSESSMENT — PAIN DESCRIPTION - ORIENTATION
ORIENTATION: RIGHT;LEFT;LOWER
ORIENTATION: LEFT;MID
ORIENTATION: RIGHT

## 2024-10-18 ASSESSMENT — PAIN DESCRIPTION - LOCATION
LOCATION: BACK;LEG
LOCATION: BACK
LOCATION: BACK
LOCATION: NECK
LOCATION: GENERALIZED

## 2024-10-18 ASSESSMENT — PAIN - FUNCTIONAL ASSESSMENT: PAIN_FUNCTIONAL_ASSESSMENT: ACTIVITIES ARE NOT PREVENTED

## 2024-10-18 NOTE — CARE COORDINATION
Transition of Care Plan:     RUR: 13%  Prior Level of Functioning: independent   Disposition: home with HH vs rehab  If SNF or IPR: Date FOC offered:   Date FOC received:   Accepting facility:   Date authorization started with reference number:   Date authorization received and expires:   Follow up appointments: PCP, speciality   DME needed: none identified at this time   Transportation at discharge: friend   IM/IMM Medicare/ letter given: to be provided   Caregiver Contact: friend Dolores Sanchez [phone]  Discharge Caregiver contacted prior to discharge? To be contact upon pt request  Care Conference needed? Not at this time   Barriers to discharge: medical stability     Pt discussed in IDRs.  PT/OT to see pt for recommendations.  CM informed pt wants a follow up appt with Dr. Tonio Velasquez at d/c.      Winnie Irvin LMSW  Supervisee in Social Work  Care Management, [facility]  [id number]

## 2024-10-19 LAB
ANION GAP SERPL CALC-SCNC: 8 MMOL/L (ref 2–12)
BUN SERPL-MCNC: 25 MG/DL (ref 6–20)
BUN/CREAT SERPL: 37 (ref 12–20)
CALCIUM SERPL-MCNC: 8.6 MG/DL (ref 8.5–10.1)
CHLORIDE SERPL-SCNC: 103 MMOL/L (ref 97–108)
CO2 SERPL-SCNC: 23 MMOL/L (ref 21–32)
CREAT SERPL-MCNC: 0.67 MG/DL (ref 0.7–1.3)
ERYTHROCYTE [DISTWIDTH] IN BLOOD BY AUTOMATED COUNT: 15 % (ref 11.5–14.5)
GLUCOSE SERPL-MCNC: 93 MG/DL (ref 65–100)
HCT VFR BLD AUTO: 30.8 % (ref 36.6–50.3)
HGB BLD-MCNC: 9.5 G/DL (ref 12.1–17)
MAGNESIUM SERPL-MCNC: 1.7 MG/DL (ref 1.6–2.4)
MCH RBC QN AUTO: 29.5 PG (ref 26–34)
MCHC RBC AUTO-ENTMCNC: 30.8 G/DL (ref 30–36.5)
MCV RBC AUTO: 95.7 FL (ref 80–99)
NRBC # BLD: 0 K/UL (ref 0–0.01)
NRBC BLD-RTO: 0 PER 100 WBC
PHOSPHATE SERPL-MCNC: 2.9 MG/DL (ref 2.6–4.7)
PLATELET # BLD AUTO: 498 K/UL (ref 150–400)
PMV BLD AUTO: 10.4 FL (ref 8.9–12.9)
POTASSIUM SERPL-SCNC: 4.1 MMOL/L (ref 3.5–5.1)
RBC # BLD AUTO: 3.22 M/UL (ref 4.1–5.7)
SODIUM SERPL-SCNC: 134 MMOL/L (ref 136–145)
WBC # BLD AUTO: 20.4 K/UL (ref 4.1–11.1)

## 2024-10-19 PROCEDURE — 6370000000 HC RX 637 (ALT 250 FOR IP): Performed by: STUDENT IN AN ORGANIZED HEALTH CARE EDUCATION/TRAINING PROGRAM

## 2024-10-19 PROCEDURE — 85027 COMPLETE CBC AUTOMATED: CPT

## 2024-10-19 PROCEDURE — 2580000003 HC RX 258: Performed by: STUDENT IN AN ORGANIZED HEALTH CARE EDUCATION/TRAINING PROGRAM

## 2024-10-19 PROCEDURE — 83735 ASSAY OF MAGNESIUM: CPT

## 2024-10-19 PROCEDURE — 2580000003 HC RX 258: Performed by: INTERNAL MEDICINE

## 2024-10-19 PROCEDURE — 36415 COLL VENOUS BLD VENIPUNCTURE: CPT

## 2024-10-19 PROCEDURE — 84100 ASSAY OF PHOSPHORUS: CPT

## 2024-10-19 PROCEDURE — 6370000000 HC RX 637 (ALT 250 FOR IP): Performed by: INTERNAL MEDICINE

## 2024-10-19 PROCEDURE — 6360000002 HC RX W HCPCS: Performed by: INTERNAL MEDICINE

## 2024-10-19 PROCEDURE — 6370000000 HC RX 637 (ALT 250 FOR IP): Performed by: NURSE PRACTITIONER

## 2024-10-19 PROCEDURE — 80048 BASIC METABOLIC PNL TOTAL CA: CPT

## 2024-10-19 PROCEDURE — 1100000003 HC PRIVATE W/ TELEMETRY

## 2024-10-19 RX ORDER — MAGNESIUM SULFATE IN WATER 40 MG/ML
2000 INJECTION, SOLUTION INTRAVENOUS ONCE
Status: COMPLETED | OUTPATIENT
Start: 2024-10-19 | End: 2024-10-19

## 2024-10-19 RX ADMIN — Medication: at 21:54

## 2024-10-19 RX ADMIN — SODIUM CHLORIDE, PRESERVATIVE FREE 10 ML: 5 INJECTION INTRAVENOUS at 21:54

## 2024-10-19 RX ADMIN — CALCIUM CARBONATE 500 MG: 500 TABLET, CHEWABLE ORAL at 08:10

## 2024-10-19 RX ADMIN — OXYCODONE 2.5 MG: 5 TABLET ORAL at 22:05

## 2024-10-19 RX ADMIN — LORAZEPAM 0.5 MG: 0.5 TABLET ORAL at 09:30

## 2024-10-19 RX ADMIN — ACETAMINOPHEN 1000 MG: 500 TABLET ORAL at 16:07

## 2024-10-19 RX ADMIN — Medication 1 CAPSULE: at 08:10

## 2024-10-19 RX ADMIN — SODIUM CHLORIDE, PRESERVATIVE FREE 10 ML: 5 INJECTION INTRAVENOUS at 08:11

## 2024-10-19 RX ADMIN — CALCIUM CARBONATE 500 MG: 500 TABLET, CHEWABLE ORAL at 21:54

## 2024-10-19 RX ADMIN — THERA TABS 1 TABLET: TAB at 08:10

## 2024-10-19 RX ADMIN — ACETAMINOPHEN 1000 MG: 500 TABLET ORAL at 08:10

## 2024-10-19 RX ADMIN — ACETAMINOPHEN 1000 MG: 500 TABLET ORAL at 21:53

## 2024-10-19 RX ADMIN — OXYCODONE 2.5 MG: 5 TABLET ORAL at 13:32

## 2024-10-19 RX ADMIN — CALCIUM CARBONATE 500 MG: 500 TABLET, CHEWABLE ORAL at 16:07

## 2024-10-19 RX ADMIN — MAGNESIUM SULFATE HEPTAHYDRATE 2000 MG: 40 INJECTION, SOLUTION INTRAVENOUS at 11:20

## 2024-10-19 RX ADMIN — LORAZEPAM 0.5 MG: 0.5 TABLET ORAL at 22:06

## 2024-10-19 RX ADMIN — VANCOMYCIN HYDROCHLORIDE 750 MG: 750 INJECTION, POWDER, LYOPHILIZED, FOR SOLUTION INTRAVENOUS at 05:42

## 2024-10-19 RX ADMIN — FOLIC ACID 1 MG: 1 TABLET ORAL at 08:10

## 2024-10-19 RX ADMIN — Medication: at 08:14

## 2024-10-19 RX ADMIN — Medication 100 MG: at 08:10

## 2024-10-19 RX ADMIN — LEVOFLOXACIN 500 MG: 500 TABLET, FILM COATED ORAL at 08:10

## 2024-10-19 RX ADMIN — PANTOPRAZOLE SODIUM 40 MG: 40 TABLET, DELAYED RELEASE ORAL at 06:53

## 2024-10-19 RX ADMIN — PIPERACILLIN AND TAZOBACTAM 3375 MG: 3; .375 INJECTION, POWDER, FOR SOLUTION INTRAVENOUS; PARENTERAL at 17:07

## 2024-10-19 RX ADMIN — PIPERACILLIN AND TAZOBACTAM 3375 MG: 3; .375 INJECTION, POWDER, FOR SOLUTION INTRAVENOUS; PARENTERAL at 08:23

## 2024-10-19 RX ADMIN — PIPERACILLIN AND TAZOBACTAM 3375 MG: 3; .375 INJECTION, POWDER, FOR SOLUTION INTRAVENOUS; PARENTERAL at 01:31

## 2024-10-19 RX ADMIN — VANCOMYCIN HYDROCHLORIDE 750 MG: 750 INJECTION, POWDER, LYOPHILIZED, FOR SOLUTION INTRAVENOUS at 16:05

## 2024-10-19 ASSESSMENT — PAIN DESCRIPTION - LOCATION
LOCATION: ABDOMEN
LOCATION: HEAD
LOCATION: SACRUM
LOCATION: HEAD

## 2024-10-19 ASSESSMENT — PAIN DESCRIPTION - ORIENTATION: ORIENTATION: RIGHT

## 2024-10-19 ASSESSMENT — PAIN SCALES - GENERAL
PAINLEVEL_OUTOF10: 7
PAINLEVEL_OUTOF10: 0
PAINLEVEL_OUTOF10: 7
PAINLEVEL_OUTOF10: 6
PAINLEVEL_OUTOF10: 0
PAINLEVEL_OUTOF10: 4

## 2024-10-19 ASSESSMENT — PAIN DESCRIPTION - DESCRIPTORS
DESCRIPTORS: ACHING

## 2024-10-19 ASSESSMENT — PAIN - FUNCTIONAL ASSESSMENT: PAIN_FUNCTIONAL_ASSESSMENT: ACTIVITIES ARE NOT PREVENTED

## 2024-10-20 LAB
ANION GAP SERPL CALC-SCNC: 6 MMOL/L (ref 2–12)
BACTERIA SPEC CULT: NORMAL
BACTERIA SPEC CULT: NORMAL
BUN SERPL-MCNC: 25 MG/DL (ref 6–20)
BUN/CREAT SERPL: 40 (ref 12–20)
CALCIUM SERPL-MCNC: 8.5 MG/DL (ref 8.5–10.1)
CHLORIDE SERPL-SCNC: 104 MMOL/L (ref 97–108)
CO2 SERPL-SCNC: 25 MMOL/L (ref 21–32)
CREAT SERPL-MCNC: 0.62 MG/DL (ref 0.7–1.3)
ERYTHROCYTE [DISTWIDTH] IN BLOOD BY AUTOMATED COUNT: 15 % (ref 11.5–14.5)
GLUCOSE SERPL-MCNC: 82 MG/DL (ref 65–100)
HCT VFR BLD AUTO: 24.9 % (ref 36.6–50.3)
HGB BLD-MCNC: 7.9 G/DL (ref 12.1–17)
MAGNESIUM SERPL-MCNC: 1.6 MG/DL (ref 1.6–2.4)
MCH RBC QN AUTO: 29.7 PG (ref 26–34)
MCHC RBC AUTO-ENTMCNC: 31.7 G/DL (ref 30–36.5)
MCV RBC AUTO: 93.6 FL (ref 80–99)
NRBC # BLD: 0 K/UL (ref 0–0.01)
NRBC BLD-RTO: 0 PER 100 WBC
PHOSPHATE SERPL-MCNC: 3.3 MG/DL (ref 2.6–4.7)
PLATELET # BLD AUTO: 477 K/UL (ref 150–400)
PMV BLD AUTO: 10.4 FL (ref 8.9–12.9)
POTASSIUM SERPL-SCNC: 4.2 MMOL/L (ref 3.5–5.1)
RBC # BLD AUTO: 2.66 M/UL (ref 4.1–5.7)
SERVICE CMNT-IMP: NORMAL
SERVICE CMNT-IMP: NORMAL
SODIUM SERPL-SCNC: 135 MMOL/L (ref 136–145)
WBC # BLD AUTO: 19.6 K/UL (ref 4.1–11.1)

## 2024-10-20 PROCEDURE — 2580000003 HC RX 258: Performed by: STUDENT IN AN ORGANIZED HEALTH CARE EDUCATION/TRAINING PROGRAM

## 2024-10-20 PROCEDURE — 2580000003 HC RX 258: Performed by: INTERNAL MEDICINE

## 2024-10-20 PROCEDURE — 36415 COLL VENOUS BLD VENIPUNCTURE: CPT

## 2024-10-20 PROCEDURE — 1100000003 HC PRIVATE W/ TELEMETRY

## 2024-10-20 PROCEDURE — 6370000000 HC RX 637 (ALT 250 FOR IP): Performed by: NURSE PRACTITIONER

## 2024-10-20 PROCEDURE — 6360000002 HC RX W HCPCS: Performed by: INTERNAL MEDICINE

## 2024-10-20 PROCEDURE — 6370000000 HC RX 637 (ALT 250 FOR IP): Performed by: INTERNAL MEDICINE

## 2024-10-20 PROCEDURE — 6370000000 HC RX 637 (ALT 250 FOR IP): Performed by: STUDENT IN AN ORGANIZED HEALTH CARE EDUCATION/TRAINING PROGRAM

## 2024-10-20 PROCEDURE — 85027 COMPLETE CBC AUTOMATED: CPT

## 2024-10-20 PROCEDURE — 83735 ASSAY OF MAGNESIUM: CPT

## 2024-10-20 PROCEDURE — 80048 BASIC METABOLIC PNL TOTAL CA: CPT

## 2024-10-20 PROCEDURE — 84100 ASSAY OF PHOSPHORUS: CPT

## 2024-10-20 RX ORDER — MAGNESIUM SULFATE IN WATER 40 MG/ML
2000 INJECTION, SOLUTION INTRAVENOUS ONCE
Status: COMPLETED | OUTPATIENT
Start: 2024-10-20 | End: 2024-10-20

## 2024-10-20 RX ADMIN — LEVOFLOXACIN 500 MG: 500 TABLET, FILM COATED ORAL at 09:41

## 2024-10-20 RX ADMIN — VANCOMYCIN HYDROCHLORIDE 750 MG: 750 INJECTION, POWDER, LYOPHILIZED, FOR SOLUTION INTRAVENOUS at 16:14

## 2024-10-20 RX ADMIN — ACETAMINOPHEN 1000 MG: 500 TABLET ORAL at 20:12

## 2024-10-20 RX ADMIN — PIPERACILLIN AND TAZOBACTAM 3375 MG: 3; .375 INJECTION, POWDER, FOR SOLUTION INTRAVENOUS; PARENTERAL at 17:58

## 2024-10-20 RX ADMIN — Medication: at 20:17

## 2024-10-20 RX ADMIN — THERA TABS 1 TABLET: TAB at 09:41

## 2024-10-20 RX ADMIN — FOLIC ACID 1 MG: 1 TABLET ORAL at 09:42

## 2024-10-20 RX ADMIN — CALCIUM CARBONATE 500 MG: 500 TABLET, CHEWABLE ORAL at 20:12

## 2024-10-20 RX ADMIN — LORAZEPAM 0.5 MG: 0.5 TABLET ORAL at 20:12

## 2024-10-20 RX ADMIN — LORAZEPAM 0.5 MG: 0.5 TABLET ORAL at 05:07

## 2024-10-20 RX ADMIN — VANCOMYCIN HYDROCHLORIDE 750 MG: 750 INJECTION, POWDER, LYOPHILIZED, FOR SOLUTION INTRAVENOUS at 03:52

## 2024-10-20 RX ADMIN — Medication 100 MG: at 09:41

## 2024-10-20 RX ADMIN — OXYCODONE 2.5 MG: 5 TABLET ORAL at 22:04

## 2024-10-20 RX ADMIN — CALCIUM CARBONATE 500 MG: 500 TABLET, CHEWABLE ORAL at 09:41

## 2024-10-20 RX ADMIN — Medication 1 CAPSULE: at 09:41

## 2024-10-20 RX ADMIN — PIPERACILLIN AND TAZOBACTAM 3375 MG: 3; .375 INJECTION, POWDER, FOR SOLUTION INTRAVENOUS; PARENTERAL at 01:56

## 2024-10-20 RX ADMIN — PIPERACILLIN AND TAZOBACTAM 3375 MG: 3; .375 INJECTION, POWDER, FOR SOLUTION INTRAVENOUS; PARENTERAL at 09:49

## 2024-10-20 RX ADMIN — Medication: at 09:42

## 2024-10-20 RX ADMIN — SODIUM CHLORIDE, PRESERVATIVE FREE 10 ML: 5 INJECTION INTRAVENOUS at 09:42

## 2024-10-20 RX ADMIN — MAGNESIUM SULFATE HEPTAHYDRATE 2000 MG: 40 INJECTION, SOLUTION INTRAVENOUS at 12:02

## 2024-10-20 RX ADMIN — PANTOPRAZOLE SODIUM 40 MG: 40 TABLET, DELAYED RELEASE ORAL at 05:07

## 2024-10-20 RX ADMIN — SODIUM CHLORIDE, PRESERVATIVE FREE 10 ML: 5 INJECTION INTRAVENOUS at 20:18

## 2024-10-20 RX ADMIN — OXYCODONE 2.5 MG: 5 TABLET ORAL at 09:41

## 2024-10-20 ASSESSMENT — PAIN SCALES - GENERAL
PAINLEVEL_OUTOF10: 0
PAINLEVEL_OUTOF10: 8
PAINLEVEL_OUTOF10: 0
PAINLEVEL_OUTOF10: 3
PAINLEVEL_OUTOF10: 6

## 2024-10-20 ASSESSMENT — PAIN DESCRIPTION - ORIENTATION: ORIENTATION: RIGHT

## 2024-10-20 ASSESSMENT — PAIN DESCRIPTION - LOCATION: LOCATION: RIB CAGE

## 2024-10-20 ASSESSMENT — PAIN DESCRIPTION - DESCRIPTORS: DESCRIPTORS: ACHING

## 2024-10-21 LAB
ANION GAP SERPL CALC-SCNC: 7 MMOL/L (ref 2–12)
BUN SERPL-MCNC: 27 MG/DL (ref 6–20)
BUN/CREAT SERPL: 39 (ref 12–20)
CALCIUM SERPL-MCNC: 8.8 MG/DL (ref 8.5–10.1)
CHLORIDE SERPL-SCNC: 104 MMOL/L (ref 97–108)
CO2 SERPL-SCNC: 25 MMOL/L (ref 21–32)
CREAT SERPL-MCNC: 0.7 MG/DL (ref 0.7–1.3)
ERYTHROCYTE [DISTWIDTH] IN BLOOD BY AUTOMATED COUNT: 15.4 % (ref 11.5–14.5)
GLUCOSE SERPL-MCNC: 94 MG/DL (ref 65–100)
HCT VFR BLD AUTO: 24.7 % (ref 36.6–50.3)
HGB BLD-MCNC: 7.9 G/DL (ref 12.1–17)
MAGNESIUM SERPL-MCNC: 1.7 MG/DL (ref 1.6–2.4)
MCH RBC QN AUTO: 30.3 PG (ref 26–34)
MCHC RBC AUTO-ENTMCNC: 32 G/DL (ref 30–36.5)
MCV RBC AUTO: 94.6 FL (ref 80–99)
NRBC # BLD: 0 K/UL (ref 0–0.01)
NRBC BLD-RTO: 0 PER 100 WBC
PHOSPHATE SERPL-MCNC: 3.6 MG/DL (ref 2.6–4.7)
PLATELET # BLD AUTO: 509 K/UL (ref 150–400)
PMV BLD AUTO: 9.9 FL (ref 8.9–12.9)
POTASSIUM SERPL-SCNC: 4.3 MMOL/L (ref 3.5–5.1)
RBC # BLD AUTO: 2.61 M/UL (ref 4.1–5.7)
SODIUM SERPL-SCNC: 136 MMOL/L (ref 136–145)
WBC # BLD AUTO: 17 K/UL (ref 4.1–11.1)

## 2024-10-21 PROCEDURE — 85027 COMPLETE CBC AUTOMATED: CPT

## 2024-10-21 PROCEDURE — 87205 SMEAR GRAM STAIN: CPT

## 2024-10-21 PROCEDURE — 1100000003 HC PRIVATE W/ TELEMETRY

## 2024-10-21 PROCEDURE — 2580000003 HC RX 258: Performed by: INTERNAL MEDICINE

## 2024-10-21 PROCEDURE — 6370000000 HC RX 637 (ALT 250 FOR IP): Performed by: INTERNAL MEDICINE

## 2024-10-21 PROCEDURE — 36415 COLL VENOUS BLD VENIPUNCTURE: CPT

## 2024-10-21 PROCEDURE — 2580000003 HC RX 258: Performed by: STUDENT IN AN ORGANIZED HEALTH CARE EDUCATION/TRAINING PROGRAM

## 2024-10-21 PROCEDURE — 87070 CULTURE OTHR SPECIMN AEROBIC: CPT

## 2024-10-21 PROCEDURE — 6370000000 HC RX 637 (ALT 250 FOR IP): Performed by: STUDENT IN AN ORGANIZED HEALTH CARE EDUCATION/TRAINING PROGRAM

## 2024-10-21 PROCEDURE — 6360000002 HC RX W HCPCS: Performed by: INTERNAL MEDICINE

## 2024-10-21 PROCEDURE — 84100 ASSAY OF PHOSPHORUS: CPT

## 2024-10-21 PROCEDURE — 6370000000 HC RX 637 (ALT 250 FOR IP): Performed by: NURSE PRACTITIONER

## 2024-10-21 PROCEDURE — 80048 BASIC METABOLIC PNL TOTAL CA: CPT

## 2024-10-21 PROCEDURE — 83735 ASSAY OF MAGNESIUM: CPT

## 2024-10-21 RX ORDER — MAGNESIUM SULFATE IN WATER 40 MG/ML
2000 INJECTION, SOLUTION INTRAVENOUS ONCE
Status: COMPLETED | OUTPATIENT
Start: 2024-10-21 | End: 2024-10-21

## 2024-10-21 RX ADMIN — MAGNESIUM SULFATE HEPTAHYDRATE 2000 MG: 40 INJECTION, SOLUTION INTRAVENOUS at 10:02

## 2024-10-21 RX ADMIN — OXYCODONE 2.5 MG: 5 TABLET ORAL at 10:17

## 2024-10-21 RX ADMIN — LORAZEPAM 0.5 MG: 0.5 TABLET ORAL at 20:54

## 2024-10-21 RX ADMIN — LEVOFLOXACIN 500 MG: 500 TABLET, FILM COATED ORAL at 09:21

## 2024-10-21 RX ADMIN — ACETAMINOPHEN 1000 MG: 500 TABLET ORAL at 08:09

## 2024-10-21 RX ADMIN — PANTOPRAZOLE SODIUM 40 MG: 40 TABLET, DELAYED RELEASE ORAL at 06:30

## 2024-10-21 RX ADMIN — OXYCODONE 2.5 MG: 5 TABLET ORAL at 22:18

## 2024-10-21 RX ADMIN — THERA TABS 1 TABLET: TAB at 08:11

## 2024-10-21 RX ADMIN — FOLIC ACID 1 MG: 1 TABLET ORAL at 08:09

## 2024-10-21 RX ADMIN — SODIUM CHLORIDE, PRESERVATIVE FREE 10 ML: 5 INJECTION INTRAVENOUS at 20:56

## 2024-10-21 RX ADMIN — Medication 1 CAPSULE: at 08:09

## 2024-10-21 RX ADMIN — CALCIUM CARBONATE 500 MG: 500 TABLET, CHEWABLE ORAL at 20:54

## 2024-10-21 RX ADMIN — VANCOMYCIN HYDROCHLORIDE 750 MG: 750 INJECTION, POWDER, LYOPHILIZED, FOR SOLUTION INTRAVENOUS at 03:31

## 2024-10-21 RX ADMIN — SODIUM CHLORIDE, PRESERVATIVE FREE 10 ML: 5 INJECTION INTRAVENOUS at 08:11

## 2024-10-21 RX ADMIN — PIPERACILLIN AND TAZOBACTAM 3375 MG: 3; .375 INJECTION, POWDER, FOR SOLUTION INTRAVENOUS; PARENTERAL at 09:57

## 2024-10-21 RX ADMIN — PIPERACILLIN AND TAZOBACTAM 3375 MG: 3; .375 INJECTION, POWDER, FOR SOLUTION INTRAVENOUS; PARENTERAL at 01:38

## 2024-10-21 RX ADMIN — VANCOMYCIN HYDROCHLORIDE 750 MG: 750 INJECTION, POWDER, LYOPHILIZED, FOR SOLUTION INTRAVENOUS at 14:54

## 2024-10-21 RX ADMIN — PIPERACILLIN AND TAZOBACTAM 3375 MG: 3; .375 INJECTION, POWDER, FOR SOLUTION INTRAVENOUS; PARENTERAL at 18:04

## 2024-10-21 RX ADMIN — CALCIUM CARBONATE 500 MG: 500 TABLET, CHEWABLE ORAL at 14:50

## 2024-10-21 RX ADMIN — Medication 100 MG: at 08:09

## 2024-10-21 RX ADMIN — Medication: at 08:11

## 2024-10-21 RX ADMIN — ACETAMINOPHEN 1000 MG: 500 TABLET ORAL at 20:55

## 2024-10-21 RX ADMIN — Medication: at 20:56

## 2024-10-21 RX ADMIN — CALCIUM CARBONATE 500 MG: 500 TABLET, CHEWABLE ORAL at 08:11

## 2024-10-21 ASSESSMENT — PAIN SCALES - GENERAL
PAINLEVEL_OUTOF10: 0
PAINLEVEL_OUTOF10: 7
PAINLEVEL_OUTOF10: 0
PAINLEVEL_OUTOF10: 0
PAINLEVEL_OUTOF10: 4
PAINLEVEL_OUTOF10: 0

## 2024-10-21 ASSESSMENT — PAIN DESCRIPTION - ORIENTATION
ORIENTATION: RIGHT
ORIENTATION: RIGHT

## 2024-10-21 ASSESSMENT — PAIN DESCRIPTION - DESCRIPTORS
DESCRIPTORS: CRAMPING
DESCRIPTORS: CRAMPING

## 2024-10-21 ASSESSMENT — PAIN - FUNCTIONAL ASSESSMENT
PAIN_FUNCTIONAL_ASSESSMENT: ACTIVITIES ARE NOT PREVENTED
PAIN_FUNCTIONAL_ASSESSMENT: ACTIVITIES ARE NOT PREVENTED

## 2024-10-21 ASSESSMENT — PAIN DESCRIPTION - PAIN TYPE
TYPE: ACUTE PAIN
TYPE: ACUTE PAIN

## 2024-10-21 ASSESSMENT — PAIN DESCRIPTION - ONSET
ONSET: GRADUAL
ONSET: GRADUAL

## 2024-10-21 ASSESSMENT — PAIN DESCRIPTION - FREQUENCY
FREQUENCY: INTERMITTENT
FREQUENCY: INTERMITTENT

## 2024-10-21 ASSESSMENT — PAIN DESCRIPTION - LOCATION
LOCATION: FLANK
LOCATION: FLANK

## 2024-10-21 NOTE — CARE COORDINATION
Transition of Care Plan:     RUR: 13%  Prior Level of Functioning: independent   Disposition: home with HH vs rehab  If SNF or IPR: Date FOC offered:   Date FOC received:   Accepting facility:   Date authorization started with reference number:   Date authorization received and expires:   Follow up appointments: PCP, speciality   DME needed: none identified at this time   Transportation at discharge: friend   IM/PAULINE Medicare/ letter given: to be provided   Caregiver Contact: friend Dolores Sanchez [phone]  Discharge Caregiver contacted prior to discharge? To be contact upon pt request  Care Conference needed? Not at this time   Barriers to discharge: medical stability       Patient discussed during IDRs and remains medically unstable for d/c at this time.       SERGIO Smith, CM  [id number]

## 2024-10-22 LAB
ANION GAP SERPL CALC-SCNC: 7 MMOL/L (ref 2–12)
BUN SERPL-MCNC: 28 MG/DL (ref 6–20)
BUN/CREAT SERPL: 35 (ref 12–20)
CALCIUM SERPL-MCNC: 9 MG/DL (ref 8.5–10.1)
CHLORIDE SERPL-SCNC: 104 MMOL/L (ref 97–108)
CO2 SERPL-SCNC: 26 MMOL/L (ref 21–32)
CREAT SERPL-MCNC: 0.8 MG/DL (ref 0.7–1.3)
ERYTHROCYTE [DISTWIDTH] IN BLOOD BY AUTOMATED COUNT: 15.8 % (ref 11.5–14.5)
GLUCOSE SERPL-MCNC: 97 MG/DL (ref 65–100)
HCT VFR BLD AUTO: 26.5 % (ref 36.6–50.3)
HGB BLD-MCNC: 8.3 G/DL (ref 12.1–17)
MAGNESIUM SERPL-MCNC: 1.6 MG/DL (ref 1.6–2.4)
MCH RBC QN AUTO: 29.7 PG (ref 26–34)
MCHC RBC AUTO-ENTMCNC: 31.3 G/DL (ref 30–36.5)
MCV RBC AUTO: 95 FL (ref 80–99)
NRBC # BLD: 0 K/UL (ref 0–0.01)
NRBC BLD-RTO: 0 PER 100 WBC
PHOSPHATE SERPL-MCNC: 3.7 MG/DL (ref 2.6–4.7)
PLATELET # BLD AUTO: 579 K/UL (ref 150–400)
PMV BLD AUTO: 10.2 FL (ref 8.9–12.9)
POTASSIUM SERPL-SCNC: 3.9 MMOL/L (ref 3.5–5.1)
RBC # BLD AUTO: 2.79 M/UL (ref 4.1–5.7)
SODIUM SERPL-SCNC: 137 MMOL/L (ref 136–145)
WBC # BLD AUTO: 15.2 K/UL (ref 4.1–11.1)

## 2024-10-22 PROCEDURE — 87106 FUNGI IDENTIFICATION YEAST: CPT

## 2024-10-22 PROCEDURE — 99152 MOD SED SAME PHYS/QHP 5/>YRS: CPT | Performed by: INTERNAL MEDICINE

## 2024-10-22 PROCEDURE — 6370000000 HC RX 637 (ALT 250 FOR IP): Performed by: INTERNAL MEDICINE

## 2024-10-22 PROCEDURE — 3600007512: Performed by: INTERNAL MEDICINE

## 2024-10-22 PROCEDURE — 7100000011 HC PHASE II RECOVERY - ADDTL 15 MIN: Performed by: INTERNAL MEDICINE

## 2024-10-22 PROCEDURE — 6360000002 HC RX W HCPCS: Performed by: INTERNAL MEDICINE

## 2024-10-22 PROCEDURE — 99153 MOD SED SAME PHYS/QHP EA: CPT | Performed by: INTERNAL MEDICINE

## 2024-10-22 PROCEDURE — 80048 BASIC METABOLIC PNL TOTAL CA: CPT

## 2024-10-22 PROCEDURE — 97165 OT EVAL LOW COMPLEX 30 MIN: CPT

## 2024-10-22 PROCEDURE — 3600007502: Performed by: INTERNAL MEDICINE

## 2024-10-22 PROCEDURE — 85027 COMPLETE CBC AUTOMATED: CPT

## 2024-10-22 PROCEDURE — 2500000003 HC RX 250 WO HCPCS: Performed by: INTERNAL MEDICINE

## 2024-10-22 PROCEDURE — 1100000003 HC PRIVATE W/ TELEMETRY

## 2024-10-22 PROCEDURE — 0BDK8ZX EXTRACTION OF RIGHT LUNG, VIA NATURAL OR ARTIFICIAL OPENING ENDOSCOPIC, DIAGNOSTIC: ICD-10-PCS | Performed by: INTERNAL MEDICINE

## 2024-10-22 PROCEDURE — 87116 MYCOBACTERIA CULTURE: CPT

## 2024-10-22 PROCEDURE — 97530 THERAPEUTIC ACTIVITIES: CPT

## 2024-10-22 PROCEDURE — 84100 ASSAY OF PHOSPHORUS: CPT

## 2024-10-22 PROCEDURE — 6370000000 HC RX 637 (ALT 250 FOR IP): Performed by: NURSE PRACTITIONER

## 2024-10-22 PROCEDURE — 2580000003 HC RX 258: Performed by: INTERNAL MEDICINE

## 2024-10-22 PROCEDURE — 36415 COLL VENOUS BLD VENIPUNCTURE: CPT

## 2024-10-22 PROCEDURE — 7100000010 HC PHASE II RECOVERY - FIRST 15 MIN: Performed by: INTERNAL MEDICINE

## 2024-10-22 PROCEDURE — 87206 SMEAR FLUORESCENT/ACID STAI: CPT

## 2024-10-22 PROCEDURE — 6370000000 HC RX 637 (ALT 250 FOR IP): Performed by: STUDENT IN AN ORGANIZED HEALTH CARE EDUCATION/TRAINING PROGRAM

## 2024-10-22 PROCEDURE — 2580000003 HC RX 258: Performed by: STUDENT IN AN ORGANIZED HEALTH CARE EDUCATION/TRAINING PROGRAM

## 2024-10-22 PROCEDURE — 88112 CYTOPATH CELL ENHANCE TECH: CPT

## 2024-10-22 PROCEDURE — 2709999900 HC NON-CHARGEABLE SUPPLY: Performed by: INTERNAL MEDICINE

## 2024-10-22 PROCEDURE — 88305 TISSUE EXAM BY PATHOLOGIST: CPT

## 2024-10-22 PROCEDURE — 83735 ASSAY OF MAGNESIUM: CPT

## 2024-10-22 PROCEDURE — 87102 FUNGUS ISOLATION CULTURE: CPT

## 2024-10-22 PROCEDURE — 97161 PT EVAL LOW COMPLEX 20 MIN: CPT

## 2024-10-22 RX ORDER — SODIUM CHLORIDE 0.9 % (FLUSH) 0.9 %
5-40 SYRINGE (ML) INJECTION PRN
Status: DISCONTINUED | OUTPATIENT
Start: 2024-10-22 | End: 2024-10-22 | Stop reason: HOSPADM

## 2024-10-22 RX ORDER — SODIUM CHLORIDE 0.9 % (FLUSH) 0.9 %
5-40 SYRINGE (ML) INJECTION EVERY 12 HOURS SCHEDULED
Status: DISCONTINUED | OUTPATIENT
Start: 2024-10-22 | End: 2024-10-22 | Stop reason: HOSPADM

## 2024-10-22 RX ORDER — FENTANYL CITRATE 0.05 MG/ML
INJECTION, SOLUTION INTRAMUSCULAR; INTRAVENOUS PRN
Status: DISCONTINUED | OUTPATIENT
Start: 2024-10-22 | End: 2024-10-22 | Stop reason: ALTCHOICE

## 2024-10-22 RX ORDER — MIDAZOLAM HYDROCHLORIDE 5 MG/5ML
5 INJECTION, SOLUTION INTRAMUSCULAR; INTRAVENOUS ONCE
Status: DISCONTINUED | OUTPATIENT
Start: 2024-10-22 | End: 2024-10-22 | Stop reason: HOSPADM

## 2024-10-22 RX ORDER — LIDOCAINE HYDROCHLORIDE 40 MG/ML
2.5 INJECTION, SOLUTION RETROBULBAR ONCE
Status: DISCONTINUED | OUTPATIENT
Start: 2024-10-22 | End: 2024-10-22 | Stop reason: HOSPADM

## 2024-10-22 RX ORDER — MIDAZOLAM HYDROCHLORIDE 1 MG/ML
INJECTION, SOLUTION INTRAMUSCULAR; INTRAVENOUS PRN
Status: DISCONTINUED | OUTPATIENT
Start: 2024-10-22 | End: 2024-10-22 | Stop reason: ALTCHOICE

## 2024-10-22 RX ORDER — SODIUM CHLORIDE 9 MG/ML
INJECTION, SOLUTION INTRAVENOUS PRN
Status: DISCONTINUED | OUTPATIENT
Start: 2024-10-22 | End: 2024-10-22 | Stop reason: HOSPADM

## 2024-10-22 RX ORDER — MAGNESIUM SULFATE IN WATER 40 MG/ML
2000 INJECTION, SOLUTION INTRAVENOUS ONCE
Status: COMPLETED | OUTPATIENT
Start: 2024-10-22 | End: 2024-10-22

## 2024-10-22 RX ORDER — LIDOCAINE HYDROCHLORIDE 20 MG/ML
JELLY TOPICAL ONCE
Status: COMPLETED | OUTPATIENT
Start: 2024-10-22 | End: 2024-10-22

## 2024-10-22 RX ORDER — LIDOCAINE HYDROCHLORIDE 20 MG/ML
INJECTION, SOLUTION EPIDURAL; INFILTRATION; INTRACAUDAL; PERINEURAL PRN
Status: DISCONTINUED | OUTPATIENT
Start: 2024-10-22 | End: 2024-10-22 | Stop reason: ALTCHOICE

## 2024-10-22 RX ORDER — FENTANYL CITRATE 50 UG/ML
100 INJECTION, SOLUTION INTRAMUSCULAR; INTRAVENOUS ONCE
Status: DISCONTINUED | OUTPATIENT
Start: 2024-10-22 | End: 2024-10-22 | Stop reason: HOSPADM

## 2024-10-22 RX ORDER — LIDOCAINE HYDROCHLORIDE 20 MG/ML
15 SOLUTION OROPHARYNGEAL ONCE
Status: COMPLETED | OUTPATIENT
Start: 2024-10-22 | End: 2024-10-22

## 2024-10-22 RX ADMIN — LIDOCAINE HYDROCHLORIDE: 20 JELLY TOPICAL at 14:16

## 2024-10-22 RX ADMIN — OXYCODONE 2.5 MG: 5 TABLET ORAL at 21:48

## 2024-10-22 RX ADMIN — MAGNESIUM SULFATE HEPTAHYDRATE 2000 MG: 40 INJECTION, SOLUTION INTRAVENOUS at 08:52

## 2024-10-22 RX ADMIN — ACETAMINOPHEN 1000 MG: 500 TABLET ORAL at 20:19

## 2024-10-22 RX ADMIN — SODIUM CHLORIDE, PRESERVATIVE FREE 10 ML: 5 INJECTION INTRAVENOUS at 09:02

## 2024-10-22 RX ADMIN — LORAZEPAM 0.5 MG: 0.5 TABLET ORAL at 20:19

## 2024-10-22 RX ADMIN — SODIUM CHLORIDE, PRESERVATIVE FREE 10 ML: 5 INJECTION INTRAVENOUS at 20:18

## 2024-10-22 RX ADMIN — PIPERACILLIN AND TAZOBACTAM 3375 MG: 3; .375 INJECTION, POWDER, FOR SOLUTION INTRAVENOUS; PARENTERAL at 17:34

## 2024-10-22 RX ADMIN — ACETAMINOPHEN 1000 MG: 500 TABLET ORAL at 15:37

## 2024-10-22 RX ADMIN — PIPERACILLIN AND TAZOBACTAM 3375 MG: 3; .375 INJECTION, POWDER, FOR SOLUTION INTRAVENOUS; PARENTERAL at 01:33

## 2024-10-22 RX ADMIN — Medication: at 09:03

## 2024-10-22 RX ADMIN — CALCIUM CARBONATE 500 MG: 500 TABLET, CHEWABLE ORAL at 15:36

## 2024-10-22 RX ADMIN — CALCIUM CARBONATE 500 MG: 500 TABLET, CHEWABLE ORAL at 20:19

## 2024-10-22 RX ADMIN — PIPERACILLIN AND TAZOBACTAM 3375 MG: 3; .375 INJECTION, POWDER, FOR SOLUTION INTRAVENOUS; PARENTERAL at 09:02

## 2024-10-22 RX ADMIN — Medication: at 20:18

## 2024-10-22 RX ADMIN — LIDOCAINE HYDROCHLORIDE 15 ML: 20 SOLUTION ORAL at 14:16

## 2024-10-22 ASSESSMENT — PAIN SCALES - GENERAL
PAINLEVEL_OUTOF10: 0

## 2024-10-22 ASSESSMENT — PAIN SCALES - WONG BAKER: WONGBAKER_NUMERICALRESPONSE: NO HURT

## 2024-10-22 ASSESSMENT — PAIN - FUNCTIONAL ASSESSMENT: PAIN_FUNCTIONAL_ASSESSMENT: 0-10

## 2024-10-22 NOTE — PRE SEDATION
Sedation Pre-Procedure Note    Patient Name: Scott Gardiner   YOB: 1945  Room/Bed: [address] [place] [address]  Medical Record Number: [id number]  Date: 10/22/2024   Time: 1:15 PM       Indication:  Pt has right hilar mass, suspected lung adenocarcinoma.     Consent: I have discussed with the patient and/or the patient representative the indication, alternatives, and the possible risks and/or complications of the planned procedure and the anesthesia methods. The patient and/or patient representative appear to understand and agree to proceed.    Vital Signs:   Vitals:    10/22/24 1200   BP:    Pulse:    Resp: 20   Temp: 98.7 °F (37.1 °C)   SpO2: 90%       Past Medical History:   has a past medical history of Abuse, Aneurysm (HCC), Chronic obstructive pulmonary disease (HCC), Chronic pain, Claudication of calf muscles (HCC), Colovesical fistula, Esophageal stricture, Esophagitis, GI bleed, Hemochromatosis, Hyperlipidemia, Hypertension, Ill-defined condition, Peripheral artery disease (HCC), Pulmonary nodule, and Stroke (HCC).    Past Surgical History:   has a past surgical history that includes amputation (Left); Colonoscopy (N/A, 12/19/2016); Upper gastrointestinal endoscopy (10/2016); vascular surgery (01/2020); other surgical history; Tonsillectomy; Colonoscopy (N/A, 9/14/2016); amputation (Left, 07/2016); Colonoscopy (N/A, 9/30/2020); and Cataract removal (Bilateral).    Medications:   Scheduled Meds:    acetaminophen  1,000 mg Oral TID    lactobacillus  1 capsule Oral Daily with breakfast    piperacillin-tazobactam  3,375 mg IntraVENous Q8H    levoFLOXacin  500 mg Oral Daily    pantoprazole  40 mg Oral QAM AC    sodium chloride flush  5-40 mL IntraVENous 2 times per day    thiamine mononitrate  100 mg Oral Daily    folic acid  1 mg Oral Daily    multivitamin  1 tablet Oral Daily    calcium carbonate  500 mg Oral TID    balsum peru-castor oil   Topical BID     Continuous Infusions:    sodium chloride 25 mL (10/17/24

## 2024-10-22 NOTE — OP NOTE
Operative Note      Patient: Scott Gardiner  YOB: 1945  MRN: [id number]    Date of Procedure: 10/22/2024    Pre-Op Diagnosis Codes:      * Lung mass [R91.8]    Post-Op Diagnosis: Same       Procedure(s):  BRONCHOSCOPY    Surgeon(s):  Sudhakar Kaiser MD    Assistant:   * No surgical staff found *    Anesthesia: IV Sedation    Estimated Blood Loss (mL): Minimal    Complications: None    Specimens:   * No specimens in log *    Implants:  * No implants in log *      Drains:   External Urinary Catheter (Active)   Site Assessment Clean,dry & intact 10/22/24 0800   Placement Replaced 10/22/24 0200   Securement Method Securing device (Describe) 10/22/24 0200   Catheter Care Catheter/Wick replaced 10/22/24 0226   Perineal Care Yes 10/22/24 0200   Suction 40 mmgHg continuous 10/19/24 2030   Urine Color Katrin 10/19/24 2030   Urine Appearance Clear 10/19/24 2030   Output (mL) 450 mL 10/21/24 190       Detailed Description of Procedure:       Pulmonary, Critical Care, and Sleep Medicine    Name: Scott Gardiner MRN: [id number]   : 1945 Hospital: [facility]   Date: 10/22/2024        Bronchoscopy Report    Procedure: Diagnostic bronchoscopy.    Indication: Abnormal chest imaging, right lung masses. Suspected adenocarcinoma.     Consent/Treatment: Informed consent was obtained from the  patient after risks, benefits and alternatives were explained. Timeout verified the correct patient and correct procedure.     Anesthesia:   Topical sedation to nares, mouth, and tracheobronchial tree with lidocaine  Moderate sedation with Fentanyl 75 mcg and Versed 1.25 mg was used, 15 cc of 1% lidocaine.   Moderate (conscious) sedation was administered by the endoscopy nurse and supervised by the endoscopist.  The following parameters were monitored: oxygen saturation, heart rate, blood pressure, respiratory rate, EKG, adequacy of pulmonary ventilation, and response to care.  Total physician

## 2024-10-23 LAB
ANION GAP SERPL CALC-SCNC: 6 MMOL/L (ref 2–12)
BACTERIA SPEC CULT: NORMAL
BUN SERPL-MCNC: 25 MG/DL (ref 6–20)
BUN/CREAT SERPL: 35 (ref 12–20)
CALCIUM SERPL-MCNC: 9 MG/DL (ref 8.5–10.1)
CHLORIDE SERPL-SCNC: 104 MMOL/L (ref 97–108)
CO2 SERPL-SCNC: 26 MMOL/L (ref 21–32)
CREAT SERPL-MCNC: 0.71 MG/DL (ref 0.7–1.3)
CYTOLOGY-NON GYN: NORMAL
ERYTHROCYTE [DISTWIDTH] IN BLOOD BY AUTOMATED COUNT: 15.7 % (ref 11.5–14.5)
GLUCOSE SERPL-MCNC: 94 MG/DL (ref 65–100)
GRAM STN SPEC: NORMAL
HCT VFR BLD AUTO: 25.9 % (ref 36.6–50.3)
HGB BLD-MCNC: 8.2 G/DL (ref 12.1–17)
MAGNESIUM SERPL-MCNC: 1.9 MG/DL (ref 1.6–2.4)
MCH RBC QN AUTO: 29.4 PG (ref 26–34)
MCHC RBC AUTO-ENTMCNC: 31.7 G/DL (ref 30–36.5)
MCV RBC AUTO: 92.8 FL (ref 80–99)
NRBC # BLD: 0 K/UL (ref 0–0.01)
NRBC BLD-RTO: 0 PER 100 WBC
PHOSPHATE SERPL-MCNC: 4.3 MG/DL (ref 2.6–4.7)
PLATELET # BLD AUTO: 609 K/UL (ref 150–400)
PMV BLD AUTO: 10.2 FL (ref 8.9–12.9)
POTASSIUM SERPL-SCNC: 3.9 MMOL/L (ref 3.5–5.1)
RBC # BLD AUTO: 2.79 M/UL (ref 4.1–5.7)
SERVICE CMNT-IMP: NORMAL
SODIUM SERPL-SCNC: 136 MMOL/L (ref 136–145)
WBC # BLD AUTO: 13.3 K/UL (ref 4.1–11.1)

## 2024-10-23 PROCEDURE — 97530 THERAPEUTIC ACTIVITIES: CPT

## 2024-10-23 PROCEDURE — 80048 BASIC METABOLIC PNL TOTAL CA: CPT

## 2024-10-23 PROCEDURE — 2700000000 HC OXYGEN THERAPY PER DAY

## 2024-10-23 PROCEDURE — 6370000000 HC RX 637 (ALT 250 FOR IP): Performed by: INTERNAL MEDICINE

## 2024-10-23 PROCEDURE — 84100 ASSAY OF PHOSPHORUS: CPT

## 2024-10-23 PROCEDURE — 6370000000 HC RX 637 (ALT 250 FOR IP): Performed by: NURSE PRACTITIONER

## 2024-10-23 PROCEDURE — 1100000003 HC PRIVATE W/ TELEMETRY

## 2024-10-23 PROCEDURE — 6360000002 HC RX W HCPCS: Performed by: INTERNAL MEDICINE

## 2024-10-23 PROCEDURE — 36415 COLL VENOUS BLD VENIPUNCTURE: CPT

## 2024-10-23 PROCEDURE — 83735 ASSAY OF MAGNESIUM: CPT

## 2024-10-23 PROCEDURE — 2580000003 HC RX 258: Performed by: INTERNAL MEDICINE

## 2024-10-23 PROCEDURE — 2580000003 HC RX 258: Performed by: STUDENT IN AN ORGANIZED HEALTH CARE EDUCATION/TRAINING PROGRAM

## 2024-10-23 PROCEDURE — 85027 COMPLETE CBC AUTOMATED: CPT

## 2024-10-23 PROCEDURE — 6370000000 HC RX 637 (ALT 250 FOR IP): Performed by: STUDENT IN AN ORGANIZED HEALTH CARE EDUCATION/TRAINING PROGRAM

## 2024-10-23 PROCEDURE — 97535 SELF CARE MNGMENT TRAINING: CPT

## 2024-10-23 RX ADMIN — SODIUM CHLORIDE, PRESERVATIVE FREE 5 ML: 5 INJECTION INTRAVENOUS at 21:24

## 2024-10-23 RX ADMIN — SODIUM CHLORIDE, PRESERVATIVE FREE 10 ML: 5 INJECTION INTRAVENOUS at 08:17

## 2024-10-23 RX ADMIN — ACETAMINOPHEN 1000 MG: 500 TABLET ORAL at 08:17

## 2024-10-23 RX ADMIN — OXYCODONE 2.5 MG: 5 TABLET ORAL at 13:09

## 2024-10-23 RX ADMIN — FOLIC ACID 1 MG: 1 TABLET ORAL at 08:18

## 2024-10-23 RX ADMIN — LORAZEPAM 0.5 MG: 0.5 TABLET ORAL at 04:18

## 2024-10-23 RX ADMIN — PANTOPRAZOLE SODIUM 40 MG: 40 TABLET, DELAYED RELEASE ORAL at 06:28

## 2024-10-23 RX ADMIN — LORAZEPAM 0.5 MG: 0.5 TABLET ORAL at 21:25

## 2024-10-23 RX ADMIN — PIPERACILLIN AND TAZOBACTAM 3375 MG: 3; .375 INJECTION, POWDER, FOR SOLUTION INTRAVENOUS; PARENTERAL at 01:52

## 2024-10-23 RX ADMIN — ACETAMINOPHEN 1000 MG: 500 TABLET ORAL at 15:12

## 2024-10-23 RX ADMIN — Medication: at 21:27

## 2024-10-23 RX ADMIN — OXYCODONE 2.5 MG: 5 TABLET ORAL at 21:25

## 2024-10-23 RX ADMIN — CALCIUM CARBONATE 500 MG: 500 TABLET, CHEWABLE ORAL at 15:12

## 2024-10-23 RX ADMIN — Medication 1 CAPSULE: at 08:18

## 2024-10-23 RX ADMIN — Medication: at 08:19

## 2024-10-23 RX ADMIN — LEVOFLOXACIN 500 MG: 500 TABLET, FILM COATED ORAL at 08:18

## 2024-10-23 RX ADMIN — THERA TABS 1 TABLET: TAB at 08:18

## 2024-10-23 RX ADMIN — LORAZEPAM 0.5 MG: 0.5 TABLET ORAL at 13:10

## 2024-10-23 RX ADMIN — Medication 100 MG: at 08:18

## 2024-10-23 ASSESSMENT — PAIN DESCRIPTION - LOCATION
LOCATION: RIB CAGE
LOCATION: BACK

## 2024-10-23 ASSESSMENT — PAIN DESCRIPTION - DESCRIPTORS
DESCRIPTORS: ACHING
DESCRIPTORS: ACHING

## 2024-10-23 ASSESSMENT — PAIN DESCRIPTION - ORIENTATION
ORIENTATION: RIGHT
ORIENTATION: RIGHT;ANTERIOR;POSTERIOR

## 2024-10-23 ASSESSMENT — PAIN SCALES - GENERAL
PAINLEVEL_OUTOF10: 6
PAINLEVEL_OUTOF10: 0
PAINLEVEL_OUTOF10: 7

## 2024-10-23 NOTE — CARE COORDINATION
Transition of Care Plan:    RUR:  12%  Prior Level of Functioning: independent  Disposition: SNF - 1st choice [facility] swing bed  If SNF or IPR: Date FOC offered: 10/23/2024  Date FOC received: 10/23/2024  Accepting facility: pending  Date authorization started with reference number: will need [employer] auth  Date authorization received and expires:   Follow up appointments:   DME needed: per rehab  Transportation at discharge: likely BLS  IM/IMM Medicare/ letter given: will need prior to discharge  Is patient a  and connected with [facility]?    If yes, was [place] transfer form completed and [facility] notified?   Caregiver Contact: friend Dolores Sanchez [phone]   Sri stout [phone]  Discharge Caregiver contacted prior to discharge? Per patient  Care Conference needed?   Barriers to discharge: medical stability,placement    CM met with patient - he has been to [facility] Swing bed and mulitiple times informed CM that his first choice to try first- CM sent a referral to CM at [facility] via email- awaiting response.    If [facility] is not able to accept he is then agreeable to back up options of [facility] and [facility] - he does not want [facility].  CM will follow and patient will need insurance auth - if goes to [facility] swing bed will need to determine if they need to do authorization due to [facility] coding purposes.  Will follow. SANDRO HENRIQUEZ, MSW  [phone]

## 2024-10-24 ENCOUNTER — APPOINTMENT (OUTPATIENT)
Facility: HOSPITAL | Age: 79
DRG: 871 | End: 2024-10-24
Attending: STUDENT IN AN ORGANIZED HEALTH CARE EDUCATION/TRAINING PROGRAM
Payer: MEDICARE

## 2024-10-24 PROCEDURE — 2580000003 HC RX 258: Performed by: STUDENT IN AN ORGANIZED HEALTH CARE EDUCATION/TRAINING PROGRAM

## 2024-10-24 PROCEDURE — 6370000000 HC RX 637 (ALT 250 FOR IP): Performed by: STUDENT IN AN ORGANIZED HEALTH CARE EDUCATION/TRAINING PROGRAM

## 2024-10-24 PROCEDURE — 6370000000 HC RX 637 (ALT 250 FOR IP): Performed by: NURSE PRACTITIONER

## 2024-10-24 PROCEDURE — 1100000003 HC PRIVATE W/ TELEMETRY

## 2024-10-24 PROCEDURE — 6370000000 HC RX 637 (ALT 250 FOR IP): Performed by: INTERNAL MEDICINE

## 2024-10-24 PROCEDURE — 76604 US EXAM CHEST: CPT

## 2024-10-24 RX ADMIN — PANTOPRAZOLE SODIUM 40 MG: 40 TABLET, DELAYED RELEASE ORAL at 05:40

## 2024-10-24 RX ADMIN — ACETAMINOPHEN 1000 MG: 500 TABLET ORAL at 14:24

## 2024-10-24 RX ADMIN — OXYCODONE 2.5 MG: 5 TABLET ORAL at 14:23

## 2024-10-24 RX ADMIN — Medication 1 CAPSULE: at 08:42

## 2024-10-24 RX ADMIN — CALCIUM CARBONATE 500 MG: 500 TABLET, CHEWABLE ORAL at 14:24

## 2024-10-24 RX ADMIN — LEVOFLOXACIN 500 MG: 500 TABLET, FILM COATED ORAL at 08:43

## 2024-10-24 RX ADMIN — Medication 100 MG: at 08:42

## 2024-10-24 RX ADMIN — FOLIC ACID 1 MG: 1 TABLET ORAL at 08:43

## 2024-10-24 RX ADMIN — SODIUM CHLORIDE, PRESERVATIVE FREE 10 ML: 5 INJECTION INTRAVENOUS at 08:41

## 2024-10-24 RX ADMIN — Medication: at 08:46

## 2024-10-24 RX ADMIN — ACETAMINOPHEN 1000 MG: 500 TABLET ORAL at 20:47

## 2024-10-24 RX ADMIN — OXYCODONE 2.5 MG: 5 TABLET ORAL at 23:51

## 2024-10-24 RX ADMIN — SODIUM CHLORIDE, PRESERVATIVE FREE 10 ML: 5 INJECTION INTRAVENOUS at 20:47

## 2024-10-24 RX ADMIN — THERA TABS 1 TABLET: TAB at 08:43

## 2024-10-24 RX ADMIN — CALCIUM CARBONATE 500 MG: 500 TABLET, CHEWABLE ORAL at 08:42

## 2024-10-24 RX ADMIN — LORAZEPAM 0.5 MG: 0.5 TABLET ORAL at 20:48

## 2024-10-24 RX ADMIN — ACETAMINOPHEN 1000 MG: 500 TABLET ORAL at 08:42

## 2024-10-24 RX ADMIN — LORAZEPAM 0.5 MG: 0.5 TABLET ORAL at 05:40

## 2024-10-24 RX ADMIN — CALCIUM CARBONATE 500 MG: 500 TABLET, CHEWABLE ORAL at 20:47

## 2024-10-24 ASSESSMENT — PAIN DESCRIPTION - PAIN TYPE: TYPE: ACUTE PAIN

## 2024-10-24 ASSESSMENT — PAIN DESCRIPTION - LOCATION
LOCATION: FOOT
LOCATION: BACK
LOCATION: ABDOMEN

## 2024-10-24 ASSESSMENT — PAIN DESCRIPTION - DESCRIPTORS
DESCRIPTORS: ACHING

## 2024-10-24 ASSESSMENT — PAIN SCALES - GENERAL
PAINLEVEL_OUTOF10: 6
PAINLEVEL_OUTOF10: 7
PAINLEVEL_OUTOF10: 3

## 2024-10-24 ASSESSMENT — PAIN DESCRIPTION - ORIENTATION
ORIENTATION: RIGHT
ORIENTATION: LEFT
ORIENTATION: RIGHT

## 2024-10-25 LAB
ACID FAST STN SPEC: NEGATIVE
MYCOBACTERIUM SPEC QL CULT: NORMAL
SPECIMEN PREPARATION: NORMAL
SPECIMEN SOURCE: NORMAL

## 2024-10-25 PROCEDURE — 97530 THERAPEUTIC ACTIVITIES: CPT | Performed by: PHYSICAL THERAPIST

## 2024-10-25 PROCEDURE — 6370000000 HC RX 637 (ALT 250 FOR IP): Performed by: INTERNAL MEDICINE

## 2024-10-25 PROCEDURE — 6370000000 HC RX 637 (ALT 250 FOR IP): Performed by: NURSE PRACTITIONER

## 2024-10-25 PROCEDURE — 1100000003 HC PRIVATE W/ TELEMETRY

## 2024-10-25 PROCEDURE — 97116 GAIT TRAINING THERAPY: CPT | Performed by: PHYSICAL THERAPIST

## 2024-10-25 PROCEDURE — 6370000000 HC RX 637 (ALT 250 FOR IP): Performed by: STUDENT IN AN ORGANIZED HEALTH CARE EDUCATION/TRAINING PROGRAM

## 2024-10-25 PROCEDURE — 2580000003 HC RX 258: Performed by: STUDENT IN AN ORGANIZED HEALTH CARE EDUCATION/TRAINING PROGRAM

## 2024-10-25 RX ADMIN — OXYCODONE 2.5 MG: 5 TABLET ORAL at 19:41

## 2024-10-25 RX ADMIN — LORAZEPAM 0.5 MG: 0.5 TABLET ORAL at 11:45

## 2024-10-25 RX ADMIN — CALCIUM CARBONATE 500 MG: 500 TABLET, CHEWABLE ORAL at 19:42

## 2024-10-25 RX ADMIN — FOLIC ACID 1 MG: 1 TABLET ORAL at 08:30

## 2024-10-25 RX ADMIN — Medication 1 CAPSULE: at 08:30

## 2024-10-25 RX ADMIN — THERA TABS 1 TABLET: TAB at 08:30

## 2024-10-25 RX ADMIN — ACETAMINOPHEN 1000 MG: 500 TABLET ORAL at 08:30

## 2024-10-25 RX ADMIN — LEVOFLOXACIN 500 MG: 500 TABLET, FILM COATED ORAL at 08:30

## 2024-10-25 RX ADMIN — PANTOPRAZOLE SODIUM 40 MG: 40 TABLET, DELAYED RELEASE ORAL at 05:19

## 2024-10-25 RX ADMIN — CALCIUM CARBONATE 500 MG: 500 TABLET, CHEWABLE ORAL at 17:05

## 2024-10-25 RX ADMIN — CALCIUM CARBONATE 500 MG: 500 TABLET, CHEWABLE ORAL at 08:30

## 2024-10-25 RX ADMIN — Medication 100 MG: at 08:30

## 2024-10-25 RX ADMIN — ACETAMINOPHEN 1000 MG: 500 TABLET ORAL at 17:05

## 2024-10-25 RX ADMIN — SODIUM CHLORIDE, PRESERVATIVE FREE 10 ML: 5 INJECTION INTRAVENOUS at 19:42

## 2024-10-25 RX ADMIN — SODIUM CHLORIDE, PRESERVATIVE FREE 10 ML: 5 INJECTION INTRAVENOUS at 08:31

## 2024-10-25 ASSESSMENT — PAIN SCALES - GENERAL
PAINLEVEL_OUTOF10: 6
PAINLEVEL_OUTOF10: 6

## 2024-10-25 ASSESSMENT — PAIN DESCRIPTION - DESCRIPTORS
DESCRIPTORS: ACHING
DESCRIPTORS: ACHING

## 2024-10-25 ASSESSMENT — PAIN DESCRIPTION - ORIENTATION
ORIENTATION: RIGHT
ORIENTATION: RIGHT

## 2024-10-25 ASSESSMENT — PAIN DESCRIPTION - LOCATION
LOCATION: BACK
LOCATION: BACK

## 2024-10-25 NOTE — CARE COORDINATION
Transition of Care Plan:     RUR:  12%  Prior Level of Functioning: independent  Disposition: SNF - 1st choice [facility] swing bed  DENIA: 10/28  If SNF or IPR: Date FOC offered: 10/23/2024  Date FOC received: 10/23/2024  Date authorization started with reference number: will need [employer] auth  Date authorization received and expires:   Transportation at discharge: BLS  IM/IMM Medicare/[employer] letter given: to be provided   Caregiver Contact: friend Dolores Sanchez [phone]   Sri stout [phone]  Discharge Caregiver contacted prior to discharge? To be contacted upon pt request   Care Conference needed? Not at this time   Barriers to discharge: medical stability, placement, auth       [facility] swing bed has declined as patient is more appropriate for SNF level of care. Referrals sent to patient's 2nd choices, [facility] and [facility].       SERGIO Smith, CM  [phone]

## 2024-10-26 PROCEDURE — 6370000000 HC RX 637 (ALT 250 FOR IP): Performed by: STUDENT IN AN ORGANIZED HEALTH CARE EDUCATION/TRAINING PROGRAM

## 2024-10-26 PROCEDURE — 6370000000 HC RX 637 (ALT 250 FOR IP): Performed by: INTERNAL MEDICINE

## 2024-10-26 PROCEDURE — 1100000003 HC PRIVATE W/ TELEMETRY

## 2024-10-26 PROCEDURE — 6370000000 HC RX 637 (ALT 250 FOR IP): Performed by: NURSE PRACTITIONER

## 2024-10-26 PROCEDURE — 2580000003 HC RX 258: Performed by: STUDENT IN AN ORGANIZED HEALTH CARE EDUCATION/TRAINING PROGRAM

## 2024-10-26 PROCEDURE — 2700000000 HC OXYGEN THERAPY PER DAY

## 2024-10-26 RX ADMIN — CALCIUM CARBONATE 500 MG: 500 TABLET, CHEWABLE ORAL at 15:32

## 2024-10-26 RX ADMIN — LORAZEPAM 0.5 MG: 0.5 TABLET ORAL at 12:27

## 2024-10-26 RX ADMIN — SODIUM CHLORIDE, PRESERVATIVE FREE 10 ML: 5 INJECTION INTRAVENOUS at 20:21

## 2024-10-26 RX ADMIN — THERA TABS 1 TABLET: TAB at 12:26

## 2024-10-26 RX ADMIN — CALCIUM CARBONATE 500 MG: 500 TABLET, CHEWABLE ORAL at 20:18

## 2024-10-26 RX ADMIN — CALCIUM CARBONATE 500 MG: 500 TABLET, CHEWABLE ORAL at 12:24

## 2024-10-26 RX ADMIN — PANTOPRAZOLE SODIUM 40 MG: 40 TABLET, DELAYED RELEASE ORAL at 12:24

## 2024-10-26 RX ADMIN — ACETAMINOPHEN 1000 MG: 500 TABLET ORAL at 15:32

## 2024-10-26 RX ADMIN — Medication 1 CAPSULE: at 12:24

## 2024-10-26 RX ADMIN — ACETAMINOPHEN 1000 MG: 500 TABLET ORAL at 12:26

## 2024-10-26 RX ADMIN — LORAZEPAM 0.5 MG: 0.5 TABLET ORAL at 21:54

## 2024-10-26 RX ADMIN — ACETAMINOPHEN 1000 MG: 500 TABLET ORAL at 20:18

## 2024-10-26 RX ADMIN — PANTOPRAZOLE SODIUM 40 MG: 40 TABLET, DELAYED RELEASE ORAL at 05:41

## 2024-10-26 RX ADMIN — Medication 100 MG: at 12:24

## 2024-10-26 RX ADMIN — SODIUM CHLORIDE, PRESERVATIVE FREE 10 ML: 5 INJECTION INTRAVENOUS at 12:27

## 2024-10-26 RX ADMIN — LORAZEPAM 0.5 MG: 0.5 TABLET ORAL at 01:05

## 2024-10-27 PROCEDURE — 6370000000 HC RX 637 (ALT 250 FOR IP): Performed by: INTERNAL MEDICINE

## 2024-10-27 PROCEDURE — 1100000003 HC PRIVATE W/ TELEMETRY

## 2024-10-27 PROCEDURE — 2700000000 HC OXYGEN THERAPY PER DAY

## 2024-10-27 PROCEDURE — 6370000000 HC RX 637 (ALT 250 FOR IP): Performed by: NURSE PRACTITIONER

## 2024-10-27 PROCEDURE — 6370000000 HC RX 637 (ALT 250 FOR IP): Performed by: STUDENT IN AN ORGANIZED HEALTH CARE EDUCATION/TRAINING PROGRAM

## 2024-10-27 PROCEDURE — 2580000003 HC RX 258: Performed by: STUDENT IN AN ORGANIZED HEALTH CARE EDUCATION/TRAINING PROGRAM

## 2024-10-27 RX ADMIN — LORAZEPAM 0.5 MG: 0.5 TABLET ORAL at 23:51

## 2024-10-27 RX ADMIN — ACETAMINOPHEN 1000 MG: 500 TABLET ORAL at 09:15

## 2024-10-27 RX ADMIN — CALCIUM CARBONATE 500 MG: 500 TABLET, CHEWABLE ORAL at 20:23

## 2024-10-27 RX ADMIN — LORAZEPAM 0.5 MG: 0.5 TABLET ORAL at 11:57

## 2024-10-27 RX ADMIN — OXYCODONE 2.5 MG: 5 TABLET ORAL at 01:37

## 2024-10-27 RX ADMIN — ACETAMINOPHEN 1000 MG: 500 TABLET ORAL at 15:52

## 2024-10-27 RX ADMIN — OXYCODONE 2.5 MG: 5 TABLET ORAL at 15:52

## 2024-10-27 RX ADMIN — CALCIUM CARBONATE 500 MG: 500 TABLET, CHEWABLE ORAL at 15:52

## 2024-10-27 RX ADMIN — PANTOPRAZOLE SODIUM 40 MG: 40 TABLET, DELAYED RELEASE ORAL at 06:44

## 2024-10-27 RX ADMIN — CALCIUM CARBONATE 500 MG: 500 TABLET, CHEWABLE ORAL at 09:15

## 2024-10-27 RX ADMIN — ACETAMINOPHEN 1000 MG: 500 TABLET ORAL at 20:23

## 2024-10-27 RX ADMIN — THERA TABS 1 TABLET: TAB at 09:15

## 2024-10-27 RX ADMIN — Medication 100 MG: at 09:16

## 2024-10-27 RX ADMIN — Medication 1 CAPSULE: at 09:15

## 2024-10-27 RX ADMIN — SODIUM CHLORIDE, PRESERVATIVE FREE 10 ML: 5 INJECTION INTRAVENOUS at 20:23

## 2024-10-27 RX ADMIN — FOLIC ACID 1 MG: 1 TABLET ORAL at 09:15

## 2024-10-27 RX ADMIN — SODIUM CHLORIDE, PRESERVATIVE FREE 10 ML: 5 INJECTION INTRAVENOUS at 09:16

## 2024-10-27 ASSESSMENT — PAIN DESCRIPTION - ORIENTATION: ORIENTATION: RIGHT

## 2024-10-27 ASSESSMENT — PAIN DESCRIPTION - LOCATION: LOCATION: ARM

## 2024-10-27 ASSESSMENT — PAIN DESCRIPTION - DESCRIPTORS: DESCRIPTORS: ACHING

## 2024-10-27 ASSESSMENT — PAIN SCALES - GENERAL
PAINLEVEL_OUTOF10: 8
PAINLEVEL_OUTOF10: 7

## 2024-10-28 PROCEDURE — 6370000000 HC RX 637 (ALT 250 FOR IP): Performed by: INTERNAL MEDICINE

## 2024-10-28 PROCEDURE — 6370000000 HC RX 637 (ALT 250 FOR IP): Performed by: NURSE PRACTITIONER

## 2024-10-28 PROCEDURE — 6370000000 HC RX 637 (ALT 250 FOR IP): Performed by: STUDENT IN AN ORGANIZED HEALTH CARE EDUCATION/TRAINING PROGRAM

## 2024-10-28 PROCEDURE — 1100000003 HC PRIVATE W/ TELEMETRY

## 2024-10-28 PROCEDURE — 97535 SELF CARE MNGMENT TRAINING: CPT

## 2024-10-28 PROCEDURE — 2700000000 HC OXYGEN THERAPY PER DAY

## 2024-10-28 PROCEDURE — 2580000003 HC RX 258: Performed by: STUDENT IN AN ORGANIZED HEALTH CARE EDUCATION/TRAINING PROGRAM

## 2024-10-28 RX ADMIN — THERA TABS 1 TABLET: TAB at 08:43

## 2024-10-28 RX ADMIN — SODIUM CHLORIDE, PRESERVATIVE FREE 10 ML: 5 INJECTION INTRAVENOUS at 21:20

## 2024-10-28 RX ADMIN — CALCIUM CARBONATE 500 MG: 500 TABLET, CHEWABLE ORAL at 15:24

## 2024-10-28 RX ADMIN — CALCIUM CARBONATE 500 MG: 500 TABLET, CHEWABLE ORAL at 08:43

## 2024-10-28 RX ADMIN — LORAZEPAM 0.5 MG: 0.5 TABLET ORAL at 21:17

## 2024-10-28 RX ADMIN — ACETAMINOPHEN 1000 MG: 500 TABLET ORAL at 15:23

## 2024-10-28 RX ADMIN — SODIUM CHLORIDE, PRESERVATIVE FREE 10 ML: 5 INJECTION INTRAVENOUS at 08:43

## 2024-10-28 RX ADMIN — FOLIC ACID 1 MG: 1 TABLET ORAL at 08:43

## 2024-10-28 RX ADMIN — PANTOPRAZOLE SODIUM 40 MG: 40 TABLET, DELAYED RELEASE ORAL at 05:59

## 2024-10-28 RX ADMIN — CALCIUM CARBONATE 500 MG: 500 TABLET, CHEWABLE ORAL at 21:17

## 2024-10-28 RX ADMIN — Medication: at 14:15

## 2024-10-28 RX ADMIN — Medication 100 MG: at 08:43

## 2024-10-28 RX ADMIN — Medication 1 CAPSULE: at 08:43

## 2024-10-28 RX ADMIN — ACETAMINOPHEN 1000 MG: 500 TABLET ORAL at 21:17

## 2024-10-28 RX ADMIN — ACETAMINOPHEN 1000 MG: 500 TABLET ORAL at 08:43

## 2024-10-28 RX ADMIN — LORAZEPAM 0.5 MG: 0.5 TABLET ORAL at 10:26

## 2024-10-28 ASSESSMENT — PAIN SCALES - GENERAL: PAINLEVEL_OUTOF10: 7

## 2024-10-28 NOTE — CARE COORDINATION
Transition of Care Plan:     RUR:  12%  Prior Level of Functioning: independent  Disposition: SNF - 1st choice [facility] swing bed  DENIA: 10/28  If SNF or IPR: Date FOC offered: 10/23/2024  Date FOC received: 10/23/2024  Date authorization started with reference number: will need [employer] auth  Date authorization received and expires:   Transportation at discharge: BLS  IM/IMM Medicare/ letter given: to be provided   Caregiver Contact: friend Dolores Daniel [phone]   Sri Abdifatah Klineon girish [phone]  Discharge Caregiver contacted prior to discharge? To be contacted upon pt request   Care Conference needed? Not at this time   Barriers to discharge: medical stability, placement, auth       [facility] and [facility] both accepted patient. CM made room visit with patient who is requesting [employer] [employer] [facility]. CM sent message to [facility] who reported they anticipate having a bed for patient tomorrow. CM to initiate insurance auth once updated therapy notes are in chart.       SERGIO Smith, CM  [phone]

## 2024-10-29 LAB
BACTERIA SPEC CULT: NORMAL
BACTERIA SPEC CULT: NORMAL
SERVICE CMNT-IMP: NORMAL

## 2024-10-29 PROCEDURE — 97535 SELF CARE MNGMENT TRAINING: CPT

## 2024-10-29 PROCEDURE — 97530 THERAPEUTIC ACTIVITIES: CPT | Performed by: PHYSICAL THERAPIST

## 2024-10-29 PROCEDURE — 6370000000 HC RX 637 (ALT 250 FOR IP): Performed by: STUDENT IN AN ORGANIZED HEALTH CARE EDUCATION/TRAINING PROGRAM

## 2024-10-29 PROCEDURE — 2580000003 HC RX 258: Performed by: STUDENT IN AN ORGANIZED HEALTH CARE EDUCATION/TRAINING PROGRAM

## 2024-10-29 PROCEDURE — 6370000000 HC RX 637 (ALT 250 FOR IP): Performed by: INTERNAL MEDICINE

## 2024-10-29 PROCEDURE — 6370000000 HC RX 637 (ALT 250 FOR IP): Performed by: NURSE PRACTITIONER

## 2024-10-29 PROCEDURE — 97530 THERAPEUTIC ACTIVITIES: CPT

## 2024-10-29 PROCEDURE — 1100000003 HC PRIVATE W/ TELEMETRY

## 2024-10-29 RX ADMIN — PANTOPRAZOLE SODIUM 40 MG: 40 TABLET, DELAYED RELEASE ORAL at 06:20

## 2024-10-29 RX ADMIN — ACETAMINOPHEN 1000 MG: 500 TABLET ORAL at 08:14

## 2024-10-29 RX ADMIN — Medication 100 MG: at 08:14

## 2024-10-29 RX ADMIN — OXYCODONE 2.5 MG: 5 TABLET ORAL at 13:40

## 2024-10-29 RX ADMIN — LORAZEPAM 0.5 MG: 0.5 TABLET ORAL at 21:31

## 2024-10-29 RX ADMIN — SODIUM CHLORIDE, PRESERVATIVE FREE 10 ML: 5 INJECTION INTRAVENOUS at 20:37

## 2024-10-29 RX ADMIN — ACETAMINOPHEN 1000 MG: 500 TABLET ORAL at 20:36

## 2024-10-29 RX ADMIN — CALCIUM CARBONATE 500 MG: 500 TABLET, CHEWABLE ORAL at 08:14

## 2024-10-29 RX ADMIN — CALCIUM CARBONATE 500 MG: 500 TABLET, CHEWABLE ORAL at 13:40

## 2024-10-29 RX ADMIN — CALCIUM CARBONATE 500 MG: 500 TABLET, CHEWABLE ORAL at 20:36

## 2024-10-29 RX ADMIN — OXYCODONE 2.5 MG: 5 TABLET ORAL at 00:00

## 2024-10-29 RX ADMIN — LORAZEPAM 0.5 MG: 0.5 TABLET ORAL at 12:54

## 2024-10-29 RX ADMIN — THERA TABS 1 TABLET: TAB at 08:15

## 2024-10-29 RX ADMIN — FOLIC ACID 1 MG: 1 TABLET ORAL at 08:14

## 2024-10-29 RX ADMIN — ACETAMINOPHEN 1000 MG: 500 TABLET ORAL at 13:40

## 2024-10-29 RX ADMIN — OXYCODONE 2.5 MG: 5 TABLET ORAL at 23:37

## 2024-10-29 RX ADMIN — Medication 1 CAPSULE: at 08:15

## 2024-10-29 ASSESSMENT — PAIN SCALES - GENERAL
PAINLEVEL_OUTOF10: 0
PAINLEVEL_OUTOF10: 4
PAINLEVEL_OUTOF10: 0
PAINLEVEL_OUTOF10: 6
PAINLEVEL_OUTOF10: 2
PAINLEVEL_OUTOF10: 7
PAINLEVEL_OUTOF10: 3

## 2024-10-29 ASSESSMENT — PAIN DESCRIPTION - DESCRIPTORS
DESCRIPTORS: SHOOTING
DESCRIPTORS: ACHING

## 2024-10-29 ASSESSMENT — PAIN DESCRIPTION - ORIENTATION
ORIENTATION: ANTERIOR
ORIENTATION: RIGHT

## 2024-10-29 ASSESSMENT — PAIN DESCRIPTION - LOCATION
LOCATION: LEG
LOCATION: SHOULDER

## 2024-10-29 ASSESSMENT — PAIN SCALES - WONG BAKER
WONGBAKER_NUMERICALRESPONSE: NO HURT
WONGBAKER_NUMERICALRESPONSE: NO HURT

## 2024-10-29 NOTE — CARE COORDINATION
Transition of Care Plan:     RUR:  7%  Prior Level of Functioning: independent  Disposition: SNF - [facility]   DENIA: 10/30  If SNF or IPR: Date FOC offered: 10/23/2024  Date FOC received: 10/23/2024  Date authorization started with reference number: 10/29/24  Date authorization received and expires:   Transportation at discharge: BLS  IM/IMM Medicare/ letter given: to be provided   Caregiver Contact: friend Dolores Sanchez [phone]   Sri Abdifatah stout [phone]  Discharge Caregiver contacted prior to discharge? To be contacted upon pt request   Care Conference needed? Not at this time   Barriers to discharge: medical stability, placement, auth       2:40pm  [employer] requested updated MD note. MARCELINO has faxed MD note from today.       10:00am  Insurance auth initiated.       SERGIO Smith, MARCELINO  [phone]

## 2024-10-30 VITALS
HEIGHT: 66 IN | SYSTOLIC BLOOD PRESSURE: 101 MMHG | RESPIRATION RATE: 25 BRPM | HEART RATE: 98 BPM | DIASTOLIC BLOOD PRESSURE: 56 MMHG | BODY MASS INDEX: 16.48 KG/M2 | OXYGEN SATURATION: 93 % | WEIGHT: 102.51 LBS | TEMPERATURE: 97.3 F

## 2024-10-30 PROCEDURE — 6370000000 HC RX 637 (ALT 250 FOR IP): Performed by: INTERNAL MEDICINE

## 2024-10-30 PROCEDURE — 6370000000 HC RX 637 (ALT 250 FOR IP): Performed by: STUDENT IN AN ORGANIZED HEALTH CARE EDUCATION/TRAINING PROGRAM

## 2024-10-30 PROCEDURE — 2700000000 HC OXYGEN THERAPY PER DAY

## 2024-10-30 PROCEDURE — 97116 GAIT TRAINING THERAPY: CPT

## 2024-10-30 PROCEDURE — 2580000003 HC RX 258: Performed by: STUDENT IN AN ORGANIZED HEALTH CARE EDUCATION/TRAINING PROGRAM

## 2024-10-30 PROCEDURE — 6370000000 HC RX 637 (ALT 250 FOR IP): Performed by: NURSE PRACTITIONER

## 2024-10-30 PROCEDURE — 97530 THERAPEUTIC ACTIVITIES: CPT

## 2024-10-30 RX ORDER — FOLIC ACID 1 MG/1
1 TABLET ORAL DAILY
Qty: 30 TABLET | Refills: 3 | Status: SHIPPED | OUTPATIENT
Start: 2024-10-31

## 2024-10-30 RX ORDER — THIAMINE MONONITRATE (VIT B1) 100 MG
100 TABLET ORAL DAILY
Qty: 30 TABLET | Refills: 0 | Status: SHIPPED | OUTPATIENT
Start: 2024-10-31 | End: 2024-11-30

## 2024-10-30 RX ORDER — MULTIVITAMIN WITH IRON
1 TABLET ORAL DAILY
Qty: 30 TABLET | Refills: 0 | Status: SHIPPED | OUTPATIENT
Start: 2024-10-31 | End: 2024-11-30

## 2024-10-30 RX ADMIN — CALCIUM CARBONATE 500 MG: 500 TABLET, CHEWABLE ORAL at 08:07

## 2024-10-30 RX ADMIN — LORAZEPAM 0.5 MG: 0.5 TABLET ORAL at 08:07

## 2024-10-30 RX ADMIN — ACETAMINOPHEN 1000 MG: 500 TABLET ORAL at 14:04

## 2024-10-30 RX ADMIN — SODIUM CHLORIDE, PRESERVATIVE FREE 10 ML: 5 INJECTION INTRAVENOUS at 08:08

## 2024-10-30 RX ADMIN — ACETAMINOPHEN 1000 MG: 500 TABLET ORAL at 08:07

## 2024-10-30 RX ADMIN — PANTOPRAZOLE SODIUM 40 MG: 40 TABLET, DELAYED RELEASE ORAL at 05:15

## 2024-10-30 RX ADMIN — Medication 1 CAPSULE: at 08:07

## 2024-10-30 RX ADMIN — OXYCODONE 2.5 MG: 5 TABLET ORAL at 14:04

## 2024-10-30 RX ADMIN — FOLIC ACID 1 MG: 1 TABLET ORAL at 08:07

## 2024-10-30 RX ADMIN — CALCIUM CARBONATE 500 MG: 500 TABLET, CHEWABLE ORAL at 14:04

## 2024-10-30 RX ADMIN — THERA TABS 1 TABLET: TAB at 08:07

## 2024-10-30 RX ADMIN — Medication 100 MG: at 08:07

## 2024-10-30 ASSESSMENT — PAIN SCALES - GENERAL
PAINLEVEL_OUTOF10: 7
PAINLEVEL_OUTOF10: 0
PAINLEVEL_OUTOF10: 7
PAINLEVEL_OUTOF10: 3

## 2024-10-30 NOTE — PLAN OF CARE
72684: Bedside and Verbal shift change report given to NIKOLAI Porter (oncoming nurse) by NIKOLAI Cartagena (offgoing nurse). Report included the following information Nurse Handoff Report and Index.   1500: Prior nurse left and this nurse will take over for the rest of the shift.   1620: Respiratory therapist notified that patient is wheezing and needs a PRN breathing treatment, awaiting arrival.  1800: Patient moved to chair to eat dinner.  1818: Provider made aware of patient having tremors and tachycardia after breathing treatment, orders received. PRN breathing treatment was changed, no orders given for HR.  1845: Provider made aware of patient having anxiety due to symptoms from breathing treatment. Orders received to give one time dose of ativan 1mg.   1900: Bedside and Verbal shift change report given to NIKOLAI Genao (oncoming nurse) by NIKOLAI Porter (offgoing nurse). Report included the following information Nurse Handoff Report and Index.       Problem: Chronic Conditions and Co-morbidities  Goal: Patient's chronic conditions and co-morbidity symptoms are monitored and maintained or improved  10/12/2024 1529 by Charlotte Funes RN  Outcome: Progressing  10/12/2024 1403 by Mitzi Wick RN  Outcome: Progressing     Problem: Discharge Planning  Goal: Discharge to home or other facility with appropriate resources  10/12/2024 1529 by Charlotte Funes RN  Outcome: Progressing  10/12/2024 1403 by Mitzi Wick RN  Outcome: Progressing  10/12/2024 0330 by Sandra Galeana RN  Outcome: Progressing     Problem: Safety - Adult  Goal: Free from fall injury  10/12/2024 1529 by Charlotte Funes RN  Outcome: Progressing  10/12/2024 1403 by Mitzi Wick RN  Outcome: Progressing  Flowsheets (Taken 10/12/2024 0800)  Free From Fall Injury: Instruct family/caregiver on patient safety  10/12/2024 0330 by Sandra Galeana RN  Outcome: Progressing     Problem: Pain  Goal: Verbalizes/displays adequate comfort level or baseline comfort 
  Problem: Chronic Conditions and Co-morbidities  Goal: Patient's chronic conditions and co-morbidity symptoms are monitored and maintained or improved  10/26/2024 0736 by Mica Hernandez RN  Outcome: Progressing  10/25/2024 1947 by Kosta Gamboa RN  Outcome: Progressing     Problem: Discharge Planning  Goal: Discharge to home or other facility with appropriate resources  10/26/2024 0736 by Mica Hernandez RN  Outcome: Progressing  10/25/2024 1947 by Kosta Gamboa RN  Outcome: Progressing     Problem: Safety - Adult  Goal: Free from fall injury  10/26/2024 0736 by Mica Hernandez RN  Outcome: Progressing  10/25/2024 1947 by Kosta Gamboa RN  Outcome: Progressing     Problem: Pain  Goal: Verbalizes/displays adequate comfort level or baseline comfort level  10/26/2024 0736 by Mica Hernandez RN  Outcome: Progressing  10/25/2024 1947 by Kosta Gamboa RN  Outcome: Progressing     Problem: Skin/Tissue Integrity  Goal: Absence of new skin breakdown  Description: 1.  Monitor for areas of redness and/or skin breakdown  2.  Assess vascular access sites hourly  3.  Every 4-6 hours minimum:  Change oxygen saturation probe site  4.  Every 4-6 hours:  If on nasal continuous positive airway pressure, respiratory therapy assess nares and determine need for appliance change or resting period.  10/26/2024 0736 by Mica Hernandez RN  Outcome: Progressing  10/25/2024 1947 by Kosta Gamboa RN  Outcome: Progressing     Problem: Nutrition Deficit:  Goal: Optimize nutritional status  Outcome: Progressing     Problem: ABCDS Injury Assessment  Goal: Absence of physical injury  Outcome: Progressing     Problem: Neurosensory - Adult  Goal: Achieves stable or improved neurological status  Outcome: Progressing  Goal: Achieves maximal functionality and self care  Outcome: Progressing     Problem: Respiratory - Adult  Goal: Achieves optimal ventilation and oxygenation  Outcome: Progressing     Problem: Cardiovascular - Adult  Goal: Maintains 
  Problem: Chronic Conditions and Co-morbidities  Goal: Patient's chronic conditions and co-morbidity symptoms are monitored and maintained or improved  10/27/2024 0730 by Mica Hernandez, RN  Outcome: Progressing  10/26/2024 2229 by Agustina Sanchez, RN  Outcome: Progressing     
  Problem: Chronic Conditions and Co-morbidities  Goal: Patient's chronic conditions and co-morbidity symptoms are monitored and maintained or improved  10/27/2024 1952 by Agustina Sanchez RN  Outcome: Progressing  10/27/2024 0730 by Mica Hernandez RN  Outcome: Progressing     Problem: Discharge Planning  Goal: Discharge to home or other facility with appropriate resources  10/27/2024 1952 by Agustina Sanchez RN  Outcome: Progressing  10/27/2024 0730 by Mica Hernandez RN  Outcome: Progressing     Problem: Safety - Adult  Goal: Free from fall injury  10/27/2024 1952 by Agustina Sanchez RN  Outcome: Progressing  10/27/2024 0730 by Mica Hernandez RN  Outcome: Progressing     Problem: Pain  Goal: Verbalizes/displays adequate comfort level or baseline comfort level  10/27/2024 1952 by Agustina Sanchez RN  Outcome: Progressing  10/27/2024 0730 by Mica Hernandez RN  Outcome: Progressing     Problem: Skin/Tissue Integrity  Goal: Absence of new skin breakdown  Description: 1.  Monitor for areas of redness and/or skin breakdown  2.  Assess vascular access sites hourly  3.  Every 4-6 hours minimum:  Change oxygen saturation probe site  4.  Every 4-6 hours:  If on nasal continuous positive airway pressure, respiratory therapy assess nares and determine need for appliance change or resting period.  10/27/2024 1952 by Agustina Sanchez RN  Outcome: Progressing  10/27/2024 0730 by Mica Hernandez RN  Outcome: Progressing     Problem: Nutrition Deficit:  Goal: Optimize nutritional status  10/27/2024 1952 by Agustina Sanchez RN  Outcome: Progressing  10/27/2024 0730 by Mica Hernandez RN  Outcome: Progressing     Problem: ABCDS Injury Assessment  Goal: Absence of physical injury  10/27/2024 1952 by Agustina Sanchez RN  Outcome: Progressing  10/27/2024 0730 by Mica Hernandez RN  Outcome: Progressing     Problem: Neurosensory - Adult  Goal: Achieves stable or improved neurological status  10/27/2024 1952 by Agustina Sanchez 
  Problem: Chronic Conditions and Co-morbidities  Goal: Patient's chronic conditions and co-morbidity symptoms are monitored and maintained or improved  10/30/2024 1035 by Hayley Sotomayor RN  Outcome: Progressing  10/29/2024 2258 by Nikki Jones RN  Outcome: Progressing     Problem: Discharge Planning  Goal: Discharge to home or other facility with appropriate resources  10/30/2024 1035 by Hayley Sotomayor RN  Outcome: Progressing  10/29/2024 2258 by Nikki Jones RN  Outcome: Progressing     Problem: Safety - Adult  Goal: Free from fall injury  10/30/2024 1035 by Hayley Sotomayor RN  Outcome: Progressing  10/29/2024 2258 by Nikki Jones RN  Outcome: Progressing     Problem: Pain  Goal: Verbalizes/displays adequate comfort level or baseline comfort level  10/30/2024 1035 by Hayley Sotomayor RN  Outcome: Progressing  10/29/2024 2258 by Nikki Jones RN  Outcome: Progressing     Problem: Skin/Tissue Integrity  Goal: Absence of new skin breakdown  Description: 1.  Monitor for areas of redness and/or skin breakdown  2.  Assess vascular access sites hourly  3.  Every 4-6 hours minimum:  Change oxygen saturation probe site  4.  Every 4-6 hours:  If on nasal continuous positive airway pressure, respiratory therapy assess nares and determine need for appliance change or resting period.  10/30/2024 1035 by Hayley Sotomayor RN  Outcome: Progressing  10/29/2024 2258 by Nikki Jones RN  Outcome: Progressing     Problem: Nutrition Deficit:  Goal: Optimize nutritional status  10/30/2024 1035 by Hayley Sotomayor RN  Outcome: Progressing  10/29/2024 2258 by Nikki Jones RN  Outcome: Progressing     Problem: ABCDS Injury Assessment  Goal: Absence of physical injury  10/30/2024 1035 by Hayley Sotomayor RN  Outcome: Progressing  10/29/2024 2258 by Nikki Jones RN  Outcome: Progressing     Problem: Neurosensory - Adult  Goal: Achieves stable or improved neurological status  10/30/2024 1035 by Hayley Sotomayor 
  Problem: Chronic Conditions and Co-morbidities  Goal: Patient's chronic conditions and co-morbidity symptoms are monitored and maintained or improved  Outcome: Progressing     Problem: Discharge Planning  Goal: Discharge to home or other facility with appropriate resources  Outcome: Progressing     Problem: Safety - Adult  Goal: Free from fall injury  10/14/2024 1113 by Dilia Castillo, RN  Outcome: Progressing  10/14/2024 0232 by Aditi Cano, RN  Outcome: Progressing  Note: Bed is in the lowest position and wheels are locked, call bell is within reach, bathroom light is on during evening hours, gripper socks are on and patient has been instructed to call out for assistance if needed.     As of now, patient is free from falls and will continue to be monitored.        Problem: Pain  Goal: Verbalizes/displays adequate comfort level or baseline comfort level  Outcome: Progressing     Problem: Skin/Tissue Integrity  Goal: Absence of new skin breakdown  Description: 1.  Monitor for areas of redness and/or skin breakdown  2.  Assess vascular access sites hourly  3.  Every 4-6 hours minimum:  Change oxygen saturation probe site  4.  Every 4-6 hours:  If on nasal continuous positive airway pressure, respiratory therapy assess nares and determine need for appliance change or resting period.  Outcome: Progressing     
  Problem: Chronic Conditions and Co-morbidities  Goal: Patient's chronic conditions and co-morbidity symptoms are monitored and maintained or improved  Outcome: Progressing     Problem: Discharge Planning  Goal: Discharge to home or other facility with appropriate resources  Outcome: Progressing     Problem: Safety - Adult  Goal: Free from fall injury  Outcome: Progressing     Problem: Pain  Goal: Verbalizes/displays adequate comfort level or baseline comfort level  Outcome: Progressing     Problem: Skin/Tissue Integrity  Goal: Absence of new skin breakdown  Description: 1.  Monitor for areas of redness and/or skin breakdown  2.  Assess vascular access sites hourly  3.  Every 4-6 hours minimum:  Change oxygen saturation probe site  4.  Every 4-6 hours:  If on nasal continuous positive airway pressure, respiratory therapy assess nares and determine need for appliance change or resting period.  Outcome: Progressing     Problem: Nutrition Deficit:  Goal: Optimize nutritional status  Outcome: Progressing     
  Problem: Chronic Conditions and Co-morbidities  Goal: Patient's chronic conditions and co-morbidity symptoms are monitored and maintained or improved  Outcome: Progressing     Problem: Discharge Planning  Goal: Discharge to home or other facility with appropriate resources  Outcome: Progressing     Problem: Safety - Adult  Goal: Free from fall injury  Outcome: Progressing     Problem: Pain  Goal: Verbalizes/displays adequate comfort level or baseline comfort level  Outcome: Progressing     Problem: Skin/Tissue Integrity  Goal: Absence of new skin breakdown  Description: 1.  Monitor for areas of redness and/or skin breakdown  2.  Assess vascular access sites hourly  3.  Every 4-6 hours minimum:  Change oxygen saturation probe site  4.  Every 4-6 hours:  If on nasal continuous positive airway pressure, respiratory therapy assess nares and determine need for appliance change or resting period.  Outcome: Progressing     Problem: Nutrition Deficit:  Goal: Optimize nutritional status  Outcome: Progressing     Problem: ABCDS Injury Assessment  Goal: Absence of physical injury  Outcome: Progressing     Problem: Neurosensory - Adult  Goal: Achieves stable or improved neurological status  Outcome: Progressing  Goal: Achieves maximal functionality and self care  Outcome: Progressing     Problem: Respiratory - Adult  Goal: Achieves optimal ventilation and oxygenation  Outcome: Progressing     Problem: Cardiovascular - Adult  Goal: Maintains optimal cardiac output and hemodynamic stability  Outcome: Progressing  Goal: Absence of cardiac dysrhythmias or at baseline  Outcome: Progressing     Problem: Skin/Tissue Integrity - Adult  Goal: Skin integrity remains intact  Outcome: Progressing  Goal: Oral mucous membranes remain intact  Outcome: Progressing     Problem: Musculoskeletal - Adult  Goal: Return mobility to safest level of function  Outcome: Progressing  Goal: Return ADL status to a safe level of function  Outcome: 
  Problem: Chronic Conditions and Co-morbidities  Goal: Patient's chronic conditions and co-morbidity symptoms are monitored and maintained or improved  Outcome: Progressing     Problem: Discharge Planning  Goal: Discharge to home or other facility with appropriate resources  Outcome: Progressing     Problem: Safety - Adult  Goal: Free from fall injury  Outcome: Progressing     Problem: Pain  Goal: Verbalizes/displays adequate comfort level or baseline comfort level  Outcome: Progressing     Problem: Skin/Tissue Integrity  Goal: Absence of new skin breakdown  Description: 1.  Monitor for areas of redness and/or skin breakdown  2.  Assess vascular access sites hourly  3.  Every 4-6 hours minimum:  Change oxygen saturation probe site  4.  Every 4-6 hours:  If on nasal continuous positive airway pressure, respiratory therapy assess nares and determine need for appliance change or resting period.  Outcome: Progressing     Problem: Nutrition Deficit:  Goal: Optimize nutritional status  Outcome: Progressing     Problem: ABCDS Injury Assessment  Goal: Absence of physical injury  Outcome: Progressing     Problem: Occupational Therapy - Adult  Goal: By Discharge: Performs self-care activities at highest level of function for planned discharge setting.  See evaluation for individualized goals.  Description: FUNCTIONAL STATUS PRIOR TO ADMISSION:  pt reports that he was independent with ADLs and ILS PTA, unsure if he was using AD   ,  ,  ,  ,  ,  ,  ,  ,  ,  , Active : Yes     HOME SUPPORT: per chart pt was living in 2 story home, living on first floor,alone    Occupational Therapy Goals:  Initiated 10/22/2024  1.  Patient will perform grooming in bed or sitting in chair or EOB with Set-up within 7 day(s).  2.  Patient will perform bathing UB sitting on Eob or in chair with Set-up within 7 day(s).  3.  Patient will perform lower body dressing with Moderate Assist within 7 day(s).  4.  Patient will perform toilet transfers 
  Problem: Chronic Conditions and Co-morbidities  Goal: Patient's chronic conditions and co-morbidity symptoms are monitored and maintained or improved  Outcome: Progressing     Problem: Discharge Planning  Goal: Discharge to home or other facility with appropriate resources  Outcome: Progressing     Problem: Safety - Adult  Goal: Free from fall injury  Outcome: Progressing     Problem: Pain  Goal: Verbalizes/displays adequate comfort level or baseline comfort level  Outcome: Progressing  Flowsheets (Taken 10/21/2024 1105 by Poncho Valverde RN)  Verbalizes/displays adequate comfort level or baseline comfort level:   Assess pain using appropriate pain scale   Encourage patient to monitor pain and request assistance   Implement non-pharmacological measures as appropriate and evaluate response     Problem: Skin/Tissue Integrity  Goal: Absence of new skin breakdown  Description: 1.  Monitor for areas of redness and/or skin breakdown  2.  Assess vascular access sites hourly  3.  Every 4-6 hours minimum:  Change oxygen saturation probe site  4.  Every 4-6 hours:  If on nasal continuous positive airway pressure, respiratory therapy assess nares and determine need for appliance change or resting period.  Outcome: Progressing     Problem: Nutrition Deficit:  Goal: Optimize nutritional status  Outcome: Progressing     
  Problem: Chronic Conditions and Co-morbidities  Goal: Patient's chronic conditions and co-morbidity symptoms are monitored and maintained or improved  Outcome: Progressing     Problem: Discharge Planning  Goal: Discharge to home or other facility with appropriate resources  Outcome: Progressing  Flowsheets (Taken 10/15/2024 0757)  Discharge to home or other facility with appropriate resources: Identify barriers to discharge with patient and caregiver     Problem: Safety - Adult  Goal: Free from fall injury  10/15/2024 1035 by Rik Hernandez RN  Outcome: Progressing  10/14/2024 2201 by Ezekiel Saldivar RN  Outcome: Progressing     Problem: Pain  Goal: Verbalizes/displays adequate comfort level or baseline comfort level  10/15/2024 1035 by Rik Hernandez RN  Outcome: Progressing  10/14/2024 2201 by Ezekiel Saldivar RN  Outcome: Progressing     Problem: Skin/Tissue Integrity  Goal: Absence of new skin breakdown  Description: 1.  Monitor for areas of redness and/or skin breakdown  2.  Assess vascular access sites hourly  3.  Every 4-6 hours minimum:  Change oxygen saturation probe site  4.  Every 4-6 hours:  If on nasal continuous positive airway pressure, respiratory therapy assess nares and determine need for appliance change or resting period.  10/15/2024 1035 by Rik Hernandez RN  Outcome: Progressing  10/14/2024 2201 by Ezekiel Saldivar RN  Outcome: Progressing     Problem: Nutrition Deficit:  Goal: Optimize nutritional status  10/15/2024 1035 by Rik Hernandez RN  Outcome: Progressing  10/14/2024 2201 by Ezekiel Saldivar RN  Outcome: Progressing     
  Problem: Chronic Conditions and Co-morbidities  Goal: Patient's chronic conditions and co-morbidity symptoms are monitored and maintained or improved  Outcome: Progressing  Flowsheets (Taken 10/19/2024 1955)  Care Plan - Patient's Chronic Conditions and Co-Morbidity Symptoms are Monitored and Maintained or Improved: Monitor and assess patient's chronic conditions and comorbid symptoms for stability, deterioration, or improvement     Problem: Discharge Planning  Goal: Discharge to home or other facility with appropriate resources  Outcome: Progressing  Flowsheets (Taken 10/19/2024 1955)  Discharge to home or other facility with appropriate resources: Identify barriers to discharge with patient and caregiver     Problem: Safety - Adult  Goal: Free from fall injury  Outcome: Progressing  Flowsheets (Taken 10/19/2024 1955)  Free From Fall Injury: Instruct family/caregiver on patient safety     Problem: Pain  Goal: Verbalizes/displays adequate comfort level or baseline comfort level  Outcome: Progressing  Flowsheets (Taken 10/19/2024 2000)  Verbalizes/displays adequate comfort level or baseline comfort level: Encourage patient to monitor pain and request assistance     Problem: Skin/Tissue Integrity  Goal: Absence of new skin breakdown  Description: 1.  Monitor for areas of redness and/or skin breakdown  2.  Assess vascular access sites hourly  3.  Every 4-6 hours minimum:  Change oxygen saturation probe site  4.  Every 4-6 hours:  If on nasal continuous positive airway pressure, respiratory therapy assess nares and determine need for appliance change or resting period.  Outcome: Progressing     Problem: Nutrition Deficit:  Goal: Optimize nutritional status  Outcome: Progressing     
  Problem: Chronic Conditions and Co-morbidities  Goal: Patient's chronic conditions and co-morbidity symptoms are monitored and maintained or improved  Outcome: Progressing  Flowsheets (Taken 10/25/2024 0800)  Care Plan - Patient's Chronic Conditions and Co-Morbidity Symptoms are Monitored and Maintained or Improved: Monitor and assess patient's chronic conditions and comorbid symptoms for stability, deterioration, or improvement     Problem: Discharge Planning  Goal: Discharge to home or other facility with appropriate resources  Outcome: Progressing  Flowsheets (Taken 10/25/2024 0800)  Discharge to home or other facility with appropriate resources: Identify barriers to discharge with patient and caregiver     Problem: Safety - Adult  Goal: Free from fall injury  Outcome: Progressing  Flowsheets (Taken 10/24/2024 0922 by Marianela Cali, RN)  Free From Fall Injury:   Instruct family/caregiver on patient safety   Based on caregiver fall risk screen, instruct family/caregiver to ask for assistance with transferring infant if caregiver noted to have fall risk factors     Problem: Pain  Goal: Verbalizes/displays adequate comfort level or baseline comfort level  Outcome: Progressing  Flowsheets (Taken 10/24/2024 0848 by Marianela Cali, RN)  Verbalizes/displays adequate comfort level or baseline comfort level:   Encourage patient to monitor pain and request assistance   Implement non-pharmacological measures as appropriate and evaluate response   Administer analgesics based on type and severity of pain and evaluate response   Assess pain using appropriate pain scale     Problem: Skin/Tissue Integrity  Goal: Absence of new skin breakdown  Description: 1.  Monitor for areas of redness and/or skin breakdown  2.  Assess vascular access sites hourly  3.  Every 4-6 hours minimum:  Change oxygen saturation probe site  4.  Every 4-6 hours:  If on nasal continuous positive airway pressure, respiratory therapy assess nares and 
  Problem: Chronic Conditions and Co-morbidities  Goal: Patient's chronic conditions and co-morbidity symptoms are monitored and maintained or improved  Recent Flowsheet Documentation  Taken 10/23/2024 0800 by Marianela Cali RN  Care Plan - Patient's Chronic Conditions and Co-Morbidity Symptoms are Monitored and Maintained or Improved:   Monitor and assess patient's chronic conditions and comorbid symptoms for stability, deterioration, or improvement   Collaborate with multidisciplinary team to address chronic and comorbid conditions and prevent exacerbation or deterioration   Update acute care plan with appropriate goals if chronic or comorbid symptoms are exacerbated and prevent overall improvement and discharge  10/22/2024 2230 by Nolberto Byers RN  Outcome: Progressing  Flowsheets (Taken 10/22/2024 0847 by Marianela Cali RN)  Care Plan - Patient's Chronic Conditions and Co-Morbidity Symptoms are Monitored and Maintained or Improved:   Monitor and assess patient's chronic conditions and comorbid symptoms for stability, deterioration, or improvement   Update acute care plan with appropriate goals if chronic or comorbid symptoms are exacerbated and prevent overall improvement and discharge   Collaborate with multidisciplinary team to address chronic and comorbid conditions and prevent exacerbation or deterioration     Problem: Discharge Planning  Goal: Discharge to home or other facility with appropriate resources  Recent Flowsheet Documentation  Taken 10/23/2024 0800 by Marianela Cali RN  Discharge to home or other facility with appropriate resources:   Identify barriers to discharge with patient and caregiver   Arrange for needed discharge resources and transportation as appropriate   Identify discharge learning needs (meds, wound care, etc)  10/22/2024 2230 by Nolberto Byers RN  Outcome: Progressing  Flowsheets (Taken 10/22/2024 0847 by Marianela Cali RN)  Discharge to home or other facility with 
  Problem: Chronic Conditions and Co-morbidities  Goal: Patient's chronic conditions and co-morbidity symptoms are monitored and maintained or improved  Recent Flowsheet Documentation  Taken 10/24/2024 0843 by Marianela Cali RN  Care Plan - Patient's Chronic Conditions and Co-Morbidity Symptoms are Monitored and Maintained or Improved:   Monitor and assess patient's chronic conditions and comorbid symptoms for stability, deterioration, or improvement   Collaborate with multidisciplinary team to address chronic and comorbid conditions and prevent exacerbation or deterioration   Update acute care plan with appropriate goals if chronic or comorbid symptoms are exacerbated and prevent overall improvement and discharge     Problem: Discharge Planning  Goal: Discharge to home or other facility with appropriate resources  Recent Flowsheet Documentation  Taken 10/24/2024 0843 by Marianela Cali RN  Discharge to home or other facility with appropriate resources:   Identify barriers to discharge with patient and caregiver   Arrange for needed discharge resources and transportation as appropriate   Identify discharge learning needs (meds, wound care, etc)     Problem: Safety - Adult  Goal: Free from fall injury  Recent Flowsheet Documentation  Taken 10/24/2024 0922 by Marianela Cali RN  Free From Fall Injury:   Instruct family/caregiver on patient safety   Based on caregiver fall risk screen, instruct family/caregiver to ask for assistance with transferring infant if caregiver noted to have fall risk factors     Problem: Pain  Goal: Verbalizes/displays adequate comfort level or baseline comfort level  Recent Flowsheet Documentation  Taken 10/24/2024 0848 by Marianela Cali RN  Verbalizes/displays adequate comfort level or baseline comfort level:   Encourage patient to monitor pain and request assistance   Implement non-pharmacological measures as appropriate and evaluate response   Administer analgesics based on type and 
  Problem: Discharge Planning  Goal: Discharge to home or other facility with appropriate resources  Outcome: Progressing     Problem: Safety - Adult  Goal: Free from fall injury  Outcome: Progressing     
  Problem: Occupational Therapy - Adult  Goal: By Discharge: Performs self-care activities at highest level of function for planned discharge setting.  See evaluation for individualized goals.  Description: FUNCTIONAL STATUS PRIOR TO ADMISSION:  pt reports that he was independent with ADLs and ILS PTA, unsure if he was using AD   ,  ,  ,  ,  ,  ,  ,  ,  ,  , Active : Yes     HOME SUPPORT: per chart pt was living in 2 story home, living on first floor,alone    Occupational Therapy Goals:  Initiated 10/22/2024  1.  Patient will perform grooming in bed or sitting in chair or EOB with Set-up within 7 day(s).  2.  Patient will perform bathing UB sitting on Eob or in chair with Set-up within 7 day(s).  3.  Patient will perform lower body dressing with Moderate Assist within 7 day(s).  4.  Patient will perform toilet transfers with Moderate Assist  within 7 day(s).  5.  Patient will perform all aspects of toileting with Moderate Assist within 7 day(s).  6.  Patient will participate in upper extremity therapeutic exercise/activities with Stand by Assist for 5 minutes within 7 day(s).    7.  Patient will utilize energy conservation techniques during functional activities with verbal cues within 7 day(s).    10/22/2024 1026 by Kasey Wise, OT  Outcome: Not Progressing     Problem: Chronic Conditions and Co-morbidities  Goal: Patient's chronic conditions and co-morbidity symptoms are monitored and maintained or improved  Recent Flowsheet Documentation  Taken 10/22/2024 0847 by Marianela Cali, RN  Care Plan - Patient's Chronic Conditions and Co-Morbidity Symptoms are Monitored and Maintained or Improved:   Monitor and assess patient's chronic conditions and comorbid symptoms for stability, deterioration, or improvement   Update acute care plan with appropriate goals if chronic or comorbid symptoms are exacerbated and prevent overall improvement and discharge   Collaborate with multidisciplinary team to address chronic 
  Problem: Occupational Therapy - Adult  Goal: By Discharge: Performs self-care activities at highest level of function for planned discharge setting.  See evaluation for individualized goals.  Description: FUNCTIONAL STATUS PRIOR TO ADMISSION:  pt reports that he was independent with ADLs and ILS PTA, unsure if he was using AD   ,  ,  ,  ,  ,  ,  ,  ,  ,  , Active : Yes     HOME SUPPORT: per chart pt was living in 2 story home, living on first floor,alone    Occupational Therapy Goals:  Initiated 10/22/2024  1.  Patient will perform grooming in bed or sitting in chair or EOB with Set-up within 7 day(s).  2.  Patient will perform bathing UB sitting on Eob or in chair with Set-up within 7 day(s).  3.  Patient will perform lower body dressing with Moderate Assist within 7 day(s).  4.  Patient will perform toilet transfers with Moderate Assist  within 7 day(s).  5.  Patient will perform all aspects of toileting with Moderate Assist within 7 day(s).  6.  Patient will participate in upper extremity therapeutic exercise/activities with Stand by Assist for 5 minutes within 7 day(s).    7.  Patient will utilize energy conservation techniques during functional activities with verbal cues within 7 day(s).    10/22/2024 1026 by Kasey Wise, OT  Outcome: Not Progressing     Problem: Physical Therapy - Adult  Goal: By Discharge: Performs mobility at highest level of function for planned discharge setting.  See evaluation for individualized goals.  Description: FUNCTIONAL STATUS PRIOR TO ADMISSION: Patient was independent and active without use of DME.    HOME SUPPORT PRIOR TO ADMISSION: Per chart, pt lives alone    Physical Therapy Goals  Initiated 10/22/2024  1.  Patient will move from supine to sit and sit to supine in bed with supervision/set-up within 7 day(s).    2.  Patient will perform sit to stand with supervision/set-up within 7 day(s).  3.  Patient will transfer from bed to chair and chair to bed with 
  Problem: Occupational Therapy - Adult  Goal: By Discharge: Performs self-care activities at highest level of function for planned discharge setting.  See evaluation for individualized goals.  Description: FUNCTIONAL STATUS PRIOR TO ADMISSION:  pt reports that he was independent with ADLs and ILS PTA, unsure if he was using AD   ,  ,  ,  ,  ,  ,  ,  ,  ,  , Active : Yes     HOME SUPPORT: per chart pt was living in 2 story home, living on first floor,alone    Occupational Therapy Goals:  Initiated 10/22/2024  1.  Patient will perform grooming in bed or sitting in chair or EOB with Set-up within 7 day(s).  2.  Patient will perform bathing UB sitting on Eob or in chair with Set-up within 7 day(s).  3.  Patient will perform lower body dressing with Moderate Assist within 7 day(s).  4.  Patient will perform toilet transfers with Moderate Assist  within 7 day(s).  5.  Patient will perform all aspects of toileting with Moderate Assist within 7 day(s).  6.  Patient will participate in upper extremity therapeutic exercise/activities with Stand by Assist for 5 minutes within 7 day(s).    7.  Patient will utilize energy conservation techniques during functional activities with verbal cues within 7 day(s).    Outcome: Progressing Slowly  OCCUPATIONAL THERAPY TREATMENT  Patient: Scott Gardiner (79 y.o. male)  Date: 10/28/2024  Primary Diagnosis: Pleural effusion, right [J90]  Procedure(s) (LRB):  BRONCHOSCOPY (N/A) 6 Days Post-Op   Precautions: Fall Risk, Skin, General Precautions, Bed Alarm, Contact Precautions, Modified Diet (DNR; No O2 used PTA; soft/bite sized food; hypotensive)                Chart, occupational therapy assessment, plan of care, and goals were reviewed.    ASSESSMENT  Patient continues to benefit from skilled OT services and is slowly progressing towards goals. At risk for depression etc with new dx of lung cancer while home situation is home alone. Quite irritable on approach but willing to work 
  Problem: Occupational Therapy - Adult  Goal: By Discharge: Performs self-care activities at highest level of function for planned discharge setting.  See evaluation for individualized goals.  Description: FUNCTIONAL STATUS PRIOR TO ADMISSION:  pt reports that he was independent with ADLs and ILS PTA, unsure if he was using AD   ,  ,  ,  ,  ,  ,  ,  ,  ,  , Active : Yes     HOME SUPPORT: per chart, pt was living alone, in 2 story home, living on first floor    Occupational Therapy Goals:  Initiated 10/22/2024  1.  Patient will perform grooming in bed or sitting in chair or EOB with Set-up within 7 day(s).  2.  Patient will perform bathing UB sitting on Eob or in chair with Set-up within 7 day(s).  3.  Patient will perform lower body dressing with Moderate Assist within 7 day(s).  4.  Patient will perform toilet transfers with Moderate Assist  within 7 day(s).  5.  Patient will perform all aspects of toileting with Moderate Assist within 7 day(s).  6.  Patient will participate in upper extremity therapeutic exercise/activities with Stand by Assist for 5 minutes within 7 day(s).    7.  Patient will utilize energy conservation techniques during functional activities with verbal cues within 7 day(s).    Outcome: Not Progressing   OCCUPATIONAL THERAPY EVALUATION    Patient: Scott Gardiner (79 y.o. male)  Date: 10/22/2024  Primary Diagnosis: Pleural effusion, right [J90]  Procedure(s) (LRB):  BRONCHOSCOPY (N/A)       Precautions:                    ASSESSMENT :  The patient is limited by decreased functional mobility, independence in ADLs, strength, activity tolerance, endurance, safety awareness, attention/concentration, balance, increased pain levels.    Based on the impairments listed above pt was supine in bed and stated that he did not want to work on anything since he has a thoracentesis this am.  OT encouraged pt to at least sit on Eob, and he agreed. He was adamant that he would not stand or work on any 
  Problem: Physical Therapy - Adult  Goal: By Discharge: Performs mobility at highest level of function for planned discharge setting.  See evaluation for individualized goals.  Description: FUNCTIONAL STATUS PRIOR TO ADMISSION: Patient was independent and active without use of DME.    HOME SUPPORT PRIOR TO ADMISSION: Per chart, pt lives alone    Physical Therapy Goals  Initiated 10/22/2024  1.  Patient will move from supine to sit and sit to supine in bed with supervision/set-up within 7 day(s).    2.  Patient will perform sit to stand with supervision/set-up within 7 day(s).  3.  Patient will transfer from bed to chair and chair to bed with supervision/set-up using the least restrictive device within 7 day(s).  4.  Patient will ambulate with supervision/set-up for 10 feet with the least restrictive device within 7 day(s).     Outcome: Progressing   PHYSICAL THERAPY TREATMENT    Patient: Scott Gardiner (79 y.o. male)  Date: 10/25/2024  Diagnosis: Pleural effusion, right [J90] Pleural effusion, right  Procedure(s) (LRB):  BRONCHOSCOPY (N/A) 3 Days Post-Op  Precautions: Fall Risk, Skin                      ASSESSMENT:  Patient continues to benefit from skilled PT services and is progressing towards goals. He is agreeable to participate and is cooperative throughout PT treatment today. Patient's mobility is limited by man-wick during this session, and he declines sitting in chair. He performs static/gentle dynamic standing at bedside x 3, 1 minute each trial, with stand-by assist/rolling walker. SpO2 remains >/= 91% with each trial while on 2L O2. He is able to take 2 side-steps to left with rolling walker and contact guard assist for safety. Patient is eager to transfer to rehabilitation facility to restore PLOF.         PLAN:  Patient continues to benefit from skilled intervention to address the above impairments.  Continue treatment per established plan of care.        Recommendation for discharge: (in order for 
  Problem: Physical Therapy - Adult  Goal: By Discharge: Performs mobility at highest level of function for planned discharge setting.  See evaluation for individualized goals.  Description: FUNCTIONAL STATUS PRIOR TO ADMISSION: Patient was independent and active without use of DME.    HOME SUPPORT PRIOR TO ADMISSION: Per chart, pt lives alone    Physical Therapy Goals  Initiated 10/22/2024  1.  Patient will move from supine to sit and sit to supine in bed with supervision/set-up within 7 day(s).    2.  Patient will perform sit to stand with supervision/set-up within 7 day(s).  3.  Patient will transfer from bed to chair and chair to bed with supervision/set-up using the least restrictive device within 7 day(s).  4.  Patient will ambulate with supervision/set-up for 10 feet with the least restrictive device within 7 day(s).   Physical Therapy Goals  Revised 10/29/2024  1.  Patient will move from supine to sit and sit to supine in bed with independence within 7 day(s).    2.  Patient will perform sit to stand with independence within 7 day(s).  3.  Patient will transfer from bed to chair and chair to bed with supervision/set-up using the least restrictive device within 7 day(s).  4.  Patient will ambulate with contact guard assist for 50 feet with the least restrictive device within 7 day(s).         Outcome: Adequate for Discharge   PHYSICAL THERAPY TREATMENT: WEEKLY REASSESSMENT    Patient: Scott Gardiner (79 y.o. male)  Date: 10/29/2024  Primary Diagnosis: Pleural effusion, right [J90]  Procedure(s) (LRB):  BRONCHOSCOPY (N/A) 7 Days Post-Op   Precautions: Fall Risk, Bed Alarm                      ASSESSMENT :  Patient continues to benefit from skilled PT services and is slowly progressing towards goals. Patient with slow progress with mobility but definite improvements noted in strength, balance, and mobility. Upon arrival, patient sitting up in bed, upset that that breakfast was not set up.  Tray appears to be 
  Problem: Safety - Adult  Goal: Free from fall injury  10/17/2024 1040 by Kathy Hodges RN  Outcome: Progressing  10/17/2024 0541 by Martha Funes RN  Outcome: Progressing     Problem: Nutrition Deficit:  Goal: Optimize nutritional status  Outcome: Progressing     Problem: Pain  Goal: Verbalizes/displays adequate comfort level or baseline comfort level  10/17/2024 1040 by Kathy Hodges RN  Outcome: Progressing  Flowsheets (Taken 10/17/2024 0735)  Verbalizes/displays adequate comfort level or baseline comfort level: Encourage patient to monitor pain and request assistance  10/17/2024 0541 by Martha Funes RN  Outcome: Progressing  Flowsheets (Taken 10/16/2024 2054 by Kateryna Peraza RN)  Verbalizes/displays adequate comfort level or baseline comfort level: Encourage patient to monitor pain and request assistance     
  Problem: Safety - Adult  Goal: Free from fall injury  Outcome: Progressing     Problem: Pain  Goal: Verbalizes/displays adequate comfort level or baseline comfort level  Outcome: Progressing  Flowsheets (Taken 10/16/2024 2054 by Kateryna Peraza RN)  Verbalizes/displays adequate comfort level or baseline comfort level: Encourage patient to monitor pain and request assistance     Problem: Skin/Tissue Integrity  Goal: Absence of new skin breakdown  Description: 1.  Monitor for areas of redness and/or skin breakdown  2.  Assess vascular access sites hourly  3.  Every 4-6 hours minimum:  Change oxygen saturation probe site  4.  Every 4-6 hours:  If on nasal continuous positive airway pressure, respiratory therapy assess nares and determine need for appliance change or resting period.  Outcome: Progressing     
  Problem: Safety - Adult  Goal: Free from fall injury  Outcome: Progressing     Problem: Skin/Tissue Integrity  Goal: Absence of new skin breakdown  Description: 1.  Monitor for areas of redness and/or skin breakdown  2.  Assess vascular access sites hourly  3.  Every 4-6 hours minimum:  Change oxygen saturation probe site  4.  Every 4-6 hours:  If on nasal continuous positive airway pressure, respiratory therapy assess nares and determine need for appliance change or resting period.  Outcome: Progressing     Problem: Nutrition Deficit:  Goal: Optimize nutritional status  Outcome: Progressing     
  Problem: Safety - Adult  Goal: Free from fall injury  Outcome: Progressing  Note: Bed is in the lowest position and wheels are locked, call bell is within reach, bathroom light is on during evening hours, gripper socks are on and patient has been instructed to call out for assistance if needed.     As of now, patient is free from falls and will continue to be monitored.        
Pt in stable condition; VSS.  NC O2 2L/Min, sats mid 90's. Non-productive, moist cough.  FRY, recovers well with rest.  Up to chair for meals, tolerates activity well. Pt is lactose intolerant, had milk at breakfast, large loose stool observed.  Voiding via urinal, clear/yellow urine.  Plan for U/S guided Thoracentesis, pt verbalizes understanding.  Continue to monitor for worsening resp symptoms.  
Progressing     OCCUPATIONAL THERAPY TREATMENT: WEEKLY REASSESSMENT    Patient: Scott Gardiner (79 y.o. male)  Date: 10/29/2024  Primary Diagnosis: Pleural effusion, right [J90]  Procedure(s) (LRB):  BRONCHOSCOPY (N/A) 7 Days Post-Op   Precautions: Fall Risk, Bed Alarm                Chart, occupational therapy assessment, plan of care, and goals were reviewed.    ASSESSMENT  Patient continues to benefit from skilled OT services and is progressing towards goals but remains limited by impaired strength, endurance, activity tolerance, balance, and functional reach. [place] [facility] [place] urgency) with constant RW support and increased assistance for aforementioned deficits, thoroughness, and quick fatigue. Noted mild exertional dyspnea, SpO2 94% on RA. Patient agreeable to sitting up in chair for brief period of time with appropriate positioning for bony prominences. He remains well below his independent functional baseline.         PLAN  Goals have been updated based on progression since last assessment.  Patient continues to benefit from skilled intervention to address the above impairments.    Recommendations and Planned Interventions:   self care training, therapeutic activities, functional mobility training, balance training, therapeutic exercise, endurance activities, cognitive retraining, and patient education    Frequency/Duration: OT Plan of Care: 4 times/week    Recommend with staff: up to chair for meals, toileting in bathroom, assist x1 with RW    Recommend next OT session: bathroom mobility/ADL    Recommendation for discharge: (in order for the patient to meet his/her long term goals):   Moderate intensity short-term skilled occupational therapy up to 5x/week    Other factors to consider for discharge: lives alone, no support system, poor safety awareness, high risk for falls, and concern for safely navigating or managing the home environment    IF patient discharges 
by Kathy Hodges, RN  Outcome: Progressing     
on type and severity of pain and evaluate response   Assess pain using appropriate pain scale     
response   Assess pain using appropriate pain scale     Problem: Skin/Tissue Integrity  Goal: Absence of new skin breakdown  Description: 1.  Monitor for areas of redness and/or skin breakdown  2.  Assess vascular access sites hourly  3.  Every 4-6 hours minimum:  Change oxygen saturation probe site  4.  Every 4-6 hours:  If on nasal continuous positive airway pressure, respiratory therapy assess nares and determine need for appliance change or resting period.  10/25/2024 1947 by Kosta Gamboa, RN  Outcome: Progressing  10/25/2024 1212 by Emely Eng, RN  Outcome: Progressing     
WNL  Arousal/Alertness: Appears intact  Following Commands: Follows one step commands consistently  Attention Span: Appears intact  Memory: Appears intact  Safety Judgement: Appears intact  Problem Solving: Assistance required to identify errors made  Insights: Appears intact  Initiation: Appears intact  Sequencing: Requires cues for some    Functional Mobility Training:  Bed Mobility:  Bed Mobility Training  Bed Mobility Training: Yes  Interventions: Verbal cues;Tactile cues  Rolling: Modified independent  Supine to Sit: Stand-by assistance  Scooting: Stand-by assistance  Transfers:  Transfer Training  Transfer Training: Yes  Interventions: Verbal cues;Safety awareness training  Sit to Stand: Contact-guard assistance  Stand to Sit: Contact-guard assistance  Bed to Chair: Contact-guard assistance;Adaptive equipment (RW)  Toilet Transfer: Contact-guard assistance;Adaptive equipment (grab bar)  Balance:  Balance  Sitting: Intact  Standing: With support  Standing - Static: Constant support;Good  Standing - Dynamic: Constant support;Fair   Ambulation/Gait Training:     Gait  Gait Training: Yes  Left Side Weight Bearing: Full  Right Side Weight Bearing: Full  Overall Level of Assistance: Contact-guard assistance  Distance (ft): 15 Feet (15 x 2 with sitting rest)  Assistive Device: Walker, rolling;Gait belt  Interventions: Visual cues;Verbal cues;Safety awareness training;Tactile cues  Base of Support: Narrowed  Speed/[place]: Slow  Step Length: Right shortened;Left shortened  Gait Abnormalities: Decreased step clearance;Path deviations                                                                                                                                                                                                                                           [facility] AM-PAC®      Basic Mobility Inpatient Short Form (6-Clicks) Version 2  How much HELP from another person do you currently need... (If the 
in chair, Call bell within reach, and Bed/ chair alarm activated    COMMUNICATION/EDUCATION:   The patient's plan of care was discussed with: physical therapist and registered nurse    Patient Education  Education Given To: Patient  Education Provided: Role of Therapy;Plan of Care;Precautions;ADL Adaptive Strategies;Transfer Training;Fall Prevention Strategies;Mobility Training;Energy Conservation  Education Method: Demonstration;Teach Back;Verbal  Barriers to Learning: None  Education Outcome: Verbalized understanding;Continued education needed;Demonstrated understanding    Thank you for this referral.  Rosa Martel OT  Minutes: 40    
0800 by Marianela Cali, RN)  Return ADL Status to a Safe Level of Function:   Administer medication as ordered   Assess activities of daily living deficits and provide assistive devices as needed   Assist and instruct patient to increase activity and self care as tolerated   Obtain physical therapy/occupational therapy consults as needed     Problem: Metabolic/Fluid and Electrolytes - Adult  Goal: Electrolytes maintained within normal limits  Outcome: Progressing  Flowsheets (Taken 10/23/2024 0800 by Marianela Cali, RN)  Electrolytes maintained within normal limits:   Monitor labs and assess patient for signs and symptoms of electrolyte imbalances   Administer electrolyte replacement as ordered   Fluid restriction as ordered   Monitor response to electrolyte replacements, including repeat lab results as appropriate  Goal: Glucose maintained within prescribed range  Outcome: Progressing  Flowsheets (Taken 10/23/2024 0800 by Marianela Cali, RN)  Glucose maintained within prescribed range:   Monitor blood glucose as ordered   Assess for signs and symptoms of hyperglycemia and hypoglycemia   Administer ordered medications to maintain glucose within target range   Assess barriers to adequate nutritional intake and initiate nutrition consult as needed   Instruct patient on self management of diabetes and initiate consult as needed     Problem: Hematologic - Adult  Goal: Maintains hematologic stability  Outcome: Progressing  Flowsheets (Taken 10/23/2024 0800 by Marianela Cali, RN)  Maintains hematologic stability:   Assess for signs and symptoms of bleeding or hemorrhage   Monitor labs for bleeding or clotting disorders   Administer blood products/factors as ordered     
Complexity : Stable, uncomplicated  AM-PAC  LOW    Based on the above components, the patient evaluation is determined to be of the following complexity level: Low

## 2024-10-30 NOTE — DISCHARGE SUMMARY
Physician Discharge Summary     Patient ID:    Scott Gardiner  [id number]  79 y.o.  1945    Admit date: 10/12/2024    Discharge date : 10/30/2024      Final Diagnoses:   Pleural effusion, right [J90]  Acute hypoxic respiratory failure  Large right pleural effusion  Right upper lobe pulmonary mass  Sepsis present on admission  Former tobacco use  Severe protein calorie malnutrition  COPD with acute exacerbation  Anxiety    Reason for Hospitalization:    Worsening shortness of breath.      Hospital Course:   79 y.o. male with a past medical history of htn, PAD s/p axillobifemoral graft who was admitted on 10/12/2024 transfer from   for thoracentesis a diagnosis of large right pleural effusion in  a setting of suspected malignant lung mass( former smoker with 50 pack year history), suspected COPD.   unexplained weight loss, severe protein calorie malnutrition .   Ct of chest 10/11/24  shows :  Large right pleural effusion with near complete collapse of the right lung   2.Probable pulmonary masses in the right upper lobe, concerning for a malignant effusion   3. Enlarged mediastinal and right cardiophrenic nodes.  Pulmonary following awaiting pleural fluid cytology he  is s/p right thoracentesis on 10/14 with 1450 mL of bloody fluid drained exudative and lymphocytic, awaiting fluid cytology.  He is s/p bronchoscopy October 23.  Cytology report indicate bronchial cells.  Seen and evaluated by pulmonologist with planned PET scan outpatient.  Patient had a prolonged hospital course secondary to poor oral intake and generalized friability.  Seen and evaluated by PT OT and recommended inpatient skilled care facility.  Overall long-term prognosis poor given multiple comorbidities and declining physical health.  Patient will need to follow-up closely with pulmonologist and oncologist for further treatment course.        Discharge Medications:      Medication List        START taking these medications

## 2024-10-30 NOTE — CARE COORDINATION
Transition of Care Plan:     RUR:  7%  Prior Level of Functioning: independent  Disposition: SNF - [facility] - call report [phone] - going to room 211B  DENIA: 10/30  If SNF or IPR: Date FOC offered: 10/23/2024  Date FOC received: 10/23/2024  Date authorization started with reference number: 10/29/24  Date authorization received and expires: auth ref #[id number] approved 10/30-11/1   Transportation at discharge: [facility] @ 1500  [facility] Medicare/ letter given: provided   Caregiver Contact: friend Dolores Sanchez [phone]   Sri stout [phone]  Discharge Caregiver contacted prior to discharge? To be contacted upon pt request   Care Conference needed? Not at this time   Barriers to discharge: medical stability, placement, auth    Update  Per MD, insurance has approved SNF. [facility] has a bed for patient today. CM made room visit with patient and informed him of dc plan. [facility] arranged for 1500. RN notified.       Initial note  CM received call from [employer] reporting MD is requesting a P2P to be completed by 3pm today. P2P number [phone] opt 5. Md notified via perfect serve and provided with insurance policy number as well.       SERGIO Smith, CM  [phone]

## 2024-12-06 LAB
ACID FAST STN SPEC: NEGATIVE
MYCOBACTERIUM SPEC QL CULT: NEGATIVE
SPECIMEN PREPARATION: NORMAL
SPECIMEN SOURCE: NORMAL

## 2024-12-09 ENCOUNTER — OFFICE VISIT (OUTPATIENT)
Age: 79
End: 2024-12-09

## 2024-12-09 ENCOUNTER — TELEPHONE (OUTPATIENT)
Age: 79
End: 2024-12-09

## 2024-12-09 DIAGNOSIS — J90 PLEURAL EFFUSION, RIGHT: Primary | ICD-10-CM

## 2024-12-09 DIAGNOSIS — R06.09 DOE (DYSPNEA ON EXERTION): ICD-10-CM

## 2024-12-09 DIAGNOSIS — R63.4 WEIGHT LOSS, UNINTENTIONAL: ICD-10-CM

## 2024-12-09 PROCEDURE — NBSRV NON-BILLABLE SERVICE: Performed by: INTERNAL MEDICINE

## 2024-12-09 RX ORDER — LANOLIN ALCOHOL/MO/W.PET/CERES
CREAM (GRAM) TOPICAL
COMMUNITY
Start: 2024-11-14 | End: 2024-12-09

## 2024-12-09 RX ORDER — LOSARTAN POTASSIUM 25 MG/1
TABLET ORAL
COMMUNITY
Start: 2024-12-08 | End: 2024-12-09

## 2024-12-09 RX ORDER — OXYCODONE HYDROCHLORIDE 5 MG/1
TABLET ORAL
COMMUNITY
Start: 2024-11-14

## 2024-12-09 RX ORDER — LORAZEPAM 1 MG/1
1 TABLET ORAL EVERY 12 HOURS PRN
Qty: 60 TABLET | Refills: 0 | Status: SHIPPED | OUTPATIENT
Start: 2024-12-09 | End: 2025-01-08

## 2024-12-09 RX ORDER — LORAZEPAM 0.5 MG/1
0.5 TABLET ORAL EVERY 8 HOURS PRN
COMMUNITY
Start: 2024-11-14 | End: 2024-12-09

## 2024-12-09 RX ORDER — HYDROCHLOROTHIAZIDE 25 MG/1
TABLET ORAL
COMMUNITY
Start: 2024-12-08 | End: 2024-12-09

## 2024-12-09 RX ORDER — PRAVASTATIN SODIUM 40 MG
TABLET ORAL
COMMUNITY
Start: 2024-12-08 | End: 2024-12-09

## 2024-12-09 RX ORDER — AMLODIPINE BESYLATE 5 MG/1
TABLET ORAL
COMMUNITY
Start: 2024-12-08 | End: 2024-12-09

## 2024-12-09 ASSESSMENT — PATIENT HEALTH QUESTIONNAIRE - PHQ9
SUM OF ALL RESPONSES TO PHQ QUESTIONS 1-9: 1
1. LITTLE INTEREST OR PLEASURE IN DOING THINGS: NOT AT ALL
SUM OF ALL RESPONSES TO PHQ QUESTIONS 1-9: 1
SUM OF ALL RESPONSES TO PHQ QUESTIONS 1-9: 1
SUM OF ALL RESPONSES TO PHQ9 QUESTIONS 1 & 2: 1
SUM OF ALL RESPONSES TO PHQ QUESTIONS 1-9: 1
2. FEELING DOWN, DEPRESSED OR HOPELESS: SEVERAL DAYS

## 2024-12-09 NOTE — TELEPHONE ENCOUNTER
Pt requesting med refill for Lorazepam 0.5 mg//medication not on med list    Pt was discharged from hospital 1 mo ago/no follow up with pcp    Please call pt at your convenience//pt seemed confused

## 2024-12-09 NOTE — PROGRESS NOTES
No charge for this visit.  A home visit was performed 12/11/24    I, Bala Xiong MD, was in my office at the time of the encounter.     .VIRTUALPHONE    The patient was located in [place] at their home for the encounter.     Consent:  He and/or health care decision maker is aware that that he may receive a bill for this telephone service, depending on his insurance coverage, and has provided verbal consent to proceed: Yes    Chief Complaint   Patient presents with    Discuss Medications     Pt would like to discuss continuing taking Ativan        Duration 30 minutes primarily education, review of imaging, labs and records.    Assessment & Plan  1. Large right pleural effusion.  Home health evaluation was recommended.    2. Anxiety and pain.  A prescription for lorazepam 0.5 mg twice daily was provided.          Chief Complaint   Patient presents with    Discuss Medications     Pt would like to discuss continuing taking Ativan          Orders Placed This Encounter   Procedures    External Referral To Home Health     Referral Priority:   Routine     Referral Type:   Home Health Care     Referral Reason:   Specialty Services Required     Requested Specialty:   Home Health Services     Number of Visits Requested:   1       Karan Almonte MD, FACP      History of Present Illness  The patient is here for a telephone consultation.    He was last seen in this clinic on 10/11/2023 and was transferred to the hospital due to severe shortness of breath and weight loss. During his hospitalization, he underwent an evaluation for a large right pleural effusion, which included CT scanning, expert consultation with pulmonary specialists, bronchoscopy, and thoracentesis. Currently, there is no definitive confirmation of malignancy, but it remains the strongest suspicion. He was discharged home on oxygen. He generally resides alone, but a family member visits and checks on him. He has no access to food or travel and would greatly

## 2024-12-11 ENCOUNTER — OFFICE VISIT (OUTPATIENT)
Age: 79
End: 2024-12-11

## 2024-12-11 DIAGNOSIS — J90 PLEURAL EFFUSION, RIGHT: Primary | ICD-10-CM

## 2024-12-11 DIAGNOSIS — E43 SEVERE PROTEIN-CALORIE MALNUTRITION (HCC): ICD-10-CM

## 2024-12-11 NOTE — PROGRESS NOTES
Duration 30 minutes primarily education, review of imaging, labs and records.    1. Pleural effusion, right  His pleural effusion is clearly recurred based on physical findings today.  At least for the moment he prefers home health but is open to the idea of hospice.  Presently his functional status is adequate for him to stay at home but I expect that to decline fairly quickly over the next several days or few weeks.    We still lack an exact diagnosis for the cause of this pleural effusion but malignancy is suspected based on CT scan imagery.  Tuberculosis has been excluded.  The absence of pleural plaquing makes asbestosis unlikely.  After lengthy discussion with the patient along with his power of  Sri who is present in the room by telephone and his caretaker Janice, he has at least for now decided simply to stay at home rather than proceed to the hospital for a variety of tests which might yield a diagnosis.  He understands that he may change his mind at any time  2. Severe protein-calorie malnutrition (HCC)  His albumin was 2.2 most recently measured in October.  Judging his weight and overall skin integrity and physical exam today he suffers from severe protein calorie malnutrition.  He has access to food and currently has someone that can prepared for him.    His overall prognosis is guarded    The duration of this visit was an hour and was conducted at the patient's residence at [place]         Chief Complaint   Patient presents with    Shortness of Breath         No orders of the defined types were placed in this encounter.      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    This was a home visit.    Mr. Gardiner was last evaluated in clinic on October 11 where he presented with severe weight loss which was unintentional, shortness of breath, and absent breath sounds in the right side.  He was referred to the emergency room and

## 2024-12-12 DIAGNOSIS — F51.01 PRIMARY INSOMNIA: Primary | ICD-10-CM

## 2024-12-12 RX ORDER — TRAZODONE HYDROCHLORIDE 100 MG/1
100 TABLET ORAL NIGHTLY
Qty: 90 TABLET | Refills: 1 | Status: SHIPPED | OUTPATIENT
Start: 2024-12-12

## 2024-12-27 ENCOUNTER — TELEPHONE (OUTPATIENT)
Age: 79
End: 2024-12-27

## 2024-12-27 NOTE — TELEPHONE ENCOUNTER
Spoke to patient and clarified instructions on Ativan. Patient stated he felt he did not need ativan during the day but was having a hard time sleeping. Per PCP he may hold Ativan during the day and take 2 tabs at HS. Try this for at least 1 week and call office if no better.     Patient verbalized understanding of instructions.

## 2024-12-27 NOTE — TELEPHONE ENCOUNTER
[phone] contact # estuardo Navarrete, would like a call back to discuss medication.    Thanks,

## 2025-01-02 ENCOUNTER — TELEPHONE (OUTPATIENT)
Age: 80
End: 2025-01-02

## 2025-01-02 RX ORDER — QUETIAPINE FUMARATE 25 MG/1
25 TABLET, FILM COATED ORAL
Qty: 60 TABLET | Refills: 3 | Status: SHIPPED | OUTPATIENT
Start: 2025-01-02 | End: 2025-01-02 | Stop reason: SDUPTHER

## 2025-01-02 RX ORDER — QUETIAPINE FUMARATE 25 MG/1
25 TABLET, FILM COATED ORAL
Qty: 60 TABLET | Refills: 3 | Status: SHIPPED | OUTPATIENT
Start: 2025-01-02

## 2025-01-02 NOTE — TELEPHONE ENCOUNTER
Pt reports that the Lorazepam 1 mg 2 tabs at night aren't helping him sleep at night. Pt would like to know if another medication can be called in for sleep.
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
## 2025-01-02 NOTE — TELEPHONE ENCOUNTER
[phone] contact # estuardo Navarrete, asking eNly to pls call me back today concerning meds    Thanks,

## 2025-01-03 ENCOUNTER — TELEPHONE (OUTPATIENT)
Age: 80
End: 2025-01-03

## 2025-01-03 NOTE — TELEPHONE ENCOUNTER
Pt called. Pt reports that the new sleep medication (Seroquel-25 mg) took 2 hrs for him to fall asleep and now he is drowsy this am. Pt was informed that drowsiness was a side effect of the medication. PCP instructed patient to cut the Seroquel in half a night. Pt acknowledged understanding.

## 2025-01-09 ENCOUNTER — TELEPHONE (OUTPATIENT)
Age: 80
End: 2025-01-09

## 2025-01-09 DIAGNOSIS — F41.9 ANXIETY: Primary | ICD-10-CM

## 2025-01-09 RX ORDER — LORAZEPAM 1 MG/1
1 TABLET ORAL 2 TIMES DAILY PRN
Qty: 120 TABLET | Refills: 0 | Status: ON HOLD | OUTPATIENT
Start: 2025-01-09 | End: 2025-03-10

## 2025-01-09 NOTE — TELEPHONE ENCOUNTER
Pt requesting a refill for Lorazepam 1mg. This medication is not on his medication list. Pt reports that this medication helps his anxiety.

## 2025-01-09 NOTE — TELEPHONE ENCOUNTER
[phone] contact # estuardo Navarrete, need refill called into [facility] [place], [place] for the following meds:  new medication from hospital lorazepam 1 mg tab    Thanks,

## 2025-01-12 ENCOUNTER — APPOINTMENT (OUTPATIENT)
Facility: HOSPITAL | Age: 80
End: 2025-01-12
Payer: MEDICARE

## 2025-01-12 ENCOUNTER — HOSPITAL ENCOUNTER (INPATIENT)
Facility: HOSPITAL | Age: 80
LOS: 5 days | DRG: 871 | End: 2025-01-17
Attending: INTERNAL MEDICINE | Admitting: STUDENT IN AN ORGANIZED HEALTH CARE EDUCATION/TRAINING PROGRAM
Payer: MEDICARE

## 2025-01-12 ENCOUNTER — HOSPITAL ENCOUNTER (EMERGENCY)
Facility: HOSPITAL | Age: 80
Discharge: ANOTHER HOSPITAL | End: 2025-01-12
Attending: EMERGENCY MEDICINE
Payer: MEDICARE

## 2025-01-12 VITALS
HEART RATE: 96 BPM | TEMPERATURE: 98 F | WEIGHT: 123.2 LBS | SYSTOLIC BLOOD PRESSURE: 135 MMHG | OXYGEN SATURATION: 100 % | DIASTOLIC BLOOD PRESSURE: 65 MMHG | HEIGHT: 66 IN | RESPIRATION RATE: 33 BRPM | BODY MASS INDEX: 19.8 KG/M2

## 2025-01-12 DIAGNOSIS — E83.51 HYPOCALCEMIA: ICD-10-CM

## 2025-01-12 DIAGNOSIS — D64.9 ANEMIA, UNSPECIFIED TYPE: ICD-10-CM

## 2025-01-12 DIAGNOSIS — J90 PLEURAL EFFUSION: Primary | ICD-10-CM

## 2025-01-12 DIAGNOSIS — D72.829 LEUKOCYTOSIS, UNSPECIFIED TYPE: ICD-10-CM

## 2025-01-12 DIAGNOSIS — I42.9 CARDIOMYOPATHY, UNSPECIFIED TYPE (HCC): Primary | ICD-10-CM

## 2025-01-12 LAB
ALBUMIN SERPL-MCNC: 1.4 G/DL (ref 3.5–5)
ALBUMIN/GLOB SERPL: 0.3 (ref 1.1–2.2)
ALP SERPL-CCNC: 115 U/L (ref 45–117)
ALT SERPL-CCNC: 9 U/L (ref 12–78)
AMORPH CRY URNS QL MICRO: ABNORMAL
ANION GAP SERPL CALC-SCNC: 13 MMOL/L (ref 2–12)
APPEARANCE UR: CLEAR
AST SERPL-CCNC: 23 U/L (ref 15–37)
BACTERIA URNS QL MICRO: NEGATIVE /HPF
BASOPHILS # BLD: 0.04 K/UL (ref 0–0.1)
BASOPHILS NFR BLD: 0.2 % (ref 0–1)
BILIRUB SERPL-MCNC: 0.4 MG/DL (ref 0.2–1)
BILIRUB UR QL: NEGATIVE
BUN SERPL-MCNC: 10 MG/DL (ref 6–20)
BUN/CREAT SERPL: 13 (ref 12–20)
CALCIUM SERPL-MCNC: 6.4 MG/DL (ref 8.5–10.1)
CHLORIDE SERPL-SCNC: 105 MMOL/L (ref 97–108)
CO2 SERPL-SCNC: 26 MMOL/L (ref 21–32)
COLOR UR: ABNORMAL
CREAT SERPL-MCNC: 0.75 MG/DL (ref 0.7–1.3)
DIFFERENTIAL METHOD BLD: ABNORMAL
EOSINOPHIL # BLD: 0 K/UL (ref 0–0.4)
EOSINOPHIL NFR BLD: 0 % (ref 0–7)
EPITH CASTS URNS QL MICRO: ABNORMAL /LPF
ERYTHROCYTE [DISTWIDTH] IN BLOOD BY AUTOMATED COUNT: 21.4 % (ref 11.5–14.5)
FLUAV RNA SPEC QL NAA+PROBE: NOT DETECTED
FLUBV RNA SPEC QL NAA+PROBE: NOT DETECTED
GLOBULIN SER CALC-MCNC: 4.7 G/DL (ref 2–4)
GLUCOSE SERPL-MCNC: 99 MG/DL (ref 65–100)
GLUCOSE UR STRIP.AUTO-MCNC: NEGATIVE MG/DL
HCT VFR BLD AUTO: 21.9 % (ref 36.6–50.3)
HGB BLD-MCNC: 6.6 G/DL (ref 12.1–17)
HGB UR QL STRIP: NEGATIVE
HISTORY CHECK: NORMAL
IMM GRANULOCYTES # BLD AUTO: 0.17 K/UL (ref 0–0.04)
IMM GRANULOCYTES NFR BLD AUTO: 0.8 % (ref 0–0.5)
KETONES UR QL STRIP.AUTO: NEGATIVE MG/DL
LACTATE SERPL-SCNC: 1.9 MMOL/L (ref 0.4–2)
LEUKOCYTE ESTERASE UR QL STRIP.AUTO: NEGATIVE
LYMPHOCYTES # BLD: 1.13 K/UL (ref 0.8–3.5)
LYMPHOCYTES NFR BLD: 5.4 % (ref 12–49)
MCH RBC QN AUTO: 29.6 PG (ref 26–34)
MCHC RBC AUTO-ENTMCNC: 30.1 G/DL (ref 30–36.5)
MCV RBC AUTO: 98.2 FL (ref 80–99)
MONOCYTES # BLD: 1.34 K/UL (ref 0–1)
MONOCYTES NFR BLD: 6.4 % (ref 5–13)
NEUTS SEG # BLD: 18.32 K/UL (ref 1.8–8)
NEUTS SEG NFR BLD: 87.2 % (ref 32–75)
NITRITE UR QL STRIP.AUTO: NEGATIVE
NRBC # BLD: 0 K/UL (ref 0–0.01)
NRBC BLD-RTO: 0 PER 100 WBC
NT PRO BNP: ABNORMAL PG/ML (ref 0–450)
PH UR STRIP: 5.5 (ref 5–8)
PLATELET # BLD AUTO: 412 K/UL (ref 150–400)
PMV BLD AUTO: 10.9 FL (ref 8.9–12.9)
POTASSIUM SERPL-SCNC: 3.7 MMOL/L (ref 3.5–5.1)
PROT SERPL-MCNC: 6.1 G/DL (ref 6.4–8.2)
PROT UR STRIP-MCNC: NEGATIVE MG/DL
RBC # BLD AUTO: 2.23 M/UL (ref 4.1–5.7)
RBC #/AREA URNS HPF: ABNORMAL /HPF (ref 0–5)
RBC MORPH BLD: ABNORMAL
SARS-COV-2 RNA RESP QL NAA+PROBE: NOT DETECTED
SODIUM SERPL-SCNC: 144 MMOL/L (ref 136–145)
SOURCE: NORMAL
SP GR UR REFRACTOMETRY: 1.02 (ref 1–1.03)
TROPONIN I SERPL HS-MCNC: 24 NG/L (ref 0–76)
URINE CULTURE IF INDICATED: ABNORMAL
UROBILINOGEN UR QL STRIP.AUTO: 0.2 EU/DL (ref 0.2–1)
WBC # BLD AUTO: 21 K/UL (ref 4.1–11.1)
WBC URNS QL MICRO: ABNORMAL /HPF (ref 0–4)

## 2025-01-12 PROCEDURE — P9016 RBC LEUKOCYTES REDUCED: HCPCS

## 2025-01-12 PROCEDURE — 2700000000 HC OXYGEN THERAPY PER DAY

## 2025-01-12 PROCEDURE — 83605 ASSAY OF LACTIC ACID: CPT

## 2025-01-12 PROCEDURE — 71045 X-RAY EXAM CHEST 1 VIEW: CPT

## 2025-01-12 PROCEDURE — 81001 URINALYSIS AUTO W/SCOPE: CPT

## 2025-01-12 PROCEDURE — 1100000003 HC PRIVATE W/ TELEMETRY

## 2025-01-12 PROCEDURE — 86901 BLOOD TYPING SEROLOGIC RH(D): CPT

## 2025-01-12 PROCEDURE — 96374 THER/PROPH/DIAG INJ IV PUSH: CPT

## 2025-01-12 PROCEDURE — 86923 COMPATIBILITY TEST ELECTRIC: CPT

## 2025-01-12 PROCEDURE — 96361 HYDRATE IV INFUSION ADD-ON: CPT

## 2025-01-12 PROCEDURE — 2500000003 HC RX 250 WO HCPCS: Performed by: EMERGENCY MEDICINE

## 2025-01-12 PROCEDURE — 96365 THER/PROPH/DIAG IV INF INIT: CPT

## 2025-01-12 PROCEDURE — 85025 COMPLETE CBC W/AUTO DIFF WBC: CPT

## 2025-01-12 PROCEDURE — 96366 THER/PROPH/DIAG IV INF ADDON: CPT

## 2025-01-12 PROCEDURE — 99285 EMERGENCY DEPT VISIT HI MDM: CPT

## 2025-01-12 PROCEDURE — 83880 ASSAY OF NATRIURETIC PEPTIDE: CPT

## 2025-01-12 PROCEDURE — 36415 COLL VENOUS BLD VENIPUNCTURE: CPT

## 2025-01-12 PROCEDURE — 93005 ELECTROCARDIOGRAM TRACING: CPT | Performed by: EMERGENCY MEDICINE

## 2025-01-12 PROCEDURE — 80053 COMPREHEN METABOLIC PANEL: CPT

## 2025-01-12 PROCEDURE — 96368 THER/DIAG CONCURRENT INF: CPT

## 2025-01-12 PROCEDURE — 96367 TX/PROPH/DG ADDL SEQ IV INF: CPT

## 2025-01-12 PROCEDURE — 86850 RBC ANTIBODY SCREEN: CPT

## 2025-01-12 PROCEDURE — 6360000002 HC RX W HCPCS: Performed by: EMERGENCY MEDICINE

## 2025-01-12 PROCEDURE — 2580000003 HC RX 258: Performed by: EMERGENCY MEDICINE

## 2025-01-12 PROCEDURE — 87040 BLOOD CULTURE FOR BACTERIA: CPT

## 2025-01-12 PROCEDURE — 96375 TX/PRO/DX INJ NEW DRUG ADDON: CPT

## 2025-01-12 PROCEDURE — 86900 BLOOD TYPING SEROLOGIC ABO: CPT

## 2025-01-12 PROCEDURE — 84145 PROCALCITONIN (PCT): CPT

## 2025-01-12 PROCEDURE — 84484 ASSAY OF TROPONIN QUANT: CPT

## 2025-01-12 PROCEDURE — 87636 SARSCOV2 & INF A&B AMP PRB: CPT

## 2025-01-12 RX ORDER — CALCIUM GLUCONATE 20 MG/ML
1000 INJECTION, SOLUTION INTRAVENOUS ONCE
Status: COMPLETED | OUTPATIENT
Start: 2025-01-12 | End: 2025-01-12

## 2025-01-12 RX ORDER — 0.9 % SODIUM CHLORIDE 0.9 %
30 INTRAVENOUS SOLUTION INTRAVENOUS ONCE
Status: COMPLETED | OUTPATIENT
Start: 2025-01-12 | End: 2025-01-12

## 2025-01-12 RX ORDER — WATER 10 ML/10ML
INJECTION INTRAMUSCULAR; INTRAVENOUS; SUBCUTANEOUS
Status: DISCONTINUED
Start: 2025-01-12 | End: 2025-01-12 | Stop reason: HOSPADM

## 2025-01-12 RX ORDER — CALCIUM GLUCONATE 94 MG/ML
2000 INJECTION, SOLUTION INTRAVENOUS ONCE
Status: DISCONTINUED | OUTPATIENT
Start: 2025-01-12 | End: 2025-01-12 | Stop reason: SDUPTHER

## 2025-01-12 RX ORDER — SODIUM CHLORIDE 9 MG/ML
INJECTION, SOLUTION INTRAVENOUS PRN
Status: DISCONTINUED | OUTPATIENT
Start: 2025-01-12 | End: 2025-01-12 | Stop reason: HOSPADM

## 2025-01-12 RX ORDER — FUROSEMIDE 10 MG/ML
20 INJECTION INTRAMUSCULAR; INTRAVENOUS
Status: COMPLETED | OUTPATIENT
Start: 2025-01-12 | End: 2025-01-12

## 2025-01-12 RX ORDER — ACETAMINOPHEN 500 MG
1000 TABLET ORAL 3 TIMES DAILY PRN
COMMUNITY

## 2025-01-12 RX ADMIN — LORAZEPAM 0.5 MG: 2 INJECTION INTRAMUSCULAR; INTRAVENOUS at 18:27

## 2025-01-12 RX ADMIN — SODIUM CHLORIDE 1000 ML: 9 INJECTION, SOLUTION INTRAVENOUS at 16:52

## 2025-01-12 RX ADMIN — PIPERACILLIN AND TAZOBACTAM 3375 MG: 3; .375 INJECTION, POWDER, LYOPHILIZED, FOR SOLUTION INTRAVENOUS at 20:05

## 2025-01-12 RX ADMIN — FUROSEMIDE 20 MG: 10 INJECTION, SOLUTION INTRAMUSCULAR; INTRAVENOUS at 19:17

## 2025-01-12 RX ADMIN — WATER 1000 MG: 1 INJECTION INTRAMUSCULAR; INTRAVENOUS; SUBCUTANEOUS at 18:07

## 2025-01-12 RX ADMIN — LORAZEPAM 0.5 MG: 2 INJECTION INTRAMUSCULAR; INTRAVENOUS at 20:49

## 2025-01-12 RX ADMIN — CALCIUM GLUCONATE 1000 MG: 20 INJECTION, SOLUTION INTRAVENOUS at 19:11

## 2025-01-12 RX ADMIN — CALCIUM GLUCONATE 1000 MG: 20 INJECTION, SOLUTION INTRAVENOUS at 18:27

## 2025-01-12 RX ADMIN — AZITHROMYCIN DIHYDRATE 500 MG: 500 INJECTION, POWDER, LYOPHILIZED, FOR SOLUTION INTRAVENOUS at 18:37

## 2025-01-12 ASSESSMENT — PAIN - FUNCTIONAL ASSESSMENT: PAIN_FUNCTIONAL_ASSESSMENT: NONE - DENIES PAIN

## 2025-01-12 NOTE — ED NOTES
EMS directed to room 10 and to wait with patient until staff is available to take report.  EMS offered to due EKG while they waited, I thanked EMS but advised that staff will get it due to we have to scan the patient, Per EMS well he is going to need it anyway and he can be monitored while waiting, I advised EMS not to take patient off of there monitor and EMS stated he is not on my monitor.  EMS proceed to say to other staff member \"Why would I bring him in on my monitor\".

## 2025-01-12 NOTE — ED PROVIDER NOTES
fatigue.  Patient has elected not to have aggressive diagnostic testing or treatment of the malignancy or his recurrent right pleural effusion.    ED Course:   Initial assessment performed. The patients presenting problems have been discussed, and they are in agreement with the care plan formulated and outlined with them.  I have encouraged them to ask questions as they arise throughout their visit.    ED Course as of 01/12/25 1857   Sun Jan 12, 2025 1856 [facility] called and stated that the hospitalist at [facility] Dr. Fernández had read the chart labs and treatment and accepted the patient as transfer to [facility]. [CH]      ED Course User Index  [CH] El Curry MD     ED EKG interpretation:16:37  Rhythm: normal sinus rhythm; and regular . Rate (approx.): 98; Axis: normal; KY Interval: normal; QRS interval: normal ; ST/T wave: non-specific changes; This EKG was interpreted by El Curry MD,ED Provider.     PROGRESS NOTE          Pt reevaluated.  Right pleural effusion is reaccumulated.  Near complete opacification of right hemithorax.  Given WBC elevated at 21 there is most likely pneumonic component as well.  Acute on chronic anemia with hemoglobin of 6.6.  Hemoglobin 8.2 on October 23.  Transfusing 1 unit of PRBCs.  Normal lactate at 1.9.  No suspicion of sepsis.  Negative high-sensitivity troponin.  No suspicion of ACS.  Initially started ceftriaxone and Zithromax.  Adding Zosyn.     Hypocalcemia at 6.4 gave 2 g of IV calcium gluconate.      Reviewed results with patient.  Although he did not wish for aggressive treatment and diagnostic procedures previously he still wished to be transferred to [facility] for further evaluation and possible thoracentesis for comfort.  Contacted [facility] [place] [facility] for possible transfer to [facility] where he was previously hospitalized.  Discussed plan with power of

## 2025-01-12 NOTE — CONSENT
Informed Consent for Blood Component Transfusion Note    I have discussed with the patient the rationale for blood component transfusion; its benefits in treating or preventing fatigue, organ damage, or death; and its risk which includes mild transfusion reactions, rare risk of blood borne infection, or more serious but rare reactions. I have discussed the alternatives to transfusion, including the risk and consequences of not receiving transfusion. The patient had an opportunity to ask questions and had agreed to proceed with transfusion of blood components.    Electronically signed by El Curry MD on 1/12/25 at 5:28 PM EST

## 2025-01-13 ENCOUNTER — APPOINTMENT (OUTPATIENT)
Facility: HOSPITAL | Age: 80
DRG: 871 | End: 2025-01-13
Attending: INTERNAL MEDICINE
Payer: MEDICARE

## 2025-01-13 ENCOUNTER — APPOINTMENT (OUTPATIENT)
Facility: HOSPITAL | Age: 80
DRG: 871 | End: 2025-01-13
Attending: STUDENT IN AN ORGANIZED HEALTH CARE EDUCATION/TRAINING PROGRAM
Payer: MEDICARE

## 2025-01-13 PROBLEM — J90 PLEURAL EFFUSION: Status: ACTIVE | Noted: 2025-01-13

## 2025-01-13 LAB
ABO + RH BLD: NORMAL
ALBUMIN SERPL-MCNC: 1.3 G/DL (ref 3.5–5)
ALBUMIN/GLOB SERPL: 0.3 (ref 1.1–2.2)
ALP SERPL-CCNC: 104 U/L (ref 45–117)
ALT SERPL-CCNC: 7 U/L (ref 12–78)
ANION GAP SERPL CALC-SCNC: 8 MMOL/L (ref 2–12)
AST SERPL-CCNC: 19 U/L (ref 15–37)
BASOPHILS # BLD: 0.04 K/UL (ref 0–0.1)
BASOPHILS NFR BLD: 0.2 % (ref 0–1)
BILIRUB SERPL-MCNC: 0.5 MG/DL (ref 0.2–1)
BLOOD GROUP ANTIBODIES SERPL: NORMAL
BUN SERPL-MCNC: 10 MG/DL (ref 6–20)
BUN/CREAT SERPL: 15 (ref 12–20)
CALCIUM SERPL-MCNC: 6.5 MG/DL (ref 8.5–10.1)
CHLORIDE SERPL-SCNC: 108 MMOL/L (ref 97–108)
CO2 SERPL-SCNC: 25 MMOL/L (ref 21–32)
CREAT SERPL-MCNC: 0.66 MG/DL (ref 0.7–1.3)
DIFFERENTIAL METHOD BLD: ABNORMAL
EKG ATRIAL RATE: 105 BPM
EKG DIAGNOSIS: NORMAL
EKG P AXIS: 58 DEGREES
EKG P-R INTERVAL: 136 MS
EKG Q-T INTERVAL: 340 MS
EKG QRS DURATION: 76 MS
EKG QTC CALCULATION (BAZETT): 449 MS
EKG R AXIS: 75 DEGREES
EKG T AXIS: -9 DEGREES
EKG VENTRICULAR RATE: 105 BPM
EOSINOPHIL # BLD: 0.02 K/UL (ref 0–0.4)
EOSINOPHIL NFR BLD: 0.1 % (ref 0–7)
ERYTHROCYTE [DISTWIDTH] IN BLOOD BY AUTOMATED COUNT: 24.4 % (ref 11.5–14.5)
FERRITIN SERPL-MCNC: 1108 NG/ML (ref 26–388)
GLOBULIN SER CALC-MCNC: 4.9 G/DL (ref 2–4)
GLUCOSE SERPL-MCNC: 88 MG/DL (ref 65–100)
HCT VFR BLD AUTO: 23.2 % (ref 36.6–50.3)
HEMOCCULT STL QL: NEGATIVE
HGB BLD-MCNC: 7.1 G/DL (ref 12.1–17)
IMM GRANULOCYTES # BLD AUTO: 0.17 K/UL (ref 0–0.04)
IMM GRANULOCYTES NFR BLD AUTO: 0.8 % (ref 0–0.5)
IRON SATN MFR SERPL: 39 % (ref 20–50)
IRON SERPL-MCNC: 28 UG/DL (ref 35–150)
LYMPHOCYTES # BLD: 1.3 K/UL (ref 0.8–3.5)
LYMPHOCYTES NFR BLD: 6.1 % (ref 12–49)
MCH RBC QN AUTO: 27.8 PG (ref 26–34)
MCHC RBC AUTO-ENTMCNC: 30.6 G/DL (ref 30–36.5)
MCV RBC AUTO: 91 FL (ref 80–99)
MONOCYTES # BLD: 1.34 K/UL (ref 0–1)
MONOCYTES NFR BLD: 6.3 % (ref 5–13)
NEUTS SEG # BLD: 18.43 K/UL (ref 1.8–8)
NEUTS SEG NFR BLD: 86.5 % (ref 32–75)
NRBC # BLD: 0 K/UL (ref 0–0.01)
NRBC BLD-RTO: 0 PER 100 WBC
NT PRO BNP: ABNORMAL PG/ML
PLATELET # BLD AUTO: 346 K/UL (ref 150–400)
PLATELET COMMENT: ABNORMAL
PMV BLD AUTO: 11 FL (ref 8.9–12.9)
POTASSIUM SERPL-SCNC: 3.1 MMOL/L (ref 3.5–5.1)
PROCALCITONIN SERPL-MCNC: 45.67 NG/ML
PROCALCITONIN SERPL-MCNC: 49.86 NG/ML
PROT SERPL-MCNC: 6.2 G/DL (ref 6.4–8.2)
RBC # BLD AUTO: 2.55 M/UL (ref 4.1–5.7)
RBC MORPH BLD: ABNORMAL
SODIUM SERPL-SCNC: 141 MMOL/L (ref 136–145)
SPECIMEN EXP DATE BLD: NORMAL
TIBC SERPL-MCNC: 72 UG/DL (ref 250–450)
WBC # BLD AUTO: 21.3 K/UL (ref 4.1–11.1)
WBC MORPH BLD: ABNORMAL

## 2025-01-13 PROCEDURE — 6370000000 HC RX 637 (ALT 250 FOR IP): Performed by: PHYSICIAN ASSISTANT

## 2025-01-13 PROCEDURE — 82728 ASSAY OF FERRITIN: CPT

## 2025-01-13 PROCEDURE — 84145 PROCALCITONIN (PCT): CPT

## 2025-01-13 PROCEDURE — 85025 COMPLETE CBC W/AUTO DIFF WBC: CPT

## 2025-01-13 PROCEDURE — 76604 US EXAM CHEST: CPT

## 2025-01-13 PROCEDURE — 71250 CT THORAX DX C-: CPT

## 2025-01-13 PROCEDURE — 86850 RBC ANTIBODY SCREEN: CPT

## 2025-01-13 PROCEDURE — 83550 IRON BINDING TEST: CPT

## 2025-01-13 PROCEDURE — 6360000002 HC RX W HCPCS: Performed by: STUDENT IN AN ORGANIZED HEALTH CARE EDUCATION/TRAINING PROGRAM

## 2025-01-13 PROCEDURE — 1100000003 HC PRIVATE W/ TELEMETRY

## 2025-01-13 PROCEDURE — 82272 OCCULT BLD FECES 1-3 TESTS: CPT

## 2025-01-13 PROCEDURE — 83540 ASSAY OF IRON: CPT

## 2025-01-13 PROCEDURE — 2580000003 HC RX 258: Performed by: STUDENT IN AN ORGANIZED HEALTH CARE EDUCATION/TRAINING PROGRAM

## 2025-01-13 PROCEDURE — 36415 COLL VENOUS BLD VENIPUNCTURE: CPT

## 2025-01-13 PROCEDURE — 2500000003 HC RX 250 WO HCPCS: Performed by: STUDENT IN AN ORGANIZED HEALTH CARE EDUCATION/TRAINING PROGRAM

## 2025-01-13 PROCEDURE — 86901 BLOOD TYPING SEROLOGIC RH(D): CPT

## 2025-01-13 PROCEDURE — 6370000000 HC RX 637 (ALT 250 FOR IP): Performed by: STUDENT IN AN ORGANIZED HEALTH CARE EDUCATION/TRAINING PROGRAM

## 2025-01-13 PROCEDURE — 2700000000 HC OXYGEN THERAPY PER DAY

## 2025-01-13 PROCEDURE — 94640 AIRWAY INHALATION TREATMENT: CPT

## 2025-01-13 PROCEDURE — 6360000002 HC RX W HCPCS: Performed by: INTERNAL MEDICINE

## 2025-01-13 PROCEDURE — 86900 BLOOD TYPING SEROLOGIC ABO: CPT

## 2025-01-13 PROCEDURE — 83880 ASSAY OF NATRIURETIC PEPTIDE: CPT

## 2025-01-13 PROCEDURE — 93005 ELECTROCARDIOGRAM TRACING: CPT | Performed by: INTERNAL MEDICINE

## 2025-01-13 PROCEDURE — 80053 COMPREHEN METABOLIC PANEL: CPT

## 2025-01-13 PROCEDURE — 6370000000 HC RX 637 (ALT 250 FOR IP): Performed by: INTERNAL MEDICINE

## 2025-01-13 RX ORDER — LORAZEPAM 1 MG/1
1 TABLET ORAL 2 TIMES DAILY PRN
Status: DISCONTINUED | OUTPATIENT
Start: 2025-01-13 | End: 2025-01-16

## 2025-01-13 RX ORDER — ONDANSETRON 2 MG/ML
4 INJECTION INTRAMUSCULAR; INTRAVENOUS EVERY 6 HOURS PRN
Status: DISCONTINUED | OUTPATIENT
Start: 2025-01-13 | End: 2025-01-18 | Stop reason: HOSPADM

## 2025-01-13 RX ORDER — IPRATROPIUM BROMIDE AND ALBUTEROL SULFATE 2.5; .5 MG/3ML; MG/3ML
1 SOLUTION RESPIRATORY (INHALATION)
Status: DISCONTINUED | OUTPATIENT
Start: 2025-01-13 | End: 2025-01-14

## 2025-01-13 RX ORDER — ACETAMINOPHEN 325 MG/1
650 TABLET ORAL EVERY 6 HOURS PRN
Status: DISCONTINUED | OUTPATIENT
Start: 2025-01-13 | End: 2025-01-18 | Stop reason: HOSPADM

## 2025-01-13 RX ORDER — ZOLPIDEM TARTRATE 5 MG/1
5 TABLET ORAL NIGHTLY PRN
Status: DISCONTINUED | OUTPATIENT
Start: 2025-01-13 | End: 2025-01-13

## 2025-01-13 RX ORDER — PANTOPRAZOLE SODIUM 40 MG/1
40 TABLET, DELAYED RELEASE ORAL
Status: DISCONTINUED | OUTPATIENT
Start: 2025-01-13 | End: 2025-01-13

## 2025-01-13 RX ORDER — LORAZEPAM 2 MG/ML
1 INJECTION INTRAMUSCULAR EVERY 6 HOURS PRN
Status: DISCONTINUED | OUTPATIENT
Start: 2025-01-13 | End: 2025-01-15

## 2025-01-13 RX ORDER — LEVOFLOXACIN 5 MG/ML
750 INJECTION, SOLUTION INTRAVENOUS EVERY 24 HOURS
Status: DISCONTINUED | OUTPATIENT
Start: 2025-01-13 | End: 2025-01-16

## 2025-01-13 RX ORDER — GAUZE BANDAGE 2" X 2"
100 BANDAGE TOPICAL DAILY
Status: DISCONTINUED | OUTPATIENT
Start: 2025-01-13 | End: 2025-01-16

## 2025-01-13 RX ORDER — GUAIFENESIN 600 MG/1
600 TABLET, EXTENDED RELEASE ORAL 2 TIMES DAILY
Status: DISCONTINUED | OUTPATIENT
Start: 2025-01-13 | End: 2025-01-16

## 2025-01-13 RX ORDER — OXYCODONE HYDROCHLORIDE 5 MG/1
2.5 TABLET ORAL EVERY 8 HOURS PRN
Status: DISCONTINUED | OUTPATIENT
Start: 2025-01-13 | End: 2025-01-16

## 2025-01-13 RX ORDER — FOLIC ACID 1 MG/1
1 TABLET ORAL DAILY
Status: DISCONTINUED | OUTPATIENT
Start: 2025-01-13 | End: 2025-01-16

## 2025-01-13 RX ORDER — SODIUM CHLORIDE 0.9 % (FLUSH) 0.9 %
5-40 SYRINGE (ML) INJECTION EVERY 12 HOURS SCHEDULED
Status: DISCONTINUED | OUTPATIENT
Start: 2025-01-13 | End: 2025-01-16

## 2025-01-13 RX ORDER — ACETAMINOPHEN 650 MG/1
650 SUPPOSITORY RECTAL EVERY 6 HOURS PRN
Status: DISCONTINUED | OUTPATIENT
Start: 2025-01-13 | End: 2025-01-18 | Stop reason: HOSPADM

## 2025-01-13 RX ORDER — SODIUM CHLORIDE 0.9 % (FLUSH) 0.9 %
5-40 SYRINGE (ML) INJECTION PRN
Status: DISCONTINUED | OUTPATIENT
Start: 2025-01-13 | End: 2025-01-18 | Stop reason: HOSPADM

## 2025-01-13 RX ORDER — METRONIDAZOLE 500 MG/100ML
500 INJECTION, SOLUTION INTRAVENOUS EVERY 8 HOURS
Status: DISCONTINUED | OUTPATIENT
Start: 2025-01-13 | End: 2025-01-16

## 2025-01-13 RX ORDER — ZOLPIDEM TARTRATE 5 MG/1
10 TABLET ORAL NIGHTLY
Status: DISCONTINUED | OUTPATIENT
Start: 2025-01-13 | End: 2025-01-16

## 2025-01-13 RX ORDER — ENOXAPARIN SODIUM 100 MG/ML
40 INJECTION SUBCUTANEOUS DAILY
Status: DISCONTINUED | OUTPATIENT
Start: 2025-01-13 | End: 2025-01-13

## 2025-01-13 RX ORDER — SODIUM CHLORIDE 9 MG/ML
INJECTION, SOLUTION INTRAVENOUS PRN
Status: DISCONTINUED | OUTPATIENT
Start: 2025-01-13 | End: 2025-01-18 | Stop reason: HOSPADM

## 2025-01-13 RX ADMIN — ACETAMINOPHEN 650 MG: 325 TABLET ORAL at 16:54

## 2025-01-13 RX ADMIN — METRONIDAZOLE 500 MG: 500 INJECTION, SOLUTION INTRAVENOUS at 04:32

## 2025-01-13 RX ADMIN — OXYCODONE 2.5 MG: 5 TABLET ORAL at 11:21

## 2025-01-13 RX ADMIN — ACETAMINOPHEN 650 MG: 325 TABLET ORAL at 19:46

## 2025-01-13 RX ADMIN — SODIUM CHLORIDE 40 MG: 9 INJECTION INTRAMUSCULAR; INTRAVENOUS; SUBCUTANEOUS at 11:14

## 2025-01-13 RX ADMIN — METRONIDAZOLE 500 MG: 500 INJECTION, SOLUTION INTRAVENOUS at 11:33

## 2025-01-13 RX ADMIN — GUAIFENESIN 600 MG: 600 TABLET ORAL at 19:45

## 2025-01-13 RX ADMIN — OXYCODONE 2.5 MG: 5 TABLET ORAL at 19:45

## 2025-01-13 RX ADMIN — IPRATROPIUM BROMIDE AND ALBUTEROL SULFATE 1 DOSE: .5; 3 SOLUTION RESPIRATORY (INHALATION) at 15:29

## 2025-01-13 RX ADMIN — LEVOFLOXACIN 750 MG: 5 INJECTION, SOLUTION INTRAVENOUS at 04:32

## 2025-01-13 RX ADMIN — THIAMINE HCL TAB 100 MG 100 MG: 100 TAB at 11:14

## 2025-01-13 RX ADMIN — ZOLPIDEM TARTRATE 10 MG: 5 TABLET, FILM COATED ORAL at 19:45

## 2025-01-13 RX ADMIN — ACETAMINOPHEN 650 MG: 325 TABLET ORAL at 01:20

## 2025-01-13 RX ADMIN — METRONIDAZOLE 500 MG: 500 INJECTION, SOLUTION INTRAVENOUS at 19:48

## 2025-01-13 RX ADMIN — SODIUM CHLORIDE, PRESERVATIVE FREE 10 ML: 5 INJECTION INTRAVENOUS at 19:46

## 2025-01-13 RX ADMIN — SODIUM CHLORIDE, PRESERVATIVE FREE 10 ML: 5 INJECTION INTRAVENOUS at 11:15

## 2025-01-13 RX ADMIN — FOLIC ACID 1 MG: 1 TABLET ORAL at 11:14

## 2025-01-13 RX ADMIN — LORAZEPAM 1 MG: 2 INJECTION INTRAMUSCULAR; INTRAVENOUS at 15:22

## 2025-01-13 ASSESSMENT — PAIN DESCRIPTION - DESCRIPTORS
DESCRIPTORS: ACHING

## 2025-01-13 ASSESSMENT — PAIN SCALES - GENERAL
PAINLEVEL_OUTOF10: 7
PAINLEVEL_OUTOF10: 6
PAINLEVEL_OUTOF10: 0
PAINLEVEL_OUTOF10: 6
PAINLEVEL_OUTOF10: 3
PAINLEVEL_OUTOF10: 2

## 2025-01-13 ASSESSMENT — PAIN DESCRIPTION - ORIENTATION
ORIENTATION: RIGHT
ORIENTATION: RIGHT;LEFT
ORIENTATION: ANTERIOR

## 2025-01-13 ASSESSMENT — PAIN - FUNCTIONAL ASSESSMENT: PAIN_FUNCTIONAL_ASSESSMENT: ACTIVITIES ARE NOT PREVENTED

## 2025-01-13 ASSESSMENT — PAIN DESCRIPTION - LOCATION
LOCATION: HAND
LOCATION: HEAD
LOCATION: CHEST

## 2025-01-13 NOTE — ED NOTES
TRANSFER - OUT REPORT:    Verbal report given to Martha Funes RN on Scott Gardiner  being transferred to [facility] for routine progression of patient care       Report consisted of patient's Situation, Background, Assessment and   Recommendations(SBAR).     Information from the following report(s) Nurse Handoff Report, ED SBAR, MAR, Recent Results, Cardiac Rhythm NSR, and Neuro Assessment was reviewed with the receiving nurse.    [place] Fall Assessment:    Presents to emergency department  because of falls (Syncope, seizure, or loss of consciousness): No  Age > 70: Yes  Altered Mental Status, Intoxication with alcohol or substance confusion (Disorientation, impaired judgment, poor safety awaremess, or inability to follow instructions): No  Impaired Mobility: Ambulates or transfers with assistive devices or assistance; Unable to ambulate or transer.: Yes  Nursing Judgement: Yes          Lines:   Peripheral IV 01/12/25 Right Antecubital (Active)       Peripheral IV 01/12/25 Left Forearm (Active)        Opportunity for questions and clarification was provided.      Patient transported with:  Monitor and Tech, pharmacy

## 2025-01-13 NOTE — PLAN OF CARE
Problem: Chronic Conditions and Co-morbidities  Goal: Patient's chronic conditions and co-morbidity symptoms are monitored and maintained or improved  Outcome: Progressing  Flowsheets (Taken 1/12/2025 2330 by Martha Funes, RN)  Care Plan - Patient's Chronic Conditions and Co-Morbidity Symptoms are Monitored and Maintained or Improved: Monitor and assess patient's chronic conditions and comorbid symptoms for stability, deterioration, or improvement     Problem: Discharge Planning  Goal: Discharge to home or other facility with appropriate resources  Outcome: Progressing  Flowsheets (Taken 1/12/2025 2330 by Martha Funes, RN)  Discharge to home or other facility with appropriate resources:   Identify barriers to discharge with patient and caregiver   Arrange for needed discharge resources and transportation as appropriate     Problem: Skin/Tissue Integrity  Goal: Absence of new skin breakdown  Description: 1.  Monitor for areas of redness and/or skin breakdown  2.  Assess vascular access sites hourly  3.  Every 4-6 hours minimum:  Change oxygen saturation probe site  4.  Every 4-6 hours:  If on nasal continuous positive airway pressure, respiratory therapy assess nares and determine need for appliance change or resting period.  Outcome: Progressing     Problem: ABCDS Injury Assessment  Goal: Absence of physical injury  Outcome: Progressing  Flowsheets (Taken 1/13/2025 1101)  Absence of Physical Injury: Implement safety measures based on patient assessment     Problem: Safety - Adult  Goal: Free from fall injury  Outcome: Progressing  Flowsheets (Taken 1/13/2025 1101)  Free From Fall Injury: Instruct family/caregiver on patient safety     Problem: Pain  Goal: Verbalizes/displays adequate comfort level or baseline comfort level  Outcome: Progressing  Flowsheets (Taken 1/13/2025 1101)  Verbalizes/displays adequate comfort level or baseline comfort level:   Encourage patient to monitor pain and request assistance    Administer analgesics based on type and severity of pain and evaluate response   Consider cultural and social influences on pain and pain management   Assess pain using appropriate pain scale   Implement non-pharmacological measures as appropriate and evaluate response   Notify Licensed Independent Practitioner if interventions unsuccessful or patient reports new pain

## 2025-01-13 NOTE — PROGRESS NOTES
Patient down in IR for ordered thoracentesis. No fluid seen on ultrasound to drain per MD Wilcox.    Primary nurse Nikki notified.

## 2025-01-13 NOTE — PROGRESS NOTES
This RN (Charge) delivered patient's home Lorazepam (x2) & Oxycodone (x1) bottles to pharmacy for locked storage while patient is in the hospital.This RN and Pharmacist Ruddy counted and signed appropriate paper documentation for all bottles of medication listed above. This RN left message on patient's white board as reminder that home medications (listed above) are with pharmacy.

## 2025-01-13 NOTE — ED NOTES
[facility] dispatch notified of ALS transport from [facility] ED bed 10 to [facility] Rm 2326. Spoke with Will at dispatch. Provided information needed for transport reservation. Pt will be on cardiac monitor and will need IV. Pt is currently getting blood transfusion. Advised dispatch that pt may still be getting blood transfusion at time of transfer. Pt in negative for COVID-19. Advised that pt shoul have contact precautions for Hx of ESBL and MRSA. This writer was given ETA of 0718 to 5651. ED nurse, NIKOLAI Washington notified of ETA.

## 2025-01-13 NOTE — PROGRESS NOTES
End of Shift Note    Bedside shift change report given to Nikki Rowland RN (oncoming nurse) by Martha Funes RN (offgoing nurse).  Report included the following information SBAR, Kardex, MAR, and Recent Results    Shift worked:  [id number]     Shift summary and any significant changes:    none     Concerns for physician to address:  Pt wants to discuss pain meds and sleeping aid with MD.     Zone phone for oncoming shift:   [phone]       Activity:     Number times ambulated in hallways past shift: 0  Number of times OOB to chair past shift: 0    Cardiac:   Cardiac Monitoring: Yes      Cardiac Rhythm: Sinus rhythm with PVC, Sinus tachy    Access:  Current line(s): PIV     Genitourinary:   Urinary Status: External catheter    Respiratory:   O2 Device: Nasal cannula  Chronic home O2 use?: YES  Incentive spirometer at bedside: N/A    GI:  Last BM (including prior to admit): 01/13/25  Current diet:  ADULT DIET; Easy to Chew  Passing flatus: YES    Pain Management:   Patient states pain is manageable on current regimen: YES    Skin:  Srinivasan Scale Score: 15  Interventions: Wound Offloading (Prevention Methods): Bed, pressure reduction mattress, Elevate heels, Pillows, Repositioning, Turning    Patient Safety:  Fall Risk:    Fall Risk Interventions  Toilet Every 2 Hours-In Advance of Need: Yes  Hourly Visual Checks: Awake, In bed  Fall Visual Posted: Fall sign posted, Socks  Room Door Open: Yes  Alarm On: Bed  Patient Moved Closer to Nursing Station: No    Active Consults:   IP CONSULT TO PALLIATIVE CARE  IP CONSULT TO PULMONOLOGY  IP CONSULT TO INTERVENTIONAL RADIOLOGY    Length of Stay:  Expected LOS: 3  Actual LOS: 1    Martha Funes RN

## 2025-01-13 NOTE — CONSULTS
Pulmonary, Critical Care, and Sleep Medicine~Consult Note    Name: Scott Gardiner MRN: [id number]   : 1945 Hospital: [facility]   Date: 2025 9:04 AM Admission: 2025     Impression Plan   Chronic hypoxemic respiratory failure   Near complete R hemithorax collapse  Pulmonary mass   Post obstructive pneumonia   Small bilateral pleural effusions   Suspected COPD, emphysema on CT  Former smoker, former alcohol abuse   Anemia  Continue supplemental O2 to maintain SpO2 >88%  IR consulted for thoracentesis however not enough fluid to drain   On levaquin and flagyl   Sputum culture if able   Pulmonary toilet   Discussed palliative care but patient not interested   Discussed with Dr. Garcias- planning EBUS will arrange with scheduling for next available time     Thank you for this consult will follow.     Daily Progression:    Consult Note    79 yom with pmhx significant for suspected COPD, HTN, HLD, GERD, CVA, former tobacco us, former alcohol abuse presented to the ED  with increased shortness of breath. Of note he was admitted 10/2024 with large pleural effusion. Cytology concerning for metastatic process of lung origin but not diagnostic. Underwent bronch with no malignant cells. In the ED, labs notable for WBC 21, Hg 6.6, BNP 01547. Proal 45.67. COVID and flu negative. CXR with increased opacification of R hemithorax. Satting well on 2L NC which is baseline. Tachycardic ~100.     Patient states he had increased dyspnea the last 5-7 days. Endorses cough but it is improved from previously. Sputum is light yellow. Endorses 20+ pound weight loss since October. Denies fever, chills.     Patient had phone visit with Dr. Ravi and EBUS +/- navigational bronch was recommended however patient severely debilitated and may not be a candidate.     Social hx: former smoker- 60 pack year history, quit 7 years ago.  Family hx: denies fhx of COPD/lung cancer    I    BUN 10 10   ALT 9* 7*        Pertinent Labs                Shanta Hanna PA-C  1/13/2025

## 2025-01-13 NOTE — PROGRESS NOTES
Comprehensive Nutrition Assessment    Type and Reason for Visit:  Initial, Positive nutrition screen    Nutrition Recommendations/Plan:   Easy to chew diet  Ensure Plus TID  Please document % meals and supplements consumed in flowsheet I/O's under intake      Malnutrition Assessment:  Malnutrition Status:  Severe malnutrition (01/13/25 1540)    Context:  Chronic Illness     Findings of the 6 clinical characteristics of malnutrition:  Energy Intake:  Mild decrease in energy intake  Weight Loss:  No weight loss     Body Fat Loss:  Severe body fat loss Orbital, Triceps, Fat Overlying Ribs, Buccal region   Muscle Mass Loss:  Severe muscle mass loss Temples (temporalis), Clavicles (pectoralis & deltoids), Thigh (quadriceps), Calf (gastrocnemius), Hand (interosseous), Scapula (trapezius)  Fluid Accumulation:  Mild Extremities   Strength:  Not Performed    Nutrition Assessment:     Chart reviewed for MST. Pt known to our team from previous admission. He is readmitted with recurrent pleural effusion r/t suspected malignancy, acute hypoxic respiratory failure, COPD, anemia. Pt resting peacefully when RD attempted to visit today, so I did not attempt to wake him. He is cachectic but his weight has trended up 2/2 fluid (if current wt is accurate). Will resume supplements per previous orders and continue monitoring. Severe malnutrition status remains applicable.     No PO intake yet documented.     Wt Readings from Last 10 Encounters:   01/12/25 54.9 kg (121 lb 0.5 oz)   01/12/25 55.9 kg (123 lb 3.2 oz)   10/30/24 46.5 kg (102 lb 8.2 oz)   10/11/24 49.9 kg (110 lb)   10/11/24 50 kg (110 lb 3.2 oz)   04/12/24 57.2 kg (126 lb 3.2 oz)   11/21/22 58.6 kg (129 lb 3.2 oz)   07/18/22 59.7 kg (131 lb 9.6 oz)   02/21/22 61.1 kg (134 lb 9.6 oz)   01/24/22 62.2 kg (137 lb 3.2 oz)   ]    Nutrition Related Findings:    Labs: K 3.1, Hgb 7.1.   Meds: Folic acid, Levaquin, Flagyl, Protonix, Thiamine, Roxicodone.   Edema: +1 BUE, +2 pitting

## 2025-01-13 NOTE — PROGRESS NOTES
0700: Bedside and Verbal shift change report given to Patricia Eng RN   (oncoming nurse) by NIKOLAI Thomas (offgoing nurse). Report included the following information Nurse Handoff Report, Index, Adult Overview, Intake/Output, MAR, Recent Results, and Cardiac Rhythm NSR-ST .     0830: Patient off floor to CT scan.    1050: Patient back on floor from IR.    1500: Called rapid response nurse to bedside to assess patient. Patient complaining of hallucinations and blurred vision. Patient VSS and notes no pain. He reports this is new. MD notified and en route to bedside.    1522: new orders placed for Mucinex, Duoneb and prn Ativan IV. PRN Ativan given.     1600: Patient reports tremors in has hands which he states he has had at home prior to admission. Patient requested Tylenol to help with these.    1700: Patient reports the Ativan helped minimally. Patient informed that a new medication will be given tonight to help him sleep. He would like this as early as possible. Patient had to be fed dinner due to his increased tremors.    1800: Provider will increase Ativan dosage to see if this improves his symptoms.    End of Shift Note    Bedside shift change report given to NIKOLAI Jordan (oncoming nurse) by Patricia Eng RN (offgoing nurse).  Report included the following information SBAR, Kardex, Intake/Output, MAR, Recent Results, and Cardiac Rhythm NSR-ST    Shift worked:  7a-7p     Shift summary and any significant changes:     See above note     Concerns for physician to address:  Discharge planning     Zone phone for oncoming shift:   N/a       Activity:  Level of Assistance: Moderate assist, patient does 50-74%  Number times ambulated in hallways past shift: 0  Number of times OOB to chair past shift: 0    Cardiac:   Cardiac Monitoring: Yes      Cardiac Rhythm: Sinus rhythm with PVC, Sinus tachy    Access:  Current line(s): PIV     Genitourinary:   Urinary Status: Voiding, External

## 2025-01-13 NOTE — CONSULTS
Palliative Medicine  Patient Name: Scott Gardiner  YOB: 1945  MRN: [id number]  Age: 79 y.o.  Gender: male    Due to high consult volume- this patient will be addressed in the next 24-48 hours    JOE Escalante - NP

## 2025-01-13 NOTE — H&P
Hospitalist Admission Note    NAME:Scott Gardiner   : 1945   MRN: [id number]     Date/Time: 2025 3:15 AM    Patient PCP: Karan Almonte MD    *Please be aware this note is formulated with assistance from Dragon voice-recognition dictation software. Please excuse any errors that may be present*    ______________________________________________________________________  Given the patient's current clinical presentation, I have a high level of concern for decompensation if discharged from the emergency department.  Complex decision making was performed, which includes reviewing the patient's available past medical records, laboratory results, and x-ray films.       My assessment of this patient's clinical condition and my plan of care is as follows.    Problem List:  Patient Active Problem List   Diagnosis    Intra-abdominal abscess (HCC)    Colovesical fistula    HTN (hypertension)    GERD (gastroesophageal reflux disease)    Tobacco abuse    Hypokalemia    Encounter for rehabilitation    B12 deficiency    Colonic mass    Hypomagnesemia    Neuropathy    Lung mass    Cystitis    Diarrhea    Leukocytosis    Macrocytic anemia    Eczema    Disorder of electrolytes    GI bleed    Essential hypertension    Esophageal stricture    Blurring of visual image    Cystitis, acute    Pleural effusion, right    Severe protein-calorie malnutrition (HCC)    Anxiety    Pleural effusion         Assessment / Plan:    Recurrent right pleural effusion, suspicion for malignant effusion   Acute hypoxic respiratory failure   COPD   Similar presentation 10/2024 - large right sided pleural effusion. Underwent thoracentesis with 1450cc of bloody pleural fluid drained exudative and lymphocytic, patho showing atypical cells, favor reactive bronchial epithelial cells with alveolar macrophages and mixed inflammation.   - Presented to [facility] today with increasing SOB, not feeling well   - CXR showing increased opacification of right  disease (HCC), Chronic pain, Claudication of calf muscles (HCC), Colovesical fistula, Esophageal stricture, Esophagitis, GI bleed, Hemochromatosis, Hyperlipidemia, Hypertension, Ill-defined condition, Peripheral artery disease (HCC), Pulmonary nodule, and Stroke (HCC). who presents with the above chief complaint.     We were asked to admit for work up and further evaluation.     Patient somewhat poor historian. Tangential answers. States that he went to the ED today  because he was not feeling well. Notes has been more SOB recently and has been breathing faster than usual. Also notes that has been fatigued and not felt like himself. Denies any fevers or chills. Notes has had some cough recently but no hemoptysis with this.     Past Medical History:   Diagnosis Date    Abuse     alcoholism    Aneurysm (HCC)     Chronic obstructive pulmonary disease (HCC)     pt denies    Chronic pain     neck /arthritis-injections    Claudication of calf muscles (HCC) 2013    Colovesical fistula     Dr Rincon    Esophageal stricture     Esophagitis     GI bleed 10/2016    Hemochromatosis     Hyperlipidemia     Hypertension     Ill-defined condition 2020    blood transfusion hx    Peripheral artery disease (HCC)     Dr. Zavala    Pulmonary nodule     right lung    Stroke (HCC)         Past Surgical History:   Procedure Laterality Date    AMPUTATION Left     transmetatarsal    AMPUTATION Left 07/2016    left 4th toe from gangrene    BRONCHOSCOPY N/A 10/22/2024    BRONCHOSCOPY performed by Sudhakar Kaiser MD at [facility] ENDOSCOPY    CATARACT REMOVAL Bilateral     COLONOSCOPY N/A 12/19/2016    COLONOSCOPY performed by Ramakrishna Morataya MD at [facility] ENDOSCOPY    COLONOSCOPY N/A 9/14/2016    COLONOSCOPY performed by Ramakrishna Morataya MD at [facility] ENDOSCOPY    COLONOSCOPY N/A 9/30/2020    COLONOSCOPY performed by Karan Lyle MD at [facility] ENDOSCOPY    OTHER SURGICAL HISTORY      partial transmetatarsal amputation, lt foot all toes amputated and rt foot has       General: No focal deficit present.      Mental Status: He is alert and oriented to person, place, and time.             LAB DATA REVIEWED:    Recent Results (from the past 12 hour(s))   EKG 12 Lead    Collection Time: 01/12/25  4:37 PM   Result Value Ref Range    Ventricular Rate 98 BPM    Atrial Rate 98 BPM    P-R Interval 200 ms    QRS Duration 74 ms    Q-T Interval 356 ms    QTc Calculation (Bazett) 454 ms    P Axis 42 degrees    R Axis 28 degrees    T Axis 1 degrees    Diagnosis       Normal sinus rhythm  Low voltage QRS  Nonspecific ST abnormality  Abnormal ECG  When compared with ECG of 12-OCT-2024 21:38,  premature atrial complexes are no longer present  Nonspecific T wave abnormality now evident in Inferior leads     CBC with Auto Differential    Collection Time: 01/12/25  4:39 PM   Result Value Ref Range    WBC 21.0 (H) 4.1 - 11.1 K/uL    RBC 2.23 (L) 4.10 - 5.70 M/uL    Hemoglobin 6.6 (LL) 12.1 - 17.0 g/dL    Hematocrit 21.9 (L) 36.6 - 50.3 %    MCV 98.2 80.0 - 99.0 FL    MCH 29.6 26.0 - 34.0 PG    MCHC 30.1 30.0 - 36.5 g/dL    RDW 21.4 (H) 11.5 - 14.5 %    Platelets 412 (H) 150 - 400 K/uL    MPV 10.9 8.9 - 12.9 FL    Nucleated RBCs 0.0 0  WBC    nRBC 0.00 0.00 - 0.01 K/uL    Neutrophils % 87.2 (H) 32.0 - 75.0 %    Lymphocytes % 5.4 (L) 12.0 - 49.0 %    Monocytes % 6.4 5.0 - 13.0 %    Eosinophils % 0.0 0.0 - 7.0 %    Basophils % 0.2 0.0 - 1.0 %    Immature Granulocytes % 0.8 (H) 0.0 - 0.5 %    Neutrophils Absolute 18.32 (H) 1.80 - 8.00 K/UL    Lymphocytes Absolute 1.13 0.80 - 3.50 K/UL    Monocytes Absolute 1.34 (H) 0.00 - 1.00 K/UL    Eosinophils Absolute 0.00 0.00 - 0.40 K/UL    Basophils Absolute 0.04 0.00 - 0.10 K/UL    Immature Granulocytes Absolute 0.17 (H) 0.00 - 0.04 K/UL    Differential Type AUTOMATED      RBC Comment ANISOCYTOSIS  2+       Comprehensive Metabolic Panel    Collection Time: 01/12/25  4:39 PM   Result Value Ref Range    Sodium 144 136 - 145 mmol/L    Potassium 3.7 3.5 -  electronic medical records for all procedures/Xrays and details which were not copied into this note but were reviewed prior to creation of Plan.

## 2025-01-13 NOTE — PROGRESS NOTES
Palliative Medicine  Per Dr. Devine: Scott Gardiner is a 79 y.o. male with a past medical history of hypertension, PAD s/p axilloaxillary bifemoral graft, large right pleural effusion,  chronic resp failure  on 2 litre oxygen, suspected COPD who was admitted on 1/12/2025 from with a diagnosis of acute hypoxic respiratory failure due to recurrent right pleural effusion, near complete right hemithorax collapse, pulmonary mass suspected malignancy, postobstructive pneumonia, suspected COPD emphysema on CT , EBUS on Friday , IR consulted for thoracentesis however not enough fluid to drain on antibiotics.  Pateint is notv   Patient admitted to this facility in November for similar presentation is a status post bronchoscopy, however pathology was not diagnostic patient did not follow-up with pulmonary office   per radiology there is no enough pleural fluid to drain  Dr. Fernández spoke with his niece and his sister and informed both of them that the patient is hospice appropriate and they are in agreement with me they will come over on Friday to convince the patient to proceed with hospice. Have a family meeting on Friday  Suspicion for malignant effusion presented with shortness of breath, chest x-ray complete malignant occlusion of right lung, nondiagnostic biopsy in October opacification of the right hemithorax, leukocytosis of 21 with left shift with shift possible underlying infection based on IV antibiotics, anemia with a hemoglobin of 6.6  Per pulmonary needs repeat bronchoscopy with EBUS for tissue diagnosis of note patient has a nondiagnostic biopsy in October.  Recent admissions: 10/12/2024 to 10/30/2024 for acute respiratory failure, large right pleural effusion, right upper lobe pulmonary mass s/p bronchial biopsy 1020 2/2024 pathology negative for malignant cells he was discharged to nursing skilled nursing facility in [facility] sepsis.     Code Status:  DDNR signed by the patient in 2019 is on file.   managed.  DDNR  EBUS 1/17  Palliative SW available for support 1/17.  Palliative team will follow for education, support and GOC discussions as appropriate.     Thank you for including Palliative Medicine in the care of Mr. Scott Gardiner.     Kymberly Gray, JOSE  [phone])

## 2025-01-14 LAB
ABO + RH BLD: NORMAL
BACTERIA SPEC CULT: ABNORMAL
BLD PROD TYP BPU: NORMAL
BLOOD BANK BLOOD PRODUCT EXPIRATION DATE: NORMAL
BLOOD BANK DISPENSE STATUS: NORMAL
BLOOD BANK ISBT PRODUCT BLOOD TYPE: 6200
BLOOD BANK UNIT TYPE AND RH: NORMAL
BLOOD GROUP ANTIBODIES SERPL: NORMAL
BPU ID: NORMAL
CROSSMATCH RESULT: NORMAL
EKG ATRIAL RATE: 98 BPM
EKG DIAGNOSIS: NORMAL
EKG P AXIS: 42 DEGREES
EKG P-R INTERVAL: 200 MS
EKG Q-T INTERVAL: 356 MS
EKG QRS DURATION: 74 MS
EKG QTC CALCULATION (BAZETT): 454 MS
EKG R AXIS: 28 DEGREES
EKG T AXIS: 1 DEGREES
EKG VENTRICULAR RATE: 98 BPM
SERVICE CMNT-IMP: ABNORMAL
SPECIMEN EXP DATE BLD: NORMAL
UNIT DIVISION: 0
UNIT ISSUE DATE/TIME: NORMAL

## 2025-01-14 PROCEDURE — 2700000000 HC OXYGEN THERAPY PER DAY

## 2025-01-14 PROCEDURE — 1100000003 HC PRIVATE W/ TELEMETRY

## 2025-01-14 PROCEDURE — 2580000003 HC RX 258: Performed by: STUDENT IN AN ORGANIZED HEALTH CARE EDUCATION/TRAINING PROGRAM

## 2025-01-14 PROCEDURE — 6370000000 HC RX 637 (ALT 250 FOR IP): Performed by: INTERNAL MEDICINE

## 2025-01-14 PROCEDURE — 6370000000 HC RX 637 (ALT 250 FOR IP): Performed by: STUDENT IN AN ORGANIZED HEALTH CARE EDUCATION/TRAINING PROGRAM

## 2025-01-14 PROCEDURE — 94640 AIRWAY INHALATION TREATMENT: CPT

## 2025-01-14 PROCEDURE — P9047 ALBUMIN (HUMAN), 25%, 50ML: HCPCS | Performed by: INTERNAL MEDICINE

## 2025-01-14 PROCEDURE — 6360000002 HC RX W HCPCS: Performed by: INTERNAL MEDICINE

## 2025-01-14 PROCEDURE — 6370000000 HC RX 637 (ALT 250 FOR IP): Performed by: PHYSICIAN ASSISTANT

## 2025-01-14 PROCEDURE — 2500000003 HC RX 250 WO HCPCS: Performed by: STUDENT IN AN ORGANIZED HEALTH CARE EDUCATION/TRAINING PROGRAM

## 2025-01-14 PROCEDURE — 6360000002 HC RX W HCPCS: Performed by: STUDENT IN AN ORGANIZED HEALTH CARE EDUCATION/TRAINING PROGRAM

## 2025-01-14 RX ORDER — IPRATROPIUM BROMIDE AND ALBUTEROL SULFATE 2.5; .5 MG/3ML; MG/3ML
1 SOLUTION RESPIRATORY (INHALATION)
Status: DISCONTINUED | OUTPATIENT
Start: 2025-01-14 | End: 2025-01-18 | Stop reason: HOSPADM

## 2025-01-14 RX ORDER — ALBUMIN (HUMAN) 12.5 G/50ML
50 SOLUTION INTRAVENOUS ONCE
Status: COMPLETED | OUTPATIENT
Start: 2025-01-14 | End: 2025-01-14

## 2025-01-14 RX ORDER — EPINEPHRINE 1 MG/ML
1 INJECTION, SOLUTION INTRAMUSCULAR; SUBCUTANEOUS ONCE
Status: CANCELLED | OUTPATIENT
Start: 2025-01-14 | End: 2025-01-14

## 2025-01-14 RX ORDER — LIDOCAINE HYDROCHLORIDE 20 MG/ML
10 INJECTION, SOLUTION INFILTRATION; PERINEURAL ONCE
Status: CANCELLED | OUTPATIENT
Start: 2025-01-14 | End: 2025-01-14

## 2025-01-14 RX ORDER — LIDOCAINE HYDROCHLORIDE 10 MG/ML
40 INJECTION, SOLUTION INFILTRATION; PERINEURAL ONCE
Status: CANCELLED | OUTPATIENT
Start: 2025-01-14 | End: 2025-01-14

## 2025-01-14 RX ORDER — LIDOCAINE HYDROCHLORIDE 20 MG/ML
JELLY TOPICAL PRN
Status: CANCELLED | OUTPATIENT
Start: 2025-01-14

## 2025-01-14 RX ORDER — SODIUM CHLORIDE 9 MG/ML
INJECTION, SOLUTION INTRAVENOUS CONTINUOUS
Status: DISCONTINUED | OUTPATIENT
Start: 2025-01-14 | End: 2025-01-14

## 2025-01-14 RX ORDER — 0.9 % SODIUM CHLORIDE 0.9 %
1000 INTRAVENOUS SOLUTION INTRAVENOUS ONCE
Status: DISCONTINUED | OUTPATIENT
Start: 2025-01-14 | End: 2025-01-14

## 2025-01-14 RX ADMIN — IPRATROPIUM BROMIDE AND ALBUTEROL SULFATE 1 DOSE: 2.5; .5 SOLUTION RESPIRATORY (INHALATION) at 15:04

## 2025-01-14 RX ADMIN — IPRATROPIUM BROMIDE AND ALBUTEROL SULFATE 1 DOSE: 2.5; .5 SOLUTION RESPIRATORY (INHALATION) at 07:31

## 2025-01-14 RX ADMIN — LEVOFLOXACIN 750 MG: 5 INJECTION, SOLUTION INTRAVENOUS at 03:53

## 2025-01-14 RX ADMIN — METRONIDAZOLE 500 MG: 500 INJECTION, SOLUTION INTRAVENOUS at 18:33

## 2025-01-14 RX ADMIN — ACETAMINOPHEN 650 MG: 325 TABLET ORAL at 20:12

## 2025-01-14 RX ADMIN — GUAIFENESIN 600 MG: 600 TABLET ORAL at 20:12

## 2025-01-14 RX ADMIN — SODIUM CHLORIDE: 9 INJECTION, SOLUTION INTRAVENOUS at 18:31

## 2025-01-14 RX ADMIN — ZOLPIDEM TARTRATE 10 MG: 5 TABLET, FILM COATED ORAL at 20:12

## 2025-01-14 RX ADMIN — SODIUM CHLORIDE, PRESERVATIVE FREE 10 ML: 5 INJECTION INTRAVENOUS at 08:32

## 2025-01-14 RX ADMIN — METRONIDAZOLE 500 MG: 500 INJECTION, SOLUTION INTRAVENOUS at 10:39

## 2025-01-14 RX ADMIN — SODIUM CHLORIDE, PRESERVATIVE FREE 10 ML: 5 INJECTION INTRAVENOUS at 20:30

## 2025-01-14 RX ADMIN — ACETAMINOPHEN 650 MG: 325 TABLET ORAL at 08:31

## 2025-01-14 RX ADMIN — ALBUMIN (HUMAN) 50 G: 0.25 INJECTION, SOLUTION INTRAVENOUS at 20:19

## 2025-01-14 RX ADMIN — THIAMINE HCL TAB 100 MG 100 MG: 100 TAB at 08:31

## 2025-01-14 RX ADMIN — GUAIFENESIN 600 MG: 600 TABLET ORAL at 08:31

## 2025-01-14 RX ADMIN — IPRATROPIUM BROMIDE AND ALBUTEROL SULFATE 1 DOSE: 2.5; .5 SOLUTION RESPIRATORY (INHALATION) at 20:52

## 2025-01-14 RX ADMIN — FOLIC ACID 1 MG: 1 TABLET ORAL at 08:31

## 2025-01-14 RX ADMIN — SODIUM CHLORIDE 40 MG: 9 INJECTION INTRAMUSCULAR; INTRAVENOUS; SUBCUTANEOUS at 08:31

## 2025-01-14 RX ADMIN — METRONIDAZOLE 500 MG: 500 INJECTION, SOLUTION INTRAVENOUS at 03:38

## 2025-01-14 ASSESSMENT — PAIN DESCRIPTION - LOCATION
LOCATION: HEAD
LOCATION: HEAD

## 2025-01-14 ASSESSMENT — PAIN DESCRIPTION - DESCRIPTORS
DESCRIPTORS: ACHING
DESCRIPTORS: ACHING

## 2025-01-14 ASSESSMENT — PAIN SCALES - GENERAL
PAINLEVEL_OUTOF10: 5
PAINLEVEL_OUTOF10: 0
PAINLEVEL_OUTOF10: 3
PAINLEVEL_OUTOF10: 6

## 2025-01-14 ASSESSMENT — PAIN - FUNCTIONAL ASSESSMENT: PAIN_FUNCTIONAL_ASSESSMENT: ACTIVITIES ARE NOT PREVENTED

## 2025-01-14 ASSESSMENT — PAIN DESCRIPTION - ORIENTATION: ORIENTATION: MID

## 2025-01-14 NOTE — PROGRESS NOTES
ADULT PROTOCOL: JET AEROSOL ASSESSMENT    Patient  Scott Gardiner     79 y.o.   male     1/14/2025  9:37 AM    Breath Sounds Pre Procedure: Breath Sounds Pre-Tx ANDRIY: Diminished, Crackles                                  Breath Sounds Pre-Tx LLL: Diminished, Crackles        Breath Sounds Pre-Tx RUL: Diminished, Crackles        Breath Sounds Pre-Tx RML: Diminished, Crackles        Breath Sounds Pre-Tx RLL: Diminished, Crackles  Breath Sounds Post Procedure: Breath Sounds Post-Tx ANDRIY: Diminished, Crackles          Breath Sounds Post-Tx LLL: Diminished, Crackles          Breath Sounds Post-Tx RUL: Diminished, Crackles          Breath Sounds Post-Tx RML: Diminished, Crackles          Breath Sounds Post-Tx RLL: Diminished, Crackles                                       Heart Rate: Pre procedure Pre-Tx Pulse: 124           Post procedure Post-Tx Pulse: 113    Resp Rate: Pre procedure Pre-Tx Resps: 31           Post procedure Post-Tx Resps: 30      Oxygen: O2 Therapy: Oxygen   nasal cannula     Changed: No    SpO2:  SpO2: 99 %   with Oxygen                Nebulizer Therapy: Current medications Medications: Albuterol/Ipratropium      Changed: No        Problem List:   Patient Active Problem List   Diagnosis    Intra-abdominal abscess (HCC)    Colovesical fistula    HTN (hypertension)    GERD (gastroesophageal reflux disease)    Tobacco abuse    Hypokalemia    Encounter for rehabilitation    B12 deficiency    Colonic mass    Hypomagnesemia    Neuropathy    Lung mass    Cystitis    Diarrhea    Leukocytosis    Macrocytic anemia    Eczema    Disorder of electrolytes    GI bleed    Essential hypertension    Esophageal stricture    Blurring of visual image    Cystitis, acute    Pleural effusion, right    Severe protein-calorie malnutrition (HCC)    Anxiety    Pleural effusion       Respiratory Therapist: Mayra Barrientos, RT

## 2025-01-14 NOTE — PROGRESS NOTES
Hospitalist Progress Note    NAME:   Scott Gardiner   : 1945   MRN: [id number]     Date/Time: 2025 6:39 PM  Patient PCP: Karan Almonte MD    Estimated discharge date:  Barriers:       Assessment / Plan:    Acute on chronic hypoxic respiratory failure with near complete opacification of the right lung, which likely reflects a   combination of airspace disease and pulmonary masses.     -As per radiology appears to be a mass obstructing the bronchus  - Nodular interlobular septal thickening within the aerated portion of the   right upper lobe suggests lymphangitic carcinomatosis. A moderate right pleural   effusion may be malignant.   -Patient admitted to this facility in November for similar presentation is a status post bronchoscopy, however pathology was not diagnostic patient did not follow-up with pulmonary office  -Patient has multiple comorbidities as well as advanced age, patient is hospice appropriate however the patient is not interested  -As per radiology there is no enough pleural fluid to drain  -I spoke with his niece and his sister and I informed both of them that the patient is hospice appropriate and they are in agreement with me they will come over on Friday to convince the patient to proceed with hospice  Have a family meeting on Friday  Going for EBUS on Friday as per my discussion with the pulmonary team      Severe protein calorie malnutrition  - Secondary to advanced age and malignancy, started on Ensure    Hypotension  - Most likely secondary to third spacing and low albumin level start on IV fluid, patient complained of dizziness      Anemia required transfusion  -Hb 6.6 today, received 1u RBC prior to transfer  -Repeat CBC tomorrow    Elevated BNP  - Check for echocardiogram    Medical Decision Making:   I personally reviewed labs:  I personally reviewed imaging:  I personally reviewed EKG:  Toxic drug monitoring:   Discussed case with:         Code Status:   DVT  Prophylaxis:   GI Prophylaxis:    Subjective:     Chief Complaint / Reason for Physician Visit  \"\".  Discussed with RN events overnight.       Objective:     VITALS:   Last 24hrs VS reviewed since prior progress note. Most recent are:  Patient Vitals for the past 24 hrs:   BP Temp Temp src Pulse Resp SpO2   01/14/25 1822 108/63 -- -- (!) 104 -- 95 %   01/14/25 1504 -- -- -- (!) 111 23 95 %   01/14/25 1445 99/61 97.8 °F (36.6 °C) Oral (!) 104 18 94 %   01/14/25 1200 (!) 81/66 -- -- 100 26 95 %   01/14/25 1030 (!) 92/55 97.6 °F (36.4 °C) Oral (!) 101 22 97 %   01/14/25 0731 -- -- -- (!) 124 (!) 31 99 %   01/14/25 0730 (!) 98/53 97.5 °F (36.4 °C) Oral 91 16 98 %   01/14/25 0400 -- -- -- -- -- 100 %   01/14/25 0333 93/67 97.6 °F (36.4 °C) Oral 94 24 99 %   01/13/25 2302 100/61 97.5 °F (36.4 °C) Oral 99 19 92 %   01/13/25 2232 103/61 97.4 °F (36.3 °C) -- 98 24 94 %   01/13/25 1945 -- -- -- -- 26 --   01/13/25 1930 104/62 98.2 °F (36.8 °C) Oral (!) 115 29 97 %         Intake/Output Summary (Last 24 hours) at 1/14/2025 1839  Last data filed at 1/14/2025 1456  Gross per 24 hour   Intake 626.43 ml   Output 1000 ml   Net -373.57 ml        I had a face to face encounter and independently examined this patient on 1/14/2025, as outlined below:  PHYSICAL EXAM:  General: Alert, cooperative  EENT:  EOMI. Anicteric sclerae.  Resp:  CTA bilaterally, no wheezing or rales.  No accessory muscle use  CV:  Regular  rhythm,  No edema  GI:  Soft, Non distended, Non tender.  +Bowel sounds  Neurologic:  Alert and oriented X 3, normal speech,   Psych:   Good insight. Not anxious nor agitated  Skin:  No rashes.  No jaundice    Reviewed most current lab test results and cultures  YES  Reviewed most current radiology test results   YES  Review and summation of old records today    NO  Reviewed patient's current orders and MAR    YES  PMH/SH reviewed - no change compared to H&P    Procedures: see electronic medical records for all procedures/Xrays

## 2025-01-14 NOTE — PROGRESS NOTES
Pulmonary, Critical Care, and Sleep Medicine~Progress Note    Name: Scott Gardiner MRN: [id number]   : 1945 Hospital: [facility]   Date: 2025 11:05 AM Admission: 2025     Impression Plan   Chronic hypoxemic respiratory failure   Near complete R hemithorax collapse  Pulmonary mass   Post obstructive pneumonia   Small bilateral pleural effusions   Suspected COPD, emphysema on CT  Former smoker, former alcohol abuse   Anemia  Continue supplemental O2 to maintain SpO2 >88%  Plan for EBUS Friday at non (next available time) with Dr. Garcias   IR consulted for thoracentesis however not enough fluid to drain   On levaquin and flagyl   Sputum culture if able   Pulmonary toilet   Discussed palliative care but patient not interested     Thank you for this consult will follow.     Daily Progression:     Patient seen this morning lying in bed. Discussed with bedside nurse. He states his dyspnea Is better. Denies cough, chest pain. Satting well on 1.5L NC    Consult Note    79 yom with pmhx significant for suspected COPD, HTN, HLD, GERD, CVA, former tobacco us, former alcohol abuse presented to the ED  with increased shortness of breath. Of note he was admitted 10/2024 with large pleural effusion. Cytology concerning for metastatic process of lung origin but not diagnostic. Underwent bronch with no malignant cells. In the ED, labs notable for WBC 21, Hg 6.6, BNP 13660. Proal 45.67. COVID and flu negative. CXR with increased opacification of R hemithorax. Satting well on 2L NC which is baseline. Tachycardic ~100.     Patient states he had increased dyspnea the last 5-7 days. Endorses cough but it is improved from previously. Sputum is light yellow. Endorses 20+ pound weight loss since October. Denies fever, chills.     Patient had phone visit with Dr. Ravi and EBUS +/- navigational bronch was recommended however patient severely debilitated and may not

## 2025-01-14 NOTE — CARE COORDINATION
Care Management Initial Assessment       RUR: 16%  Readmission? No  1st IM letter given? Yes       Patient lives alone in a 2 story home, only uses main level, with 3 miguel. Patient has a friend that visits daily to assist with meals and laundry. Otherwise, patient is independent. Patient does not drive and reports his PCP does house visits, patient does not leave home. Patient has a RW that he uses at baseline. Patient also home home O2 (2L at baseline) but unable to recall agency O2 is through. Patient is insistent on returning home upon d/c and requesting stretcher transportation home.          01/14/25 1150   Service Assessment   Patient Orientation Alert and Oriented   Cognition Alert   History Provided By Patient   Primary Caregiver Self  (has a friend that assists with meals and laundry daily)   Support Systems Family Members;Friends/Neighbors   Patient's Healthcare Decision Maker is: Named in Scanned ACP Document   PCP Verified by CM Yes   Last Visit to PCP Within last 3 months  (last month, PCP Karan Almonte does house visits)   Prior Functional Level Assistance with the following:;Cooking;Housework;Shopping   Current Functional Level Cooking;Housework;Shopping;Assistance with the following:   Can patient return to prior living arrangement Yes   Family able to assist with home care needs: No   Would you like for me to discuss the discharge plan with any other family members/significant others, and if so, who? No   Financial Resources Medicare   Social/Functional History   Lives With Alone   Type of Home House   Home Layout Two level   Home Access   (3 miguel)   Home Equipment Walker - Rolling;Oxygen  (patient has home O2 (2L) but unsure of the agency that supplies. Patient also has a RW that he uses at baseline.)   Active  No   Patient's  Info patient does not leave the home - to include appointments   Discharge Planning   Patient expects to be discharged to: House   Services At/After Discharge    Mode of Transport at Discharge BLS   Confirm Follow Up Transport Other (see comment)   Condition of Participation: Discharge Planning   The Plan for Transition of Care is related to the following treatment goals: home       SERGIO Smith, CM  [phone]

## 2025-01-14 NOTE — PROGRESS NOTES
End of Shift Note    Bedside shift change report given to NIKOLAI Bear (oncoming nurse) by Nikki Jones RN (offgoing nurse).  Report included the following information SBAR, Kardex, Intake/Output, MAR, Recent Results, and Cardiac Rhythm NSR-ST    Shift worked:  7P-7A     Shift summary and any significant changes:     IV abx. BP soft overnight map above 60. Pt medicated with ambien and oxy per MAR. No significant shift events to note.      Concerns for physician to address:  Discharge planning     Zone phone for oncoming shift:   N/a       Activity:  Level of Assistance: Moderate assist, patient does 50-74%  Number times ambulated in hallways past shift: 0  Number of times OOB to chair past shift: 0    Cardiac:   Cardiac Monitoring: Yes      Cardiac Rhythm: Sinus rhythm with PVC, Sinus tachy    Access:  Current line(s): PIV     Genitourinary:   Urinary Status: Voiding, External catheter    Respiratory:   O2 Device: Nasal cannula  Chronic home O2 use?: NO  Incentive spirometer at bedside: NO    GI:  Last BM (including prior to admit): 01/13/25  Current diet:  ADULT DIET; Easy to Chew  ADULT ORAL NUTRITION SUPPLEMENT; Breakfast, Lunch, Dinner; Standard High Calorie/High Protein Oral Supplement  Passing flatus: YES    Pain Management:   Patient states pain is manageable on current regimen: YES    Skin:  Srinivasan Scale Score: 16  Interventions: Wound Offloading (Prevention Methods): Repositioning    Patient Safety:  Fall Risk: Nursing Judgement-Fall Risk High(Add Comments): Yes  Fall Risk Interventions  Nursing Judgement-Fall Risk High(Add Comments): Yes  Toilet Every 2 Hours-In Advance of Need: Yes  Hourly Visual Checks: Awake, In bed  Fall Visual Posted: Armband  Room Door Open: Yes  Alarm On: Bed  Patient Moved Closer to Nursing Station: Yes    Active Consults:   IP CONSULT TO PALLIATIVE CARE  IP CONSULT TO PULMONOLOGY  IP CONSULT TO INTERVENTIONAL RADIOLOGY    Length of Stay:  Expected LOS: 3  Actual LOS:

## 2025-01-14 NOTE — PROGRESS NOTES
0700: bedside shift report received from night shift.    0800: shift assessment completed.     1040: reassessment completed.     1230: pt had bowel movement, cleaned pt up. No gowns in either linen cart. Pt agreed to wear an isolation gown.     1500: reassessment completed. MRSA positive, provider notified. Pt keeps jumping up to the 130's multiple times, however not staying in the 130's, provider notified.    1700: spoke with niece about updates on pt. Family verbalized understanding and stated no further questions at this time.    1800: pt said that he was dizzy and everything was out of focus, /63, provider notified. Bolus of 1 liter ordered.     1900:bedside shift report given to night shift nurse.

## 2025-01-14 NOTE — PROGRESS NOTES
End of Shift Note    Bedside shift change report given to Roma (oncoming nurse) by Marianela Cali RN (offgoing nurse).  Report included the following information SBAR, Intake/Output, MAR, Recent Results, Cardiac Rhythm Sinus tach, and Quality Measures    Shift worked:  Day shift [id number]     Shift summary and any significant changes:     Pt tachy in 130's/140's, provider aware.     Concerns for physician to address:  none     Zone phone for oncoming shift:   [phone]       Activity:  Level of Assistance: Moderate assist, patient does 50-74%  Number times ambulated in hallways past shift: 0  Number of times OOB to chair past shift: 0    Cardiac:   Cardiac Monitoring: Yes      Cardiac Rhythm: Sinus rhythm with PVC, Sinus tachy    Access:  Current line(s): PIV  20 G left forearm    Genitourinary:   Urinary Status: Voiding, External catheter    Respiratory:   O2 Device: Nasal cannula  Chronic home O2 use?: NO (on 1 liter of O2)  Incentive spirometer at bedside: YES    GI:  Last BM (including prior to admit): 01/14/25  Current diet:  ADULT DIET; Easy to Chew  ADULT ORAL NUTRITION SUPPLEMENT; Breakfast, Lunch, Dinner; Standard High Calorie/High Protein Oral Supplement  Diet NPO Exceptions are: Sips of Water with Meds  Passing flatus: YES    Pain Management:   Patient states pain is manageable on current regimen: YES    Skin:  Srinivasan Scale Score: 15  Interventions: Wound Offloading (Prevention Methods): Bed, pressure reduction mattress, Turning, Repositioning, Pillows    Patient Safety:  Fall Risk: Nursing Judgement-Fall Risk High(Add Comments): Yes  Fall Risk Interventions  Nursing Judgement-Fall Risk High(Add Comments): Yes  Toilet Every 2 Hours-In Advance of Need: Yes  Hourly Visual Checks: Awake, In bed  Fall Visual Posted: Armband  Room Door Open: Yes  Alarm On: Bed  Patient Moved Closer to Nursing Station: Yes    Active Consults:   IP CONSULT TO PALLIATIVE CARE  IP CONSULT TO PULMONOLOGY  IP CONSULT TO INTERVENTIONAL  RADIOLOGY    Length of Stay:  Expected LOS: 5  Actual LOS: 2    Marianela Cali, RN

## 2025-01-14 NOTE — PROGRESS NOTES
Hospitalist Progress Note    NAME:   Scott Gardiner   : 1945   MRN: [id number]     Date/Time: 2025 1:29 AM  Patient PCP: Karan Almonte MD    Estimated discharge date:  Barriers:       Assessment / Plan:    Acute on chronic hypoxic respiratory failure with near complete opacification of the right lung, which likely reflects a   combination of airspace disease and pulmonary masses.     -As per radiology appears to be a mass obstructing the bronchus  - Nodular interlobular septal thickening within the aerated portion of the   right upper lobe suggests lymphangitic carcinomatosis. A moderate right pleural   effusion may be malignant.   -Patient admitted to this facility in November for similar presentation is a status post EBUS, however pathology was not diagnostic patient did not follow-up with pulmonary office  -Patient has multiple comorbidities as well as advanced age, patient is hospice appropriate however the patient is not interested  -As per radiology there is no enough pleural fluid to drain        Severe protein calorie malnutrition  - Secondary to advanced age and malignancy, started on Ensure    Anemia  -Hb 6.6 today, received 1u RBC prior to transfer  -Will repeat Hgb   -Check fecal occult blood  -Check iron panel   -2+ pitting edema  -Check BNP  -Give 1 dose IV lasix      Medical Decision Making:   I personally reviewed labs:  I personally reviewed imaging:  I personally reviewed EKG:  Toxic drug monitoring:   Discussed case with:         Code Status:   DVT Prophylaxis:   GI Prophylaxis:    Subjective:     Chief Complaint / Reason for Physician Visit  \"\".  Discussed with RN events overnight.       Objective:     VITALS:   Last 24hrs VS reviewed since prior progress note. Most recent are:  Patient Vitals for the past 24 hrs:   BP Temp Temp src Pulse Resp SpO2 Height   25 2302 100/61 97.5 °F (36.4 °C) Oral 99 19 92 % --   25 2232 103/61 97.4 °F (36.3 °C) -- 98 24 94 % --    01/13/25 1945 -- -- -- -- 26 -- --   01/13/25 1930 104/62 98.2 °F (36.8 °C) Oral (!) 115 29 97 % --   01/13/25 1537 -- -- -- -- -- -- 1.676 m (5' 6\")   01/13/25 1530 116/68 -- -- (!) 119 (!) 38 99 % --   01/13/25 1529 -- -- -- -- -- 99 % --   01/13/25 1500 127/77 98.5 °F (36.9 °C) Oral (!) 116 (!) 32 100 % --   01/13/25 1445 128/83 -- -- (!) 137 26 93 % --   01/13/25 1121 -- -- -- -- 28 -- --   01/13/25 1115 131/77 98 °F (36.7 °C) Oral (!) 101 (!) 35 99 % --   01/13/25 1040 131/70 -- -- 95 18 92 % --   01/13/25 0940 (!) 127/56 -- -- 95 18 97 % --   01/13/25 0752 117/66 97.5 °F (36.4 °C) Oral 86 20 100 % --   01/13/25 0345 (!) 111/55 97.8 °F (36.6 °C) Oral 89 12 99 % --         Intake/Output Summary (Last 24 hours) at 1/14/2025 0129  Last data filed at 1/13/2025 2056  Gross per 24 hour   Intake 769.59 ml   Output 550 ml   Net 219.59 ml        I had a face to face encounter and independently examined this patient on 1/14/2025, as outlined below:  PHYSICAL EXAM:  General: Alert, cooperative  EENT:  EOMI. Anicteric sclerae.  Resp:  CTA bilaterally, no wheezing or rales.  No accessory muscle use  CV:  Regular  rhythm,  No edema  GI:  Soft, Non distended, Non tender.  +Bowel sounds  Neurologic:  Alert and oriented X 3, normal speech,   Psych:   Good insight. Not anxious nor agitated  Skin:  No rashes.  No jaundice    Reviewed most current lab test results and cultures  YES  Reviewed most current radiology test results   YES  Review and summation of old records today    NO  Reviewed patient's current orders and MAR    YES  PMH/SH reviewed - no change compared to H&P    Procedures: see electronic medical records for all procedures/Xrays and details which were not copied into this note but were reviewed prior to creation of Plan.      LABS:  I reviewed today's most current labs and imaging studies.  Pertinent labs include:  Recent Labs     01/12/25  1639 01/13/25  0334   WBC 21.0* 21.3*   HGB 6.6* 7.1*   HCT 21.9* 23.2*    * 346     Recent Labs     01/12/25  1639 01/13/25  0334    141   K 3.7 3.1*    108   CO2 26 25   GLUCOSE 99 88   BUN 10 10   CREATININE 0.75 0.66*   CALCIUM 6.4* 6.5*   BILITOT 0.4 0.5   AST 23 19   ALT 9* 7*       Signed: Cali Fernández MD

## 2025-01-14 NOTE — PLAN OF CARE
Problem: Chronic Conditions and Co-morbidities  Goal: Patient's chronic conditions and co-morbidity symptoms are monitored and maintained or improved  Recent Flowsheet Documentation  Taken 1/14/2025 0835 by Marianela Cali, RN  Care Plan - Patient's Chronic Conditions and Co-Morbidity Symptoms are Monitored and Maintained or Improved:   Monitor and assess patient's chronic conditions and comorbid symptoms for stability, deterioration, or improvement   Collaborate with multidisciplinary team to address chronic and comorbid conditions and prevent exacerbation or deterioration   Update acute care plan with appropriate goals if chronic or comorbid symptoms are exacerbated and prevent overall improvement and discharge  1/13/2025 2046 by Nikki Jones, RN  Outcome: Progressing  Flowsheets (Taken 1/13/2025 1930)  Care Plan - Patient's Chronic Conditions and Co-Morbidity Symptoms are Monitored and Maintained or Improved: Monitor and assess patient's chronic conditions and comorbid symptoms for stability, deterioration, or improvement     Problem: Discharge Planning  Goal: Discharge to home or other facility with appropriate resources  Recent Flowsheet Documentation  Taken 1/14/2025 0835 by Marianela Cali, RN  Discharge to home or other facility with appropriate resources:   Identify barriers to discharge with patient and caregiver   Arrange for needed discharge resources and transportation as appropriate   Identify discharge learning needs (meds, wound care, etc)  1/13/2025 2046 by Nikki Jones RN  Outcome: Progressing     Problem: Skin/Tissue Integrity  Goal: Absence of new skin breakdown  Description: 1.  Monitor for areas of redness and/or skin breakdown  2.  Assess vascular access sites hourly  3.  Every 4-6 hours minimum:  Change oxygen saturation probe site  4.  Every 4-6 hours:  If on nasal continuous positive airway pressure, respiratory therapy assess nares and determine need for appliance change or resting

## 2025-01-15 ENCOUNTER — APPOINTMENT (OUTPATIENT)
Facility: HOSPITAL | Age: 80
DRG: 871 | End: 2025-01-15
Attending: INTERNAL MEDICINE
Payer: MEDICARE

## 2025-01-15 LAB
BASOPHILS # BLD: 0.05 K/UL (ref 0–0.1)
BASOPHILS NFR BLD: 0.2 % (ref 0–1)
DIFFERENTIAL METHOD BLD: ABNORMAL
ECHO BSA: 1.6 M2
ECHO LV EF PHYSICIAN: 50 %
ECHO LV FRACTIONAL SHORTENING: 0 % (ref 28–44)
ECHO LV INTERNAL DIMENSION DIASTOLE INDEX: 2.41 CM/M2
ECHO LV INTERNAL DIMENSION DIASTOLIC: 3.9 CM (ref 4.2–5.9)
ECHO LV INTERNAL DIMENSION SYSTOLIC INDEX: 2.41 CM/M2
ECHO LV INTERNAL DIMENSION SYSTOLIC: 3.9 CM
ECHO LV IVSD: 0.8 CM (ref 0.6–1)
ECHO LV MASS 2D: 122.1 G (ref 88–224)
ECHO LV MASS INDEX 2D: 75.4 G/M2 (ref 49–115)
ECHO LV POSTERIOR WALL DIASTOLIC: 1.2 CM (ref 0.6–1)
ECHO LV RELATIVE WALL THICKNESS RATIO: 0.62
ECHO LVOT AREA: 2.5 CM2
ECHO LVOT DIAM: 1.8 CM
EOSINOPHIL # BLD: 0 K/UL (ref 0–0.4)
EOSINOPHIL NFR BLD: 0 % (ref 0–7)
ERYTHROCYTE [DISTWIDTH] IN BLOOD BY AUTOMATED COUNT: 24.8 % (ref 11.5–14.5)
HCT VFR BLD AUTO: 24.7 % (ref 36.6–50.3)
HGB BLD-MCNC: 7.5 G/DL (ref 12.1–17)
IMM GRANULOCYTES # BLD AUTO: 0.26 K/UL (ref 0–0.04)
IMM GRANULOCYTES NFR BLD AUTO: 1.1 % (ref 0–0.5)
LYMPHOCYTES # BLD: 0.88 K/UL (ref 0.8–3.5)
LYMPHOCYTES NFR BLD: 3.7 % (ref 12–49)
MCH RBC QN AUTO: 28 PG (ref 26–34)
MCHC RBC AUTO-ENTMCNC: 30.4 G/DL (ref 30–36.5)
MCV RBC AUTO: 92.2 FL (ref 80–99)
MONOCYTES # BLD: 1.34 K/UL (ref 0–1)
MONOCYTES NFR BLD: 5.6 % (ref 5–13)
NEUTS SEG # BLD: 21.37 K/UL (ref 1.8–8)
NEUTS SEG NFR BLD: 89.4 % (ref 32–75)
NRBC # BLD: 0 K/UL (ref 0–0.01)
NRBC BLD-RTO: 0 PER 100 WBC
PLATELET # BLD AUTO: 292 K/UL (ref 150–400)
PMV BLD AUTO: 10.8 FL (ref 8.9–12.9)
RBC # BLD AUTO: 2.68 M/UL (ref 4.1–5.7)
RBC MORPH BLD: ABNORMAL
WBC # BLD AUTO: 23.9 K/UL (ref 4.1–11.1)

## 2025-01-15 PROCEDURE — 6370000000 HC RX 637 (ALT 250 FOR IP): Performed by: PHYSICIAN ASSISTANT

## 2025-01-15 PROCEDURE — 6360000002 HC RX W HCPCS: Performed by: INTERNAL MEDICINE

## 2025-01-15 PROCEDURE — 6370000000 HC RX 637 (ALT 250 FOR IP): Performed by: INTERNAL MEDICINE

## 2025-01-15 PROCEDURE — 6370000000 HC RX 637 (ALT 250 FOR IP): Performed by: NURSE PRACTITIONER

## 2025-01-15 PROCEDURE — 36415 COLL VENOUS BLD VENIPUNCTURE: CPT

## 2025-01-15 PROCEDURE — 6370000000 HC RX 637 (ALT 250 FOR IP): Performed by: STUDENT IN AN ORGANIZED HEALTH CARE EDUCATION/TRAINING PROGRAM

## 2025-01-15 PROCEDURE — 6360000002 HC RX W HCPCS: Performed by: STUDENT IN AN ORGANIZED HEALTH CARE EDUCATION/TRAINING PROGRAM

## 2025-01-15 PROCEDURE — 99223 1ST HOSP IP/OBS HIGH 75: CPT | Performed by: INTERNAL MEDICINE

## 2025-01-15 PROCEDURE — 2700000000 HC OXYGEN THERAPY PER DAY

## 2025-01-15 PROCEDURE — 85025 COMPLETE CBC W/AUTO DIFF WBC: CPT

## 2025-01-15 PROCEDURE — 2500000003 HC RX 250 WO HCPCS: Performed by: STUDENT IN AN ORGANIZED HEALTH CARE EDUCATION/TRAINING PROGRAM

## 2025-01-15 PROCEDURE — 1100000003 HC PRIVATE W/ TELEMETRY

## 2025-01-15 PROCEDURE — 93308 TTE F-UP OR LMTD: CPT

## 2025-01-15 PROCEDURE — 94640 AIRWAY INHALATION TREATMENT: CPT

## 2025-01-15 RX ORDER — BENZONATATE 100 MG/1
100 CAPSULE ORAL 3 TIMES DAILY PRN
Status: DISCONTINUED | OUTPATIENT
Start: 2025-01-15 | End: 2025-01-16

## 2025-01-15 RX ORDER — LACTOBACILLUS RHAMNOSUS GG 10B CELL
1 CAPSULE ORAL
Status: DISCONTINUED | OUTPATIENT
Start: 2025-01-15 | End: 2025-01-16

## 2025-01-15 RX ORDER — LORAZEPAM 2 MG/ML
2 INJECTION INTRAMUSCULAR EVERY 6 HOURS PRN
Status: DISCONTINUED | OUTPATIENT
Start: 2025-01-15 | End: 2025-01-16

## 2025-01-15 RX ORDER — CASTOR OIL AND BALSAM, PERU 788; 87 MG/G; MG/G
OINTMENT TOPICAL 2 TIMES DAILY
Status: DISCONTINUED | OUTPATIENT
Start: 2025-01-15 | End: 2025-01-18 | Stop reason: HOSPADM

## 2025-01-15 RX ADMIN — METRONIDAZOLE 500 MG: 500 INJECTION, SOLUTION INTRAVENOUS at 12:24

## 2025-01-15 RX ADMIN — LORAZEPAM 1 MG: 2 INJECTION INTRAMUSCULAR; INTRAVENOUS at 09:51

## 2025-01-15 RX ADMIN — OXYCODONE 2.5 MG: 5 TABLET ORAL at 20:22

## 2025-01-15 RX ADMIN — METRONIDAZOLE 500 MG: 500 INJECTION, SOLUTION INTRAVENOUS at 20:16

## 2025-01-15 RX ADMIN — THIAMINE HCL TAB 100 MG 100 MG: 100 TAB at 09:10

## 2025-01-15 RX ADMIN — SODIUM CHLORIDE, PRESERVATIVE FREE 10 ML: 5 INJECTION INTRAVENOUS at 09:13

## 2025-01-15 RX ADMIN — IPRATROPIUM BROMIDE AND ALBUTEROL SULFATE 1 DOSE: 2.5; .5 SOLUTION RESPIRATORY (INHALATION) at 20:38

## 2025-01-15 RX ADMIN — ZOLPIDEM TARTRATE 10 MG: 5 TABLET, FILM COATED ORAL at 20:14

## 2025-01-15 RX ADMIN — BENZONATATE 100 MG: 100 CAPSULE ORAL at 20:23

## 2025-01-15 RX ADMIN — LORAZEPAM 2 MG: 2 INJECTION INTRAMUSCULAR; INTRAVENOUS at 22:13

## 2025-01-15 RX ADMIN — Medication: at 22:08

## 2025-01-15 RX ADMIN — Medication: at 12:24

## 2025-01-15 RX ADMIN — FOLIC ACID 1 MG: 1 TABLET ORAL at 09:10

## 2025-01-15 RX ADMIN — OXYCODONE 2.5 MG: 5 TABLET ORAL at 03:30

## 2025-01-15 RX ADMIN — IPRATROPIUM BROMIDE AND ALBUTEROL SULFATE 1 DOSE: 2.5; .5 SOLUTION RESPIRATORY (INHALATION) at 08:09

## 2025-01-15 RX ADMIN — GUAIFENESIN 600 MG: 600 TABLET ORAL at 20:14

## 2025-01-15 RX ADMIN — LEVOFLOXACIN 750 MG: 5 INJECTION, SOLUTION INTRAVENOUS at 06:12

## 2025-01-15 RX ADMIN — ACETAMINOPHEN 650 MG: 325 TABLET ORAL at 09:12

## 2025-01-15 RX ADMIN — GUAIFENESIN 600 MG: 600 TABLET ORAL at 09:10

## 2025-01-15 RX ADMIN — Medication 1 CAPSULE: at 09:10

## 2025-01-15 RX ADMIN — METRONIDAZOLE 500 MG: 500 INJECTION, SOLUTION INTRAVENOUS at 03:26

## 2025-01-15 RX ADMIN — SODIUM CHLORIDE, PRESERVATIVE FREE 10 ML: 5 INJECTION INTRAVENOUS at 22:07

## 2025-01-15 RX ADMIN — LORAZEPAM 1 MG: 1 TABLET ORAL at 00:04

## 2025-01-15 RX ADMIN — BENZONATATE 100 MG: 100 CAPSULE ORAL at 12:25

## 2025-01-15 RX ADMIN — LORAZEPAM 1 MG: 2 INJECTION INTRAMUSCULAR; INTRAVENOUS at 12:31

## 2025-01-15 ASSESSMENT — PAIN SCALES - GENERAL
PAINLEVEL_OUTOF10: 8
PAINLEVEL_OUTOF10: 5
PAINLEVEL_OUTOF10: 5
PAINLEVEL_OUTOF10: 7
PAINLEVEL_OUTOF10: 4
PAINLEVEL_OUTOF10: 3

## 2025-01-15 ASSESSMENT — PAIN DESCRIPTION - LOCATION
LOCATION: HEAD
LOCATION: HEAD
LOCATION: BUTTOCKS;RECTUM
LOCATION: BUTTOCKS;RECTUM
LOCATION: HEAD
LOCATION: HEAD

## 2025-01-15 ASSESSMENT — PAIN DESCRIPTION - DESCRIPTORS
DESCRIPTORS: ACHING
DESCRIPTORS: ACHING
DESCRIPTORS: ACHING;BURNING
DESCRIPTORS: BURNING;TENDER

## 2025-01-15 ASSESSMENT — PAIN DESCRIPTION - ORIENTATION
ORIENTATION: INNER
ORIENTATION: MID

## 2025-01-15 ASSESSMENT — PAIN - FUNCTIONAL ASSESSMENT: PAIN_FUNCTIONAL_ASSESSMENT: ACTIVITIES ARE NOT PREVENTED

## 2025-01-15 NOTE — CONSULTS
Palliative Medicine  Patient Name: Scott Gardiner  YOB: 1945  MRN: [id number]  Age: 79 y.o.  Gender: male    Date of Initial Consult: 1/13/2024  Date of Service: 1/15/2025  Time: 12:25 PM  Provider: Madeline Devine MD  Hospital Day: 4  Admit Date: 1/12/2025  Referring Provider: Sunil Schuster MD       Reasons for Consultation:  Goals of Care    HISTORY OF PRESENT ILLNESS (HPI):   Scott Gardiner is a 79 y.o. 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 of note patient was admitted in October 2024 for similar  presentation, biopsy of lung was inconclusive at that time.    Hospital course:  Pulmonary following EBUS scheduled on Friday.  IR consulted for thoracentesis not enough fluid to be drained.  Patient is on IV antibiotics.    Patient has ongoing anxiety on chronic benzodiazepine /Ativan, noted IV Ativan increased to 2 mg every 6 hours as needed today for anxiety and agitation.    Primary attending has been communicating with patient niece and sister//primary and secondary medical POA to keep them in the loop and meeting is scheduled with the primary attending this Friday to discuss hospice level of care.      Recent admissions:  10/12/2024 to 10/30/2024 for acute respiratory failure, large right pleural effusion, right upper lobe pulmonary mass s/p bronchial biopsy 1020 2/2024 pathology negative for malignant cells he was discharged to nursing skilled nursing facility in [facility]., sepsis.    Psychosocial: Lives is 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    Only check if applicable and billing time based rather than MDM  [] The total encounter time on this service date was ____ minutes which was spent performing a face-to-face encounter and personally completing the provider-level activities documented in the note. This includes time spent prior to the visit and after the visit in direct care of the patient. This time does not include time spent in any separately reportable services.    Electronically signed by   Madeline Devine MD  Palliative Care Team  on 1/15/2025 at 12:25 PM

## 2025-01-15 NOTE — PROGRESS NOTES
Nursing contacted Nocturnist/cross cover provider via non-urgent messaging system [employer] and notified patient having difficulty to sleep with cough, states got ativan and also ambien earlier and remains aware, asking for tessalon perles. No adventitious lung sounds reported, no inc wob, no inc o2 use, and NAD reported. No other concerns reported. No acute distress reported. No other information provided by nurse. VSS.     Ordered tessalon perles tid prn per request for cough. Will defer further evaluation/management to the day shift primary attending care team. Patient denies any further complaints or concerns.     Nursing to notify Hospitalist for further/continued concerns. Will remain available overnight for further concerns if nursing/patient needs. Please note, there are RRT systems in this hospital in place that if nursing has acute or critical patient condition change or concern, this is to help facilitate and notify that patient needs immediate bedside evaluation by a provider.     Update  0339 nurse reported pt skin becoming irritated and excoriated due to frequent stooling, states loose/soft, but every time he is coughing stool is leaking out. Not meeting criteria for c.diff studies at present. Likely 2/2 antbx- ordered probiotics daily to start now, nurse to consult wound care nurse in am if not already seeing pt ok nurse place order per hospital protocol for wound care. Nurse may use barier cream available on the unit zinc oxide that is available until seen by wound care in the am.      Non-billable note.

## 2025-01-15 NOTE — PLAN OF CARE
Problem: Chronic Conditions and Co-morbidities  Goal: Patient's chronic conditions and co-morbidity symptoms are monitored and maintained or improved  Outcome: Progressing  Flowsheets (Taken 1/14/2025 1956)  Care Plan - Patient's Chronic Conditions and Co-Morbidity Symptoms are Monitored and Maintained or Improved:   Monitor and assess patient's chronic conditions and comorbid symptoms for stability, deterioration, or improvement   Collaborate with multidisciplinary team to address chronic and comorbid conditions and prevent exacerbation or deterioration     Problem: Discharge Planning  Goal: Discharge to home or other facility with appropriate resources  Outcome: Progressing  Flowsheets (Taken 1/14/2025 1956)  Discharge to home or other facility with appropriate resources:   Identify barriers to discharge with patient and caregiver   Arrange for needed discharge resources and transportation as appropriate     Problem: Skin/Tissue Integrity  Goal: Absence of new skin breakdown  Description: 1.  Monitor for areas of redness and/or skin breakdown  2.  Assess vascular access sites hourly  3.  Every 4-6 hours minimum:  Change oxygen saturation probe site  4.  Every 4-6 hours:  If on nasal continuous positive airway pressure, respiratory therapy assess nares and determine need for appliance change or resting period.  Outcome: Progressing     Problem: ABCDS Injury Assessment  Goal: Absence of physical injury  Outcome: Progressing  Flowsheets (Taken 1/14/2025 0915 by Marianela Cali, RN)  Absence of Physical Injury: Implement safety measures based on patient assessment     Problem: Safety - Adult  Goal: Free from fall injury  Outcome: Progressing  Flowsheets (Taken 1/14/2025 0915 by Marianela Cali, RN)  Free From Fall Injury:   Instruct family/caregiver on patient safety   Based on caregiver fall risk screen, instruct family/caregiver to ask for assistance with transferring infant if caregiver noted to have fall risk

## 2025-01-15 NOTE — PROGRESS NOTES
Patient has been having frequent loose/soft stools overnight.  Patient has been coughing  and each time stool leaks out   causing patient's rectum and inner buttock being excoriated.  Incontinence care has been done each time and Zinc Oxide   was been applied.  Patient given something to ease his pain. Nurse Practitioner notified and some Culturelle ordered.

## 2025-01-15 NOTE — PROGRESS NOTES
0700: Bedside and Verbal shift change report given to Patricia Eng RN   (oncoming nurse) by NIKOLAI Brandon (offgoing nurse). Report included the following information Nurse Handoff Report, Index, Adult Overview, Intake/Output, MAR, Recent Results, and Cardiac Rhythm NSR-ST .     End of Shift Note    Bedside shift change report given to NIKOLAI Brandon (oncoming nurse) by Patricia Eng RN (offgoing nurse).  Report included the following information SBAR, Kardex, Intake/Output, MAR, Recent Results, and Cardiac Rhythm A-Fib, ST    Shift worked:  7a-7p     Shift summary and any significant changes:     Patient going in and out of Afib. MD made aware and EKG obtained. New order placed for Ativan 2mg IV prn. Palliative and wound care seen patient during shift.     Concerns for physician to address:  Discharge planning     [facility]   N[facility]a       Activity:  Level of Assistance: Moderate assist, patient does 50-74%  Number times ambulated in hallways past shift: 0  Number of times OOB to chair past shift: 0    Cardiac:   Cardiac Monitoring: Yes      Cardiac Rhythm: Sinus tachy, Atrial fib    Access:  Current line(s): PIV     Genitourinary:   Urinary Status: Voiding, External catheter    Respiratory:   O2 Device: Nasal cannula  Chronic home O2 use?: NO  Incentive spirometer at bedside: NO    GI:  Last BM (including prior to admit): 01/14/25  Current diet:  ADULT DIET; Easy to Chew  ADULT ORAL NUTRITION SUPPLEMENT; Breakfast, Lunch, Dinner; Standard High Calorie/High Protein Oral Supplement  Diet NPO Exceptions are: Sips of Water with Meds  Passing flatus: YES    Pain Management:   Patient states pain is manageable on current regimen: YES    Skin:  Srinivasan Scale Score: 15  Interventions: Wound Offloading (Prevention Methods): Bed, pressure reduction mattress, Elevate heels, Pillows, Repositioning, Turning    Patient Safety:  Fall Risk: Nursing Judgement-Fall Risk High(Add Comments): Yes  Fall Risk  Interventions  Nursing Judgement-Fall Risk High(Add Comments): Yes  Toilet Every 2 Hours-In Advance of Need: Yes  Hourly Visual Checks: Awake, In bed  Fall Visual Posted: Armband, Fall sign posted, Socks  Room Door Open: Yes  Alarm On: Bed  Patient Moved Closer to Nursing Station: No    Active Consults:   IP CONSULT TO PALLIATIVE CARE  IP CONSULT TO PULMONOLOGY  IP CONSULT TO INTERVENTIONAL RADIOLOGY    Length of Stay:  Expected LOS: 8  Actual LOS: 3    Patricia Eng RN

## 2025-01-15 NOTE — PROGRESS NOTES
Brief summary of visit, full note to follow.    Patient is known to us from previous admission, we are called in for goals of care.    Patient is currently alert he wants to rest not amenable to a detailed conversation, clarified with patient that he has a D DNR signed by him in 2019 and on this admission he decided for full code, patient is very clear he wants to pass away naturally does not want CPR or intubation, I will change the CODE STATUS to DO NOT RESUSCITATE.    He is awaiting for EBUS this Friday.     to call his sisterDolores Sanchez/# primary medical POA to update them on his condition.    Anxiety: He is on chronic benzo /Ativan, currently he is on 1 mg Ativan orally bid prn hours as needed( home dose ), I noted IV Ativan was increased to 2 mg  every 6 hours prn for agitation and anxiety.    My recommendation is not  increase the Ativan because he is at high risk of delirium given his age and comorbidities, consider using SSRIs low-dose Lexapro or Zoloft, for anxiety and depression, to take effect in 4 to 6 weeks.  Consider using Seroquel low-dose 12.5 mg prn for anxiety/agitation.    For now the goals are to continue with DNR status and proceed with EBUS.    Goals of care dialogue will be continued once we have the biopsy results.  Thank you for allowing us to be part of Mr. Scott Gardiner's care.

## 2025-01-15 NOTE — PROGRESS NOTES
ADULT PROTOCOL: JET AEROSOL ASSESSMENT    Patient  Scott Gardiner     79 y.o.   male     1/15/2025  8:42 AM    Breath Sounds Pre Procedure: Breath Sounds Pre-Tx ANDRIY: Diminished                                  Breath Sounds Pre-Tx LLL: Diminished        Breath Sounds Pre-Tx RUL: Diminished        Breath Sounds Pre-Tx RML: Diminished        Breath Sounds Pre-Tx RLL: Crackles, Diminished  Breath Sounds Post Procedure: Breath Sounds Post-Tx ANDRIY: Diminished          Breath Sounds Post-Tx LLL: Diminished          Breath Sounds Post-Tx RUL: Diminished          Breath Sounds Post-Tx RML: Diminished          Breath Sounds Post-Tx RLL: Diminished                                     Heart Rate: Pre procedure Pre-Tx Pulse: 100           Post procedure Post-Tx Pulse: 105    Resp Rate: Pre procedure Pre-Tx Resps: 20           Post procedure Post-Tx Resps: 20    Oxygen: O2 Therapy: Oxygen   nasal cannula     Changed: No    SpO2:  SpO2: 99 %   with Oxygen                Nebulizer Therapy: Current medications Medications: Albuterol/Ipratropium      Changed: No    Smoking History:   Tobacco Use    Smoking status: Former       Current packs/day: 0.00       Average packs/day: 0.5 packs/day for 58.0 years (29.0 ttl pk-yrs)       Types: Cigarettes       Start date: 1960       Quit date: 2018       Years since quittin.0       Problem List:   Patient Active Problem List   Diagnosis    Intra-abdominal abscess (HCC)    Colovesical fistula    HTN (hypertension)    GERD (gastroesophageal reflux disease)    Tobacco abuse    Hypokalemia    Encounter for rehabilitation    B12 deficiency    Colonic mass    Hypomagnesemia    Neuropathy    Lung mass    Cystitis    Diarrhea    Leukocytosis    Macrocytic anemia    Eczema    Disorder of electrolytes    GI bleed    Essential hypertension    Esophageal stricture    Blurring of visual image    Cystitis, acute    Pleural effusion, right    Severe protein-calorie malnutrition (HCC)    Anxiety

## 2025-01-15 NOTE — WOUND CARE
Wound care nurse consult for left ear wound.    78 y/o male admitted for pleural effusion and acute on chronic hypoxic respiratory failure.  Past Medical History:   Diagnosis Date    Abuse     alcoholism    Aneurysm (HCC)     Chronic obstructive pulmonary disease (HCC)     pt denies    Chronic pain     neck /arthritis-injections    Claudication of calf muscles (HCC) 2013    Colovesical fistula     Dr Rincon    Esophageal stricture     Esophagitis     GI bleed 10/2016    Hemochromatosis     Hyperlipidemia     Hypertension     Ill-defined condition 2020    blood transfusion hx    Peripheral artery disease (HCC)     Dr. Zavala    Pulmonary nodule     right lung    Stroke (HCC)      Past Surgical History:   Procedure Laterality Date    AMPUTATION Left     transmetatarsal    AMPUTATION Left 07/2016    left 4th toe from gangrene    BRONCHOSCOPY N/A 10/22/2024    BRONCHOSCOPY performed by Sudhakar Kaiser MD at [facility] ENDOSCOPY    CATARACT REMOVAL Bilateral     COLONOSCOPY N/A 12/19/2016    COLONOSCOPY performed by Ramakrishna Morataya MD at [facility] ENDOSCOPY    COLONOSCOPY N/A 9/14/2016    COLONOSCOPY performed by Ramakrishna Morataya MD at [facility] ENDOSCOPY    COLONOSCOPY N/A 9/30/2020    COLONOSCOPY performed by Karan Lyle MD at [facility] ENDOSCOPY    OTHER SURGICAL HISTORY      partial transmetatarsal amputation, lt foot all toes amputated and rt foot has 2nd tow amputation    TONSILLECTOMY      UPPER GASTROINTESTINAL ENDOSCOPY  10/2016    VASCULAR SURGERY  01/2020    axillo-bifemoral    Patient wears 2L O2 NC at home for baseline.  Patient has a Stage 2 pressure injury for O2 nasal cannula tubing to top of left ear.      WOUND POA CONDITIONS    Wound 10/23/24 Sacrum Posterior blanchable redness on sacrum w/ small area of skin breakdown (Active)   Number of days: 83       Wound 01/15/25 Ear Left (Active)   Wound Image   01/15/25 1140   Wound Etiology Pressure Stage 2 01/15/25 1140   Dressing/Treatment Other (comment) 01/15/25 1140   Wound

## 2025-01-15 NOTE — PLAN OF CARE
Problem: Chronic Conditions and Co-morbidities  Goal: Patient's chronic conditions and co-morbidity symptoms are monitored and maintained or improved  1/15/2025 1213 by Emely Eng RN  Outcome: Progressing  Flowsheets (Taken 1/14/2025 1956 by Karla Higgins RN)  Care Plan - Patient's Chronic Conditions and Co-Morbidity Symptoms are Monitored and Maintained or Improved:   Monitor and assess patient's chronic conditions and comorbid symptoms for stability, deterioration, or improvement   Collaborate with multidisciplinary team to address chronic and comorbid conditions and prevent exacerbation or deterioration  1/14/2025 2250 by Karla Higgins RN  Outcome: Progressing  Flowsheets (Taken 1/14/2025 1956)  Care Plan - Patient's Chronic Conditions and Co-Morbidity Symptoms are Monitored and Maintained or Improved:   Monitor and assess patient's chronic conditions and comorbid symptoms for stability, deterioration, or improvement   Collaborate with multidisciplinary team to address chronic and comorbid conditions and prevent exacerbation or deterioration     Problem: Discharge Planning  Goal: Discharge to home or other facility with appropriate resources  1/15/2025 1213 by Emely Eng RN  Outcome: Progressing  Flowsheets (Taken 1/14/2025 1956 by Karla Higgins RN)  Discharge to home or other facility with appropriate resources:   Identify barriers to discharge with patient and caregiver   Arrange for needed discharge resources and transportation as appropriate  1/14/2025 2250 by Karla Higgins RN  Outcome: Progressing  Flowsheets (Taken 1/14/2025 1956)  Discharge to home or other facility with appropriate resources:   Identify barriers to discharge with patient and caregiver   Arrange for needed discharge resources and transportation as appropriate     Problem: Skin/Tissue Integrity  Goal: Absence of new skin breakdown  Description: 1.  Monitor for areas of redness and/or skin

## 2025-01-16 LAB
BACTERIA SPEC CULT: NORMAL
BACTERIA SPEC CULT: NORMAL
BASOPHILS # BLD: 0.05 K/UL (ref 0–0.1)
BASOPHILS NFR BLD: 0.2 % (ref 0–1)
DIFFERENTIAL METHOD BLD: ABNORMAL
EOSINOPHIL # BLD: 0.05 K/UL (ref 0–0.4)
EOSINOPHIL NFR BLD: 0.2 % (ref 0–7)
ERYTHROCYTE [DISTWIDTH] IN BLOOD BY AUTOMATED COUNT: 25.2 % (ref 11.5–14.5)
HCT VFR BLD AUTO: 26.2 % (ref 36.6–50.3)
HGB BLD-MCNC: 7.5 G/DL (ref 12.1–17)
IMM GRANULOCYTES # BLD AUTO: 0.26 K/UL (ref 0–0.04)
IMM GRANULOCYTES NFR BLD AUTO: 1 % (ref 0–0.5)
LYMPHOCYTES # BLD: 0.82 K/UL (ref 0.8–3.5)
LYMPHOCYTES NFR BLD: 3.2 % (ref 12–49)
MCH RBC QN AUTO: 28.2 PG (ref 26–34)
MCHC RBC AUTO-ENTMCNC: 28.6 G/DL (ref 30–36.5)
MCV RBC AUTO: 98.5 FL (ref 80–99)
MONOCYTES # BLD: 1.8 K/UL (ref 0–1)
MONOCYTES NFR BLD: 7 % (ref 5–13)
NEUTS SEG # BLD: 22.72 K/UL (ref 1.8–8)
NEUTS SEG NFR BLD: 88.4 % (ref 32–75)
NRBC # BLD: 0 K/UL (ref 0–0.01)
NRBC BLD-RTO: 0 PER 100 WBC
PLATELET # BLD AUTO: 312 K/UL (ref 150–400)
PMV BLD AUTO: 10.9 FL (ref 8.9–12.9)
RBC # BLD AUTO: 2.66 M/UL (ref 4.1–5.7)
RBC MORPH BLD: ABNORMAL
SERVICE CMNT-IMP: NORMAL
SERVICE CMNT-IMP: NORMAL
WBC # BLD AUTO: 25.7 K/UL (ref 4.1–11.1)

## 2025-01-16 PROCEDURE — 6360000002 HC RX W HCPCS: Performed by: INTERNAL MEDICINE

## 2025-01-16 PROCEDURE — 85025 COMPLETE CBC W/AUTO DIFF WBC: CPT

## 2025-01-16 PROCEDURE — 6370000000 HC RX 637 (ALT 250 FOR IP): Performed by: STUDENT IN AN ORGANIZED HEALTH CARE EDUCATION/TRAINING PROGRAM

## 2025-01-16 PROCEDURE — 2500000003 HC RX 250 WO HCPCS: Performed by: STUDENT IN AN ORGANIZED HEALTH CARE EDUCATION/TRAINING PROGRAM

## 2025-01-16 PROCEDURE — 1100000000 HC RM PRIVATE

## 2025-01-16 PROCEDURE — 6370000000 HC RX 637 (ALT 250 FOR IP): Performed by: PHYSICIAN ASSISTANT

## 2025-01-16 PROCEDURE — 6370000000 HC RX 637 (ALT 250 FOR IP): Performed by: INTERNAL MEDICINE

## 2025-01-16 PROCEDURE — 6370000000 HC RX 637 (ALT 250 FOR IP): Performed by: NURSE PRACTITIONER

## 2025-01-16 PROCEDURE — 6360000002 HC RX W HCPCS: Performed by: STUDENT IN AN ORGANIZED HEALTH CARE EDUCATION/TRAINING PROGRAM

## 2025-01-16 PROCEDURE — 36415 COLL VENOUS BLD VENIPUNCTURE: CPT

## 2025-01-16 PROCEDURE — 2700000000 HC OXYGEN THERAPY PER DAY

## 2025-01-16 PROCEDURE — 94640 AIRWAY INHALATION TREATMENT: CPT

## 2025-01-16 PROCEDURE — 93005 ELECTROCARDIOGRAM TRACING: CPT | Performed by: INTERNAL MEDICINE

## 2025-01-16 RX ORDER — LORAZEPAM 2 MG/ML
2 INJECTION INTRAMUSCULAR EVERY 4 HOURS PRN
Status: DISCONTINUED | OUTPATIENT
Start: 2025-01-16 | End: 2025-01-17

## 2025-01-16 RX ORDER — SCOPOLAMINE 1 MG/3D
1 PATCH, EXTENDED RELEASE TRANSDERMAL
Status: DISCONTINUED | OUTPATIENT
Start: 2025-01-16 | End: 2025-01-18 | Stop reason: HOSPADM

## 2025-01-16 RX ORDER — GUAIFENESIN 600 MG/1
1200 TABLET, EXTENDED RELEASE ORAL 2 TIMES DAILY
Status: DISCONTINUED | OUTPATIENT
Start: 2025-01-16 | End: 2025-01-16

## 2025-01-16 RX ORDER — MORPHINE SULFATE 4 MG/ML
4 INJECTION, SOLUTION INTRAMUSCULAR; INTRAVENOUS
Status: DISCONTINUED | OUTPATIENT
Start: 2025-01-16 | End: 2025-01-17

## 2025-01-16 RX ORDER — LORAZEPAM 2 MG/ML
1.5 INJECTION INTRAMUSCULAR EVERY 6 HOURS PRN
Status: DISCONTINUED | OUTPATIENT
Start: 2025-01-16 | End: 2025-01-16

## 2025-01-16 RX ORDER — MORPHINE SULFATE 4 MG/ML
4 INJECTION, SOLUTION INTRAMUSCULAR; INTRAVENOUS EVERY 4 HOURS PRN
Status: DISCONTINUED | OUTPATIENT
Start: 2025-01-16 | End: 2025-01-16

## 2025-01-16 RX ADMIN — OXYCODONE 2.5 MG: 5 TABLET ORAL at 13:52

## 2025-01-16 RX ADMIN — THIAMINE HCL TAB 100 MG 100 MG: 100 TAB at 08:52

## 2025-01-16 RX ADMIN — LORAZEPAM 1 MG: 1 TABLET ORAL at 10:12

## 2025-01-16 RX ADMIN — METRONIDAZOLE 500 MG: 500 INJECTION, SOLUTION INTRAVENOUS at 03:33

## 2025-01-16 RX ADMIN — Medication: at 08:53

## 2025-01-16 RX ADMIN — Medication 1 CAPSULE: at 08:52

## 2025-01-16 RX ADMIN — GUAIFENESIN 600 MG: 600 TABLET ORAL at 08:52

## 2025-01-16 RX ADMIN — LORAZEPAM 2 MG: 2 INJECTION INTRAMUSCULAR; INTRAVENOUS at 19:35

## 2025-01-16 RX ADMIN — IPRATROPIUM BROMIDE AND ALBUTEROL SULFATE 1 DOSE: 2.5; .5 SOLUTION RESPIRATORY (INHALATION) at 07:45

## 2025-01-16 RX ADMIN — MORPHINE SULFATE 4 MG: 4 INJECTION, SOLUTION INTRAMUSCULAR; INTRAVENOUS at 14:14

## 2025-01-16 RX ADMIN — MORPHINE SULFATE 4 MG: 4 INJECTION, SOLUTION INTRAMUSCULAR; INTRAVENOUS at 23:25

## 2025-01-16 RX ADMIN — LEVOFLOXACIN 750 MG: 5 INJECTION, SOLUTION INTRAVENOUS at 04:56

## 2025-01-16 RX ADMIN — SODIUM CHLORIDE, PRESERVATIVE FREE 10 ML: 5 INJECTION INTRAVENOUS at 08:53

## 2025-01-16 RX ADMIN — METRONIDAZOLE 500 MG: 500 INJECTION, SOLUTION INTRAVENOUS at 12:26

## 2025-01-16 RX ADMIN — BENZONATATE 100 MG: 100 CAPSULE ORAL at 04:56

## 2025-01-16 RX ADMIN — FOLIC ACID 1 MG: 1 TABLET ORAL at 08:52

## 2025-01-16 RX ADMIN — Medication: at 22:30

## 2025-01-16 RX ADMIN — OXYCODONE 2.5 MG: 5 TABLET ORAL at 04:55

## 2025-01-16 RX ADMIN — LORAZEPAM 2 MG: 2 INJECTION INTRAMUSCULAR; INTRAVENOUS at 23:17

## 2025-01-16 RX ADMIN — MORPHINE SULFATE 4 MG: 4 INJECTION, SOLUTION INTRAMUSCULAR; INTRAVENOUS at 17:37

## 2025-01-16 ASSESSMENT — PAIN DESCRIPTION - LOCATION
LOCATION: GENERALIZED
LOCATION: ABDOMEN
LOCATION: SACRUM
LOCATION: SACRUM

## 2025-01-16 ASSESSMENT — PAIN SCALES - GENERAL
PAINLEVEL_OUTOF10: 3
PAINLEVEL_OUTOF10: 4
PAINLEVEL_OUTOF10: 0
PAINLEVEL_OUTOF10: 10
PAINLEVEL_OUTOF10: 6
PAINLEVEL_OUTOF10: 8

## 2025-01-16 ASSESSMENT — PAIN DESCRIPTION - ORIENTATION: ORIENTATION: RIGHT

## 2025-01-16 ASSESSMENT — PAIN DESCRIPTION - DESCRIPTORS
DESCRIPTORS: ACHING

## 2025-01-16 NOTE — PROGRESS NOTES
Comprehensive Nutrition Assessment    Type and Reason for Visit:  Reassess    Nutrition Recommendations/Plan:   Continue diet and supplements as tolerated     Malnutrition Assessment:  Malnutrition Status:  Severe malnutrition (01/13/25 1540)    Context:  Chronic Illness     Findings of the 6 clinical characteristics of malnutrition:  Energy Intake:  Mild decrease in energy intake  Weight Loss:  No weight loss     Body Fat Loss:  Severe body fat loss Orbital, Triceps, Fat Overlying Ribs, Buccal region   Muscle Mass Loss:  Severe muscle mass loss Temples (temporalis), Clavicles (pectoralis & deltoids), Thigh (quadriceps), Calf (gastrocnemius), Hand (interosseous), Scapula (trapezius)  Fluid Accumulation:  Mild Extremities   Strength:  Not Performed    Nutrition Assessment:    Chart reviewed; attempted to visit with patient at bedside but he was calling out for help when RD entered room. He stated he was having a hard time catching his breath. Notified RN who came to bedside. Rapid response called shortly after interaction. Patient receiving an easy to chew diet. Poor PO intake per minimal flowsheet documentation. Continue ensure plus TID. Patient is NPO tomorrow for EBUS. Plans for family meeting tomorrow as well to discuss goals of care/hospice.     Patient Vitals for the past 120 hrs:   PO Meals Eaten (%)   01/16/25 0904 26 - 50%   01/15/25 1000 26 - 50%     Nutrition Related Findings:    No new labs   BM 1/16   Folic Acid, Probiotic, Thiamine   Wound Type: Stage II (left ear)       Current Nutrition Intake & Therapies:    Average Meal Intake: 26-50%  Average Supplements Intake: Unable to assess  ADULT DIET; Easy to Chew  ADULT ORAL NUTRITION SUPPLEMENT; Breakfast, Lunch, Dinner; Standard High Calorie/High Protein Oral Supplement  Diet NPO Exceptions are: Sips of Water with Meds  Diet NPO    Anthropometric Measures:  Height: 167.6 cm (5' 6\")  Ideal Body Weight (IBW): 142 lbs (65 kg)       Current Body Weight:

## 2025-01-16 NOTE — PROGRESS NOTES
ADULT PROTOCOL: JET AEROSOL ASSESSMENT    Patient  Scott Gardiner     79 y.o.   male     2025  8:48 AM    Breath Sounds Pre Procedure: Breath Sounds Pre-Tx ANDRIY: Diminished                                  Breath Sounds Pre-Tx LLL: Diminished        Breath Sounds Pre-Tx RUL: Diminished        Breath Sounds Pre-Tx RML: Diminished        Breath Sounds Pre-Tx RLL: Diminished  Breath Sounds Post Procedure: Breath Sounds Post-Tx ANDRIY: Diminished          Breath Sounds Post-Tx LLL: Diminished          Breath Sounds Post-Tx RUL: Diminished          Breath Sounds Post-Tx RML: Diminished          Breath Sounds Post-Tx RLL: Diminished                                     Heart Rate: Pre procedure Pre-Tx Pulse: 100           Post procedure Post-Tx Pulse: 100    Resp Rate: Pre procedure Pre-Tx Resps: 20           Post procedure Post-Tx Resps: 20    Oxygen: O2 Therapy: Oxygen   nasal cannula     Changed: No    SpO2:  SpO2: 95 %   with Oxygen                Nebulizer Therapy: Current medications Medications: Albuterol/Ipratropium      Changed: No    Smoking History:   Tobacco Use    Smoking status: Former       Current packs/day: 0.00       Average packs/day: 0.5 packs/day for 58.0 years (29.0 ttl pk-yrs)       Types: Cigarettes       Start date: 1960       Quit date: 2018       Years since quittin.0        Problem List:   Patient Active Problem List   Diagnosis    Intra-abdominal abscess (HCC)    Colovesical fistula    HTN (hypertension)    GERD (gastroesophageal reflux disease)    Tobacco abuse    Hypokalemia    Encounter for rehabilitation    B12 deficiency    Colonic mass    Hypomagnesemia    Neuropathy    Lung mass    Cystitis    Diarrhea    Leukocytosis    Macrocytic anemia    Eczema    Disorder of electrolytes    GI bleed    Essential hypertension    Esophageal stricture    Blurring of visual image    Cystitis, acute    Pleural effusion, right    Severe protein-calorie malnutrition (HCC)    Anxiety

## 2025-01-16 NOTE — PROGRESS NOTES
Pulmonary, Critical Care, and Sleep Medicine~Progress Note    Name: Scott Gardiner MRN: [id number]   : 1945 Hospital: [facility]   Date: 2025 10:15 AM Admission: 2025     Impression Plan   Chronic hypoxemic respiratory failure   Near complete R hemithorax collapse  Pulmonary masses   Post obstructive pneumonia   Small bilateral pleural effusions   Suspected COPD, emphysema on CT--not in acute exacerbation  Former smoker, former alcohol abuse   Anemia   Hypotension--resolved Continue supplemental O2 to maintain SpO2 >88%  Plan for EBUS Friday at noon with Dr. Garcias   IR consulted for thoracentesis however not enough fluid to drain   On levaquin and flagyl. Low threshold to broaden further if clinically worsening, or febrile. Mrsa nares +   Sputum culture if able   Pulmonary toilet --increase mucinex, add chest PT, continue scheduled duonebs  Check cbc, bmp  Continue GOC discussions. I note plans for family meeting on Friday  NPO at midnight for EBUS tomorrow--I d/w pt       Daily Progression:    : He denies SOB, cough, chest congestion.  He sounds very congested on exam.  ?afib on the monitor--HR 90s-130s    1/15: He reports mild dry cough. Denies SOB but appears dyspneic.   He wishes to continue current level of care.       Patient seen this morning lying in bed. Discussed with bedside nurse. He states his dyspnea Is better. Denies cough, chest pain. Satting well on 1.5L NC    Consult Note    79 yom with pmhx significant for suspected COPD, HTN, HLD, GERD, CVA, former tobacco us, former alcohol abuse presented to the ED  with increased shortness of breath. Of note he was admitted 10/2024 with large pleural effusion. Cytology concerning for metastatic process of lung origin but not diagnostic. Underwent bronch with no malignant cells. In the ED, labs notable for WBC 21, Hg 6.6, BNP 66512. Proal 45.67. COVID and flu negative. CXR with  ENDOSCOPY  10/2016    VASCULAR SURGERY  2020    axillo-bifemoral      Prior to Admission medications    Medication Sig Start Date End Date Taking? Authorizing Provider   B Complex-C-Folic Acid (B-COMPLEX-C, W/FOLIC ACID, PO) Take by mouth   Yes Camryn Starr MD   acetaminophen (TYLENOL) 500 MG tablet Take 2 tablets by mouth 3 times daily as needed for Pain   Yes Camryn Starr MD   LORazepam (ATIVAN) 1 MG tablet Take 1 tablet by mouth 2 times daily as needed for Anxiety for up to 60 days. Max Daily Amount: 2 mg 1/9/25 3/10/25 Yes Karan Almonte MD   Multiple Vitamins-Minerals (CENTRUM SILVER 50+MEN) TABS TAKE 1 TABLET BY MOUTH DAILY FOR SUPPLEMENT 24  Yes Camryn Starr MD   oxyCODONE (ROXICODONE) 5 MG immediate release tablet Take 0.5 tablets by mouth every 8 hours as needed for Pain. 24  Yes Camryn Starr MD   omeprazole (PRILOSEC) 20 MG delayed release capsule Take 1 capsule by mouth daily 24  Yes Karan Almonte MD   QUEtiapine (SEROQUEL) 25 MG tablet Take 1 tablet by mouth nightly  Patient not taking: Reported on 2025   Karan Almonte MD   traZODone (DESYREL) 100 MG tablet Take 1 tablet by mouth nightly  Patient not taking: Reported on 24   Karan Almonte MD   folic acid (FOLVITE) 1 MG tablet Take 1 tablet by mouth daily 10/31/24   Janes Monique MD   thiamine mononitrate (THIAMINE) 100 MG tablet Take 1 tablet by mouth daily 10/31/24 12/9/24  Janes Monique MD     Allergies   Allergen Reactions    Iodine Rash     Hives on back      Social History     Tobacco Use    Smoking status: Former     Current packs/day: 0.00     Average packs/day: 0.5 packs/day for 58.0 years (29.0 ttl pk-yrs)     Types: Cigarettes     Start date: 1960     Quit date: 2018     Years since quittin.0    Smokeless tobacco: Never   Substance Use Topics    Alcohol use: Not Currently      Family History   Problem Relation Age of Onset     Alzheimer's Disease Father     Stroke Mother      OBJECTIVE:     Vital Signs:     /74   Pulse (!) 104   Temp 97.9 °F (36.6 °C) (Oral)   Resp 26   Ht 1.676 m (5' 6\")   Wt 54.9 kg (121 lb)   SpO2 95%   BMI 19.53 kg/m²    Temp (24hrs), Av.1 °F (36.7 °C), Min:97.9 °F (36.6 °C), Max:98.6 °F (37 °C)     Intake/Output:     Last shift: 701 - 1900  In: 370 [P.O.:370]  Out: -     Last 3 shifts:  190 -  0700  In: 360 [P.O.:360]  Out: 700 [Urine:700]        Intake/Output Summary (Last 24 hours) at 2025 1015  Last data filed at 2025 0904  Gross per 24 hour   Intake 370 ml   Output 700 ml   Net -330 ml       Physical Exam:                                        Exam Findings Other   General: No resp distress noted, cachetic, mild dyspnea    HEENT:  NCAT     Chest: tachypnea    HEART:  Tachycardia ~100    Lungs:  Diminished on R upper, absent right mid/lower, clear on L     ABD: Soft/NT,    EXT: No edema    Skin: No rashes or ulcers, no mottling    Neuro: A/O x 3        Medications:  Current Facility-Administered Medications   Medication Dose Route Frequency    LORazepam (ATIVAN) injection 1.5 mg  1.5 mg IntraVENous Q6H PRN    benzonatate (TESSALON) capsule 100 mg  100 mg Oral TID PRN    lactobacillus (CULTURELLE) capsule 1 capsule  1 capsule Oral Daily with breakfast    balsum peru-castor oil (VENELEX) ointment   Topical BID    ipratropium 0.5 mg-albuterol 2.5 mg (DUONEB) nebulizer solution 1 Dose  1 Dose Inhalation Q6H WA RT    folic acid (FOLVITE) tablet 1 mg  1 mg Oral Daily    LORazepam (ATIVAN) tablet 1 mg  1 mg Oral BID PRN    oxyCODONE (ROXICODONE) immediate release tablet 2.5 mg  2.5 mg Oral Q8H PRN    thiamine mononitrate tablet 100 mg  100 mg Oral Daily    sodium chloride flush 0.9 % injection 5-40 mL  5-40 mL IntraVENous 2 times per day    sodium chloride flush 0.9 % injection 5-40 mL  5-40 mL IntraVENous PRN    0.9 % sodium chloride infusion   IntraVENous PRN

## 2025-01-16 NOTE — PROGRESS NOTES
Hospitalist Progress Note    NAME:   Scott Gardiner   : 1945   MRN: [id number]     Date/Time: 2025 12:36 AM  Patient PCP: Karan Almonte MD    Estimated discharge date:  Barriers:       Assessment / Plan:    Acute on chronic hypoxic respiratory failure with near complete opacification of the right lung, which likely reflects a   combination of airspace disease and pulmonary masses.     -As per radiology appears to be a mass obstructing the bronchus  - Nodular interlobular septal thickening within the aerated portion of the   right upper lobe suggests lymphangitic carcinomatosis. A moderate right pleural   effusion may be malignant.   -Patient admitted to this facility in November for similar presentation is a status post bronchoscopy, however pathology was not diagnostic patient did not follow-up with pulmonary office  -Patient has multiple comorbidities as well as advanced age, patient is hospice appropriate however the patient is not interested  -As per radiology there is no enough pleural fluid to drain  -I spoke with his niece and his sister and I informed both of them that the patient is hospice appropriate and they are in agreement with me they will come over on Friday to convince the patient to proceed with hospice  Have a family meeting on Friday  Going for EBUS on Friday as per my discussion with the pulmonary team      Severe protein calorie malnutrition  - Secondary to advanced age and malignancy, started on Ensure    Hypotension  - Most likely secondary to third spacing and low albumin level start on IV fluid, patient complained of dizziness      Anemia required transfusion  -Hb 6.6 today, received 1u RBC prior to transfer  -Repeat CBC tomorrow    Elevated BNP  - Check for echocardiogram    Medical Decision Making:   I personally reviewed labs:  I personally reviewed imaging:  I personally reviewed EKG:  Toxic drug monitoring:   Discussed case with:         Code Status:   DVT  The patient presents to the emergency department alert and oriented with a complaint of a small laceration over the right eye while at day care this morning. The mother reports that day care staff state the patient was running and tripped and struck his head on a book shelf. The patients mother denies any loss of consciousness. The patient placed on the monitor with vital signs taken. Assessment as follows; Skin is pink, warm and dry. The laceration is small and not bleeding.       Mi Christensen RN  03/16/21 1007

## 2025-01-17 VITALS
HEIGHT: 66 IN | SYSTOLIC BLOOD PRESSURE: 81 MMHG | DIASTOLIC BLOOD PRESSURE: 51 MMHG | RESPIRATION RATE: 14 BRPM | BODY MASS INDEX: 19.44 KG/M2 | OXYGEN SATURATION: 62 % | HEART RATE: 85 BPM | WEIGHT: 121 LBS | TEMPERATURE: 99.3 F

## 2025-01-17 LAB
EKG DIAGNOSIS: NORMAL
EKG Q-T INTERVAL: 246 MS
EKG QRS DURATION: 66 MS
EKG QTC CALCULATION (BAZETT): 417 MS
EKG R AXIS: 68 DEGREES
EKG T AXIS: -76 DEGREES
EKG VENTRICULAR RATE: 173 BPM

## 2025-01-17 PROCEDURE — 2700000000 HC OXYGEN THERAPY PER DAY

## 2025-01-17 PROCEDURE — 94761 N-INVAS EAR/PLS OXIMETRY MLT: CPT

## 2025-01-17 PROCEDURE — 6360000002 HC RX W HCPCS: Performed by: INTERNAL MEDICINE

## 2025-01-17 PROCEDURE — 94760 N-INVAS EAR/PLS OXIMETRY 1: CPT

## 2025-01-17 PROCEDURE — 94640 AIRWAY INHALATION TREATMENT: CPT

## 2025-01-17 PROCEDURE — 51798 US URINE CAPACITY MEASURE: CPT

## 2025-01-17 PROCEDURE — 1100000000 HC RM PRIVATE

## 2025-01-17 PROCEDURE — 6370000000 HC RX 637 (ALT 250 FOR IP): Performed by: INTERNAL MEDICINE

## 2025-01-17 RX ORDER — LORAZEPAM 2 MG/ML
2 INJECTION INTRAMUSCULAR EVERY 4 HOURS
Status: DISCONTINUED | OUTPATIENT
Start: 2025-01-17 | End: 2025-01-18 | Stop reason: HOSPADM

## 2025-01-17 RX ORDER — MORPHINE SULFATE 4 MG/ML
6 INJECTION, SOLUTION INTRAMUSCULAR; INTRAVENOUS
Status: DISCONTINUED | OUTPATIENT
Start: 2025-01-17 | End: 2025-01-18 | Stop reason: HOSPADM

## 2025-01-17 RX ORDER — GLYCOPYRROLATE 1 MG/1
1 TABLET ORAL 3 TIMES DAILY
Status: DISCONTINUED | OUTPATIENT
Start: 2025-01-17 | End: 2025-01-17

## 2025-01-17 RX ORDER — LORAZEPAM 2 MG/ML
2 INJECTION INTRAMUSCULAR
Status: DISCONTINUED | OUTPATIENT
Start: 2025-01-17 | End: 2025-01-18 | Stop reason: HOSPADM

## 2025-01-17 RX ORDER — GLYCOPYRROLATE 0.2 MG/ML
0.2 INJECTION INTRAMUSCULAR; INTRAVENOUS EVERY 4 HOURS
Status: DISCONTINUED | OUTPATIENT
Start: 2025-01-17 | End: 2025-01-18 | Stop reason: HOSPADM

## 2025-01-17 RX ORDER — MORPHINE SULFATE 4 MG/ML
6 INJECTION, SOLUTION INTRAMUSCULAR; INTRAVENOUS EVERY 4 HOURS
Status: DISCONTINUED | OUTPATIENT
Start: 2025-01-17 | End: 2025-01-18 | Stop reason: HOSPADM

## 2025-01-17 RX ORDER — MORPHINE SULFATE 4 MG/ML
6 INJECTION, SOLUTION INTRAMUSCULAR; INTRAVENOUS
Status: DISCONTINUED | OUTPATIENT
Start: 2025-01-17 | End: 2025-01-17

## 2025-01-17 RX ADMIN — MORPHINE SULFATE 6 MG: 4 INJECTION, SOLUTION INTRAMUSCULAR; INTRAVENOUS at 11:29

## 2025-01-17 RX ADMIN — IPRATROPIUM BROMIDE AND ALBUTEROL SULFATE 1 DOSE: 2.5; .5 SOLUTION RESPIRATORY (INHALATION) at 08:05

## 2025-01-17 RX ADMIN — LORAZEPAM 2 MG: 2 INJECTION INTRAMUSCULAR; INTRAVENOUS at 16:05

## 2025-01-17 RX ADMIN — MORPHINE SULFATE 4 MG: 4 INJECTION, SOLUTION INTRAMUSCULAR; INTRAVENOUS at 09:27

## 2025-01-17 RX ADMIN — IPRATROPIUM BROMIDE AND ALBUTEROL SULFATE 1 DOSE: 2.5; .5 SOLUTION RESPIRATORY (INHALATION) at 20:56

## 2025-01-17 RX ADMIN — LORAZEPAM 2 MG: 2 INJECTION INTRAMUSCULAR; INTRAVENOUS at 17:49

## 2025-01-17 RX ADMIN — MORPHINE SULFATE 6 MG: 4 INJECTION, SOLUTION INTRAMUSCULAR; INTRAVENOUS at 20:44

## 2025-01-17 RX ADMIN — LORAZEPAM 2 MG: 2 INJECTION INTRAMUSCULAR; INTRAVENOUS at 20:45

## 2025-01-17 RX ADMIN — MORPHINE SULFATE 6 MG: 4 INJECTION, SOLUTION INTRAMUSCULAR; INTRAVENOUS at 16:05

## 2025-01-17 RX ADMIN — LORAZEPAM 2 MG: 2 INJECTION INTRAMUSCULAR; INTRAVENOUS at 03:12

## 2025-01-17 RX ADMIN — GLYCOPYRROLATE 0.2 MG: 0.2 INJECTION INTRAMUSCULAR; INTRAVENOUS at 17:49

## 2025-01-17 RX ADMIN — MORPHINE SULFATE 4 MG: 4 INJECTION, SOLUTION INTRAMUSCULAR; INTRAVENOUS at 07:35

## 2025-01-17 RX ADMIN — Medication: at 20:43

## 2025-01-17 RX ADMIN — MORPHINE SULFATE 6 MG: 4 INJECTION, SOLUTION INTRAMUSCULAR; INTRAVENOUS at 17:49

## 2025-01-17 RX ADMIN — MORPHINE SULFATE 4 MG: 4 INJECTION, SOLUTION INTRAMUSCULAR; INTRAVENOUS at 03:11

## 2025-01-17 RX ADMIN — Medication: at 07:40

## 2025-01-17 RX ADMIN — GLYCOPYRROLATE 0.2 MG: 0.2 INJECTION INTRAMUSCULAR; INTRAVENOUS at 20:45

## 2025-01-17 RX ADMIN — LORAZEPAM 2 MG: 2 INJECTION INTRAMUSCULAR; INTRAVENOUS at 07:36

## 2025-01-17 ASSESSMENT — PAIN SCALES - GENERAL
PAINLEVEL_OUTOF10: 2
PAINLEVEL_OUTOF10: 7
PAINLEVEL_OUTOF10: 0

## 2025-01-17 NOTE — ACP (ADVANCE CARE PLANNING)
Advance Care Planning     General Advance Care Planning (ACP) Conversation    Date of Conversation: 1/17/2025  Conducted with: Healthcare Decision Maker (Sister oDlores by phone)  Other persons present: Niece Sri and her mother, patient's sister in law    Healthcare Decision Maker:   Primary Decision Maker: Dolores Sanchez - Brother/Sister - [phone]    Secondary Decision Maker: Sri Sow - Niece/Nephew - [phone]       Content/Action Overview:  Has ACP document(s) on file - reflects the patient's care preferences  Reviewed DNR/DNI and patient confirms current DNR status - completed forms on file (place new order if needed)    Palliative SW made supportive visit to patient and family at bedside and spoke with sister Dolores by phone.  Dolores requests that the secondary HCDM, Sri Sow be designated as her proxy to meet with hospice and sign any forms. Dolores just recently lost her spouse and is too emotionally upset at this time to be primary HCDM. She and Sri have been making decisions together and Sri is in agreement that she will sign any hospice admission paperwork and meet with hospice representative.    Length of Voluntary ACP Conversation in minutes:  <16 minutes (Non-Billable)    Dipti Gray LCSW

## 2025-01-17 NOTE — PROGRESS NOTES
Hospitalist Progress Note    NAME:   Scott Gardiner   : 1945   MRN: [id number]     Date/Time: 2025 6:56 PM  Patient PCP: Karan Almonte MD    Estimated discharge date:  Barriers:       Assessment / Plan:    Acute on chronic hypoxic respiratory failure with near complete opacification of the right lung, which likely reflects a combination of airspace disease and pulmonary masses.     -As per radiology appears to be a mass obstructing the bronchus  - Nodular interlobular septal thickening within the aerated portion of the   right upper lobe suggests lymphangitic carcinomatosis. A moderate right pleural   effusion may be malignant.   -Patient admitted to this facility in November for similar presentation is a status post bronchoscopy, however pathology was not diagnostic patient did not follow-up with pulmonary office  -Patient has multiple comorbidities as well as advanced age, patient is hospice appropriate however the patient is not interested  1/15 -As per radiology there is no enough pleural fluid to drain  -I spoke with his niece and his sister and I informed both of them that the patient is hospice appropriate and they are in agreement with me they will come over on Friday to convince the patient to proceed with hospice  Have a family meeting on Friday  Going for EBUS on Friday as per my discussion with the pulmonary team      Patient developed A-fib with RVR today, became hypotensive, I spoke to him about his current medical status and informed him that hospice is the best option given regardless of he was/pathology finding she is not a candidate for any treatment.  He agreed with hospice / comfort care.  -I informed his niece  - comfort measures only      Currently patient on comfort care I expect he expires within next 24 hours, I met with his sister and his today, they asked to put his niece as a  I informed the nurse and he informed me that he will follow.  They  YES  Review and summation of old records today    NO  Reviewed patient's current orders and MAR    YES  PMH/SH reviewed - no change compared to H&P    Procedures: see electronic medical records for all procedures/Xrays and details which were not copied into this note but were reviewed prior to creation of Plan.      LABS:  I reviewed today's most current labs and imaging studies.  Pertinent labs include:  Recent Labs     01/15/25  0338 01/16/25  1134   WBC 23.9* 25.7*   HGB 7.5* 7.5*   HCT 24.7* 26.2*    312     No results for input(s): \"NA\", \"K\", \"CL\", \"CO2\", \"GLUCOSE\", \"BUN\", \"CREATININE\", \"CALCIUM\", \"MG\", \"PHOS\", \"LABALBU\", \"BILITOT\", \"AST\", \"ALT\", \"INR\" in the last 72 hours.      Signed: Cali Fernández MD

## 2025-01-17 NOTE — PROGRESS NOTES
Palliative Medicine  Per Dr. Devine: Scott Gardiner is a 79 y.o. male with a past medical history of hypertension, PAD s/p axilloaxillary bifemoral graft, large right pleural effusion,  chronic resp failure  on 2 litre oxygen, suspected COPD who was admitted on 1/12/2025 from with a diagnosis of acute hypoxic respiratory failure due to recurrent right pleural effusion, near complete right hemithorax collapse, pulmonary mass suspected malignancy, postobstructive pneumonia, suspected COPD emphysema on CT , EBUS on Friday , IR consulted for thoracentesis however not enough fluid to drain on antibiotics.  Pateint is notv   Patient admitted to this facility in November for similar presentation is a status post bronchoscopy, however pathology was not diagnostic patient did not follow-up with pulmonary office   per radiology there is no enough pleural fluid to drain  Dr. Fernández spoke with his niece and his sister and informed both of them that the patient is hospice appropriate and they are in agreement with me they will come over on Friday to convince the patient to proceed with hospice. Have a family meeting on Friday  Suspicion for malignant effusion presented with shortness of breath, chest x-ray complete malignant occlusion of right lung, nondiagnostic biopsy in October opacification of the right hemithorax, leukocytosis of 21 with left shift with shift possible underlying infection based on IV antibiotics, anemia with a hemoglobin of 6.6  Per pulmonary needs repeat bronchoscopy with EBUS for tissue diagnosis of note patient has a nondiagnostic biopsy in October.  Recent admissions: 10/12/2024 to 10/30/2024 for acute respiratory failure, large right pleural effusion, right upper lobe pulmonary mass s/p bronchial biopsy 1020 2/2024 pathology negative for malignant cells he was [facility] nursing [facility] nursing [facility] sepsis.     Code Status:  DDNR signed by the patient in 2019 is on file.   caregiver feel confident administering medication?   Does the caregiver need any help connecting with community resources?   Does the caregiver feel confident assisting with activities of daily living?      Goals of Care / Plan: (Please see Dr. Devine's note.)  Patient's symptoms will be managed.  DDNR  Hospice is consulted for EOL care.     Thank you for including Palliative Medicine in the care of Mr. Scott Gardiner.     Kymberly Gray, [facility]  [phone])

## 2025-01-17 NOTE — PLAN OF CARE
Problem: Chronic Conditions and Co-morbidities  Goal: Patient's chronic conditions and co-morbidity symptoms are monitored and maintained or improved  Outcome: Progressing     Problem: Discharge Planning  Goal: Discharge to home or other facility with appropriate resources  Outcome: Progressing     Problem: Skin/Tissue Integrity  Goal: Absence of new skin breakdown  Description: 1.  Monitor for areas of redness and/or skin breakdown  2.  Assess vascular access sites hourly  3.  Every 4-6 hours minimum:  Change oxygen saturation probe site  4.  Every 4-6 hours:  If on nasal continuous positive airway pressure, respiratory therapy assess nares and determine need for appliance change or resting period.  Outcome: Progressing     Problem: Safety - Adult  Goal: Free from fall injury  Outcome: Progressing     Problem: Pain  Goal: Verbalizes/displays adequate comfort level or baseline comfort level  Outcome: Progressing     Problem: Respiratory - Adult  Goal: Achieves optimal ventilation and oxygenation  1/17/2025 0733 by Zeny Nugent RN  Outcome: Progressing  1/17/2025 0455 by Ormond, Brittany Michelle, RCP  Outcome: Progressing     Problem: Skin/Tissue Integrity - Adult  Goal: Skin integrity remains intact  Outcome: Progressing     Problem: Musculoskeletal - Adult  Goal: Return mobility to safest level of function  Outcome: Progressing     Problem: Infection - Adult  Goal: Absence of infection during hospitalization  Outcome: Progressing

## 2025-01-17 NOTE — CARE COORDINATION
Transition of Care Plan:    RUR: 15%   Prior Level of Functioning: assistance with IADLs  Disposition: pending discussion with palliative once niece arrives today  DENIA: 1/18/25  If SNF or IPR: Date FOC offered:   Date FOC received:   Accepting facility:   Date authorization started with reference number:   Date authorization received and expires:   Follow up appointments: PCP, Specialists  DME needed: tbd  Transportation at discharge: BLS  IM/IMM Medicare/ letter given:   Is patient a [place] and connected with [facility]?    If yes, was  transfer form completed and [facility] notified?   Caregiver Contact: Dolores Sanchez (Brother/Sister)  [phone] (Home Phone)   Discharge Caregiver contacted prior to discharge?   Care Conference needed?   Barriers to discharge: hospice set up; mtg with palliative    CM called pt's sister, Dolores Sanchez, who referred this CM to call pt's niece, Sri Sow.  CM spoke with Ms. Sow who shared she is driving from [place] to meet with palliative team once she arrives today to discuss hospice with pt. Niece is hoping pt will be agreeable to services.  CM provided contact info and will follow along.     2:45 p.m. CM informed pt's family is at bedside.  CM met with Ms. Sow and offered hospice info session. FOC offered; no preference. Ms. Sow in agreement.  CM will send order.    Winnie Irvin LMSW, LCSW  Care Management, [facility]  [phone]

## 2025-01-17 NOTE — PROGRESS NOTES
Hospitalist Progress Note    NAME:   Scott Gardiner   : 1945   MRN: [id number]     Date/Time: 2025 6:52 PM  Patient PCP: Karan Almonte MD    Estimated discharge date:  Barriers:       Assessment / Plan:    Acute on chronic hypoxic respiratory failure with near complete opacification of the right lung, which likely reflects a combination of airspace disease and pulmonary masses.     -As per radiology appears to be a mass obstructing the bronchus  - Nodular interlobular septal thickening within the aerated portion of the   right upper lobe suggests lymphangitic carcinomatosis. A moderate right pleural   effusion may be malignant.   -Patient admitted to this facility in November for similar presentation is a status post bronchoscopy, however pathology was not diagnostic patient did not follow-up with pulmonary office  -Patient has multiple comorbidities as well as advanced age, patient is hospice appropriate however the patient is not interested  1/15 -As per radiology there is no enough pleural fluid to drain  -I spoke with his niece and his sister and I informed both of them that the patient is hospice appropriate and they are in agreement with me they will come over on Friday to convince the patient to proceed with hospice  Have a family meeting on Friday  Going for EBUS on Friday as per my discussion with the pulmonary team      Patient developed A-fib with RVR today, became hypotensive, I spoke to him about his current medical status and informed him that hospice is the best option given regardless of he was/pathology finding she is not a candidate for any treatment.  He agreed with hospice / comfort care.  -I informed his niece  - comfort measures only      Severe protein calorie malnutrition  - Secondary to advanced age and malignancy, started on Ensure    Hypotension  - Most likely secondary to third spacing and low albumin level start on IV fluid, patient complained of      LABS:  I reviewed today's most current labs and imaging studies.  Pertinent labs include:  Recent Labs     01/15/25  0338 01/16/25  1134   WBC 23.9* 25.7*   HGB 7.5* 7.5*   HCT 24.7* 26.2*    312     No results for input(s): \"NA\", \"K\", \"CL\", \"CO2\", \"GLUCOSE\", \"BUN\", \"CREATININE\", \"CALCIUM\", \"MG\", \"PHOS\", \"LABALBU\", \"BILITOT\", \"AST\", \"ALT\", \"INR\" in the last 72 hours.      Signed: Cali Fernández MD

## 2025-01-17 NOTE — PROGRESS NOTES
End of Shift Note    Bedside shift change report given to Breana (oncoming nurse) by MARIOLA RICHARDS RN (offgoing nurse).  Report included the following information SBAR    Shift worked:  7a-7p     Shift summary and any significant changes:     Pt converted to comfort     Concerns for physician to address:  Pain control, anxiety     Zone phone for oncoming shift:   yes       Activity:  Level of Assistance: Moderate assist, patient does 50-74%  Number times ambulated in hallways past shift: 0  Number of times OOB to chair past shift: 0    Cardiac:   Cardiac Monitoring: No      Cardiac Rhythm: Sinus tachy    Access:  Current line(s): PIV     Genitourinary:   Urinary Status: Voiding, External catheter    Respiratory:   O2 Device: Nasal cannula  Chronic home O2 use?: NO  Incentive spirometer at bedside: NO    GI:  Last BM (including prior to admit): 01/16/25  Current diet:  ADULT DIET; Easy to Chew  ADULT ORAL NUTRITION SUPPLEMENT; Breakfast, Lunch, Dinner; Standard High Calorie/High Protein Oral Supplement  Diet NPO Exceptions are: Sips of Water with Meds  Diet NPO  Passing flatus: YES    Pain Management:   Patient states pain is manageable on current regimen: YES    Skin:  Srinivasan Scale Score: 15  Interventions: Wound Offloading (Prevention Methods): Bed, pressure redistribution/air    Patient Safety:  Fall Risk: Nursing Judgement-Fall Risk High(Add Comments): Yes  Fall Risk Interventions  Nursing Judgement-Fall Risk High(Add Comments): Yes  Toilet Every 2 Hours-In Advance of Need: Yes  Hourly Visual Checks: Eyes closed, In bed  Fall Visual Posted: Armband, Fall sign posted  Room Door Open: Yes  Alarm On: Bed  Patient Moved Closer to Nursing Station: No    Active Consults:   IP CONSULT TO PALLIATIVE CARE  IP CONSULT TO PULMONOLOGY  IP CONSULT TO INTERVENTIONAL RADIOLOGY  IP CONSULT TO HOSPICE    Length of Stay:  Expected LOS: 8  Actual LOS: 4    MARIOLA RICHARDS, NIKOLAI

## 2025-01-17 NOTE — PROGRESS NOTES
End of Shift Note    Bedside shift change report given to Brianna (oncoming nurse) by Zeny Nugent RN (offgoing nurse).  Report included the following information SBAR, Kardex, and MAR    Shift worked:  7p-7a     Shift summary and any significant changes:     Scheduled medications were given, see MAR.  A new IV was started.  Patient was repositioned during my shift.  Patient was given Ativan and Morphine twice, see PRN MAR.  Frequent rounding was done.       Concerns for physician to address:       Zone phone for oncoming shift:          Activity:  Level of Assistance: Maximum assist, patient does 25-49%  Number times ambulated in hallways past shift: 0  Number of times OOB to chair past shift: 0    Cardiac:   Cardiac Monitoring: No      Cardiac Rhythm: Sinus tachy    Access:  Current line(s): PIV     Genitourinary:   Urinary Status: External catheter    Respiratory:   O2 Device: Nasal cannula  Chronic home O2 use?: YES  Incentive spirometer at bedside: NO    GI:  Last BM (including prior to admit): 01/16/25  Current diet:  Diet NPO  Passing flatus: YES    Pain Management:   Patient states pain is manageable on current regimen: YES    Skin:  Srinivasan Scale Score: 14  Interventions: Wound Offloading (Prevention Methods): Blankets, Pillows, Repositioning, Turning    Patient Safety:  Fall Risk: Nursing Judgement-Fall Risk High(Add Comments): Yes  Fall Risk Interventions  Nursing Judgement-Fall Risk High(Add Comments): Yes  Toilet Every 2 Hours-In Advance of Need: Yes  Hourly Visual Checks: In bed, Eyes closed, Agitated  Fall Visual Posted: Armband, Fall sign posted  Room Door Open: Deferred to promote rest  Alarm On: Bed  Patient Moved Closer to Nursing Station: No    Active Consults:   IP CONSULT TO PALLIATIVE CARE  IP CONSULT TO PULMONOLOGY  IP CONSULT TO INTERVENTIONAL RADIOLOGY  IP CONSULT TO HOSPICE    Length of Stay:  Expected LOS: 5  Actual LOS: 5    Zeny Nugent RN

## 2025-01-17 NOTE — PROGRESS NOTES
[facility]  [facility] Help to Those in Need  [phone]     Patient Name: Scott Gardiner  YOB: 1945  Age: 79 y.o.    [facility] RN Note:  Hospice consult received, reviewing chart. Will follow up with Unit Nurse and Care Manager to discuss plan of care, patient status and discharge disposition within the hour.     Met with girish at bedside, Patient noted to have large amount of secretions and agonal breathing with periods of apnea. Appears to be very close to expiring, we will not admit due to imminent status but have reached out to attending about scheduling morphine, ativan, and adding robinul as well. Girish in agreement with this plan.    Thank you for the opportunity to be of service to this patient.    MELANY Stevenson, RN  Clinical Nurse Liaison  [facility]  [phone]  Office: [phone]  Available on [employer]

## 2025-01-17 NOTE — PROGRESS NOTES
Spiritual Health History and Assessment/Progress Note  [facility]    Initial Encounter, Palliative Care,  , End of Life,      Name: Scott Gardiner MRN: [id number]    Age: 79 y.o.     Sex: male   Language: English   Bahai: Other   Pleural effusion     Date: 1/17/2025            Total Time Calculated: 30 min              Spiritual Assessment began in [facility]        Referral/Consult From: Nurse   Encounter Overview/Reason: Initial Encounter, Palliative Care  Service Provided For: Patient and family together    Diana, Belief, Meaning:   Patient unable to assess at this time  Family/Friends identify as spiritual, are connected with a diana tradition or spiritual practice, and have beliefs or practices that help with coping during difficult times      Importance and Influence:  Patient unable to assess at this time  Family/Friends have no beliefs influential to healthcare decision-making identified during this visit    Community:  Patient feels well-supported. Support system includes: Extended family  Family/Friends feel well-supported. Support system includes: Parent/s, Children, and Extended family    Assessment and Plan of Care:     Patient Interventions include: Other: see below  Family/Friends Interventions include: Facilitated expression of thoughts and feelings, Engaged in theological reflection, and Affirmed coping skills/support systems    Patient Plan of Care: Spiritual Care available upon further referral  Family/Friends Plan of Care: Spiritual Care available upon further referral    Visited with patient's niece and sister-in-law; Mr Gardiner seemed unresponsive although niece said he occasionally squeezed her hand.  Offered supportive listening and pastoral support.  Shared a scripture reading from patient's mother's bible (niece had brought it to the hospital).  Offered a prayer.  Advised of ongoing  availability.    Electronically signed by Chaplain YELENA

## 2025-01-17 NOTE — PROGRESS NOTES
Per my discussion with the hospitalist and nurse patient is now comfort care with plans to discharge with home hospice today. EBUS has been cancelled. Will sign off.

## 2025-01-17 NOTE — PLAN OF CARE
Problem: Chronic Conditions and Co-morbidities  Goal: Patient's chronic conditions and co-morbidity symptoms are monitored and maintained or improved  1/17/2025 1004 by Brianna Nova RN  Outcome: Progressing  1/17/2025 0733 by Zeny Nugent RN  Outcome: Progressing     Problem: Discharge Planning  Goal: Discharge to home or other facility with appropriate resources  1/17/2025 1004 by Brianna Nova RN  Outcome: Progressing  1/17/2025 0733 by Zeny Nugent RN  Outcome: Progressing     Problem: Skin/Tissue Integrity  Goal: Absence of new skin breakdown  Description: 1.  Monitor for areas of redness and/or skin breakdown  2.  Assess vascular access sites hourly  3.  Every 4-6 hours minimum:  Change oxygen saturation probe site  4.  Every 4-6 hours:  If on nasal continuous positive airway pressure, respiratory therapy assess nares and determine need for appliance change or resting period.  1/17/2025 1004 by Brianna Nova RN  Outcome: Progressing  1/17/2025 0733 by Zeny Nugent RN  Outcome: Progressing     Problem: ABCDS Injury Assessment  Goal: Absence of physical injury  Outcome: Progressing     Problem: Safety - Adult  Goal: Free from fall injury  1/17/2025 1004 by Brianna Nova RN  Outcome: Progressing  1/17/2025 0733 by Zeny Nugent RN  Outcome: Progressing     Problem: Pain  Goal: Verbalizes/displays adequate comfort level or baseline comfort level  1/17/2025 1004 by Brianna Nova RN  Outcome: Progressing  1/17/2025 0733 by Zeny Nugent RN  Outcome: Progressing     Problem: Respiratory - Adult  Goal: Achieves optimal ventilation and oxygenation  1/17/2025 1004 by Brianan Nova RN  Outcome: Progressing  1/17/2025 0733 by Zeny Nugent RN  Outcome: Progressing  1/17/2025 0455 by Ormond, Brittany Michelle, RCP  Outcome: Progressing     Problem: Neurosensory - Adult  Goal: Achieves stable or improved neurological status  Outcome:

## 2025-01-18 PROCEDURE — 94761 N-INVAS EAR/PLS OXIMETRY MLT: CPT

## 2025-01-18 PROCEDURE — 2700000000 HC OXYGEN THERAPY PER DAY

## 2025-01-18 NOTE — PROGRESS NOTES
Physician Progress Note      PATIENT:               KATHI DICKERSON  CSN #:                  [id number]  :                       1945  ADMIT DATE:       2025 10:45 PM  DISCH DATE:  RESPONDING  PROVIDER #:        Cali Fernández MD          QUERY TEXT:    Pt admitted with \"near complete opacification of right lung\". Pt noted to have   hr , rr 30, wbc 21, left shift. If possible, please document in the   progress notes and discharge summary if you are evaluating and /or treating   any of the following:  The medical record reflects the following:  Risk Factors:\"Post obstructive pneumonia\" chr resp failure, severe   malnutrition  Clinical Indicators: hr , rr 30, wbc 21, H&P-WBC 21 with left shift -   underlying infectious component as well? Will continue IV abx.  Treatment: Lvq, Flagyl, pulm cx  Options provided:  -- Sepsis, present on admission  -- Post obstructive pneumonia without Sepsis  -- Other - I will add my own diagnosis  -- Disagree - Not applicable / Not valid  -- Disagree - Clinically unable to determine / Unknown  -- Refer to Clinical Documentation Reviewer    PROVIDER RESPONSE TEXT:    This patient has sepsis which was present on admission.    Query created by: Rimma Ramirez on 2025 11:32 AM      Electronically signed by:  Cali Fernández MD 2025 7:41 PM

## 2025-01-18 NOTE — PROGRESS NOTES
Patient passed away at 2310 on 1/17/25.  Patient was given scheduled medication.  IV's were flushed and were patent.  The Nursing Supervisor, , Hospitalist, [employer] and family were notified as per protocol.  IV's were removed with catheter tips intact.  Post-mortem care was done.  Patient was [place] the [facility] [place] two bags of personal effects which includes; 3 sweat pants, two sweat shirts, an electric shaver and a test glass.

## 2025-01-18 NOTE — PROGRESS NOTES
A wallet was found among this patient's possession, I di call security and ask that the content be itemized which was done.  I did inform the patient's niece in a follow-up phone call that, \"security can keep the envelope with the patient's personal effects for 90 days.  A copy of the enclosed content receipt will be given to Cedric Chandler to give to the family member when she comes to collect the envelope.

## 2025-01-18 NOTE — PROGRESS NOTES
End of Shift Note    Bedside shift change report given to NIKOLAI Toussaint (oncoming nurse) by BERONICA AMBROSE RN (offgoing nurse).  Report included the following information SBAR, Kardex, Intake/Output, and MAR    Shift worked:  7a-7p     Shift summary and any significant changes:     PRN morphine given x4 and PRN ativan given x2. Pt unresponsive at the end of shift. Family present at bedside for a couple of hours. New IV placed. Bladder scan done since patient had not voided during the shift, 211 ml present.      Concerns for physician to address:    Zone phone for oncoming shift:       Activity:  Level of Assistance: Maximum assist, patient does 25-49%  Number times ambulated in hallways past shift: 0  Number of times OOB to chair past shift: 0    Cardiac:   Cardiac Monitoring: No      Cardiac Rhythm: Sinus tachy    Access:  Current line(s): PIV     Genitourinary:   Urinary Status: External catheter    Respiratory:   O2 Device: Nasal cannula  Chronic home O2 use?: NO  Incentive spirometer at bedside: NO    GI:  Last BM (including prior to admit): 01/16/25  Current diet:  Diet NPO  DIET ONE TIME MESSAGE;  Passing flatus: NO    Pain Management:   Patient states pain is manageable on current regimen: N/A    Skin:  Srinivasan Scale Score: 11  Interventions: Wound Offloading (Prevention Methods): Turning, Repositioning    Patient Safety:  Fall Risk: Nursing Judgement-Fall Risk High(Add Comments): Yes  Fall Risk Interventions  Nursing Judgement-Fall Risk High(Add Comments): Yes  Toilet Every 2 Hours-In Advance of Need: Yes  Hourly Visual Checks: In bed, Eyes closed, Agitated  Fall Visual Posted: Armband, Fall sign posted  Room Door Open: Deferred to promote rest  Alarm On: Bed  Patient Moved Closer to Nursing Station: No    Active Consults:   IP CONSULT TO PALLIATIVE CARE  IP CONSULT TO PULMONOLOGY  IP CONSULT TO INTERVENTIONAL RADIOLOGY  IP CONSULT TO HOSPICE    Length of Stay:  Expected LOS: 6  Actual LOS: 5    BERONICA

## 2025-01-18 NOTE — PROGRESS NOTES
I did contact Pharmacy about this patient's personal narcotics after speaking with his niece,  she did advise me, \"to have pharmacy dispose of said medication appropriately which was subsequently relayed to the pharmacy.

## 2025-01-18 NOTE — DEATH NOTES
Physician Pronouncement of Death    Called by nursing to pronounce patient.  Death expected: YES, was on comfort measures already  Family informed: YES, by nursing    Physical Exam:  No corneal reflex.  No gag reflex.  No heart sounds on auscultation.  No spontaneous breath sounds.  No withdrawal from painful stimuli.    Time of Death: 2310    Signed: JOE Rahman CNP  1/17/2025 11:20 PM    Death summary and discharge to be completed by primary attending Cali Negron.

## 2025-01-19 LAB
EKG ATRIAL RATE: 110 BPM
EKG DIAGNOSIS: NORMAL
EKG P AXIS: 80 DEGREES
EKG P-R INTERVAL: 140 MS
EKG Q-T INTERVAL: 338 MS
EKG QRS DURATION: 72 MS
EKG QTC CALCULATION (BAZETT): 457 MS
EKG R AXIS: -20 DEGREES
EKG T AXIS: 60 DEGREES
EKG VENTRICULAR RATE: 110 BPM

## 2025-01-26 NOTE — DISCHARGE SUMMARY
Discharge Summary    Name: Scott Gardiner  [id number]  YOB: 1945 (Age: 79 y.o.)   Date of Admission: 1/12/2025  Date of Discharge: 1/17/2025  Attending Physician: No att. providers found    Discharge Diagnosis:   Hypoxic respiratory failure most likely secondary to lung mass malignancy and postobstructive pneumonia  Consultations:  IP CONSULT TO PALLIATIVE CARE  IP CONSULT TO PULMONOLOGY  IP CONSULT TO INTERVENTIONAL RADIOLOGY  IP CONSULT TO HOSPICE      Brief Admission History/Reason for Admission Per Landen Barber MD:   Scott Gardiner is a 79 y.o.  male with PMHx significant for  has a past medical history of Abuse, Aneurysm   (HCC), Chronic obstructive pulmonary disease (HCC), Chronic pain, Claudication of calf muscles (HCC), Colovesical fistula, Esophageal stricture, Esophagitis, GI bleed, Hemochromatosis, Hyperlipidemia, Hypertension, Ill-defined condition, Peripheral artery disease (HCC), Pulmonary nodule, and Stroke (HCC). who presents with the above chief complaint.      We were asked to admit for work up and further evaluation.      Patient somewhat poor historian. Tangential answers. States that he went to the ED today  because he was not feeling well. Notes has been more SOB recently and has been breathing faster than usual. Also notes that has been fatigued and not felt like himself. Denies any fevers or chills. Notes has had some cough recently but no hemoptysis with this.        Brief Hospital Course by Main Problems:   The patient presents to the hospital with acute hypoxic respiratory failure, history of smoking, history of alcohol abuse, history of COPD, injury imaging at the ER was mostly consistent with postobstructive pneumonia due to large right lung mass most likely malignant.-Prior plan was to proceed with EBUS and 2 lung biopsy, however her  Status deteriorate patient developed A-fib with RVR and finally the patient decided to proceed with

## 2025-02-04 LAB
BACTERIA SPEC CULT: NORMAL
BACTERIA SPEC CULT: NORMAL
SERVICE CMNT-IMP: NORMAL
SERVICE CMNT-IMP: NORMAL

## 2025-02-06 NOTE — PROGRESS NOTES
Physician Progress Note      PATIENT:               KATHI DICKERSON  [facility] #:                  [id number]  :                       1945  ADMIT DATE:       2025 10:45 PM  DISCH DATE:        2025 11:10 PM  RESPONDING  PROVIDER #:        Cali Fernández MD          QUERY TEXT:    Patient admitted with sepsis and pneumonia. Per 1/15 Wound Care note, noted to   also have a stage 2 pressure injury to left ear. If possible, please document   in progress notes and discharge summary the type of ulcer, site of the ulcer   and present on admission status of the ulcer:    The medical record reflects the following:  Risk Factors:  Clinical Indicators: 1/15  Wound Care note:  Patient wears 2L O2 NC at home   for baseline.  Patient has a Stage 2 pressure injury for O2 nasal cannula tubing to top of   left ear.  Treatment:  Left ear: BID, cleanse with NS moist 4x4, apply Venelex/BPCO   ointment to wound. Place NC foam tubing covers on tubing.  Options provided:  -- Stage 2 pressure injury to left ear present on admission  -- Other - I will add my own diagnosis  -- Disagree - Not applicable / Not valid  -- Disagree - Clinically unable to determine / Unknown  -- Refer to Clinical Documentation Reviewer    PROVIDER RESPONSE TEXT:    This patient has a stage 2 pressure injury to left ear that was present on   admission.    Query created by: Betty Lamas on 2025 2:06 PM      QUERY TEXT:    Patient admitted with hypoxic respiratory failure most likely secondary to   lung mass malignancy and postobstructive pneumonia, noted to have \" Nodular   interlobular septal thickening within the aerated portion of the right upper   lobe suggests lymphangitic carcinomatosis\". If possible, please document in   progress notes and discharge summary if you are evaluating and/or treating any   of the following:    The medical record reflects the following:  Risk Factors: Lung mass malignancy  Clinical Indicators:  Internal

## 2025-03-09 NOTE — PROGRESS NOTES
Pulmonary, Critical Care, and Sleep Medicine~Consult Note    Name: Scott Gardiner MRN: [id number]   : 1945 Hospital: [facility]   Date: 10/21/2024 11:46 AM Admission: 10/12/2024       Discussed with Ms. Hoffman.   Will proceed with bronchoscopy with conscious sedation on 10/22.     Impression Plan   Acute hypoxemic respiratory failure  Abnormal chest imaging: lymphadenopathy, large right pleural effusion, probable right pulmonary masses--s/p R thoracentesis on 10/14 with 1450 ml bloody fluid drained, exudative, lymphocytic. Cytomorphologic features worrisome for a metastatic process of lung origin but not definitive/diagnostic  Possible post-obstructive PNA  Sepsis  Suspected COPD, emphysema on CT; not in an acute exacerbation  Former tobacco abuse, 60pk year smoker  anxiety   Bronch 10/22/24 at 130pm. air way inspection and biopsy under conscious sedation. If negative results, EBUS at later date.  small amount of loculated fluid, not enough for drain placement by IR.  Empiric abx--continue vancomycin, levaquin. With rising wbc and procal broadened abx to zosyn.   F/u bcx--neg  mrsa screen positive  Trend procal (down), wbc--down  sputum cx-normal resp mone  Prn xopenex  Ativan prn  Dvt ppx: SCDs. Hold dvt ppx for possible biopsy. NPO at midnight for bronch tomorrow  He would benefit from outpatient PFTs.        HPI:    10/21: denies SOB. Cough is improving.  We discussed bronch tomorrow and all questions answered    10/19:  Feels better  Cough with green sputum  Ct chest reviewed and d/w patient.    10/18: coughing up less sputum. Denies SOB.   Hypotensive  On 3L O2  afebrile    10/16: sputum is becoming more clear in color. Denies SOB. Asking for something for anxiety.  Weaned O2 to 1L w/ sats 96%  Afebrile  Wbc 18--up  Procal 60--up    10/15: up in the chair. Denies SOB at rest but appears dyspneic with conversation. Admits to FRY. Cough productive of 
                           Pulmonary, Critical Care, and Sleep Medicine~Consult Note    Name: Scott Gardiner MRN: [id number]   : 1945 Hospital: [facility] [place] [facility]   Date: 10/14/2024 9:25 AM Admission: 10/12/2024     Impression Plan   Acute hypoxemic respiratory failure  Abnormal chest imaging: lymphadenopathy, large right pleural effusion, probable right pulmonary masses  Possible post-obstructive PNA  Suspected COPD, emphysema on CT; not in an acute exacerbation  Former tobacco abuse, 60pk year smoker Plan for thoracentesis with IR today. Follow pleural fluid studies, cytology, cx. If pleural path is non-diagnostic, would benefit from re-imaging with contrasted chest CT s/p pleural fluid drainage to further evaluate possible biopsy.  Empiric abx--levaquin, flagyl. Low threshold to broaden further  F/u bcx  Check mrsa screen  Prn duonebs  He would benefit from outpatient PFTs.       HPI:    10/14: seen in bed. Wants to know when thoracentesis will be done so he can \"get out of here. I have bills to pay.\" Denies sob, cough, chest pain. On 3L O2.   Tmax 100.5  Wbc 13--down  Procal 48--down    79 year old male who was referred to the hospital from his 6-month follow up PCP visit for tachpnea and tachycardia. He notes he had worsening SOB since  associated with decreased appetite and 16 lbs unintentional weight loss since . He denies any cough/congestion, edema, fevers/chills.      10/11/24 CXR revealed a large right pleural effusion  10/11/24 Chest CT without contrast: 17mm precarinal node, [id number] right cardiophrenic node. Large right pleural effusion with near collapse of the right lung with probable pulmonary masses in the RUL. Mild emphysema to the left lung.     WBC 14.1 on admission, Hgn 9.6 (8.3 today)  Blood cultures pending  Flu and covid-19 negative  On room air on my exam. He reports unchanged SOB. No other complaints.    I have reviewed the labs and previous day’s 
                           Pulmonary, Critical Care, and Sleep Medicine~Consult Note    Name: Scott Gardiner MRN: [id number]   : 1945 Hospital: [facility]   Date: 10/24/2024 9:33 AM Admission: 10/12/2024       Discussed with Ms. Hoffman.   Will proceed with bronchoscopy with conscious sedation on 10/22.     Impression Plan   Acute hypoxemic respiratory failure  Abnormal chest imaging: lymphadenopathy, large right pleural effusion, probable right pulmonary masses--s/p R thoracentesis on 10/14 with 1450 ml bloody fluid drained, exudative, lymphocytic. Cytomorphologic features worrisome for a metastatic process of lung origin but not definitive/diagnostic. S/p bronch 10/22-no overt endobronchial mass seen.  Possible post-obstructive PNA  Sepsis  Suspected COPD, emphysema on CT; not in an acute exacerbation  Former tobacco abuse, 60pk year smoker  anxiety   Wean supp o2 as tolerated for goal sats >90%. Exercise oximetry prior to discharge.  Will have IR re-evaluate right pleural space for reaccumulation of fluid and possible repeat thoracentesis.  Previously only had a small amount of loculated fluid on the right, not enough for drain placement by IR.  completed vancomycin/zosyn. continue levaquin for a 10 day course.  F/u BAL Cx, path  F/u bcx--neg  mrsa screen positive  Trend procal (down), wbc--down  sputum cx-normal resp mone  Prn xopenex  Ativan prn  Dvt ppx: SCDs. Ok to resume pharm dvt ppx from pulm standpoint  He would benefit from outpatient PFTs.  Dispo: SNF        HPI:    10/24: no overnight events. He offers no complaints  Required 2L O2 during PT session yesterday to maintain saturations in the 90s    10/23: in bed eating breakfast.  No new complaints  On 2L O2 satting well    10/22: no overnight events. He denies new complaints.  Refused to work w/ PT yesterday      10/21: denies SOB. Cough is improving.  We discussed bronch tomorrow and all questions 
                           Pulmonary, Critical Care, and Sleep Medicine~Consult Note    Name: Scott Gardiner MRN: [id number]   : 1945 Hospital: [facility]   Date: 10/15/2024 9:21 AM Admission: 10/12/2024     Impression Plan   Acute hypoxemic respiratory failure  Abnormal chest imaging: lymphadenopathy, large right pleural effusion, probable right pulmonary masses--s/p R thoracentesis on 10/14 with 1450 ml bloody fluid drained, exudative, lymphocytic  Possible post-obstructive PNA  Sepsis  Suspected COPD, emphysema on CT; not in an acute exacerbation  Former tobacco abuse, 60pk year smoker  anxiety Follow up pleural fluid cytology, cx. If pleural path is non-diagnostic, would benefit from re-imaging with contrasted chest CT s/p pleural fluid drainage to further evaluate possible biopsy.  Consult IR for pleural drain placement  Empiric abx--levaquin, flagyl. Add vancomycin  check bcx  F/u mrsa screen  Trend procal  Check sputum cx  F/u labs for today  Prn xopenex  Ativan prn  Dvt ppx: SCDs. Ok to start ppx lovenox from pulm standpoint  He would benefit from outpatient PFTs.       HPI:    10/15: up in the chair. Denies SOB at rest but appears dyspneic with conversation. Admits to FRY. Cough productive of yellow sputum. Feeling anxious and asking for something to help calm him down.   Febrile to 100.5  Tachycardic  Hypotensive. MAP 72  No labs for today    10/14: seen in bed. Wants to know when thoracentesis will be done so he can \"get out of here. I have bills to pay.\" Denies sob, cough, chest pain. On 3L O2.   Tmax 100.5  Wbc 13--down  Procal 48--down    79 year old male who was referred to the hospital from his 6-month follow up PCP visit for tachpnea and tachycardia. He notes he had worsening SOB since  associated with decreased appetite and 16 lbs unintentional weight loss since . He denies any cough/congestion, edema, fevers/chills.      10/11/24 CXR revealed a large right 
                           Pulmonary, Critical Care, and Sleep Medicine~Consult Note    Name: Scott Gardiner MRN: [id number]   : 1945 Hospital: [facility]   Date: 10/16/2024 11:25 AM Admission: 10/12/2024     Impression Plan   Acute hypoxemic respiratory failure  Abnormal chest imaging: lymphadenopathy, large right pleural effusion, probable right pulmonary masses--s/p R thoracentesis on 10/14 with 1450 ml bloody fluid drained, exudative, lymphocytic  Possible post-obstructive PNA  Sepsis  Suspected COPD, emphysema on CT; not in an acute exacerbation  Former tobacco abuse, 60pk year smoker  anxiety Follow up pleural fluid cytology, cx. If pleural path is non-diagnostic, would benefit from re-imaging with contrasted chest CT s/p pleural fluid drainage to further evaluate possible biopsy.  Consult IR for pleural drain placement-->small amount of loculated fluid, not enough for drain placement  Empiric abx--continue vancomycin, levaquin. With rising wbc and procal will broaden abx to zosyn.  F/u bcx  F/u mrsa screen  Trend procal, wbc  F/u sputum cx  Prn xopenex  Ativan prn  Dvt ppx: SCDs. Ok to start ppx lovenox from pulm standpoint  He would benefit from outpatient PFTs.       HPI:    10/16: sputum is becoming more clear in color. Denies SOB. Asking for something for anxiety.  Weaned O2 to 1L w/ sats 96%  Afebrile  Wbc 18--up  Procal 60--up    10/15: up in the chair. Denies SOB at rest but appears dyspneic with conversation. Admits to FRY. Cough productive of yellow sputum. Feeling anxious and asking for something to help calm him down.   Febrile to 100.5  Tachycardic  Hypotensive. MAP 72  No labs for today    10/14: seen in bed. Wants to know when thoracentesis will be done so he can \"get out of here. I have bills to pay.\" Denies sob, cough, chest pain. On 3L O2.   Tmax 100.5  Wbc 13--down  Procal 48--down    79 year old male who was referred to the hospital from his 6-month follow up 
                           Pulmonary, Critical Care, and Sleep Medicine~Consult Note    Name: Scott Gardiner MRN: [id number]   : 1945 Hospital: [facility]   Date: 10/18/2024 10:11 AM Admission: 10/12/2024     Called to schedule bronch for pt.   Earliest appt per Endo:   10/22/24 at 130pm.   Will be air way inspection.   Biopsy.   With conscious sedation.         Impression Plan   Acute hypoxemic respiratory failure  Abnormal chest imaging: lymphadenopathy, large right pleural effusion, probable right pulmonary masses--s/p R thoracentesis on 10/14 with 1450 ml bloody fluid drained, exudative, lymphocytic. Cytomorphologic features worrisome for a metastatic process of lung origin but not definitive/diagnostic  Possible post-obstructive PNA  Sepsis  Suspected COPD, emphysema on CT; not in an acute exacerbation  Former tobacco abuse, 60pk year smoker  anxiety Obtain CT chest/abd/pelvis to assess for a site that would be amenable to biopsy  Consult IR for pleural drain placement-->small amount of loculated fluid, not enough for drain placement  Empiric abx--continue vancomycin, levaquin. With rising wbc and procal broadened abx to zosyn.   F/u bcx--ngtd  mrsa screen positive  Trend procal (down), wbc  sputum cx-normal resp mone  Prn xopenex  Ativan prn  Dvt ppx: SCDs. Hold dvt ppx for possible biopsy pending CT results  He would benefit from outpatient PFTs.  Will see prn over the weekend     HPI:    10/18: coughing up less sputum. Denies SOB.   Hypotensive  On 3L O2  afebrile    10/16: sputum is becoming more clear in color. Denies SOB. Asking for something for anxiety.  Weaned O2 to 1L w/ sats 96%  Afebrile  Wbc 18--up  Procal 60--up    10/15: up in the chair. Denies SOB at rest but appears dyspneic with conversation. Admits to FRY. Cough productive of yellow sputum. Feeling anxious and asking for something to help calm him down.   Febrile to 100.5  Tachycardic  Hypotensive. MAP 72  No 
      Hospitalist Progress Note    NAME:   Scott Gardiner   : 1945   MRN: [id number]     Date/Time: 10/18/2024 8:46 AM  Patient PCP: Karan Almonte MD    Estimated discharge date: 10/21, home with HH? Versus SNF TBD  Barriers: Pleural Fluid Cytology pending, ?Biopsy needed, wean off oxygen, pulm clearance, PT OT evaluation.        Assessment / Plan:  Acute Resp Failure with Hypoxia POA  Due to Large right pleural effusion POA-suspected malignant   Likely due to underlying Right upper lobe pulmonary masses (tumor, malignancy) POA  Likely underlying Post obstructive PNA POA- procal 52.4->48.85  Former smoker with 50-pack-year history  Unexplained weight loss  Suspected underlying COPD with mild exacerbation POA  Anxiety POA  Cont tele- sinus tachycardia  Oxygen  as needed- on 1-2 L/m NC currently  IP Pulmonology consulted- following, cont Nebs prn,   S/p  thoracentesis with 1450 ml out 10/14 -Cytology on Fluid pending- if not yielding then will consider Biopsy (?lung mass biopsy versus hilar biopsy)  IR consulted for Aspira drain ?placement   Will hold DVT chemoprophylaxis for now, but if this will wait until Monday would recommend resumption of chemoprophylaxis over the weekend and high risk patient  Cont IV ativan prn anxiety state/tremors after nebs  Cont empiric Abx for now- Levaquin + Flagyl for now, added Vancomycin today with worsening leukocytosis  Cont Nebs changed to Xopenex to prevent overstimulation causing anxiety/tremors & tachycardia  Palliative consulted  10/16: Patient went down for her ultrasound-guided right pleural drain placement but there was not enough fluid.  I spoke with pulmonology JOE Lee, plan is to follow-up with cytology for now.  Patient is currently on IV Zosyn, vancomycin and Levaquin.  10/17: O2 requirement is up at 3 L.  I spoke with pulmonology JOE Lee, plan is to continue IV antibiotics for now.  Patient may need repeat CT of his chest to reassess for interval 
      Hospitalist Progress Note    NAME:   Scott Gardiner   : 1945   MRN: [id number]     Date/Time: 10/19/2024 10:23 AM  Patient PCP: Karan Almonte MD    Estimated discharge date: 10/23, home with HH? Versus SNF TBD  Barriers: Likely lung cancer needs bronch on 10/22, wean off oxygen, pulm clearance, PT OT evaluation.        Assessment / Plan:  Acute Resp Failure with Hypoxia POA  Due to Large right pleural effusion POA-suspected malignant   Likely due to underlying Right upper lobe pulmonary masses (tumor, malignancy) POA  Likely underlying Post obstructive PNA POA- procal 52.4->48.85  Former smoker with 50-pack-year history  Unexplained weight loss  Suspected underlying COPD with mild exacerbation POA  Anxiety POA  Cont tele- sinus tachycardia  Oxygen  as needed- on 1-2 L/m NC currently  IP Pulmonology consulted- following, cont Nebs prn,   S/p  thoracentesis with 1450 ml out 10/14 -Cytology on Fluid pending- if not yielding then will consider Biopsy (?lung mass biopsy versus hilar biopsy)  IR consulted for Aspira drain ?placement   Will hold DVT chemoprophylaxis for now, but if this will wait until Monday would recommend resumption of chemoprophylaxis over the weekend and high risk patient  Cont IV ativan prn anxiety state/tremors after nebs  Cont empiric Abx for now- Levaquin + Flagyl for now, added Vancomycin today with worsening leukocytosis  Cont Nebs changed to Xopenex to prevent overstimulation causing anxiety/tremors & tachycardia  Palliative consulted  10/16: Patient went down for her ultrasound-guided right pleural drain placement but there was not enough fluid.  I spoke with pulmonology JOE Lee, plan is to follow-up with cytology for now.  Patient is currently on IV Zosyn, vancomycin and Levaquin.  10/17: O2 requirement is up at 3 L.  I spoke with pulmonology JOE Lee, plan is to continue IV antibiotics for now.  Patient may need repeat CT of his chest to reassess for interval 
      Hospitalist Progress Note    NAME:   Scott Gardiner   : 1945   MRN: [id number]     Date/Time: 10/15/2024 12:59 PM  Patient PCP: Karan Almonte MD    Estimated discharge date: 10/18?-21 , home with HH? Versus SNF TBD  Barriers: Pleural Fluid Cytology pending, ?Biopsy needed, Pulm clearance      Assessment / Plan:    Acute Resp Failure with Hypoxia POA  Due to Large right pleural effusion POA-suspected malignant   Likely due to underlying Right upper lobe pulmonary masses(tumor, malignancy) POA  Likely underlying Post obstructive PNA POA- procal 52.4->48.85  Former smoker with 50-pack-year history  Unexplained weight loss  Suspected underlying COPD with mild exacerbation POA  Anxiety State POA    Cont tele- sinus tachycardia  Oxygen  as needed- on 1-2 L/m NC currently  IP Pulmonology consulted- following, cont Nebs prn,   S/p  thoracentesis with 1450 ml out 10/14 -Cytology on Fluid pending- if not yielding then will consider Biopsy (?lung mass biopsy versus hilar biopsy)  IR consulted for Aspira drain ?placement   Will hold DVT chemoprophylaxis for now, but if this will wait until Monday would recommend resumption of chemoprophylaxis over the weekend and high risk patient  Cont IV ativan prn anxiety state/tremors after nebs  Cont empiric Abx for now- Levaquin + Flagyl for now, added Vancomycin today with worsening leukocytosis  Cont Nebs changed to Xopenex to prevent overstimulation causing anxiety/tremors & tachycardia  Palliative consulted     Severe protein calorie malnutrition  Protein shakes with meals  Supplement thiamine, folic acid, MVI  Dietitian consulted, greatly appreciate their expertise     GERD  Formulary substitution Protonix     PAD status post axillobifemoral graft  Essential hypertension  Hyperlipidemia  Patient reports PCP is taken off all antihypertensive medications due to significant weight loss  Monitor and address as needed     Medical Decision Making:   I personally 
      Hospitalist Progress Note    NAME:   Scott Gardiner   : 1945   MRN: [id number]     Date/Time: 10/20/2024 9:56 AM  Patient PCP: Karan Almonte MD    Estimated discharge date: 10/23, home with HH? Versus SNF TBD  Barriers: Likely lung cancer needs bronch on 10/22, wean off oxygen, pulm clearance, PT OT evaluation.        Assessment / Plan:  Acute Resp Failure with Hypoxia POA  Due to Large right pleural effusion POA-suspected malignant   Likely due to underlying Right upper lobe pulmonary masses (tumor, malignancy) POA  Likely underlying Post obstructive PNA POA- procal 52.4->48.85  Former smoker with 50-pack-year history  Unexplained weight loss  Suspected underlying COPD with mild exacerbation POA  Anxiety POA  Cont tele- sinus tachycardia  Oxygen  as needed- on 1-2 L/m NC currently  IP Pulmonology consulted- following, cont Nebs prn,   S/p  thoracentesis with 1450 ml out 10/14 -Cytology on Fluid pending- if not yielding then will consider Biopsy (?lung mass biopsy versus hilar biopsy)  IR consulted for Aspira drain ?placement   Will hold DVT chemoprophylaxis for now, but if this will wait until Monday would recommend resumption of chemoprophylaxis over the weekend and high risk patient  Cont IV ativan prn anxiety state/tremors after nebs  Cont empiric Abx for now- Levaquin + Flagyl for now, added Vancomycin today with worsening leukocytosis  Cont Nebs changed to Xopenex to prevent overstimulation causing anxiety/tremors & tachycardia  Palliative consulted  10/16: Patient went down for her ultrasound-guided right pleural drain placement but there was not enough fluid.  I spoke with pulmonology JOE Lee, plan is to follow-up with cytology for now.  Patient is currently on IV Zosyn, vancomycin and Levaquin.  10/17: O2 requirement is up at 3 L.  I spoke with pulmonology JOE Lee, plan is to continue IV antibiotics for now.  Patient may need repeat CT of his chest to reassess for interval change 
      Hospitalist Progress Note    NAME:   Scott Gardiner   : 1945   MRN: [id number]     Date/Time: 10/13/2024 9:55 AM  Patient PCP: Karan Almonte MD    Estimated discharge date: 10/16? , home with HH? Versus SNF TBD  Barriers: thoracentesis by IR Monday, Pleural Fluid Cytology/ ?Biopsy needed, Pulm clearance      Assessment / Plan:    Acute Resp Failure with Hypoxia POA  Due to Large right pleural effusion POA-suspected malignant   Likely due to underlying Right upper lobe pulmonary masses(tumor, malignancy) POA  Likely underlying Post obstructive PNA POA- procal 52.4  Former smoker with 50-pack-year history  Unexplained weight loss  Suspected underlying COPD with mild exacerbation POA  Anxiety State POA    Cont on telemetry monitoring on Step down unit for now till Thoracentesis Monday and pt stable after that  Oxygen  as needed- on 1L/m NC currently  IP Pulmonology consulted- following, cont Nebs prn, IR to do thoracentesis on Monday with Cytology on Fluid, if not yielding then will consider Biopsy (?lung mass biopsy versus hilar biopsy)  Thoracentesis ordered--Fluid studies with cytology ordered  Will hold DVT chemoprophylaxis for now, but if this will wait until Monday would recommend resumption of chemoprophylaxis over the weekend and high risk patient  IV ativan prn anxiety state/tremors after nebs  Empiric Abx for now- Levaquin + Flagyl PO as pt able to  Follow procal, CBC in AM     Severe protein calorie malnutrition  Protein shakes with meals  Supplement thiamine, folic acid, MVI  Dietitian consulted, greatly appreciate their expertise     GERD  Formulary substitution Protonix     PAD status post axillobifemoral graft  Essential hypertension  Hyperlipidemia  Patient reports PCP is taken off all antihypertensive medications due to significant weight loss  Monitor and address as needed     Medical Decision Making:   I personally reviewed labs: Yes, as listed below  I personally reviewed 
      Hospitalist Progress Note    NAME:   Scott Gardiner   : 1945   MRN: [id number]     Date/Time: 10/17/2024 9:07 AM  Patient PCP: Karan lAmonte MD    Estimated discharge date: 10/19, home with HH? Versus SNF TBD  Barriers: Pleural Fluid Cytology pending, ?Biopsy needed, wean off oxygen, pulm clearance        Assessment / Plan:  Acute Resp Failure with Hypoxia POA  Due to Large right pleural effusion POA-suspected malignant   Likely due to underlying Right upper lobe pulmonary masses (tumor, malignancy) POA  Likely underlying Post obstructive PNA POA- procal 52.4->48.85  Former smoker with 50-pack-year history  Unexplained weight loss  Suspected underlying COPD with mild exacerbation POA  Anxiety POA  Cont tele- sinus tachycardia  Oxygen  as needed- on 1-2 L/m NC currently  IP Pulmonology consulted- following, cont Nebs prn,   S/p  thoracentesis with 1450 ml out 10/14 -Cytology on Fluid pending- if not yielding then will consider Biopsy (?lung mass biopsy versus hilar biopsy)  IR consulted for Aspira drain ?placement   Will hold DVT chemoprophylaxis for now, but if this will wait until Monday would recommend resumption of chemoprophylaxis over the weekend and high risk patient  Cont IV ativan prn anxiety state/tremors after nebs  Cont empiric Abx for now- Levaquin + Flagyl for now, added Vancomycin today with worsening leukocytosis  Cont Nebs changed to Xopenex to prevent overstimulation causing anxiety/tremors & tachycardia  Palliative consulted  10/16: Patient went down for her ultrasound-guided right pleural drain placement but there was not enough fluid.  I spoke with pulmonology JOE Lee, plan is to follow-up with cytology for now.  Patient is currently on IV Zosyn, vancomycin and Levaquin.  10/17: O2 requirement is up at 3 L.  I spoke with pulmonology JOE Lee, plan is to continue IV antibiotics for now.  Patient may need repeat CT of his chest to reassess for interval change of his 
      Hospitalist Progress Note    NAME:   Scott Gardiner   : 1945   MRN: [id number]     Date/Time: 10/22/2024 7:41 AM  Patient PCP: Karan Almonte MD    Estimated discharge date: 10/24, home with HH? Versus SNF TBD  Barriers: Likely lung cancer needs bronch on 10/22, wean off oxygen, pulm clearance, PT OT evaluation.        Assessment / Plan:  Acute Resp Failure with Hypoxia POA  Due to Large right pleural effusion POA-suspected malignant   Likely due to underlying Right upper lobe pulmonary masses (tumor, malignancy) POA  Likely underlying Post obstructive PNA POA- procal 52.4->48.85  Leukocytosis  Former smoker with 50-pack-year history  Unexplained weight loss  Suspected underlying COPD with mild exacerbation POA  Anxiety POA  Cont tele- sinus tachycardia  Oxygen  as needed- on 1-2 L/m NC currently  IP Pulmonology consulted- following, cont Nebs prn,   S/p  thoracentesis with 1450 ml out 10/14 -Cytology on Fluid pending- if not yielding then will consider Biopsy (?lung mass biopsy versus hilar biopsy)  IR consulted for Aspira drain ?placement   Will hold DVT chemoprophylaxis for now, but if this will wait until Monday would recommend resumption of chemoprophylaxis over the weekend and high risk patient  Cont IV ativan prn anxiety state/tremors after nebs  Cont empiric Abx for now- Levaquin + Flagyl for now, added Vancomycin today with worsening leukocytosis  Cont Nebs changed to Xopenex to prevent overstimulation causing anxiety/tremors & tachycardia  Palliative consulted  10/16: Patient went down for her ultrasound-guided right pleural drain placement but there was not enough fluid.  I spoke with pulmonology JOE Lee, plan is to follow-up with cytology for now.  Patient is currently on IV Zosyn, vancomycin and Levaquin.  10/17: O2 requirement is up at 3 L.  I spoke with pulmonology JOE Lee, plan is to continue IV antibiotics for now.  Patient may need repeat CT of his chest to reassess for 
      Hospitalist Progress Note    NAME:   Scott Gardiner   : 1945   MRN: [id number]     Date/Time: 10/16/2024 9:14 AM  Patient PCP: Karan Almonte MD    Estimated discharge date: 10/19, home with HH? Versus SNF TBD  Barriers: Pleural Fluid Cytology pending, ?Biopsy needed, Pulm clearance        Assessment / Plan:  Acute Resp Failure with Hypoxia POA  Due to Large right pleural effusion POA-suspected malignant   Likely due to underlying Right upper lobe pulmonary masses (tumor, malignancy) POA  Likely underlying Post obstructive PNA POA- procal 52.4->48.85  Former smoker with 50-pack-year history  Unexplained weight loss  Suspected underlying COPD with mild exacerbation POA  Anxiety POA  Cont tele- sinus tachycardia  Oxygen  as needed- on 1-2 L/m NC currently  IP Pulmonology consulted- following, cont Nebs prn,   S/p  thoracentesis with 1450 ml out 10/14 -Cytology on Fluid pending- if not yielding then will consider Biopsy (?lung mass biopsy versus hilar biopsy)  IR consulted for Aspira drain ?placement   Will hold DVT chemoprophylaxis for now, but if this will wait until Monday would recommend resumption of chemoprophylaxis over the weekend and high risk patient  Cont IV ativan prn anxiety state/tremors after nebs  Cont empiric Abx for now- Levaquin + Flagyl for now, added Vancomycin today with worsening leukocytosis  Cont Nebs changed to Xopenex to prevent overstimulation causing anxiety/tremors & tachycardia  Palliative consulted  10/16: Patient went down for her ultrasound-guided right pleural drain placement but there was not enough fluid.  I spoke with pulmonology JOE Lee, plan is to follow-up with cytology for now.  Patient is currently on IV Zosyn, vancomycin and Levaquin.    Hypomagnesemia  10/16: Magnesium 0.7 and will give 4 g of IV magnesium sulfate.     Severe protein calorie malnutrition  Protein shakes with meals  Supplement thiamine, folic acid, MVI  Dietitian consulted, greatly 
    Hospitalist Progress Note               Daily Progress Note: 10/24/2024      Hospital Day: 13     Chief complaint: No chief complaint on file.       Subjective:   Hospital course to date:  79 y.o. male with a past medical history of htn, PAD s/p axillobifemoral graft who was admitted on 10/12/2024 transfer from   for thoracentesis a diagnosis of large right pleural effusion in  a setting of suspected malignant lung mass( former smoker with 50 pack year history), suspected COPD.   unexplained weight loss, severe protein calorie malnutrition .   Ct of chest 10/11/24  shows :  Large right pleural effusion with near complete collapse of the right lung   2.Probable pulmonary masses in the right upper lobe, concerning for a malignant effusion   3. Enlarged mediastinal and right cardiophrenic nodes.  Pulmonary following awaiting pleural fluid cytology he  is s/p right thoracentesis on 10/14 with 1450 mL of bloody fluid drained exudative and lymphocytic, awaiting fluid cytology.  He is s/p bronchoscopy October 23.  Cytology pending.    --------  Patient is seen today for follow-up.  Offers no complaints.  Frail-appearing.  Little fluid available in the lung for thoracentesis        Medications reviewed  Current Facility-Administered Medications   Medication Dose Route Frequency    acetaminophen (TYLENOL) tablet 1,000 mg  1,000 mg Oral TID    oxyCODONE (ROXICODONE) immediate release tablet 2.5 mg  2.5 mg Oral Q8H PRN    lactobacillus (CULTURELLE) capsule 1 capsule  1 capsule Oral Daily with breakfast    levoFLOXacin (LEVAQUIN) tablet 500 mg  500 mg Oral Daily    LORazepam (ATIVAN) tablet 0.5 mg  0.5 mg Oral Q8H PRN    pantoprazole (PROTONIX) tablet 40 mg  40 mg Oral QAM AC    sodium chloride flush 0.9 % injection 5-40 mL  5-40 mL IntraVENous 2 times per day    sodium chloride flush 0.9 % injection 5-40 mL  5-40 mL IntraVENous PRN    0.9 % sodium chloride infusion   IntraVENous PRN    ondansetron (ZOFRAN-ODT) 
    Hospitalist Progress Note               Daily Progress Note: 10/25/2024      Hospital Day: 14     Chief complaint: No chief complaint on file.       Subjective:   Hospital course to date:  79 y.o. male with a past medical history of htn, PAD s/p axillobifemoral graft who was admitted on 10/12/2024 transfer from   for thoracentesis a diagnosis of large right pleural effusion in  a setting of suspected malignant lung mass( former smoker with 50 pack year history), suspected COPD.   unexplained weight loss, severe protein calorie malnutrition .   Ct of chest 10/11/24  shows :  Large right pleural effusion with near complete collapse of the right lung   2.Probable pulmonary masses in the right upper lobe, concerning for a malignant effusion   3. Enlarged mediastinal and right cardiophrenic nodes.  Pulmonary following awaiting pleural fluid cytology he  is s/p right thoracentesis on 10/14 with 1450 mL of bloody fluid drained exudative and lymphocytic, awaiting fluid cytology.  He is s/p bronchoscopy October 23.  Cytology pending.    --------  Patient is seen today for follow-up.  Offers no complaints.  Frail-appearing.  Little fluid available in the lung for thoracentesis        Medications reviewed  Current Facility-Administered Medications   Medication Dose Route Frequency    zinc oxide 40 % paste   Topical 4x Daily PRN    acetaminophen (TYLENOL) tablet 1,000 mg  1,000 mg Oral TID    oxyCODONE (ROXICODONE) immediate release tablet 2.5 mg  2.5 mg Oral Q8H PRN    lactobacillus (CULTURELLE) capsule 1 capsule  1 capsule Oral Daily with breakfast    levoFLOXacin (LEVAQUIN) tablet 500 mg  500 mg Oral Daily    LORazepam (ATIVAN) tablet 0.5 mg  0.5 mg Oral Q8H PRN    pantoprazole (PROTONIX) tablet 40 mg  40 mg Oral QAM AC    sodium chloride flush 0.9 % injection 5-40 mL  5-40 mL IntraVENous 2 times per day    sodium chloride flush 0.9 % injection 5-40 mL  5-40 mL IntraVENous PRN    0.9 % sodium chloride infusion   
    Hospitalist Progress Note               Daily Progress Note: 10/26/2024      Hospital Day: 15     Chief complaint: No chief complaint on file.       Subjective:   Hospital course to date:  79 y.o. male with a past medical history of htn, PAD s/p axillobifemoral graft who was admitted on 10/12/2024 transfer from   for thoracentesis a diagnosis of large right pleural effusion in  a setting of suspected malignant lung mass( former smoker with 50 pack year history), suspected COPD.   unexplained weight loss, severe protein calorie malnutrition .   Ct of chest 10/11/24  shows :  Large right pleural effusion with near complete collapse of the right lung   2.Probable pulmonary masses in the right upper lobe, concerning for a malignant effusion   3. Enlarged mediastinal and right cardiophrenic nodes.  Pulmonary following awaiting pleural fluid cytology he  is s/p right thoracentesis on 10/14 with 1450 mL of bloody fluid drained exudative and lymphocytic, awaiting fluid cytology.  He is s/p bronchoscopy October 23.  Cytology pending.    --------  Patient is seen today for follow-up.  Offers no complaints.  Frail-appearing.  Little fluid available in the lung for thoracentesis        Medications reviewed  Current Facility-Administered Medications   Medication Dose Route Frequency    zinc oxide 40 % paste   Topical 4x Daily PRN    acetaminophen (TYLENOL) tablet 1,000 mg  1,000 mg Oral TID    oxyCODONE (ROXICODONE) immediate release tablet 2.5 mg  2.5 mg Oral Q8H PRN    lactobacillus (CULTURELLE) capsule 1 capsule  1 capsule Oral Daily with breakfast    LORazepam (ATIVAN) tablet 0.5 mg  0.5 mg Oral Q8H PRN    pantoprazole (PROTONIX) tablet 40 mg  40 mg Oral QAM AC    sodium chloride flush 0.9 % injection 5-40 mL  5-40 mL IntraVENous 2 times per day    sodium chloride flush 0.9 % injection 5-40 mL  5-40 mL IntraVENous PRN    0.9 % sodium chloride infusion   IntraVENous PRN    ondansetron (ZOFRAN-ODT) disintegrating tablet 
    Hospitalist Progress Note               Daily Progress Note: 10/29/2024      Hospital Day: 18     Chief complaint: No chief complaint on file.       Subjective:   Hospital course to date:  79 y.o. male with a past medical history of htn, PAD s/p axillobifemoral graft who was admitted on 10/12/2024 transfer from   for thoracentesis a diagnosis of large right pleural effusion in  a setting of suspected malignant lung mass( former smoker with 50 pack year history), suspected COPD.   unexplained weight loss, severe protein calorie malnutrition .   Ct of chest 10/11/24  shows :  Large right pleural effusion with near complete collapse of the right lung   2.Probable pulmonary masses in the right upper lobe, concerning for a malignant effusion   3. Enlarged mediastinal and right cardiophrenic nodes.  Pulmonary following awaiting pleural fluid cytology he  is s/p right thoracentesis on 10/14 with 1450 mL of bloody fluid drained exudative and lymphocytic, awaiting fluid cytology.  He is s/p bronchoscopy October 23.  Cytology pending.    --------  Patient is seen today for follow-up.  Offers no complaints.  Frail-appearing.          Medications reviewed  Current Facility-Administered Medications   Medication Dose Route Frequency    zinc oxide 40 % paste   Topical 4x Daily PRN    acetaminophen (TYLENOL) tablet 1,000 mg  1,000 mg Oral TID    oxyCODONE (ROXICODONE) immediate release tablet 2.5 mg  2.5 mg Oral Q8H PRN    lactobacillus (CULTURELLE) capsule 1 capsule  1 capsule Oral Daily with breakfast    LORazepam (ATIVAN) tablet 0.5 mg  0.5 mg Oral Q8H PRN    pantoprazole (PROTONIX) tablet 40 mg  40 mg Oral QAM AC    sodium chloride flush 0.9 % injection 5-40 mL  5-40 mL IntraVENous 2 times per day    sodium chloride flush 0.9 % injection 5-40 mL  5-40 mL IntraVENous PRN    0.9 % sodium chloride infusion   IntraVENous PRN    ondansetron (ZOFRAN-ODT) disintegrating tablet 4 mg  4 mg Oral Q8H PRN    Or    ondansetron 
  Discharge Summary     Patient: Scott Gardiner MRN: [id number]  SSN: [id number]    YOB: 1945  Age: 79 y.o.  Sex: male       Code Status: DNR  Allergies:   Allergies   Allergen Reactions    Iodine Rash     Hives on back       Admit Date: 10/12/2024    Discharge Date: 10/30/2024      Admission Diagnoses: Pleural effusion, right [J90]    PMH:   Past Medical History:   Diagnosis Date    Abuse     alcoholism    Aneurysm (HCC)     Chronic obstructive pulmonary disease (HCC)     pt denies    Chronic pain     neck /arthritis-injections    Claudication of calf muscles (HCC)     Colovesical fistula     Dr Rincon    Esophageal stricture     Esophagitis     GI bleed 10/2016    Hemochromatosis     Hyperlipidemia     Hypertension     Ill-defined condition 2020    blood transfusion hx    Peripheral artery disease (HCC)     Dr. Zavala    Pulmonary nodule     right lung    Stroke (HCC)        Social History:  Social history   Social History     Tobacco Use    Smoking status: Former     Current packs/day: 0.00     Types: Cigarettes     Start date:      Quit date: 2018     Years since quittin.8    Smokeless tobacco: Never   Substance Use Topics    Alcohol use: Not Currently     Drug History   Social History     Substance and Sexual Activity   Drug Use No     Familiy History   Family History   Problem Relation Age of Onset    Alzheimer's Disease Father     Stroke Mother        Consults: None    Significant Diagnostic Studies:     Discharge Condition: Good    Disposition: SNF    Discharge Medications:   Current Discharge Medication List        START taking these medications    Details   folic acid (FOLVITE) 1 MG tablet Take 1 tablet by mouth daily  Qty: 30 tablet, Refills: 3      Multiple Vitamin (MULTIVITAMIN) TABS tablet Take 1 tablet by mouth daily  Qty: 30 tablet, Refills: 0      thiamine mononitrate (THIAMINE) 100 MG tablet Take 1 tablet by mouth daily  Qty: 30 tablet, Refills: 0       
  End of Shift Note    Bedside shift change report given to NIKOLAI Hardy (oncoming nurse) by Nikki Jones RN (offgoing nurse).  Report included the following information SBAR, Intake/Output, MAR, and Cardiac Rhythm sinus tach    Shift worked:  7p-7a     Shift summary and any significant changes:     No acute events overnight. Prn ativan and patience given x1 per MAR. Auth pending for placement.    Concerns for physician to address:   None    Zone phone for oncoming shift:               Length of Stay:  Expected LOS: 18  Actual LOS: 17      Nikki Jones RN                            
  Physician Progress Note      PATIENT:               KATHI DICKERSON  CSN #:                  [id number]  :                       1945  ADMIT DATE:       10/12/2024 2:02 AM  DISCH DATE:  RESPONDING  PROVIDER #:        Janes Monique MD          QUERY TEXT:    79yoM pt admitted with Acute hypoxic respiratory failure Due to Large right   pleural effusion POA and likely post obstructive PNA POA. Pt noted to have   Leukocytosis on admit.  Pulmonology note states Sepsis on 10/15  . If   possible, please document in the progress notes and discharge summary if you   are evaluating and /or treating any of the following:  The medical record reflects the following:  Risk Factors: former smoker, COPD w/ mild exacerbation, malnutrition,   Pneumonia, Large right pleural effusion  Clinical Indicators: ED arrival mid 90s on 2L , /60 - hr 108 -130s and rr   26 -30s  Procal 52.41 (10/12)-> 48.05 (10/14  WBC 12.1* 14.4* 13.6* (10/14)  Lactic acid 2.7 - 2.8  CT imaging  with near complete opacification of the Right lung a combination   of airspace disease and pulmonary masses, Lymphadenopathy.  per Pulmonology note 10/15  ' Sepsis, possible post-obstructive Pneumonia'  10/19 Pulmonology noted 'Empiric abx--continue vancomycin, levaquin. With   rising wbc and procal broadened abx to zosyn.  Treatment: Trend procal, Vanc, Zosyn, Levaquin, oxygen as needed, IR   thoracentesis, Bronch w/ lung biopsy, blood  & sputum cx, MRSA screening.    Thank you,  Callie Flores RN, CDI  Options provided:  -- Sepsis, present on admission  -- Sepsis was ruled out  -- Other - I will add my own diagnosis  -- Disagree - Not applicable / Not valid  -- Disagree - Clinically unable to determine / Unknown  -- Refer to Clinical Documentation Reviewer    PROVIDER RESPONSE TEXT:    This patient has sepsis which was present on admission.    Query created by: Callie Flores on 10/28/2024 6:29 PM      Electronically signed by:  Janes Monique MD 
0700 Bedside and Verbal shift change report given to Dilia MENCHACA (oncoming nurse) by Aditi MENCHACA (offgoing nurse). Report included the following information Nurse Handoff Report, Index, ED Encounter Summary, ED SBAR, Intake/Output, MAR, Recent Results, and Cardiac Rhythm NSR, Sinus Tach .     1336 Called Xray to inform them of portable CXR order post thoracentesis    1551 Patient is still tachypneic post thoracentesis. RR 30s to 40s and O2 92% on 2L NC, HR now in to 120-130s. Most recent BP is 119/70. CELESTINO Gamboa MD updated - verbal orders received to give 1mg ativan ONCE    1804 patient's HR sustaining in the 140s after transferring from bed to chair. CELESTINO Gamboa MD made aware    End of Shift Note    Bedside shift change report given to Ezekiel MENCHACA (oncoming nurse) by Dilia Castillo RN (offgoing nurse).  Report included the following information SBAR, Kardex, ED Summary, Intake/Output, MAR, Recent Results, and Cardiac Rhythm NSR, Sinus Tach    Shift worked:  7a to 7p     Shift summary and any significant changes:     See above    Ativan given twice this shift.    Patient OOB to chair during lunch and dinner. X1 assist    Cytology sent today from thoracentesis     Concerns for physician to address:  Possible palliative consult     Zone phone for oncoming shift:             Dilia Castillo RN                            
0700: Bedside and Verbal shift change report given to Mica Hernandez RN (oncoming nurse) by NIKOLAI Berrios (offgoing nurse). Report included the following information Nurse Handoff Report, Adult Overview, Intake/Output, MAR, and Recent Results.     1158: PRN ativan administered    1553: PRN oxy administered d/t R arm pain    1900: Bedside and Verbal shift change report given to NIKOLAI Berrios (oncoming nurse) by Mica Hernandez RN (offgoing nurse). Report included the following information Nurse Handoff Report, Adult Overview, Intake/Output, MAR, and Recent Results.     
0700: Bedside and Verbal shift change report given to Mica Hernandez RN (oncoming nurse) by NIKOLAI Brambila (offgoing nurse). Report included the following information Nurse Handoff Report, Adult Overview, Intake/Output, MAR, and Recent Results.     1227: PRN Ativan administered.    Uneventful shift    Bedside and Verbal shift change report given to  (oncoming nurse) by Mica Hernandez RN (offgoing nurse). Report included the following information Nurse Handoff Report, Adult Overview, Intake/Output, MAR, and Recent Results.     
0700: Bedside and Verbal shift change report given to Patricia Eng RN   (oncoming nurse) by NIKOLAI Brambila (offgoing nurse). Report included the following information Nurse Handoff Report, Index, Adult Overview, Intake/Output, MAR, Recent Results, and Cardiac Rhythm NSR-ST .     0800: Patient reminded to turn off sacrum in bed. Patient voiced understanding.    1500: PT at bedside.     End of Shift Note    Bedside shift change report given to NIKOLAI Brambila (oncoming nurse) by Patricia Eng RN (offgoing nurse).  Report included the following information SBAR, Kardex, Intake/Output, MAR, Recent Results, and Cardiac Rhythm NSR-ST    Shift worked:  [id number]     Shift summary and any significant changes:     none     Concerns for physician to address:  none     Zone phone for oncoming shift:   N/a       Activity:     Number times ambulated in hallways past shift: 0  Number of times OOB to chair past shift: 0    Cardiac:   Cardiac Monitoring: Yes           Access:  Current line(s): PIV     Genitourinary:   Urinary status: voiding and external catheter    Respiratory:      Chronic home O2 use?: NO  Incentive spirometer at bedside: NO       GI:     Current diet:  ADULT DIET; Dysphagia - Soft and Bite Sized; Lactose-Controlled  ADULT ORAL NUTRITION SUPPLEMENT; Breakfast, Lunch, Dinner; Standard High Calorie/High Protein Oral Supplement  Passing flatus: YES  Tolerating current diet: YES       Pain Management:   Patient states pain is manageable on current regimen: YES    Skin:     Interventions: turn team, float heels, increase time out of bed, foam dressing, PT/OT consult, and internal/external urinary devices    Patient Safety:  Fall Score:    Interventions: bed/chair alarm, assistive device (walker, cane. etc), gripper socks, pt to call before getting OOB, and stay with me (per policy)       Length of Stay:  Expected LOS: 17  Actual LOS: 13      Patricia Eng RN                            
0700: Bedside report was received from Kosta. No significant events occurred throughout the night. Pt was turned onto back.    0900: Pt was changed by tech, zinc cream and Ventex applied to sacrum and covered with new foam pad. Vitals, assessment, pt was turned onto right side and morning medications given.     1040: Pt was reassessed and asked about pain.    1050: Pt went down to IR for thoracentesis.    1200: Pt came back from IR. Sat in the chair for lunch.    1230: Pt had a bowel movement and was cleaned up.    1300: Pt was placed back in bed and turned onto right side.    1430:Pt was given pain medication.    1500: Pt was turned onto left side. Pain was reevaluated.     1600: Pt was reassessed and cleaned up after BM.  
0720: Verbal shift change report given to Kathy Hodges RN   (oncoming nurse) by NIKOLAI Reeder (offgoing nurse). Report included the following information Nurse Handoff Report, Index, MAR, and Cardiac Rhythm Sinus tach .      1134: Pt returned to unit from CT.     1242: PRN Monika given for 6/10 generalized aching pain.     1826: PRN ativan given  
0730: Bedside and Verbal shift change report given to Kathy Hodges RN (oncoming nurse) by NIKOLAI Thomas (offgoing nurse). Report included the following information Nurse Handoff Report, Index, ED Encounter Summary, Intake/Output, MAR, Recent Results, and Cardiac Rhythm Sinus tach .      1000: PRN tylenol given for generalized pain 3/10.    1422: PRN Ativan given.     1930: Verbal shift change report given to NIKOLAI Reeder (oncoming nurse) by Kathy Hodges RN (offgoing nurse). Report included the following information Nurse Handoff Report, Index, ED Encounter Summary, Intake/Output, MAR, Recent Results, and Cardiac Rhythm Sinus tach .    
1900 Bedside and Verbal shift change report given to Nolberto RN (oncoming nurse) by Adela RN (offgoing nurse). Report included the following information Nurse Handoff Report, MAR, Recent Results, and Cardiac Rhythm S Tachy/NSR. AM Doctor aware of pt continuous loose stool. Pt on chair. Heels elevated by foam/pillow    1930 Pt placed back on bed.    2000 Sputum sample collected and sent to lab.    0000 Started NPO except ice chips as ordered.    0230 Blood samples collected and sent to lab    0700 Bedside shift change report given to Marianela MENCHACA (oncoming nurse) by Nolberto Byers RN (offgoing nurse).  Report included the following information SBAR, MAR, Recent Results, and Cardiac Rhythm S Tachy/NSR. Kept NPO.  
1900 Bedside and Verbal shift change report given to Nolberto RN (oncoming nurse) by Clara RN (offgoing nurse). Report included the following information Nurse Handoff Report, MAR, Recent Results, and Cardiac Rhythm S Tachy .     2000 Redness noted on buttocks area (From pooping)- zinc cream applied.    2100 Informed Dr. Duran about pt telemetry order. Inform to continue but no orders placed(charge aware).    0200 Blood samples collected and sent to lab.    0700 Bedside shift change report given to Adela MENCHACA (oncoming nurse) by Nolberto Byers RN (offgoing nurse).  Report included the following information SBAR, MAR, Recent Results, and Cardiac Rhythm S Tachy /NSR                            
1900 Bedside and Verbal shift change report given to Nolberto RN (oncoming nurse) by Marianela RN (offgoing nurse). Report included the following information Nurse Handoff Report, MAR, Recent Results, and Cardiac Rhythm S Tachy/NSR .     2020 Pt relative Sri Sow (Niece [phone] Secondary Decision Maker) called about pt procedure and ask to give her a call if any bad happens (She is currently in [place]).    0200 Blood samples collected and sent to lab    0700 Bedside shift change report given to Marianela MENCHACA (oncoming nurse) by Nolberto Byers RN (offgoing nurse).  Report included the following information SBAR, MAR, Recent Results, and Cardiac Rhythm S Tachy NSR                          
6105    Spoke with primary nurse and informed her that per JOANNA Harp pt procedure will be done tomorrow 10/16/2024 since thoracentesis was done yesterday and to ensure enough fluid accumulation for drain placement. RN stated understanding    
After US evaluation by JOANNA NETTLES, patient was found to not have a fluid collection compatible with thoracentesis. This was explained to patient with his understanding. Patient placed back to transport to return to his room 2322.  
Attempted to call report.   
Attempted to see pt for PT eval. He was received up in chair and declined any activity today. Only interested in having suction to help with secretions. Will follow up tomorrow.  
Bedside and Verbal shift change report given to NIKOLAI Brambila (oncoming nurse) by NIKOLAI Bear (offgoing nurse). Report included the following information Nurse Handoff Report, Index, Adult Overview, Intake/Output, MAR, Recent Results, and Cardiac Rhythm NSR/Sinus Tach .     2126: PRN Ativan and Monika administered. See MAR    0540: PRN Ativan administered. See MAR    End of Shift Note    Bedside shift change report given to NIKOLAI Bear (oncoming nurse) by Kosta Gamboa RN (offgoing nurse).  Report included the following information SBAR, Kardex, Intake/Output, MAR, Recent Results, and Cardiac Rhythm NSR/Sinus Tach    Shift worked:  7p-7a     Shift summary and any significant changes:          Concerns for physician to address:       Zone phone for oncoming shift:          Kosta Gamboa RN                            
Bedside and Verbal shift change report given to NIKOLAI Brambila (oncoming nurse) by NIKOLAI Bear (offgoing nurse). Report included the following information Nurse Handoff Report, Index, Adult Overview, Intake/Output, MAR, Recent Results, and Cardiac Rhythm Sinus Tach .     2048: PRN Ativan administered     2351: PRN Monika administered. See MAR    End of Shift Note    Bedside shift change report given to NIKOLAI Han (oncoming nurse) by Kosta Gamboa RN (offgoing nurse).  Report included the following information SBAR, Kardex, Intake/Output, MAR, Recent Results, and Cardiac Rhythm Sinus Tach    Shift worked:  7p-7a     Shift summary and any significant changes:          Concerns for physician to address:       Zone phone for oncoming shift:          Kosta Gamboa RN                              
Bedside and Verbal shift change report given to NIKOLAI Brambila (oncoming nurse) by NIKOLAI Han (offgoing nurse). Report included the following information Nurse Handoff Report, Index, Adult Overview, Intake/Output, MAR, Recent Results, and Cardiac Rhythm Sinus Tach .     1941: PRN Monika administered. See MAR    0105: PRN ativan administered. See MAR    End of Shift Note    Bedside shift change report given to NIKOLAI Nino (oncoming nurse) by Kosta Gamboa RN (offgoing nurse).  Report included the following information SBAR, Kardex, Intake/Output, MAR, Recent Results, and Cardiac Rhythm Sinus Tach    Shift worked:  7p-7a     Shift summary and any significant changes:          Concerns for physician to address:       Zone phone for oncoming shift:          Kosta Gamboa RN                            
Bedside and Verbal shift change report given to NIKOLAI Jarrett (oncoming nurse) by NIKOLAI Chapman (offgoing nurse). Report included the following information Nurse Handoff Report, Intake/Output, MAR, Recent Results, Cardiac Rhythm sinus tach, Neuro Assessment, and Event Log.      End of Shift Note    Bedside shift change report given to NIKOLAI Curry (oncoming nurse) by Kolby Garay RN (offgoing nurse).  Report included the following information SBAR, Intake/Output, MAR, and Cardiac Rhythm sinus tach    Shift worked:  7p-7a     Shift summary and any significant changes:     No acute events overnight      Concerns for physician to address:        Zone phone for oncoming shift:               Length of Stay:  Expected LOS: 17  Actual LOS: 17      Kolby Garay RN                            
Bedside and Verbal shift change report given to Tucker (oncoming nurse) by Ezekiel (offgoing nurse). Report included the following information Nurse Handoff Report, Index, Adult Overview, MAR, Recent Results, and Cardiac Rhythm Sinus tachy .    
Bedside shift change report given to Hayley Sotomayor RN   (oncoming nurse) by Nikki MENCHACA (offgoing nurse). Report included the following information Nurse Handoff Report, ED Encounter Summary, Intake/Output, MAR, Recent Results, and Cardiac Rhythm NSR/tach .    
Bedside shift change report given to jose l (oncoming nurse) by nelli (offgoing nurse). Report included the following information Nurse Handoff Report, ED SBAR, Adult Overview, MAR, Recent Results, and Cardiac Rhythm nsr/tachy .     End of Shift Note    Bedside shift change report given to nelli (oncoming nurse) by JOSE L ESPITIA RN (offgoing nurse).  Report included the following information SBAR, ED Summary, MAR, Recent Results, and Cardiac Rhythm nsr/tachy    Shift worked:  7p-7a     Shift summary and any significant changes:     na     Concerns for physician to address:  na     Zone phone for oncoming shift:          Activity:     Number times ambulated in hallways past shift: 0  Number of times OOB to chair past shift: 0    Cardiac:   Cardiac Monitoring: Yes           Access:  Current line(s): PIV     Genitourinary:   Urinary status: external catheter    Respiratory:      Chronic home O2 use?: NO  Incentive spirometer at bedside: YES       GI:     Current diet:  ADULT ORAL NUTRITION SUPPLEMENT; Breakfast, Lunch, Dinner; Standard High Calorie/High Protein Oral Supplement  ADULT DIET; Dysphagia - Soft and Bite Sized; Lactose-Controlled  Passing flatus: YES  Tolerating current diet: YES       Pain Management:   Patient states pain is manageable on current regimen: YES    Skin:     Interventions: turn team    Patient Safety:  Fall Score:    Interventions: bed/chair alarm       Length of Stay:  Expected LOS: 7  Actual LOS: 5      JOSE L ESPITIA RN                            
Chart checked for follow-up, s/p bronchial biopsy 10/22 no overt endobronchial mass seen pathology negative for malignant cells.    Pulmonary  to follow in outpatient setting, plan is for discharge to skilled nursing facility in [facility].    Goals are clear I will sign off .  Please do not hesitate to reconsult us for any future discussion/palliative need.  Thank you for allowing us to be part of [facility].  
Chart reviewed for IR order  
Chart reviewed. Attempted to see patient for PT, but he declined due to just getting pain relief from a medication earlier.  Will follow up tomorrow. Will likely need SNF level rehab prior to going home.    Jorge Silva, PT    
Comprehensive Nutrition Assessment    Type and Reason for Visit:  Initial, Consult, Positive Nutrition Screen    Nutrition Recommendations/Plan:   Continue diet as tolerated  RD to adjust ONS to TID  Please document % meals and supplements consumed in flowsheet I/O's under intake   Monitor lytes for refeeding syndrome  Continue thiamine, folate and MVI     Malnutrition Assessment:  Malnutrition Status:  Severe malnutrition (10/12/24 1609)    Context:  Acute Illness     Findings of the 6 clinical characteristics of malnutrition:  Energy Intake:  Unable to assess  Weight Loss:  Greater than 7.5% over 3 months     Body Fat Loss:  Moderate body fat loss Buccal region   Muscle Mass Loss:  Moderate muscle mass loss Temples (temporalis)  Fluid Accumulation:  Moderate to Severe  (malignant pleural effusion)   Strength:  Not Performed    Nutrition Assessment:  Pt admitted with PE.  PMH: ETOH abuse, HTN, CVA, COPD, GERD.  Consult received, chart reviewed, pt sleeping soundly at time of attempted visit.  He appears profoundly cachectic per visual assessment.  Significant wt loss noted since April of this year.  He is for thoracentesis (suspect malignant pleural effusion as he has a R lower lobe mass).  K 3.2.  On goody bag vitamins, which is appropriate.   Patient Vitals for the past 120 hrs:   PO Meals Eaten (%)   10/12/24 1240 51 - 75%   10/12/24 0856 51 - 75%     Wt Readings from Last 15 Encounters:   10/12/24 50.3 kg (110 lb 14.3 oz)   10/11/24 49.9 kg (110 lb)   10/11/24 50 kg (110 lb 3.2 oz)   04/12/24 57.2 kg (126 lb 3.2 oz)   11/21/22 58.6 kg (129 lb 3.2 oz)   07/18/22 59.7 kg (131 lb 9.6 oz)   02/21/22 61.1 kg (134 lb 9.6 oz)   01/24/22 62.2 kg (137 lb 3.2 oz)   10/21/21 60.4 kg (133 lb 3.2 oz)   07/22/21 63.6 kg (140 lb 3.2 oz)   06/24/21 63.7 kg (140 lb 6.4 oz)   02/23/21 65.4 kg (144 lb 3.2 oz)   06/10/20 58.5 kg (129 lb)   07/31/19 59.5 kg (131 lb 3.2 oz)            Nutrition Related Findings:    Meds: tums, 
Comprehensive Nutrition Assessment    Type and Reason for Visit:  Reassess    Nutrition Recommendations/Plan:   Continue current diet and supplements  Please document % meals and supplements consumed in flowsheet I/O's under intake      Malnutrition Assessment:  Malnutrition Status:  Severe malnutrition (10/15/24 1459)    Context:  Chronic Illness     Findings of the 6 clinical characteristics of malnutrition:  Energy Intake:  Mild decrease in energy intake (Comment)  Weight Loss:  Greater than 10% over 6 months     Body Fat Loss:  Severe body fat loss Orbital, Triceps, Fat Overlying Ribs, Buccal region   Muscle Mass Loss:  Severe muscle mass loss Temples (temporalis), Clavicles (pectoralis & deltoids), Hand (interosseous), Scapula (trapezius)  Fluid Accumulation:  No significant fluid accumulation     Strength:  Not Performed    Nutrition Assessment:     Chart reviewed. Pt up in the chair during RD visit. Pt reports he is still eating well and drinks the supplements as often as he receives them. Noted Ensure did not come on the lunch tray today so RD called the kitchen to obtain one. Discharge planning underway. Will continue monitoring.     Patient Vitals for the past 120 hrs:   PO Meals Eaten (%)   10/28/24 1831 76 - 100%   10/28/24 1300 76 - 100%   10/28/24 0900 76 - 100%     Patient Vitals for the past 120 hrs:   PO Supplement (%)   10/28/24 1300 76 - 100%     Wt Readings from Last 5 Encounters:   10/30/24 46.5 kg (102 lb 8.2 oz)   10/11/24 49.9 kg (110 lb)   10/11/24 50 kg (110 lb 3.2 oz)   04/12/24 57.2 kg (126 lb 3.2 oz)   11/21/22 58.6 kg (129 lb 3.2 oz)   ]    Nutrition Related Findings:    No recent labs.   Meds: TUMS, Folic acid, culturelle, MVI, Protonix, Thiamine.   BM 10/29.   Wound Type: None       Current Nutrition Intake & Therapies:    Average Meal Intake: %  Average Supplements Intake: %  ADULT DIET; Dysphagia - Soft and Bite Sized; Lactose-Controlled  ADULT ORAL NUTRITION 
Comprehensive Nutrition Assessment    Type and Reason for Visit:  Reassess    Nutrition Recommendations/Plan:   Continue current diet and supplements  Please document % meals and supplements consumed in flowsheet I/O's under intake      Malnutrition Assessment:  Malnutrition Status:  Severe malnutrition (10/15/24 1459)    Context:  Chronic Illness     Findings of the 6 clinical characteristics of malnutrition:  Energy Intake:  Mild decrease in energy intake (Comment)  Weight Loss:  Greater than 10% over 6 months     Body Fat Loss:  Severe body fat loss Orbital, Triceps, Fat Overlying Ribs, Buccal region   Muscle Mass Loss:  Severe muscle mass loss Temples (temporalis), Clavicles (pectoralis & deltoids), Hand (interosseous), Scapula (trapezius)  Fluid Accumulation:  No significant fluid accumulation     Strength:  Not Performed    Nutrition Assessment:     Chart reviewed. Pt with another provider when RD attempted visit today. He is s/p bronch 10/22. There remains concern for metastatic process of lung origin, but is not yet definitive. Overall PO intake is still doing well per documentation from nursing. Noted diarrhea which seems to have started around 10/21. Will continue monitoring.     Patient Vitals for the past 120 hrs:   PO Meals Eaten (%)   10/21/24 1804 76 - 100%   10/21/24 1237 76 - 100%   10/21/24 0930 51 - 75%   10/18/24 1758 26 - 50%     Patient Vitals for the past 120 hrs:   PO Supplement (%)   10/21/24 1804 76 - 100%       Nutrition Related Findings:    Labs: reviewed.   Meds: TUMS, Folic acid, Culturelle, Levaquin, MVI, Protonix, Thiamine, Roxicodone.    10/23 diarrhea.   Wound Type: None       Current Nutrition Intake & Therapies:    Average Meal Intake: 51-75%, %  Average Supplements Intake: 51-75%, %  ADULT DIET; Dysphagia - Soft and Bite Sized; Lactose-Controlled  ADULT ORAL NUTRITION SUPPLEMENT; Breakfast, Lunch, Dinner; Standard High Calorie/High Protein Oral 
Comprehensive Nutrition Assessment    Type and Reason for Visit:  Reassess    Nutrition Recommendations/Plan:   Continue current diet and supplements. Pt left his dentures at home and is content with soft & bite sized diet at this time.   Please document % meals and supplements consumed in flowsheet I/O's under intake      Malnutrition Assessment:  Malnutrition Status:  Severe malnutrition (10/15/24 1459)    Context:  Chronic Illness     Findings of the 6 clinical characteristics of malnutrition:  Energy Intake:  Mild decrease in energy intake (Comment)  Weight Loss:  Greater than 10% over 6 months     Body Fat Loss:  Severe body fat loss Orbital, Triceps, Fat Overlying Ribs, Buccal region   Muscle Mass Loss:  Severe muscle mass loss Temples (temporalis), Clavicles (pectoralis & deltoids), Hand (interosseous), Scapula (trapezius)  Fluid Accumulation:  No significant fluid accumulation     Strength:  Not Performed    Nutrition Assessment:     Chart reviewed. Pt seen at bedside. He reports his appetite was terrible x2 weeks PTA. He is eating better now and drinking all the supplements ordered TID. At home he drinks just 1 Boost daily. Severe fat and muscle wasting present. Significant weight loss noted. Pt reports 16# weight loss since April (13%) over 6 months. Will continue monitoring.     Patient Vitals for the past 120 hrs:   PO Meals Eaten (%)   10/14/24 0715 51 - 75%   10/13/24 1745 76 - 100%   10/13/24 1315 51 - 75%   10/13/24 0903 51 - 75%   10/12/24 1240 51 - 75%   10/12/24 0856 51 - 75%     Patient Vitals for the past 120 hrs:   PO Supplement (%)   10/14/24 0715 76 - 100%   10/13/24 1745 76 - 100%   10/13/24 0903 76 - 100%   10/12/24 1240 76 - 100%     Wt Readings from Last 5 Encounters:   10/13/24 51 kg (112 lb 7 oz)   10/11/24 49.9 kg (110 lb)   10/11/24 50 kg (110 lb 3.2 oz)   04/12/24 57.2 kg (126 lb 3.2 oz)   11/21/22 58.6 kg (129 lb 3.2 oz)   ]    Nutrition Related Findings:    Labs: no BMP today. 
End of Shift Note    Bedside shift change report given to Harish (oncoming nurse) by Zeny Nugent RN (offgoing nurse).  Report included the following information SBAR, Kardex, and MAR    Shift worked:  7p-7a     Shift summary and any significant changes:     Scheduled medications were given, see MAR.  IV has been flushed and is patent.  Patient was given Melatonin, Oxycodone and Ativan once each respectively, see PRN MAR.  Patient was repositioned during my shift.   Patient had a bowel movement during my shift.  Morning lab was done.  Patient teaching and routine rounding has been done.     Concerns for physician to address:       Zone phone for oncoming shift:          Activity:     Number times ambulated in hallways past shift: 0  Number of times OOB to chair past shift: 0    Cardiac:   Cardiac Monitoring: Yes           Access:  Current line(s): PIV     Genitourinary:   Urinary status: voiding and external catheter    Respiratory:      Chronic home O2 use?: NO  Incentive spirometer at bedside: NO       GI:     Current diet:  ADULT ORAL NUTRITION SUPPLEMENT; Breakfast, Lunch, Dinner; Standard High Calorie/High Protein Oral Supplement  ADULT DIET; Dysphagia - Soft and Bite Sized; Lactose-Controlled  Passing flatus: YES  Tolerating current diet: NO       Pain Management:   Patient states pain is manageable on current regimen: YES    Skin:     Interventions: float heels and internal/external urinary devices    Patient Safety:  Fall Score:    Interventions: bed/chair alarm, assistive device (walker, cane. etc), and pt to call before getting OOB       Length of Stay:  Expected LOS: 9  Actual LOS: 7      Zeny Nugent RN                            
End of Shift Note    Bedside shift change report given to Kosta (oncoming nurse) by Marianela Cali RN (offgoing nurse).  Report included the following information SBAR, Procedure Summary, Intake/Output, MAR, Cardiac Rhythm (sinus rhythm/sinus tach), and Quality Measures    Shift worked:  Shift 6424-6331     Shift summary and any significant changes:     Pt was suppose to have a thoracentesis done, however they did not find any fluid in the lung. Pt was able to move to bed and chair.     Concerns for physician to address:  None     Zone phone for oncoming shift:   [phone]       Activity:     Number times ambulated in hallways past shift: 2  Number of times OOB to chair past shift: 4      Cardiac:   Cardiac Monitoring: Yes           Access:  Current line(s): PIV 22 G Right AC    Genitourinary:   Urinary status: voiding and external catheter    Respiratory:      Chronic home O2 use?: NO  Incentive spirometer at bedside: NO       GI:     Current diet:  ADULT DIET; Dysphagia - Soft and Bite Sized; Lactose-Controlled  ADULT ORAL NUTRITION SUPPLEMENT; Breakfast, Lunch, Dinner; Standard High Calorie/High Protein Oral Supplement  Passing flatus: YES  Tolerating current diet: YES       Pain Management:   Patient states pain is manageable on current regimen: YES    Skin:     Interventions: turn team, float heels, increase time out of bed, PT/OT consult, internal/external urinary devices, and nutritional support    Patient Safety:  Fall Score:    Interventions: bed/chair alarm, assistive device (walker, cane. etc), gripper socks, pt to call before getting OOB, and stay with me (per policy)       Length of Stay:  Expected LOS: 16  Actual LOS: 12      Marianela Cali, RN                          
End of Shift Note    Bedside shift change report given to Kosta (oncoming nurse) by Marianela Cali RN (offgoing nurse).  Report included the following information SBAR, Procedure Summary, Intake/Output, MAR, and Quality Measures    Shift worked:  Day Shift 9182-2451     Shift summary and any significant changes:     Pt had no significant events this shift. Worked with OT, saw  about going into a skilled nursing facility, and sat up in the chair for a few hours.     Concerns for physician to address:  None     [facility]   [phone]       Activity:     Number times ambulated in hallways past shift: 0  Number of times OOB to chair past shift: 4    Cardiac:   Cardiac Monitoring: Yes           Access:  Current line(s): PIV 22 G in Right AC    Genitourinary:   Urinary status: voiding and external catheter    Respiratory:      Chronic home O2 use?: NO  Incentive spirometer at bedside: NO       GI:     Current diet:  ADULT DIET; Dysphagia - Soft and Bite Sized; Lactose-Controlled  ADULT ORAL NUTRITION SUPPLEMENT; Breakfast, Lunch, Dinner; Standard High Calorie/High Protein Oral Supplement  Passing flatus: NO  Tolerating current diet: YES       Pain Management:   Patient states pain is manageable on current regimen: YES    Skin:     Interventions: turn team, float heels, increase time out of bed, PT/OT consult, and internal/external urinary devices    Patient Safety:  Fall Score:    Interventions: bed/chair alarm, assistive device (walker, cane. etc), gripper socks, pt to call before getting OOB, and stay with me (per policy)       Length of Stay:  Expected LOS: 13  Actual LOS: 11      Marianela Cali, RN                          
End of Shift Note    Bedside shift change report given to NIKOLAI Chapman (oncoming nurse) by Briana De Oliveira RN (offgoing nurse).  Report included the following information SBAR, Kardex, ED Summary, Procedure Summary, Intake/Output, MAR, Recent Results, and Cardiac Rhythm NSR-Sinus Tachy    Shift worked:  8464-1613     Shift summary and any significant changes:     Pt tachycardic and tachypneic at start of shift, pt largely asymptomatic but anxious. Received orders for EKG, CXR, labs, 500 mL bolus and 0.5 mg of ativan. Pt HR gradually came down over shift to NSR. Pt breathing rate essentially unchanged and pt maintained on 1 L nasal cannula w/ HOB elevated 30 degrees or higher.    Lactic 2.8 at 2200, down to 1.3 at 0415.    Pt had 3x loose, watery stools over shift.    AM labs drawn. New PIV placed.      Concerns for physician to address:  Diarrhea  K+ 3.3  Ca++ 6.7     Zone phone for oncoming shift:   [phone]           Briana De Oliveira RN                            
End of Shift Note    Bedside shift change report given to NIKOLAI Jolly (oncoming nurse) by ISIDORO BRENNAN RN (offgoing nurse).  Report included the following information SBAR, Kardex, Intake/Output, MAR, and Cardiac Rhythm ST    Shift worked:  7p-7a     Shift summary and any significant changes:     Pt very anxious and requesting anxiety meds.  Pt only slept between 5192-3094.  Pt then had frequent stools about once an hour the rest of the night.     Concerns for physician to address:  Anxiety, Pain, frequent stools, jitendra Resp and high HR.     Zone phone for oncoming shift:          Activity:     Number times ambulated in hallways past shift: 0  Number of times OOB to chair past shift: 1    Cardiac:   Cardiac Monitoring: Yes           Access:  Current line(s): PIV     Genitourinary:   Urinary status: incontinent and external catheter    Respiratory:      Chronic home O2 use?: NO  Incentive spirometer at bedside: NO       GI:     Current diet:  ADULT ORAL NUTRITION SUPPLEMENT; Breakfast, Lunch, Dinner; Standard High Calorie/High Protein Oral Supplement  ADULT DIET; Dysphagia - Soft and Bite Sized; Lactose Intolerant, Ensures okay  Passing flatus: YES  Tolerating current diet: YES       Pain Management:   Patient states pain is manageable on current regimen: NO    Skin:     Interventions: increase time out of bed    Patient Safety:  Fall Score:    Interventions: bed/chair alarm, assistive device (walker, cane. etc), gripper socks, and pt to call before getting OOB       Length of Stay:  Expected LOS: 4  Actual LOS: 3      ISIDORO BRENNAN RN                            
End of Shift Note    Bedside shift change report given to Nolberto (oncoming nurse) by Marianela Cali RN (offgoing nurse).  Report included the following information  SBAR, intake/output, quality measures, procedure summary, cardiac rhythm, MAR.    Shift worked:  Day Shift 0169-9744     Shift summary and any significant changes:     Pt went for a bronchoscopy today.     Concerns for physician to address:  None     Zone phone for oncoming shift:   [phone]       Activity:     Number times ambulated in hallways past shift: 0  Number of times OOB to chair past shift: 0    Cardiac:   Cardiac Monitoring: Yes           Access:  Current line(s): PIV 22 G in Right AC and PIV 22G in Right hand    Genitourinary:   Urinary status: voiding and external catheter    Respiratory:      Chronic home O2 use?: NO  Incentive spirometer at bedside: NO       GI:     Current diet:  ADULT DIET; Dysphagia - Soft and Bite Sized; Lactose-Controlled  ADULT ORAL NUTRITION SUPPLEMENT; Breakfast, Lunch, Dinner; Standard High Calorie/High Protein Oral Supplement  Passing flatus: YES  Tolerating current diet: YES       Pain Management:   Patient states pain is manageable on current regimen: YES    Skin:     Interventions: turn team, float heels, PT/OT consult, internal/external urinary devices, and nutritional support    Patient Safety:  Fall Score:    Interventions: bed/chair alarm, assistive device (walker, cane. etc), gripper socks, pt to call before getting OOB, and stay with me (per policy)       Length of Stay:  Expected LOS: 12  Actual LOS: 10      Marianela Cali, RN                          
End of Shift Note    Bedside shift change report given to Rik (oncoming nurse) by Zeny Nugent RN (offgoing nurse).  Report included the following information SBAR, Kardex, and MAR    Shift worked:  7p-7a     Shift summary and any significant changes:     Scheduled medications were given, see MAR.  IV has been flushed and is patent.  Patient was given Ativan twice, Melatonin and Tylenol  once each respectively, see PRN MAR.  Patient had four bowel movements during  my shift.  Morning lab was drawn.  Patient teaching and routine rounding has been done.     Concerns for physician to address:       Zone phone for oncoming shift:          Activity:     Number times ambulated in hallways past shift: 0  Number of times OOB to chair past shift: 0    Cardiac:   Cardiac Monitoring: Yes           Access:  Current line(s): PIV     Genitourinary:   Urinary status: voiding and external catheter    Respiratory:      Chronic home O2 use?: NO  Incentive spirometer at bedside: NO       GI:     Current diet:  ADULT ORAL NUTRITION SUPPLEMENT; Breakfast, Lunch, Dinner; Standard High Calorie/High Protein Oral Supplement  ADULT DIET; Dysphagia - Soft and Bite Sized; Lactose-Controlled  Passing flatus: YES  Tolerating current diet: YES       Pain Management:   Patient states pain is manageable on current regimen: YES    Skin:     Interventions: float heels, increase time out of bed, and internal/external urinary devices    Patient Safety:  Fall Score:    Interventions: bed/chair alarm, assistive device (walker, cane. etc), gripper socks, and pt to call before getting OOB       Length of Stay:  Expected LOS: 6  Actual LOS: 4      Zeny Nugent RN                            
Endoscope was pre-cleaned at bedside immediately following procedure by Ivory Pina RN.    Glasses returned to patient.   
Endoscopy recovery  Patient returned to baseline, vital signs stable (see vital sign flowsheet). Respiratory status within defined limits. Abdomen soft not tender. Skin with in defined limits.   
Following message sent to on call MD, Dr. Sunday Begum, via secure messaging:     S. Pt with increased heart rate, incrased O2 demand, and tachypenia.     B. Pt admitted on 10/12/24 with acute respiratory failure with hypoxia related to large right pleural effusion (suspected malignancy ), post obstructive pneumonia, and COPD exacerabation.     A. Pt has standing orders to notify MD for HR greater than 120 and RR greater than 25. I took over care of this pt at 23:30, however RR has been mid to low thirties since about 20:00. Pt has known sinus tachycardia in the low 100s-110, however since start of my shift, He is ranging from 116 to 120's, and up to 130's with coughing which is productive of thick yellow/brown phlegm. I received pt on 1L of O2 via nasal canula. He has goal to keep O2 sats at least 90%. I have had to incrase his supplemental oxygen to 3L which is keeping sats between 89%-90%. Pt has order for prn Xopenex, however will not take it because according to pt, the other one (albuterol) casued him \"to go into convulsions\".     R. Head of bed rasied to high fowlers for better lung expansion, and pt encouraged to continue to cough/deep breathe for pulmonary hygiene. Any additional interventions needed at this time?    03:30  Received orders from Dr. Begum to have pt evaluated at bedside for any further decline, otherwise no additional interventions needed at this time.    End of Shift Note    Bedside shift change report given to Kathy MENCHACA (oncoming nurse) by Martha Funes RN (offgoing nurse).  Report included the following information SBAR, Kardex, MAR, Recent Results, and Cardiac Rhythm ST    Shift worked:  [id number]     Shift summary and any significant changes:     Pt had multiple small loose bowel movements over night.     Concerns for physician to address:  See above SBAR note     Zone phone for oncoming shift:   [phone]       Activity:     Number times ambulated in hallways past shift: 0  Number of times 
Name of procedure: Right Thoracentesis    Sedation medications given: NO    Vital Signs: Stable    Fluids removed: 1450 ml clear noe fluid    Samples sent to lab: YES    Any complications related to procedure: NO    Post Procedure Care Needed/order sets placed in [facility] care.     Patient is at increased fall risk due to medication given.   
No care due was identified.  [facility] Embedded Care Due Messages. Reference number: [id number].   3/09/2025 6:50:58 PM CDT  
Nursing contacted Nocturnist/cross cover provider via non-urgent messaging system [employer] and notified patient calcium 6.1 down from 6.9 poa, has copd and ? lung mass. asymptomatic. no ectopy or rhythm changes reported.. No other concerns reported. No acute distress reported. No other information provided by nurse. VSS.     Ordered calcium level repeat in am s/p repletion, corrected calcium level 7.1, calcium gluc 2gm iv x1. defer to dayshift for further evaluation/mgmt.. Will defer further evaluation/management to the day shift primary attending care team. Patient denies any further complaints or concerns.     Nursing to notify Hospitalist for further/continued concerns. Will remain available overnight for further concerns if nursing/patient needs. Please note, there are RRT systems in this hospital in place that if nursing has acute or critical patient condition change or concern, this is to help facilitate and notify that patient needs immediate bedside evaluation by a provider.     Non-billable note.       
Nursing contacted Nocturnist/cross cover provider via non-urgent messaging system [employer] and notified patient tachy 140s and tachypnea up to mid 30s, anxious, tachycardia developed s/p albuterol 1630 earlier along with anxiety and has persisted. reported lungs clear but diminished, no inc wob, no in o2 consumption reported, bp stable and afebrile. states looks calm and not in distress. having lung mass w/u possible cancer on monday appears bx scheduled. no c/o pain reported. . No other concerns reported. No acute distress reported. No other information provided by nurse. VSS.     Ordered stat cxr r/o acute vs worse effusion- pending. stat ekg- pending. stat cbc, bmp, procalc, lactic- pending. ns 500ml bolus x1. consider replete lytes if needed when results return. ativan 0.5mg iv x1 for anxiety- last dose 1mg almost 3 hrs ago. 2305 nurse reported lactic back 2.8- up from prior, lactic likely elevated 2/2 inc resp drive, was on steroids on admit for copd exac likely reason for mild inc wbc. rr improved, hr 110s now. no acute distress reported. states sleeping and comfortable s/p Ativan. cxr results reviewed. u/a was neg, bld cx pnd results. no acute infectious processes noted w/o obvious source of infection will defer antbx for now as is afebrile, hemodynamically stable, not septic shock appearing. repeat lactic in am.. Will defer further evaluation/management to the day shift primary attending care team. Patient denies any further complaints or concerns.     Nursing to notify Hospitalist for further/continued concerns. Will remain available overnight for further concerns if nursing/patient needs. Please note, there are RRT systems in this hospital in place that if nursing has acute or critical patient condition change or concern, this is to help facilitate and notify that patient needs immediate bedside evaluation by a provider.     Non-billable note.       
Nursing contacted Nocturnist/cross cover provider via non-urgent messaging system [employer] and notified patient hr 130s and rr 30s and very anxious, s/p prn Ativan 0.5mg per nurse. no resp distress. pt did this last night with anxiety at that time also then calmed down. also reported wheezing asking for nebs. has prn nebs- awaiting nurse confirmation if just needs x1. nurse msg back 2250, she didn't get my response earlier from reported technical issues with her prefect serve. i called nurse on unit shivammonique answer the phone and asked her to inform nurse my recommendations- also perfect serve marc the primary nurse back. marc responded and states will give the ativan now. nurse reported pt declines to have nebs stating he doesn't like how they make him feel. . No other concerns reported. No acute distress reported. No other information provided by nurse. VSS.     Ordered ativan prn ok nurse to give now early, can get prn nebs as already ordered. if not would consider x1 if already given and not yet due. reaction with tachy reported after albuterol nebs during dayshift day prior. nurse to call a rapid if needed for distress if develops or needs urgent bedside evaluation.. Will defer further evaluation/management to the day shift primary attending care team. Patient denies any further complaints or concerns.     Nursing to notify Hospitalist for further/continued concerns. Will remain available overnight for further concerns if nursing/patient needs. Please note, there are RRT systems in this hospital in place that if nursing has acute or critical patient condition change or concern, this is to help facilitate and notify that patient needs immediate bedside evaluation by a provider.     Non-billable note.       
Occupational Therapy    Patient chart reviewed up to date. Note SpO2 sustaining 82-85% upon arrival w/ good pleth; patient on 2L O2. Extended time needed for recovery > 90%. Patient only agreeable to therapist dependently scooting him to HOB to promote more upright posture. He adamantly declines all offers of activity/mobility. Note plan for procedure tomorrow. Will follow-up for evaluation as appropriate and as patient is agreeable.     Lenka Ross OTR/L  Time: 10 minutes  
Occupational Therapy note:    Order received and acknowledged. Patient received sitting in recliner chair and declined participating in therapy stating \"I don't want to do anything today\". Education provided on importance of completing ADLs and mobility during hospital stay but patient continued to decline. Will defer and attempt OT eval tomorrow.    Porsche Pandey, OTR/L, OTD  
Palliative Medicine  Patient Name: Scott Gardiner  YOB: 1945  MRN: [id number]  Age: 79 y.o.  Gender: male    Date of Initial Consult: 10/15/2024  Date of Service: 10/18/2024  Time: 1:38 PM  Provider: JOE Escalante NP  Hospital Day: 7  Admit Date: 10/12/2024  Referring Provider: Skyla Gamboa MD       Reasons for Consultation:  Goals of Care, overwhelming symptoms and end stage disease.    HISTORY OF PRESENT ILLNESS (HPI):   Scott Gardiner is a 79 y.o. male with a past medical history of htn, PAD s/p axillobifemoral graft who was admitted on 10/12/2024 transfer from   for thoracentesis a diagnosis of large right pleural effusion in  a setting of suspected malignant lung mass( former smoker with 50 pack year history), suspected COPD.   unexplained weight loss, severe protein calorie malnutrition .    Ct of chest 10/11/24  shows :  Large right pleural effusion with near complete collapse of the right lung    Probable pulmonary masses in the right upper lobe, concerning for a malignant effusion     2. Enlarged mediastinal and right cardiophrenic nodes.    Pulmonary following awaiting pleural fluid cytology he  is s/p right thoracentesis on 10/14 with 1450 mL of bloody fluid drained exudative and lymphocytic, awaiting fluid cytology if it is negative recommended for reimaging with contrast CT to further evaluate possible biopsy, IR for pleural fluid drainage  and empiric antibiotics.    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  He lives independently was driving prior to admission.    He lives in [facility], his main support is his friend Oc Paz who lives close to him, patient's sister Dolores Sanchez lives in [place] she is 82-year-old she is on his advanced directive as primary medical POA, because his brother Abdifatah, Bill  listed as primary medical POA passed away.    DDNR 
Palliative Medicine  Patient Name: Scott Gardiner  YOB: 1945  MRN: [id number]  Age: 79 y.o.  Gender: male      Reviewed this chart for my colleage Dr Sybil Cruz done, and d/c planning starting  No acute inpatient palliative needs at this time, his goals are clear  Will continue to be available as he is high risk but please call us if goals need to be readdressed    Otherwise he plans to follow up with oncology outpatient after biopsies result    JOE Escalante - NP    
Palliative Medicine  Per Dr. Devine: Scott Gardiner is a 79 y.o. male with a past medical history of htn, PAD s/p axillobifemoral graft who was admitted on 10/12/2024 transfer from   for thoracentesis a diagnosis of large right pleural effusion in  a setting of suspected malignant lung mass( former smoker with 50 pack year history), suspected COPD.   unexplained weight loss, severe protein calorie malnutrition .   Ct of chest 10/11/24  shows :  Large right pleural effusion with near complete collapse of the right lung   2.Probable pulmonary masses in the right upper lobe, concerning for a malignant effusion   3. Enlarged mediastinal and right cardiophrenic nodes.  Pulmonary following awaiting pleural fluid cytology he  is s/p right thoracentesis on 10/14 with 1450 mL of bloody fluid drained exudative and lymphocytic, awaiting fluid cytology if it is negative recommended for reimaging with contrast CT to further evaluate possible biopsy, IR for pleural fluid drainage  and empiric antibiotics.     Code Status: DNR DDNR signed by the patient in 2019 is on file.    Advance Care Planning:      10/16/2024     6:50 AM   Demographics   Marital Status Single   AMD updated 10/16/24 names sister Dolores as primary and niece Sri as secondary HCDM.    Patient / Family Encounter Documentation    Participants (names): Scott Abdifatah, assigned nurse, Rik, Dr. Devine, Kymberly Gray, [facility]    Narrative: Palliative team met with patient after reviewing his chart and checking in with assigned nurse.  --Patient was received lying in bed with hob elevated in no apparent distress, but tense and reporting feeling anxious.  --Palliative team provided calm, caring presence, reviewed our role and supportive service, including updating AMDs.  --SW offered complementary strategies for coping with anxiety, including relaxation breathing and distraction.  --We reviewed his medical issues and he was waiting for test results, which he 
Palliative Medicine  Per Dr. Devine: Scott Gardiner is a 79 y.o. male with a past medical history of htn, PAD s/p axillobifemoral graft who was admitted on 10/12/2024 transfer from   for thoracentesis a diagnosis of large right pleural effusion in  a setting of suspected malignant lung mass( former smoker with 50 pack year history), suspected COPD.   unexplained weight loss, severe protein calorie malnutrition .   Ct of chest 10/11/24  shows :  Large right pleural effusion with near complete collapse of the right lung   2.Probable pulmonary masses in the right upper lobe, concerning for a malignant effusion   3. Enlarged mediastinal and right cardiophrenic nodes.  Pulmonary following awaiting pleural fluid cytology he  is s/p right thoracentesis on 10/14 with 1450 mL of bloody fluid drained exudative and lymphocytic, awaiting fluid cytology if it is negative recommended for reimaging with contrast CT to further evaluate possible biopsy, IR for pleural fluid drainage  and empiric antibiotics.     Code Status: DNR DDNR signed by the patient in 2019 is on file.    Advance Care Planning:      10/22/2024     1:42 PM   Demographics   Marital Status Single   AMD updated 10/16/24 names sister Dolores as primary and niece Sri as secondary HCDM.    Patient / Family Encounter Documentation    Participants (names): Scott GardinerKymberly, JOSE    Narrative:   --Palliative SW reviewed chart and offered support to patient, who was received alert and oriented, smiling and appeared more relaxed, less tense than in our last encounter.  --Patient reported that his niece came and spent about 5 hours with him getting his legal documents completed with a notary and he expressed satisfaction with that.  --Patient had asked nurse for anxiety medications and she was going to get them for him.  --Patient's lunch tray was not edible for him as he does not have his dentures. He said he had spoken with dietary to try to correct 
Patient discharged to SNF via [facility]. Tele and Iv d/c.  
Pharmacy Antimicrobial Kinetic Dosing    Indication for Antimicrobials: CAP     Current Regimen of Each Antimicrobial:  Vancomycin 750 mg IV q12h; Start Date 10/15; Day 1  Levofloxacin 500 mg PO q24h; Start Date 10/13; Day 3  Metronidazole 500 mg PO q8h; Start Date 10/13; Day 3    Previous Antimicrobial Therapy:       Goal Level: Vancomycin -600    Date Dose & Interval Measured (mcg/mL) Predicted AUC 24-48 Predicted AUC 24,ss                          Significant Cultures:   10/14 pleural fluid: NGTD  10/14 MRSA: pending  10/15 blood: NGTD    Labs:  Recent Labs     Units 10/15/24  0633 10/14/24  0507 10/13/24  0413 10/12/24  2158   CREATININE MG/DL  --  0.68* 0.79 1.05   BUN MG/DL  --  24* 25* 31*   PROCAL ng/mL  --  48.85  --  52.41   WBC K/uL 15.0* 13.6*  --  14.4*     Temp (24hrs), Av.6 °F (37 °C), Min:97.7 °F (36.5 °C), Max:99.6 °F (37.6 °C)      Conditions for Dosing Consideration: None    Creatinine Clearance (mL/min): Estimated Creatinine Clearance: 63 mL/min (A) (based on SCr of 0.68 mg/dL (L)).       Impression/Plan:   Give vancomycin 1250 mg loading dose, then 750 mg q12h  Predicted ODA94-74 = 421, Predicted AUC24,ss = 508  Vancomycin level 10/16 @ 1000  Antimicrobial stop date 10/21 for vanc; 10/18 for Flagyl, TBD for levofloxacin     Pharmacy will follow daily and adjust medications as appropriate for renal function and/or serum levels.    Thank you,  Carl Morrissey [facility]    
Pharmacy Antimicrobial Kinetic Dosing    Indication for Antimicrobials: CAP     Current Regimen of Each Antimicrobial:  Vancomycin 750 mg IV q12h; Start Date 10/15; Day 2  Levofloxacin 500 mg PO q24h; Start Date 10/13; Day 4  Zosyn 3.375 g IV q8h; Start Date 10/16; Day 1      Previous Antimicrobial Therapy:   Metronidazole 10/13-10/16    Goal Level: Vancomycin -600    Date Dose & Interval Measured (mcg/mL) Predicted AUC 24-48 Predicted AUC 24,ss   10/16 750 mg q12h 10.8 494 520                   Significant Cultures:   10/14 pleural fluid: NGTD  10/14 MRSA: positive  10/15 blood: NGTD  10/16 sputum: NGTD    Labs:  Recent Labs     Units 10/16/24  0241 10/15/24  1103 10/15/24  0633 10/14/24  0507   CREATININE MG/DL 0.72  --   --  0.68*   BUN MG/DL 27*  --   --  24*   PROCAL ng/mL 60.01 57.02  --  48.85   WBC K/uL 18.0*  --  15.0* 13.6*     Temp (24hrs), Av.9 °F (36.6 °C), Min:97.3 °F (36.3 °C), Max:98.4 °F (36.9 °C)      Conditions for Dosing Consideration: None    Creatinine Clearance (mL/min): Estimated Creatinine Clearance: 59 mL/min (based on SCr of 0.72 mg/dL).       Impression/Plan:   Vancomycin level of 10.8 is therapeuic. Continue vanc 750 mg q12h  Flagyl Dc'd; Zosyn added  Antimicrobial stop date 10/21 for vanc; 10/23 for Zosyn; 10/19 for levofloxacin     Pharmacy will follow daily and adjust medications as appropriate for renal function and/or serum levels.    Thank you,  Carl Morrissey [facility]      
Pharmacy Antimicrobial Kinetic Dosing    Indication for Antimicrobials: CAP     Current Regimen of Each Antimicrobial:  Vancomycin 750 mg IV q12h; Start Date 10/15; Day 5  Levofloxacin 500 mg PO q24h; Start Date 10/13; Day 5  Zosyn 3.375 g IV q8h; Start Date 10/16; Day 4    Previous Antimicrobial Therapy:   Metronidazole 10/13-10/16    Goal Level: Vancomycin -600    Date Dose & Interval Measured (mcg/mL) Predicted AUC 24-48 Predicted AUC 24,ss   10/16 750 mg q12h 10.8 494 520                   Significant Cultures:   10/14 pleural fluid: NGTD  10/14 MRSA: positive  10/15 blood: NGTD  10/16 sputum: NGTD    Labs:  Recent Labs     Units 10/19/24  0311 10/18/24  0438 10/17/24  0457   CREATININE MG/DL 0.67* 0.69* 0.75   BUN MG/DL 25* 26* 27*   PROCAL ng/mL  --  60.10 66.35   WBC K/uL 20.4* 16.0* 16.3*     Temp (24hrs), Av.2 °F (36.8 °C), Min:97.8 °F (36.6 °C), Max:98.6 °F (37 °C)      Conditions for Dosing Consideration: None    Creatinine Clearance (mL/min): Estimated Creatinine Clearance: 67 mL/min (A) (based on SCr of 0.67 mg/dL (L)).       Impression/Plan:   WBC still 16, afebrile, MRSA Screen positive, CX ng  Continue abx for now pre Pulm consult  Continue Vanc, Zosyn, Levaquin  Antimicrobial stop date 10/21 for vanc; 10/23 for Zosyn; 10/19 for levofloxacin     Pharmacy will follow daily and adjust medications as appropriate for renal function and/or serum levels.    Thank you,  Bacilio De Oliveira RPH            
Pharmacy Antimicrobial Kinetic Dosing    Indication for Antimicrobials: CAP     Current Regimen of Each Antimicrobial:  Vancomycin 750 mg IV q12h; Start Date 10/15; Day 6  Levofloxacin 500 mg PO q24h; Start Date 10/13; Day 6  Zosyn 3.375 g IV q8h; Start Date 10/16; Day 5    Previous Antimicrobial Therapy:   Metronidazole 10/13-10/16    Goal Level: Vancomycin -600    Date Dose & Interval Measured (mcg/mL) Predicted AUC 24-48 Predicted AUC 24,ss   10/16 750 mg q12h 10.8 494 520                   Significant Cultures:   10/14 pleural fluid: NGTD  10/14 MRSA: positive  10/15 blood: NGTD  10/16 sputum: NGTD    Labs:  Recent Labs     Units 10/19/24  0311 10/18/24  0438 10/17/24  0457   CREATININE MG/DL 0.67* 0.69* 0.75   BUN MG/DL 25* 26* 27*   PROCAL ng/mL  --  60.10 66.35   WBC K/uL 20.4* 16.0* 16.3*     Temp (24hrs), Av.2 °F (36.8 °C), Min:97.8 °F (36.6 °C), Max:98.6 °F (37 °C)      Conditions for Dosing Consideration: None    Creatinine Clearance (mL/min): Estimated Creatinine Clearance: 67 mL/min (A) (based on SCr of 0.67 mg/dL (L)).       Impression/Plan:   WBC still 16, afebrile, MRSA Screen positive, CX ng  Continue abx for now pre Pulm consult  Continue Vanc, Zosyn, Levaquin  Antimicrobial stop date 10/21 for vanc; 10/23 for Zosyn; 10/19 for levofloxacin     Pharmacy will follow daily and adjust medications as appropriate for renal function and/or serum levels.    Thank you,  Bacilio De Oliveira RPH        
Physical therapy services attempted @1:25PM. Reporting DPT introduced self/role and offered therapy services, but Pt adamant refusal despite encouragement and education. Upon arrival, therapist observed Pt in slouched down position with feet resting on base of hospital bed. Pt amenable to therapist assist to reposition, but came ~agitated stating, \"You messed everything up. They reposition me every two hours. I don't want to do anything else. Just leave\". Supplemental O2 being received via NC upon arrival with noted desat ~83%. Pt reports, \"It does that, it just takes some time\". Pt declined explaining timeframe, but after ~ 10 minutes while present assisting with repositioning, SpO2 finally returned to > 90%. Pt resting NAD and confirmed no further needs. PT Team will follow and attempt to complete functional assessment at a later date as time permits and medically appropriate to pt tolerance.      Lizzy Lockett, PT, DPT   
Pt seen and examined briefly today AM during my rounds.  Pt sitting comfortably in chair awaiting Breakfast.  NO new complains  Stable on NC oxygen- 2L/m  Awaiting Pulm consult  Awaiting thoracentesis as planned- likely to happen on Monday as pt stable  Will await further plans of ?Lung Biopsy if Pleural path negative    NON BILLABLE rounding    
Spiritual Health History and Assessment/Progress Note  [facility]    Initial Encounter,  ,  ,      Name: Scott Gardiner MRN: [id number]    Age: 79 y.o.     Sex: male   Language: English   Worship: Other   Pleural effusion, right     Date: 10/17/2024            Total Time Calculated: 10 min              Spiritual Assessment began in [facility] 2 CARDIAC MEDICAL STEP DOWN        Referral/Consult From: Rounding   Encounter Overview/Reason: Initial Encounter  Service Provided For: Patient    Diana, Belief, Meaning:   Patient Other: Not affiliated with a diana tradition  Family/Friends No family/friends present      Importance and Influence:  Patient has no beliefs influential to healthcare decision-making identified during this visit  Family/Friends No family/friends present    Community:  Patient feels well-supported. Support system includes: Extended family  Family/Friends No family/friends present    Assessment and Plan of Care:     Patient Interventions include: Facilitated expression of thoughts and feelings, Explored spiritual coping/struggle/distress, and Affirmed coping skills/support systems  Family/Friends Interventions include: No family/friends present    Patient Plan of Care: No spiritual needs identified for follow-up  Family/Friends Plan of Care: Spiritual Care available upon further referral    Electronically signed by Chaplain William on 10/17/2024 at 3:10 PM  
Suspected lung cancer.  Right lung opacified.  Will do Bronch next Tuesday 10/22 at 130pm.   
  Medical Decision Making:   I personally reviewed labs: Yes, as listed below  I personally reviewed imaging: CT chest and CXR from OSH  Toxic drug monitoring: None  Discussed case with: Pt, RN, CM on IDRs, IR team        Code Status: DNR-confirmed with patient  DVT Prophylaxis: SCDs  GI Prophylaxis: Protonix  Baseline: Independent    Subjective:     Chief Complaint / Reason for Physician Visit:  F/u for ?Malignant R pleural effusion , Lung mass/cancer with LNs,  Tachycardia, COPD, Smoker, Anxiety state  \"I am ok\".  Discussed with RN events overnight.     Pt awaiting thoracentesis by IR with Cytology on Fluid  today, if non diagnostic will need Lung Mass/LN biopsy  Gets significantly Tachy and shaky after Duonebs-- changed to Xopenex  Ativan prn anxiety      Objective:     VITALS:   Last 24hrs VS reviewed since prior progress note. Most recent are:  Patient Vitals for the past 24 hrs:   BP Temp Temp src Pulse Resp SpO2   10/14/24 1040 107/67 -- -- -- -- --   10/14/24 1038 -- -- -- -- -- 99 %   10/14/24 1030 91/66 -- -- -- -- --   10/14/24 1015 -- -- -- -- -- 97 %   10/14/24 0715 112/72 97.4 °F (36.3 °C) Oral (!) 102 19 91 %   10/14/24 0320 (!) 121/93 97.6 °F (36.4 °C) Oral (!) 102 (!) 42 93 %   10/13/24 2355 (!) 100/52 -- -- (!) 122 (!) 33 93 %   10/13/24 2335 (!) 97/46 (!) 100.5 °F (38.1 °C) Oral (!) 122 (!) 35 91 %   10/13/24 2245 -- -- -- (!) 120 (!) 32 (!) 89 %   10/13/24 2230 -- -- -- (!) 127 29 92 %   10/13/24 2215 -- -- -- (!) 123 (!) 32 92 %   10/13/24 2200 -- -- -- (!) 129 (!) 35 90 %   10/13/24 2145 -- -- -- (!) 132 (!) 34 91 %   10/13/24 2130 -- -- -- (!) 140 (!) 47 90 %   10/13/24 2115 -- -- -- (!) 136 (!) 50 (!) 84 %   10/13/24 2100 -- -- -- (!) 136 (!) 45 90 %   10/13/24 1930 118/71 97.7 °F (36.5 °C) Oral (!) 130 (!) 35 90 %   10/13/24 1548 134/64 98.6 °F (37 °C) Oral (!) 105 24 91 %         Intake/Output Summary (Last 24 hours) at 10/14/2024 1255  Last data filed at 10/13/2024 2230  Gross per 24 hour 
Measures:  Height: 167.6 cm (5' 6\")  Ideal Body Weight (IBW): 142 lbs (65 kg)       Current Body Weight: 51.7 kg (113 lb 15.7 oz), 78.1 % IBW. Weight Source: Bed Scale  Current BMI (kg/m2): 18.4  Usual Body Weight: 57.2 kg (126 lb) (April 2024)  % Weight Change (Calculated): -12  Weight Adjustment For: No Adjustment                 BMI Categories: Underweight (BMI less than 18.5)    Estimated Daily Nutrient Needs:  Energy Requirements Based On: Formula  Weight Used for Energy Requirements: Current  Energy (kcal/day): MSJ 1800 (1156 x 1.3 +300 for wt gain)  Weight Used for Protein Requirements: Current  Protein (g/day): 65-75g (1.3-1.5gPro/kg)  Method Used for Fluid Requirements: 1 ml/kcal  Fluid (ml/day): 1800mL    Nutrition Diagnosis:   Severe malnutrition related to catabolic illness, inadequate protein-energy intake as evidenced by weight loss greater than or equal to 10% in 6 months, severe loss of subcutaneous fat, severe muscle loss    Nutrition Interventions:   Food and/or Nutrient Delivery: Continue Current Diet, Continue Oral Nutrition Supplement  Nutrition Education/Counseling: No recommendation at this time  Coordination of Nutrition Care: Continue to monitor while inpatient       Goals:  Previous Goal Met: Goal(s) Achieved  Goals: PO intake 50% or greater, PO intake 75% or greater, by next RD assessment       Nutrition Monitoring and Evaluation:   Behavioral-Environmental Outcomes: None Identified  Food/Nutrient Intake Outcomes: Food and Nutrient Intake, Supplement Intake  Physical Signs/Symptoms Outcomes: Biochemical Data, Nutrition Focused Physical Findings, Skin, Weight    Discharge Planning:    Continue current diet, Continue Oral Nutrition Supplement     Sri Dominguez RD  Contact: [phone]    
AMPUTATION Left     transmetatarsal    AMPUTATION Left 2016    left 4th toe from gangrene    CATARACT REMOVAL Bilateral     COLONOSCOPY N/A 2016    COLONOSCOPY performed by Ramakrishna Morataya MD at [facility] ENDOSCOPY    COLONOSCOPY N/A 2016    COLONOSCOPY performed by Ramakrishna Morataya MD at [facility] ENDOSCOPY    COLONOSCOPY N/A 2020    COLONOSCOPY performed by Karan Lyle MD at [facility] ENDOSCOPY    OTHER SURGICAL HISTORY      partial transmetatarsal amputation, lt foot all toes amputated and rt foot has 2nd tow amputation    TONSILLECTOMY      UPPER GASTROINTESTINAL ENDOSCOPY  10/2016    VASCULAR SURGERY  2020    axillo-bifemoral      Prior to Admission medications    Medication Sig Start Date End Date Taking? Authorizing Provider   omeprazole (PRILOSEC) 20 MG delayed release capsule Take 1 capsule by mouth daily 24  Yes Karan Almonte MD     Allergies   Allergen Reactions    Iodine Rash     Hives on back      Social History     Tobacco Use    Smoking status: Former     Current packs/day: 0.00     Types: Cigarettes     Start date:      Quit date: 2018     Years since quittin.8    Smokeless tobacco: Never   Substance Use Topics    Alcohol use: Not Currently    Former smoker up to 1ppd x 60 years, quit 7 years ago.   He quit ETOH use 7 years ago when he had a stroke.  Family History   Problem Relation Age of Onset    Alzheimer's Disease Father     Stroke Mother      OBJECTIVE:     Vital Signs:     /65   Pulse (!) 101   Temp 98.1 °F (36.7 °C) (Oral)   Resp 20   Ht 1.676 m (5' 6\")   Wt 53.9 kg (118 lb 12.9 oz)   SpO2 90%   PF (!) 18 L/min   BMI 19.18 kg/m²    Temp (24hrs), Av.3 °F (36.8 °C), Min:98.1 °F (36.7 °C), Max:98.4 °F (36.9 °C)     Intake/Output:     Last shift: No intake/output data recorded.    Last 3 shifts: 10/20 1901 - 10/22 0700  In: 1160 [P.O.:1060]  Out: 2450 [Urine:2450]        Intake/Output Summary (Last 24 hours) at 10/22/2024 1111  Last data filed at 
antihypertensive medications due to significant weight loss  Monitor and address as needed      Medical Decision Making:   I personally reviewed labs: CBC, BMP, magnesium, phosphorus  I personally reviewed imaging:   I personally reviewed EKG:  Toxic drug monitoring: Kidney function while patient is on IV vancomycin  Discussed case with: Patient, RN        Code Status: DNR/DNI  DVT Prophylaxis: SCDs  GI Prophylaxis: Protonix  Baseline: Independent, patient told me that he is niece Sri Sow is his DPOA.    Subjective:     Chief Complaint / Reason for Physician Visit  \" Follow-up for acute hypoxic respiratory failure secondary to large right pleural effusion, right upper lobe pulmonary mass, postobstructive pneumonia, former smoker with 50-pack-year smoking, Weight loss, COPD with exacerbation, anxiety, severe protein calorie malnutrition, GERD, PUD, HTN, HLD.\".  Discussed with RN events overnight.       Objective:     VITALS:   Last 24hrs VS reviewed since prior progress note. Most recent are:  Patient Vitals for the past 24 hrs:   BP Temp Temp src Pulse Resp SpO2 Weight   10/21/24 0800 (!) 98/58 98.5 °F (36.9 °C) Oral 96 22 92 % --   10/21/24 0500 -- -- -- -- -- -- 54 kg (119 lb 0.8 oz)   10/21/24 0400 110/75 98.5 °F (36.9 °C) Oral -- 21 -- --   10/21/24 0001 -- 98.3 °F (36.8 °C) Oral -- -- -- --   10/20/24 1955 -- 98.7 °F (37.1 °C) Oral -- 20 -- --   10/20/24 1600 (!) 102/55 98 °F (36.7 °C) Oral (!) 113 -- 99 % --   10/20/24 1200 110/65 97.8 °F (36.6 °C) Oral (!) 107 -- 93 % --         Intake/Output Summary (Last 24 hours) at 10/21/2024 0932  Last data filed at 10/21/2024 0700  Gross per 24 hour   Intake --   Output 2200 ml   Net -2200 ml        I had a face to face encounter and independently examined this patient on 10/21/2024, as outlined below:  PHYSICAL EXAM:  General: Somewhat lethargic, cachectic, ill looking, cooperative  EENT:  EOMI. Anicteric sclerae.  Resp:  2L oxygen, somewhat coarse sound on right 
Alzheimer's Disease Father     Stroke Mother      OBJECTIVE:     Vital Signs:     BP (!) 95/59   Pulse 100   Temp 98.5 °F (36.9 °C) (Oral)   Resp 20   Ht 1.676 m (5' 6\")   Wt 50.3 kg (110 lb 14.3 oz)   SpO2 93%   PF (!) 18 L/min   BMI 17.90 kg/m²    Temp (24hrs), Av.8 °F (36.6 °C), Min:97.3 °F (36.3 °C), Max:98.5 °F (36.9 °C)     Intake/Output:     Last shift: No intake/output data recorded.    Last 3 shifts: 10/23 1901 - 10/25 0700  In: -   Out: 2475 [Urine:2475]        Intake/Output Summary (Last 24 hours) at 10/25/2024 1057  Last data filed at 10/25/2024 0523  Gross per 24 hour   Intake --   Output 1475 ml   Net -1475 ml           Physical Exam:                                        Exam Findings Other   General: No resp distress noted, appears stated age, cachexia    HEENT:  No ulcers, JVD not elevated    Chest: No pectus deformity, normal chest rise b/l    HEART:  RRR, no murmurs/rubs/gallops    Lungs:  diminished bs right hemithorax.no wheeze/rhonchi.     ABD: Soft/NT, non rigid     EXT: No cyanosis/clubbing/edema, normal peripheral pulses    Skin: No rashes or ulcers, no mottling    Neuro: A/O x 3        Medications:  Current Facility-Administered Medications   Medication Dose Route Frequency    zinc oxide 40 % paste   Topical 4x Daily PRN    acetaminophen (TYLENOL) tablet 1,000 mg  1,000 mg Oral TID    oxyCODONE (ROXICODONE) immediate release tablet 2.5 mg  2.5 mg Oral Q8H PRN    lactobacillus (CULTURELLE) capsule 1 capsule  1 capsule Oral Daily with breakfast    levoFLOXacin (LEVAQUIN) tablet 500 mg  500 mg Oral Daily    LORazepam (ATIVAN) tablet 0.5 mg  0.5 mg Oral Q8H PRN    pantoprazole (PROTONIX) tablet 40 mg  40 mg Oral QAM AC    sodium chloride flush 0.9 % injection 5-40 mL  5-40 mL IntraVENous 2 times per day    sodium chloride flush 0.9 % injection 5-40 mL  5-40 mL IntraVENous PRN    0.9 % sodium chloride infusion   IntraVENous PRN    ondansetron (ZOFRAN-ODT) disintegrating tablet 4 mg  
°C), Max:98.9 °F (37.2 °C)     Intake/Output:     Last shift: No intake/output data recorded.    Last 3 shifts: 10/21 1901 - 10/23 0700  In: -   Out: 200 [Urine:200]      No intake or output data in the 24 hours ending 10/23/24 0938        Physical Exam:                                        Exam Findings Other   General: No resp distress noted, appears stated age, cachexia    HEENT:  No ulcers, JVD not elevated, no cervical LAD    Chest: No pectus deformity, normal chest rise b/l    HEART:  RRR, no murmurs/rubs/gallops    Lungs:  diminished bs right hemithorax.no wheeze/rhonchi.     ABD: Soft/NT, non rigid     EXT: No cyanosis/clubbing/edema, normal peripheral pulses    Skin: No rashes or ulcers, no mottling    Neuro: A/O x 3        Medications:  Current Facility-Administered Medications   Medication Dose Route Frequency    acetaminophen (TYLENOL) tablet 1,000 mg  1,000 mg Oral TID    oxyCODONE (ROXICODONE) immediate release tablet 2.5 mg  2.5 mg Oral Q8H PRN    lactobacillus (CULTURELLE) capsule 1 capsule  1 capsule Oral Daily with breakfast    levoFLOXacin (LEVAQUIN) tablet 500 mg  500 mg Oral Daily    LORazepam (ATIVAN) tablet 0.5 mg  0.5 mg Oral Q8H PRN    pantoprazole (PROTONIX) tablet 40 mg  40 mg Oral QAM AC    sodium chloride flush 0.9 % injection 5-40 mL  5-40 mL IntraVENous 2 times per day    sodium chloride flush 0.9 % injection 5-40 mL  5-40 mL IntraVENous PRN    0.9 % sodium chloride infusion   IntraVENous PRN    ondansetron (ZOFRAN-ODT) disintegrating tablet 4 mg  4 mg Oral Q8H PRN    Or    ondansetron (ZOFRAN) injection 4 mg  4 mg IntraVENous Q6H PRN    polyethylene glycol (GLYCOLAX) packet 17 g  17 g Oral Daily PRN    melatonin tablet 3 mg  3 mg Oral Nightly PRN    thiamine mononitrate tablet 100 mg  100 mg Oral Daily    folic acid (FOLVITE) tablet 1 mg  1 mg Oral Daily    multivitamin 1 tablet  1 tablet Oral Daily    calcium carbonate (TUMS) chewable tablet 500 mg  500 mg Oral TID    balsum 
Topical BID    levalbuterol (XOPENEX) nebulizer solution 1.25 mg  1.25 mg Nebulization Q8H PRN       Labs:  ABG Invalid input(s): \"PHI\", \"PCO2I\", \"PO2I\", \"HCO3I\", \"SO2I\", \"FIO2I\"     CBC Recent Labs     10/17/24  0457 10/18/24  0438 10/19/24  0311   WBC 16.3* 16.0* 20.4*   HGB 8.4* 7.9* 9.5*   HCT 26.3* 24.5* 30.8*   * 440* 498*   MCV 93.6 91.1 95.7   MCH 29.9 29.4 29.5        Metabolic  Panel Recent Labs     10/17/24  0457 10/18/24  0438 10/19/24  0311    136 134*   K 4.0 4.1 4.1    102 103   CO2 22 24 23   BUN 27* 26* 25*   MG 1.4* 1.6 1.7   PHOS 3.5 3.6 2.9        Pertinent Labs      Erendira Peter NP  
and high risk patient  Cont IV ativan prn anxiety state/tremors after nebs  Cont empiric Abx for now- Levaquin + Flagyl for now, added Vancomycin today with worsening leukocytosis  Cont Nebs changed to Xopenex to prevent overstimulation causing anxiety/tremors & tachycardia  Palliative consulted  10/16: Patient went down for her ultrasound-guided right pleural drain placement but there was not enough fluid.  I spoke with pulmonology JOE Lee, plan is to follow-up with cytology for now.  Patient is currently on IV Zosyn, vancomycin and Levaquin.  10/17: O2 requirement is up at 3 L.  I spoke with pulmonology JOE Lee, plan is to continue IV antibiotics for now.  Patient may need repeat CT of his chest to reassess for interval change of his pulmonary disease.  10/18: Patient is down to 2 L of oxygen.  Repeat CT chest abdomen pelvis was done and report is pending.  10/19: CT chest abdomen pelvis 10/18 shows:  1.  Near complete opacification of the right lung, which likely reflects a combination of airspace disease and pulmonary masses. Identification or measurement of discrete pulmonary masses is limited without intravenous contrast, but there appears to be a mass obstructing the bronchus intermedius as well as nodularity within the peripheral right upper lobe.   2.  Nodular interlobular septal thickening within the aerated portion of the right upper lobe suggests lymphangitic carcinomatosis. A moderate right pleural effusion may be malignant.   3.  Groundglass opacity within the aerated portions of the right middle and lower lobes suggests postobstructive infection.  Appreciate input from pulmonology and plan is for bronchoscopy on 10/22.  I updated patient's sister-in-law and niece Sri at the bedside.  10/20: On 2 L of oxygen.  10/21: The patient is planned for bronchoscopy tomorrow.  N.p.o. after midnight for the same.  He does have some air movement on the right lungs.  10/22: Barely perceptible breath sound 
weekend and high risk patient  Cont IV ativan prn anxiety state/tremors after nebs  Cont empiric Abx for now- Levaquin + Flagyl for now, added Vancomycin today with worsening leukocytosis  Cont Nebs changed to Xopenex to prevent overstimulation causing anxiety/tremors & tachycardia  Palliative consulted  10/16: Patient went down for her ultrasound-guided right pleural drain placement but there was not enough fluid.  I spoke with pulmonology JOE Lee, plan is to follow-up with cytology for now.  Patient is currently on IV Zosyn, vancomycin and Levaquin.  10/17: O2 requirement is up at 3 L.  I spoke with pulmonology JOE Lee, plan is to continue IV antibiotics for now.  Patient may need repeat CT of his chest to reassess for interval change of his pulmonary disease.  10/18: Patient is down to 2 L of oxygen.  Repeat CT chest abdomen pelvis was done and report is pending.  10/19: CT chest abdomen pelvis 10/18 shows:  1.  Near complete opacification of the right lung, which likely reflects a combination of airspace disease and pulmonary masses. Identification or measurement of discrete pulmonary masses is limited without intravenous contrast, but there appears to be a mass obstructing the bronchus intermedius as well as nodularity within the peripheral right upper lobe.   2.  Nodular interlobular septal thickening within the aerated portion of the right upper lobe suggests lymphangitic carcinomatosis. A moderate right pleural effusion may be malignant.   3.  Groundglass opacity within the aerated portions of the right middle and lower lobes suggests postobstructive infection.  Appreciate input from pulmonology and plan is for bronchoscopy on 10/22.  I updated patient's sister-in-law and niece Sri at the bedside.  10/20: On 2 L of oxygen.  10/21: The patient is planned for bronchoscopy tomorrow.  N.p.o. after midnight for the same.  He does have some air movement on the right lungs.  10/22: Barely perceptible 
frame/disposition:    Barriers to discharge: Cytology report from bronchoscopy          Janes Monique MD

## 2025-05-07 NOTE — PROGRESS NOTES
General Surgery End of Shift Nursing Note      Shift worked:  [id number]   Summary of shift:  No acute events. Issues for physician to address:  Pt having 2-3 liquid bm's a shift and skin is starting to become excoriated.      Number times ambulated in hallway past shift: 0    Number of times OOB to chair past shift: 0    Pain Management:  Current medication: Tylenol  Patient states pain is manageable on current pain medication: YES    GI:    Current diet:  DIET SNACKS HS Snack  DIET REGULAR  DIET NUTRITIONAL SUPPLEMENTS No; Dinner; Ensure Jmi-Catron current diet: YES  Passing flatus: YES  Last Bowel Movement: today   Appearance: LIQUID GREEN    Easton Vasquez RN Render Risk Assessment In Note?: no 5 Detail Level: Simple Note Text (......Xxx Chief Complaint.): This diagnosis correlates with the Other (Free Text): 02/2020 Other (Free Text): 2012 Other (Free Text): L inf forehead 2015, L forearm 2016. Other (Free Text): 2021

## (undated) DEVICE — SUTURE SZ 0 27IN 5/8 CIR UR-6  TAPER PT VIOLET ABSRB VICRYL J603H

## (undated) DEVICE — HANDLE LT SNAP ON ULT DURABLE LENS FOR TRUMPF ALC DISPOSABLE

## (undated) DEVICE — NON-REM POLYHESIVE PATIENT RETURN ELECTRODE: Brand: VALLEYLAB

## (undated) DEVICE — CULTURETTE SGL EVAC TUBE PALL -- 100/CA

## (undated) DEVICE — 1200 GUARD II KIT W/5MM TUBE W/O VAC TUBE: Brand: GUARDIAN

## (undated) DEVICE — HYPODERMIC SAFETY NEEDLE: Brand: MAGELLAN

## (undated) DEVICE — Device

## (undated) DEVICE — 40580 - THE PINK PAD - ADVANCED TRENDELENBURG POSITIONING KIT: Brand: 40580 - THE PINK PAD - ADVANCED TRENDELENBURG POSITIONING KIT

## (undated) DEVICE — SNARE ENDOSCP M L240CM W27MM SHTH DIA2.4MM CHN 2.8MM OVL

## (undated) DEVICE — SET ADMIN 16ML TBNG L100IN 2 Y INJ SITE IV PIGGY BK DISP

## (undated) DEVICE — SUTURE VCRL SZ 2-0 L36IN ABSRB VLT L36MM CT-1 1/2 CIR J345H

## (undated) DEVICE — PREP SKN CHLRAPRP APL 26ML STR --

## (undated) DEVICE — ZINACTIVE USE 2641837 CLIP LIG M BLU TI HRT SHP WIRE HORZ 600 PER BX

## (undated) DEVICE — 3M™ IOBAN™ 2 ANTIMICROBIAL INCISE DRAPE 6651EZ: Brand: IOBAN™ 2

## (undated) DEVICE — RELOAD STPL L45MM H2-4.1MM THCK TISS GRN GRIPPING SURF B

## (undated) DEVICE — SUTURING DEVICE: Brand: ENDO STITCH

## (undated) DEVICE — ACCESS PLATFORM FOR MINIMALLY INVASIVE SURGERY: Brand: GELPOINT®  MINI ADVANCED ACCESS PLATFORM

## (undated) DEVICE — (D)PREP SKN CHLRAPRP APPL 26ML -- CONVERT TO ITEM 371833

## (undated) DEVICE — FALCON® SAMPLE CONTAINER, WITH LID, 4.5OZ (110ML), INDIVIDUALLY WRAPPED, STERILE, 100/CASE: Brand: FALCON A CORNING BRAND

## (undated) DEVICE — ELECTRODE,RADIOTRANSLUCENT,FOAM,5PK: Brand: MEDLINE

## (undated) DEVICE — TOWEL SURG W17XL27IN STD BLU COT NONFENESTRATED PREWASHED

## (undated) DEVICE — SOLUTION IV 1000ML 0.9% SOD CHL

## (undated) DEVICE — SUTURE PERMAHAND SZ 3-0 L30IN NONABSORBABLE BLK SILK BRAID A304H

## (undated) DEVICE — LABEL MED CARD MRMC STRL

## (undated) DEVICE — OPEN-END URETERAL CATHETER: Brand: COOK

## (undated) DEVICE — YANKAUER,TAPERED BULBOUS TIP,W/O VENT: Brand: MEDLINE

## (undated) DEVICE — IV STRT KT 3282] LSL INDUSTRIES INC]

## (undated) DEVICE — SUT ETHLN 5-0 18IN P3 BLK --

## (undated) DEVICE — ACCESS PLATFORM FOR MINIMALLY INVASIVE SURGERY.: Brand: GELPORT® LAPAROSCOPIC  SYSTEM

## (undated) DEVICE — PICO 7 15CM X 15CM: Brand: PICO™ 7

## (undated) DEVICE — INFECTION CONTROL KIT SYS

## (undated) DEVICE — BLOCK BITE ENDOSCP AD 21 MM W/ DIL BLU LF DISP

## (undated) DEVICE — GDWIRE URET STR STD .035X150 -- ZIPWIRE STD

## (undated) DEVICE — SUTURE PROL SZ 2-0 L36IN NONABSORBABLE BLU SH L26MM 1/2 CIR 8523H

## (undated) DEVICE — CLIP LIG L TI MTL LIG SYS 24 COUNT HORZ

## (undated) DEVICE — BLADE ASSEMB CLP HAIR FINE --

## (undated) DEVICE — SURGICAL PROCEDURE PACK BASIN MAJ SET CUST NO CAUT

## (undated) DEVICE — CATHETER IV 20 GAX1 IN N WNG KINK RESIST INSYT AUTOGRD

## (undated) DEVICE — STERILE LATEX POWDER-FREE SURGICAL GLOVESWITH NITRILE COATING: Brand: PROTEXIS

## (undated) DEVICE — DRESSING,GAUZE,XEROFORM,CURAD,1"X8",ST: Brand: CURAD

## (undated) DEVICE — NEONATAL-ADULT SPO2 SENSOR: Brand: NELLCOR

## (undated) DEVICE — TUBING, SUCTION, 1/4" X 10', STRAIGHT: Brand: MEDLINE

## (undated) DEVICE — COVER,TABLE,60X90,STERILE: Brand: MEDLINE

## (undated) DEVICE — CONTAINER SPEC 20 ML LID NEUT BUFF FORMALIN 10 % POLYPR STS

## (undated) DEVICE — NEEDLE HYPO 18GA L1.5IN PNK S STL HUB POLYPR SHLD REG BVL

## (undated) DEVICE — Z DISCONTINUED PER MEDLINE LINE GAS SAMPLING O2/CO2 LNG AD 13 FT NSL W/ TBNG FILTERLINE

## (undated) DEVICE — SYRINGE MED 20ML STD CLR PLAS LUERSLIP TIP N CTRL DISP

## (undated) DEVICE — SOLIDIFIER FLD 2OZ 1500CC N DISINF IN BTL DISP SAFESORB

## (undated) DEVICE — SINGLE USE SUCTION VALVE MAJ-209: Brand: SINGLE USE SUCTION VALVE (STERILE)

## (undated) DEVICE — BANDAGE COBAN 4 IN COMPR W4INXL5YD FOAM COHESIVE QUIK STK SELF ADH SFT

## (undated) DEVICE — STERILE POLYISOPRENE POWDER-FREE SURGICAL GLOVES WITH EMOLLIENT COATING: Brand: PROTEXIS

## (undated) DEVICE — FORCEPS BX L100CM DIA1.8MM WRK CHN 2MM PULM S STL RAD JAW 4

## (undated) DEVICE — SET ADMIN 16ML TBNG L100IN 2 Y INJ SITE IV PIGGY BK DISP (ORDER IN MULIPLES OF 48)

## (undated) DEVICE — STRAINER URIN CALC RNL MSH -- CONVERT TO ITEM 357634

## (undated) DEVICE — TRAY PREP DRY W/ PREM GLV 2 APPL 6 SPNG 2 UNDPD 1 OVERWRAP

## (undated) DEVICE — CATH IV AUTOGRD BC PNK 20GA 25 -- INSYTE

## (undated) DEVICE — SYR 3ML LL TIP 1/10ML GRAD --

## (undated) DEVICE — TOTAL TRAY, 16FR 10ML SIL FOLEY, URN: Brand: MEDLINE

## (undated) DEVICE — INTRALOCK 4-WAY TRANSPARENT STOPCOCK: Brand: ICU MEDICAL

## (undated) DEVICE — BASIN EMSIS 16OZ GRAPHITE PLAS KID SHP MOLD GRAD FOR ORAL

## (undated) DEVICE — GOWN,SIRUS,FABRNF,XL,20/CS: Brand: MEDLINE

## (undated) DEVICE — SUTURE PROL 6 0 24 RB 2 ETHALLOY NDL DBL ARM D5166

## (undated) DEVICE — SENSOR PLSE OXMTR NEO AD 40KG ADH DISP NELLCOR

## (undated) DEVICE — TOWEL 4 PLY TISS 19X30 SUE WHT

## (undated) DEVICE — SOLUTION SCRB 2OZ 10% POVIDONE IOD ANTIMIC BTL

## (undated) DEVICE — SINGLE USE BIOPSY VALVE MAJ-210: Brand: SINGLE USE BIOPSY VALVE (STERILE)

## (undated) DEVICE — STERILE POLYISOPRENE POWDER-FREE SURGICAL GLOVES: Brand: PROTEXIS

## (undated) DEVICE — TROCAR: Brand: KII SLEEVE

## (undated) DEVICE — SUTURE MCRYL SZ 4-0 L27IN ABSRB UD L19MM PS-2 1/2 CIR PRIM Y426H

## (undated) DEVICE — SPONGE LAP 18X18IN STRL -- 5/PK

## (undated) DEVICE — STAPLER SKIN H3.9MM WIRE DIA0.58MM CRWN 6.9MM 35 STPL ROT

## (undated) DEVICE — SPONGE GZ W4XL4IN COT 12 PLY TYP VII WVN C FLD DSGN

## (undated) DEVICE — SYR 10ML LUER LOK 1/5ML GRAD --

## (undated) DEVICE — NEEDLE HYPO 25GA L1.5IN BVL ORIENTED ECLIPSE

## (undated) DEVICE — CLIP INT SM WIDE RED TI TRNSVRS GRV CHEVRON SHP W PRECIS

## (undated) DEVICE — SPONGE: SPECIALTY PEANUT XR 100/CS: Brand: MEDICAL ACTION INDUSTRIES

## (undated) DEVICE — DRAPE,REIN 53X77,STERILE: Brand: MEDLINE

## (undated) DEVICE — YANKAUER,BULB TIP,W/O VENT,RIGID,STERILE: Brand: MEDLINE

## (undated) DEVICE — MARYLAND JAW LAPAROSCOPIC SEALER/DIVIDER COATED: Brand: LIGASURE

## (undated) DEVICE — DRAPE,EXTREMITY,89X128,STERILE: Brand: MEDLINE

## (undated) DEVICE — VASCULAR-RICHMOND-LF: Brand: MEDLINE INDUSTRIES, INC.

## (undated) DEVICE — SUTURE VCRL SZ 3-0 L27IN ABSRB UD L26MM SH 1/2 CIR J416H

## (undated) DEVICE — PURSE STRING DEVICE: Brand: PURSTRING

## (undated) DEVICE — YANKAUER,POOLE TIP,STERILE,50/CS: Brand: MEDLINE

## (undated) DEVICE — TROCARS: Brand: KII® BALLOON BLUNT TIP SYSTEM

## (undated) DEVICE — DISPOSABLE CYTOLOGY BRUSH: Brand: DISPOSABLE CYTOLOGY BRUSH

## (undated) DEVICE — GOWN,PREVENTION PLUS,XL,ST,24/CS: Brand: MEDLINE

## (undated) DEVICE — CATH URET 5FRX70CM POLYUR -- CONVERT TO ITEM 106403

## (undated) DEVICE — SYRINGE MEDICAL 3ML CLEAR PLASTIC STANDARD NON CONTROL LUERLOCK TIP DISPOSABLE

## (undated) DEVICE — PACK,BASIC,SIRUS,V: Brand: MEDLINE

## (undated) DEVICE — DRAPE FLD WRM W44XL66IN C6L FOR INTRATEMP SYS THERMABASIN

## (undated) DEVICE — SUTURE VCRL SZ 2-0 L27IN ABSRB UD L26MM SH 1/2 CIR J417H

## (undated) DEVICE — SOL IRRIGATION INJ NACL 0.9% 500ML BTL

## (undated) DEVICE — SOLUTION IRRIG 1000ML H2O STRL BLT

## (undated) DEVICE — DISPOSABLE BIOPSY FORCEPS: Brand: DISPOSABLE BIOPSY FORCEPS

## (undated) DEVICE — REM POLYHESIVE ADULT PATIENT RETURN ELECTRODE: Brand: VALLEYLAB

## (undated) DEVICE — SWAB CULT LIQ STUART AGR AERB MOD IN BRK SGL RAYON TIP PLAS 220099] BECTON DICKINSON MICRO]

## (undated) DEVICE — SUTURE PROL SZ 5-0 L24IN NONABSORBABLE BLU RB-2 L13IN 1/2 8554H

## (undated) DEVICE — STRIP SKIN CLSR W0.25XL4IN WHT SPUNBOUND FBR NYL HI ADH

## (undated) DEVICE — SYRINGE MED 10ML LUERLOCK TIP W/O SFTY DISP

## (undated) DEVICE — LOOP VES MAXI YEL 2/PK

## (undated) DEVICE — SOLIDIFIER MEDC 1200ML -- CONVERT TO 356117

## (undated) DEVICE — JELLY,LUBE,STERILE,FLIP TOP,TUBE,4-OZ: Brand: MEDLINE

## (undated) DEVICE — BANDAGE,GAUZE,BULKEE II,4.5"X4.1YD,STRL: Brand: MEDLINE

## (undated) DEVICE — ROCKER SWITCH PENCIL HOLSTER: Brand: VALLEYLAB

## (undated) DEVICE — VISUALIZATION SYSTEM: Brand: CLEARIFY

## (undated) DEVICE — STOCKINETTE: Brand: DEROYAL

## (undated) DEVICE — GRASPER ENDOSCP TIP L10MM ANVIL ROT RATCH HNDL DISP

## (undated) DEVICE — CUFF BLD PRSS AD CLTH SGL TB W/ BAYNT CONN ROUNDED CORNER

## (undated) DEVICE — SUTURE PERMAHAND SZ 2-0 L30IN NONABSORBABLE BLK SILK W/O A305H

## (undated) DEVICE — CANISTER, RIGID, 3000CC: Brand: MEDLINE INDUSTRIES, INC.

## (undated) DEVICE — STAPLER INT L340MM 45MM STD 12 FIRING B FRM PWR + GRIPPING

## (undated) DEVICE — 3M™ IOBAN™ 2 ANTIMICROBIAL INCISE DRAPE 6650EZ: Brand: IOBAN™ 2

## (undated) DEVICE — SUT ETHLN 3-0 18IN PS2 BLK --

## (undated) DEVICE — TROCAR: Brand: KII FIOS FIRST ENTRY

## (undated) DEVICE — DRAPE,ROBOTICS,STERILE: Brand: MEDLINE

## (undated) DEVICE — TUBING IRRIG L77IN DIA0.241IN L BOR FOR CYSTO W/ NVENT

## (undated) DEVICE — ROCKER SWITCH PENCIL BLADE ELECTRODE, HOLSTER: Brand: EDGE

## (undated) DEVICE — TRAP,MUCUS SPECIMEN, 80CC: Brand: MEDLINE

## (undated) DEVICE — STRAP,POSITIONING,KNEE/BODY,FOAM,4X60": Brand: MEDLINE

## (undated) DEVICE — SUTURE PDS II SZ 0 L60IN ABSRB VLT L48MM CTX 1/2 CIR Z990G

## (undated) DEVICE — NEEDLE HYPO 22GA L1.5IN BLK POLYPR HUB S STL REG BVL STR

## (undated) DEVICE — GOWN,PLEAT,SPECIALTY,XL,STRL: Brand: MEDLINE